# Patient Record
Sex: FEMALE | Race: ASIAN | NOT HISPANIC OR LATINO | Employment: PART TIME | URBAN - METROPOLITAN AREA
[De-identification: names, ages, dates, MRNs, and addresses within clinical notes are randomized per-mention and may not be internally consistent; named-entity substitution may affect disease eponyms.]

---

## 2017-01-12 DIAGNOSIS — M54.2 NECK PAIN: Primary | ICD-10-CM

## 2017-02-13 DIAGNOSIS — Z30.011 ENCOUNTER FOR INITIAL PRESCRIPTION OF CONTRACEPTIVE PILLS: ICD-10-CM

## 2017-02-13 RX ORDER — NORGESTIMATE AND ETHINYL ESTRADIOL 0.25-0.035
1 KIT ORAL DAILY
Qty: 84 TABLET | Refills: 3 | Status: SHIPPED
Start: 2017-02-13 | End: 2017-05-05

## 2017-02-17 ENCOUNTER — OFFICE VISIT (OUTPATIENT)
Dept: FAMILY MEDICINE | Facility: CLINIC | Age: 27
End: 2017-02-17

## 2017-02-17 ENCOUNTER — CARE COORDINATION (OUTPATIENT)
Dept: FAMILY MEDICINE | Facility: CLINIC | Age: 27
End: 2017-02-17

## 2017-02-17 ENCOUNTER — TELEPHONE (OUTPATIENT)
Dept: FAMILY MEDICINE | Facility: CLINIC | Age: 27
End: 2017-02-17

## 2017-02-17 VITALS
BODY MASS INDEX: 29.17 KG/M2 | WEIGHT: 135.2 LBS | SYSTOLIC BLOOD PRESSURE: 108 MMHG | HEIGHT: 57 IN | OXYGEN SATURATION: 98 % | DIASTOLIC BLOOD PRESSURE: 73 MMHG | HEART RATE: 112 BPM | TEMPERATURE: 98 F

## 2017-02-17 DIAGNOSIS — K29.50 OTHER CHRONIC GASTRITIS WITHOUT HEMORRHAGE: ICD-10-CM

## 2017-02-17 DIAGNOSIS — F33.1 MAJOR DEPRESSIVE DISORDER, RECURRENT EPISODE, MODERATE (H): Primary | ICD-10-CM

## 2017-02-17 DIAGNOSIS — F41.1 GENERALIZED ANXIETY DISORDER: ICD-10-CM

## 2017-02-17 DIAGNOSIS — J30.89 SEASONAL ALLERGIC RHINITIS DUE TO OTHER ALLERGIC TRIGGER: ICD-10-CM

## 2017-02-17 DIAGNOSIS — H81.10 BPPV (BENIGN PAROXYSMAL POSITIONAL VERTIGO), UNSPECIFIED LATERALITY: ICD-10-CM

## 2017-02-17 DIAGNOSIS — K59.00 CONSTIPATION, UNSPECIFIED CONSTIPATION TYPE: ICD-10-CM

## 2017-02-17 RX ORDER — CALCIUM CARBONATE 500 MG/1
1 TABLET, CHEWABLE ORAL 2 TIMES DAILY
Qty: 150 TABLET | Refills: 1 | Status: SHIPPED | OUTPATIENT
Start: 2017-02-17 | End: 2017-08-03

## 2017-02-17 RX ORDER — CETIRIZINE HYDROCHLORIDE 10 MG/1
10 TABLET ORAL EVERY EVENING
Qty: 90 TABLET | Refills: 1 | Status: SHIPPED | OUTPATIENT
Start: 2017-02-17 | End: 2017-08-03

## 2017-02-17 RX ORDER — ASPIRIN 81 MG
100 TABLET, DELAYED RELEASE (ENTERIC COATED) ORAL 2 TIMES DAILY
Qty: 60 TABLET | Refills: 1 | Status: SHIPPED | OUTPATIENT
Start: 2017-02-17 | End: 2017-08-03

## 2017-02-17 RX ORDER — FLUOXETINE 40 MG/1
40 CAPSULE ORAL DAILY
Qty: 60 CAPSULE | Refills: 1 | Status: SHIPPED | OUTPATIENT
Start: 2017-02-17 | End: 2017-04-03

## 2017-02-17 RX ORDER — MECLIZINE HYDROCHLORIDE 25 MG/1
25 TABLET ORAL EVERY 6 HOURS PRN
Qty: 30 TABLET | Refills: 1 | Status: SHIPPED | OUTPATIENT
Start: 2017-02-17 | End: 2017-08-03

## 2017-02-17 RX ORDER — DIPHENHYDRAMINE HCL 25 MG
25 TABLET ORAL EVERY 6 HOURS PRN
Qty: 60 TABLET | Refills: 1 | Status: SHIPPED | OUTPATIENT
Start: 2017-02-17 | End: 2017-08-03

## 2017-02-17 RX ORDER — HYDROXYZINE HYDROCHLORIDE 25 MG/1
25 TABLET, FILM COATED ORAL
Qty: 30 TABLET | Refills: 1 | Status: SHIPPED | OUTPATIENT
Start: 2017-02-17 | End: 2017-04-06

## 2017-02-17 ASSESSMENT — ANXIETY QUESTIONNAIRES
1. FEELING NERVOUS, ANXIOUS, OR ON EDGE: NEARLY EVERY DAY
3. WORRYING TOO MUCH ABOUT DIFFERENT THINGS: NEARLY EVERY DAY
2. NOT BEING ABLE TO STOP OR CONTROL WORRYING: NEARLY EVERY DAY
IF YOU CHECKED OFF ANY PROBLEMS ON THIS QUESTIONNAIRE, HOW DIFFICULT HAVE THESE PROBLEMS MADE IT FOR YOU TO DO YOUR WORK, TAKE CARE OF THINGS AT HOME, OR GET ALONG WITH OTHER PEOPLE: SOMEWHAT DIFFICULT
7. FEELING AFRAID AS IF SOMETHING AWFUL MIGHT HAPPEN: MORE THAN HALF THE DAYS
6. BECOMING EASILY ANNOYED OR IRRITABLE: MORE THAN HALF THE DAYS
5. BEING SO RESTLESS THAT IT IS HARD TO SIT STILL: SEVERAL DAYS
GAD7 TOTAL SCORE: 16

## 2017-02-17 ASSESSMENT — PATIENT HEALTH QUESTIONNAIRE - PHQ9: 5. POOR APPETITE OR OVEREATING: MORE THAN HALF THE DAYS

## 2017-02-17 NOTE — PROGRESS NOTES
HPI:       Hayde Macias is a 26 year old  female with a significant past medical history of anxiety and depression who presents for follow up of concern(s) listed below    1. Worsening depression  - Health insurance was interrupted, which was a known problem at her last visit  - Attempted to provide her with enough antidepressant medication to cover while insurance was instituted, but Hayde ran out of medications about 2-3 weeks ago  - Worsening symptoms include decreased sleep, decreased appetite, feelings of guilt and worthlessness, increased anxiety, increased feeling of impending doom  - Yet again brings in paperwork regarding immigration and citizenship status.  Unsure what this specific document is eluding to, but it reads as a possible decline of citizenship - Hayde became tearful at this possible notion.  Our  helped with this, printed off the documents the letter referred to  - Has not seen her therapist in about 2 months time either, notes the insurance problem and ride difficulty    2. Burning epigastric abdominal pain  - Worsening epigastric abdominal pain  - Described as a burning sensation that is worse with eating  - No vomiting         PMHX:     Patient Active Problem List   Diagnosis     Health Care Home     Generalized anxiety disorder     Major depressive disorder, recurrent episode, moderate (H)     Seasonal allergic rhinitis       Current Outpatient Prescriptions   Medication Sig Dispense Refill     norgestimate-ethinyl estradiol (ORTHO-CYCLEN, SPRINTEC) 0.25-35 MG-MCG per tablet Take 1 tablet by mouth daily 84 tablet 3     menthol, Topical Analgesic, 2.5% (BENGAY VANISHIN SCENT) 2.5 % GEL topical gel Apply topically every 6 hours as needed for moderate pain 1 Tube 1     acetaminophen (TYLENOL) 325 MG tablet Take 2 tablets (650 mg) by mouth every 4 hours as needed for mild pain 100 tablet 0     calcium carbonate (TUMS) 500 MG chewable tablet Take 1 tablet (500 mg) by mouth 2  "times daily 150 tablet 1     cetirizine (ZYRTEC) 10 MG tablet Take 1 tablet (10 mg) by mouth every evening 90 tablet 1     diphenhydrAMINE (BENADRYL) 25 MG tablet Take 1 tablet (25 mg) by mouth every 6 hours as needed for itching or allergies 60 tablet 1     docusate sodium (COLACE) 100 MG tablet Take 100 mg by mouth 2 times daily 60 tablet 1     FLUoxetine (PROZAC) 40 MG capsule Take 1 capsule (40 mg) by mouth daily 60 capsule 1     fluticasone (FLONASE) 50 MCG/ACT spray Spray 1-2 sprays into both nostrils daily 16 g 2     hydrOXYzine (ATARAX) 25 MG tablet Take 1 tablet (25 mg) by mouth nightly as needed for anxiety 30 tablet 1     meclizine (ANTIVERT) 25 MG tablet Take 1 tablet (25 mg) by mouth every 6 hours as needed for dizziness 30 tablet 1     ranitidine (ZANTAC) 150 MG tablet Take 1 tablet (150 mg) by mouth 2 times daily 60 tablet 1     polyvinyl alcohol (LIQUIFILM TEARS) 1.4 % ophthalmic solution Place 1 drop into both eyes as needed for dry eyes 15 mL 1          Allergies   Allergen Reactions     Nkda [No Known Drug Allergies]        No results found for this or any previous visit (from the past 24 hour(s)).         SHx:   No tobacco use         Review of Systems:   Constitutional, HEENT, cardiovascular, pulmonary, gi and gu systems are negative, except as otherwise noted.          Physical Exam:     Vitals:    02/17/17 1541   BP: 108/73   BP Location: Right arm   Patient Position: Chair   Cuff Size: Adult Regular   Pulse: 112   Temp: 98  F (36.7  C)   TempSrc: Oral   SpO2: 98%   Weight: 135 lb 3.2 oz (61.3 kg)   Height: 4' 9.25\" (145.4 cm)     Body mass index is 29 kg/(m^2).    GENERAL APPEARANCE: healthy, alert and no distress  EYES: Eyes grossly normal to inspection, conjunctivae and sclerae normal  HENT: ear canals and TM's normal, nose and mouth without ulcers or lesions, oropharynx clear and oral mucous membranes moist  NECK: no adenopathy, no asymmetry, masses, or scars and thyroid normal to " palpation  RESP: lungs clear to auscultation - no rales, rhonchi or wheezes  CV: regular rates and rhythm, normal S1 S2, no S3 or S4, no murmur, click or rub, no peripheral edema and peripheral pulses strong  ABDOMEN: soft, nontender, no hepatosplenomegaly, no masses and bowel sounds normal  MS: no musculoskeletal defects are noted and gait is age appropriate without ataxia  SKIN: no suspicious lesions or rashes  PSYCH: mentation appears normal, and affect depressed, congruent with mood    Office Visit on 12/09/2016   Component Date Value Ref Range Status     Rapid Strep A Screen 12/09/2016 POSITIVE  Negative Final       Assessment and Plan     (F33.1) Major depressive disorder, recurrent episode, moderate (H)  (primary encounter diagnosis), (F41.1) Generalized anxiety disorder  Comment:   Plan: FLUoxetine (PROZAC) 40 MG capsule, hydrOXYzine (ATARAX) 25 MG tablet, diphenhydrAMINE (BENADRYL) 25 MG tablet        Multifactorial causes of her worsening symptoms.  Ran out of prozac, restarting.  No need to titrate at this time.  Encouraged return to her therapist for increased visits.  Social stressors regarding immigration and citizenship status is a recurrent theme.  We have tried to help in multiple ways.  Continue to support.  Encouraged contact with [facility] for continued support through this, especially regarding interpreting letter's written in English that are sent to her    (K29.50) Other chronic gastritis without hemorrhage  Comment:   Plan: ranitidine (ZANTAC) 150 MG tablet, calcium         carbonate (TUMS) 500 MG chewable tablet        Known gastritis/GERD.  Without medications for 2-3 weeks, with symptoms resuming.  Not on NSAIDs anymore at this time.  Restart zantac and tums and monitor for relief of symptoms.    (H81.10) BPPV (benign paroxysmal positional vertigo), unspecified laterality  Comment:   Plan: meclizine (ANTIVERT) 25 MG tablet        Refilled medication    (K59.00) Constipation,  unspecified constipation type  Comment:   Plan: docusate sodium (COLACE) 100 MG tablet        Refilled medication    (J30.89) Seasonal allergic rhinitis due to other allergic trigger  Comment:   Plan: cetirizine (ZYRTEC) 10 MG tablet        Refilled medication      Options for treatment and follow-up care were reviewed with the patient and/or guardian. Hayde LUJAN Say and/or guardian engaged in the decision making process and verbalized understanding of the options discussed and agreed with the final plan.    Carlos Manuel Rivas MD      Precepted today with: Herb Spencer MD  Preceptor Attestation:  I saw and evaluated the patient.  I reviewed the resident physician's history, exam, and treatment plan; and I agree with the documentation by the resident physician.  Supervising Physician:  Herb Spencer MD

## 2017-02-17 NOTE — NURSING NOTE
PHQ9--given to  to help pt fill out for MD     name: William Lacy  Language: Maryann  Agency: [employer]  Phone number: [phone]

## 2017-02-17 NOTE — MR AVS SNAPSHOT
"              After Visit Summary   2/17/2017    Hayde LUJAN Say    MRN: [id number]           Patient Information     Date Of Birth          1990        Visit Information        Provider Department      2/17/2017 3:40 PM Carlos Manuel Rivas MD [facility]        Today's Diagnoses     Major depressive disorder, recurrent episode, moderate (H)    -  1    Generalized anxiety disorder        Other chronic gastritis without hemorrhage        BPPV (benign paroxysmal positional vertigo), unspecified laterality        Constipation, unspecified constipation type        Seasonal allergic rhinitis due to other allergic trigger           Follow-ups after your visit        Follow-up notes from your care team     Return in about 3 weeks (around 3/10/2017) for Recheck Condition Status.      Who to contact     Please call your clinic at [phone] to:    Ask questions about your health    Make or cancel appointments    Discuss your medicines    Learn about your test results    Speak to your doctor   If you have compliments or concerns about an experience at your clinic, or if you wish to file a complaint, please contact [facility] Patient Relations at [phone] or email us at [email or web address]         Additional Information About Your Visit        Care EveryWhere ID     This is your Care EveryWhere ID. This could be used by other organizations to access your [facility] medical records  [id number]        Your Vitals Were     Pulse Temperature Height Pulse Oximetry BMI (Body Mass Index)       112 98  F (36.7  C) (Oral) 4' 9.25\" (145.4 cm) 98% 29 kg/m2        Blood Pressure from Last 3 Encounters:   02/17/17 108/73   12/30/16 129/75   12/09/16 109/69    Weight from Last 3 Encounters:   02/17/17 135 lb 3.2 oz (61.3 kg)   12/30/16 133 lb 9.6 oz (60.6 kg)   12/09/16 135 lb (61.2 kg)              Today, you had the following     No orders found for display         Where to get " your medicines      These medications were sent to [facility][id number] - [place], [place] - [address]  [address] [address]     Phone:  [phone]     calcium carbonate 500 MG chewable tablet    cetirizine 10 MG tablet    diphenhydrAMINE 25 MG tablet    docusate sodium 100 MG tablet    FLUoxetine 40 MG capsule    hydrOXYzine 25 MG tablet    meclizine 25 MG tablet    ranitidine 150 MG tablet          Primary Care Provider Office Phone # Fax #    Carlos Manuel Rivas [phone]       [facility] [address] [address]        Thank you!     Thank you for choosing [facility]  for your care. Our goal is always to provide you with excellent care. Hearing back from our patients is one way we can continue to improve our services. Please take a few minutes to complete the written survey that you may receive in the mail after your visit with us. Thank you!             Your Updated Medication List - Protect others around you: Learn how to safely use, store and throw away your medicines at www.disposemymeds.org.          This list is accurate as of: 2/17/17 11:59 PM.  Always use your most recent med list.                   Brand Name Dispense Instructions for use    acetaminophen 325 MG tablet    TYLENOL    100 tablet    Take 2 tablets (650 mg) by mouth every 4 hours as needed for mild pain       calcium carbonate 500 MG chewable tablet    TUMS    150 tablet    Take 1 tablet (500 mg) by mouth 2 times daily       cetirizine 10 MG tablet    zyrTEC    90 tablet    Take 1 tablet (10 mg) by mouth every evening       diphenhydrAMINE 25 MG tablet    BENADRYL    60 tablet    Take 1 tablet (25 mg) by mouth every 6 hours as needed for itching or allergies       docusate sodium 100 MG tablet    COLACE    60 tablet    Take 100 mg by mouth 2 times daily       FLUoxetine 40 MG capsule    PROzac    60 capsule    Take 1 capsule (40 mg) by mouth daily       fluticasone  50 MCG/ACT spray    FLONASE    16 g    Spray 1-2 sprays into both nostrils daily       hydrOXYzine 25 MG tablet    ATARAX    30 tablet    Take 1 tablet (25 mg) by mouth nightly as needed for anxiety       meclizine 25 MG tablet    ANTIVERT    30 tablet    Take 1 tablet (25 mg) by mouth every 6 hours as needed for dizziness       menthol (Topical Analgesic) 2.5% 2.5 % Gel topical gel    BENGAY VANISHIN SCENT    1 Tube    Apply topically every 6 hours as needed for moderate pain       norgestimate-ethinyl estradiol 0.25-35 MG-MCG per tablet    ORTHO-CYCLEN, SPRINTEC    84 tablet    Take 1 tablet by mouth daily       polyvinyl alcohol 1.4 % ophthalmic solution    LIQUIFILM TEARS    15 mL    Place 1 drop into both eyes as needed for dry eyes       ranitidine 150 MG tablet    ZANTAC    60 tablet    Take 1 tablet (150 mg) by mouth 2 times daily

## 2017-02-17 NOTE — PROGRESS NOTES
Met with pt and   My perception of her was she appeared very angry  She said she wanted to go to [facility], but when she goes they do not have a karis  there and she can not communicate then, and it makes her angry    Told her I would see what I could do to set up an appt on a tues or wed at or around 9am with karis  per her request- however agency was closed    She will also need a ride set up - prefers her  to be karis as well if possible  lbetz

## 2017-02-18 ASSESSMENT — ANXIETY QUESTIONNAIRES: GAD7 TOTAL SCORE: 16

## 2017-02-18 ASSESSMENT — PATIENT HEALTH QUESTIONNAIRE - PHQ9: SUM OF ALL RESPONSES TO PHQ QUESTIONS 1-9: 13

## 2017-02-20 ENCOUNTER — TELEPHONE (OUTPATIENT)
Dept: FAMILY MEDICINE | Facility: CLINIC | Age: 27
End: 2017-02-20

## 2017-02-20 NOTE — TELEPHONE ENCOUNTER
I got a message to help the pt set up an appointment with her therapist at [facility].  They stated that they needed the pt to be seen either Tuesday or Wednesday at 9:00am.  I called [facility] to set up this appointment, they did not have any available for the times and days this week.  They did have it available for 9:00am next Wednesday, which I set the pt up for.      I called the pt and informed her of her appointment, and that I will set up a ride for the pt.      I called [facility] to set up the ride, but was not able to set up till the end of this month.  I will have to call them at the end of the month.

## 2017-03-14 ENCOUNTER — TELEPHONE (OUTPATIENT)
Dept: FAMILY MEDICINE | Facility: CLINIC | Age: 27
End: 2017-03-14

## 2017-03-14 NOTE — TELEPHONE ENCOUNTER
Call from Public Health Nurse Ashley [phone] wanting to go over patient's medications as she states that Hayde is taking all of her prn's at the same time. She questions if patient is on Dramamine and I inform her this is not on her list.

## 2017-03-24 ENCOUNTER — OFFICE VISIT (OUTPATIENT)
Dept: FAMILY MEDICINE | Facility: CLINIC | Age: 27
End: 2017-03-24

## 2017-03-24 VITALS
BODY MASS INDEX: 29.43 KG/M2 | WEIGHT: 136.4 LBS | SYSTOLIC BLOOD PRESSURE: 123 MMHG | TEMPERATURE: 100.9 F | HEART RATE: 140 BPM | HEIGHT: 57 IN | DIASTOLIC BLOOD PRESSURE: 74 MMHG

## 2017-03-24 DIAGNOSIS — J02.0 ACUTE STREPTOCOCCAL PHARYNGITIS: Primary | ICD-10-CM

## 2017-03-24 DIAGNOSIS — R07.0 THROAT PAIN: ICD-10-CM

## 2017-03-24 DIAGNOSIS — R10.9 FLANK PAIN: ICD-10-CM

## 2017-03-24 LAB
BACTERIA URINE: ABNORMAL HPF
BILIRUB UR QL STRIP: NEGATIVE
CLARITY, URINE: ABNORMAL
COLOR UR AUTO: YELLOW
GLUCOSE UR QL STRIP: NEGATIVE
HGB UR QL STRIP: NEGATIVE
KETONES UR QL STRIP: ABNORMAL
LEUKOCYTE ESTERASE UR QL STRIP: ABNORMAL
Lab: PRESENT
MUCOUS THREADS: ABNORMAL LPF
NITRITE UR QL STRIP: NEGATIVE
PH UR STRIP: 8 [PH] (ref 4.5–8)
PROT UR QL STRIP: NEGATIVE MG/DL
RBC, UR MICRO: ABNORMAL HPF
S PYO AG THROAT QL IA.RAPID: POSITIVE
SP GR UR STRIP: 1.01 (ref 1–1.03)
SQUAMOUS EPITHELIAL, UR.: >100 LPF
UROBILINOGEN UR QL STRIP: ABNORMAL
WBC, URINE MICROSCOPIC: ABNORMAL HPF

## 2017-03-24 RX ORDER — CEFTRIAXONE 1 G/1
1000 INJECTION, POWDER, FOR SOLUTION INTRAMUSCULAR; INTRAVENOUS ONCE
Qty: 1 EACH | Refills: 0 | OUTPATIENT
Start: 2017-03-24 | End: 2017-03-24

## 2017-03-24 NOTE — MR AVS SNAPSHOT
"              After Visit Summary   3/24/2017    Hayde LUJAN Say    MRN: [id number]           Patient Information     Date Of Birth          1990        Visit Information        Provider Department      3/24/2017 10:00 AM Suyapa Mittal MD [facility]        Today's Diagnoses     Throat pain    -  1    Flank pain        Acute streptococcal pharyngitis          Care Instructions    Take Augmentin 1 pill twice a day for 10 days  Go to the emergency department if your symptoms worsen or you have trouble breathing.  Return for follow-up visit on Monday        Follow-ups after your visit        Who to contact     Please call your clinic at [phone] to:    Ask questions about your health    Make or cancel appointments    Discuss your medicines    Learn about your test results    Speak to your doctor   If you have compliments or concerns about an experience at your clinic, or if you wish to file a complaint, please contact [facility] [place] [place] [facility] Patient Relations at [phone] or email us at [email or web address]         Additional Information About Your Visit        Care EveryWhere ID     This is your Care EveryWhere ID. This could be used by other organizations to access your [facility] medical records  [id number]        Your Vitals Were     Pulse Temperature Height BMI (Body Mass Index)          140 100.9  F (38.3  C) 4' 9\" (144.8 cm) 29.52 kg/m2         Blood Pressure from Last 3 Encounters:   03/24/17 123/74   02/17/17 108/73   12/30/16 129/75    Weight from Last 3 Encounters:   03/24/17 136 lb 6.4 oz (61.9 kg)   02/17/17 135 lb 3.2 oz (61.3 kg)   12/30/16 133 lb 9.6 oz (60.6 kg)              We Performed the Following     CBC with Plt ([facility])     cefTRIAXone (ROCEPHIN) 1 G IM ([facility])     INJECTION INTRAMUSCULAR OR SUB-Q     Rapid Strep Screen (Group) ([facility])     Urinalysis-UC If Indicate ([facility])          Today's Medication Changes          These changes are " accurate as of: 3/24/17 11:22 AM.  If you have any questions, ask your nurse or doctor.               Start taking these medicines.        Dose/Directions    amoxicillin-clavulanate 875-125 MG per tablet   Commonly known as:  AUGMENTIN   Used for:  Acute streptococcal pharyngitis   Started by:  Suyapa Mittal MD        Dose:  1 tablet   Take 1 tablet by mouth 2 times daily   Quantity:  20 tablet   Refills:  0       cefTRIAXone 1 GM vial   Commonly known as:  ROCEPHIN   Used for:  Throat pain   Started by:  Suyapa Mittal MD        Dose:  1000 mg   Inject 1 g (1,000 mg) into the vein once for 1 dose   Quantity:  1 each   Refills:  0       methylprednisoLONE acetate 40 MG/ML injection   Commonly known as:  DEPO-MEDROL   Used for:  Throat pain   Started by:  Suyapa Mittal MD        80 mg/ml 80 mg IM once   Quantity:  1 mL   Refills:  0            Where to get your medicines      These medications were sent to [id number] - [place], [place] - [address] [address] [address] [address]     Phone:  [phone]     amoxicillin-clavulanate 875-125 MG per tablet         Some of these will need a paper prescription and others can be bought over the counter.  Ask your nurse if you have questions.     You don't need a prescription for these medications     cefTRIAXone 1 GM vial    methylprednisoLONE acetate 40 MG/ML injection                Primary Care Provider Office Phone # Fax #    Carlos Manuel Rivas [phone]       [facility] [address] [address]        Thank you!     Thank you for choosing [facility]  for your care. Our goal is always to provide you with excellent care. Hearing back from our patients is one way we can continue to improve our services. Please take a few minutes to complete the written survey that you may receive in the mail after your visit with us. Thank you!             Your Updated Medication List - Protect others  around you: Learn how to safely use, store and throw away your medicines at www.disposemymeds.org.          This list is accurate as of: 3/24/17 11:22 AM.  Always use your most recent med list.                   Brand Name Dispense Instructions for use    acetaminophen 325 MG tablet    TYLENOL    100 tablet    Take 2 tablets (650 mg) by mouth every 4 hours as needed for mild pain       amoxicillin-clavulanate 875-125 MG per tablet    AUGMENTIN    20 tablet    Take 1 tablet by mouth 2 times daily       calcium carbonate 500 MG chewable tablet    TUMS    150 tablet    Take 1 tablet (500 mg) by mouth 2 times daily       cefTRIAXone 1 GM vial    ROCEPHIN    1 each    Inject 1 g (1,000 mg) into the vein once for 1 dose       cetirizine 10 MG tablet    zyrTEC    90 tablet    Take 1 tablet (10 mg) by mouth every evening       diphenhydrAMINE 25 MG tablet    BENADRYL    60 tablet    Take 1 tablet (25 mg) by mouth every 6 hours as needed for itching or allergies       docusate sodium 100 MG tablet    COLACE    60 tablet    Take 100 mg by mouth 2 times daily       FLUoxetine 40 MG capsule    PROzac    60 capsule    Take 1 capsule (40 mg) by mouth daily       fluticasone 50 MCG/ACT spray    FLONASE    16 g    Spray 1-2 sprays into both nostrils daily       hydrOXYzine 25 MG tablet    ATARAX    30 tablet    Take 1 tablet (25 mg) by mouth nightly as needed for anxiety       meclizine 25 MG tablet    ANTIVERT    30 tablet    Take 1 tablet (25 mg) by mouth every 6 hours as needed for dizziness       menthol (Topical Analgesic) 2.5% 2.5 % Gel topical gel    BENGAY VANISHIN SCENT    1 Tube    Apply topically every 6 hours as needed for moderate pain       methylprednisoLONE acetate 40 MG/ML injection    DEPO-MEDROL    1 mL    80 mg/ml 80 mg IM once       norgestimate-ethinyl estradiol 0.25-35 MG-MCG per tablet    ORTHO-CYCLEN, SPRINTEC    84 tablet    Take 1 tablet by mouth daily       polyvinyl alcohol 1.4 % ophthalmic solution     LIQUIFILM TEARS    15 mL    Place 1 drop into both eyes as needed for dry eyes       ranitidine 150 MG tablet    ZANTAC    60 tablet    Take 1 tablet (150 mg) by mouth 2 times daily

## 2017-03-24 NOTE — PROGRESS NOTES
HPI:       Hayde Macias is a 26 year old  female with a significant past medical history of anxiety, depression who presents for the new concern(s) of:    #Headache,throat , low back pain:  Started 2 days ago  -Having subjective fevers as well- has pain with swallowing, more severe on the left side.  No sick contacts.  No cough.  No dysuria or flank pain  -No trouble breathing, pain with swallowing but she is able to take sips  -Low back pain:  Present off and on for several months  No vision changes, no syncope  -Frontal headache, no neck pain or trouble with ROM, no vision changes    No vomiting or diarrhea.  No cough or SOB.  She has had this throat pain before, treated for strep on chart review    Maryann  used for entire visit              PMHX:     Patient Active Problem List   Diagnosis     Health Care Home     Generalized anxiety disorder     Major depressive disorder, recurrent episode, moderate (H)     Seasonal allergic rhinitis       Current Outpatient Prescriptions   Medication Sig Dispense Refill     ranitidine (ZANTAC) 150 MG tablet Take 1 tablet (150 mg) by mouth 2 times daily 60 tablet 1     meclizine (ANTIVERT) 25 MG tablet Take 1 tablet (25 mg) by mouth every 6 hours as needed for dizziness 30 tablet 1     hydrOXYzine (ATARAX) 25 MG tablet Take 1 tablet (25 mg) by mouth nightly as needed for anxiety 30 tablet 1     FLUoxetine (PROZAC) 40 MG capsule Take 1 capsule (40 mg) by mouth daily 60 capsule 1     docusate sodium (COLACE) 100 MG tablet Take 100 mg by mouth 2 times daily 60 tablet 1     diphenhydrAMINE (BENADRYL) 25 MG tablet Take 1 tablet (25 mg) by mouth every 6 hours as needed for itching or allergies 60 tablet 1     cetirizine (ZYRTEC) 10 MG tablet Take 1 tablet (10 mg) by mouth every evening 90 tablet 1     calcium carbonate (TUMS) 500 MG chewable tablet Take 1 tablet (500 mg) by mouth 2 times daily 150 tablet 1     norgestimate-ethinyl estradiol (ORTHO-CYCLEN, SPRINTEC)  "0.25-35 MG-MCG per tablet Take 1 tablet by mouth daily 84 tablet 3     menthol, Topical Analgesic, 2.5% (BENGAY VANISHIN SCENT) 2.5 % GEL topical gel Apply topically every 6 hours as needed for moderate pain 1 Tube 1     acetaminophen (TYLENOL) 325 MG tablet Take 2 tablets (650 mg) by mouth every 4 hours as needed for mild pain 100 tablet 0     fluticasone (FLONASE) 50 MCG/ACT spray Spray 1-2 sprays into both nostrils daily 16 g 2     polyvinyl alcohol (LIQUIFILM TEARS) 1.4 % ophthalmic solution Place 1 drop into both eyes as needed for dry eyes 15 mL 1          Allergies   Allergen Reactions     Nkda [No Known Drug Allergies]        No results found for this or any previous visit (from the past 24 hour(s)).         Review of Systems:   5-Point Review of Systems Negative -- Except as noted above.          Physical Exam:     Vitals:    03/24/17 1009   BP: 123/74   Pulse: 140   Temp: 100.9  F (38.3  C)   Weight: 136 lb 6.4 oz (61.9 kg)   Height: 4' 9\" (144.8 cm)     Body mass index is 29.52 kg/(m^2).    Gen patient laying on exam table in the fetal position when I arrive in the room.  She does not sit up or change positions while we talk.  She is tearful and crying in pain.  HEENT grossly purulent left tonsil, right tonsil edematous with white lesion.  Voice is clear, not muffled.  Uvula is midline.  No assymetry or neck fullness, pain with palpation of anterior cervical lymph nodes on the left, no crepitus. Pain with opening mouth.  No stridor.  Neck no nuchal rigidity, negative Kernig's  Heart tachycardic to 140s, regular no murmurs  Lungs clear no wheezing or crackles  Abd no pain rebound or guarding  Back no CVA tenderness, diffuse TTP along paraspinal muscles of lumbar spine  Neuro PERRL, CN grossly normal    Assessment and Plan     Severe strep positive pharyngitis, visible purulence on exam, cannot rule out tonsillar/peritonsillar abscess or deeper soft tissue infection.  Patient in obvious discomfort, tearful " and yelling out.  She is febrile and tachycardic, she is not hypotensive.  I did recommend that patient seek care at the emergency department but she refused, stating she is scared- she was not able to elaborate on why she was scared.  Reviewed risk of worsening infection, spreading to neck and rest of body, risk of sepsis if untreated.  Also recommended CT scan at [facility] which patient refused.  Communicated through karis .  Back pain seems chronic and unrelated to current illness.  No s/s meningitis.  -Patient was given 1g IM rocephin and 80 mg IM injection of methylprednisolone for anti-inflammatory benefit.  Rx sent for Augmentin BID x 10 days .   -Patient refused a blood draw, refused to leave a urine sample, refused imaging at [facility].  -I discussed with patient that she needs to seek care at the ED if pain/symptoms are not improving over the next few hours.  Patient to f/u here in clinic on Monday.    Options for treatment and follow-up care were reviewed with the patient and/or guardian. Hayde LUJAN Say and/or guardian engaged in the decision making process and verbalized understanding of the options discussed and agreed with the final plan.      Suyapa Mittal MD      Precepted today with: Jillian Degroot MD

## 2017-03-24 NOTE — PATIENT INSTRUCTIONS
Take Augmentin 1 pill twice a day for 10 days  Go to the emergency department if your symptoms worsen or you have trouble breathing.  Return for follow-up visit on Monday

## 2017-03-24 NOTE — NURSING NOTE
The following medication was given:     MEDICATION: Rocephin 1 gram and Lidocaine 2.1cc  ROUTE: IM  SITE: Vastus Lateralis - Right  DOSE: 1 gram  LOT #: [id number]  :  Hospira  EXPIRATION DATE:  07/01/2019  NDC: 8358-3536-18    The following medication was given:     MEDICATION: Depo Medrol 80mg  ROUTE: IM  SITE: Left upper glute  DOSE: 80 mg/ml  LOT #: [id number]   :  BzzAgent  EXPIRATION DATE:  09/2017  NDC: 3098-2182-88    Allergy: NKDA  Verified both medications with Suma TEJADA on 5Rs. Told pt to wait 20 minutes in clinic for allergy reaction.

## 2017-03-25 LAB — CULTURE: NORMAL

## 2017-03-28 NOTE — PROGRESS NOTES
Please call:    Jarvis Lock  Your urine test is normal, there is no infection.  How are you feeling?  Are you taking the augmentin pills?  Please return for a visit if you are still feeling ill.  Thank you  Dr Mittal

## 2017-03-31 DIAGNOSIS — F41.1 GENERALIZED ANXIETY DISORDER: ICD-10-CM

## 2017-03-31 DIAGNOSIS — F33.1 MAJOR DEPRESSIVE DISORDER, RECURRENT EPISODE, MODERATE (H): ICD-10-CM

## 2017-03-31 RX ORDER — FLUOXETINE 40 MG/1
40 CAPSULE ORAL DAILY
Qty: 60 CAPSULE | Refills: 1 | Status: CANCELLED | OUTPATIENT
Start: 2017-03-31

## 2017-03-31 RX ORDER — HYDROXYZINE HYDROCHLORIDE 25 MG/1
25 TABLET, FILM COATED ORAL
Qty: 30 TABLET | Refills: 1 | Status: CANCELLED | OUTPATIENT
Start: 2017-03-31

## 2017-03-31 NOTE — PROGRESS NOTES
Preceptor Attestation:  Patient's case reviewed and discussed with Suyapa Mittal MD Patient seen and discussed with the resident.. I agree with assessment and plan of care.  Supervising Physician:  Jillian Degroot MD  [facility]

## 2017-03-31 NOTE — TELEPHONE ENCOUNTER
The pt had called her , and the pt  had informed me about the pt needing medication refill.  The pt stated that she needs all her depression medication refilled.  She thought the doctor had refilled it, but when she went to the pharmacy, they did not have it.

## 2017-04-03 DIAGNOSIS — F33.1 MAJOR DEPRESSIVE DISORDER, RECURRENT EPISODE, MODERATE (H): ICD-10-CM

## 2017-04-03 RX ORDER — FLUOXETINE 40 MG/1
40 CAPSULE ORAL DAILY
Qty: 90 CAPSULE | Refills: 1 | Status: SHIPPED | OUTPATIENT
Start: 2017-04-03 | End: 2017-08-03

## 2017-04-06 ENCOUNTER — OFFICE VISIT (OUTPATIENT)
Dept: FAMILY MEDICINE | Facility: CLINIC | Age: 27
End: 2017-04-06

## 2017-04-06 VITALS
HEIGHT: 58 IN | HEART RATE: 72 BPM | TEMPERATURE: 97.8 F | SYSTOLIC BLOOD PRESSURE: 101 MMHG | RESPIRATION RATE: 20 BRPM | DIASTOLIC BLOOD PRESSURE: 66 MMHG | WEIGHT: 133 LBS | OXYGEN SATURATION: 99 % | BODY MASS INDEX: 27.92 KG/M2

## 2017-04-06 DIAGNOSIS — M54.2 NECK PAIN: ICD-10-CM

## 2017-04-06 DIAGNOSIS — H10.13 ALLERGIC CONJUNCTIVITIS OF BOTH EYES: ICD-10-CM

## 2017-04-06 DIAGNOSIS — G89.29 CHRONIC MIDLINE LOW BACK PAIN WITHOUT SCIATICA: ICD-10-CM

## 2017-04-06 DIAGNOSIS — G44.209 TENSION-TYPE HEADACHE, NOT INTRACTABLE, UNSPECIFIED CHRONICITY PATTERN: ICD-10-CM

## 2017-04-06 DIAGNOSIS — F41.1 GENERALIZED ANXIETY DISORDER: ICD-10-CM

## 2017-04-06 DIAGNOSIS — F33.1 MAJOR DEPRESSIVE DISORDER, RECURRENT EPISODE, MODERATE (H): Primary | ICD-10-CM

## 2017-04-06 DIAGNOSIS — M54.50 CHRONIC MIDLINE LOW BACK PAIN WITHOUT SCIATICA: ICD-10-CM

## 2017-04-06 RX ORDER — HYDROXYZINE HYDROCHLORIDE 25 MG/1
25 TABLET, FILM COATED ORAL
Qty: 30 TABLET | Refills: 1 | Status: SHIPPED | OUTPATIENT
Start: 2017-04-06 | End: 2017-08-03

## 2017-04-06 RX ORDER — POLYVINYL ALCOHOL 14 MG/ML
1 SOLUTION/ DROPS OPHTHALMIC PRN
Qty: 15 ML | Refills: 1 | Status: SHIPPED | OUTPATIENT
Start: 2017-04-06 | End: 2017-08-03

## 2017-04-06 ASSESSMENT — ANXIETY QUESTIONNAIRES
GAD7 TOTAL SCORE: 6
1. FEELING NERVOUS, ANXIOUS, OR ON EDGE: SEVERAL DAYS
2. NOT BEING ABLE TO STOP OR CONTROL WORRYING: SEVERAL DAYS
IF YOU CHECKED OFF ANY PROBLEMS ON THIS QUESTIONNAIRE, HOW DIFFICULT HAVE THESE PROBLEMS MADE IT FOR YOU TO DO YOUR WORK, TAKE CARE OF THINGS AT HOME, OR GET ALONG WITH OTHER PEOPLE: SOMEWHAT DIFFICULT
7. FEELING AFRAID AS IF SOMETHING AWFUL MIGHT HAPPEN: SEVERAL DAYS
6. BECOMING EASILY ANNOYED OR IRRITABLE: SEVERAL DAYS
3. WORRYING TOO MUCH ABOUT DIFFERENT THINGS: SEVERAL DAYS
5. BEING SO RESTLESS THAT IT IS HARD TO SIT STILL: NOT AT ALL

## 2017-04-06 ASSESSMENT — PATIENT HEALTH QUESTIONNAIRE - PHQ9: 5. POOR APPETITE OR OVEREATING: SEVERAL DAYS

## 2017-04-06 NOTE — PROGRESS NOTES
Preceptor Attestation:  Patient's case reviewed and discussed with Carlos Manuel Rivas MD Patient seen and discussed with the resident.. I agree with assessment and plan of care.  Supervising Physician:  Sariah Keller DO  [facility]

## 2017-04-06 NOTE — MR AVS SNAPSHOT
After Visit Summary   4/6/2017    Hayde LUJAN Say    MRN: [id number]           Patient Information     Date Of Birth          1990        Visit Information        Provider Department      4/6/2017 8:40 AM Carlos Manuel Rivas MD [facility]        Today's Diagnoses     Major depressive disorder, recurrent episode, moderate (H)    -  1    Neck pain        Tension-type headache, not intractable, unspecified chronicity pattern        Chronic midline low back pain without sciatica        Generalized anxiety disorder        Allergic conjunctivitis of both eyes          Care Instructions                    Copyright   [employer] 2011                    Follow-ups after your visit        Follow-up notes from your care team     Return in about 4 weeks (around 5/4/2017) for Recheck Condition Status.      Your next 10 appointments already scheduled     Apr 12, 2017  8:20 AM CDT   Return Visit with Elham Mcmahan LMFT [facility] ([facility])    [address] [place] [id number] [phone]            Apr 26, 2017  9:00 AM CDT   Return Visit with Carlos Manuel Rivas MD   [facility] ([facility])    [address] [place] [id number]   [phone]              Who to contact     Please call your clinic at [phone] to:    Ask questions about your health    Make or cancel appointments    Discuss your medicines    Learn about your test results    Speak to your doctor   If you have compliments or concerns about an experience at your clinic, or if you wish to file a complaint, please contact [facility] Patient Relations at [phone] or email us at [email or web address]         Additional Information About Your Visit        Care EveryWhere ID     This is your Care EveryWhere ID. This could be used by other organizations to access your [place] medical records  [id number]       "  Your Vitals Were     Pulse Temperature Respirations Height Pulse Oximetry BMI (Body Mass Index)    72 97.8  F (36.6  C) (Oral) 20 4' 10\" (147.3 cm) 99% 27.8 kg/m2       Blood Pressure from Last 3 Encounters:   04/06/17 101/66   03/24/17 123/74   02/17/17 108/73    Weight from Last 3 Encounters:   04/06/17 133 lb (60.3 kg)   03/24/17 136 lb 6.4 oz (61.9 kg)   02/17/17 135 lb 3.2 oz (61.3 kg)              Today, you had the following     No orders found for display         Where to get your medicines      These medications were sent to [id number] - [place], [place] - [address]  [address] [address]     Phone:  [phone]     hydrOXYzine 25 MG tablet    menthol (Topical Analgesic) 2.5% 2.5 % Gel topical gel    polyvinyl alcohol 1.4 % ophthalmic solution          Primary Care Provider Office Phone # Fax #    Carlos Manuel Rivas [phone]       [facility] [address]  [facility] [address]        Thank you!     Thank you for choosing [facility]  for your care. Our goal is always to provide you with excellent care. Hearing back from our patients is one way we can continue to improve our services. Please take a few minutes to complete the written survey that you may receive in the mail after your visit with us. Thank you!             Your Updated Medication List - Protect others around you: Learn how to safely use, store and throw away your medicines at www.disposemymeds.org.          This list is accurate as of: 4/6/17 11:59 PM.  Always use your most recent med list.                   Brand Name Dispense Instructions for use    acetaminophen 325 MG tablet    TYLENOL    100 tablet    Take 2 tablets (650 mg) by mouth every 4 hours as needed for mild pain       amoxicillin-clavulanate 875-125 MG per tablet    AUGMENTIN    20 tablet    Take 1 tablet by mouth 2 times daily       calcium carbonate 500 MG chewable tablet    TUMS    150 tablet    Take 1 " tablet (500 mg) by mouth 2 times daily       cetirizine 10 MG tablet    zyrTEC    90 tablet    Take 1 tablet (10 mg) by mouth every evening       diphenhydrAMINE 25 MG tablet    BENADRYL    60 tablet    Take 1 tablet (25 mg) by mouth every 6 hours as needed for itching or allergies       docusate sodium 100 MG tablet    COLACE    60 tablet    Take 100 mg by mouth 2 times daily       FLUoxetine 40 MG capsule    PROzac    90 capsule    Take 1 capsule (40 mg) by mouth daily       fluticasone 50 MCG/ACT spray    FLONASE    16 g    Spray 1-2 sprays into both nostrils daily       hydrOXYzine 25 MG tablet    ATARAX    30 tablet    Take 1 tablet (25 mg) by mouth nightly as needed for anxiety       meclizine 25 MG tablet    ANTIVERT    30 tablet    Take 1 tablet (25 mg) by mouth every 6 hours as needed for dizziness       menthol (Topical Analgesic) 2.5% 2.5 % Gel topical gel    BENGAY VANISHIN SCENT    1 Tube    Apply topically every 6 hours as needed for moderate pain       methylprednisoLONE acetate 40 MG/ML injection    DEPO-MEDROL    1 mL    80 mg/ml 80 mg IM once       norgestimate-ethinyl estradiol 0.25-35 MG-MCG per tablet    ORTHO-CYCLEN, SPRINTEC    84 tablet    Take 1 tablet by mouth daily       polyvinyl alcohol 1.4 % ophthalmic solution    LIQUIFILM TEARS    15 mL    Place 1 drop into both eyes as needed for dry eyes       ranitidine 150 MG tablet    ZANTAC    60 tablet    Take 1 tablet (150 mg) by mouth 2 times daily

## 2017-04-06 NOTE — PROGRESS NOTES
Primary Care Behavioral Health Consult Note    Meeting was: unscheduled  Others present: family medicine resident- PCP  Meeting lasted: 25 minutes    Identifying Information and Presenting Problem:    Dr. Rivas requested behavioral health consultation for this patient regarding depression/anxiety and changing psychotherapy care to our clinic.  The patient is a 26 year old Maryann female and agreed to be seen by behavioral health today.    Topics Discussed/Interventions Provided:       Current sxs: most bothersome include- anhedonia, crying, and racing thoughts before bed. These keep her up at night. Cannot identify triggers. Once anhedonia starts, patient reports it impacts her functioning and is bothersome. She 'can't do anything', it's coupled with low energy. Similar outcome when she starts crying. Says these happen 'sometimes', but are very bothersome when they occur and limit her functioning.     Reports depression dates back to pregnancy/post-partum of 3rd child (approx 3-4 yrs). Lives with 3 children, , and his aunt.    Did not like therapist she has been seeing. States he did not smile enough. To me, it sounds like they had poor rapport. We have agreed she will start seeing me here to address problems with sleep, energy, mood, and racing thoughts.       Assessment:     Mental Status: Hayde Macias appeared generally alert and oriented. Dress was casual and appropriate to the weather and occasion. Grooming and hygiene were good. Eye contact was appropriate. Speech was of normal volume and rate and was clear, coherent, and relevant. Mood was euthymic with congruent affect. Thought processes were relevant, logical and goal-directed. Thought content was WNL with no evidence of psychotic or paranoid features. No evidence of SI/HI or self-harm, intent, or plans. Memory appeared grossly intact. Insight and judgment appeared intact and patient exhibited good impulse control during the appointment.     Does the  patient appear to be at imminent risk of harm to self/others at this time? No      PHQ-9 SCORE 12/30/2016 2/17/2017 4/6/2017   Total Score 7 13 5       BRENDA-7 SCORE 2/17/2016 2/17/2017   Total Score 7 16       Other screeners: n/a     Diagnoses (PROVISIONAL):      Recurrent moderate major depression  Generalized anxiety disorder    Plan:      Return to see me to establish care as soon as possible.

## 2017-04-06 NOTE — PROGRESS NOTES
"       HPI:       Hayde Macias is a 26 year old  female with a significant past medical history of mental illness who presents for follow up of concern(s) listed below    1. Depression  - Has been taking her medications every day, does not bring in medications  - States she has been \"thinking a lot\", which prevents her from falling asleep  - Before bedtime, puts her kids to sleep, then tries to fall asleep right away after  - Sleep has been bad for the past couple days  - No thoughts of HI or SI    2. Headaches  - Has been taking tylenol, 2 tablets, once a day, up to 3 times a day  - Described as starting in bilateral temple area, then radiates backwards, bilaterally, hard to describe quality, but maybe stabbing  - Can be caused by oily foods, most frequently, but not always  - Is not sleeping well, could be contributing         PMHX:     Patient Active Problem List   Diagnosis     Health Care Home     Generalized anxiety disorder     Major depressive disorder, recurrent episode, moderate (H)     Seasonal allergic rhinitis       Current Outpatient Prescriptions   Medication Sig Dispense Refill     FLUoxetine (PROZAC) 40 MG capsule Take 1 capsule (40 mg) by mouth daily 90 capsule 1     methylprednisoLONE acetate (DEPO-MEDROL) 40 MG/ML injection 80 mg/ml  80 mg IM once 1 mL 0     amoxicillin-clavulanate (AUGMENTIN) 875-125 MG per tablet Take 1 tablet by mouth 2 times daily 20 tablet 0     ranitidine (ZANTAC) 150 MG tablet Take 1 tablet (150 mg) by mouth 2 times daily 60 tablet 1     meclizine (ANTIVERT) 25 MG tablet Take 1 tablet (25 mg) by mouth every 6 hours as needed for dizziness 30 tablet 1     hydrOXYzine (ATARAX) 25 MG tablet Take 1 tablet (25 mg) by mouth nightly as needed for anxiety 30 tablet 1     docusate sodium (COLACE) 100 MG tablet Take 100 mg by mouth 2 times daily 60 tablet 1     diphenhydrAMINE (BENADRYL) 25 MG tablet Take 1 tablet (25 mg) by mouth every 6 hours as needed for itching or allergies 60 " "tablet 1     cetirizine (ZYRTEC) 10 MG tablet Take 1 tablet (10 mg) by mouth every evening 90 tablet 1     calcium carbonate (TUMS) 500 MG chewable tablet Take 1 tablet (500 mg) by mouth 2 times daily 150 tablet 1     norgestimate-ethinyl estradiol (ORTHO-CYCLEN, SPRINTEC) 0.25-35 MG-MCG per tablet Take 1 tablet by mouth daily 84 tablet 3     menthol, Topical Analgesic, 2.5% (BENGAY VANISHIN SCENT) 2.5 % GEL topical gel Apply topically every 6 hours as needed for moderate pain 1 Tube 1     acetaminophen (TYLENOL) 325 MG tablet Take 2 tablets (650 mg) by mouth every 4 hours as needed for mild pain 100 tablet 0     fluticasone (FLONASE) 50 MCG/ACT spray Spray 1-2 sprays into both nostrils daily 16 g 2     polyvinyl alcohol (LIQUIFILM TEARS) 1.4 % ophthalmic solution Place 1 drop into both eyes as needed for dry eyes 15 mL 1          Allergies   Allergen Reactions     Nkda [No Known Drug Allergies]        No results found for this or any previous visit (from the past 24 hour(s)).         SHx:   No tobacco use         Review of Systems:   Constitutional, HEENT, cardiovascular, pulmonary, gi and gu systems are negative, except as otherwise noted.          Physical Exam:     Vitals:    04/06/17 0852   BP: 101/66   Pulse: 72   Resp: 20   Temp: 97.8  F (36.6  C)   TempSrc: Oral   SpO2: 99%   Weight: 133 lb (60.3 kg)   Height: 4' 10\" (147.3 cm)     Body mass index is 27.8 kg/(m^2).    GENERAL APPEARANCE: healthy, alert and no distress  EYES: Eyes grossly normal to inspection, sclerae normal  NECK: no asymmetry, masses, or scars, tenderness to palpation along paracervical muscles, especially at insertion into occiput  BACK: Tenderness along paraspinal muscles in lumbar region  MS: no musculoskeletal defects are noted and gait is age appropriate without ataxia  SKIN: no suspicious lesions or rashes  PSYCH: mentation appears normal and affect normal/bright    Office Visit on 03/24/2017   Component Date Value Ref Range Status "     Rapid Strep A Screen 03/24/2017 POSITIVE  Negative Final     Color Urine 03/24/2017 Yellow  Colorless, Yellow, Straw, Light Yellow Final     Clarity, urine 03/24/2017 Cloudy* Clear Final     Glucose Urine 03/24/2017 Negative  Negative Final     Bilirubin, Ur 03/24/2017 Negative  Negative Final     Ketones, Ur 03/24/2017 60 mg/dL* Negative Final     Specific Gravity 03/24/2017 1.014  1.001 - 1.030 Final     Hemoglobin, Ur. 03/24/2017 Negative  Negative Final     pH Urine 03/24/2017 8.0  4.5 - 8.0 Final     Protein, Ur. 03/24/2017 Negative  Negative mg/dL Final     Urobilinogen, Ur. 03/24/2017 <2.0 E.U./dL  <2.0 E.U./dL, 2.0 E.U./dL Final     Nitrite, Ur. 03/24/2017 Negative  Negative Final     Leukocyte Esterase, Ur. 03/24/2017 Small* Negative Final     BACTERIA URINE 03/24/2017 None Seen  None Seen hpf Final     RBC, Ur Micro 03/24/2017 0-2  None Seen, 0-2 hpf Final     WBC, Urine Microscopic 03/24/2017 5-10* None Seen, 0-5 hpf Final     Squamous Epithelial, Ur. 03/24/2017 >100* None Seen, 0-5 lpf Final     White Blood Cell Clump 03/24/2017 Present* None Seen Final     Mucous Threads 03/24/2017 Few* None Seen lpf Final     Culture 03/24/2017 SEE RESULTS BELOW   Final    Comment: CULTURE, URINE   SOURCE: Urine, Clean Catch   CULTURE RESULTS:    No Growth         Assessment and Plan     (F33.1) Major depressive disorder, recurrent episode, moderate (H)  (primary encounter diagnosis)  Comment:   Plan: Continue current regimen.  Hayde was seen in conjunction with Dr. Elham Mcmahan.  Has been taking prozac at night, which is an activating anti-depressant.  We therefore suggested her to take it in the morning instead.  RTC in 1-4 weeks to see how her sleep is doing.    (M54.2) Neck pain  Comment:   Plan: menthol, Topical Analgesic, 2.5% (BENGAY         VANISHIN SCENT) 2.5 % GEL topical gel        Gave home exercises for strengthening.  Musculoskeletal in origin.    (G44.209) Tension-type headache, not intractable,  unspecified chronicity pattern  Comment:   Plan: Chronic, likely combination of food triggers, lack of sleep, dehydration, and stress.  Continue depression treatment, work on sleep, avoid oily foods.    (M54.5,  G89.29) Chronic midline low back pain without sciatica  Comment:   Plan: Gave home back strengthening exercises    (H10.13) Allergic conjunctivitis of both eyes  Comment:   Plan: polyvinyl alcohol (LIQUIFILM TEARS) 1.4 %         ophthalmic solution        Refill      Options for treatment and follow-up care were reviewed with the patient and/or guardian. Hayde LUJAN Say and/or guardian engaged in the decision making process and verbalized understanding of the options discussed and agreed with the final plan.    Carlos Manuel Rivas MD      Precepted today with: Dr. Sariah Keller

## 2017-04-07 DIAGNOSIS — M54.2 NECK PAIN: ICD-10-CM

## 2017-04-07 ASSESSMENT — PATIENT HEALTH QUESTIONNAIRE - PHQ9: SUM OF ALL RESPONSES TO PHQ QUESTIONS 1-9: 5

## 2017-04-07 ASSESSMENT — ANXIETY QUESTIONNAIRES: GAD7 TOTAL SCORE: 6

## 2017-04-12 ENCOUNTER — OFFICE VISIT (OUTPATIENT)
Dept: PSYCHOLOGY | Facility: CLINIC | Age: 27
End: 2017-04-12

## 2017-04-12 DIAGNOSIS — F33.1 MAJOR DEPRESSIVE DISORDER, RECURRENT EPISODE, MODERATE (H): Primary | ICD-10-CM

## 2017-04-12 DIAGNOSIS — F41.1 GENERALIZED ANXIETY DISORDER: ICD-10-CM

## 2017-04-12 NOTE — MR AVS SNAPSHOT
After Visit Summary   4/12/2017    Hayde LUJAN Say    MRN: [id number]           Patient Information     Date Of Birth          1990        Visit Information        Provider Department      4/12/2017 8:20 AM Elham Mcmahan, LMFT [facility]        Today's Diagnoses     Major depressive disorder, recurrent episode, moderate (H)    -  1    Generalized anxiety disorder           Follow-ups after your visit        Your next 10 appointments already scheduled     Apr 26, 2017  9:00 AM CDT   Return Visit with Carlos Manuel Rivas MD   [facility] ([facility])    [address] [place] [id number]   [phone]            Apr 26, 2017 10:00 AM CDT   Return Visit with ANABEL Eckert   [facility] ([facility])    [address] [place] [id number]   [phone]              Who to contact     Please call your clinic at [phone] to:    Ask questions about your health    Make or cancel appointments    Discuss your medicines    Learn about your test results    Speak to your doctor   If you have compliments or concerns about an experience at your clinic, or if you wish to file a complaint, please contact [facility] Patient Relations at [phone] or email us at [email or web address]         Additional Information About Your Visit        Care EveryWhere ID     This is your Care EveryWhere ID. This could be used by other organizations to access your [facility] medical records  [id number]         Blood Pressure from Last 3 Encounters:   04/06/17 101/66   03/24/17 123/74   02/17/17 108/73    Weight from Last 3 Encounters:   04/06/17 133 lb (60.3 kg)   03/24/17 136 lb 6.4 oz (61.9 kg)   02/17/17 135 lb 3.2 oz (61.3 kg)              We Performed the Following     Interactive Complexity add-on (76338)     Psychiatric Diagnostic Eval (71938)        Primary Care Provider Office Phone # Fax #    Carlos Manuel  Andreas Rivas [phone]       [facility] [address] [place] [address]        Thank you!     Thank you for choosing [facility]  for your care. Our goal is always to provide you with excellent care. Hearing back from our patients is one way we can continue to improve our services. Please take a few minutes to complete the written survey that you may receive in the mail after your visit with us. Thank you!             Your Updated Medication List - Protect others around you: Learn how to safely use, store and throw away your medicines at www.disposemymeds.org.          This list is accurate as of: 4/12/17  2:45 PM.  Always use your most recent med list.                   Brand Name Dispense Instructions for use    acetaminophen 325 MG tablet    TYLENOL    100 tablet    Take 2 tablets (650 mg) by mouth every 4 hours as needed for mild pain       amoxicillin-clavulanate 875-125 MG per tablet    AUGMENTIN    20 tablet    Take 1 tablet by mouth 2 times daily       calcium carbonate 500 MG chewable tablet    TUMS    150 tablet    Take 1 tablet (500 mg) by mouth 2 times daily       Camphor-Menthol-Methyl Sal 3-5-15 % Crea     1 Tube    Externally apply topically every 6 hours as needed (for moderate pain)       cetirizine 10 MG tablet    zyrTEC    90 tablet    Take 1 tablet (10 mg) by mouth every evening       diphenhydrAMINE 25 MG tablet    BENADRYL    60 tablet    Take 1 tablet (25 mg) by mouth every 6 hours as needed for itching or allergies       docusate sodium 100 MG tablet    COLACE    60 tablet    Take 100 mg by mouth 2 times daily       FLUoxetine 40 MG capsule    PROzac    90 capsule    Take 1 capsule (40 mg) by mouth daily       fluticasone 50 MCG/ACT spray    FLONASE    16 g    Spray 1-2 sprays into both nostrils daily       hydrOXYzine 25 MG tablet    ATARAX    30 tablet    Take 1 tablet (25 mg) by mouth nightly as needed for anxiety       meclizine 25 MG  tablet    ANTIVERT    30 tablet    Take 1 tablet (25 mg) by mouth every 6 hours as needed for dizziness       menthol (Topical Analgesic) 2.5% 2.5 % Gel topical gel    BENGAY VANISHIN SCENT    1 Tube    Apply topically every 6 hours as needed for moderate pain       methylprednisoLONE acetate 40 MG/ML injection    DEPO-MEDROL    1 mL    80 mg/ml 80 mg IM once       norgestimate-ethinyl estradiol 0.25-35 MG-MCG per tablet    ORTHO-CYCLEN, SPRINTEC    84 tablet    Take 1 tablet by mouth daily       polyvinyl alcohol 1.4 % ophthalmic solution    LIQUIFILM TEARS    15 mL    Place 1 drop into both eyes as needed for dry eyes       ranitidine 150 MG tablet    ZANTAC    60 tablet    Take 1 tablet (150 mg) by mouth 2 times daily

## 2017-04-12 NOTE — PROGRESS NOTES
Behavioral Health Diagnostic Assessment:    Meeting was: scheduled  Others present:   Meeting lasted: 45 minutes  Client was: on time  Complexity: An  is used not only to interpret language, since the patient does not speak English, but also to help with the complexity of understandings across cultures, since the patient is not well integrated in the larger [place] culture.    Assessment Summary: Diagnostic completed today. The patient is a 26 year old Maryann female who was referred for mental health services for help with stress, feeling sad, and overwhelmed. Based on the patient's report of symptoms, she likely meets criteria for recurrent moderate major depressive disorder, and generalized anxiety disorder. Patient's affect was sometimes incongruent with statements she made (e.g., saying she felt sad and overwhelmed while smiling and laughing, joking around with me a little). Her behaviors were also a little inappropriate: often rubbed her face and ran hands through her hair while answering questions, sometimes interrupted me or the , and would move in a slightly exaggerated or sudden manner to lay down on the couch, sit up, or appear disengaged (less eye contact, physically turning her head away from me). These behaviors did not seem to be connected to any particular types of questions I asked. She saw Dr. Qureshi at this clinic for a few sessions in 2013. He noted similar behaviors and also questioned whether the pt had a personality d/o and PTSD, despite her inability to identify a traumatizing event. She did not endorse PTSD sxs with me today.  The patient's mental health concerns have been affecting her ability to function at home with her family/relationships and have been causing clinically significant distress. The patient also reports no drug/alcohol use amount frequency and duration. The patient is also struggling with acculturation concerns, not being able to speak  English, limited finances.She denies safety concerns.  Based on the patient's reported symptoms and impact on functioning, the plan for the patient is therapy every 2-3 weeks at this clinic with me.    DSM-V Diagnoses:  296.32 recurrent moderate major depression  300.02 Generalized anxiety d/o    R/O PTSD, personality d/o NOS per previous notes.    Orientation, Recommendations, & Plan:       Rights to and limits to confidentiality were discussed with patient.    Integrated care team and shared chart were discussed with patient and agreed upon.    Follow-up in 2-3 to establish regular psychotherapy to address sadness, stress management.    Goals for therapy = will be established in future appts    Collaboration with other professionals is not indicated at this time.    Referral to another professional/service is not indicated at this time..    A Release of Information is not needed at this time.    Mental Health Screening Questionnaires:    PHQ-9 SCORE 2/17/2017 4/6/2017 4/6/2017   Total Score 13 5 5     BRENDA-7 SCORE 2/17/2016 2/17/2017 4/6/2017   Total Score 7 16 6     PTSD-PC = 0/4  CAGE AID:  0/4   No flowsheet data found.  Complete responses are available for review in the flow-sheet.     Patient reported 'sometimes' to all these answers. Had difficulty putting a time frame on them.   How many days were these difficulties present? 2 days  How many days were you totally unable to carry out your usual activities or work because of any health condition? 2 days  How many days did you cut back and or reduce your usual activities or work because of any health condition? 3 days    Current Presenting Problems or Complaints (including patient perception of problem and external factors contributing to current dilemma): Overall, pt was poor historian today. I'm unable to tell if this is because she has challenges putting her thoughts/feelings into words or problems with the interpretation/translation process today.  Scanned  note from [facility] on 5/12/16 noted similar concerns. Patient states that she 'sometimes' has low energy, cries throughout the day, somewhat anhedonic (said she likes to get out and do things, but sometimes doesn't want to), and worries a lot. She says it is very stressful and frightening for her to live in the  because of language barriers, strange and frightening customs, and not understanding her mail/forms (hailey: food stamps, health insurance, citizenship forms). She says she has a worker at the 'agency' in [facility] that helps with her citizenship waiver. Pt had been seeing [facility]- unclear if this is who filled out her waiver or if this was another agency. Pt notes she becomes easily overwhelmed and frustrated when trying to access resources as she does not always understand what they are for or how to utilize them. She notes that she sometimes has 'episodes' where she becomes very overwhelmed, cries, heart races, short of breath, sensitive to noise, feels like something bad will happen. If her family does not quiet down and let her relax, she becomes more agitated and will start screaming and throwing things. Has not hurt anyone in the home.   Pt brought this up spontaneously as I was starting to ask another question.     Review of Symptoms:  Depression: Sleep Interest Energy Concentration Hopeless Helpless Ruminations Irritability  Gina:  No symptoms  Psychosis: No symptoms  Anxiety: Worries  Panic:  Palpitations Shortness of Breath Sense of Impending Doom- usually accompanied by crying, sometimes screaming, and throwing things.  Post Traumatic Stress Disorder: No symptoms (although somewhat endorsed sxs in 2013). PTSD screen at that time positive, but pt could not articulate a traumatizing event/s.  Obsessive Compulsive Disorder: No symptoms  Eating Disorder: No symptoms  Oppositional Defiant Disorder: Lose Temper  ADD / ADHD: No symptoms  Conduct Disorder: No symptoms    Patient Strengths and Resources:  "Patient is willing to attend appts at clinic and follow Beryl Recommendations. Tries hard to seek out help as needed.     Previous Mental Health Concerns/Treatment:  2-3 sessions at this clinic in 2013. At that time, endorsed more PTSD-like sxs (PTSD screen 4/4), as well as thoughts of self-injurious behaviors (e.g., wanting to poke herself with a knife), moderately high anxiety (BRENDA 7 of 17) and depression (PHQ9 of 20). Most recently, has been seen at [facility]. Feels this is too far away and wants to be seen here.    Outpatient: seen most recently at [facility] and at this clinic in 2013.    Inpatient: None    Chemical Health History:    Alcohol Use: 0  Drug Use: 0  Prescription Misuse: 0  Tobacco Use: 0    Have you ever thought about cutting down your drug/alcohol use?  No  Do you ever get annoyed when people ask you about your drug/alcohol use: No  Do you ever feel guilty about your drug/alcohol use: No  Do you ever drink alcohol or use drugs in the morning: No    Patient past attempts to control alcohol/drug use (including informal approaches or formal treatment): 0  Have there been any consequences related to clients drug use? no.    Mental Status Exam:    Appearance:  Distressed, Casually dressed, Adequately groomed and Appears stated age  Behavior/Relationship to Examiner/Demeanor:  Cooperative, Engaged, Pleasant, Reduced eye contact, Guarded and Interruptive  Build: stocky  Gait:  Normal  Psychomotor Activity: agitation  Speech rate:  Normal  Speech volume:  Normal  Speech coherence:  Normal- although sometimes did not exactly answer the question that was posed to her. Hard to tell if this was a result of miscommunication during translation.  Speech spontaneity:  Normal and sometimes increased latency of response.  Mood (subjective report):  \"okay\"  Affect (objective appearance):  Reactive/Full range  Eye Contact: adequate  Thought Process (Associations):  Logical, Linear and Goal directed (most of the time- otherwise " did not always answer question asked to her. Seemed to answer a different question).  Thought process (Rate):  Normal  Abnormal Perception:  None  Sensorium:  Alert, Oriented to person, Oriented to place and Oriented to situation  Attention/Concentration:  Normal  Insight:  Adequate  Judgment:  Fair    Suicide Assessment: Pt was poor historian. This was difficult to assess.     Recent suicidal thoughts: No  Past suicidal thoughts: No  Any attempts in the past: No  Any family/friends/loved ones commit suicide: No  Plan or considering various methods: No  Access to guns: No  Protective factors: Unclear  Verbal contract for safety: Yes    Non-Suicidal Self Injurious Behavior: unclear. Sounded more like 'no'    Violence/Homicide Risk Assessment:     Problems with anger management: possibly. Need to assess these 'episodes' further to understand pt outbursts.  History of violence: possible  History of significant damage to property: unk  Threat made to harm or kill someone: no  Verbal contract for safety: Yes      Social History and Associated Level of Functioning (See below): pt was very succinct in answering these questions. Despite some additional questioning, she did not have much else to offer.    Current Living Arrangements: Living in an apartment with her 3 children, , and 's aunt.     Family/Children: Pt has 3 children. The youngest is 3. She mentioned that one of them has a nurse that comes out. I could not ascertain what this was for or for which child. Her 's aunt is old and has a PCA that comes out to care for her. Stated that her relationship with her  was 'fine'. Has some family members (parents, siblings) nearby but they have their own concerns to deal with. Sounded like they mostly see each other for celebrations.     Intimate Relationship/Marriage: Pt is . Did not offer much more than to say that their relationship is 'fine'.    Social Connection: isolated.      Developmental History: Pt is Maryann and immigrated to the [place] within the past 10 years, possibly yes. It was hard to discern.     Abuse/Trauma: Did not endorse today, although has reported nightmares to her PCP and score positive on her PTSD screen in 2013 when meeting with this clinic's behaviorist.     Work: not working    Education: unk    Legal: working on establishing citizenship    Cultural/Belief System: pt very concerned about dying. Notes that [place] customs around burial are very frightening. She does not want her organs or blood removed nor does she want to be cremated. She had many questions for me about this- whether it was illegal to be buried without being embalmed, if she needed to have her organs removed etc. Reports these are part of the racing thoughts she has before bed; especially after seeing someone die. She reports this is the reason she does not like hospitals. She does not want to be near death as she is very worried about what will happen to her personally after death.     Personal Health:     Patient Active Problem List   Diagnosis     Health Care Home     Generalized anxiety disorder     Major depressive disorder, recurrent episode, moderate (H)     Seasonal allergic rhinitis     Current Outpatient Prescriptions   Medication     Camphor-Menthol-Methyl Sal 3-5-15 % CREA     hydrOXYzine (ATARAX) 25 MG tablet     menthol, Topical Analgesic, 2.5% (BENGAY VANISHIN SCENT) 2.5 % GEL topical gel     polyvinyl alcohol (LIQUIFILM TEARS) 1.4 % ophthalmic solution     FLUoxetine (PROZAC) 40 MG capsule     methylprednisoLONE acetate (DEPO-MEDROL) 40 MG/ML injection     amoxicillin-clavulanate (AUGMENTIN) 875-125 MG per tablet     ranitidine (ZANTAC) 150 MG tablet     meclizine (ANTIVERT) 25 MG tablet     docusate sodium (COLACE) 100 MG tablet     diphenhydrAMINE (BENADRYL) 25 MG tablet     cetirizine (ZYRTEC) 10 MG tablet     calcium carbonate (TUMS) 500 MG chewable tablet     norgestimate-ethinyl  "estradiol (ORTHO-CYCLEN, SPRINTEC) 0.25-35 MG-MCG per tablet     acetaminophen (TYLENOL) 325 MG tablet     fluticasone (FLONASE) 50 MCG/ACT spray     No current facility-administered medications for this visit.        Family Health History:     NOTE: Diagnostic complete       Primary Care PTSD Screen  In your life, have you ever had any experience that was so frightening, horrible or upsetting that, in the past month, you...    1. Have had nightmares about it or thought about it when you did not want to? No  2. Tried hard not to think about it or went out of your way to avoid situations that remind you of it? No  3. Were constantly on guard, watchful, or easily startled? No  4. Felt numb or detached from others, activities, or your surroundings? No    Current research suggests that the results of the PC-PTSD should be considered \"positive\" if a patient answers \"yes\" to any (3) items.    References    BRAD Braswell., NORA Angel., Kimerling, R., TRISH Muniz., KENDALL Boyd., RENÉ Gonzáles., NOMAN Luo, BIPIN Rivera., Saucedo, J.I. (2004). The primary care PTSD screen (PC-PTSD): development and operating characteristics. Primary Care Psychiatry, 9, 9-14.    "

## 2017-04-26 ENCOUNTER — OFFICE VISIT (OUTPATIENT)
Dept: PSYCHOLOGY | Facility: CLINIC | Age: 27
End: 2017-04-26

## 2017-04-26 ENCOUNTER — OFFICE VISIT (OUTPATIENT)
Dept: FAMILY MEDICINE | Facility: CLINIC | Age: 27
End: 2017-04-26

## 2017-04-26 VITALS
SYSTOLIC BLOOD PRESSURE: 111 MMHG | HEIGHT: 58 IN | DIASTOLIC BLOOD PRESSURE: 72 MMHG | WEIGHT: 134.4 LBS | HEART RATE: 85 BPM | OXYGEN SATURATION: 98 % | BODY MASS INDEX: 28.21 KG/M2 | TEMPERATURE: 98 F

## 2017-04-26 DIAGNOSIS — G89.29 CHRONIC RIGHT-SIDED LOW BACK PAIN WITHOUT SCIATICA: ICD-10-CM

## 2017-04-26 DIAGNOSIS — F33.1 MAJOR DEPRESSIVE DISORDER, RECURRENT EPISODE, MODERATE (H): Primary | ICD-10-CM

## 2017-04-26 DIAGNOSIS — J30.1 SEASONAL ALLERGIC RHINITIS DUE TO POLLEN: ICD-10-CM

## 2017-04-26 DIAGNOSIS — M54.50 CHRONIC RIGHT-SIDED LOW BACK PAIN WITHOUT SCIATICA: ICD-10-CM

## 2017-04-26 DIAGNOSIS — H10.13 ALLERGIC CONJUNCTIVITIS OF BOTH EYES AND RHINITIS: Primary | ICD-10-CM

## 2017-04-26 DIAGNOSIS — F41.1 GENERALIZED ANXIETY DISORDER: ICD-10-CM

## 2017-04-26 DIAGNOSIS — J30.9 ALLERGIC CONJUNCTIVITIS OF BOTH EYES AND RHINITIS: Primary | ICD-10-CM

## 2017-04-26 RX ORDER — OLOPATADINE HYDROCHLORIDE 1 MG/ML
1 SOLUTION/ DROPS OPHTHALMIC 2 TIMES DAILY
Qty: 1 BOTTLE | Refills: 1 | Status: SHIPPED | OUTPATIENT
Start: 2017-04-26 | End: 2017-08-03

## 2017-04-26 RX ORDER — FEXOFENADINE HCL 180 MG/1
180 TABLET ORAL DAILY
Qty: 30 TABLET | Refills: 1 | Status: SHIPPED | OUTPATIENT
Start: 2017-04-26 | End: 2017-08-03

## 2017-04-26 ASSESSMENT — ANXIETY QUESTIONNAIRES
6. BECOMING EASILY ANNOYED OR IRRITABLE: NOT AT ALL
2. NOT BEING ABLE TO STOP OR CONTROL WORRYING: MORE THAN HALF THE DAYS
3. WORRYING TOO MUCH ABOUT DIFFERENT THINGS: MORE THAN HALF THE DAYS
1. FEELING NERVOUS, ANXIOUS, OR ON EDGE: SEVERAL DAYS
IF YOU CHECKED OFF ANY PROBLEMS ON THIS QUESTIONNAIRE, HOW DIFFICULT HAVE THESE PROBLEMS MADE IT FOR YOU TO DO YOUR WORK, TAKE CARE OF THINGS AT HOME, OR GET ALONG WITH OTHER PEOPLE: SOMEWHAT DIFFICULT
5. BEING SO RESTLESS THAT IT IS HARD TO SIT STILL: NOT AT ALL
GAD7 TOTAL SCORE: 8
7. FEELING AFRAID AS IF SOMETHING AWFUL MIGHT HAPPEN: MORE THAN HALF THE DAYS

## 2017-04-26 ASSESSMENT — PATIENT HEALTH QUESTIONNAIRE - PHQ9: 5. POOR APPETITE OR OVEREATING: SEVERAL DAYS

## 2017-04-26 NOTE — PROGRESS NOTES
Behavioral Health Progress Note    Client Name: Hayde Macias    Service Type: Individual  Length of Visit: 45 minutes  Attendees:   Complexity: An  is used not only to interpret language, since the patient does not speak English, but also to help with the complexity of understandings across cultures, since the patient is not well integrated in the larger [place] culture.      Identifying Information and Presenting Problem:    The patient is a 26 year old Maryann female who is being seen for problematic symptoms of depression and anxiety .    Treatment Objective(s) Addressed in This Session:  Psychological distress due to acculturation difficulties    Progress on / Status of Treatment Objective(s) / Homework:  New Objective established this session       PHQ-9 SCORE 4/6/2017 4/6/2017 4/26/2017   Total Score 5 5 6       BRENDA-7 SCORE 2/17/2017 4/6/2017 4/26/2017   Total Score 16 6 8       Topics Discussed/Interventions Provided:  Today it was difficult to determine how to intervene with Hayde because she endorsed being 'ok' and only feeling stressed 2 days/week. I continued with assessment, reminding her of the concerns she brought last time. She endorsed she has had no anger episodes and that she often will throw clothes or dishes. Denies breaking or hurting anyone. Says it is mostly focused on her  when she is stressed over finances and she is uncertain where money has gone.     Citizenship: this was confusing today, so I brought in Angeli Guerrero, care coordinator, to help clarify. Her initial application was denied, she reapplied, and the newest letter was informational only- informing her they had received her application. She has fears and heard rumors that people in the [place] without citizenship will be deported. This is very stressful because her children are [place] citizens. Moving forward, she wants to work most on understanding [place] customs and help with citizenship.    Assessment: The patient  appeared to be active and engaged in today's session and was receptive to feedback. She struggles greatly to understand functioning and proper utilization of [place] government, resources, and forms. It's somewhat unclear still exactly how I can help her; she seems more interested at this point in getting her problems solved (e.g., obtain citizenship) than learning skills to cope with stress.    Mental Status: Hayde Macias appeared generally alert and oriented. Dress was casual and appropriate to the weather and occasion. Grooming and hygiene were good. Eye contact was adequate. Speech was of normal volume and rate and was clear, coherent, and relevant. Mood was euthymic with congruent affect. Thought processes were relevant, logical and goal-directed. Thought content was WNL with no evidence of psychotic or paranoid features. No evidence of SI/HI or self-harm, intent, or plans. Memory appeared grossly intact. Insight and judgment appeared fair and patient exhibited good impulse control during the appointment.     Does the patient appear to be at imminent risk of harm to self/others at this time? No    The session was necessary to address depressive and anxiety symptoms that have been interfering with patient's ability to function at home, personal life management.  Ongoing psychotherapy is necessary to improve functioning with daily activities, provide psychoeducation and provide support.    Diagnosis (DSM-5):    296.32 Major Depressive Disorder, Recurrent Episode, Moderate _  300.02 (F41.1) Generalized Anxiety Disorder    Plan:  1. Follow up in 2-3 weeks.  2. Work on goals as noted in patient instructions.  3. Utilize crisis resources as needed.      NOTE: Treatment plan update due in 2 sessions.  Diagnostic assessment update due 4/12/18.

## 2017-04-26 NOTE — NURSING NOTE
name: Evelio Sosa  Language: Maryann  Agency: [facility]  Phone number: [phone]    PHQ9 / GAD7--given to pt to have  help fill out for MD

## 2017-04-26 NOTE — PROGRESS NOTES
Preceptor Attestation:  Patient's case reviewed and discussed with  Patient seen and discussed with the resident..  I agree with written assessment and plan of care.  Supervising Physician:  Jenna Lozada MD  [facility]

## 2017-04-26 NOTE — PROGRESS NOTES
HPI:       Hayde Macias is a 26 year old  female with a significant past medical history of seasonal allergies and resistant depression who presents for    1. Eye problems  - Complains of worsening dry, itchy, burning eye discomfort  - This is a chronic problem secondary to allergies, which use to be controlled with zyrtec, lubricant eye drops, and benadryl as needed  - Currently symptoms are worsening, over the past 1-2 weeks  - Requesting new medications for this problem    2. Low back pain  - Continues to complain of low back pain, chronic problem  - Uses bengay cream with some help, requesting more definitive treatment  - No neurological symptoms         PMHX:     Patient Active Problem List   Diagnosis     Health Care Home     Generalized anxiety disorder     Major depressive disorder, recurrent episode, moderate (H)     Seasonal allergic rhinitis       Current Outpatient Prescriptions   Medication Sig Dispense Refill     hydrOXYzine (ATARAX) 25 MG tablet Take 1 tablet (25 mg) by mouth nightly as needed for anxiety 30 tablet 1     polyvinyl alcohol (LIQUIFILM TEARS) 1.4 % ophthalmic solution Place 1 drop into both eyes as needed for dry eyes 15 mL 1     FLUoxetine (PROZAC) 40 MG capsule Take 1 capsule (40 mg) by mouth daily 90 capsule 1     amoxicillin-clavulanate (AUGMENTIN) 875-125 MG per tablet Take 1 tablet by mouth 2 times daily 20 tablet 0     ranitidine (ZANTAC) 150 MG tablet Take 1 tablet (150 mg) by mouth 2 times daily 60 tablet 1     meclizine (ANTIVERT) 25 MG tablet Take 1 tablet (25 mg) by mouth every 6 hours as needed for dizziness 30 tablet 1     docusate sodium (COLACE) 100 MG tablet Take 100 mg by mouth 2 times daily 60 tablet 1     diphenhydrAMINE (BENADRYL) 25 MG tablet Take 1 tablet (25 mg) by mouth every 6 hours as needed for itching or allergies 60 tablet 1     cetirizine (ZYRTEC) 10 MG tablet Take 1 tablet (10 mg) by mouth every evening 90 tablet 1     calcium carbonate (TUMS) 500 MG  "chewable tablet Take 1 tablet (500 mg) by mouth 2 times daily 150 tablet 1     acetaminophen (TYLENOL) 325 MG tablet Take 2 tablets (650 mg) by mouth every 4 hours as needed for mild pain 100 tablet 0     Camphor-Menthol-Methyl Sal 3-5-15 % CREA Externally apply topically every 6 hours as needed (for moderate pain) 1 Tube 11     menthol, Topical Analgesic, 2.5% (BENGAY VANISHIN SCENT) 2.5 % GEL topical gel Apply topically every 6 hours as needed for moderate pain 1 Tube 1     methylprednisoLONE acetate (DEPO-MEDROL) 40 MG/ML injection 80 mg/ml  80 mg IM once 1 mL 0     norgestimate-ethinyl estradiol (ORTHO-CYCLEN, SPRINTEC) 0.25-35 MG-MCG per tablet Take 1 tablet by mouth daily 84 tablet 3     fluticasone (FLONASE) 50 MCG/ACT spray Spray 1-2 sprays into both nostrils daily 16 g 2          Allergies   Allergen Reactions     Nkda [No Known Drug Allergies]        No results found for this or any previous visit (from the past 24 hour(s)).         Review of Systems:   General:  NEGATIVE for fever, chills, change in weight  HEENT:  Positive for dry, burning, itchy eyes, negative for sore throat or nasal discharge  CV:  NEGATIVE for chest pain, palpitations or peripheral edema  Resp:  NEGATIVE for significant cough or SOB  Heme/immune/allergy: POSITIVE  for:, allergies, seasonal allergies  Skin: No rashes,worrisome lesions or skin problems          Physical Exam:     Vitals:    04/26/17 0902   BP: 111/72   BP Location: Right arm   Patient Position: Chair   Cuff Size: Adult Regular   Pulse: 85   Temp: 98  F (36.7  C)   TempSrc: Oral   SpO2: 98%   Weight: 134 lb 6.4 oz (61 kg)   Height: 4' 10\" (147.3 cm)     Body mass index is 28.09 kg/(m^2).    GENERAL APPEARANCE: healthy, alert and no distress  EYES: Eyes grossly normal to inspection, PERRLA, and conjunctivae and sclerae erythematous without drainage  HENT: ear canals and TM's normal, nose and mouth without ulcers or lesions, oropharynx clear and oral mucous membranes " moist  RESP: lungs clear to auscultation - no rales, rhonchi or wheezes  SKIN: no suspicious lesions or rashes    Office Visit on 03/24/2017   Component Date Value Ref Range Status     Rapid Strep A Screen 03/24/2017 POSITIVE  Negative Final     Color Urine 03/24/2017 Yellow  Colorless, Yellow, Straw, Light Yellow Final     Clarity, urine 03/24/2017 Cloudy* Clear Final     Glucose Urine 03/24/2017 Negative  Negative Final     Bilirubin, Ur 03/24/2017 Negative  Negative Final     Ketones, Ur 03/24/2017 60 mg/dL* Negative Final     Specific Gravity 03/24/2017 1.014  1.001 - 1.030 Final     Hemoglobin, Ur. 03/24/2017 Negative  Negative Final     pH Urine 03/24/2017 8.0  4.5 - 8.0 Final     Protein, Ur. 03/24/2017 Negative  Negative mg/dL Final     Urobilinogen, Ur. 03/24/2017 <2.0 E.U./dL  <2.0 E.U./dL, 2.0 E.U./dL Final     Nitrite, Ur. 03/24/2017 Negative  Negative Final     Leukocyte Esterase, Ur. 03/24/2017 Small* Negative Final     BACTERIA URINE 03/24/2017 None Seen  None Seen hpf Final     RBC, Ur Micro 03/24/2017 0-2  None Seen, 0-2 hpf Final     WBC, Urine Microscopic 03/24/2017 5-10* None Seen, 0-5 hpf Final     Squamous Epithelial, Ur. 03/24/2017 >100* None Seen, 0-5 lpf Final     White Blood Cell Clump 03/24/2017 Present* None Seen Final     Mucous Threads 03/24/2017 Few* None Seen lpf Final     Culture 03/24/2017 SEE RESULTS BELOW   Final    Comment: CULTURE, URINE   SOURCE: Urine, Clean Catch   CULTURE RESULTS:    No Growth         Assessment and Plan     (H10.13,  J30.9) Allergic conjunctivitis of both eyes and rhinitis  (primary encounter diagnosis)  Comment:   Plan: fexofenadine (ALLEGRA) 180 MG tablet,         olopatadine (PATANOL) 0.1 % ophthalmic         solution        Seasonal allergies worsening at this time, which is appropriate for the weather and pollen count this time of year.  Claritin seemingly not providing benefit at this time, will switch to allegra.  Eye symptoms are the primary  symptoms, so will prescribe patanol BID.  Information given to patient regarding this.  Continue lubricating eye drops.    (M54.5,  G89.29) Chronic right-sided low back pain without sciatica  Comment:   Plan: Discussed the need for back strengthening exercises.  Offered formal PT, which was declined.  Patient given home back exercises to do.  Discussed the importance of this.    Options for treatment and follow-up care were reviewed with the patient and/or guardian. Hayde TAI Say and/or guardian engaged in the decision making process and verbalized understanding of the options discussed and agreed with the final plan.    Carlos Manuel Rivas MD      Precepted today with: Dr. Jacque Lozada

## 2017-04-26 NOTE — MR AVS SNAPSHOT
"              After Visit Summary   4/26/2017    Hayde LUJAN Say    MRN: [id number]           Patient Information     Date Of Birth          1990        Visit Information        Provider Department      4/26/2017 9:00 AM Carlos Manuel Rivas MD [facility]        Today's Diagnoses     Allergic conjunctivitis of both eyes and rhinitis    -  1      Care Instructions                      Follow-ups after your visit        Your next 10 appointments already scheduled     Apr 26, 2017 10:00 AM CDT   Return Visit with ANABEL Eckert   [facility] ([facility])    [address] [place] [address]   [phone]              Who to contact     Please call your clinic at [phone] to:    Ask questions about your health    Make or cancel appointments    Discuss your medicines    Learn about your test results    Speak to your doctor   If you have compliments or concerns about an experience at your clinic, or if you wish to file a complaint, please contact [facility] Patient Relations at [phone] or email us at [email or web address]         Additional Information About Your Visit        Care EveryWhere ID     This is your Care EveryWhere ID. This could be used by other organizations to access your [place] medical records  [id number]        Your Vitals Were     Pulse Temperature Height Pulse Oximetry BMI (Body Mass Index)       85 98  F (36.7  C) (Oral) 4' 10\" (147.3 cm) 98% 28.09 kg/m2        Blood Pressure from Last 3 Encounters:   04/26/17 111/72   04/06/17 101/66   03/24/17 123/74    Weight from Last 3 Encounters:   04/26/17 134 lb 6.4 oz (61 kg)   04/06/17 133 lb (60.3 kg)   03/24/17 136 lb 6.4 oz (61.9 kg)              Today, you had the following     No orders found for display         Today's Medication Changes          These changes are accurate as of: 4/26/17  9:39 AM.  If you have any questions, ask your nurse or doctor. "               Start taking these medicines.        Dose/Directions    fexofenadine 180 MG tablet   Commonly known as:  ALLEGRA   Used for:  Allergic conjunctivitis of both eyes and rhinitis   Started by:  Carlos Manuel Rivas MD        Dose:  180 mg   Take 1 tablet (180 mg) by mouth daily   Quantity:  30 tablet   Refills:  1       olopatadine 0.1 % ophthalmic solution   Commonly known as:  PATANOL   Used for:  Allergic conjunctivitis of both eyes and rhinitis   Started by:  Carlos Manuel Rivas MD        Dose:  1 drop   Place 1 drop into both eyes 2 times daily   Quantity:  1 Bottle   Refills:  1            Where to get your medicines      These medications were sent to [id number] - [place], [place] - [address]  [address] [address]     Phone:  [phone]     fexofenadine 180 MG tablet    olopatadine 0.1 % ophthalmic solution                Primary Care Provider Office Phone # Fax #    Carlos Manuel Rivas [phone]       [facility] [address] [address]        Thank you!     Thank you for choosing [facility]  for your care. Our goal is always to provide you with excellent care. Hearing back from our patients is one way we can continue to improve our services. Please take a few minutes to complete the written survey that you may receive in the mail after your visit with us. Thank you!             Your Updated Medication List - Protect others around you: Learn how to safely use, store and throw away your medicines at www.disposemymeds.org.          This list is accurate as of: 4/26/17  9:39 AM.  Always use your most recent med list.                   Brand Name Dispense Instructions for use    acetaminophen 325 MG tablet    TYLENOL    100 tablet    Take 2 tablets (650 mg) by mouth every 4 hours as needed for mild pain       amoxicillin-clavulanate 875-125 MG per tablet    AUGMENTIN    20 tablet    Take 1 tablet by mouth  2 times daily       calcium carbonate 500 MG chewable tablet    TUMS    150 tablet    Take 1 tablet (500 mg) by mouth 2 times daily       Camphor-Menthol-Methyl Sal 3-5-15 % Crea     1 Tube    Externally apply topically every 6 hours as needed (for moderate pain)       cetirizine 10 MG tablet    zyrTEC    90 tablet    Take 1 tablet (10 mg) by mouth every evening       diphenhydrAMINE 25 MG tablet    BENADRYL    60 tablet    Take 1 tablet (25 mg) by mouth every 6 hours as needed for itching or allergies       docusate sodium 100 MG tablet    COLACE    60 tablet    Take 100 mg by mouth 2 times daily       fexofenadine 180 MG tablet    ALLEGRA    30 tablet    Take 1 tablet (180 mg) by mouth daily       FLUoxetine 40 MG capsule    PROzac    90 capsule    Take 1 capsule (40 mg) by mouth daily       fluticasone 50 MCG/ACT spray    FLONASE    16 g    Spray 1-2 sprays into both nostrils daily       hydrOXYzine 25 MG tablet    ATARAX    30 tablet    Take 1 tablet (25 mg) by mouth nightly as needed for anxiety       meclizine 25 MG tablet    ANTIVERT    30 tablet    Take 1 tablet (25 mg) by mouth every 6 hours as needed for dizziness       menthol (Topical Analgesic) 2.5% 2.5 % Gel topical gel    BENGAY VANISHIN SCENT    1 Tube    Apply topically every 6 hours as needed for moderate pain       methylprednisoLONE acetate 40 MG/ML injection    DEPO-MEDROL    1 mL    80 mg/ml 80 mg IM once       norgestimate-ethinyl estradiol 0.25-35 MG-MCG per tablet    ORTHO-CYCLEN, SPRINTEC    84 tablet    Take 1 tablet by mouth daily       olopatadine 0.1 % ophthalmic solution    PATANOL    1 Bottle    Place 1 drop into both eyes 2 times daily       polyvinyl alcohol 1.4 % ophthalmic solution    LIQUIFILM TEARS    15 mL    Place 1 drop into both eyes as needed for dry eyes       ranitidine 150 MG tablet    ZANTAC    60 tablet    Take 1 tablet (150 mg) by mouth 2 times daily

## 2017-04-26 NOTE — MR AVS SNAPSHOT
After Visit Summary   4/26/2017    Hayde LUJAN Say    MRN: [id number]           Patient Information     Date Of Birth          1990        Visit Information        Provider Department      4/26/2017 10:00 AM Elham Mcmahan, LMFT [facility]        Today's Diagnoses     Major depressive disorder, recurrent episode, moderate (H)    -  1    Generalized anxiety disorder           Follow-ups after your visit        Your next 10 appointments already scheduled     May 05, 2017  1:20 PM CDT   Return Visit with Carlos Manuel Rivas MD   [facility] ([facility])    [address] [place] [address]   [phone]            May 24, 2017  8:20 AM CDT   Return Visit with Elham Mcmahan LMFT [facility] ([facility]) [address] [place] [address]   [phone]              Who to contact     Please call your clinic at [phone] to:    Ask questions about your health    Make or cancel appointments    Discuss your medicines    Learn about your test results    Speak to your doctor   If you have compliments or concerns about an experience at your clinic, or if you wish to file a complaint, please contact [facility] Patient Relations at [phone] or email us at [email or web address]         Additional Information About Your Visit        Care EveryWhere ID     This is your Care EveryWhere ID. This could be used by other organizations to access your [place] medical records  [id number]         Blood Pressure from Last 3 Encounters:   04/26/17 111/72   04/06/17 101/66   03/24/17 123/74    Weight from Last 3 Encounters:   04/26/17 134 lb 6.4 oz (61 kg)   04/06/17 133 lb (60.3 kg)   03/24/17 136 lb 6.4 oz (61.9 kg)              We Performed the Following     Interactive Complexity add-on (38227)     Psychotherapy 45 min (75835)          Today's Medication Changes          These changes are accurate  as of: 4/26/17  4:44 PM.  If you have any questions, ask your nurse or doctor.               Start taking these medicines.        Dose/Directions    fexofenadine 180 MG tablet   Commonly known as:  ALLEGRA   Used for:  Allergic conjunctivitis of both eyes and rhinitis   Started by:  Carlos Manuel Rivas MD        Dose:  180 mg   Take 1 tablet (180 mg) by mouth daily   Quantity:  30 tablet   Refills:  1       olopatadine 0.1 % ophthalmic solution   Commonly known as:  PATANOL   Used for:  Allergic conjunctivitis of both eyes and rhinitis   Started by:  Carlos Manuel Rivas MD        Dose:  1 drop   Place 1 drop into both eyes 2 times daily   Quantity:  1 Bottle   Refills:  1            Where to get your medicines      These medications were sent to [id number] - [place], [place] - [address]  [address] [facility] [address]     Phone:  [phone]     fexofenadine 180 MG tablet    olopatadine 0.1 % ophthalmic solution                Primary Care Provider Office Phone # Fax #    Carlos Manuel Rivas [phone]       [facility] [address] [facility] [address]        Thank you!     Thank you for choosing [facility]  for your care. Our goal is always to provide you with excellent care. Hearing back from our patients is one way we can continue to improve our services. Please take a few minutes to complete the written survey that you may receive in the mail after your visit with us. Thank you!             Your Updated Medication List - Protect others around you: Learn how to safely use, store and throw away your medicines at www.disposemymeds.org.          This list is accurate as of: 4/26/17  4:44 PM.  Always use your most recent med list.                   Brand Name Dispense Instructions for use    acetaminophen 325 MG tablet    TYLENOL    100 tablet    Take 2 tablets (650 mg) by mouth every 4 hours as needed for mild pain        amoxicillin-clavulanate 875-125 MG per tablet    AUGMENTIN    20 tablet    Take 1 tablet by mouth 2 times daily       calcium carbonate 500 MG chewable tablet    TUMS    150 tablet    Take 1 tablet (500 mg) by mouth 2 times daily       Camphor-Menthol-Methyl Sal 3-5-15 % Crea     1 Tube    Externally apply topically every 6 hours as needed (for moderate pain)       cetirizine 10 MG tablet    zyrTEC    90 tablet    Take 1 tablet (10 mg) by mouth every evening       diphenhydrAMINE 25 MG tablet    BENADRYL    60 tablet    Take 1 tablet (25 mg) by mouth every 6 hours as needed for itching or allergies       docusate sodium 100 MG tablet    COLACE    60 tablet    Take 100 mg by mouth 2 times daily       fexofenadine 180 MG tablet    ALLEGRA    30 tablet    Take 1 tablet (180 mg) by mouth daily       FLUoxetine 40 MG capsule    PROzac    90 capsule    Take 1 capsule (40 mg) by mouth daily       fluticasone 50 MCG/ACT spray    FLONASE    16 g    Spray 1-2 sprays into both nostrils daily       hydrOXYzine 25 MG tablet    ATARAX    30 tablet    Take 1 tablet (25 mg) by mouth nightly as needed for anxiety       meclizine 25 MG tablet    ANTIVERT    30 tablet    Take 1 tablet (25 mg) by mouth every 6 hours as needed for dizziness       menthol (Topical Analgesic) 2.5% 2.5 % Gel topical gel    BENGAY VANISHIN SCENT    1 Tube    Apply topically every 6 hours as needed for moderate pain       methylprednisoLONE acetate 40 MG/ML injection    DEPO-MEDROL    1 mL    80 mg/ml 80 mg IM once       norgestimate-ethinyl estradiol 0.25-35 MG-MCG per tablet    ORTHO-CYCLEN, SPRINTEC    84 tablet    Take 1 tablet by mouth daily       olopatadine 0.1 % ophthalmic solution    PATANOL    1 Bottle    Place 1 drop into both eyes 2 times daily       polyvinyl alcohol 1.4 % ophthalmic solution    LIQUIFILM TEARS    15 mL    Place 1 drop into both eyes as needed for dry eyes       ranitidine 150 MG tablet    ZANTAC    60 tablet    Take 1 tablet  (150 mg) by mouth 2 times daily

## 2017-04-27 ASSESSMENT — PATIENT HEALTH QUESTIONNAIRE - PHQ9: SUM OF ALL RESPONSES TO PHQ QUESTIONS 1-9: 6

## 2017-04-27 ASSESSMENT — ANXIETY QUESTIONNAIRES: GAD7 TOTAL SCORE: 8

## 2017-05-05 ENCOUNTER — OFFICE VISIT (OUTPATIENT)
Dept: FAMILY MEDICINE | Facility: CLINIC | Age: 27
End: 2017-05-05

## 2017-05-05 VITALS
BODY MASS INDEX: 28.22 KG/M2 | WEIGHT: 135 LBS | HEART RATE: 96 BPM | SYSTOLIC BLOOD PRESSURE: 104 MMHG | TEMPERATURE: 97.8 F | DIASTOLIC BLOOD PRESSURE: 69 MMHG | OXYGEN SATURATION: 100 %

## 2017-05-05 DIAGNOSIS — K29.70 HELICOBACTER POSITIVE GASTRITIS: ICD-10-CM

## 2017-05-05 DIAGNOSIS — Z30.011 ENCOUNTER FOR INITIAL PRESCRIPTION OF CONTRACEPTIVE PILLS: ICD-10-CM

## 2017-05-05 DIAGNOSIS — K29.50 OTHER CHRONIC GASTRITIS WITHOUT HEMORRHAGE: ICD-10-CM

## 2017-05-05 DIAGNOSIS — R10.13 ABDOMINAL PAIN, EPIGASTRIC: Primary | ICD-10-CM

## 2017-05-05 DIAGNOSIS — B96.81 HELICOBACTER POSITIVE GASTRITIS: ICD-10-CM

## 2017-05-05 RX ORDER — NORGESTIMATE AND ETHINYL ESTRADIOL 0.25-0.035
1 KIT ORAL DAILY
Qty: 84 TABLET | Refills: 3 | Status: SHIPPED | OUTPATIENT
Start: 2017-05-05 | End: 2017-08-03

## 2017-05-05 RX ORDER — OMEPRAZOLE 40 MG/1
40 CAPSULE, DELAYED RELEASE ORAL DAILY
Qty: 30 CAPSULE | Refills: 0 | Status: SHIPPED | OUTPATIENT
Start: 2017-05-05 | End: 2017-05-11

## 2017-05-05 NOTE — NURSING NOTE
name: Contreras Hess  Language: Maryann  Agency: [facility]  Phone number: [phone]

## 2017-05-05 NOTE — PROGRESS NOTES
HPI:       Hayde Macias is a 26 year old  female with a significant past medical history of mental illness who presents for the new concern(s) of    1. Abdominal Pain  - having epigastric abdominal pain  - Comes and goes  - Described as burning pain  - Food makes it worse  - Has tried tums for the pain, no help, has also been on zantac for quite some time  - No nausea or vomiting, no fevers, no diarrhea, no melanotic stools  - Denies CP or SOB         PMHX:     Patient Active Problem List   Diagnosis     Health Care Home     Generalized anxiety disorder     Major depressive disorder, recurrent episode, moderate (H)     Seasonal allergic rhinitis       Current Outpatient Prescriptions   Medication Sig Dispense Refill     norgestimate-ethinyl estradiol (ORTHO-CYCLEN, SPRINTEC) 0.25-35 MG-MCG per tablet Take 1 tablet by mouth daily 84 tablet 3     fexofenadine (ALLEGRA) 180 MG tablet Take 1 tablet (180 mg) by mouth daily 30 tablet 1     olopatadine (PATANOL) 0.1 % ophthalmic solution Place 1 drop into both eyes 2 times daily 1 Bottle 1     Camphor-Menthol-Methyl Sal 3-5-15 % CREA Externally apply topically every 6 hours as needed (for moderate pain) 1 Tube 11     hydrOXYzine (ATARAX) 25 MG tablet Take 1 tablet (25 mg) by mouth nightly as needed for anxiety 30 tablet 1     polyvinyl alcohol (LIQUIFILM TEARS) 1.4 % ophthalmic solution Place 1 drop into both eyes as needed for dry eyes 15 mL 1     FLUoxetine (PROZAC) 40 MG capsule Take 1 capsule (40 mg) by mouth daily 90 capsule 1     ranitidine (ZANTAC) 150 MG tablet Take 1 tablet (150 mg) by mouth 2 times daily 60 tablet 1     meclizine (ANTIVERT) 25 MG tablet Take 1 tablet (25 mg) by mouth every 6 hours as needed for dizziness 30 tablet 1     docusate sodium (COLACE) 100 MG tablet Take 100 mg by mouth 2 times daily 60 tablet 1     diphenhydrAMINE (BENADRYL) 25 MG tablet Take 1 tablet (25 mg) by mouth every 6 hours as needed for itching or allergies 60 tablet 1      cetirizine (ZYRTEC) 10 MG tablet Take 1 tablet (10 mg) by mouth every evening 90 tablet 1     calcium carbonate (TUMS) 500 MG chewable tablet Take 1 tablet (500 mg) by mouth 2 times daily 150 tablet 1     [DISCONTINUED] norgestimate-ethinyl estradiol (ORTHO-CYCLEN, SPRINTEC) 0.25-35 MG-MCG per tablet Take 1 tablet by mouth daily 84 tablet 3     acetaminophen (TYLENOL) 325 MG tablet Take 2 tablets (650 mg) by mouth every 4 hours as needed for mild pain 100 tablet 0     fluticasone (FLONASE) 50 MCG/ACT spray Spray 1-2 sprays into both nostrils daily 16 g 2          Allergies   Allergen Reactions     Nkda [No Known Drug Allergies]        No results found for this or any previous visit (from the past 24 hour(s)).         Review of Systems:   Constitutional, HEENT, cardiovascular, pulmonary, gi and gu systems are negative, except as otherwise noted.          Physical Exam:     Vitals:    05/05/17 1320   BP: 104/69   Pulse: 96   Temp: 97.8  F (36.6  C)   TempSrc: Oral   SpO2: 100%   Weight: 135 lb (61.2 kg)     Body mass index is 28.22 kg/(m^2).    GENERAL APPEARANCE: healthy, alert and no distress  RESP: lungs clear to auscultation - no rales, rhonchi or wheezes  CV: regular rate and rhythm, normal S1 S2, no S3 or S4, no murmur, click or rub, no peripheral edema and peripheral pulses strong  ABDOMEN: soft, no guarding, tenderness to light palpation in epigastrium, negative juarez's sign, negative bounce test, no hepatosplenomegaly, no masses and bowel sounds normal  PSYCH: mentation appears normal    Office Visit on 03/24/2017   Component Date Value Ref Range Status     Rapid Strep A Screen 03/24/2017 POSITIVE  Negative Final     Color Urine 03/24/2017 Yellow  Colorless, Yellow, Straw, Light Yellow Final     Clarity, urine 03/24/2017 Cloudy* Clear Final     Glucose Urine 03/24/2017 Negative  Negative Final     Bilirubin, Ur 03/24/2017 Negative  Negative Final     Ketones, Ur 03/24/2017 60 mg/dL* Negative Final      Specific Gravity 03/24/2017 1.014  1.001 - 1.030 Final     Hemoglobin, Ur. 03/24/2017 Negative  Negative Final     pH Urine 03/24/2017 8.0  4.5 - 8.0 Final     Protein, Ur. 03/24/2017 Negative  Negative mg/dL Final     Urobilinogen, Ur. 03/24/2017 <2.0 E.U./dL  <2.0 E.U./dL, 2.0 E.U./dL Final     Nitrite, Ur. 03/24/2017 Negative  Negative Final     Leukocyte Esterase, Ur. 03/24/2017 Small* Negative Final     BACTERIA URINE 03/24/2017 None Seen  None Seen hpf Final     RBC, Ur Micro 03/24/2017 0-2  None Seen, 0-2 hpf Final     WBC, Urine Microscopic 03/24/2017 5-10* None Seen, 0-5 hpf Final     Squamous Epithelial, Ur. 03/24/2017 >100* None Seen, 0-5 lpf Final     White Blood Cell Clump 03/24/2017 Present* None Seen Final     Mucous Threads 03/24/2017 Few* None Seen lpf Final     Culture 03/24/2017 SEE RESULTS BELOW   Final    Comment: CULTURE, URINE   SOURCE: Urine, Clean Catch   CULTURE RESULTS:    No Growth         Assessment and Plan     (R10.13) Abdominal pain, epigastric  (primary encounter diagnosis)     (K29.50) Other chronic gastritis without hemorrhage  Comment:   Plan: H. Pylori Agn Fecal ([facility]), omeprazole         (PRILOSEC) 40 MG capsule        Hayde describes continued epigastric pain that is worsening at this time over her chronic pain.  Historically zantac was effective, but currently is not.  I am concerned for an H pylori infection at this time.  She has never been tested for this, and has never undergone an EGD.  She has no signs or symptoms of a bleeding peptic ulcer.  Plan at this time will be to obtain stool antigen for H pylori, and then (after stool collection) start omeprazole daily.  If positive, start triple therapy.  If negative, complete 1 month of PPI, then transition back to just H2 blocker with calcium carbonate (consider adding bismuth sulfate if needed).    Options for treatment and follow-up care were reviewed with the patient and/or guardian. Hayde LUJAN Say and/or guardian engaged  in the decision making process and verbalized understanding of the options discussed and agreed with the final plan.    Carlos Manuel Rivas MD      Precepted today with: Kenya Bentley MD

## 2017-05-05 NOTE — MR AVS SNAPSHOT
After Visit Summary   5/5/2017    Hayde LUJAN Say    MRN: [id number]           Patient Information     Date Of Birth          1990        Visit Information        Provider Department      5/5/2017 1:20 PM Carlos Manuel Rivas MD [facility]        Today's Diagnoses     Abdominal pain, epigastric    -  1    Encounter for initial prescription of contraceptive pills        Other chronic gastritis without hemorrhage           Follow-ups after your visit        Follow-up notes from your care team     Return in about 4 weeks (around 6/2/2017) for Recheck Condition Status.      Your next 10 appointments already scheduled     May 24, 2017  8:20 AM CDT   Return Visit with ANABEL Eckert   [facility] ([facility])    [address] [place] [address]   [phone]              Who to contact     Please call your clinic at [phone] to:    Ask questions about your health    Make or cancel appointments    Discuss your medicines    Learn about your test results    Speak to your doctor   If you have compliments or concerns about an experience at your clinic, or if you wish to file a complaint, please contact [facility] Patient Relations at [phone] or email us at [email or web address]         Additional Information About Your Visit        Care EveryWhere ID     This is your Care EveryWhere ID. This could be used by other organizations to access your [place] medical records  [id number]        Your Vitals Were     Pulse Temperature Last Period Pulse Oximetry BMI (Body Mass Index)       96 97.8  F (36.6  C) (Oral) 04/22/2017 (Approximate) 100% 28.22 kg/m2        Blood Pressure from Last 3 Encounters:   05/05/17 104/69   04/26/17 111/72   04/06/17 101/66    Weight from Last 3 Encounters:   05/05/17 135 lb (61.2 kg)   04/26/17 134 lb 6.4 oz (61 kg)   04/06/17 133 lb (60.3 kg)              We Performed the Following     H.  Pylori Agn Fecal ([facility])          Today's Medication Changes          These changes are accurate as of: 5/5/17 11:59 PM.  If you have any questions, ask your nurse or doctor.               Start taking these medicines.        Dose/Directions    omeprazole 40 MG capsule   Commonly known as:  priLOSEC   Used for:  Abdominal pain, epigastric, Other chronic gastritis without hemorrhage   Started by:  Carlos Manuel Rivas MD        Dose:  40 mg   Take 1 capsule (40 mg) by mouth daily Take 30-60 minutes before a meal.   Quantity:  30 capsule   Refills:  0         Stop taking these medicines if you haven't already. Please contact your care team if you have questions.     amoxicillin-clavulanate 875-125 MG per tablet   Commonly known as:  AUGMENTIN   Stopped by:  Carlos Manuel Rivas MD           menthol (Topical Analgesic) 2.5% 2.5 % Gel topical gel   Commonly known as:  BENGAY VANISHIN SCENT   Stopped by:  Carlos Manuel Rivas MD           methylprednisoLONE acetate 40 MG/ML injection   Commonly known as:  DEPO-MEDROL   Stopped by:  Carlos Manuel Rivas MD                Where to get your medicines      These medications were sent to [facility] #[id number] - [place] [address] [address] [facility]  [address] [facility] [address]     Phone:  [phone]     norgestimate-ethinyl estradiol 0.25-35 MG-MCG per tablet    omeprazole 40 MG capsule                Primary Care Provider Office Phone # Fax #    Carlos Manuel Rivas [phone]       [facility] [address] [facility] [address]        Thank you!     Thank you for choosing [facility]  for your care. Our goal is always to provide you with excellent care. Hearing back from our patients is one way we can continue to improve our services. Please take a few minutes to complete the written survey that you may receive in the mail after your visit with us. Thank you!             Your Updated Medication List -  Protect others around you: Learn how to safely use, store and throw away your medicines at www.disposemymeds.org.          This list is accurate as of: 5/5/17 11:59 PM.  Always use your most recent med list.                   Brand Name Dispense Instructions for use    acetaminophen 325 MG tablet    TYLENOL    100 tablet    Take 2 tablets (650 mg) by mouth every 4 hours as needed for mild pain       calcium carbonate 500 MG chewable tablet    TUMS    150 tablet    Take 1 tablet (500 mg) by mouth 2 times daily       Camphor-Menthol-Methyl Sal 3-5-15 % Crea     1 Tube    Externally apply topically every 6 hours as needed (for moderate pain)       cetirizine 10 MG tablet    zyrTEC    90 tablet    Take 1 tablet (10 mg) by mouth every evening       diphenhydrAMINE 25 MG tablet    BENADRYL    60 tablet    Take 1 tablet (25 mg) by mouth every 6 hours as needed for itching or allergies       docusate sodium 100 MG tablet    COLACE    60 tablet    Take 100 mg by mouth 2 times daily       fexofenadine 180 MG tablet    ALLEGRA    30 tablet    Take 1 tablet (180 mg) by mouth daily       FLUoxetine 40 MG capsule    PROzac    90 capsule    Take 1 capsule (40 mg) by mouth daily       fluticasone 50 MCG/ACT spray    FLONASE    16 g    Spray 1-2 sprays into both nostrils daily       hydrOXYzine 25 MG tablet    ATARAX    30 tablet    Take 1 tablet (25 mg) by mouth nightly as needed for anxiety       meclizine 25 MG tablet    ANTIVERT    30 tablet    Take 1 tablet (25 mg) by mouth every 6 hours as needed for dizziness       norgestimate-ethinyl estradiol 0.25-35 MG-MCG per tablet    ORTHO-CYCLEN, SPRINTEC    84 tablet    Take 1 tablet by mouth daily       olopatadine 0.1 % ophthalmic solution    PATANOL    1 Bottle    Place 1 drop into both eyes 2 times daily       omeprazole 40 MG capsule    priLOSEC    30 capsule    Take 1 capsule (40 mg) by mouth daily Take 30-60 minutes before a meal.       polyvinyl alcohol 1.4 % ophthalmic  solution    LIQUIFILM TEARS    15 mL    Place 1 drop into both eyes as needed for dry eyes       ranitidine 150 MG tablet    ZANTAC    60 tablet    Take 1 tablet (150 mg) by mouth 2 times daily

## 2017-05-08 LAB — H PYLORI AG STL QL IA: POSITIVE

## 2017-05-08 NOTE — PROGRESS NOTES
Preceptor Attestation:  Patient's case reviewed and discussed with Carlos Manuel Rivas MD resident and I evaluated the patient. I agree with written assessment and plan of care.  Supervising Physician:  CLAUDIA LONGORIA MD  [facility]

## 2017-05-11 RX ORDER — OMEPRAZOLE 40 MG/1
40 CAPSULE, DELAYED RELEASE ORAL 2 TIMES DAILY
Qty: 60 CAPSULE | Refills: 0 | Status: SHIPPED | OUTPATIENT
Start: 2017-05-11 | End: 2017-06-10

## 2017-05-11 RX ORDER — CLARITHROMYCIN 500 MG
500 TABLET ORAL 2 TIMES DAILY
Qty: 28 TABLET | Refills: 0 | Status: SHIPPED | OUTPATIENT
Start: 2017-05-11 | End: 2017-05-25

## 2017-05-11 RX ORDER — AMOXICILLIN 500 MG/1
1000 CAPSULE ORAL 2 TIMES DAILY
Qty: 56 CAPSULE | Refills: 0 | Status: SHIPPED | OUTPATIENT
Start: 2017-05-11 | End: 2017-05-25

## 2017-05-11 NOTE — PROGRESS NOTES
H pylori stool antigen is positive.  Will send in prescription for antibiotics to complete a triple therapy regimen with Clarithromycin 500mg BID for 14 days, Amoxicillin 1g BID for 14 days, and omeprazole 40mg BID for 1 month.  RTC in 1 month to follow up symptoms.    Carlos Manuel Rivas MD

## 2017-05-18 ENCOUNTER — TELEPHONE (OUTPATIENT)
Dept: FAMILY MEDICINE | Facility: CLINIC | Age: 27
End: 2017-05-18

## 2017-05-18 NOTE — TELEPHONE ENCOUNTER
Prior Authorization needed on:  17    Medication:  OMEPRAZOLE    Dose:  40 MG CAPSULE    SI CAPSULE TWICE DAILY    Pharmacy confirmed as   [facility] #[id number] - [place] [address] [place] [address] [address] [facility] [address]  Phone: [phone] Fax: [phone]  : Yes    Insurance Name:  BL[employer]/ [employer]  Insurance Phone: [phone]  Insurance Patient ID: [id number]    Alternatives Suggested:  NONE PROVIDED.    QUANTITY LIMIT FORM FILLED AND FAXED TO [employer] [employer].    Demetria Felix May 18, 2017 at 11:24 AM

## 2017-05-24 ENCOUNTER — OFFICE VISIT (OUTPATIENT)
Dept: PSYCHOLOGY | Facility: CLINIC | Age: 27
End: 2017-05-24

## 2017-05-24 ENCOUNTER — DOCUMENTATION ONLY (OUTPATIENT)
Dept: FAMILY MEDICINE | Facility: CLINIC | Age: 27
End: 2017-05-24

## 2017-05-24 DIAGNOSIS — F41.1 GENERALIZED ANXIETY DISORDER: ICD-10-CM

## 2017-05-24 DIAGNOSIS — F33.1 MAJOR DEPRESSIVE DISORDER, RECURRENT EPISODE, MODERATE (H): Primary | ICD-10-CM

## 2017-05-24 NOTE — MR AVS SNAPSHOT
After Visit Summary   5/24/2017    Hayde LUJAN Say    MRN: [id number]           Patient Information     Date Of Birth          1990        Visit Information        Provider Department      5/24/2017 8:20 AM Elham Mcmahan LMFT [facility]        Today's Diagnoses     Major depressive disorder, recurrent episode, moderate (H)    -  1    Generalized anxiety disorder           Follow-ups after your visit        Your next 10 appointments already scheduled     Jun 07, 2017 10:00 AM CDT   Return Visit with Elham Mcmahan LMFT [facility] ([facility])    [address] [place] [address]   [phone]            Jun 19, 2017  1:40 PM CDT   Return Visit with Carlos Manuel Rivas MD   [facility] ([facility])    [address] [place] [address]   [phone]            Jun 19, 2017  3:00 PM CDT   Return Visit with Elham Mcmahan LMFT [facility] ([facility])    [address] [place] [address]   [phone]              Who to contact     Please call your clinic at [phone] to:    Ask questions about your health    Make or cancel appointments    Discuss your medicines    Learn about your test results    Speak to your doctor   If you have compliments or concerns about an experience at your clinic, or if you wish to file a complaint, please contact [facility] Patient Relations at [phone] or email us at [email or web address]         Additional Information About Your Visit        Care EveryWhere ID     This is your Care EveryWhere ID. This could be used by other organizations to access your [place] medical records  [id number]        Your Vitals Were     Last Period                   04/22/2017 (Approximate)            Blood Pressure from Last 3 Encounters:   05/05/17 104/69   04/26/17 111/72   04/06/17 101/66    Weight from Last 3 Encounters:   05/05/17  135 lb (61.2 kg)   04/26/17 134 lb 6.4 oz (61 kg)   04/06/17 133 lb (60.3 kg)              We Performed the Following     Interactive Complexity add-on ([address])     Psychotherapy 45 min ([address])        Primary Care Provider Office Phone # Fax #    Carlos Manuel Anderas Rivas [phone]       [facility] [address] [place] [address]        Thank you!     Thank you for choosing [facility]  for your care. Our goal is always to provide you with excellent care. Hearing back from our patients is one way we can continue to improve our services. Please take a few minutes to complete the written survey that you may receive in the mail after your visit with us. Thank you!             Your Updated Medication List - Protect others around you: Learn how to safely use, store and throw away your medicines at www.disposemymeds.org.          This list is accurate as of: 5/24/17 10:10 AM.  Always use your most recent med list.                   Brand Name Dispense Instructions for use    acetaminophen 325 MG tablet    TYLENOL    100 tablet    Take 2 tablets (650 mg) by mouth every 4 hours as needed for mild pain       amoxicillin 500 MG capsule    AMOXIL    56 capsule    Take 2 capsules (1,000 mg) by mouth 2 times daily for 14 days       calcium carbonate 500 MG chewable tablet    TUMS    150 tablet    Take 1 tablet (500 mg) by mouth 2 times daily       Camphor-Menthol-Methyl Sal 3-5-15 % Crea     1 Tube    Externally apply topically every 6 hours as needed (for moderate pain)       cetirizine 10 MG tablet    zyrTEC    90 tablet    Take 1 tablet (10 mg) by mouth every evening       clarithromycin 500 MG tablet    BIAXIN    28 tablet    Take 1 tablet (500 mg) by mouth 2 times daily for 14 days       diphenhydrAMINE 25 MG tablet    BENADRYL    60 tablet    Take 1 tablet (25 mg) by mouth every 6 hours as needed for itching or allergies       docusate sodium 100 MG tablet    COLACE    60  tablet    Take 100 mg by mouth 2 times daily       fexofenadine 180 MG tablet    ALLEGRA    30 tablet    Take 1 tablet (180 mg) by mouth daily       FLUoxetine 40 MG capsule    PROzac    90 capsule    Take 1 capsule (40 mg) by mouth daily       fluticasone 50 MCG/ACT spray    FLONASE    16 g    Spray 1-2 sprays into both nostrils daily       hydrOXYzine 25 MG tablet    ATARAX    30 tablet    Take 1 tablet (25 mg) by mouth nightly as needed for anxiety       meclizine 25 MG tablet    ANTIVERT    30 tablet    Take 1 tablet (25 mg) by mouth every 6 hours as needed for dizziness       norgestimate-ethinyl estradiol 0.25-35 MG-MCG per tablet    ORTHO-CYCLEN, SPRINTEC    84 tablet    Take 1 tablet by mouth daily       olopatadine 0.1 % ophthalmic solution    PATANOL    1 Bottle    Place 1 drop into both eyes 2 times daily       omeprazole 40 MG capsule    priLOSEC    60 capsule    Take 1 capsule (40 mg) by mouth 2 times daily Take 30-60 minutes before a meal.       polyvinyl alcohol 1.4 % ophthalmic solution    LIQUIFILM TEARS    15 mL    Place 1 drop into both eyes as needed for dry eyes       ranitidine 150 MG tablet    ZANTAC    60 tablet    Take 1 tablet (150 mg) by mouth 2 times daily

## 2017-05-24 NOTE — PROGRESS NOTES
Behavioral Health Progress Note     Client Name:            Hayde Macias                                        Service Type:           Individual  Length of Visit: 50 minutes  Attendees:   Complexity: An  is used not only to interpret language, since the patient does not speak English, but also to help with the complexity of understandings across cultures, since the patient is not well integrated in the larger [place] culture.        Identifying Information and Presenting Problem:     The patient is a 26 year old [place] female who is being seen for problematic symptoms of depression and anxiety .     Treatment Objective(s) Addressed in This Session:  Psychological distress due to acculturation difficulties     Progress on / Status of Treatment Objective(s) / Homework:  New Objective established this session         PHQ-9 SCORE 4/6/2017 4/6/2017 4/26/2017   Total Score 5 5 6         BRENDA-7 SCORE 2/17/2017 4/6/2017 4/26/2017   Total Score 16 6 8         Topics Discussed/Interventions Provided:  Addressed psychological and acculturation stressors and wrote treatment plan.    Acculturation/psychological stressors:  - Pt had 3 major stressors: person knocking on their windows at 2-3am (Pt lives in basement apartment and was scared), had heard story on the news/rumor within her community that people will be required to get barcode tattoos in order to complete daily tasks (grocery shopping, going to the bank, be seen at the clinic etc); citizenship concerns. Processed all of these to better understand patient's fears and experience in [place]. Empathized with patient's terror at being deported back to [facility] where she lived in a refugee [place], uncertainty of living in [place], and feeling unsafe. Tried to help patient brainstorm ways to address safe housing concerns (e.g., call police, talk with landlord about locks) and calm fears about barcode. Ultimately, patient wanted most to just have a place to express  her fears today.     Pt goals focus on better addressing feelings of fear/anxiety/worry; acculturation stress; anger management.     Assessment: The patient appeared to be active and engaged in today's session and was receptive to feedback. She struggles greatly to understand functioning and proper utilization of [place] government, resources, and forms. Is terrified of being deported (despite having proper documentation), and consequences of having to leave her children here and return to life in a refugee camp.      Mental Status: Hayde Macias appeared generally alert and oriented. Dress was casual and appropriate to the weather and occasion. Grooming and hygiene were good. Eye contact was adequate. Speech was of normal volume and rate and was clear, coherent, and relevant. Mood was euthymic with congruent affect. Thought processes were relevant, logical and goal-directed. Thought content was WNL with no evidence of psychotic or paranoid features. No evidence of SI/HI or self-harm, intent, or plans. Memory appeared grossly intact. Insight and judgment appeared fair and patient exhibited good impulse control during the appointment.      Does the patient appear to be at imminent risk of harm to self/others at this time? No     The session was necessary to address depressive and anxiety symptoms that have been interfering with patient's ability to function at home, personal life management.  Ongoing psychotherapy is necessary to improve functioning with daily activities, provide psychoeducation and provide support.     Diagnosis (DSM-5):     296.32 Major Depressive Disorder, Recurrent Episode, Moderate _  300.02 (F41.1) Generalized Anxiety Disorder     Plan:  1. Follow up in 2 weeks and 4 weeks.  2. Further explore possible sxs of PTSD and life in refugee camp.          NOTE: Treatment plan update due 8/24/17.  Diagnostic assessment update due 4/12/18.

## 2017-05-24 NOTE — PROGRESS NOTES
Treatment Plan    Client's Name: Hayde Macias  YOB: 1990    Today's Date: 5/24/17    Date Diagnostic Update Due: 4/12/18    DSM-V Diagnoses:  296.32 Major Depressive Disorder, Recurrent Episode, Moderate _  300.02 (F41.1) Generalized Anxiety Disorder    Psychosocial / Contextual Factors: Hayde is a 26 year old [place] refugee who is well-known to our clinic and her PCP, Dr. Rivas. Hayde has a history of depression and anxiety. She has mostly voiced concerns with me about anger management, stress over finances and not understanding [place] culture/customs. She has young children and is . Her  works. She has some family here, that she sometimes socializes with.  She has tried multiple times to get citizenship, but has had problems with her application and processes in the past. Her application is currently in process again.     No flowsheet data found.  PC-PTSD: 0 /4- although pt has some possible sxs of PTSD that need further exploration.   CAGE AID: 0 /4  PHQ-9 SCORE 4/6/2017 4/6/2017 4/26/2017   Total Score 5 5 6     BRENDA-7 SCORE 2/17/2017 4/6/2017 4/26/2017   Total Score 16 6 8       Collaboration: Carlos Manuel Rivas  n/a    Referral:  n/a    Anticipated treatment duration: Unknown  Agreed upon meeting frequency: 2-3 weeks    Long Term Treatment Goal(s) related to diagnosis / functional impairment(s)  Goal 1: Hayde will become less fearful of [place] customs/news reports that she does not understand.    Steps we will take to achieve your goal:    Hayde will express her fears, ask questions related to them in psychotherapy or with other clinic staff and providers as indicated.    Intervention(s)  Therapist will provide support, psychoeducation and homework assignments as needed.    Goal 2: Hayde will learn more adaptive ways to manage her worries.     Steps we will take to achieve your goal:    Hayde will engage in a combination of relaxation techniques, mindfulness exercises, and  distraction/thought blocking to reduce worry..    Intervention(s)  Therapist will provide support, psychoeducation and homework assignments as needed.    Goal 3: Hayde will learn more appropriate skills to manage anger.    Steps we will take to achieve your goal:    Hayde will identify triggers that cause anger, and engage in a combination of relaxation techniques, mindfulness exercises, and distraction/thought blocking to reduce anger outbursts.    Intervention(s)  Therapist will provide support, psychoeducation and homework assignments as needed.    If you need additional support and care during times that your therapist or PCP are not available, here are some additional resources for you:    Help in a Crisis:    COPE ([facility] Mobile Response Team)  [phone]   Crisis Connection:  [phone]  [facility] Multilingual Crisis Line:  [phone]    [facility]   [address] [place] [address]   [phone] (for 24 hour crisis consultation)     Monday - Friday 8:00am - 7:00pm  Saturday: 11:00am - 3:00pm  Sunday and Holidays Closed    If you feel at risk of immediate harm, go directly to the Emergency Department.    Client has reviewed and agreed to the above plan.    Elham Mcmahan, LMFT  May 24, 2017      ______________________________    ________  Patient Signature       Date    ______________________________    ________  Provider Signature       Date

## 2017-05-26 NOTE — TELEPHONE ENCOUNTER
Prior Authorization: Approved    Approved as of: 5/11/17 THRU 6/11/17    Called pharmacy. Left message on pharmacy VM of approval information and dates.    Demetria Felix May 26, 2017 at 9:45 AM

## 2017-06-07 ENCOUNTER — OFFICE VISIT (OUTPATIENT)
Dept: PSYCHOLOGY | Facility: CLINIC | Age: 27
End: 2017-06-07

## 2017-06-07 DIAGNOSIS — F33.1 MAJOR DEPRESSIVE DISORDER, RECURRENT EPISODE, MODERATE (H): ICD-10-CM

## 2017-06-07 DIAGNOSIS — F43.10 PTSD (POST-TRAUMATIC STRESS DISORDER): Primary | ICD-10-CM

## 2017-06-07 NOTE — MR AVS SNAPSHOT
After Visit Summary   6/7/2017    Hayde LUJAN Say    MRN: [id number]           Patient Information     Date Of Birth          1990        Visit Information        Provider Department      6/7/2017 10:00 AM Elham Mcmahan, LMFT [facility]        Today's Diagnoses     PTSD (post-traumatic stress disorder)    -  1    Major depressive disorder, recurrent episode, moderate (H)           Follow-ups after your visit        Your next 10 appointments already scheduled     Jun 19, 2017  1:40 PM CDT   Return Visit with Carlos Manuel Rivas MD   [facility] ([facility])    [address] [place] [id number]   [phone]            Jun 19, 2017  3:00 PM CDT   Return Visit with Elham Mcmahan LMFT [facility] ([facility])    [address] [place] [address]   [phone]              Who to contact     Please call your clinic at [phone] to:    Ask questions about your health    Make or cancel appointments    Discuss your medicines    Learn about your test results    Speak to your doctor   If you have compliments or concerns about an experience at your clinic, or if you wish to file a complaint, please contact [facility] Patient Relations at [phone] or email us at [email or web address]         Additional Information About Your Visit        Care EveryWhere ID     This is your Care EveryWhere ID. This could be used by other organizations to access your [place] medical records  [id number]         Blood Pressure from Last 3 Encounters:   05/05/17 104/69   04/26/17 111/72   04/06/17 101/66    Weight from Last 3 Encounters:   05/05/17 135 lb (61.2 kg)   04/26/17 134 lb 6.4 oz (61 kg)   04/06/17 133 lb (60.3 kg)              We Performed the Following     Interactive Complexity add-on (99977)     Psychotherapy 45 min (42875)        Primary Care Provider Office Phone # Fax #    Carlos Mnauel Johns  MD Rob [phone]       [facility] [address] [place] [address]        Thank you!     Thank you for choosing [facility]  for your care. Our goal is always to provide you with excellent care. Hearing back from our patients is one way we can continue to improve our services. Please take a few minutes to complete the written survey that you may receive in the mail after your visit with us. Thank you!             Your Updated Medication List - Protect others around you: Learn how to safely use, store and throw away your medicines at www.disposemymeds.org.          This list is accurate as of: 6/7/17 11:59 PM.  Always use your most recent med list.                   Brand Name Dispense Instructions for use    acetaminophen 325 MG tablet    TYLENOL    100 tablet    Take 2 tablets (650 mg) by mouth every 4 hours as needed for mild pain       calcium carbonate 500 MG chewable tablet    TUMS    150 tablet    Take 1 tablet (500 mg) by mouth 2 times daily       Camphor-Menthol-Methyl Sal 3-5-15 % Crea     1 Tube    Externally apply topically every 6 hours as needed (for moderate pain)       cetirizine 10 MG tablet    zyrTEC    90 tablet    Take 1 tablet (10 mg) by mouth every evening       diphenhydrAMINE 25 MG tablet    BENADRYL    60 tablet    Take 1 tablet (25 mg) by mouth every 6 hours as needed for itching or allergies       docusate sodium 100 MG tablet    COLACE    60 tablet    Take 100 mg by mouth 2 times daily       fexofenadine 180 MG tablet    ALLEGRA    30 tablet    Take 1 tablet (180 mg) by mouth daily       FLUoxetine 40 MG capsule    PROzac    90 capsule    Take 1 capsule (40 mg) by mouth daily       fluticasone 50 MCG/ACT spray    FLONASE    16 g    Spray 1-2 sprays into both nostrils daily       hydrOXYzine 25 MG tablet    ATARAX    30 tablet    Take 1 tablet (25 mg) by mouth nightly as needed for anxiety       meclizine 25 MG tablet    ANTIVERT    30  tablet    Take 1 tablet (25 mg) by mouth every 6 hours as needed for dizziness       norgestimate-ethinyl estradiol 0.25-35 MG-MCG per tablet    ORTHO-CYCLEN, SPRINTEC    84 tablet    Take 1 tablet by mouth daily       olopatadine 0.1 % ophthalmic solution    PATANOL    1 Bottle    Place 1 drop into both eyes 2 times daily       omeprazole 40 MG capsule    priLOSEC    60 capsule    Take 1 capsule (40 mg) by mouth 2 times daily Take 30-60 minutes before a meal.       polyvinyl alcohol 1.4 % ophthalmic solution    LIQUIFILM TEARS    15 mL    Place 1 drop into both eyes as needed for dry eyes       ranitidine 150 MG tablet    ZANTAC    60 tablet    Take 1 tablet (150 mg) by mouth 2 times daily

## 2017-06-09 PROBLEM — F43.10 PTSD (POST-TRAUMATIC STRESS DISORDER): Status: ACTIVE | Noted: 2017-06-09

## 2017-06-09 NOTE — PROGRESS NOTES
Behavioral Health Progress Note      Client Name:            Hayde Macias                                        Service Type:           Individual  Length of Visit: 50 minutes  Attendees:   Complexity: An  is used not only to interpret language, since the patient does not speak English, but also to help with the complexity of understandings across cultures, since the patient is not well integrated in the larger [place] culture.          Identifying Information and Presenting Problem:      The patient is a 26 year old [place] female who is being seen for problematic symptoms of depression and anxiety .      Treatment Objective(s) Addressed in This Session:  Psychological distress due to acculturation difficulties      Progress on / Status of Treatment Objective(s) / Homework:  Minimal progress          PHQ-9 SCORE 4/6/2017 4/6/2017 4/26/2017   Total Score 5 5 6           BRENDA-7 SCORE 2/17/2017 4/6/2017 4/26/2017   Total Score 16 6 8           Topics Discussed/Interventions Provided:  Addressed psychological and acculturation stressors today by exploring patient's past experiences in the refugee [place]. Pt recounted tales of people being killed or maimed for leaving the camp, spending her days cleaning, cooking, and getting water. They came to the [place] because her father put in a petition. She reports that many control mechanisms were used by camp officials to keep everyone in line and there was a constant, low grade fear and uncertainty. They trusted few people. Based on assessment, patient more appropriately meets criteria for PTSD. I will remove her anxiety diagnosis, as her symptoms are more appropriately captured by hypervigilance. Her symptoms include: intrusion (distressing memories and nightmares), flashbacks, negative mood, avoidance, arousal (predominantly sleep disturbance, hypervigilance, problems with concentration, irritable behavior and angry outbursts).      Assessment: The patient  appeared to be active and engaged in today's session and was receptive to feedback. She struggles greatly to understand functioning and proper utilization of [place] government, resources, and forms. Is terrified of being deported (despite having proper documentation), and consequences of having to leave her children here and return to life in a refugee camp. As noted, PTSD is likely a more appropriate diagnosis for Hayde.       Mental Status: Hayde Macias appeared generally alert and oriented. Dress was casual and appropriate to the weather and occasion. Grooming and hygiene were good. Eye contact was adequate. Speech was of normal volume and rate and was clear, coherent, and relevant. Mood was euthymic with congruent affect. Thought processes were relevant, logical and goal-directed. Thought content was WNL with no evidence of psychotic or paranoid features. No evidence of SI/HI or self-harm, intent, or plans. Memory appeared grossly intact. Insight and judgment appeared fair and patient exhibited good impulse control during the appointment.       Does the patient appear to be at imminent risk of harm to self/others at this time? No      The session was necessary to address depressive and anxiety symptoms that have been interfering with patient's ability to function at home, personal life management.  Ongoing psychotherapy is necessary to improve functioning with daily activities, provide psychoeducation and provide support.      Diagnosis (DSM-5):      296.32 Major Depressive Disorder, Recurrent Episode, Moderate _  308.3 post traumatic stress disorder       Plan:  1. Follow up in 2 weeks and 4 weeks.  2. Further explore possible sxs of PTSD and life in refugee camp.             NOTE: Treatment plan update due 8/24/17.  Diagnostic assessment update due 4/12/18.

## 2017-08-03 ENCOUNTER — OFFICE VISIT (OUTPATIENT)
Dept: FAMILY MEDICINE | Facility: CLINIC | Age: 27
End: 2017-08-03

## 2017-08-03 VITALS
TEMPERATURE: 98 F | HEIGHT: 58 IN | WEIGHT: 132.8 LBS | OXYGEN SATURATION: 97 % | RESPIRATION RATE: 20 BRPM | DIASTOLIC BLOOD PRESSURE: 70 MMHG | SYSTOLIC BLOOD PRESSURE: 105 MMHG | BODY MASS INDEX: 27.88 KG/M2 | HEART RATE: 93 BPM

## 2017-08-03 DIAGNOSIS — Z86.19 HISTORY OF HELICOBACTER PYLORI INFECTION: ICD-10-CM

## 2017-08-03 DIAGNOSIS — K29.50 OTHER CHRONIC GASTRITIS WITHOUT HEMORRHAGE: ICD-10-CM

## 2017-08-03 DIAGNOSIS — F33.1 MAJOR DEPRESSIVE DISORDER, RECURRENT EPISODE, MODERATE (H): ICD-10-CM

## 2017-08-03 DIAGNOSIS — R10.13 ABDOMINAL PAIN, EPIGASTRIC: Primary | ICD-10-CM

## 2017-08-03 DIAGNOSIS — G44.209 TENSION-TYPE HEADACHE, NOT INTRACTABLE, UNSPECIFIED CHRONICITY PATTERN: ICD-10-CM

## 2017-08-03 DIAGNOSIS — J30.89 SEASONAL ALLERGIC RHINITIS DUE TO OTHER ALLERGIC TRIGGER: ICD-10-CM

## 2017-08-03 DIAGNOSIS — Z30.011 ENCOUNTER FOR INITIAL PRESCRIPTION OF CONTRACEPTIVE PILLS: ICD-10-CM

## 2017-08-03 DIAGNOSIS — K59.00 CONSTIPATION, UNSPECIFIED CONSTIPATION TYPE: ICD-10-CM

## 2017-08-03 DIAGNOSIS — H10.13 ALLERGIC CONJUNCTIVITIS OF BOTH EYES: ICD-10-CM

## 2017-08-03 DIAGNOSIS — H81.10 BPPV (BENIGN PAROXYSMAL POSITIONAL VERTIGO), UNSPECIFIED LATERALITY: ICD-10-CM

## 2017-08-03 DIAGNOSIS — A04.8 H. PYLORI INFECTION: Primary | ICD-10-CM

## 2017-08-03 RX ORDER — NORGESTIMATE AND ETHINYL ESTRADIOL 0.25-0.035
1 KIT ORAL DAILY
Qty: 84 TABLET | Refills: 3 | Status: SHIPPED | OUTPATIENT
Start: 2017-08-03 | End: 2018-07-03

## 2017-08-03 RX ORDER — CETIRIZINE HYDROCHLORIDE 10 MG/1
10 TABLET ORAL EVERY EVENING
Qty: 90 TABLET | Refills: 1 | Status: SHIPPED | OUTPATIENT
Start: 2017-08-03 | End: 2017-09-13

## 2017-08-03 RX ORDER — FLUTICASONE PROPIONATE 50 MCG
1-2 SPRAY, SUSPENSION (ML) NASAL DAILY
Qty: 16 G | Refills: 2 | Status: SHIPPED | OUTPATIENT
Start: 2017-08-03 | End: 2017-09-13

## 2017-08-03 RX ORDER — ACETAMINOPHEN 325 MG/1
650 TABLET ORAL EVERY 4 HOURS PRN
Qty: 100 TABLET | Refills: 0 | Status: SHIPPED | OUTPATIENT
Start: 2017-08-03 | End: 2017-11-01

## 2017-08-03 RX ORDER — POLYVINYL ALCOHOL 14 MG/ML
1 SOLUTION/ DROPS OPHTHALMIC PRN
Qty: 15 ML | Refills: 1 | Status: SHIPPED | OUTPATIENT
Start: 2017-08-03 | End: 2018-03-16

## 2017-08-03 RX ORDER — ASPIRIN 81 MG
100 TABLET, DELAYED RELEASE (ENTERIC COATED) ORAL 2 TIMES DAILY
Qty: 60 TABLET | Refills: 1 | Status: SHIPPED | OUTPATIENT
Start: 2017-08-03 | End: 2017-09-13

## 2017-08-03 RX ORDER — CALCIUM CARBONATE 500 MG/1
1 TABLET, CHEWABLE ORAL 2 TIMES DAILY
Qty: 150 TABLET | Refills: 1 | Status: SHIPPED | OUTPATIENT
Start: 2017-08-03 | End: 2017-09-13

## 2017-08-03 RX ORDER — FLUOXETINE 40 MG/1
40 CAPSULE ORAL DAILY
Qty: 90 CAPSULE | Refills: 1 | Status: SHIPPED | OUTPATIENT
Start: 2017-08-03 | End: 2017-12-15

## 2017-08-03 RX ORDER — MECLIZINE HYDROCHLORIDE 25 MG/1
25 TABLET ORAL EVERY 6 HOURS PRN
Qty: 30 TABLET | Refills: 1 | Status: SHIPPED | OUTPATIENT
Start: 2017-08-03 | End: 2018-03-16

## 2017-08-03 NOTE — PATIENT INSTRUCTIONS
- I refilled all of your medications. They will be at your pharmacy.   - Start taking omeprazole 2 times per day every day for the next month. If you need to break the capsule apart, mix it with juice or applesauce to make sure it gets to your stomach.   - Come back in about 3 weeks to follow up on your abdominal pain. You are also due for a pap test.  - Bring the stool test back to clinic tomorrow, before you start the omeprazole.    Starting a walking program  - Start with a 5 minute walk and add a minute every 1-2 days until you reach 30-60 minutes per day.   - If you get too sore from your walking, skip a day and subtract a minute. When you are no longer sore, you can begin adding minutes again.  - Plan to walk every day so that you get at least 5-6 days per week.   - This will give you great health benefits and 60 minutes a day along with sensible eating has been shown to help people lose weight.

## 2017-08-03 NOTE — PROGRESS NOTES
"   Subjective:   Hayde Macias is a 26 year old  female with mental health, abdominal pain who presents for:    Abdominal pain  - epigastric, burning  - all times of day, not affected by eating, not affected by position  - lasts for 3-4 hours at a time  - no blood in stool or black stools, no vomiting/nausea, no cramping, no fevers, no diarrhea or constipation, normal periods, no abnormal vaginal discharge, no chest pain, cough or SOB.  - thinks her mother has similar symptoms  - took triple therapy 3 months ago for h pylori. Could not swallow capsules, so broke them apart and swallowed the contents. Thinks she took all meds bid but not certain (\"whatever the instructions were on the bottle\").   - currently not taking PPI or zantac    Mood  - doing okay; not great. No thoughts of self harm.   - ran out of fluoxetine last month    Other  - thought she was going to be seeing her regular doctor today; wondering why he is not here  - needs refills on all her meds; had run out of insurance    Social: ran out of insurance coverage a few months ago, but recently has it again  A Riiid  was used for this visit.  ROS negative other than mentioned in HPI   History and medications listed below  Objective:   /70 (BP Location: Left arm, Patient Position: Chair, Cuff Size: Adult Regular)  Pulse 93  Temp 98  F (36.7  C) (Oral)  Resp 20  Ht 4' 9.5\" (146.1 cm)  Wt 132 lb 12.8 oz (60.2 kg)  LMP 08/03/2017  SpO2 97%  BMI 28.24 kg/m2  Body mass index is 28.24 kg/(m^2).  Gen: alert, NAD  HENT: oral mucosa moist  Card: RRR, no murmurs  Resp: CTAB, no wheezing or crackles  Abd: soft, moderate epigastric tenderness, mild diffuse abdominal tenderness without rebound or guarding  MS: extremities grossly normal  Ext: no LE edema  Skin: no rashes on exposed skin  Neuro: mentation intact, speech normal  Psych: mood dysthymic and irritable, affect normal, animated  Assessment and Plan     1. Abdominal pain, epigastric  Most " likely related to recurrent or inadequately treated h pylori infection with ulcers. Could also be gastritis. Less likely functional abdominal pain, though does have MH history related to refugee camp experiences.  Currently no other signs/symptoms to suggest alternative pathology. Will recheck h pylori as it has been about 2 months since completing treatment and she has been off PPI. If positive, would retreat with triple therapy or alternative regimen. Did discuss possibility of EGD in the future to assess for ulcers if ongoing symptoms; patient adamantly refuses this currently.   - omeprazole (PRILOSEC) 20 MG CR capsule; Take 1 capsule (20 mg) by mouth 2 times daily  Dispense: 60 capsule; Refill: 0  - H. Pylori Agn Fecal (RingMD); Future  - calcium carbonate (TUMS) 500 MG chewable tablet; Take 1 tablet (500 mg) by mouth 2 times daily  Dispense: 150 tablet; Refill: 1    2. Major depressive disorder, recurrent episode, moderate (H)  PHQ-9 score today is 5, with no no thoughts of self harm. Has been off meds and did not follow up as recommended with .   - FLUoxetine (PROZAC) 40 MG capsule; Take 1 capsule (40 mg) by mouth daily  Dispense: 90 capsule; Refill: 1    3. Other / med refills  Patient requested all meds refilled. Went through list together and reordered most meds.   - norgestimate-ethinyl estradiol (ORTHO-CYCLEN, SPRINTEC) 0.25-35 MG-MCG per tablet; Take 1 tablet by mouth daily  Dispense: 84 tablet; Refill: 3  - polyvinyl alcohol (LIQUIFILM TEARS) 1.4 % ophthalmic solution; Place 1 drop into both eyes as needed for dry eyes  Dispense: 15 mL; Refill: 1  - meclizine (ANTIVERT) 25 MG tablet; Take 1 tablet (25 mg) by mouth every 6 hours as needed for dizziness  Dispense: 30 tablet; Refill: 1  - docusate sodium (COLACE) 100 MG tablet; Take 100 mg by mouth 2 times daily  Dispense: 60 tablet; Refill: 1  - cetirizine (ZYRTEC) 10 MG tablet; Take 1 tablet (10 mg) by mouth every evening  Dispense: 90 tablet;  Refill: 1  - fluticasone (FLONASE) 50 MCG/ACT spray; Spray 1-2 sprays into both nostrils daily  Dispense: 16 g; Refill: 2  - acetaminophen (TYLENOL) 325 MG tablet; Take 2 tablets (650 mg) by mouth every 4 hours as needed for mild pain  Dispense: 100 tablet; Refill: 0    Follow up: about 3 weeks with Dr. Rivas    Options for treatment and follow-up care were reviewed with the patient and/or guardian. Hayde TAI Say and/or guardian engaged in the decision making process and verbalized understanding of the options discussed and agreed with the final plan.    Kandy Pagan MD, MPH  [facility] [place]    Precepted with: Sariah Keller DO    PMHX:     Patient Active Problem List   Diagnosis     Health Care Home     Major depressive disorder, recurrent episode, moderate (H)     Seasonal allergic rhinitis     PTSD (post-traumatic stress disorder)     Current Outpatient Prescriptions   Medication Sig Dispense Refill     norgestimate-ethinyl estradiol (ORTHO-CYCLEN, SPRINTEC) 0.25-35 MG-MCG per tablet Take 1 tablet by mouth daily 84 tablet 3     fexofenadine (ALLEGRA) 180 MG tablet Take 1 tablet (180 mg) by mouth daily 30 tablet 1     olopatadine (PATANOL) 0.1 % ophthalmic solution Place 1 drop into both eyes 2 times daily 1 Bottle 1     Camphor-Menthol-Methyl Sal 3-5-15 % CREA Externally apply topically every 6 hours as needed (for moderate pain) 1 Tube 11     hydrOXYzine (ATARAX) 25 MG tablet Take 1 tablet (25 mg) by mouth nightly as needed for anxiety 30 tablet 1     polyvinyl alcohol (LIQUIFILM TEARS) 1.4 % ophthalmic solution Place 1 drop into both eyes as needed for dry eyes 15 mL 1     FLUoxetine (PROZAC) 40 MG capsule Take 1 capsule (40 mg) by mouth daily 90 capsule 1     ranitidine (ZANTAC) 150 MG tablet Take 1 tablet (150 mg) by mouth 2 times daily 60 tablet 1     meclizine (ANTIVERT) 25 MG tablet Take 1 tablet (25 mg) by mouth every 6 hours as needed for dizziness 30 tablet 1     docusate sodium  (COLACE) 100 MG tablet Take 100 mg by mouth 2 times daily 60 tablet 1     diphenhydrAMINE (BENADRYL) 25 MG tablet Take 1 tablet (25 mg) by mouth every 6 hours as needed for itching or allergies 60 tablet 1     cetirizine (ZYRTEC) 10 MG tablet Take 1 tablet (10 mg) by mouth every evening 90 tablet 1     calcium carbonate (TUMS) 500 MG chewable tablet Take 1 tablet (500 mg) by mouth 2 times daily 150 tablet 1     acetaminophen (TYLENOL) 325 MG tablet Take 2 tablets (650 mg) by mouth every 4 hours as needed for mild pain 100 tablet 0     fluticasone (FLONASE) 50 MCG/ACT spray Spray 1-2 sprays into both nostrils daily 16 g 2     Family History   Problem Relation Age of Onset     Asthma Son      Asthma Son      Depression Mother      DIABETES No family hx of      Coronary Artery Disease No family hx of      Hypertension No family hx of      Hyperlipidemia No family hx of      CEREBROVASCULAR DISEASE No family hx of      Breast Cancer No family hx of      Colon Cancer No family hx of      Prostate Cancer No family hx of      Other Cancer No family hx of      Anxiety Disorder No family hx of      MENTAL ILLNESS No family hx of      Substance Abuse No family hx of      Anesthesia Reaction No family hx of      OSTEOPOROSIS No family hx of      Genetic Disorder No family hx of      Thyroid Disease No family hx of      Obesity No family hx of      Social History     Social History Narrative     Allergies   Allergen Reactions     Nkda [No Known Drug Allergies]      No results found for this or any previous visit (from the past 24 hour(s)).

## 2017-08-03 NOTE — PROGRESS NOTES
Preceptor Attestation:  Patient's case reviewed and discussed with Kandy Pagan MD Patient seen and discussed with the resident.. I agree with assessment and plan of care.  Supervising Physician:  Sariah Keller DO  [facility]

## 2017-08-03 NOTE — MR AVS SNAPSHOT
After Visit Summary   8/3/2017    Hayde LUJAN Say    MRN: [id number]           Patient Information     Date Of Birth          1990        Visit Information        Provider Department      8/3/2017 8:40 AM Kandy Pagan MD [facility]        Today's Diagnoses     Encounter for initial prescription of contraceptive pills        History of Helicobacter pylori infection        Major depressive disorder, recurrent episode, moderate (H)        Allergic conjunctivitis of both eyes        BPPV (benign paroxysmal positional vertigo), unspecified laterality        Constipation, unspecified constipation type        Seasonal allergic rhinitis due to other allergic trigger        Other chronic gastritis without hemorrhage        Tension-type headache, not intractable, unspecified chronicity pattern          Care Instructions    - I refilled all of your medications. They will be at your pharmacy.   - Start taking omeprazole 2 times per day every day for the next month. If you need to break the capsule apart, mix it with juice or applesauce to make sure it gets to your stomach.   - Come back in about 3 weeks to follow up on your abdominal pain. You are also due for a pap test.  - Bring the stool test back to clinic tomorrow, before you start the omeprazole.    Starting a walking program  - Start with a 5 minute walk and add a minute every 1-2 days until you reach 30-60 minutes per day.   - If you get too sore from your walking, skip a day and subtract a minute. When you are no longer sore, you can begin adding minutes again.  - Plan to walk every day so that you get at least 5-6 days per week.   - This will give you great health benefits and 60 minutes a day along with sensible eating has been shown to help people lose weight.                Follow-ups after your visit        Follow-up notes from your care team     Return in about 3 weeks (around 8/24/2017).      Future tests that were ordered for  "you today     Open Future Orders        Priority Expected Expires Ordered    H. Pylori Agn Fecal ([facility]) Routine  8/10/2017 8/3/2017            Who to contact     Please call your clinic at [phone] to:    Ask questions about your health    Make or cancel appointments    Discuss your medicines    Learn about your test results    Speak to your doctor   If you have compliments or concerns about an experience at your clinic, or if you wish to file a complaint, please contact [facility] Patient Relations at [phone] or email us at [email or web address]         Additional Information About Your Visit        Care EveryWhere ID     This is your Care EveryWhere ID. This could be used by other organizations to access your [facility] medical records  [id number]        Your Vitals Were     Pulse Temperature Respirations Height Last Period Pulse Oximetry    93 98  F (36.7  C) (Oral) 20 4' 9.5\" (146.1 cm) 08/03/2017 97%    BMI (Body Mass Index)                   28.24 kg/m2            Blood Pressure from Last 3 Encounters:   08/03/17 105/70   05/05/17 104/69   04/26/17 111/72    Weight from Last 3 Encounters:   08/03/17 132 lb 12.8 oz (60.2 kg)   05/05/17 135 lb (61.2 kg)   04/26/17 134 lb 6.4 oz (61 kg)                 Today's Medication Changes          These changes are accurate as of: 8/3/17  9:49 AM.  If you have any questions, ask your nurse or doctor.               Start taking these medicines.        Dose/Directions    omeprazole 20 MG CR capsule   Commonly known as:  priLOSEC   Used for:  History of Helicobacter pylori infection   Started by:  Kandy Pagan MD        Dose:  20 mg   Take 1 capsule (20 mg) by mouth 2 times daily   Quantity:  60 capsule   Refills:  0            Where to get your medicines      These medications were sent to [facility] - [place], [place] - [address] [place], [place] [address]  [address], [place] [address]     Phone:  " [phone]     acetaminophen 325 MG tablet    calcium carbonate 500 MG chewable tablet    cetirizine 10 MG tablet    docusate sodium 100 MG tablet    FLUoxetine 40 MG capsule    fluticasone 50 MCG/ACT spray    meclizine 25 MG tablet    norgestimate-ethinyl estradiol 0.25-35 MG-MCG per tablet    omeprazole 20 MG CR capsule    polyvinyl alcohol 1.4 % ophthalmic solution                Primary Care Provider Office Phone # Fax #    Carlos Manuel Andreas Rivas [phone]       [facility] [address] [place] [address]        Equal Access to Services     [facility]: Hadii raven ku hadasho Soomaali, waaxda luqadaha, qaybta kaalmada shoshana, nakul duarte . So [facility] [phone].    ATENCIÓN: Si habla español, tiene a keene disposición servicios gratuitos de asistencia lingüística. [facility] [phone].    We comply with applicable federal civil rights laws and [place] laws. We do not discriminate on the basis of race, color, national origin, age, disability sex, sexual orientation or gender identity.            Thank you!     Thank you for choosing [facility]  for your care. Our goal is always to provide you with excellent care. Hearing back from our patients is one way we can continue to improve our services. Please take a few minutes to complete the written survey that you may receive in the mail after your visit with us. Thank you!             Your Updated Medication List - Protect others around you: Learn how to safely use, store and throw away your medicines at www.disposemymeds.org.          This list is accurate as of: 8/3/17  9:49 AM.  Always use your most recent med list.                   Brand Name Dispense Instructions for use Diagnosis    acetaminophen 325 MG tablet    TYLENOL    100 tablet    Take 2 tablets (650 mg) by mouth every 4 hours as needed for mild pain    Tension-type headache, not intractable, unspecified chronicity  pattern       calcium carbonate 500 MG chewable tablet    TUMS    150 tablet    Take 1 tablet (500 mg) by mouth 2 times daily    Other chronic gastritis without hemorrhage       cetirizine 10 MG tablet    zyrTEC    90 tablet    Take 1 tablet (10 mg) by mouth every evening    Seasonal allergic rhinitis due to other allergic trigger       docusate sodium 100 MG tablet    COLACE    60 tablet    Take 100 mg by mouth 2 times daily    Constipation, unspecified constipation type       FLUoxetine 40 MG capsule    PROzac    90 capsule    Take 1 capsule (40 mg) by mouth daily    Major depressive disorder, recurrent episode, moderate (H)       fluticasone 50 MCG/ACT spray    FLONASE    16 g    Spray 1-2 sprays into both nostrils daily    Seasonal allergic rhinitis due to other allergic trigger       meclizine 25 MG tablet    ANTIVERT    30 tablet    Take 1 tablet (25 mg) by mouth every 6 hours as needed for dizziness    BPPV (benign paroxysmal positional vertigo), unspecified laterality       norgestimate-ethinyl estradiol 0.25-35 MG-MCG per tablet    ORTHO-CYCLEN, SPRINTEC    84 tablet    Take 1 tablet by mouth daily    Encounter for initial prescription of contraceptive pills       omeprazole 20 MG CR capsule    priLOSEC    60 capsule    Take 1 capsule (20 mg) by mouth 2 times daily    History of Helicobacter pylori infection       polyvinyl alcohol 1.4 % ophthalmic solution    LIQUIFILM TEARS    15 mL    Place 1 drop into both eyes as needed for dry eyes    Allergic conjunctivitis of both eyes

## 2017-08-03 NOTE — NURSING NOTE
name: William Lacy  Language: Maryann  Agency: [facility]  Phone number: [phone]

## 2017-08-05 LAB — H PYLORI AG STL QL IA: POSITIVE

## 2017-08-09 RX ORDER — METRONIDAZOLE 500 MG/1
500 TABLET ORAL 2 TIMES DAILY
Qty: 28 TABLET | Refills: 0 | Status: SHIPPED | OUTPATIENT
Start: 2017-08-09 | End: 2017-08-23

## 2017-08-09 RX ORDER — CLARITHROMYCIN 500 MG
500 TABLET ORAL 2 TIMES DAILY
Qty: 28 TABLET | Refills: 0 | Status: SHIPPED | OUTPATIENT
Start: 2017-08-09 | End: 2017-09-13

## 2017-08-09 RX ORDER — AMOXICILLIN 500 MG/1
1000 CAPSULE ORAL 2 TIMES DAILY
Qty: 56 CAPSULE | Refills: 0 | Status: SHIPPED | OUTPATIENT
Start: 2017-08-09 | End: 2017-09-13

## 2017-08-09 NOTE — PROGRESS NOTES
H pylori repeat testing was positive. Had been treated with triple therapy. Sounds like she was compliant, though may have had reduced efficacy with breaking capsules. Reviewed alternative regimens. Will do concomitant therapy option as this is bid dosing and adds metronidazole. If not successful, would need to discuss in person the alternative regimens, as there are more complex to take.   - PPI bid  - clarithromycin 500 mg bid  - amoxicillin 1 g bid  - metronidazole 500 mg bid  - should follow up in 2 weeks at completion of therapy; has not yet scheduled this appt

## 2017-08-10 NOTE — PROGRESS NOTES
Called with  and inform pt of results and recommendations. Pt states understanding and have no further questions for MD at this time.

## 2017-08-16 ASSESSMENT — PATIENT HEALTH QUESTIONNAIRE - PHQ9: SUM OF ALL RESPONSES TO PHQ QUESTIONS 1-9: 5

## 2017-08-25 ENCOUNTER — TELEPHONE (OUTPATIENT)
Dept: FAMILY MEDICINE | Facility: CLINIC | Age: 27
End: 2017-08-25

## 2017-08-25 NOTE — TELEPHONE ENCOUNTER
Call to patient to follow up from recent ED visit for dyspepsia, she is feeling better. She is informed of on call doctor. She states that she has had itchy watery eyes and runny nose x 2 weeks and would like to schedule an appointment, transferred with  to FD.

## 2017-08-29 ENCOUNTER — OFFICE VISIT (OUTPATIENT)
Dept: FAMILY MEDICINE | Facility: CLINIC | Age: 27
End: 2017-08-29

## 2017-08-29 VITALS
DIASTOLIC BLOOD PRESSURE: 67 MMHG | OXYGEN SATURATION: 98 % | HEIGHT: 57 IN | RESPIRATION RATE: 23 BRPM | TEMPERATURE: 97.7 F | SYSTOLIC BLOOD PRESSURE: 101 MMHG | HEART RATE: 80 BPM | WEIGHT: 133.5 LBS | BODY MASS INDEX: 28.8 KG/M2

## 2017-08-29 DIAGNOSIS — K21.9 ESOPHAGEAL REFLUX: ICD-10-CM

## 2017-08-29 DIAGNOSIS — H10.33 ACUTE CONJUNCTIVITIS OF BOTH EYES, UNSPECIFIED ACUTE CONJUNCTIVITIS TYPE: Primary | ICD-10-CM

## 2017-08-29 DIAGNOSIS — J30.2 ACUTE SEASONAL ALLERGIC RHINITIS, UNSPECIFIED TRIGGER: ICD-10-CM

## 2017-08-29 DIAGNOSIS — F33.1 MAJOR DEPRESSIVE DISORDER, RECURRENT EPISODE, MODERATE (H): ICD-10-CM

## 2017-08-29 RX ORDER — AZELASTINE HYDROCHLORIDE 0.5 MG/ML
1 SOLUTION/ DROPS OPHTHALMIC 2 TIMES DAILY
Qty: 1 BOTTLE | Refills: 1 | Status: SHIPPED | OUTPATIENT
Start: 2017-08-29 | End: 2017-09-06

## 2017-08-29 RX ORDER — MONTELUKAST SODIUM 10 MG/1
10 TABLET ORAL AT BEDTIME
Qty: 30 TABLET | Refills: 3 | Status: SHIPPED | OUTPATIENT
Start: 2017-08-29 | End: 2017-11-01

## 2017-08-29 ASSESSMENT — ANXIETY QUESTIONNAIRES
GAD7 TOTAL SCORE: 15
3. WORRYING TOO MUCH ABOUT DIFFERENT THINGS: NEARLY EVERY DAY
6. BECOMING EASILY ANNOYED OR IRRITABLE: SEVERAL DAYS
IF YOU CHECKED OFF ANY PROBLEMS ON THIS QUESTIONNAIRE, HOW DIFFICULT HAVE THESE PROBLEMS MADE IT FOR YOU TO DO YOUR WORK, TAKE CARE OF THINGS AT HOME, OR GET ALONG WITH OTHER PEOPLE: SOMEWHAT DIFFICULT
7. FEELING AFRAID AS IF SOMETHING AWFUL MIGHT HAPPEN: NEARLY EVERY DAY
5. BEING SO RESTLESS THAT IT IS HARD TO SIT STILL: SEVERAL DAYS
1. FEELING NERVOUS, ANXIOUS, OR ON EDGE: NEARLY EVERY DAY
2. NOT BEING ABLE TO STOP OR CONTROL WORRYING: NEARLY EVERY DAY

## 2017-08-29 ASSESSMENT — PATIENT HEALTH QUESTIONNAIRE - PHQ9
SUM OF ALL RESPONSES TO PHQ QUESTIONS 1-9: 13
5. POOR APPETITE OR OVEREATING: SEVERAL DAYS

## 2017-08-29 NOTE — NURSING NOTE
name: Sosa Pérez  Language: karis  Agency: [facility]  Phone number: [phone]

## 2017-08-29 NOTE — MR AVS SNAPSHOT
After Visit Summary   8/29/2017    Hayde LUJAN Say    MRN: [id number]           Patient Information     Date Of Birth          1990        Visit Information        Provider Department      8/29/2017 8:40 AM Elham Miller APRN CNP [facility]        Today's Diagnoses     Acute conjunctivitis of both eyes, unspecified acute conjunctivitis type    -  1    Acute seasonal allergic rhinitis, unspecified trigger        Esophageal reflux          Care Instructions      Take 1 omeprazole tablet in the morning and evening for 2 weeks. Then come back to see your doctor.    Please use the new allergy pill one tablet each day, and your new eye drops, 1 drop in each eye every morning and every evening.    Please bring all your medicines each time you come and visit.  Come back and see your primary doctor or me in 2 weeks.    If symptoms worsen suddenly, contact:    Mental Health Crisis number for adults   [facility] [phone]   [facility]   [address] [address]    Understanding Anxiety Disorders  Almost everyone gets nervous now and then. It s normal to have knots in your stomach before a test, or for your heart to race on a first date. But an anxiety disorder is much more than a case of nerves. In fact, its symptoms may be overwhelming. But treatment can relieve many of these symptoms. Talking to your healthcare provider is the first step.    What are anxiety disorders?  An anxiety disorder causes intense feelings of panic and fear. These feelings may arise for no apparent reason. And they tend to recur again and again. They may prevent you from coping with life and cause you great distress. As a result, you may avoid anything that triggers your fear. In extreme cases, you may never leave the house. Anxiety disorders may cause other symptoms, such as:    Obsessive thoughts you can t control    Constant nightmares or painful thoughts of the  past    Nausea, sweating, and muscle tension    Trouble sleeping or concentrating  What causes anxiety disorders?  Anxiety disorders tend to run in families. For some people, childhood abuse or neglect may play a role. For others, stressful life events or trauma may trigger anxiety disorders. Anxiety can trigger low self-esteem and poor coping skills.  Common anxiety disorders    Panic disorder. This causes an intense fear of being in danger.    Phobias. These are extreme fears of certain objects, places, or events.    Obsessive-compulsive disorder. This causes you to have unwanted thoughts and urges. You also may perform certain actions over and over.    Posttraumatic stress disorder. This occurs in people who have survived a terrible ordeal. It can cause nightmares and flashbacks about the event.    Generalized anxiety disorder. This causes constant worry that can greatly disrupt your life.   Getting better  You may believe that nothing can help you. Or, you might fear what others may think. But most anxiety symptoms can be eased. Having an anxiety disorder is nothing to be ashamed of. Most people do best with treatment that combines medicine and therapy. These aren t cures. But they can help you live a healthier life.  Date Last Reviewed: 2/1/2017 2000-2017 [employer]. [address] [place] [address]. All rights reserved. This information is not intended as a substitute for professional medical care. Always follow your healthcare professional's instructions.        Depression  Depression is one of the most common mental health problems today. It is not just a state of unhappiness or sadness. It is a true disease. The cause seems to be related to a decrease in chemicals that transmit signals in the brain. Having a family history of depression, alcoholism, or suicide increases the risk. Chronic illness, chronic pain, migraine headaches and high emotional stress also increase the  risk.  Depression is something we tend to recognize in others, but may have a hard time seeing in ourselves. It can show in many physical and emotional ways:    Loss of appetite    Over-eating    Not being able to sleep    Sleeping too much    Tiredness not related to physical exertion    Restlessness or irritability    Slowness of movement or speech    Feeling depressed or withdrawn    Loss of interest in things you once enjoyed    Trouble concentrating, poor memory, trouble making decisions    Thoughts of harming or killing oneself, or thoughts that life is not worth living    Low self-esteem  The treatment for depression may include both medicine and psychotherapy. Antidepressants can reduce suffering and can improve the ability to function during the depressed period. Therapy can offer emotional support and help you understand emotional factors that may be causing the depression.  Home care    On-going care and support helps people manage this disease.  Find a healthcare provider and therapist who meet your needs. Seek help when you feel like you may be getting ill.    Be kind to yourself. Make it a point to do things that you enjoy (gardening, walking in nature, going to a movie, etc.). Reward yourself for small successes.    Take care of your physical body. Eat a balanced diet (low in saturated fat and high in fruits and vegetables). Exercise at least 3 times a week for 30 minutes. Even mild-moderate exercise (like brisk walking) can make you feel better.    Avoid alcohol, which can make depression worse.    Take medicine as prescribed.    Tell each of your healthcare providers about all of the prescription drugs, over-the-counter medicines, vitamins, and supplements you take. Certain supplements interact with medicines and can result in dangerous side effects. Ask your pharmacist when you have questions about drug interactions.    Talk with your family and trusted friends about your feelings and thoughts. Ask  them to help you recognize behavior changes early so you can get help and, if needed, medicine can be adjusted.  Follow-up care  Follow up with your healthcare provider, or as advised.  Call 911  Call 911 if you:    Have suicidal thoughts, a suicide plan, and the means to carry out the plan    Have trouble breathing    Are very confused    Feel very drowsy or have trouble awakening    Faint or lose consciousness    Have new chest pain that becomes more severe, lasts longer, or spreads into your shoulder, arm, neck, jaw or back  When to seek medical advice  Call your healthcare provider right away if any of these occur:    Feeling extreme depression, fear, anxiety, or anger toward yourself or others    Feeling out of control    Feeling that you may try to harm yourself or another    Hearing voices that others do not hear    Seeing things that others do not see    Can t sleep or eat for 3 days in a row    Friends or family express concern over your behavior and ask you to seek help  Date Last Reviewed: 9/29/2015 2000-2017 [employer]. [address] [place] [address]. All rights reserved. This information is not intended as a substitute for professional medical care. Always follow your healthcare professional's instructions.        Seasonal Allergy  Seasonal allergy is also called hay fever. It may occur after a person is exposed to pollens released from grasses, weeds, trees and shrubs. This type of allergy occurs during the spring and summer when the pollen contacts the lining of the nose, eyes, eyelids, sinuses and throat. This causes histamine to be released from the tissues. Histamine causes itching and swelling. This may produce a watery discharge from the eyes or nose. Violent sneezing, nasal congestion, post-nasal drip, itching of the eyes, nose, throat and mouth, scratchy throat, and dry cough may also occur.  Home care  Seasonal allergy cannot be cured, but symptoms can be reduced  by these measures:    Avoid or reduce exposure to the allergen as much as you can:      Stay indoors on windy days of pollen season.     Keep windows and doors closed. Use air conditioning instead in your home and car. This filters the air.    Change air conditioner filters often.    Take a shower, wash your hair, and change clothes after being outdoors.    Put on a NIOSH-rated 95 filter mask when working outdoors. Before going outside, take your allergy medicine as advised by your healthcare provider.    Decongestant pills and sprays reduce tissue swelling and watery discharge. Overuse of nasal decongestant sprays may make symptoms worse. Do not use these more often than recommended. Sometimes you can experience a rebound effect (symptoms worsen), when stopping them. Talk to your healthcare provider or pharmacist about these medicines before taking them, especially if you have high blood pressure or heart problems.     Antihistamines block the release of histamine during the allergic response. They work better when taken before symptoms develop. Unless a prescription antihistamine was prescribed, you can take over-the-counter antihistamines that do not cause drowsiness.  Ask your pharmacist for suggestions.    Steroid nasal sprays or oral steroids may also be prescribed for more severe symptoms. These help to reduce the local inflammation that can add to the allergic response.    If you have asthma, pollen season may make your asthma symptoms worse. It is important that you use your asthma medicines as directed during this time to prevent or treat attacks. Some persons with asthma have asthma symptoms that get worse when they take antihistamines. This is due to the drying effect on the lungs. If you notice this, stop the antihistamines, drink extra fluids and notify your doctor.    If you have sinus congestion or drainage, a saline nasal rinse may give relief. A saline nasal rinse lessens the swelling and clears  excess mucus. This allows sinuses to drain. Prepackaged kits are sold at most drug stores. These contain pre-mixed salt packets and an irrigation device.  Follow-up care  Follow up with your healthcare provider or as directed. If you have been referred to a specialist, make an appointment promptly.  When to seek medical advice  Call your healthcare provider for any of the following:    Facial, ear or sinus pain; colored drainage from the nose    Headaches    You have asthma and your asthma symptoms do not respond to the usual doses of your medicine    Cough with colored sputum (mucus)    Fever of 100.4 F (38 C) or higher, or as directed by the healthcare provider  Call 911 if any of these occur:    Trouble breathing or swallowing, wheezing    Hoarse voice, trouble speaking, or drooling    Confusion    Very drowsy or trouble awakening    Fainting or loss of consciousness    Rapid heart rate, or weak pulse    Low blood pressure    Feeling of doom    Nausea, vomiting, abdominal pain, diarrhea    Vomiting blood, or large amounts of blood in stool    Seizure    Cold, moist, or pale (blue in color) skin  Date Last Reviewed: 5/1/2017 2000-2017 [employer]. [address] [place] [address]. All rights reserved. This information is not intended as a substitute for professional medical care. Always follow your healthcare professional's instructions.                Follow-ups after your visit        Follow-up notes from your care team     Return in about 2 weeks (around 9/12/2017), or if symptoms worsen or fail to improve, for Physical Exam, Routine Visit.      Your next 10 appointments already scheduled     Sep 14, 2017  9:00 AM CDT   Return Visit with ANABEL Eckert   [facility] ([facility])    [address] [place] [address]   [phone]              Who to contact     Please call your clinic at [phone] to:    Ask questions about your health    Make or  "cancel appointments    Discuss your medicines    Learn about your test results    Speak to your doctor   If you have compliments or concerns about an experience at your clinic, or if you wish to file a complaint, please contact [facility] Patient Relations at [phone] or email us at [email or web address]         Additional Information About Your Visit        Care EveryWhere ID     This is your Care EveryWhere ID. This could be used by other organizations to access your [facility] medical records  [id number]        Your Vitals Were     Pulse Temperature Respirations Height Last Period Pulse Oximetry    80 97.7  F (36.5  C) (Oral) 23 4' 9\" (144.8 cm) 08/03/2017 98%    BMI (Body Mass Index)                   28.89 kg/m2            Blood Pressure from Last 3 Encounters:   08/29/17 101/67   08/03/17 105/70   05/05/17 104/69    Weight from Last 3 Encounters:   08/29/17 133 lb 8 oz (60.6 kg)   08/03/17 132 lb 12.8 oz (60.2 kg)   05/05/17 135 lb (61.2 kg)              Today, you had the following     No orders found for display         Today's Medication Changes          These changes are accurate as of: 8/29/17  9:45 AM.  If you have any questions, ask your nurse or doctor.               Start taking these medicines.        Dose/Directions    azelastine 0.05 % Soln ophthalmic solution   Commonly known as:  OPTIVAR   Used for:  Acute conjunctivitis of both eyes, unspecified acute conjunctivitis type   Started by:  Elham Miller APRN CNP        Dose:  1 drop   Place 1 drop into both eyes 2 times daily   Quantity:  1 Bottle   Refills:  1       montelukast 10 MG tablet   Commonly known as:  SINGULAIR   Used for:  Acute seasonal allergic rhinitis, unspecified trigger   Started by:  Elham Miller APRN CNP        Dose:  10 mg   Take 1 tablet (10 mg) by mouth At Bedtime   Quantity:  30 tablet   Refills:  3         These medicines have changed or have updated prescriptions.  "       Dose/Directions    * omeprazole 20 MG CR capsule   Commonly known as:  priLOSEC   This may have changed:  Another medication with the same name was added. Make sure you understand how and when to take each.   Used for:  History of Helicobacter pylori infection   Changed by:  Kandy Pagan MD        Dose:  20 mg   Take 1 capsule (20 mg) by mouth 2 times daily   Quantity:  60 capsule   Refills:  0       * omeprazole 20 MG CR capsule   Commonly known as:  priLOSEC   This may have changed:  You were already taking a medication with the same name, and this prescription was added. Make sure you understand how and when to take each.   Used for:  Esophageal reflux   Changed by:  Elham Miller APRN CNP        Dose:  20 mg   Take 1 capsule (20 mg) by mouth 2 times daily   Quantity:  60 capsule   Refills:  0       * Notice:  This list has 2 medication(s) that are the same as other medications prescribed for you. Read the directions carefully, and ask your doctor or other care provider to review them with you.         Where to get your medicines      These medications were sent to [facility] - [place], [place] - [address] [place], [place] [address]  [address] [address] [place] [address]     Phone:  [phone]     azelastine 0.05 % Soln ophthalmic solution    montelukast 10 MG tablet    omeprazole 20 MG CR capsule                Primary Care Provider Office Phone # Fax #    Carlos Manuel Andreas Rivas [phone]       [facility] [address] [place] [address]        Equal Access to Services     DYLAN LEY AH: Hadii aad ku hadasho Soomaali, waaxda luqadaha, qaybta kaalmada adeegyada, nakul gil hayjuan garcía. So [facility] [phone].    ATENCIÓN: Si habla español, tiene a keene disposición servicios gratuitos de asistencia lingüística. Llame al [phone].    We comply with applicable federal civil rights laws and [place] laws. We do not discriminate  on the basis of race, color, national origin, age, disability sex, sexual orientation or gender identity.            Thank you!     Thank you for choosing [facility]  for your care. Our goal is always to provide you with excellent care. Hearing back from our patients is one way we can continue to improve our services. Please take a few minutes to complete the written survey that you may receive in the mail after your visit with us. Thank you!             Your Updated Medication List - Protect others around you: Learn how to safely use, store and throw away your medicines at www.disposemymeds.org.          This list is accurate as of: 8/29/17  9:45 AM.  Always use your most recent med list.                   Brand Name Dispense Instructions for use Diagnosis    acetaminophen 325 MG tablet    TYLENOL    100 tablet    Take 2 tablets (650 mg) by mouth every 4 hours as needed for mild pain    Tension-type headache, not intractable, unspecified chronicity pattern       azelastine 0.05 % Soln ophthalmic solution    OPTIVAR    1 Bottle    Place 1 drop into both eyes 2 times daily    Acute conjunctivitis of both eyes, unspecified acute conjunctivitis type       calcium carbonate 500 MG chewable tablet    TUMS    150 tablet    Take 1 tablet (500 mg) by mouth 2 times daily    Other chronic gastritis without hemorrhage       cetirizine 10 MG tablet    zyrTEC    90 tablet    Take 1 tablet (10 mg) by mouth every evening    Seasonal allergic rhinitis due to other allergic trigger       docusate sodium 100 MG tablet    COLACE    60 tablet    Take 100 mg by mouth 2 times daily    Constipation, unspecified constipation type       FLUoxetine 40 MG capsule    PROzac    90 capsule    Take 1 capsule (40 mg) by mouth daily    Major depressive disorder, recurrent episode, moderate (H)       fluticasone 50 MCG/ACT spray    FLONASE    16 g    Spray 1-2 sprays into both nostrils daily    Seasonal allergic rhinitis due to other  allergic trigger       meclizine 25 MG tablet    ANTIVERT    30 tablet    Take 1 tablet (25 mg) by mouth every 6 hours as needed for dizziness    BPPV (benign paroxysmal positional vertigo), unspecified laterality       montelukast 10 MG tablet    SINGULAIR    30 tablet    Take 1 tablet (10 mg) by mouth At Bedtime    Acute seasonal allergic rhinitis, unspecified trigger       norgestimate-ethinyl estradiol 0.25-35 MG-MCG per tablet    ORTHO-CYCLEN, SPRINTEC    84 tablet    Take 1 tablet by mouth daily    Encounter for initial prescription of contraceptive pills       * omeprazole 20 MG CR capsule    priLOSEC    60 capsule    Take 1 capsule (20 mg) by mouth 2 times daily    History of Helicobacter pylori infection       * omeprazole 20 MG CR capsule    priLOSEC    60 capsule    Take 1 capsule (20 mg) by mouth 2 times daily    Esophageal reflux       polyvinyl alcohol 1.4 % ophthalmic solution    LIQUIFILM TEARS    15 mL    Place 1 drop into both eyes as needed for dry eyes    Allergic conjunctivitis of both eyes       * Notice:  This list has 2 medication(s) that are the same as other medications prescribed for you. Read the directions carefully, and ask your doctor or other care provider to review them with you.

## 2017-08-29 NOTE — PROGRESS NOTES
HPI:       Hayde Macias is a 26 year old who presents for the following    A [employer]  was used for  this visit.      Main concerns-    Eye pain and itching-both eyes  No foreign body sensation  Unsure duration, intermittent, August Sept every year  Stuffy nose,Ear itching     Allergy pill and nasal spray and drops were ordered here  3 weeks ago,not helpful  Yearly occurrences  Nasal spray not working, eye drops not helping, eyes burn  Feeling poorly, wanted to come to clinic    Was begun on tx for h pylori 8/3, and her pain became so severe she went to the ED-burning and cramping abdominal pain    Finished her medicines as prescribed  Is out of the stomach pain medicines now  Uses twice a day    She is finished having her family, is  and has 4 children.Taking her birth control pill each day.Is safe in her home and her relationships.  Problem, Medication and Allergy Lists were   reviewed and are current.     Patient Active Problem List    Diagnosis Date Noted     History of Helicobacter pylori infection 08/03/2017     Priority: Medium     5/2017; treated       PTSD (post-traumatic stress disorder) 06/09/2017     Priority: Medium     Seasonal allergic rhinitis 09/06/2015     Priority: Medium     Major depressive disorder, recurrent episode, moderate (H) 11/29/2013     Priority: Medium     Health Care Home 02/15/2013     Priority: Medium     Tier Level: 1  DX V65.8 REPLACED WITH [facility] (04/08/2013)     .    Patient is an established patient of this clinic..         Review of Systems:   CONSTITUTIONAL: no fatigue, no unexpected change in weight  SKIN: no worrisome rashes, no worrisome moles, no worrisome lesions  EYES: Has itching and drainage from both eyes, again.  Has corrective lenses, unsure when she went to the eye doctor  For the prescription  ENT: ear- both are itching, no mouth problems,   Has some throat discomfort, sneezing  RESP: no significant cough, no shortness of  "breath  CV: no chest pain, no palpitations or edema  GI: has some nausea, intermittent,no vomiting,   no constipation, no diarrhea  Psych: Affect:  Very animated, verbal.Casually groomed.  PHQ-2 Score: 4  PHQ-9 SCORE 8/29/2017   Total Score -   Total Score 13     Endorses less interest, feeling down or depressed, Sleep and energy difficulty,  moving slowly Or restlessness as features. Does not endorse poor appetite/overeating, or feeling bad about herself.   Endorses occasional idea it might be better if her life were over.  Does not endorse suicidal intent, or plans.  Symptoms making getting through the day and getting along with others   Not at all difficult.             Physical Exam:     Vitals:    08/29/17 0836   BP: 101/67   Pulse: 80   Resp: 23   Temp: 97.7  F (36.5  C)   TempSrc: Oral   SpO2: 98%   Weight: 133 lb 8 oz (60.6 kg)   Height: 4' 9\" (144.8 cm)     Body mass index is 28.89 kg/(m^2).  Vitals were reviewed and were normal  GENERAL:: healthy, alert and in mild distress  EYES: Eyes grossly normal to inspection, extraocular movements - intact, and PERRL  HENT: ear canals- normal; TMs- normal; Nose- swollen, congested turbinates with clear mucous production; Mouth- no ulcers, no lesions  NECK: no tenderness, no adenopathy, no stiffness; thyroid- normal to palpation  RESP: lungs clear to auscultation - no rales, no rhonchi, no wheezes  CV: regular rates and rhythm, normal S1 S2, no murmur, no click or rub -  ABDOMEN: soft, epigastric tenderness, no masses, normal bowel sounds  MS: extremities- no deformities noted, no edema  SKIN: no suspicious lesions, no rashes  NEURO: strength and tone- normal, sensory exam- grossly normal, mentation- intact, reflexes- symmetric  PSYCH: Alert and oriented; speech- coherent , increased rate and volume; able to articulate logical thoughts,affect- animated, cooperative      Results:      None    Assessment and Plan     1. Acute conjunctivitis of both eyes, unspecified " "acute conjunctivitis type  Instructed in seasonal allergy basics including use of- azelastine (OPTIVAR) 0.05 % SOLN ophthalmic solution; Place 1 drop into both eyes 2 times daily  Dispense: 1 Bottle; Refill: 1    2. Acute seasonal allergic rhinitis, unspecified trigger  - montelukast (SINGULAIR) 10 MG tablet; Take 1 tablet (10 mg) by mouth At Bedtime  Dispense: 30 tablet; Refill: 3  Discussed purpose and appropriate administration.  3. Esophageal reflux  Epigastric pain and relationship to h pylori therapy  - omeprazole (PRILOSEC) 20 MG CR capsule; Take 1 capsule (20 mg) by mouth 2 times daily  Dispense: 60 capsule; Refill: 0  Discussed typical use of omeprazole is once daily 30 minutes prior to the main meal of the day. Discussed use of 20 mg BID during treatment for h pylori, or short term with increased symptoms. Concern for use of PPI long-term and especially BID this is not usually indicated.    Agreed w/patient to continue PPI BID for the next 2 weeks   Made an appt to see her PCP 9/11/17.   RTC for re-evaluation of symptoms and consideration of referral to GI should epigastric pain persist or if other GI s/s occur.  RTC sooner/promptly with increase in epigastric or abdominal pain, suggests she can be seen in clinic rather than ED unless symptoms are severe or accompanied by high fever, chills or other severe symptoms.      4. Major Depression, Moderate, 13 PHQ-9 score with ideas of suicide, \"sometimes\", no urge or plan.Puzzling choice that there is no impact on getting through her day.Wants to continue fluoxetine 40 mg po daily for the present time, reminded she can see Dr. Mcmahan again when she would like to-already has an appt scheduled for 9/14/17.    If symptoms worsen suddenly, contact:    Mental Health Crisis number for adults   [facility] [phone]   [facility]   [address]  [address]      Options for treatment and follow-up care were reviewed with the " patient. Hayde TAI Macias  engaged in the decision making process and verbalized understanding of the options discussed and agreed with the final plan.  30 minutes was spent on this visit, >50% counseling and coordination of care re:above concerns.  MARTHA Oshea CNP

## 2017-08-29 NOTE — PATIENT INSTRUCTIONS
Take 1 omeprazole tablet in the morning and evening for 2 weeks. Then come back to see your doctor.    Please use the new allergy pill one tablet each day, and your new eye drops, 1 drop in each eye every morning and every evening.    Please bring all your medicines each time you come and visit.  Come back and see your primary doctor or me in 2 weeks.    If symptoms worsen suddenly, contact:    Mental Health Crisis number for adults   [facility] [phone]   [facility]   [address] [address]    Understanding Anxiety Disorders  Almost everyone gets nervous now and then. It s normal to have knots in your stomach before a test, or for your heart to race on a first date. But an anxiety disorder is much more than a case of nerves. In fact, its symptoms may be overwhelming. But treatment can relieve many of these symptoms. Talking to your healthcare provider is the first step.    What are anxiety disorders?  An anxiety disorder causes intense feelings of panic and fear. These feelings may arise for no apparent reason. And they tend to recur again and again. They may prevent you from coping with life and cause you great distress. As a result, you may avoid anything that triggers your fear. In extreme cases, you may never leave the house. Anxiety disorders may cause other symptoms, such as:    Obsessive thoughts you can t control    Constant nightmares or painful thoughts of the past    Nausea, sweating, and muscle tension    Trouble sleeping or concentrating  What causes anxiety disorders?  Anxiety disorders tend to run in families. For some people, childhood abuse or neglect may play a role. For others, stressful life events or trauma may trigger anxiety disorders. Anxiety can trigger low self-esteem and poor coping skills.  Common anxiety disorders    Panic disorder. This causes an intense fear of being in danger.    Phobias. These are extreme fears of certain objects,  places, or events.    Obsessive-compulsive disorder. This causes you to have unwanted thoughts and urges. You also may perform certain actions over and over.    Posttraumatic stress disorder. This occurs in people who have survived a terrible ordeal. It can cause nightmares and flashbacks about the event.    Generalized anxiety disorder. This causes constant worry that can greatly disrupt your life.   Getting better  You may believe that nothing can help you. Or, you might fear what others may think. But most anxiety symptoms can be eased. Having an anxiety disorder is nothing to be ashamed of. Most people do best with treatment that combines medicine and therapy. These aren t cures. But they can help you live a healthier life.  Date Last Reviewed: 2/1/2017 2000-2017 [employer]. [address] [place] [address], [place] [address]. All rights reserved. This information is not intended as a substitute for professional medical care. Always follow your healthcare professional's instructions.        Depression  Depression is one of the most common mental health problems today. It is not just a state of unhappiness or sadness. It is a true disease. The cause seems to be related to a decrease in chemicals that transmit signals in the brain. Having a family history of depression, alcoholism, or suicide increases the risk. Chronic illness, chronic pain, migraine headaches and high emotional stress also increase the risk.  Depression is something we tend to recognize in others, but may have a hard time seeing in ourselves. It can show in many physical and emotional ways:    Loss of appetite    Over-eating    Not being able to sleep    Sleeping too much    Tiredness not related to physical exertion    Restlessness or irritability    Slowness of movement or speech    Feeling depressed or withdrawn    Loss of interest in things you once enjoyed    Trouble concentrating, poor memory, trouble making decisions    Thoughts  of harming or killing oneself, or thoughts that life is not worth living    Low self-esteem  The treatment for depression may include both medicine and psychotherapy. Antidepressants can reduce suffering and can improve the ability to function during the depressed period. Therapy can offer emotional support and help you understand emotional factors that may be causing the depression.  Home care    On-going care and support helps people manage this disease.  Find a healthcare provider and therapist who meet your needs. Seek help when you feel like you may be getting ill.    Be kind to yourself. Make it a point to do things that you enjoy (gardening, walking in nature, going to a movie, etc.). Reward yourself for small successes.    Take care of your physical body. Eat a balanced diet (low in saturated fat and high in fruits and vegetables). Exercise at least 3 times a week for 30 minutes. Even mild-moderate exercise (like brisk walking) can make you feel better.    Avoid alcohol, which can make depression worse.    Take medicine as prescribed.    Tell each of your healthcare providers about all of the prescription drugs, over-the-counter medicines, vitamins, and supplements you take. Certain supplements interact with medicines and can result in dangerous side effects. Ask your pharmacist when you have questions about drug interactions.    Talk with your family and trusted friends about your feelings and thoughts. Ask them to help you recognize behavior changes early so you can get help and, if needed, medicine can be adjusted.  Follow-up care  Follow up with your healthcare provider, or as advised.  Call 911  Call 911 if you:    Have suicidal thoughts, a suicide plan, and the means to carry out the plan    Have trouble breathing    Are very confused    Feel very drowsy or have trouble awakening    Faint or lose consciousness    Have new chest pain that becomes more severe, lasts longer, or spreads into your shoulder,  arm, neck, jaw or back  When to seek medical advice  Call your healthcare provider right away if any of these occur:    Feeling extreme depression, fear, anxiety, or anger toward yourself or others    Feeling out of control    Feeling that you may try to harm yourself or another    Hearing voices that others do not hear    Seeing things that others do not see    Can t sleep or eat for 3 days in a row    Friends or family express concern over your behavior and ask you to seek help  Date Last Reviewed: 9/29/2015 2000-2017 [employer]. [address] [place] [address]. All rights reserved. This information is not intended as a substitute for professional medical care. Always follow your healthcare professional's instructions.        Seasonal Allergy  Seasonal allergy is also called hay fever. It may occur after a person is exposed to pollens released from grasses, weeds, trees and shrubs. This type of allergy occurs during the spring and summer when the pollen contacts the lining of the nose, eyes, eyelids, sinuses and throat. This causes histamine to be released from the tissues. Histamine causes itching and swelling. This may produce a watery discharge from the eyes or nose. Violent sneezing, nasal congestion, post-nasal drip, itching of the eyes, nose, throat and mouth, scratchy throat, and dry cough may also occur.  Home care  Seasonal allergy cannot be cured, but symptoms can be reduced by these measures:    Avoid or reduce exposure to the allergen as much as you can:      Stay indoors on windy days of pollen season.     Keep windows and doors closed. Use air conditioning instead in your home and car. This filters the air.    Change air conditioner filters often.    Take a shower, wash your hair, and change clothes after being outdoors.    Put on a NIOSH-rated 95 filter mask when working outdoors. Before going outside, take your allergy medicine as advised by your healthcare provider.     Decongestant pills and sprays reduce tissue swelling and watery discharge. Overuse of nasal decongestant sprays may make symptoms worse. Do not use these more often than recommended. Sometimes you can experience a rebound effect (symptoms worsen), when stopping them. Talk to your healthcare provider or pharmacist about these medicines before taking them, especially if you have high blood pressure or heart problems.     Antihistamines block the release of histamine during the allergic response. They work better when taken before symptoms develop. Unless a prescription antihistamine was prescribed, you can take over-the-counter antihistamines that do not cause drowsiness.  Ask your pharmacist for suggestions.    Steroid nasal sprays or oral steroids may also be prescribed for more severe symptoms. These help to reduce the local inflammation that can add to the allergic response.    If you have asthma, pollen season may make your asthma symptoms worse. It is important that you use your asthma medicines as directed during this time to prevent or treat attacks. Some persons with asthma have asthma symptoms that get worse when they take antihistamines. This is due to the drying effect on the lungs. If you notice this, stop the antihistamines, drink extra fluids and notify your doctor.    If you have sinus congestion or drainage, a saline nasal rinse may give relief. A saline nasal rinse lessens the swelling and clears excess mucus. This allows sinuses to drain. Prepackaged kits are sold at most drug stores. These contain pre-mixed salt packets and an irrigation device.  Follow-up care  Follow up with your healthcare provider or as directed. If you have been referred to a specialist, make an appointment promptly.  When to seek medical advice  Call your healthcare provider for any of the following:    Facial, ear or sinus pain; colored drainage from the nose    Headaches    You have asthma and your asthma symptoms do not  respond to the usual doses of your medicine    Cough with colored sputum (mucus)    Fever of 100.4 F (38 C) or higher, or as directed by the healthcare provider  Call 911 if any of these occur:    Trouble breathing or swallowing, wheezing    Hoarse voice, trouble speaking, or drooling    Confusion    Very drowsy or trouble awakening    Fainting or loss of consciousness    Rapid heart rate, or weak pulse    Low blood pressure    Feeling of doom    Nausea, vomiting, abdominal pain, diarrhea    Vomiting blood, or large amounts of blood in stool    Seizure    Cold, moist, or pale (blue in color) skin  Date Last Reviewed: 5/1/2017 2000-2017 [employer]. [address] [place] [address]. All rights reserved. This information is not intended as a substitute for professional medical care. Always follow your healthcare professional's instructions.

## 2017-08-30 ASSESSMENT — ANXIETY QUESTIONNAIRES: GAD7 TOTAL SCORE: 15

## 2017-09-06 ENCOUNTER — TELEPHONE (OUTPATIENT)
Dept: FAMILY MEDICINE | Facility: CLINIC | Age: 27
End: 2017-09-06

## 2017-09-06 DIAGNOSIS — H10.33 ACUTE CONJUNCTIVITIS OF BOTH EYES, UNSPECIFIED ACUTE CONJUNCTIVITIS TYPE: Primary | ICD-10-CM

## 2017-09-06 NOTE — TELEPHONE ENCOUNTER
Azelastine 0.05% eye drops is non-formulary. Per online [employer] formulary cover alternatives are: Claritin 0.25%, ketotifen 0.025% and alaway 0.025% eye drops.    Message forward to provider to review and send alternatives. Patient will have to purchase Azelastine 0.05% if prefers this generic.

## 2017-09-06 NOTE — TELEPHONE ENCOUNTER
Will DC azelastine order-entered erx order for ketotifen 0.025% 1 drop OU q 12 hours.  Elham Miller,JASONP

## 2017-09-13 ENCOUNTER — OFFICE VISIT (OUTPATIENT)
Dept: FAMILY MEDICINE | Facility: CLINIC | Age: 27
End: 2017-09-13

## 2017-09-13 VITALS
TEMPERATURE: 98.1 F | HEIGHT: 57 IN | BODY MASS INDEX: 28.22 KG/M2 | OXYGEN SATURATION: 98 % | DIASTOLIC BLOOD PRESSURE: 72 MMHG | HEART RATE: 76 BPM | RESPIRATION RATE: 20 BRPM | SYSTOLIC BLOOD PRESSURE: 105 MMHG | WEIGHT: 130.8 LBS

## 2017-09-13 DIAGNOSIS — J30.1 CHRONIC SEASONAL ALLERGIC RHINITIS DUE TO POLLEN: Primary | ICD-10-CM

## 2017-09-13 DIAGNOSIS — A04.8 H. PYLORI INFECTION: ICD-10-CM

## 2017-09-13 DIAGNOSIS — K29.70 HELICOBACTER PYLORI GASTRITIS: ICD-10-CM

## 2017-09-13 DIAGNOSIS — K59.00 CONSTIPATION, UNSPECIFIED CONSTIPATION TYPE: ICD-10-CM

## 2017-09-13 DIAGNOSIS — K29.50 OTHER CHRONIC GASTRITIS WITHOUT HEMORRHAGE: ICD-10-CM

## 2017-09-13 DIAGNOSIS — B96.81 HELICOBACTER PYLORI GASTRITIS: ICD-10-CM

## 2017-09-13 DIAGNOSIS — J30.89 SEASONAL ALLERGIC RHINITIS DUE TO OTHER ALLERGIC TRIGGER: ICD-10-CM

## 2017-09-13 RX ORDER — CLARITHROMYCIN 500 MG
500 TABLET ORAL 2 TIMES DAILY
COMMUNITY
Start: 2017-09-13 | End: 2017-09-27

## 2017-09-13 RX ORDER — CALCIUM CARBONATE 500 MG/1
1 TABLET, CHEWABLE ORAL 2 TIMES DAILY PRN
COMMUNITY
Start: 2017-09-13 | End: 2017-12-15

## 2017-09-13 RX ORDER — AMOXICILLIN 500 MG/1
1000 CAPSULE ORAL 2 TIMES DAILY
COMMUNITY
Start: 2017-09-13 | End: 2017-09-27

## 2017-09-13 RX ORDER — LORATADINE 10 MG/1
10 TABLET ORAL DAILY
Qty: 30 TABLET | Refills: 1 | Status: SHIPPED | OUTPATIENT
Start: 2017-09-13 | End: 2017-09-13

## 2017-09-13 RX ORDER — ASPIRIN 81 MG
100 TABLET, DELAYED RELEASE (ENTERIC COATED) ORAL DAILY PRN
COMMUNITY
Start: 2017-09-13 | End: 2018-03-30

## 2017-09-13 RX ORDER — FLUTICASONE PROPIONATE 50 MCG
1-2 SPRAY, SUSPENSION (ML) NASAL DAILY PRN
COMMUNITY
Start: 2017-09-13 | End: 2018-08-22

## 2017-09-13 RX ORDER — IBUPROFEN 600 MG/1
600 TABLET, FILM COATED ORAL DAILY PRN
COMMUNITY
Start: 2017-09-13 | End: 2018-02-07

## 2017-09-13 RX ORDER — CETIRIZINE HYDROCHLORIDE 10 MG/1
10 TABLET ORAL EVERY EVENING
Qty: 90 TABLET | Refills: 1 | COMMUNITY
Start: 2017-09-13 | End: 2018-02-21

## 2017-09-13 NOTE — MR AVS SNAPSHOT
After Visit Summary   9/13/2017    Hayde LUJAN Say    MRN: [id number]           Patient Information     Date Of Birth          1990        Visit Information        Provider Department      9/13/2017 2:00 PM Carlos Manuel Rivas MD [facility]        Today's Diagnoses     Chronic seasonal allergic rhinitis due to pollen    -  1    Helicobacter pylori gastritis        Seasonal allergic rhinitis due to other allergic trigger        Other chronic gastritis without hemorrhage        H. pylori infection        Constipation, unspecified constipation type           Follow-ups after your visit        Follow-up notes from your care team     Return in about 2 weeks (around 9/27/2017).      Your next 10 appointments already scheduled     Sep 28, 2017  9:00 AM CDT   Return Visit with Elham Mcmahan, LMFT [facility] ([facility])    [address] [place] [address]   [phone]            Oct 02, 2017  8:20 AM CDT   Return Visit with Sierra Scott, RPH [facility] ([facility])    [address] [place] [address]   [phone]            Oct 02, 2017  8:40 AM CDT   Return Visit with Carlos Manuel Rivas MD   [facility] ([facility])    [address] [place] [address]   [phone]              Who to contact     Please call your clinic at [phone] to:    Ask questions about your health    Make or cancel appointments    Discuss your medicines    Learn about your test results    Speak to your doctor   If you have compliments or concerns about an experience at your clinic, or if you wish to file a complaint, please contact [facility] Patient Relations at [phone] or email us at [email or web address]         Additional Information About Your Visit        Care EveryWhere ID     This is your Care EveryWhere ID. This could be used by other organizations  "to access your [place] medical records  [id number]        Your Vitals Were     Pulse Temperature Respirations Height Last Period Pulse Oximetry    76 98.1  F (36.7  C) (Oral) 20 4' 8.75\" (144.1 cm) 09/01/2017 98%    BMI (Body Mass Index)                   28.55 kg/m2            Blood Pressure from Last 3 Encounters:   09/13/17 105/72   08/29/17 101/67   08/03/17 105/70    Weight from Last 3 Encounters:   09/13/17 130 lb 12.8 oz (59.3 kg)   08/29/17 133 lb 8 oz (60.6 kg)   08/03/17 132 lb 12.8 oz (60.2 kg)              Today, you had the following     No orders found for display         Today's Medication Changes          These changes are accurate as of: 9/13/17 11:59 PM.  If you have any questions, ask your nurse or doctor.               These medicines have changed or have updated prescriptions.        Dose/Directions    calcium carbonate 500 MG chewable tablet   Commonly known as:  TUMS   This may have changed:    - when to take this  - reasons to take this   Used for:  Other chronic gastritis without hemorrhage   Changed by:  Carlos Manuel Rivas MD        Dose:  1 chew tab   Take 1 tablet (500 mg) by mouth 2 times daily as needed for heartburn   Refills:  0       docusate sodium 100 MG tablet   Commonly known as:  COLACE   This may have changed:    - when to take this  - reasons to take this   Used for:  Constipation, unspecified constipation type   Changed by:  Carlos Manuel Rivas MD        Dose:  100 mg   Take 100 mg by mouth daily as needed for constipation   Refills:  0       FLONASE 50 MCG/ACT spray   This may have changed:    - when to take this  - reasons to take this   Used for:  Seasonal allergic rhinitis due to other allergic trigger   Generic drug:  fluticasone   Changed by:  Carlos Manuel Rivas MD        Dose:  1-2 spray   Spray 1-2 sprays into both nostrils daily as needed for allergies   Refills:  0                Primary Care Provider Office Phone # Fax #    Carlos Manuel Johns " MD Rob [phone]       [facility] [address] [address]        Equal Access to Services     DYLAN LEY : Hadii aad ku hadlainashelia Jones, wacarinda ambar, qamarshata kaalmada shoshana, nakul acin hayaajulián weemsritchie ibarra felicia garcía. So [facility] [phone].    ATENCIÓN: Si habla español, tiene a keene disposición servicios gratuitos de asistencia lingüística. Llame al [phone].    We comply with applicable federal civil rights laws and [place] laws. We do not discriminate on the basis of race, color, national origin, age, disability sex, sexual orientation or gender identity.            Thank you!     Thank you for choosing [facility]  for your care. Our goal is always to provide you with excellent care. Hearing back from our patients is one way we can continue to improve our services. Please take a few minutes to complete the written survey that you may receive in the mail after your visit with us. Thank you!             Your Updated Medication List - Protect others around you: Learn how to safely use, store and throw away your medicines at www.disposemymeds.org.          This list is accurate as of: 9/13/17 11:59 PM.  Always use your most recent med list.                   Brand Name Dispense Instructions for use Diagnosis    acetaminophen 325 MG tablet    TYLENOL    100 tablet    Take 2 tablets (650 mg) by mouth every 4 hours as needed for mild pain    Tension-type headache, not intractable, unspecified chronicity pattern       amoxicillin 500 MG capsule    AMOXIL     Take 2 capsules (1,000 mg) by mouth 2 times daily    H. pylori infection       calcium carbonate 500 MG chewable tablet    TUMS     Take 1 tablet (500 mg) by mouth 2 times daily as needed for heartburn    Other chronic gastritis without hemorrhage       cetirizine 10 MG tablet    zyrTEC    90 tablet    Take 1 tablet (10 mg) by mouth every evening    Seasonal allergic rhinitis due to other  allergic trigger       clarithromycin 500 MG tablet    BIAXIN     Take 1 tablet (500 mg) by mouth 2 times daily    H. pylori infection       docusate sodium 100 MG tablet    COLACE     Take 100 mg by mouth daily as needed for constipation    Constipation, unspecified constipation type       FLONASE 50 MCG/ACT spray   Generic drug:  fluticasone      Spray 1-2 sprays into both nostrils daily as needed for allergies    Seasonal allergic rhinitis due to other allergic trigger       FLUoxetine 40 MG capsule    PROzac    90 capsule    Take 1 capsule (40 mg) by mouth daily    Major depressive disorder, recurrent episode, moderate (H)       ibuprofen 600 MG tablet    ADVIL/MOTRIN     Take 1 tablet (600 mg) by mouth daily as needed        ketotifen 0.025 % Soln ophthalmic solution    ZADITOR    1 Bottle    Place 1 drop into both eyes every 12 hours    Acute conjunctivitis of both eyes, unspecified acute conjunctivitis type       meclizine 25 MG tablet    ANTIVERT    30 tablet    Take 1 tablet (25 mg) by mouth every 6 hours as needed for dizziness    BPPV (benign paroxysmal positional vertigo), unspecified laterality       montelukast 10 MG tablet    SINGULAIR    30 tablet    Take 1 tablet (10 mg) by mouth At Bedtime    Acute seasonal allergic rhinitis, unspecified trigger       norgestimate-ethinyl estradiol 0.25-35 MG-MCG per tablet    ORTHO-CYCLEN, SPRINTEC    84 tablet    Take 1 tablet by mouth daily    Encounter for initial prescription of contraceptive pills       omeprazole 20 MG CR capsule    priLOSEC     Take 1 capsule (20 mg) by mouth 2 times daily        polyvinyl alcohol 1.4 % ophthalmic solution    LIQUIFILM TEARS    15 mL    Place 1 drop into both eyes as needed for dry eyes    Allergic conjunctivitis of both eyes

## 2017-09-13 NOTE — NURSING NOTE
name: Shine Archuleta  Language: Maryann  Agency: [facility]  Phone number: [phone]

## 2017-09-13 NOTE — PROGRESS NOTES
S: Maryann  used for visit. Requested by Dr. Rivas to review medicines and implement pillbox use. Medicines brought to clinic today: Amoxicillin 500 mg, Clarithromycin 500 mg, Omeprazole 20 mg, Fluoxetine 40 mg, Ceterizine 10 mg, Diphenhydramine 25 mg, Augmentin 875 mg, Omeprazole 40 mg, Flonase, Ibuprofen 600 mg, Acetaminophen 325 mg, Liquifilm artificial tears, Calcium carbonate, Docusate 100 mg, Montelukast 10 mg.    Reports confusion about medicines. So many!    Regarding H pylori regimen, reports some difficulty swallowing capsules. Reports opens and sprinkles in mouth.     Reports sleepiness with Diphenhydramine. Using for itching / allergies. Not as consistently using Cetirizine. As reports Flonase / eye drops helpful also for allergies / itching. Does not use every day.     Patient unsure why prescribed Montelukast. Not taking.     Reports benefit with Fluoxetine for depression.     Reports Acetaminophen for headaches.     Calcium carbonate helpful for gas pains. Again uses PRN.    Uses Docusate only PRN for constipation. States sometimes will go week without BM. Relieved by Docusate    When inquire about getting more medicines from pharmacy, patient reports medicines are automatically sent to her house.     O:     Patient Active Problem List   Diagnosis     Health Care Home     Major depressive disorder, recurrent episode, moderate (H)     Seasonal allergic rhinitis     PTSD (post-traumatic stress disorder)     Helicobacter pylori gastritis         A/P:     Medication list updated.     BID pillbox x2 provided (=14 day course). Filled with patient as outlined below.    Discontinue Diphenhydramine due to side effect. Continue Ceterizine 10 mg QHS.     Initiate H pylori treatment - Amoxicillin 1000 mg BID, Clarithromycin 500 mg BID, Omeprazole BID x 2 weeks. Advised against opening capsules and to swallow whole if possible. Patient agreed.     Continue Fluoxetine 40 mg QHS.     Continue  Montelukast 10 mg QHS.     Affirmed that use of PRNs (as briefly reviewed today) is appropriate.     Call to [facility] - enrolled patient in automatic refills (pharmacy will call patient when maintenance medicines are due = Fluoxetine 40 mg, Montelukast 10 mg, Cetirizine 10 mg, OCP). Will discuss with her at follow up.     For future visit need to discuss medicines not present at today's visit = Ortho-cyclen, Meclizine, Ketotifen opth,     Options for treatment and/or follow-up care were reviewed with the patient. Hayde was engaged and actively involved in the decision making process. She verbalized understanding of the options discussed and was satisfied with the final plan. Patient was provided with written instructions/medication list via AVS.    Dr. Rivas was provided our recommendations in clinic today and Dr. Lin was available for supervision during this visit and is the authorizing prescriber for this visit through the pharmacist collaborative practice agreement.    Thank you for the opportunity to participate in the care of this patient.  Sierra Scott, Pharm.D.

## 2017-09-13 NOTE — PROGRESS NOTES
Preceptor Attestation:  Patient's case reviewed and discussed with Carlos Manuel Rivas MD Patient seen and discussed with the resident.. I agree with assessment and plan of care.  Supervising Physician:  Bulmaro Lin MD  [facility]

## 2017-09-14 ENCOUNTER — OFFICE VISIT (OUTPATIENT)
Dept: PSYCHOLOGY | Facility: CLINIC | Age: 27
End: 2017-09-14

## 2017-09-14 DIAGNOSIS — F43.10 PTSD (POST-TRAUMATIC STRESS DISORDER): ICD-10-CM

## 2017-09-14 DIAGNOSIS — F33.1 MAJOR DEPRESSIVE DISORDER, RECURRENT EPISODE, MODERATE (H): Primary | ICD-10-CM

## 2017-09-14 NOTE — PROGRESS NOTES
Behavioral Health Progress Note    Client Name: Hayde Macias    Service Type: Individual  Length of Visit: 45 minutes  Attendees:  and children  Complexity: An  is used not only to interpret language, since the patient does not speak English, but also to help with the complexity of understandings across cultures, since the patient is not well integrated in the larger [place] culture.      Identifying Information and Presenting Problem:    The patient is a 26 year old Maryann female who is being seen for problematic symptoms of depression and PTSD.    Treatment Objective(s) Addressed in This Session:  Psychological distress related to acculturation and immigration issues      Progress on / Status of Treatment Objective(s) / Homework:  Minimal progress       PHQ-9 SCORE 4/26/2017 8/3/2017 8/29/2017   Total Score - - -   Total Score 6 5 13       BRENDA-7 SCORE 4/6/2017 4/26/2017 8/29/2017   Total Score 6 8 15       Topics Discussed/Interventions Provided:  Legal/immigration: Patient went again to have an interview for her citizenship exam. Again they did not provide her an . Her child's teacher came with. They again denied her. She greatly fears that since she brought the teacher (White, English speaking) they believe she can speak English and is lying to them. She cried while discussing this and appeared completely dejected. Is uncertain whether/how to continue with citizenship. Says the forms that our clinic helped her fill out did not have enough information, according to the immigration personnel. She denied help from [employer] to understand what is going on. She also brought a CD from [employer]. She believes it has the 100 questions she needs to memorize to pass the test, but she does not have a CD player. I do not have one, but there should be in the clinic. She allowed me to keep the CD to determine what is on it. I will return at her next appt.     Parenting  stress: Her two young children came today and were very active and energetic (jumping and running around). She redirected, ignored, and sometimes moved them to another part of the room. They mostly did not follow direction. They immediately stopped and tended to her when she started crying. It is hard to understand, but it sounds like the youngest has a teacher that comes. Hayde says the teacher plays games with them. I offered much empathy for how challenging it could be for her to parent children with so much energy. Then, I tried to see if there were other areas where we could intervene to help with parenting. Pt became quiet, cried, and did not answer.    Assessment: The patient appeared to be active and engaged in today's session and was receptive to feedback. However, there were periods of long silence after I would ask how exactly we can help her today or offer suggestions for help. She appeared completely overwhelmed and despondent. Her children are very energetic and needed to be redirected many times. I anticipate it is overwhelming to parent them and in combination with her worry and depression, could cause her to shut down as witnessed today.     Mental Status: Hayde Macias appeared generally alert and oriented. Dress was casual and appropriate to the weather and occasion. Grooming and hygiene were clean. Eye contact was avoidant. Speech was of normal volume and rate and was clear, coherent, and relevant. Mood was labile with congruent affect. Thought processes were relevant, logical and goal-directed. Thought content was WNL with no evidence of psychotic or paranoid features. No evidence of SI/HI or self-harm, intent, or plans. Memory appeared grossly intact. Insight and judgment appeared fair and patient exhibited fair impulse control during the appointment.     Does the patient appear to be at imminent risk of harm to self/others at this time? No    The session was necessary to address PTSD, worry,  depressive symptoms that have been interfering with patient's ability to function at home, personal life management.  Ongoing psychotherapy is necessary to improve functioning with daily activities, provide psychoeducation and provide support.    Diagnosis (DSM-5):  296.32 recurrent moderate major depression  308.3 PTSD    Plan:  1. Follow up in 2 weeks.  2.I will listen to CD and determine it's content. Keep locked in locked file  locked building.       NOTE: Treatment plan update due 12/14/17.  Diagnostic assessment update due 4/12/18.  The Treatment Plan dated 5/24/17 was reviewed with the patient at today s visit.  The Treatment Plan remains current based on the patient s status and progress to date.

## 2017-09-14 NOTE — MR AVS SNAPSHOT
After Visit Summary   9/14/2017    Hayde LUJAN Say    MRN: [id number]           Patient Information     Date Of Birth          1990        Visit Information        Provider Department      9/14/2017 9:00 AM Elham Mcmahan, LMFT [facility]        Today's Diagnoses     Major depressive disorder, recurrent episode, moderate (H)    -  1    PTSD (post-traumatic stress disorder)           Follow-ups after your visit        Your next 10 appointments already scheduled     Sep 28, 2017  9:00 AM CDT   Return Visit with Elham Mcmahan, LMFT [facility] ([facility])    [address] [place] [address]   [phone]            Oct 02, 2017  8:20 AM CDT   Return Visit with Sierra Scott RPH [facility] ([facility])    [address] [place] [address]   [phone]            Oct 02, 2017  8:40 AM CDT   Return Visit with Carlos Manuel Rivas MD   [facility] ([facility])    [address] [place] [address]   [phone]              Who to contact     Please call your clinic at [phone] to:    Ask questions about your health    Make or cancel appointments    Discuss your medicines    Learn about your test results    Speak to your doctor   If you have compliments or concerns about an experience at your clinic, or if you wish to file a complaint, please contact [facility] Patient Relations at [phone] or email us at [email or web address]         Additional Information About Your Visit        Care EveryWhere ID     This is your Care EveryWhere ID. This could be used by other organizations to access your [place] medical records  [id number]        Your Vitals Were     Last Period                   09/01/2017            Blood Pressure from Last 3 Encounters:   09/13/17 105/72   08/29/17 101/67   08/03/17 105/70    Weight from Last 3 Encounters:    09/13/17 130 lb 12.8 oz (59.3 kg)   08/29/17 133 lb 8 oz (60.6 kg)   08/03/17 132 lb 12.8 oz (60.2 kg)              We Performed the Following     Interactive Complexity add-on (03817)     Psychotherapy 45 min (74222)        Primary Care Provider Office Phone # Fax #    Carlos Manuel Andreas Rivas [phone]       [facility] [address] [place] [address]        Equal Access to Services     CHHAYA LEY : Hadii aad ku hadasho Soomaali, waaxda luqadaha, qaybta kaalmada adeegyada, waxay idiin hayaan justin duarte . So [facility] [phone].    ATENCIÓN: Si habla español, tiene a keene disposición servicios gratuitos de asistencia lingüística. Llame al [phone].    We comply with applicable federal civil rights laws and [place] laws. We do not discriminate on the basis of race, color, national origin, age, disability sex, sexual orientation or gender identity.            Thank you!     Thank you for choosing [facility]  for your care. Our goal is always to provide you with excellent care. Hearing back from our patients is one way we can continue to improve our services. Please take a few minutes to complete the written survey that you may receive in the mail after your visit with us. Thank you!             Your Updated Medication List - Protect others around you: Learn how to safely use, store and throw away your medicines at www.disposemymeds.org.          This list is accurate as of: 9/14/17 11:32 AM.  Always use your most recent med list.                   Brand Name Dispense Instructions for use Diagnosis    acetaminophen 325 MG tablet    TYLENOL    100 tablet    Take 2 tablets (650 mg) by mouth every 4 hours as needed for mild pain    Tension-type headache, not intractable, unspecified chronicity pattern       amoxicillin 500 MG capsule    AMOXIL     Take 2 capsules (1,000 mg) by mouth 2 times daily    H. pylori infection       calcium carbonate 500 MG  chewable tablet    TUMS     Take 1 tablet (500 mg) by mouth 2 times daily as needed for heartburn    Other chronic gastritis without hemorrhage       cetirizine 10 MG tablet    zyrTEC    90 tablet    Take 1 tablet (10 mg) by mouth every evening    Seasonal allergic rhinitis due to other allergic trigger       clarithromycin 500 MG tablet    BIAXIN     Take 1 tablet (500 mg) by mouth 2 times daily    H. pylori infection       docusate sodium 100 MG tablet    COLACE     Take 100 mg by mouth daily as needed for constipation    Constipation, unspecified constipation type       FLONASE 50 MCG/ACT spray   Generic drug:  fluticasone      Spray 1-2 sprays into both nostrils daily as needed for allergies    Seasonal allergic rhinitis due to other allergic trigger       FLUoxetine 40 MG capsule    PROzac    90 capsule    Take 1 capsule (40 mg) by mouth daily    Major depressive disorder, recurrent episode, moderate (H)       ibuprofen 600 MG tablet    ADVIL/MOTRIN     Take 1 tablet (600 mg) by mouth daily as needed        ketotifen 0.025 % Soln ophthalmic solution    ZADITOR    1 Bottle    Place 1 drop into both eyes every 12 hours    Acute conjunctivitis of both eyes, unspecified acute conjunctivitis type       meclizine 25 MG tablet    ANTIVERT    30 tablet    Take 1 tablet (25 mg) by mouth every 6 hours as needed for dizziness    BPPV (benign paroxysmal positional vertigo), unspecified laterality       montelukast 10 MG tablet    SINGULAIR    30 tablet    Take 1 tablet (10 mg) by mouth At Bedtime    Acute seasonal allergic rhinitis, unspecified trigger       norgestimate-ethinyl estradiol 0.25-35 MG-MCG per tablet    ORTHO-CYCLEN, SPRINTEC    84 tablet    Take 1 tablet by mouth daily    Encounter for initial prescription of contraceptive pills       omeprazole 20 MG CR capsule    priLOSEC     Take 1 capsule (20 mg) by mouth 2 times daily        polyvinyl alcohol 1.4 % ophthalmic solution    LIQUIFILM TEARS    15 mL     Place 1 drop into both eyes as needed for dry eyes    Allergic conjunctivitis of both eyes

## 2017-09-28 ENCOUNTER — OFFICE VISIT (OUTPATIENT)
Dept: PSYCHOLOGY | Facility: CLINIC | Age: 27
End: 2017-09-28

## 2017-09-28 DIAGNOSIS — F43.10 PTSD (POST-TRAUMATIC STRESS DISORDER): Primary | ICD-10-CM

## 2017-09-28 DIAGNOSIS — F33.1 MAJOR DEPRESSIVE DISORDER, RECURRENT EPISODE, MODERATE (H): ICD-10-CM

## 2017-09-28 NOTE — PROGRESS NOTES
Behavioral Health Progress Note     Client Name:            Hayde Macias                                        Service Type:           Individual  Length of Visit: 45 minutes  Attendees:   Complexity: An  is used not only to interpret language, since the patient does not speak English, but also to help with the complexity of understandings across cultures, since the patient is not well integrated in the larger [place] culture.        Identifying Information and Presenting Problem:     The patient is a 26 year old Maryann female who is being seen for problematic symptoms of depression and PTSD.     Treatment Objective(s) Addressed in This Session:  Psychological distress related to acculturation and immigration issues        Progress on / Status of Treatment Objective(s) / Homework:  Minimal progress         PHQ-9 SCORE 4/26/2017 8/3/2017 8/29/2017   Total Score - - -   Total Score 6 5 13         BRENDA-7 SCORE 4/6/2017 4/26/2017 8/29/2017   Total Score 6 8 15         Topics Discussed/Interventions Provided:  Legal/immigration: I reviewed pt's CD from immigration services and it appears to be her entire file starting with her original accepted petition for refugee status in the [place]. I printed the document for her and spend most of session trying to explain it to her and address her fears. She quickly became overwhelmed and began crying.     Notably, at last visit, pt stated she brought the children's 'teacher' with her and had a recent interview. Per the report, her last attempt at an interview was in last year and the person with her was a . The form claims she did not have the correct forms and they felt she could speak sufficient English because she followed orders without needing gestures and the  indicated she communicated with Hayde in English. At this point, I am uncertain about next steps/guidance for Hayde and will call for a care coordination meeting among Dr. Angeli  Rob, and myself.       Housing: pt was recently told too many people are living in her apartment, but she does not believe they can afford an apartment with more bedrooms. She is scared about having to move and upset that living in this country is so hard for her. I provided empathy and supportive counseling.        Assessment: The patient appeared to be active and engaged in today's session and was receptive to feedback. I have some concerns about Hayde's memory or processing ability based on the fact that we have had numerous conversations about citizenship, and I often struggle to understand what has happened or determine how well Hayde comprehends this process. Additionally, her report is somewhat contrary to what is written in her immigration file. Again, she seems to get overwhelmed quickly when discussing this topic. This leads me to question how well she understands this process and how to participate it in.       Mental Status: Hayde Macias appeared generally alert and oriented. Dress was casual and appropriate to the weather and occasion. Grooming and hygiene were clean. Eye contact was avoidant. Speech was of normal volume and rate and was clear, coherent, and relevant. Mood was labile with congruent affect. Thought processes were relevant, logical and goal-directed. Thought content was WNL with no evidence of psychotic or paranoid features. No evidence of SI/HI or self-harm, intent, or plans. Memory appeared grossly intact. Insight and judgment appeared fair and patient exhibited fair impulse control during the appointment.      Does the patient appear to be at imminent risk of harm to self/others at this time? No     The session was necessary to address PTSD, worry, depressive symptoms that have been interfering with patient's ability to function at home, parenting, personal life management.  Ongoing psychotherapy is necessary to improve functioning with daily activities, provide psychoeducation and  provide support.     Diagnosis (DSM-5):  296.32 recurrent moderate major depression  308.3 PTSD     Plan:  1. Follow up in 2 weeks.  2.Per Hayde's request, I will keep her immigration file locked in locked file  my office.         NOTE: Treatment plan update due 12/14/17.  Diagnostic assessment update due 4/12/18.  The Treatment Plan dated 5/24/17 was reviewed with the patient at today s visit.  The Treatment Plan remains current based on the patient s status and progress to date.

## 2017-09-28 NOTE — MR AVS SNAPSHOT
After Visit Summary   9/28/2017    Hayde LUJAN Say    MRN: [id number]           Patient Information     Date Of Birth          1990        Visit Information        Provider Department      9/28/2017 9:00 AM Elham Mcmahan, LMFT [facility]        Today's Diagnoses     PTSD (post-traumatic stress disorder)    -  1    Major depressive disorder, recurrent episode, moderate (H)           Follow-ups after your visit        Your next 10 appointments already scheduled     Oct 02, 2017  8:20 AM CDT   Return Visit with Sierra Scott RPH [facility] ([facility])    [address] [place] [id number]   [phone]            Oct 02, 2017  8:40 AM CDT   Return Visit with Carlos Manuel Rivas MD   [facility] ([facility])    [address] [place] [address]   [phone]            Oct 25, 2017  9:00 AM CDT   Return Visit with Elham Mcmahan LMFT [facility] ([facility])    [address] [place] [address]   [phone]              Who to contact     Please call your clinic at [phone] to:    Ask questions about your health    Make or cancel appointments    Discuss your medicines    Learn about your test results    Speak to your doctor   If you have compliments or concerns about an experience at your clinic, or if you wish to file a complaint, please contact [facility] Patient Relations at [phone] or email us at [email or web address]         Additional Information About Your Visit        Care EveryWhere ID     This is your Care EveryWhere ID. This could be used by other organizations to access your [facility] medical records  [id number]        Your Vitals Were     Last Period                   09/01/2017            Blood Pressure from Last 3 Encounters:   09/13/17 105/72   08/29/17 101/67   08/03/17 105/70    Weight from Last 3 Encounters:    09/13/17 130 lb 12.8 oz (59.3 kg)   08/29/17 133 lb 8 oz (60.6 kg)   08/03/17 132 lb 12.8 oz (60.2 kg)              We Performed the Following     Interactive Complexity add-on (29255)     Psychotherapy 45 min (46556)        Primary Care Provider Office Phone # Fax #    Carlos Manuel Andreas Rivas [phone]       [facility] [address] [place] [address]        Equal Access to Services     CHHAYA LEY : Hadii aad ku hadasho Soomaali, waaxda luqadaha, qaybta kaalmada adeegyada, waxay idiin hayaan justin duarte . So [facility] [phone].    ATENCIÓN: Si habla español, tiene a keene disposición servicios gratuitos de asistencia lingüística. Llame al [phone].    We comply with applicable federal civil rights laws and [place] laws. We do not discriminate on the basis of race, color, national origin, age, disability sex, sexual orientation or gender identity.            Thank you!     Thank you for choosing [facility]  for your care. Our goal is always to provide you with excellent care. Hearing back from our patients is one way we can continue to improve our services. Please take a few minutes to complete the written survey that you may receive in the mail after your visit with us. Thank you!             Your Updated Medication List - Protect others around you: Learn how to safely use, store and throw away your medicines at www.disposemymeds.org.          This list is accurate as of: 9/28/17 12:40 PM.  Always use your most recent med list.                   Brand Name Dispense Instructions for use Diagnosis    acetaminophen 325 MG tablet    TYLENOL    100 tablet    Take 2 tablets (650 mg) by mouth every 4 hours as needed for mild pain    Tension-type headache, not intractable, unspecified chronicity pattern       calcium carbonate 500 MG chewable tablet    TUMS     Take 1 tablet (500 mg) by mouth 2 times daily as needed for heartburn    Other chronic gastritis  without hemorrhage       cetirizine 10 MG tablet    zyrTEC    90 tablet    Take 1 tablet (10 mg) by mouth every evening    Seasonal allergic rhinitis due to other allergic trigger       docusate sodium 100 MG tablet    COLACE     Take 100 mg by mouth daily as needed for constipation    Constipation, unspecified constipation type       FLONASE 50 MCG/ACT spray   Generic drug:  fluticasone      Spray 1-2 sprays into both nostrils daily as needed for allergies    Seasonal allergic rhinitis due to other allergic trigger       FLUoxetine 40 MG capsule    PROzac    90 capsule    Take 1 capsule (40 mg) by mouth daily    Major depressive disorder, recurrent episode, moderate (H)       ibuprofen 600 MG tablet    ADVIL/MOTRIN     Take 1 tablet (600 mg) by mouth daily as needed        ketotifen 0.025 % Soln ophthalmic solution    ZADITOR    1 Bottle    Place 1 drop into both eyes every 12 hours    Acute conjunctivitis of both eyes, unspecified acute conjunctivitis type       meclizine 25 MG tablet    ANTIVERT    30 tablet    Take 1 tablet (25 mg) by mouth every 6 hours as needed for dizziness    BPPV (benign paroxysmal positional vertigo), unspecified laterality       montelukast 10 MG tablet    SINGULAIR    30 tablet    Take 1 tablet (10 mg) by mouth At Bedtime    Acute seasonal allergic rhinitis, unspecified trigger       norgestimate-ethinyl estradiol 0.25-35 MG-MCG per tablet    ORTHO-CYCLEN, SPRINTEC    84 tablet    Take 1 tablet by mouth daily    Encounter for initial prescription of contraceptive pills       polyvinyl alcohol 1.4 % ophthalmic solution    LIQUIFILM TEARS    15 mL    Place 1 drop into both eyes as needed for dry eyes    Allergic conjunctivitis of both eyes

## 2017-10-02 ENCOUNTER — OFFICE VISIT (OUTPATIENT)
Dept: PHARMACY | Facility: CLINIC | Age: 27
End: 2017-10-02

## 2017-10-02 ENCOUNTER — OFFICE VISIT (OUTPATIENT)
Dept: FAMILY MEDICINE | Facility: CLINIC | Age: 27
End: 2017-10-02

## 2017-10-02 VITALS
BODY MASS INDEX: 27.92 KG/M2 | SYSTOLIC BLOOD PRESSURE: 108 MMHG | HEIGHT: 58 IN | DIASTOLIC BLOOD PRESSURE: 72 MMHG | RESPIRATION RATE: 18 BRPM | OXYGEN SATURATION: 97 % | TEMPERATURE: 98.4 F | WEIGHT: 133 LBS | HEART RATE: 99 BPM

## 2017-10-02 DIAGNOSIS — M54.2 NECK PAIN: ICD-10-CM

## 2017-10-02 DIAGNOSIS — F43.10 PTSD (POST-TRAUMATIC STRESS DISORDER): ICD-10-CM

## 2017-10-02 DIAGNOSIS — F33.1 MAJOR DEPRESSIVE DISORDER, RECURRENT EPISODE, MODERATE (H): Primary | ICD-10-CM

## 2017-10-02 DIAGNOSIS — B96.81 HELICOBACTER PYLORI GASTRITIS: ICD-10-CM

## 2017-10-02 DIAGNOSIS — B96.81 HELICOBACTER PYLORI GASTRITIS: Primary | ICD-10-CM

## 2017-10-02 DIAGNOSIS — K29.70 HELICOBACTER PYLORI GASTRITIS: Primary | ICD-10-CM

## 2017-10-02 DIAGNOSIS — K29.70 HELICOBACTER PYLORI GASTRITIS: ICD-10-CM

## 2017-10-02 DIAGNOSIS — J30.1 CHRONIC SEASONAL ALLERGIC RHINITIS DUE TO POLLEN: ICD-10-CM

## 2017-10-02 NOTE — MR AVS SNAPSHOT
After Visit Summary   10/2/2017    Hayde LUJAN Say    MRN: [id number]           Patient Information     Date Of Birth          1990        Visit Information        Provider Department      10/2/2017 8:20 AM Sierra Scott, RPH [facility]        Today's Diagnoses     Major depressive disorder, recurrent episode, moderate (H)    -  1    Chronic seasonal allergic rhinitis due to pollen        Helicobacter pylori gastritis           Follow-ups after your visit        Your next 10 appointments already scheduled     Oct 25, 2017  9:00 AM CDT   Return Visit with Elham Mcmahan LMFT [facility] ([facility])    [address] [place] [address]   [phone]              Who to contact     Please call your clinic at [phone] to:    Ask questions about your health    Make or cancel appointments    Discuss your medicines    Learn about your test results    Speak to your doctor   If you have compliments or concerns about an experience at your clinic, or if you wish to file a complaint, please contact [facility] Patient Relations at [phone] or email us at [email or web address]         Additional Information About Your Visit        Care EveryWhere ID     This is your Care EveryWhere ID. This could be used by other organizations to access your [place] medical records  [id number]        Your Vitals Were     Last Period                   09/01/2017            Blood Pressure from Last 3 Encounters:   10/02/17 108/72   09/13/17 105/72   08/29/17 101/67    Weight from Last 3 Encounters:   10/02/17 133 lb (60.3 kg)   09/13/17 130 lb 12.8 oz (59.3 kg)   08/29/17 133 lb 8 oz (60.6 kg)              We Performed the Following     MTM, EA ADDITIONAL 15 MIN (87539) x 1 (= 15 min.)          Today's Medication Changes          These changes are accurate as of: 10/2/17  9:29 AM.  If you have any questions, ask your nurse or  doctor.               Start taking these medicines.        Dose/Directions    Camphor-Menthol-Methyl Sal 3-5-15 % Crea   Used for:  Neck pain   Started by:  Carlos Manuel Rivas MD        Externally apply topically every 6 hours as needed (for moderate pain)   Quantity:  1 Tube   Refills:  3            Where to get your medicines      These medications were sent to [facility] - [place], [place] - [address]  [address], [place] [address]     Phone:  [phone]     Camphor-Menthol-Methyl Sal 3-5-15 % Crea                Primary Care Provider Office Phone # Fax #    Carlos Manuel Rivas [phone]       [facility] [address] [place] [address]        Equal Access to Services     [facility] HospitalCONST[facility]INO : Hadii aad ku hadasho Soomaali, waaxda luqadaha, qaybta kaalmada adeegyada, waxay idiin hayaan justin duarte . So [facility] [phone].    ATENCIÓN: Si habla español, tiene a keene disposición servicios gratuitos de asistencia lingüística. [facility] [phone].    We comply with applicable federal civil rights laws and [place] laws. We do not discriminate on the basis of race, color, national origin, age, disability, sex, sexual orientation, or gender identity.            Thank you!     Thank you for choosing [facility]  for your care. Our goal is always to provide you with excellent care. Hearing back from our patients is one way we can continue to improve our services. Please take a few minutes to complete the written survey that you may receive in the mail after your visit with us. Thank you!             Your Updated Medication List - Protect others around you: Learn how to safely use, store and throw away your medicines at www.disposemymeds.org.          This list is accurate as of: 10/2/17  9:29 AM.  Always use your most recent med list.                   Brand Name Dispense Instructions for use Diagnosis     acetaminophen 325 MG tablet    TYLENOL    100 tablet    Take 2 tablets (650 mg) by mouth every 4 hours as needed for mild pain    Tension-type headache, not intractable, unspecified chronicity pattern       calcium carbonate 500 MG chewable tablet    TUMS     Take 1 tablet (500 mg) by mouth 2 times daily as needed for heartburn    Other chronic gastritis without hemorrhage       Camphor-Menthol-Methyl Sal 3-5-15 % Crea     1 Tube    Externally apply topically every 6 hours as needed (for moderate pain)    Neck pain       cetirizine 10 MG tablet    zyrTEC    90 tablet    Take 1 tablet (10 mg) by mouth every evening    Seasonal allergic rhinitis due to other allergic trigger       docusate sodium 100 MG tablet    COLACE     Take 100 mg by mouth daily as needed for constipation    Constipation, unspecified constipation type       FLONASE 50 MCG/ACT spray   Generic drug:  fluticasone      Spray 1-2 sprays into both nostrils daily as needed for allergies    Seasonal allergic rhinitis due to other allergic trigger       FLUoxetine 40 MG capsule    PROzac    90 capsule    Take 1 capsule (40 mg) by mouth daily    Major depressive disorder, recurrent episode, moderate (H)       ibuprofen 600 MG tablet    ADVIL/MOTRIN     Take 1 tablet (600 mg) by mouth daily as needed        ketotifen 0.025 % Soln ophthalmic solution    ZADITOR    1 Bottle    Place 1 drop into both eyes every 12 hours    Acute conjunctivitis of both eyes, unspecified acute conjunctivitis type       meclizine 25 MG tablet    ANTIVERT    30 tablet    Take 1 tablet (25 mg) by mouth every 6 hours as needed for dizziness    BPPV (benign paroxysmal positional vertigo), unspecified laterality       montelukast 10 MG tablet    SINGULAIR    30 tablet    Take 1 tablet (10 mg) by mouth At Bedtime    Acute seasonal allergic rhinitis, unspecified trigger       norgestimate-ethinyl estradiol 0.25-35 MG-MCG per tablet    ORTHO-CYCLEN, SPRINTEC    84 tablet    Take 1 tablet  by mouth daily    Encounter for initial prescription of contraceptive pills       polyvinyl alcohol 1.4 % ophthalmic solution    LIQUIFILM TEARS    15 mL    Place 1 drop into both eyes as needed for dry eyes    Allergic conjunctivitis of both eyes

## 2017-10-02 NOTE — NURSING NOTE
name: Sebastián Murillo  Language: Maryann  Agency: [facility]  Phone number: [phone]

## 2017-10-02 NOTE — PROGRESS NOTES
"S: Maryann  used for duration of visit.     Pillbox not brought to visit today nor medications. Report is so busy with children, doesn't have time to sleep let alone make another appointment. Reports son has appointment here at [facility] this Wednesday.     Concern for effects of medicines - upset stomach, nausea, tingling feet / fingers. Persisting today. Wondering if medicines too strong? Only taking medicines out of box, not out of bottles. Is out of medicines, is not sure when the last dose was. Concern for number of medicines taken per day.    Cream was helpful for pain. Need more of this. Is unsure how to get more.     O:     Patient Active Problem List   Diagnosis     Health Care Home     Major depressive disorder, recurrent episode, moderate (H)     Seasonal allergic rhinitis     PTSD (post-traumatic stress disorder)     Helicobacter pylori gastritis         A/P: Concern for reported effects from being off of medicines. Hopeful that completed H pylori treatment, however unable to confirm today.     Had hoped today to review and provide education on pillbox fill so patient can hopefully transition to doing this independently. Unable to complete today.     When talk about previously reported benefit, patient agreeable to continuing the following scheduled medicines for allergies and \"mood\": Fluoxetine, Montelukast, Cetirizine. PRNs not discussed today. Per Dr. Rivas - no need for continued PPI at this time. Patient agreeable to bring pillbox and ALL medicines on Wednesday. Patient requested appointment reminder call. Sent message to .     Briefly discussed process of how to obtain more medicine once supply is deplete. This will require follow up. Refills requested by writer today from [facility] for delivery to patient's home.     Additional items for follow up:  - Unclear about use of birth control       Options for treatment and/or follow-up care were reviewed with the patient. Hayde was " engaged and actively involved in the decision making process. She verbalized understanding of the options discussed and was satisfied with the final plan. Patient was provided with written instructions/medication list via AVS.    Dr. Rivas was provided our recommendations in clinic today and Dr. Snyder was available for supervision during this visit and is the authorizing prescriber for this visit through the pharmacist collaborative practice agreement.    Thank you for the opportunity to participate in the care of this patient.  Sierra Scott, Pharm.D.

## 2017-10-02 NOTE — MR AVS SNAPSHOT
"              After Visit Summary   10/2/2017    Hayde LUJAN Say    MRN: [id number]           Patient Information     Date Of Birth          1990        Visit Information        Provider Department      10/2/2017 8:40 AM Carlos Manuel Rivas MD [facility]        Today's Diagnoses     Helicobacter pylori gastritis    -  1    PTSD (post-traumatic stress disorder)        Neck pain           Follow-ups after your visit        Follow-up notes from your care team     Return in about 4 weeks (around 10/30/2017).      Your next 10 appointments already scheduled     Oct 25, 2017  9:00 AM CDT   Return Visit with Elham Mcmahan LMFT [facility] ([facility])    [address] [place] [address]   [phone]            Nov 01, 2017  8:20 AM CDT   Return Visit with Carlos Manuel Rivas MD   [facility] ([facility])    [address] [place] [address]   [phone]              Who to contact     Please call your clinic at [phone] to:    Ask questions about your health    Make or cancel appointments    Discuss your medicines    Learn about your test results    Speak to your doctor   If you have compliments or concerns about an experience at your clinic, or if you wish to file a complaint, please contact [facility] Patient Relations at [phone] or email us at [email or web address]         Additional Information About Your Visit        Care EveryWhere ID     This is your Care EveryWhere ID. This could be used by other organizations to access your [place] medical records  [id number]        Your Vitals Were     Pulse Temperature Respirations Height Last Period Pulse Oximetry    99 98.4  F (36.9  C) (Oral) 18 4' 10\" (147.3 cm) 09/01/2017 97%    BMI (Body Mass Index)                   27.8 kg/m2            Blood Pressure from Last 3 Encounters:   10/02/17 108/72   09/13/17 105/72   08/29/17 101/67    Weight " from Last 3 Encounters:   10/02/17 133 lb (60.3 kg)   09/13/17 130 lb 12.8 oz (59.3 kg)   08/29/17 133 lb 8 oz (60.6 kg)              Today, you had the following     No orders found for display         Today's Medication Changes          These changes are accurate as of: 10/2/17 11:59 PM.  If you have any questions, ask your nurse or doctor.               Start taking these medicines.        Dose/Directions    Camphor-Menthol-Methyl Sal 3-5-15 % Crea   Used for:  Neck pain   Started by:  Carlos Manuel Rivas MD        Externally apply topically every 6 hours as needed (for moderate pain)   Quantity:  1 Tube   Refills:  3            Where to get your medicines      These medications were sent to [facility] - [place], [place] - [address] [place] [address] [address]  [address] [facility] [facility] [address], [place] [address]     Phone:  [phone]     Camphor-Menthol-Methyl Sal 3-5-15 % Crea                Primary Care Provider Office Phone # Fax #    Carlos Manuel Rivas [phone]       [facility] [address] [place] [address]        Equal Access to Services     DYLAN LEY AH: Hadii raven ku hadasho Soomaali, waaxda luqadaha, qaybta kaalmada adeegyada, nakul daon justin garcía. So [facility] [phone].    ATENCIÓN: Si habla español, tiene a keene disposición servicios gratuitos de asistencia lingüística. Llame al [phone].    We comply with applicable federal civil rights laws and [place] laws. We do not discriminate on the basis of race, color, national origin, age, disability, sex, sexual orientation, or gender identity.            Thank you!     Thank you for choosing [facility]  for your care. Our goal is always to provide you with excellent care. Hearing back from our patients is one way we can continue to improve our services. Please take a few minutes to complete the written survey that you may receive in the mail after your visit with us. Thank  you!             Your Updated Medication List - Protect others around you: Learn how to safely use, store and throw away your medicines at www.disposemymeds.org.          This list is accurate as of: 10/2/17 11:59 PM.  Always use your most recent med list.                   Brand Name Dispense Instructions for use Diagnosis    acetaminophen 325 MG tablet    TYLENOL    100 tablet    Take 2 tablets (650 mg) by mouth every 4 hours as needed for mild pain    Tension-type headache, not intractable, unspecified chronicity pattern       calcium carbonate 500 MG chewable tablet    TUMS     Take 1 tablet (500 mg) by mouth 2 times daily as needed for heartburn    Other chronic gastritis without hemorrhage       Camphor-Menthol-Methyl Sal 3-5-15 % Crea     1 Tube    Externally apply topically every 6 hours as needed (for moderate pain)    Neck pain       cetirizine 10 MG tablet    zyrTEC    90 tablet    Take 1 tablet (10 mg) by mouth every evening    Seasonal allergic rhinitis due to other allergic trigger       docusate sodium 100 MG tablet    COLACE     Take 100 mg by mouth daily as needed for constipation    Constipation, unspecified constipation type       FLONASE 50 MCG/ACT spray   Generic drug:  fluticasone      Spray 1-2 sprays into both nostrils daily as needed for allergies    Seasonal allergic rhinitis due to other allergic trigger       FLUoxetine 40 MG capsule    PROzac    90 capsule    Take 1 capsule (40 mg) by mouth daily    Major depressive disorder, recurrent episode, moderate (H)       ibuprofen 600 MG tablet    ADVIL/MOTRIN     Take 1 tablet (600 mg) by mouth daily as needed        ketotifen 0.025 % Soln ophthalmic solution    ZADITOR    1 Bottle    Place 1 drop into both eyes every 12 hours    Acute conjunctivitis of both eyes, unspecified acute conjunctivitis type       meclizine 25 MG tablet    ANTIVERT    30 tablet    Take 1 tablet (25 mg) by mouth every 6 hours as needed for dizziness    BPPV (benign  paroxysmal positional vertigo), unspecified laterality       montelukast 10 MG tablet    SINGULAIR    30 tablet    Take 1 tablet (10 mg) by mouth At Bedtime    Acute seasonal allergic rhinitis, unspecified trigger       norgestimate-ethinyl estradiol 0.25-35 MG-MCG per tablet    ORTHO-CYCLEN, SPRINTEC    84 tablet    Take 1 tablet by mouth daily    Encounter for initial prescription of contraceptive pills       polyvinyl alcohol 1.4 % ophthalmic solution    LIQUIFILM TEARS    15 mL    Place 1 drop into both eyes as needed for dry eyes    Allergic conjunctivitis of both eyes

## 2017-10-02 NOTE — PROGRESS NOTES
"       Subjective       Hayde B Say is a 26 year old  female who presents for follow up of concern(s) listed below    #Abdominal Pain  - Still comes with spicy food  - epigastric  - takes tums - alleviates it  - No nausea or vomiting, no hematemesis  - No diarrhea    #Neck Pain  - Located on anterior left neck  - Aching pain, worse with movement  - No sore throat  - No fever    Pertinent ROS: Constitutional, HEENT, cardiovascular, pulmonary, gi and gu systems are negative, except as otherwise noted.           Objective   Vitals:    10/02/17 0845   BP: 108/72   Pulse: 99   Resp: 18   Temp: 98.4  F (36.9  C)   TempSrc: Oral   SpO2: 97%   Weight: 133 lb (60.3 kg)   Height: 4' 10\" (147.3 cm)     Body mass index is 27.8 kg/(m^2).    GENERAL APPEARANCE: healthy, alert and no distress  EYES: Eyes grossly normal to inspection, mild scleral injection  HENT: ear canals and TM's normal, nose and mouth without ulcers or lesions, oropharynx clear and oral mucous membranes moist  NECK: no adenopathy, no asymmetry, masses, or scars and thyroid normal to palpation, Left SCM tender to palpation  RESP: lungs clear to auscultation - no rales, rhonchi or wheezes  CV: regular rates and rhythm, normal S1 S2, no S3 or S4, no murmur, click or rub, no peripheral edema and peripheral pulses strong  ABDOMEN: soft, nontender, no hepatosplenomegaly, no masses and bowel sounds normal  MS: no musculoskeletal defects are noted and gait is age appropriate without ataxia  SKIN: no suspicious lesions or rashes  NEURO: Normal strength and tone, sensory exam grossly normal, mentation intact and speech normal  PSYCH: mentation appears normal and affect normal/bright         Assessment/Plan       (K29.70,  B96.81) Helicobacter pylori gastritis  (primary encounter diagnosis)  Comment:   Plan: Improving abdominal pain, still has triggers. Discussed avoiding these triggers.    (F43.10) PTSD (post-traumatic stress disorder)  Comment:   Plan: Encouraged " continued meetings with [facility]. Given the complete situation, we discussed the potential benefits of continued mental health care at the [facility]. Patient is agreeable to this, though is concerned about rides. Will place referral.    (M54.2) Neck pain  Comment:   Plan: Camphor-Menthol-Methyl Sal 3-5-15 % CREA        Neck pain located to SCM muscle. Stretches taught by myself to help stretch and strengthen SCM.        Options for treatment and follow-up care were reviewed with the patient and/or guardian. Hayde TAI Say and/or guardian engaged in the decision making process and verbalized understanding of the options discussed and agreed with the final plan.    Carlos Manuel Rivas MD      Precepted today with: Teagan Snyder MD

## 2017-10-04 ENCOUNTER — OFFICE VISIT (OUTPATIENT)
Dept: PHARMACY | Facility: CLINIC | Age: 27
End: 2017-10-04

## 2017-10-04 DIAGNOSIS — F33.1 MAJOR DEPRESSIVE DISORDER, RECURRENT EPISODE, MODERATE (H): Primary | ICD-10-CM

## 2017-10-04 DIAGNOSIS — J30.1 CHRONIC SEASONAL ALLERGIC RHINITIS DUE TO POLLEN: ICD-10-CM

## 2017-10-04 NOTE — Clinical Note
Just an FYI / update - We went through how to fill her pillbox together. Afterwards she stated she was comfortable doing independently. Both this and her ability to obtain refills needs to be followed up on to make sure our conversation stuck :) Seeing you 11/1. I sent a message to the  to call to remind her to bring in her meds / pillboxes to this visit. I'll be in that AM also (closer to 845 as have a AM meeting) so hoping to pop in and check how it's going!

## 2017-10-04 NOTE — PROGRESS NOTES
S: Seen today in clinic with assistance of Maryann . Children were present. They were very interested in medicines / pillbox. Patient reports keeps medicine out of reach of kiddos.      Reports is in hurry today, needs to leave clinic very soon. No concerns with medicines but states has run out and has noticed a difference being off. Would like to continue pillboxes as filled previously.     Pillbox brought to clinic. Empty as expected. Patient did not feel comfortable doing herself. Medicine bottles (refills) also brought to clinic.     O:     Patient Active Problem List   Diagnosis     Health Care Home     Major depressive disorder, recurrent episode, moderate (H)     Seasonal allergic rhinitis     PTSD (post-traumatic stress disorder)     Helicobacter pylori gastritis         A/P:     Together with patient filled pillboxes x 2 weeks: Cetirizine, Singulair, Fluoxetine - each daily. Encouraged patient to take these medicines at the same time as her birth control pill. Patient agreeable. Patient states feels comfortable refilling pillbox independently going forward. Does not feel needs to do this together again. Will check in on patient in one month during follow up w/ PCP. Sent future message to  staff to remind patient prior to next appointment to bring medicines / pillboxes.    Talked through strategy of getting refills once medicines run out. Requires follow up to ensure understanding.         Options for treatment and/or follow-up care were reviewed with the patient. Hayde was engaged and actively involved in the decision making process. She verbalized understanding of the options discussed and was satisfied with the final plan.     Dr. Rivas was provided our recommendations via routed note and Dr. Nguyễn was available for supervision during this visit and is the authorizing prescriber for this visit through the pharmacist collaborative practice agreement.    Thank you for the opportunity  to participate in the care of this patient.  Tao OlsenD.    Current Outpatient Prescriptions   Medication Sig Dispense Refill     cetirizine (ZYRTEC) 10 MG tablet Take 1 tablet (10 mg) by mouth every evening 90 tablet 1     calcium carbonate (TUMS) 500 MG chewable tablet Take 1 tablet (500 mg) by mouth 2 times daily as needed for heartburn       fluticasone (FLONASE) 50 MCG/ACT spray Spray 1-2 sprays into both nostrils daily as needed for allergies       montelukast (SINGULAIR) 10 MG tablet Take 1 tablet (10 mg) by mouth At Bedtime 30 tablet 3     norgestimate-ethinyl estradiol (ORTHO-CYCLEN, SPRINTEC) 0.25-35 MG-MCG per tablet Take 1 tablet by mouth daily 84 tablet 3     FLUoxetine (PROZAC) 40 MG capsule Take 1 capsule (40 mg) by mouth daily 90 capsule 1     Camphor-Menthol-Methyl Sal 3-5-15 % CREA Externally apply topically every 6 hours as needed (for moderate pain) 1 Tube 3     docusate sodium (COLACE) 100 MG tablet Take 100 mg by mouth daily as needed for constipation       ibuprofen (ADVIL/MOTRIN) 600 MG tablet Take 1 tablet (600 mg) by mouth daily as needed       ketotifen (ZADITOR) 0.025 % SOLN ophthalmic solution Place 1 drop into both eyes every 12 hours 1 Bottle 1     polyvinyl alcohol (LIQUIFILM TEARS) 1.4 % ophthalmic solution Place 1 drop into both eyes as needed for dry eyes 15 mL 1     meclizine (ANTIVERT) 25 MG tablet Take 1 tablet (25 mg) by mouth every 6 hours as needed for dizziness 30 tablet 1     acetaminophen (TYLENOL) 325 MG tablet Take 2 tablets (650 mg) by mouth every 4 hours as needed for mild pain 100 tablet 0        Drug therapy problems identified:  Medical Condition 1: Depression, Goals of therapy: Not at goal, Drug Class: Antidepressant, Convenience: Patient misunderstanding, Intervention: Add device or process to assist use, Verification: Patient Agreed - Compliance/Education    Relevant medical devices: n/a    # of medical conditions addressed: 2  # of  medications addressed: 4  # of DTP identified: 1  Time spent: 20 minutes  Level of service per [place] DHS guidelines: 2

## 2017-10-04 NOTE — MR AVS SNAPSHOT
After Visit Summary   10/4/2017    Hayde LUJAN Say    MRN: [id number]           Patient Information     Date Of Birth          1990        Visit Information        Provider Department      10/4/2017 8:40 AM Sierra Scott, RPH [facility]        Today's Diagnoses     Major depressive disorder, recurrent episode, moderate (H)    -  1    Chronic seasonal allergic rhinitis due to pollen           Follow-ups after your visit        Your next 10 appointments already scheduled     Oct 25, 2017  9:00 AM CDT   Return Visit with ANABEL Eckert   [facility] ([facility])    [address] [place] [address]   [phone]            Nov 01, 2017  8:20 AM CDT   Return Visit with Carlos Manuel Rivas MD   [facility] ([facility])    [address] [place] [address]   [phone]              Who to contact     Please call your clinic at [phone] to:    Ask questions about your health    Make or cancel appointments    Discuss your medicines    Learn about your test results    Speak to your doctor   If you have compliments or concerns about an experience at your clinic, or if you wish to file a complaint, please contact [facility] Patient Relations at [phone] or email us at [email or web address]         Additional Information About Your Visit        Care EveryWhere ID     This is your Care EveryWhere ID. This could be used by other organizations to access your [place] medical records  [id number]        Your Vitals Were     Last Period                   09/01/2017            Blood Pressure from Last 3 Encounters:   10/02/17 108/72   09/13/17 105/72   08/29/17 101/67    Weight from Last 3 Encounters:   10/02/17 133 lb (60.3 kg)   09/13/17 130 lb 12.8 oz (59.3 kg)   08/29/17 133 lb 8 oz (60.6 kg)              We Performed the Following     MTM, EA ADDITIONAL 15 MIN (46695) x 1 (= 15  min.)        Primary Care Provider Office Phone # Fax #    Carlos Manuel Andreas Rivas [phone]       [facility] [address] [place] [address]        Equal Access to Services     DYLAN LEY : Hadii raven ku hadlainao Soomaali, waaxda luqadaha, qaybta kaalmada adeegyada, nakul daon justin peacejulián garcía. So [facility] [phone].    ATENCIÓN: Si habla español, tiene a keene disposición servicios gratuitos de asistencia lingüística. Llame al [phone].    We comply with applicable federal civil rights laws and [place] laws. We do not discriminate on the basis of race, color, national origin, age, disability, sex, sexual orientation, or gender identity.            Thank you!     Thank you for choosing [facility]  for your care. Our goal is always to provide you with excellent care. Hearing back from our patients is one way we can continue to improve our services. Please take a few minutes to complete the written survey that you may receive in the mail after your visit with us. Thank you!             Your Updated Medication List - Protect others around you: Learn how to safely use, store and throw away your medicines at www.disposemymeds.org.          This list is accurate as of: 10/4/17 11:59 PM.  Always use your most recent med list.                   Brand Name Dispense Instructions for use Diagnosis    acetaminophen 325 MG tablet    TYLENOL    100 tablet    Take 2 tablets (650 mg) by mouth every 4 hours as needed for mild pain    Tension-type headache, not intractable, unspecified chronicity pattern       calcium carbonate 500 MG chewable tablet    TUMS     Take 1 tablet (500 mg) by mouth 2 times daily as needed for heartburn    Other chronic gastritis without hemorrhage       Camphor-Menthol-Methyl Sal 3-5-15 % Crea     1 Tube    Externally apply topically every 6 hours as needed (for moderate pain)    Neck pain       cetirizine 10 MG tablet    zyrTEC    90  tablet    Take 1 tablet (10 mg) by mouth every evening    Seasonal allergic rhinitis due to other allergic trigger       docusate sodium 100 MG tablet    COLACE     Take 100 mg by mouth daily as needed for constipation    Constipation, unspecified constipation type       FLONASE 50 MCG/ACT spray   Generic drug:  fluticasone      Spray 1-2 sprays into both nostrils daily as needed for allergies    Seasonal allergic rhinitis due to other allergic trigger       FLUoxetine 40 MG capsule    PROzac    90 capsule    Take 1 capsule (40 mg) by mouth daily    Major depressive disorder, recurrent episode, moderate (H)       ibuprofen 600 MG tablet    ADVIL/MOTRIN     Take 1 tablet (600 mg) by mouth daily as needed        ketotifen 0.025 % Soln ophthalmic solution    ZADITOR    1 Bottle    Place 1 drop into both eyes every 12 hours    Acute conjunctivitis of both eyes, unspecified acute conjunctivitis type       meclizine 25 MG tablet    ANTIVERT    30 tablet    Take 1 tablet (25 mg) by mouth every 6 hours as needed for dizziness    BPPV (benign paroxysmal positional vertigo), unspecified laterality       montelukast 10 MG tablet    SINGULAIR    30 tablet    Take 1 tablet (10 mg) by mouth At Bedtime    Acute seasonal allergic rhinitis, unspecified trigger       norgestimate-ethinyl estradiol 0.25-35 MG-MCG per tablet    ORTHO-CYCLEN, SPRINTEC    84 tablet    Take 1 tablet by mouth daily    Encounter for initial prescription of contraceptive pills       polyvinyl alcohol 1.4 % ophthalmic solution    LIQUIFILM TEARS    15 mL    Place 1 drop into both eyes as needed for dry eyes    Allergic conjunctivitis of both eyes

## 2017-10-05 NOTE — PROGRESS NOTES
Preceptor Attestation:  Patient's case reviewed and discussed with Carlos Manuel Rivas MD.  Patient seen and discussed with the resident.  I agree with assessment and plan of care.  Supervising Physician:  Teagan Snyder MD  [facility]

## 2017-10-25 ENCOUNTER — OFFICE VISIT (OUTPATIENT)
Dept: PSYCHOLOGY | Facility: CLINIC | Age: 27
End: 2017-10-25

## 2017-10-25 DIAGNOSIS — F33.1 MAJOR DEPRESSIVE DISORDER, RECURRENT EPISODE, MODERATE (H): ICD-10-CM

## 2017-10-25 DIAGNOSIS — F43.10 PTSD (POST-TRAUMATIC STRESS DISORDER): Primary | ICD-10-CM

## 2017-10-25 NOTE — MR AVS SNAPSHOT
After Visit Summary   10/25/2017    Hayde LUJAN Say    MRN: [id number]           Patient Information     Date Of Birth          1990        Visit Information        Provider Department      10/25/2017 9:00 AM Elham Mcmahan, LMFT [facility]        Today's Diagnoses     PTSD (post-traumatic stress disorder)    -  1    Major depressive disorder, recurrent episode, moderate (H)           Follow-ups after your visit        Your next 10 appointments already scheduled     Nov 01, 2017  8:20 AM CDT   Return Visit with Carlos Manuel Rivas MD   [facility] ([facility])    [address] [place] [address]   [phone]            Nov 15, 2017  9:00 AM CST   Return Visit with ANABEL Eckert   [facility] ([facility])    [address] [place] [address]   [phone]              Who to contact     Please call your clinic at [phone] to:    Ask questions about your health    Make or cancel appointments    Discuss your medicines    Learn about your test results    Speak to your doctor   If you have compliments or concerns about an experience at your clinic, or if you wish to file a complaint, please contact [facility] Patient Relations at [phone] or email us at [email or web address]         Additional Information About Your Visit        Care EveryWhere ID     This is your Care EveryWhere ID. This could be used by other organizations to access your [place] medical records  [id number]         Blood Pressure from Last 3 Encounters:   10/02/17 108/72   09/13/17 105/72   08/29/17 101/67    Weight from Last 3 Encounters:   10/02/17 133 lb (60.3 kg)   09/13/17 130 lb 12.8 oz (59.3 kg)   08/29/17 133 lb 8 oz (60.6 kg)              We Performed the Following     Interactive Complexity add-on (20543)     Psychotherapy 45 min (04763)        Primary Care Provider Office Phone # Fax #    Carlos Manuel  Andreas Rivas [phone]       [facility] [address], [place] [address]        Equal Access to Services     DYLAN LEY : Hadii aad ku hadlainashelia Celenafransisco, wacarinda luqadaha, qaybta kaalmada shoshana, nakul acin hayaajulián weemsritchie ibarra laJeannettejuan garcía. So [facility] [phone].    ATENCIÓN: Si habla español, tiene a keene disposición servicios gratuitos de asistencia lingüística. Llame al [phone].    We comply with applicable federal civil rights laws and [place] laws. We do not discriminate on the basis of race, color, national origin, age, disability, sex, sexual orientation, or gender identity.            Thank you!     Thank you for choosing [facility]  for your care. Our goal is always to provide you with excellent care. Hearing back from our patients is one way we can continue to improve our services. Please take a few minutes to complete the written survey that you may receive in the mail after your visit with us. Thank you!             Your Updated Medication List - Protect others around you: Learn how to safely use, store and throw away your medicines at www.disposemymeds.org.          This list is accurate as of: 10/25/17 11:59 PM.  Always use your most recent med list.                   Brand Name Dispense Instructions for use Diagnosis    acetaminophen 325 MG tablet    TYLENOL    100 tablet    Take 2 tablets (650 mg) by mouth every 4 hours as needed for mild pain    Tension-type headache, not intractable, unspecified chronicity pattern       calcium carbonate 500 MG chewable tablet    TUMS     Take 1 tablet (500 mg) by mouth 2 times daily as needed for heartburn    Other chronic gastritis without hemorrhage       Camphor-Menthol-Methyl Sal 3-5-15 % Crea     1 Tube    Externally apply topically every 6 hours as needed (for moderate pain)    Neck pain       cetirizine 10 MG tablet    zyrTEC    90 tablet    Take 1 tablet (10 mg) by mouth every evening    Seasonal  allergic rhinitis due to other allergic trigger       docusate sodium 100 MG tablet    COLACE     Take 100 mg by mouth daily as needed for constipation    Constipation, unspecified constipation type       FLONASE 50 MCG/ACT spray   Generic drug:  fluticasone      Spray 1-2 sprays into both nostrils daily as needed for allergies    Seasonal allergic rhinitis due to other allergic trigger       FLUoxetine 40 MG capsule    PROzac    90 capsule    Take 1 capsule (40 mg) by mouth daily    Major depressive disorder, recurrent episode, moderate (H)       ibuprofen 600 MG tablet    ADVIL/MOTRIN     Take 1 tablet (600 mg) by mouth daily as needed        ketotifen 0.025 % Soln ophthalmic solution    ZADITOR    1 Bottle    Place 1 drop into both eyes every 12 hours    Acute conjunctivitis of both eyes, unspecified acute conjunctivitis type       meclizine 25 MG tablet    ANTIVERT    30 tablet    Take 1 tablet (25 mg) by mouth every 6 hours as needed for dizziness    BPPV (benign paroxysmal positional vertigo), unspecified laterality       montelukast 10 MG tablet    SINGULAIR    30 tablet    Take 1 tablet (10 mg) by mouth At Bedtime    Acute seasonal allergic rhinitis, unspecified trigger       norgestimate-ethinyl estradiol 0.25-35 MG-MCG per tablet    ORTHO-CYCLEN, SPRINTEC    84 tablet    Take 1 tablet by mouth daily    Encounter for initial prescription of contraceptive pills       polyvinyl alcohol 1.4 % ophthalmic solution    LIQUIFILM TEARS    15 mL    Place 1 drop into both eyes as needed for dry eyes    Allergic conjunctivitis of both eyes

## 2017-10-26 ENCOUNTER — TELEPHONE (OUTPATIENT)
Dept: FAMILY MEDICINE | Facility: CLINIC | Age: 27
End: 2017-10-26

## 2017-10-26 NOTE — TELEPHONE ENCOUNTER
I got a call from the pt, I called the pt back with an .  The pt wanted me to help her with applying for Public Housing and Section 8.  I told her that she can come in today at 2:00pm or wait till she is seen next week.  The pt has an appointment to be seen on the 1st of October.  The pt stated that she wanted to wait till next week to have me help her apply.

## 2017-10-26 NOTE — PROGRESS NOTES
Behavioral Health Progress Note    Client Legal Name: Hayde LUJAN Say   Client Preferred Name: Hayde   Service Type: Individual  Length of Visit: 40 minutes  Attendees:   Complexity: An  is used not only to interpret language, since the patient does not speak English, but also to help with the complexity of understandings across cultures, since the patient is not well integrated in the larger [place] culture.      Identifying Information and Presenting Problem:    The patient is a 26 year old [place] female who is being seen for problematic symptoms of depression and PTSD.    Treatment Objective(s) Addressed in This Session:  Fear related to [place] customs and systems.    Progress on / Status of Treatment Objective(s) / Homework:  Minimal progress   Hayde is interested in public housing today    PHQ-9 SCORE 4/26/2017 8/3/2017 8/29/2017   Total Score - - -   Total Score 6 5 13       BRENDA-7 SCORE 4/6/2017 4/26/2017 8/29/2017   Total Score 6 8 15       Topics Discussed/Interventions Provided:  Hayde was most interested in discussing how to apply for public housing because the landlord has told her there are too many people living her in [facility]. We discussed her fears around this, pulled up the [place] [employer] website and reviewed what she would need to apply. She then became hesitant because she was uncertain she would be able to get all the necessary information. It became confusing because it appeared patient thought it would be too difficult and overwhelming, but asked at the end of session if we were going to do her application. I had Angeli Guerrero, care coordinator, come and speak with patient about how fill the application and reach him for assistance. She also had concerns about forms sent home from her sons's school. Although they were written in [place], she and no one she knows can read [place]. Therefore, she was delinquent on the forms.     Assessment: The patient appeared to be active and engaged in  today's session and was receptive to feedback. Patient's behaviors were a little odd today. A few times she dramatically laid down on my couch when she felt overwhelmed and stayed laying down for the remainder of session. I think Healthcare [facility] would be a good choice for Hayde. Often she receives forms in the mail she cannot read and has trouble understanding and navigating correct systems to get the help she needs.      Mental Status: Hayde Macias appeared generally alert and oriented. Dress was casual and appropriate to the weather and occasion. Grooming and hygiene were clean. Eye contact was avoidant. Speech was of normal volume and rate and was clear, coherent, and relevant. Mood was stgressed with incongruent affect. Thought processes were relevant, logical and goal-directed. Thought content was WNL with no evidence of psychotic or paranoid features. No evidence of SI/HI or self-harm, intent, or plans. Memory appeared grossly intact. Insight and judgment appeared fair and patient exhibited fair impulse control during the appointment.      Does the patient appear to be at imminent risk of harm to self/others at this time? No     The session was necessary to address PTSD, worry, depressive symptoms that have been interfering with patient's ability to function at home, personal life management.  Ongoing psychotherapy is necessary to improve functioning with daily activities, provide psychoeducation and provide support.     Diagnosis (DSM-5):  296.32 recurrent moderate major depression  308.3 PTSD     Plan:  1. Follow up in 2 weeks.  2.Connect with [facility]     NOTE: Treatment plan update due 12/14/17.  Diagnostic assessment update due 4/12/18.

## 2017-11-01 ENCOUNTER — OFFICE VISIT (OUTPATIENT)
Dept: FAMILY MEDICINE | Facility: CLINIC | Age: 27
End: 2017-11-01

## 2017-11-01 VITALS
HEART RATE: 84 BPM | RESPIRATION RATE: 22 BRPM | TEMPERATURE: 97.7 F | SYSTOLIC BLOOD PRESSURE: 134 MMHG | OXYGEN SATURATION: 98 % | DIASTOLIC BLOOD PRESSURE: 76 MMHG | WEIGHT: 132.4 LBS | BODY MASS INDEX: 27.79 KG/M2 | HEIGHT: 58 IN

## 2017-11-01 DIAGNOSIS — B96.81 HELICOBACTER PYLORI GASTRITIS: Primary | ICD-10-CM

## 2017-11-01 DIAGNOSIS — M54.2 NECK PAIN: ICD-10-CM

## 2017-11-01 DIAGNOSIS — G44.209 TENSION-TYPE HEADACHE, NOT INTRACTABLE, UNSPECIFIED CHRONICITY PATTERN: ICD-10-CM

## 2017-11-01 DIAGNOSIS — K29.70 HELICOBACTER PYLORI GASTRITIS: Primary | ICD-10-CM

## 2017-11-01 DIAGNOSIS — G47.9 SLEEP DISTURBANCE: ICD-10-CM

## 2017-11-01 DIAGNOSIS — J30.2 ACUTE SEASONAL ALLERGIC RHINITIS, UNSPECIFIED TRIGGER: ICD-10-CM

## 2017-11-01 DIAGNOSIS — Z23 IMMUNIZATION DUE: ICD-10-CM

## 2017-11-01 RX ORDER — LANOLIN ALCOHOL/MO/W.PET/CERES
3 CREAM (GRAM) TOPICAL
Qty: 30 TABLET | Refills: 3 | Status: SHIPPED | OUTPATIENT
Start: 2017-11-01 | End: 2018-03-16

## 2017-11-01 RX ORDER — ACETAMINOPHEN 325 MG/1
650 TABLET ORAL EVERY 4 HOURS PRN
Qty: 100 TABLET | Refills: 0 | Status: SHIPPED | OUTPATIENT
Start: 2017-11-01 | End: 2018-03-16 | Stop reason: ALTCHOICE

## 2017-11-01 RX ORDER — MONTELUKAST SODIUM 10 MG/1
10 TABLET ORAL DAILY
Qty: 30 TABLET | Refills: 3 | Status: SHIPPED | OUTPATIENT
Start: 2017-11-01 | End: 2018-03-16

## 2017-11-01 NOTE — MR AVS SNAPSHOT
"              After Visit Summary   11/1/2017    Hayde LUJAN Say    MRN: [id number]           Patient Information     Date Of Birth          1990        Visit Information        Provider Department      11/1/2017 8:20 AM Carlos Manuel Rivas MD [facility]        Today's Diagnoses     Helicobacter pylori gastritis    -  1    Neck pain        Acute seasonal allergic rhinitis, unspecified trigger        Tension-type headache, not intractable, unspecified chronicity pattern        Sleep disturbance        Immunization due           Follow-ups after your visit        Follow-up notes from your care team     Return in about 6 weeks (around 12/13/2017).      Your next 10 appointments already scheduled     Nov 15, 2017  9:00 AM CST   Return Visit with Elham Mcmahan LMFT [facility] ([facility])    [address] [place] [address]   [phone]              Who to contact     Please call your clinic at [phone] to:    Ask questions about your health    Make or cancel appointments    Discuss your medicines    Learn about your test results    Speak to your doctor   If you have compliments or concerns about an experience at your clinic, or if you wish to file a complaint, please contact [facility] Patient Relations at [phone] or email us at [email or web address]         Additional Information About Your Visit        Care EveryWhere ID     This is your Care EveryWhere ID. This could be used by other organizations to access your [place] medical records  [id number]        Your Vitals Were     Pulse Temperature Respirations Height Pulse Oximetry BMI (Body Mass Index)    84 97.7  F (36.5  C) 22 4' 10\" (147.3 cm) 98% 27.67 kg/m2       Blood Pressure from Last 3 Encounters:   11/01/17 134/76   10/02/17 108/72   09/13/17 105/72    Weight from Last 3 Encounters:   11/01/17 132 lb 6.4 oz (60.1 kg)   10/02/17 133 lb (60.3 kg)   09/13/17 130 " lb 12.8 oz (59.3 kg)              We Performed the Following     ADMIN VACCINE, INITIAL     FLU VAC PRESRV FREE QUAD SPLIT VIR IM, 0.5 mL dosage          Today's Medication Changes          These changes are accurate as of: 11/1/17  9:04 AM.  If you have any questions, ask your nurse or doctor.               Start taking these medicines.        Dose/Directions    melatonin 3 MG tablet   Used for:  Sleep disturbance   Started by:  Carlos Manuel Rivas MD        Dose:  3 mg   Take 1 tablet (3 mg) by mouth nightly as needed for sleep   Quantity:  30 tablet   Refills:  3       ranitidine 150 MG tablet   Commonly known as:  ZANTAC   Used for:  Helicobacter pylori gastritis   Started by:  Carlos Manuel Rivas MD        Dose:  150 mg   Take 1 tablet (150 mg) by mouth 2 times daily   Quantity:  60 tablet   Refills:  3         These medicines have changed or have updated prescriptions.        Dose/Directions    montelukast 10 MG tablet   Commonly known as:  SINGULAIR   This may have changed:  when to take this   Used for:  Acute seasonal allergic rhinitis, unspecified trigger   Changed by:  Carlos Manuel Rivas MD        Dose:  10 mg   Take 1 tablet (10 mg) by mouth daily   Quantity:  30 tablet   Refills:  3            Where to get your medicines      These medications were sent to [facility] - [place], [place] - [address] [place] [address]  [address], [place] [address]     Phone:  [phone]     acetaminophen 325 MG tablet    Camphor-Menthol-Methyl Sal 3-5-15 % Crea    melatonin 3 MG tablet    montelukast 10 MG tablet    ranitidine 150 MG tablet                Primary Care Provider Office Phone # Fax #    Carlos Manuel Rivas [phone]       [facility] [address] [place] [address]        Equal Access to Services     DYLAN LEY AH: Mason Jones, [place] villalta, nakul young  lashayne garcía. So [facility] [phone].    ATENCIÓN: Si barbarala jolanta, tiene a keene disposición servicios gratuitos de asistencia lingüística. Cristy medina [phone].    We comply with applicable federal civil rights laws and [place] laws. We do not discriminate on the basis of race, color, national origin, age, disability, sex, sexual orientation, or gender identity.            Thank you!     Thank you for choosing [facility]  for your care. Our goal is always to provide you with excellent care. Hearing back from our patients is one way we can continue to improve our services. Please take a few minutes to complete the written survey that you may receive in the mail after your visit with us. Thank you!             Your Updated Medication List - Protect others around you: Learn how to safely use, store and throw away your medicines at www.disposemymeds.org.          This list is accurate as of: 11/1/17  9:04 AM.  Always use your most recent med list.                   Brand Name Dispense Instructions for use Diagnosis    acetaminophen 325 MG tablet    TYLENOL    100 tablet    Take 2 tablets (650 mg) by mouth every 4 hours as needed for mild pain    Tension-type headache, not intractable, unspecified chronicity pattern       calcium carbonate 500 MG chewable tablet    TUMS     Take 1 tablet (500 mg) by mouth 2 times daily as needed for heartburn    Other chronic gastritis without hemorrhage       Camphor-Menthol-Methyl Sal 3-5-15 % Crea     1 Tube    Externally apply topically every 6 hours as needed (for moderate pain)    Neck pain       cetirizine 10 MG tablet    zyrTEC    90 tablet    Take 1 tablet (10 mg) by mouth every evening    Seasonal allergic rhinitis due to other allergic trigger       docusate sodium 100 MG tablet    COLACE     Take 100 mg by mouth daily as needed for constipation    Constipation, unspecified constipation type       FLONASE 50 MCG/ACT spray   Generic drug:  fluticasone      Spray 1-2  sprays into both nostrils daily as needed for allergies    Seasonal allergic rhinitis due to other allergic trigger       FLUoxetine 40 MG capsule    PROzac    90 capsule    Take 1 capsule (40 mg) by mouth daily    Major depressive disorder, recurrent episode, moderate (H)       ibuprofen 600 MG tablet    ADVIL/MOTRIN     Take 1 tablet (600 mg) by mouth daily as needed        ketotifen 0.025 % Soln ophthalmic solution    ZADITOR    1 Bottle    Place 1 drop into both eyes every 12 hours    Acute conjunctivitis of both eyes, unspecified acute conjunctivitis type       meclizine 25 MG tablet    ANTIVERT    30 tablet    Take 1 tablet (25 mg) by mouth every 6 hours as needed for dizziness    BPPV (benign paroxysmal positional vertigo), unspecified laterality       melatonin 3 MG tablet     30 tablet    Take 1 tablet (3 mg) by mouth nightly as needed for sleep    Sleep disturbance       montelukast 10 MG tablet    SINGULAIR    30 tablet    Take 1 tablet (10 mg) by mouth daily    Acute seasonal allergic rhinitis, unspecified trigger       norgestimate-ethinyl estradiol 0.25-35 MG-MCG per tablet    ORTHO-CYCLEN, SPRINTEC    84 tablet    Take 1 tablet by mouth daily    Encounter for initial prescription of contraceptive pills       polyvinyl alcohol 1.4 % ophthalmic solution    LIQUIFILM TEARS    15 mL    Place 1 drop into both eyes as needed for dry eyes    Allergic conjunctivitis of both eyes       ranitidine 150 MG tablet    ZANTAC    60 tablet    Take 1 tablet (150 mg) by mouth 2 times daily    Helicobacter pylori gastritis

## 2017-11-01 NOTE — PROGRESS NOTES
"       Subjective       Hayde B Say is a 26 year old  female with a significant past medical history of depression and h pylori gastritis who presents for follow up of concern(s) listed below    1. H pylori associated abdominal pain  - Did have good relief from pain after finishing antibiotic course  - Now having burning epigastric abdominal pain again  - Worse with spicy foods  - No vomiting, weight loss, or melanotic stool  - Not taking any medication for abdominal pain at this time    2. Pill Boxes  - brings in empty pill boxes, states she doesn't remember how to fill them  - Brings in 3 pill bottles    Pertinent ROS: 7-Point Review of Systems Negative -- Except as noted above.         Objective     Vitals:    11/01/17 0833   BP: 134/76   Pulse: 84   Resp: 22   Temp: 97.7  F (36.5  C)   SpO2: 98%   Weight: 132 lb 6.4 oz (60.1 kg)   Height: 4' 10\" (147.3 cm)     Body mass index is 27.67 kg/(m^2).    GENERAL APPEARANCE: healthy, alert and no distress  EYES: Eyes grossly normal to inspection  RESP: lungs clear to auscultation - no rales, rhonchi or wheezes  CV: regular rates and rhythm, normal S1 S2, no S3 or S4, no murmur, click or rub, no peripheral edema and peripheral pulses strong  ABDOMEN: soft, mild tenderness in epigastrim, no hepatosplenomegaly, no masses and bowel sounds normal  MS: no musculoskeletal defects are noted and gait is age appropriate without ataxia  SKIN: no suspicious lesions or rashes  NEURO: Normal strength and tone, sensory exam grossly normal, mentation intact and speech normal  PSYCH: mentation appears normal and affect normal/bright         Assessment/Plan       (K29.70,  B96.81) Helicobacter pylori gastritis  (primary encounter diagnosis)  Comment:   Plan: ranitidine (ZANTAC) 150 MG tablet        Temporary relief from h pylori treatment. Will start antacid. If no improvement, consider other diagnostic studies such as upper GI study versus EGD. May not be just gastritis.    (M54.2) Neck " pain  Comment:   Plan: Camphor-Menthol-Methyl Sal 3-5-15 % CREA        Refill    (J30.2) Acute seasonal allergic rhinitis, unspecified trigger  Comment:   Plan: montelukast (SINGULAIR) 10 MG tablet        Refill    (G44.209) Tension-type headache, not intractable, unspecified chronicity pattern  Comment:   Plan: acetaminophen (TYLENOL) 325 MG tablet        Refill    (G47.9) Sleep disturbance  Comment:   Plan: melatonin 3 MG tablet        Sleep aid. Discussed sleep hygiene, which may be difficult for this patient    (Z23) Immunization due  Comment:   Plan: ADMIN VACCINE, INITIAL, FLU VAC PRESRV FREE         QUAD SPLIT VIR IM, 0.5 mL dosage            Options for treatment and follow-up care were reviewed with the patient and/or guardian. Hayde LUJAN Say and/or guardian engaged in the decision making process and verbalized understanding of the options discussed and agreed with the final plan.    Carlos Manuel Rivas MD      Precepted today with: Dr. Motta

## 2017-11-01 NOTE — PROGRESS NOTES
Preceptor Attestation:  Patient's case reviewed and discussed with Carlos Manuel Rivas MD resident and I evaluated the patient. I agree with written assessment and plan of care.  Supervising Physician:Brant Motta MD    [facility]

## 2017-11-01 NOTE — NURSING NOTE
" name: Mirna Holder  Language: Maryann  Agency: ?  Phone number: ?  Injectable Influenza Immunization Documentation    1.  Has the patient received the information for the injectable influenza vaccine? YES     2. Is the patient 6 months of age or older? YES     3. Does the patient have any of the following contraindications?         Severe allergy to eggs? No     Severe allergic reaction to previous influenza vaccines? No   Severe allergy to latex? No       History of Guillain-Rochester syndrome? No     Currently have a temperature greater than 100.4F? No        4.  Severely egg allergic patients should have flu vaccine eligibility assessed by an MD, RN, or pharmacist, and those who received flu vaccine should be observed for 15 min by an MD, RN, Pharmacist, Medical Technician, or member of clinic staff.\": YES    5. Latex-allergic patients should be given latex-free influenza vaccine Yes. Please reference the Vaccine latex table to determine if your clinic s product is latex-containing.       Vaccination given by SHILO Aguilera        "

## 2017-11-13 ENCOUNTER — OFFICE VISIT (OUTPATIENT)
Dept: FAMILY MEDICINE | Facility: CLINIC | Age: 27
End: 2017-11-13

## 2017-11-13 VITALS
HEART RATE: 78 BPM | TEMPERATURE: 98 F | BODY MASS INDEX: 28.34 KG/M2 | SYSTOLIC BLOOD PRESSURE: 104 MMHG | HEIGHT: 58 IN | DIASTOLIC BLOOD PRESSURE: 67 MMHG | OXYGEN SATURATION: 100 % | RESPIRATION RATE: 16 BRPM | WEIGHT: 135 LBS

## 2017-11-13 DIAGNOSIS — F43.10 PTSD (POST-TRAUMATIC STRESS DISORDER): Primary | ICD-10-CM

## 2017-11-13 RX ORDER — PRAZOSIN HYDROCHLORIDE 1 MG/1
1 CAPSULE ORAL AT BEDTIME
Qty: 30 CAPSULE | Refills: 1 | Status: SHIPPED | OUTPATIENT
Start: 2017-11-13 | End: 2017-11-29

## 2017-11-13 NOTE — PROGRESS NOTES
Preceptor Attestation:  Patient's case reviewed and discussed with Carlos Manuel Rivas MD Patient seen and discussed with the resident.. I agree with assessment and plan of care.  Supervising Physician:  Parth Jessica MD  [facility]

## 2017-11-13 NOTE — MR AVS SNAPSHOT
"              After Visit Summary   11/13/2017    Hayde LUJAN Say    MRN: [id number]           Patient Information     Date Of Birth          1990        Visit Information        Provider Department      11/13/2017 8:20 AM Carlos Manuel Rivas MD [facility]        Today's Diagnoses     PTSD (post-traumatic stress disorder)    -  1       Follow-ups after your visit        Follow-up notes from your care team     Return in about 2 weeks (around 11/27/2017).      Your next 10 appointments already scheduled     Nov 29, 2017  9:00 AM CST   Return Visit with Carlos Manuel Rivas MD   [facility] ([facility])    [address] [place] [address]   [phone]            Dec 07, 2017  9:00 AM CST   Return Visit with Elham Mcmahan LMFT [facility] ([facility])    [address] [place] [address]   [phone]              Who to contact     Please call your clinic at [phone] to:    Ask questions about your health    Make or cancel appointments    Discuss your medicines    Learn about your test results    Speak to your doctor   If you have compliments or concerns about an experience at your clinic, or if you wish to file a complaint, please contact [facility] Patient Relations at [phone] or email us at [email or web address]         Additional Information About Your Visit        Care EveryWhere ID     This is your Care EveryWhere ID. This could be used by other organizations to access your [facility] medical records  [id number]        Your Vitals Were     Pulse Temperature Respirations Height Last Period Pulse Oximetry    78 98  F (36.7  C) (Oral) 16 4' 9.87\" (147 cm) (LMP Unknown) 100%    BMI (Body Mass Index)                   28.34 kg/m2            Blood Pressure from Last 3 Encounters:   11/13/17 104/67   11/01/17 134/76   10/02/17 108/72    Weight from Last 3 Encounters:   11/13/17 135 lb (61.2 " kg)   11/01/17 132 lb 6.4 oz (60.1 kg)   10/02/17 133 lb (60.3 kg)              Today, you had the following     No orders found for display         Today's Medication Changes          These changes are accurate as of: 11/13/17 11:59 PM.  If you have any questions, ask your nurse or doctor.               Start taking these medicines.        Dose/Directions    prazosin 1 MG capsule   Commonly known as:  MINIPRESS   Used for:  PTSD (post-traumatic stress disorder)   Started by:  Carlos Manuel Rivas MD        Dose:  1 mg   Take 1 capsule (1 mg) by mouth At Bedtime   Quantity:  30 capsule   Refills:  1            Where to get your medicines      These medications were sent to [facility] - [place], [place] - [address] [place] [address] [address] [address] [place] [address]     Phone:  [phone]     prazosin 1 MG capsule                Primary Care Provider Office Phone # Fax #    Carlos Manuel Rivas [phone]       [facility] [address] [place] [address]        Equal Access to Services     [facility] AH: Hadii aad ku hadasho Soomaali, waaxda luqadaha, qaybta kaalmada adeegyada, waxay jeffery duarte . So [facility] [phone].    ATENCIÓN: Si habla español, tiene a keene disposición servicios gratuitos de asistencia lingüística. Cristy al [phone].    We comply with applicable federal civil rights laws and [place] laws. We do not discriminate on the basis of race, color, national origin, age, disability, sex, sexual orientation, or gender identity.            Thank you!     Thank you for choosing [facility]  for your care. Our goal is always to provide you with excellent care. Hearing back from our patients is one way we can continue to improve our services. Please take a few minutes to complete the written survey that you may receive in the mail after your visit with us. Thank you!             Your Updated Medication List  - Protect others around you: Learn how to safely use, store and throw away your medicines at www.disposemymeds.org.          This list is accurate as of: 11/13/17 11:59 PM.  Always use your most recent med list.                   Brand Name Dispense Instructions for use Diagnosis    acetaminophen 325 MG tablet    TYLENOL    100 tablet    Take 2 tablets (650 mg) by mouth every 4 hours as needed for mild pain    Tension-type headache, not intractable, unspecified chronicity pattern       calcium carbonate 500 MG chewable tablet    TUMS     Take 1 tablet (500 mg) by mouth 2 times daily as needed for heartburn    Other chronic gastritis without hemorrhage       Camphor-Menthol-Methyl Sal 3-5-15 % Crea     1 Tube    Externally apply topically every 6 hours as needed (for moderate pain)    Neck pain       cetirizine 10 MG tablet    zyrTEC    90 tablet    Take 1 tablet (10 mg) by mouth every evening    Seasonal allergic rhinitis due to other allergic trigger       docusate sodium 100 MG tablet    COLACE     Take 100 mg by mouth daily as needed for constipation    Constipation, unspecified constipation type       FLONASE 50 MCG/ACT spray   Generic drug:  fluticasone      Spray 1-2 sprays into both nostrils daily as needed for allergies    Seasonal allergic rhinitis due to other allergic trigger       FLUoxetine 40 MG capsule    PROzac    90 capsule    Take 1 capsule (40 mg) by mouth daily    Major depressive disorder, recurrent episode, moderate (H)       ibuprofen 600 MG tablet    ADVIL/MOTRIN     Take 1 tablet (600 mg) by mouth daily as needed        ketotifen 0.025 % Soln ophthalmic solution    ZADITOR    1 Bottle    Place 1 drop into both eyes every 12 hours    Acute conjunctivitis of both eyes, unspecified acute conjunctivitis type       meclizine 25 MG tablet    ANTIVERT    30 tablet    Take 1 tablet (25 mg) by mouth every 6 hours as needed for dizziness    BPPV (benign paroxysmal positional vertigo), unspecified  laterality       melatonin 3 MG tablet     30 tablet    Take 1 tablet (3 mg) by mouth nightly as needed for sleep    Sleep disturbance       montelukast 10 MG tablet    SINGULAIR    30 tablet    Take 1 tablet (10 mg) by mouth daily    Acute seasonal allergic rhinitis, unspecified trigger       norgestimate-ethinyl estradiol 0.25-35 MG-MCG per tablet    ORTHO-CYCLEN, SPRINTEC    84 tablet    Take 1 tablet by mouth daily    Encounter for initial prescription of contraceptive pills       polyvinyl alcohol 1.4 % ophthalmic solution    LIQUIFILM TEARS    15 mL    Place 1 drop into both eyes as needed for dry eyes    Allergic conjunctivitis of both eyes       prazosin 1 MG capsule    MINIPRESS    30 capsule    Take 1 capsule (1 mg) by mouth At Bedtime    PTSD (post-traumatic stress disorder)       ranitidine 150 MG tablet    ZANTAC    60 tablet    Take 1 tablet (150 mg) by mouth 2 times daily    Helicobacter pylori gastritis
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
## 2017-11-15 ENCOUNTER — OFFICE VISIT (OUTPATIENT)
Dept: PSYCHOLOGY | Facility: CLINIC | Age: 27
End: 2017-11-15

## 2017-11-15 DIAGNOSIS — F33.1 MAJOR DEPRESSIVE DISORDER, RECURRENT EPISODE, MODERATE (H): ICD-10-CM

## 2017-11-15 DIAGNOSIS — F43.10 PTSD (POST-TRAUMATIC STRESS DISORDER): Primary | ICD-10-CM

## 2017-11-15 NOTE — PROGRESS NOTES
"Behavioral Health Progress Note    Client Legal Name: Hayde LUJAN Say   Client Preferred Name: Hayde   Service Type: Individual  Length of Visit: 45 minutes  Attendees:   Complexity: An  is used not only to interpret language, since the patient does not speak English, but also to help with the complexity of understandings across cultures, since the patient is not well integrated in the larger [place] culture.      Identifying Information and Presenting Problem:    The patient is a 27 year old Maryann female who is being seen for problematic symptoms of depression and PTSD.    Treatment Objective(s) Addressed in This Session:  Learn more adaptive ways to manage worries      Progress on / Status of Treatment Objective(s) / Homework:  Minimal progress       PHQ-9 SCORE 4/26/2017 8/3/2017 8/29/2017   Total Score - - -   Total Score 6 5 13       BRENDA-7 SCORE 4/6/2017 4/26/2017 8/29/2017   Total Score 6 8 15       Topics Discussed/Interventions Provided:  Follow-up on friend's death: this was a confusing conversation. Hayde appeared to not know what I was talking about. She replied to my attempts to show empathy about the loss of her friend with statements such as \"I have a friend\", \"oh\", \"no\". She was smiling, sometimes giggling when I discussed this. I repeated that she told me and Dr. Rivas her friend was murdered and she said 'yes'.     Concerns with her son: She reports her oldest son is seeing Dr. Fernandez at our clinic and he thought Hayde needed a personal psychotherapist. She is uncertain why he said this since she is seeing me. She feels her son does not listen and she gets calls from the school about him misbehaving.     Anxiety sxs: pt has been experiencing palpitations directly related to anxiety and feeling scared since friend's death. Also hearing people eating, in the shower, doors closing. She lives in an apartment but denies this could be her neighbors. Others in the household do not " hear them. She denies she has experienced anything like this before (hearing things others cannot hear). Since her insight and cognition appear low, I felt breathing exercises might be the best tool today: talked her though deep breathing. She thought this was very funny and started laughing. I encouraged her to do this to help with her heart pounding and when she interacts with her son.     At the end of the appt she abruptly mentioned that she heard from someone that her green card is expiring and she needs help renewing it. She became very adamant that she needed help with this now. I told her that we need to see her green card to see when it is due, bring it to clinic, and a care coordinator could help fill this out.     Assessment: The patient appeared to be active and engaged in today's session and was receptive to feedback. However, she seemed to have great difficulty tracking. This commonly happens with Hayde and it is unclear to me whether this is because of issues with the  or issues with Hayde's intellectual ability. She answered many of my questions with very short answers or slightly inappropriate answers, which seemed inappropriate based on the complexity of what I was asking. I think it most appropriate for her to have a psych evaluation at this point. With information from the eval, we can determine how to get her an [facility] worker. I think she needs more intense care than psychotherapy alone.     Mental Status: Hayde appeared generally alert and oriented. Dress was casual and appropriate to the weather and occasion. Grooming and hygiene were clean. Eye contact was erratic. Speech was of normal volume and rate and was clear, coherent, and somewhat relevant. Mood was labile with congruent affect. Thought processes were not always relevant, logical nor goal-directed. Pt endorsed hearing noises at home that others do not hear.. No evidence of SI/HI or self-harm, intent, or plans. Memory appeared  grossly intact. Insight and judgment appeared poor and patient exhibited poor impulse control during the appointment.     Does the patient appear to be at imminent risk of harm to self/others at this time? No    The session was necessary to address PTSD, worry, depressive symptoms that have been interfering with patient's ability to function at home, personal life management, parenting.  Ongoing psychotherapy is necessary to improve functioning with daily activities, provide psychoeducation and provide support.    Diagnosis (DSM-5):  296.32 recurrent moderate major depression  308.3 PTSD    Plan:  1. Follow up in 1-2 weeks.  2. Schedule for appt with Dr. So for psych consult.     NOTE: Treatment plan update due 12/14/17.  Diagnostic assessment update due 4/12/18.

## 2017-11-15 NOTE — MR AVS SNAPSHOT
After Visit Summary   11/15/2017    Hayde LUJAN Say    MRN: [id number]           Patient Information     Date Of Birth          1990        Visit Information        Provider Department      11/15/2017 9:00 AM Elham Mcmahan, LMFT [facility]        Today's Diagnoses     PTSD (post-traumatic stress disorder)    -  1    Major depressive disorder, recurrent episode, moderate (H)           Follow-ups after your visit        Additional Services     MENTAL HEALTH REFERRAL  - Adult; Psychiatry and Medication Management       Use this form for behavioral health consults and assessments. The referral coordinator will help to determine whether patients are best served by clinic behavioral health staff or by community providers.    Type of referral(s) requested (indicate all that apply):  Adult Psychiatry--for diagnosis and medication management    Reason for referral: emotional lability and inappropriate and abrupt changes in affect during appts, poor response/unclear response to SSRIs (celexa and prozac), potential combination of low IQ, suspect personality disorder. Difficult interacting with patient- tracking conversation, pt cannot/does not participate in problem solving ideas, does not answer questions directly/stay on track.  Pt has diagnoses of major depressive disorder and PTSD. Need suggestions for medication management, thoughts on potential personality d/o, and potential for increased psychiatric care.    Currently receiving mental health services (if 'Yes', what services and why today's referral?): Yes: sees Dr. Mcmahan at [facility].  Currently having suicidal thoughts: No  Previous psych hospitalization: No    Please provide data for below screening tools if available.   PHQ-9 Score: 13 (8/29/17)  GAD7 Score: 15 (8/29/17)  PC-PTSD Score:  Bipolar Screen:  Elsie (ADHD):   MCHAT (Autism Screen):   Pediatric Symptom Checklist (PSC):      needed: Yes  Language: Maryann                   Your next 10 appointments already scheduled     Nov 29, 2017  9:00 AM CST   Return Visit with Carlos Manuel Rivas MD   [facility] ([facility])    [address] [place] [address]   [phone]            Dec 07, 2017  9:00 AM CST   Return Visit with Elham Mcmahan LMFT [facility] ([facility])    [address] [place] [address]   [phone]              Who to contact     Please call your clinic at [phone] to:    Ask questions about your health    Make or cancel appointments    Discuss your medicines    Learn about your test results    Speak to your doctor   If you have compliments or concerns about an experience at your clinic, or if you wish to file a complaint, please contact [facility] Patient Relations at [phone] or email us at [email or web address]         Additional Information About Your Visit        Care EveryWhere ID     This is your Care EveryWhere ID. This could be used by other organizations to access your [place] medical records  [id number]        Your Vitals Were     Last Period                   (LMP Unknown)            Blood Pressure from Last 3 Encounters:   11/13/17 104/67   11/01/17 134/76   10/02/17 108/72    Weight from Last 3 Encounters:   11/13/17 135 lb (61.2 kg)   11/01/17 132 lb 6.4 oz (60.1 kg)   10/02/17 133 lb (60.3 kg)              We Performed the Following     MENTAL HEALTH REFERRAL  - Adult; Psychiatry and Medication Management     Psychotherapy 45 min ([address])        Primary Care Provider Office Phone # Fax #    Carlos Manuel Rivas [phone]       [facility] [address] [place] [address]        Equal Access to Services     DYLAN LEY : Hadii raven Jones, wacarinda ambar, qaybta kaalmada shoshana, nakul garcía. [facility] [phone].    ATENCIÓN: Si barbarala espdenise,  tiene a keene disposición servicios gratuitos de asistencia lingüística. Cristy medina [phone].    We comply with applicable federal civil rights laws and [place] laws. We do not discriminate on the basis of race, color, national origin, age, disability, sex, sexual orientation, or gender identity.            Thank you!     Thank you for choosing [facility]  for your care. Our goal is always to provide you with excellent care. Hearing back from our patients is one way we can continue to improve our services. Please take a few minutes to complete the written survey that you may receive in the mail after your visit with us. Thank you!             Your Updated Medication List - Protect others around you: Learn how to safely use, store and throw away your medicines at www.disposemymeds.org.          This list is accurate as of: 11/15/17  3:41 PM.  Always use your most recent med list.                   Brand Name Dispense Instructions for use Diagnosis    acetaminophen 325 MG tablet    TYLENOL    100 tablet    Take 2 tablets (650 mg) by mouth every 4 hours as needed for mild pain    Tension-type headache, not intractable, unspecified chronicity pattern       calcium carbonate 500 MG chewable tablet    TUMS     Take 1 tablet (500 mg) by mouth 2 times daily as needed for heartburn    Other chronic gastritis without hemorrhage       Camphor-Menthol-Methyl Sal 3-5-15 % Crea     1 Tube    Externally apply topically every 6 hours as needed (for moderate pain)    Neck pain       cetirizine 10 MG tablet    zyrTEC    90 tablet    Take 1 tablet (10 mg) by mouth every evening    Seasonal allergic rhinitis due to other allergic trigger       docusate sodium 100 MG tablet    COLACE     Take 100 mg by mouth daily as needed for constipation    Constipation, unspecified constipation type       FLONASE 50 MCG/ACT spray   Generic drug:  fluticasone      Spray 1-2 sprays into both nostrils daily as needed for allergies     Seasonal allergic rhinitis due to other allergic trigger       FLUoxetine 40 MG capsule    PROzac    90 capsule    Take 1 capsule (40 mg) by mouth daily    Major depressive disorder, recurrent episode, moderate (H)       ibuprofen 600 MG tablet    ADVIL/MOTRIN     Take 1 tablet (600 mg) by mouth daily as needed        ketotifen 0.025 % Soln ophthalmic solution    ZADITOR    1 Bottle    Place 1 drop into both eyes every 12 hours    Acute conjunctivitis of both eyes, unspecified acute conjunctivitis type       meclizine 25 MG tablet    ANTIVERT    30 tablet    Take 1 tablet (25 mg) by mouth every 6 hours as needed for dizziness    BPPV (benign paroxysmal positional vertigo), unspecified laterality       melatonin 3 MG tablet     30 tablet    Take 1 tablet (3 mg) by mouth nightly as needed for sleep    Sleep disturbance       montelukast 10 MG tablet    SINGULAIR    30 tablet    Take 1 tablet (10 mg) by mouth daily    Acute seasonal allergic rhinitis, unspecified trigger       norgestimate-ethinyl estradiol 0.25-35 MG-MCG per tablet    ORTHO-CYCLEN, SPRINTEC    84 tablet    Take 1 tablet by mouth daily    Encounter for initial prescription of contraceptive pills       polyvinyl alcohol 1.4 % ophthalmic solution    LIQUIFILM TEARS    15 mL    Place 1 drop into both eyes as needed for dry eyes    Allergic conjunctivitis of both eyes       prazosin 1 MG capsule    MINIPRESS    30 capsule    Take 1 capsule (1 mg) by mouth At Bedtime    PTSD (post-traumatic stress disorder)       ranitidine 150 MG tablet    ZANTAC    60 tablet    Take 1 tablet (150 mg) by mouth 2 times daily    Helicobacter pylori gastritis

## 2017-11-29 ENCOUNTER — OFFICE VISIT (OUTPATIENT)
Dept: FAMILY MEDICINE | Facility: CLINIC | Age: 27
End: 2017-11-29

## 2017-11-29 VITALS
HEIGHT: 58 IN | TEMPERATURE: 97.8 F | BODY MASS INDEX: 28.13 KG/M2 | DIASTOLIC BLOOD PRESSURE: 69 MMHG | SYSTOLIC BLOOD PRESSURE: 107 MMHG | OXYGEN SATURATION: 99 % | WEIGHT: 134 LBS | HEART RATE: 81 BPM

## 2017-11-29 DIAGNOSIS — F41.1 GENERALIZED ANXIETY DISORDER: ICD-10-CM

## 2017-11-29 DIAGNOSIS — F43.10 PTSD (POST-TRAUMATIC STRESS DISORDER): Primary | ICD-10-CM

## 2017-11-29 RX ORDER — PRAZOSIN HYDROCHLORIDE 1 MG/1
2 CAPSULE ORAL AT BEDTIME
Qty: 60 CAPSULE | Refills: 1 | Status: SHIPPED | OUTPATIENT
Start: 2017-11-29 | End: 2017-12-15

## 2017-11-29 NOTE — MR AVS SNAPSHOT
"              After Visit Summary   11/29/2017    Hayde LUJAN Say    MRN: [id number]           Patient Information     Date Of Birth          1990        Visit Information        Provider Department      11/29/2017 9:00 AM Carlos Manuel Rivas MD [facility]        Today's Diagnoses     PTSD (post-traumatic stress disorder)    -  1    Generalized anxiety disorder           Follow-ups after your visit        Follow-up notes from your care team     Return in about 2 weeks (around 12/13/2017).      Your next 10 appointments already scheduled     Dec 15, 2017  8:20 AM CST   Return Visit with Carlos Manuel Rivas MD   [facility] ([facility])    [address] [place] [address]   [phone]            Dec 28, 2017 10:00 AM CST   Return Visit with Elham Mcmahan LMFT [facility] ([facility])    [address] [place] [address]   [phone]              Who to contact     Please call your clinic at [phone] to:    Ask questions about your health    Make or cancel appointments    Discuss your medicines    Learn about your test results    Speak to your doctor   If you have compliments or concerns about an experience at your clinic, or if you wish to file a complaint, please contact [facility] Patient Relations at [phone] or email us at [email or web address]         Additional Information About Your Visit        Care EveryWhere ID     This is your Care EveryWhere ID. This could be used by other organizations to access your [place] medical records  [id number]        Your Vitals Were     Pulse Temperature Height Last Period Pulse Oximetry BMI (Body Mass Index)    81 97.8  F (36.6  C) (Oral) 4' 9.5\" (146.1 cm) (LMP Unknown) 99% 28.5 kg/m2       Blood Pressure from Last 3 Encounters:   11/29/17 107/69   11/13/17 104/67   11/01/17 134/76    Weight from Last 3 Encounters:   11/29/17 134 lb (60.8 kg) "   11/13/17 135 lb (61.2 kg)   11/01/17 132 lb 6.4 oz (60.1 kg)              Today, you had the following     No orders found for display         Today's Medication Changes          These changes are accurate as of: 11/29/17 11:59 PM.  If you have any questions, ask your nurse or doctor.               These medicines have changed or have updated prescriptions.        Dose/Directions    prazosin 1 MG capsule   Commonly known as:  MINIPRESS   This may have changed:  how much to take   Used for:  PTSD (post-traumatic stress disorder)   Changed by:  Carlos Manuel Rivas MD        Dose:  2 mg   Take 2 capsules (2 mg) by mouth At Bedtime   Quantity:  60 capsule   Refills:  1            Where to get your medicines      These medications were sent to [facility] - [place], [place] - [address] [place] [address] [address]kw[address]  [address], [place] [address]     Phone:  [phone]     prazosin 1 MG capsule                Primary Care Provider Office Phone # Fax #    Carlos Manuel Rivas [phone]       [facility] [address] [place] [address]        Equal Access to Services     [facility] Center[facility]ANTINO AH: Hadii aad ku hadasho Soomaali, waaxda luqadaha, qaybta kaalmada adeegyada, nakul shenin hayninfan justin duarte . So [facility] [phone].    ATENCIÓN: Si habla español, tiene a keene disposición servicios gratuitos de asistencia lingüística. Ll[facility] [phone].    We comply with applicable federal civil rights laws and [place] laws. We do not discriminate on the basis of race, color, national origin, age, disability, sex, sexual orientation, or gender identity.            Thank you!     Thank you for choosing [facility]  for your care. Our goal is always to provide you with excellent care. Hearing back from our patients is one way we can continue to improve our services. Please take a few minutes to complete the written survey that you may receive in the mail after  your visit with us. Thank you!             Your Updated Medication List - Protect others around you: Learn how to safely use, store and throw away your medicines at www.disposemymeds.org.          This list is accurate as of: 11/29/17 11:59 PM.  Always use your most recent med list.                   Brand Name Dispense Instructions for use Diagnosis    acetaminophen 325 MG tablet    TYLENOL    100 tablet    Take 2 tablets (650 mg) by mouth every 4 hours as needed for mild pain    Tension-type headache, not intractable, unspecified chronicity pattern       calcium carbonate 500 MG chewable tablet    TUMS     Take 1 tablet (500 mg) by mouth 2 times daily as needed for heartburn    Other chronic gastritis without hemorrhage       Camphor-Menthol-Methyl Sal 3-5-15 % Crea     1 Tube    Externally apply topically every 6 hours as needed (for moderate pain)    Neck pain       cetirizine 10 MG tablet    zyrTEC    90 tablet    Take 1 tablet (10 mg) by mouth every evening    Seasonal allergic rhinitis due to other allergic trigger       docusate sodium 100 MG tablet    COLACE     Take 100 mg by mouth daily as needed for constipation    Constipation, unspecified constipation type       FLONASE 50 MCG/ACT spray   Generic drug:  fluticasone      Spray 1-2 sprays into both nostrils daily as needed for allergies    Seasonal allergic rhinitis due to other allergic trigger       FLUoxetine 40 MG capsule    PROzac    90 capsule    Take 1 capsule (40 mg) by mouth daily    Major depressive disorder, recurrent episode, moderate (H)       ibuprofen 600 MG tablet    ADVIL/MOTRIN     Take 1 tablet (600 mg) by mouth daily as needed        ketotifen 0.025 % Soln ophthalmic solution    ZADITOR    1 Bottle    Place 1 drop into both eyes every 12 hours    Acute conjunctivitis of both eyes, unspecified acute conjunctivitis type       meclizine 25 MG tablet    ANTIVERT    30 tablet    Take 1 tablet (25 mg) by mouth every 6 hours as needed for  dizziness    BPPV (benign paroxysmal positional vertigo), unspecified laterality       melatonin 3 MG tablet     30 tablet    Take 1 tablet (3 mg) by mouth nightly as needed for sleep    Sleep disturbance       montelukast 10 MG tablet    SINGULAIR    30 tablet    Take 1 tablet (10 mg) by mouth daily    Acute seasonal allergic rhinitis, unspecified trigger       norgestimate-ethinyl estradiol 0.25-35 MG-MCG per tablet    ORTHO-CYCLEN, SPRINTEC    84 tablet    Take 1 tablet by mouth daily    Encounter for initial prescription of contraceptive pills       polyvinyl alcohol 1.4 % ophthalmic solution    LIQUIFILM TEARS    15 mL    Place 1 drop into both eyes as needed for dry eyes    Allergic conjunctivitis of both eyes       prazosin 1 MG capsule    MINIPRESS    60 capsule    Take 2 capsules (2 mg) by mouth At Bedtime    PTSD (post-traumatic stress disorder)       ranitidine 150 MG tablet    ZANTAC    60 tablet    Take 1 tablet (150 mg) by mouth 2 times daily    Helicobacter pylori gastritis

## 2017-11-29 NOTE — PROGRESS NOTES
"       [place]       Hayde TAI Say is a 27 year old  female with a significant past medical history of PTSD, anxiety who presents for follow up of concern(s) listed below    1. Mood is not good  - PHQ-9 completed, similar - still question validity of PHQ-9 in [place] immigrant population   - More irritated, towards family  - More frustrated, towards family  - Children and  - she is swearing more at them  - Having daily nightmares still, people trying to kill her in her dreams.  When she wakes up, she has palpitations, hears voices, and feels very afraid.  - Other than that, denying hearing things that are not there anymore  - Had a recent call from her mother regarding her uncle attempting to kill himself    Pertinent ROS: Constitutional, HEENT, cardiovascular, pulmonary, gi and gu systems are negative, except as otherwise noted.           Objective   Vitals:    11/29/17 0911   BP: 107/69   Pulse: 81   Temp: 97.8  F (36.6  C)   TempSrc: Oral   SpO2: 99%   Weight: 134 lb (60.8 kg)   Height: 4' 9.5\" (146.1 cm)     Body mass index is 28.5 kg/(m^2).    ENERAL APPEARANCE: healthy, alert and no distress  EYES: Eyes grossly normal to inspection  RESP: lungs clear to auscultation - no rales, rhonchi or wheezes  CV: regular rates and rhythm, normal S1 S2, no S3 or S4, no murmur, click or rub, no peripheral edema and peripheral pulses strong  ABDOMEN: soft, mild tenderness in epigastrim, no hepatosplenomegaly, no masses and bowel sounds normal  MS: no musculoskeletal defects are noted and gait is age appropriate without ataxia  SKIN: no suspicious lesions or rashes  NEURO: Normal strength and tone, sensory exam grossly normal, mentation intact and speech normal  PSYCH: mentation appears normal and affect normal/bright, no SI or HI, no current hallucinations         Assessment/Plan       (F43.10) PTSD (post-traumatic stress disorder)  Comment:   Plan: prazosin (MINIPRESS) 1 MG capsule        Continues to have nightmares " daily. No evidence of orthostasis or morning drowsiness. Will increase prazosin to 2mg at night. RTC in 2 weeks    (F41.1) Generalized anxiety disorder  Comment:   Plan: No change in management at this time. Continue with Dr. Mcmahan, if able, re-establish with psychotherapist versus psychiatrist, but transportation and follow up has been difficult. Possible neuropsych testing. At next visit, consider switching prozac to a SNRI.    Options for treatment and follow-up care were reviewed with the patient and/or guardian. Hayde TAI Say and/or guardian engaged in the decision making process and verbalized understanding of the options discussed and agreed with the final plan.    Carlos Manuel Rivas MD      Precepted today with: Teagan Snyder MD

## 2017-11-29 NOTE — NURSING NOTE
name: Va Gifford  Language: Mayrann  Agency: [facility]  Phone number: [phone]

## 2017-11-30 ASSESSMENT — PATIENT HEALTH QUESTIONNAIRE - PHQ9: SUM OF ALL RESPONSES TO PHQ QUESTIONS 1-9: 8

## 2017-12-07 ENCOUNTER — OFFICE VISIT (OUTPATIENT)
Dept: PSYCHOLOGY | Facility: CLINIC | Age: 27
End: 2017-12-07

## 2017-12-07 DIAGNOSIS — F33.1 MAJOR DEPRESSIVE DISORDER, RECURRENT EPISODE, MODERATE (H): ICD-10-CM

## 2017-12-07 DIAGNOSIS — F43.10 PTSD (POST-TRAUMATIC STRESS DISORDER): Primary | ICD-10-CM

## 2017-12-07 NOTE — PROGRESS NOTES
Behavioral Health Progress Note     Client Legal Name:             Hayde LUJAN Say                  Client Preferred Name: Hayde             Service Type:           Individual  Length of Visit: 45 minutes  Attendees:   Complexity: An  is used not only to interpret language, since the patient does not speak English, but also to help with the complexity of understandings across cultures, since the patient is not well integrated in the larger [place] culture.        Identifying Information and Presenting Problem:     The patient is a 27 year old [place] female who is being seen for problematic symptoms of depression and PTSD.    Treatment Objective(s) Addressed in This Session:  psychological distress related to acculturation      Progress on / Status of Treatment Objective(s) / Homework:  Minimal progress       PHQ-9 SCORE 8/3/2017 8/29/2017 11/29/2017   Total Score - - -   Total Score 5 13 8       BRENDA-7 SCORE 4/6/2017 4/26/2017 8/29/2017   Total Score 6 8 15       Topics Discussed/Interventions Provided:  Anxiety: Hayde reports feeling more scared. States she is scared every day. Fearful of many things in [place]. Most recently, her son was in the hospital. She described being very fearful of doctors, not knowing if they would hurt her.  Examined times she is not scared- when her siblings are over. Otherwise, her  works at night and she is very scared and does not sleep well when he is gone. Still hearing people in the shower, but mostly when she is sleeping or coming out of sleep- yet maintains she does not sleep well.     Frustration: related to anxiety. Pt admits she has a hard time controlling her temper and is easily frustrated. Too many stressors, fears, and lack of understanding about [place] culture and customs compound on a regular basis and she has little patience-especially with her children. Deep breathing from last time- she forgot about it.     Adjustment to [place] culture: patient was very  focused today on concerns about what will happen to her body when she dies. This is a recurring theme. She is very fearful that her organs will be removed. She seems to be thinking about it more now since her friend was murdered. Her green card does not  until .    Also, I wondered if some of the concerns we have with Hayde not seeming to understand questions is because she speaks a different dialect than the interpreters. She does not. However, she does endorse she has a hard time understanding people when they speak to her in [place]. Does not always understand what they mean and needs them to explain a few times.     Medicines: pt does not always remember to take her medicines. Thinks she takes something for depression, nightmares, sleep, her heart, and constipation. Also drinks a chinese diet tea or pills (unclear)- it's the only thing she says that makes her have a bowel movement.     Most interventions today included solution focused therapy questions, processing and exploration of [place] culture.       Assessment: The patient appeared to be active and engaged in today's session and was receptive to feedback. We continue to see little progress in Hayde because she cannot remember what to work on at home. Similarly, she does not always remember to take her medicines. I don't believe neuropsych testing would be valid in her given her inability to speak English and cultural differences. It is somewhat indicative that she may have comprehension problems because she does endorse not understanding what is said in her native language (plus she does not read or write in [place] or English).     Mental Status: Hayde appeared generally alert and oriented. Dress was casual and appropriate to the weather and occasion. Grooming and hygiene were clean. Eye contact was poor. Speech was of normal volume and rate and was clear, coherent, and relevant. Mood was nervous with incongruent affect (laughed throughout session).  Thought processes were relevant, logical and goal-directed. Thought content was WNL with no evidence of psychotic or paranoid features. No evidence of SI/HI or self-harm, intent, or plans. Memory appeared grossly intact. Insight and judgment appeared fair/poor and patient exhibited fair impulse control during the appointment.     Does the patient appear to be at imminent risk of harm to self/others at this time? No    The session was necessary to address PTSD, worry, depressive symptoms that have been interfering with patient's ability to function at home, personal life management, parenting.  Ongoing psychotherapy is necessary to improve functioning with daily activities, provide psychoeducation and provide support.     Diagnosis (DSM-5):  296.32 recurrent moderate major depression  308.3 PTSD     Plan:  1. Follow up in 1-2 weeks.  2. Schedule for appt with Dr. So for psych consult.      NOTE: Treatment plan update due 12/14/17.  Diagnostic assessment update due 4/12/18.

## 2017-12-07 NOTE — MR AVS SNAPSHOT
After Visit Summary   12/7/2017    Hayde LUJAN Say    MRN: [id number]           Patient Information     Date Of Birth          1990        Visit Information        Provider Department      12/7/2017 9:00 AM Elham Mcmahan, LMFT [facility]        Today's Diagnoses     PTSD (post-traumatic stress disorder)    -  1    Major depressive disorder, recurrent episode, moderate (H)           Follow-ups after your visit        Your next 10 appointments already scheduled     Dec 15, 2017  8:20 AM CST   Return Visit with Carlos Manuel Rivas MD   [facility] ([facility])    [address] [place] [address]   [phone]            Dec 28, 2017 10:00 AM CST   Return Visit with Elham Mcmahan LMFT [facility] ([facility])    [address] [place] [address]   [phone]              Who to contact     Please call your clinic at [phone] to:    Ask questions about your health    Make or cancel appointments    Discuss your medicines    Learn about your test results    Speak to your doctor   If you have compliments or concerns about an experience at your clinic, or if you wish to file a complaint, please contact [facility] Patient Relations at [phone] or email us at [email or web address]         Additional Information About Your Visit        Care EveryWhere ID     This is your Care EveryWhere ID. This could be used by other organizations to access your [place] medical records  [id number]        Your Vitals Were     Last Period                   (LMP Unknown)            Blood Pressure from Last 3 Encounters:   11/29/17 107/69   11/13/17 104/67   11/01/17 134/76    Weight from Last 3 Encounters:   11/29/17 134 lb (60.8 kg)   11/13/17 135 lb (61.2 kg)   11/01/17 132 lb 6.4 oz (60.1 kg)              We Performed the Following     Interactive Complexity add-on (08148)     Psychotherapy 45 min  (07444)        Primary Care Provider Office Phone # Fax #    Carlos Manuel Andreas Rivas [phone]       [facility] [address] [facility] [address]        Equal Access to Services     DYLAN LEY : Hadii raven ku hadlainao Soomaali, waaxda luqadaha, qaybta kaalmada adeegyada, nakul daon justin ibarra laJeannettejuan garcía. So [facility] [phone].    ATENCIÓN: Si habla español, tiene a keene disposición servicios gratuitos de asistencia lingüística. Llame al [phone].    We comply with applicable federal civil rights laws and [place] laws. We do not discriminate on the basis of race, color, national origin, age, disability, sex, sexual orientation, or gender identity.            Thank you!     Thank you for choosing [facility]  for your care. Our goal is always to provide you with excellent care. Hearing back from our patients is one way we can continue to improve our services. Please take a few minutes to complete the written survey that you may receive in the mail after your visit with us. Thank you!             Your Updated Medication List - Protect others around you: Learn how to safely use, store and throw away your medicines at www.disposemymeds.org.          This list is accurate as of: 12/7/17 11:59 PM.  Always use your most recent med list.                   Brand Name Dispense Instructions for use Diagnosis    acetaminophen 325 MG tablet    TYLENOL    100 tablet    Take 2 tablets (650 mg) by mouth every 4 hours as needed for mild pain    Tension-type headache, not intractable, unspecified chronicity pattern       calcium carbonate 500 MG chewable tablet    TUMS     Take 1 tablet (500 mg) by mouth 2 times daily as needed for heartburn    Other chronic gastritis without hemorrhage       Camphor-Menthol-Methyl Sal 3-5-15 % Crea     1 Tube    Externally apply topically every 6 hours as needed (for moderate pain)    Neck pain       cetirizine 10 MG tablet    zyrTEC    90  tablet    Take 1 tablet (10 mg) by mouth every evening    Seasonal allergic rhinitis due to other allergic trigger       docusate sodium 100 MG tablet    COLACE     Take 100 mg by mouth daily as needed for constipation    Constipation, unspecified constipation type       FLONASE 50 MCG/ACT spray   Generic drug:  fluticasone      Spray 1-2 sprays into both nostrils daily as needed for allergies    Seasonal allergic rhinitis due to other allergic trigger       FLUoxetine 40 MG capsule    PROzac    90 capsule    Take 1 capsule (40 mg) by mouth daily    Major depressive disorder, recurrent episode, moderate (H)       ibuprofen 600 MG tablet    ADVIL/MOTRIN     Take 1 tablet (600 mg) by mouth daily as needed        ketotifen 0.025 % Soln ophthalmic solution    ZADITOR    1 Bottle    Place 1 drop into both eyes every 12 hours    Acute conjunctivitis of both eyes, unspecified acute conjunctivitis type       meclizine 25 MG tablet    ANTIVERT    30 tablet    Take 1 tablet (25 mg) by mouth every 6 hours as needed for dizziness    BPPV (benign paroxysmal positional vertigo), unspecified laterality       melatonin 3 MG tablet     30 tablet    Take 1 tablet (3 mg) by mouth nightly as needed for sleep    Sleep disturbance       montelukast 10 MG tablet    SINGULAIR    30 tablet    Take 1 tablet (10 mg) by mouth daily    Acute seasonal allergic rhinitis, unspecified trigger       norgestimate-ethinyl estradiol 0.25-35 MG-MCG per tablet    ORTHO-CYCLEN, SPRINTEC    84 tablet    Take 1 tablet by mouth daily    Encounter for initial prescription of contraceptive pills       polyvinyl alcohol 1.4 % ophthalmic solution    LIQUIFILM TEARS    15 mL    Place 1 drop into both eyes as needed for dry eyes    Allergic conjunctivitis of both eyes       prazosin 1 MG capsule    MINIPRESS    60 capsule    Take 2 capsules (2 mg) by mouth At Bedtime    PTSD (post-traumatic stress disorder)       ranitidine 150 MG tablet    ZANTAC    60 tablet     Take 1 tablet (150 mg) by mouth 2 times daily    Helicobacter pylori gastritis

## 2017-12-12 ENCOUNTER — DOCUMENTATION ONLY (OUTPATIENT)
Dept: FAMILY MEDICINE | Facility: CLINIC | Age: 27
End: 2017-12-12

## 2017-12-12 NOTE — PROGRESS NOTES
Procedure Requested        9035     MENTAL HEALTH REFERRAL  - Adult; Psy* [#[id number]]         Priority: Routine  Class: External referral         Comment:Use this form for behavioral health consults and assessments. The                  referral coordinator will help to determine whether patients are                  best served by clinic behavioral health staff or by community                  providers.                                    Type of referral(s) requested (indicate all that apply):                  Adult Psychiatry--for diagnosis and medication management                                    Reason for referral: emotional lability and inappropriate and                   abrupt changes in affect during appts, poor response/unclear                   response to SSRIs (celexa and prozac), potential combination of                   low IQ, suspect personality disorder. Difficult interacting with                   patient- tracking conversation, pt cannot/does not participate in                   problem solving ideas, does not answer questions directly/stay on                   track.  Pt has diagnoses of major depressive disorder and PTSD.                   Need suggestions for medication management, thoughts on potential                   personality d/o, and potential for increased psychiatric care.                                    Currently receiving mental health services (if 'Yes', what                   services and why today's referral?): Yes: sees Dr. Mcmahan at                   [facility].                  Currently having suicidal thoughts: No                  Previous psych hospitalization: No                                    Please provide data for below screening tools if available.                   PHQ-9 Score: 13 (8/29/17)                  GAD7 Score: 15 (8/29/17)                  PC-PTSD Score:                  Bipolar Screen:                  Elsie (ADHD):                   JENNIFER  (Autism Screen):                   Pediatric Symptom Checklist (PSC):                                      needed: Yes                  Language: Maryann       Associated Diagnoses         F43.10 PTSD (post-traumatic stress disorder)         F33.1 Major depressive disorder, recurrent episode, moderate (H)         Child/Adolescent or Adult  Adult               Which Service?  Psychiatry and Medication Management                     APOLINARDORENE TAI                [phone]               : 1990  F      [address] [facility] *                          PCP: MIL RICK*     [place] [address]                                CTR: [facility]

## 2017-12-12 NOTE — PROGRESS NOTES
Referral for (Test): Psychiatry   Location/Place/Provider: [facility]    Date/Time: 01/22/18 at 8:30am ()    Phone: [phone]  Fax: [phone]  Additional information/prep.:   Scheduled by: GEETA Chung

## 2017-12-15 ENCOUNTER — OFFICE VISIT (OUTPATIENT)
Dept: FAMILY MEDICINE | Facility: CLINIC | Age: 27
End: 2017-12-15
Payer: COMMERCIAL

## 2017-12-15 VITALS
BODY MASS INDEX: 27.71 KG/M2 | TEMPERATURE: 98.1 F | WEIGHT: 132 LBS | HEIGHT: 58 IN | HEART RATE: 87 BPM | DIASTOLIC BLOOD PRESSURE: 64 MMHG | OXYGEN SATURATION: 96 % | SYSTOLIC BLOOD PRESSURE: 98 MMHG

## 2017-12-15 DIAGNOSIS — K29.50 OTHER CHRONIC GASTRITIS WITHOUT HEMORRHAGE: ICD-10-CM

## 2017-12-15 DIAGNOSIS — F43.10 PTSD (POST-TRAUMATIC STRESS DISORDER): Primary | ICD-10-CM

## 2017-12-15 DIAGNOSIS — M54.2 NECK PAIN: ICD-10-CM

## 2017-12-15 DIAGNOSIS — M54.50 CHRONIC RIGHT-SIDED LOW BACK PAIN WITHOUT SCIATICA: ICD-10-CM

## 2017-12-15 DIAGNOSIS — G89.29 CHRONIC RIGHT-SIDED LOW BACK PAIN WITHOUT SCIATICA: ICD-10-CM

## 2017-12-15 RX ORDER — CALCIUM CARBONATE 500 MG/1
1 TABLET, CHEWABLE ORAL 2 TIMES DAILY PRN
Qty: 150 TABLET | Refills: 3 | Status: SHIPPED | OUTPATIENT
Start: 2017-12-15 | End: 2022-01-25

## 2017-12-15 RX ORDER — PRAZOSIN HYDROCHLORIDE 1 MG/1
2 CAPSULE ORAL AT BEDTIME
Qty: 60 CAPSULE | Refills: 1 | Status: SHIPPED | OUTPATIENT
Start: 2017-12-15 | End: 2018-03-16

## 2017-12-15 RX ORDER — VENLAFAXINE HYDROCHLORIDE 37.5 MG/1
CAPSULE, EXTENDED RELEASE ORAL
Qty: 46 CAPSULE | Refills: 0 | Status: SHIPPED | OUTPATIENT
Start: 2017-12-15 | End: 2018-01-09

## 2017-12-15 NOTE — PROGRESS NOTES
"       Subjective       Hayde LUJAN Say is a 27 year old  female with a significant past medical history of PTSD and depression who presents for follow up of concern(s) listed below     Hayde is taking 2 mg prazosin at night as prescribed. She now has some nights where she doesn't have nightmares. However, she still reports her nightmares as frequent and troubling. She has been using diapers for the last week because she feels too scared at night to leave her bed to use the bathroom. She reports clear disruption of her life.    Saw Dr. Mcmahan on 12/7. Didn't think it was very helpful.    Denies suicidal ideation or wanting to hurt others.    Pertinent ROS: 5-Point Review of Systems Negative -- Except as noted above.         Objective     Vitals:    12/15/17 0824   BP: 98/64   Pulse: 87   Temp: 98.1  F (36.7  C)   TempSrc: Oral   SpO2: 96%   Weight: 132 lb (59.9 kg)   Height: 4' 9.68\" (146.5 cm)     Body mass index is 27.9 kg/(m^2).    GENERAL APPEARANCE: alert, distressed, anxious  EYES: Eyes grossly normal to inspection  MS: no musculoskeletal defects are noted and gait is age appropriate without ataxia  SKIN: no suspicious lesions or rashes  NEURO: Normal strength and tone, sensory exam grossly normal, mentation intact and speech normal  PSYCH: judgement and insight intact, anxious and worried         Assessment/Plan       #PTSD, Depression  Patient is showing little improvement in her symptoms so far. Has been on fluoxetine for over a year but is still troubled by her depressive symptoms, and now with worsening symptoms and greater impairment of her life. Cannot increase prazosin yet, as she is sleepy in the morning on the 2mg.    - continue Prazosin 2 mg at night  - d/c fluoxetine, start Effexor 37.5mg daily for 2 weeks, then increase to 75mg daily. F/u in 1 week to assess for adverse effects  - continue seeing Dr. Mcmahan      Options for treatment and follow-up care were reviewed with the patient and/or " guardian. Hayde B Say and/or guardian engaged in the decision making process and verbalized understanding of the options discussed and agreed with the final plan.    This note is scribed by Duong Neely, medical student on behalf of CARLOS MANUEL RIVAS.    In supervising the medical student, I saw and evaluated the patient with the student and personally performed all aspects of the history and physical.  I have reviewed and verified that the documentation is correct and complete.    Carlos Manuel Rivas MD      Precepted today with: Jillian Degroot MD

## 2017-12-15 NOTE — MR AVS SNAPSHOT
"              After Visit Summary   12/15/2017    Hayde LUJAN Say    MRN: [id number]           Patient Information     Date Of Birth          1990        Visit Information        Provider Department      12/15/2017 8:20 AM Carlos Manuel Rivas MD [facility]        Today's Diagnoses     PTSD (post-traumatic stress disorder)        Other chronic gastritis without hemorrhage           Follow-ups after your visit        Your next 10 appointments already scheduled     Dec 28, 2017 10:00 AM CST   Return Visit with Elham Mcmahan LMFT [facility] ([facility])    [address] [place] [address]   [phone]              Who to contact     Please call your clinic at [phone] to:    Ask questions about your health    Make or cancel appointments    Discuss your medicines    Learn about your test results    Speak to your doctor   If you have compliments or concerns about an experience at your clinic, or if you wish to file a complaint, please contact [facility] Patient Relations at [phone] or email us at [email or web address]         Additional Information About Your Visit        Care EveryWhere ID     This is your Care EveryWhere ID. This could be used by other organizations to access your [place] medical records  [id number]        Your Vitals Were     Pulse Temperature Height Pulse Oximetry BMI (Body Mass Index)       87 98.1  F (36.7  C) (Oral) 4' 9.68\" (146.5 cm) 96% 27.9 kg/m2        Blood Pressure from Last 3 Encounters:   12/15/17 98/64   11/29/17 107/69   11/13/17 104/67    Weight from Last 3 Encounters:   12/15/17 132 lb (59.9 kg)   11/29/17 134 lb (60.8 kg)   11/13/17 135 lb (61.2 kg)              Today, you had the following     No orders found for display         Today's Medication Changes          These changes are accurate as of: 12/15/17  9:18 AM.  If you have any questions, ask your nurse or doctor.             "   Start taking these medicines.        Dose/Directions    venlafaxine 37.5 MG 24 hr capsule   Commonly known as:  EFFEXOR-XR   Used for:  PTSD (post-traumatic stress disorder)   Started by:  Carlos Manuel Rivas MD        Take 1 capsule daily for 14 days, then take 2 capsules daily.   Quantity:  46 capsule   Refills:  0         Stop taking these medicines if you haven't already. Please contact your care team if you have questions.     FLUoxetine 40 MG capsule   Commonly known as:  PROzac   Stopped by:  Carlos Manuel Rivas MD                Where to get your medicines      These medications were sent to [facility] - [place], [place] - [address]  [address], [place] [address]     Phone:  [phone]     calcium carbonate 500 MG chewable tablet    prazosin 1 MG capsule    venlafaxine 37.5 MG 24 hr capsule                Primary Care Provider Office Phone # Fax #    Carlos Manuel Rivas [phone]       [facility] [address] [place] [address]        Equal Access to Services     [facility] Hospital[facility]ANT[facility] AH: Hadii aad ku hadasho Soomaali, waaxda luqadaha, qaybta kaalmada adeegyada, waxay idiin hayaan justin duatre . So [facility] [phone].    ATENCIÓN: Si habla español, tiene a keene disposición servicios gratuitos de asistencia lingüística. Llame al [phone].    We comply with applicable federal civil rights laws and [place] laws. We do not discriminate on the basis of race, color, national origin, age, disability, sex, sexual orientation, or gender identity.            Thank you!     Thank you for choosing [facility]  for your care. Our goal is always to provide you with excellent care. Hearing back from our patients is one way we can continue to improve our services. Please take a few minutes to complete the written survey that you may receive in the mail after your visit with us. Thank you!             Your Updated  Medication List - Protect others around you: Learn how to safely use, store and throw away your medicines at www.disposemymeds.org.          This list is accurate as of: 12/15/17  9:18 AM.  Always use your most recent med list.                   Brand Name Dispense Instructions for use Diagnosis    acetaminophen 325 MG tablet    TYLENOL    100 tablet    Take 2 tablets (650 mg) by mouth every 4 hours as needed for mild pain    Tension-type headache, not intractable, unspecified chronicity pattern       calcium carbonate 500 MG chewable tablet    TUMS    150 tablet    Take 1 tablet (500 mg) by mouth 2 times daily as needed for heartburn    Other chronic gastritis without hemorrhage       Camphor-Menthol-Methyl Sal 3-5-15 % Crea     1 Tube    Externally apply topically every 6 hours as needed (for moderate pain)    Neck pain       cetirizine 10 MG tablet    zyrTEC    90 tablet    Take 1 tablet (10 mg) by mouth every evening    Seasonal allergic rhinitis due to other allergic trigger       docusate sodium 100 MG tablet    COLACE     Take 100 mg by mouth daily as needed for constipation    Constipation, unspecified constipation type       FLONASE 50 MCG/ACT spray   Generic drug:  fluticasone      Spray 1-2 sprays into both nostrils daily as needed for allergies    Seasonal allergic rhinitis due to other allergic trigger       ibuprofen 600 MG tablet    ADVIL/MOTRIN     Take 1 tablet (600 mg) by mouth daily as needed        ketotifen 0.025 % Soln ophthalmic solution    ZADITOR    1 Bottle    Place 1 drop into both eyes every 12 hours    Acute conjunctivitis of both eyes, unspecified acute conjunctivitis type       meclizine 25 MG tablet    ANTIVERT    30 tablet    Take 1 tablet (25 mg) by mouth every 6 hours as needed for dizziness    BPPV (benign paroxysmal positional vertigo), unspecified laterality       melatonin 3 MG tablet     30 tablet    Take 1 tablet (3 mg) by mouth nightly as needed for sleep    Sleep  disturbance       montelukast 10 MG tablet    SINGULAIR    30 tablet    Take 1 tablet (10 mg) by mouth daily    Acute seasonal allergic rhinitis, unspecified trigger       norgestimate-ethinyl estradiol 0.25-35 MG-MCG per tablet    ORTHO-CYCLEN, SPRINTEC    84 tablet    Take 1 tablet by mouth daily    Encounter for initial prescription of contraceptive pills       polyvinyl alcohol 1.4 % ophthalmic solution    LIQUIFILM TEARS    15 mL    Place 1 drop into both eyes as needed for dry eyes    Allergic conjunctivitis of both eyes       prazosin 1 MG capsule    MINIPRESS    60 capsule    Take 2 capsules (2 mg) by mouth At Bedtime    PTSD (post-traumatic stress disorder)       ranitidine 150 MG tablet    ZANTAC    60 tablet    Take 1 tablet (150 mg) by mouth 2 times daily    Helicobacter pylori gastritis       venlafaxine 37.5 MG 24 hr capsule    EFFEXOR-XR    46 capsule    Take 1 capsule daily for 14 days, then take 2 capsules daily.    PTSD (post-traumatic stress disorder)

## 2017-12-15 NOTE — NURSING NOTE
name: Sebastián Murillo  Language: Maryann   Agency: [employer]  Phone number: [phone]

## 2017-12-21 RX ORDER — ASPIRIN 81 MG
TABLET,CHEWABLE ORAL 2 TIMES DAILY PRN
Qty: 56.6 G | Refills: 3 | Status: SHIPPED | OUTPATIENT
Start: 2017-12-21 | End: 2018-03-16

## 2017-12-22 ENCOUNTER — OFFICE VISIT (OUTPATIENT)
Dept: FAMILY MEDICINE | Facility: CLINIC | Age: 27
End: 2017-12-22
Payer: COMMERCIAL

## 2017-12-22 VITALS
TEMPERATURE: 98.5 F | HEART RATE: 72 BPM | HEIGHT: 58 IN | OXYGEN SATURATION: 99 % | BODY MASS INDEX: 27.75 KG/M2 | RESPIRATION RATE: 20 BRPM | DIASTOLIC BLOOD PRESSURE: 71 MMHG | WEIGHT: 132.2 LBS | SYSTOLIC BLOOD PRESSURE: 105 MMHG

## 2017-12-22 DIAGNOSIS — F33.1 MAJOR DEPRESSIVE DISORDER, RECURRENT EPISODE, MODERATE (H): ICD-10-CM

## 2017-12-22 DIAGNOSIS — B96.81 HELICOBACTER PYLORI GASTRITIS: ICD-10-CM

## 2017-12-22 DIAGNOSIS — F43.10 PTSD (POST-TRAUMATIC STRESS DISORDER): Primary | ICD-10-CM

## 2017-12-22 DIAGNOSIS — K29.70 HELICOBACTER PYLORI GASTRITIS: ICD-10-CM

## 2017-12-22 LAB
BUN SERPL-MCNC: 8 MG/DL (ref 5–24)
CALCIUM SERPL-MCNC: 9.3 MG/DL (ref 8.5–10.4)
CHLORIDE SERPLBLD-SCNC: 100 MMOL/L (ref 94–109)
CO2 SERPL-SCNC: 26 MMOL/L (ref 20–32)
CREAT SERPL-MCNC: 0.7 MG/DL (ref 0.6–1.3)
EGFR CALCULATED (BLACK REFERENCE): >90 ML/MIN
EGFR CALCULATED (NON BLACK REFERENCE): >90 ML/MIN
GLUCOSE SERPL-MCNC: 79 MG/DL (ref 60–109)
POTASSIUM SERPL-SCNC: 3.8 MMOL/L (ref 3.4–5.3)
SODIUM SERPL-SCNC: 140 MMOL/L (ref 133–144)

## 2017-12-22 NOTE — NURSING NOTE
name: Clark Wilkinson  Language: Paige  Agency: [facility]  Phone number: [phone]

## 2017-12-22 NOTE — MR AVS SNAPSHOT
"              After Visit Summary   12/22/2017    Hayde LUJAN Say    MRN: [id number]           Patient Information     Date Of Birth          1990        Visit Information        Provider Department      12/22/2017 9:20 AM Carlos Manuel Rivas MD [facility]        Today's Diagnoses     PTSD (post-traumatic stress disorder)    -  1    Major depressive disorder, recurrent episode, moderate (H)        Helicobacter pylori gastritis          Care Instructions                Follow-ups after your visit        Your next 10 appointments already scheduled     Dec 28, 2017 10:00 AM CST   Return Visit with Elham Mcmahan LMFT [facility] ([facility])    [address] [place] [address]   [phone]              Who to contact     Please call your clinic at [phone] to:    Ask questions about your health    Make or cancel appointments    Discuss your medicines    Learn about your test results    Speak to your doctor   If you have compliments or concerns about an experience at your clinic, or if you wish to file a complaint, please contact [facility] Patient Relations at [phone] or email us at [email or web address]         Additional Information About Your Visit        Care EveryWhere ID     This is your Care EveryWhere ID. This could be used by other organizations to access your [place] medical records  [id number]        Your Vitals Were     Pulse Temperature Respirations Height Last Period Pulse Oximetry    72 98.5  F (36.9  C) (Oral) 20 4' 9.5\" (146.1 cm) 12/22/2017 99%    BMI (Body Mass Index)                   28.11 kg/m2            Blood Pressure from Last 3 Encounters:   12/22/17 105/71   12/15/17 98/64   11/29/17 107/69    Weight from Last 3 Encounters:   12/22/17 132 lb 3.2 oz (60 kg)   12/15/17 132 lb (59.9 kg)   11/29/17 134 lb (60.8 kg)              We Performed the Following     Basic Metabolic Panel ([facility]) - " Results < 1 hr        Primary Care Provider Office Phone # Fax #    Carlos Manuel Andreas Rivas [phone]       [facility] [address] [place] [address]        Equal Access to Services     DYLAN LEY : Hadii raven ku hadlainao Soomaali, waaxda luqadaha, qaybta kaalmada adeegyada, waxkala daon justin ibarra laJeannettejuan garcía. So [facility] [phone].    ATENCIÓN: Si habla español, tiene a keene disposición servicios gratuitos de asistencia lingüística. Llame al [phone].    We comply with applicable federal civil rights laws and [place] laws. We do not discriminate on the basis of race, color, national origin, age, disability, sex, sexual orientation, or gender identity.            Thank you!     Thank you for choosing [facility]  for your care. Our goal is always to provide you with excellent care. Hearing back from our patients is one way we can continue to improve our services. Please take a few minutes to complete the written survey that you may receive in the mail after your visit with us. Thank you!             Your Updated Medication List - Protect others around you: Learn how to safely use, store and throw away your medicines at www.disposemymeds.org.          This list is accurate as of: 12/22/17  9:49 AM.  Always use your most recent med list.                   Brand Name Dispense Instructions for use Diagnosis    acetaminophen 325 MG tablet    TYLENOL    100 tablet    Take 2 tablets (650 mg) by mouth every 4 hours as needed for mild pain    Tension-type headache, not intractable, unspecified chronicity pattern       calcium carbonate 500 MG chewable tablet    TUMS    150 tablet    Take 1 tablet (500 mg) by mouth 2 times daily as needed for heartburn    Other chronic gastritis without hemorrhage       Capsaicin 0.1 % cream     56.6 g    Apply topically 2 times daily as needed    Chronic right-sided low back pain without sciatica, Neck pain       cetirizine 10 MG  tablet    zyrTEC    90 tablet    Take 1 tablet (10 mg) by mouth every evening    Seasonal allergic rhinitis due to other allergic trigger       docusate sodium 100 MG tablet    COLACE     Take 100 mg by mouth daily as needed for constipation    Constipation, unspecified constipation type       FLONASE 50 MCG/ACT spray   Generic drug:  fluticasone      Spray 1-2 sprays into both nostrils daily as needed for allergies    Seasonal allergic rhinitis due to other allergic trigger       ibuprofen 600 MG tablet    ADVIL/MOTRIN     Take 1 tablet (600 mg) by mouth daily as needed        ketotifen 0.025 % Soln ophthalmic solution    ZADITOR    1 Bottle    Place 1 drop into both eyes every 12 hours    Acute conjunctivitis of both eyes, unspecified acute conjunctivitis type       meclizine 25 MG tablet    ANTIVERT    30 tablet    Take 1 tablet (25 mg) by mouth every 6 hours as needed for dizziness    BPPV (benign paroxysmal positional vertigo), unspecified laterality       melatonin 3 MG tablet     30 tablet    Take 1 tablet (3 mg) by mouth nightly as needed for sleep    Sleep disturbance       montelukast 10 MG tablet    SINGULAIR    30 tablet    Take 1 tablet (10 mg) by mouth daily    Acute seasonal allergic rhinitis, unspecified trigger       norgestimate-ethinyl estradiol 0.25-35 MG-MCG per tablet    ORTHO-CYCLEN, SPRINTEC    84 tablet    Take 1 tablet by mouth daily    Encounter for initial prescription of contraceptive pills       polyvinyl alcohol 1.4 % ophthalmic solution    LIQUIFILM TEARS    15 mL    Place 1 drop into both eyes as needed for dry eyes    Allergic conjunctivitis of both eyes       prazosin 1 MG capsule    MINIPRESS    60 capsule    Take 2 capsules (2 mg) by mouth At Bedtime    PTSD (post-traumatic stress disorder)       ranitidine 150 MG tablet    ZANTAC    60 tablet    Take 1 tablet (150 mg) by mouth 2 times daily    Helicobacter pylori gastritis       venlafaxine 37.5 MG 24 hr capsule    EFFEXOR-XR     46 capsule    Take 1 capsule daily for 14 days, then take 2 capsules daily.    PTSD (post-traumatic stress disorder)

## 2017-12-22 NOTE — LETTER
December 26, 2017      Hayde LUJAN Say  [address]  [place] [address]        Dear Hayde,    Your kidney test results are normal. I will see you in 2 weeks.    Please see below for your test results.    Resulted Orders   Basic Metabolic Panel ([facility]) - Results < 1 hr   Result Value Ref Range    Glucose 79.0 60.0 - 109.0 mg/dL    Urea Nitrogen 8.0 5.0 - 24.0 mg/dL    Creatinine 0.7 0.6 - 1.3 mg/dL    Sodium 140.0 133.0 - 144.0 mmol/L    Potassium 3.8 3.4 - 5.3 mmol/L    Chloride 100.0 94.0 - 109.0 mmol/L    Carbon Dioxide 26.0 20.0 - 32.0 mmol/L    Calcium 9.3 8.5 - 10.4 mg/dL    eGFR Calculated (Non Black Reference) >90 >60.0 mL/min    eGFR Calculated (Black Reference) >90 >60.0 mL/min       If you have any questions, please call the clinic to make an appointment.    Sincerely,    Carlos Manuel Rivas MD

## 2017-12-22 NOTE — PROGRESS NOTES
"       Chester LUJAN Say is a 27 year old  female with a significant past medical history of PTSD, anxiety who presents for follow up of concern(s) listed below    1. Mood is not good  - PHQ-9 completed, score of 4  -She just picked up venlafaxine yesterday, so has only been taking one days worth of this  -Has not stopped fluoxetine yet  -Her nightmares are improving, but she is still slightly tired in the morning after taking prazosin    2. Epigastric Abdominal Pain  -Started reexperiencing epigastric burning abdominal pain about 1 week ago  -Per patient report, she has only been taking ranitidine once a day, as well as taking Tums once a day in the morning  -She denies any hematemesis or melanotic stools  -Denies fevers    Pertinent ROS: Constitutional, HEENT, cardiovascular, pulmonary, gi and gu systems are negative, except as otherwise noted.           Objective   Vitals:    12/22/17 0917   BP: 105/71   Pulse: 72   Resp: 20   Temp: 98.5  F (36.9  C)   TempSrc: Oral   SpO2: 99%   Weight: 132 lb 3.2 oz (60 kg)   Height: 4' 9.5\" (146.1 cm)     Body mass index is 28.11 kg/(m^2).    ENERAL APPEARANCE: healthy, alert and no distress  EYES: Eyes grossly normal to inspection  RESP: lungs clear to auscultation - no rales, rhonchi or wheezes  CV: regular rates and rhythm, normal S1 S2, no S3 or S4, no murmur, click or rub, no peripheral edema and peripheral pulses strong  ABDOMEN: soft, mild tenderness in epigastrim, no hepatosplenomegaly, no masses and bowel sounds normal  MS: no musculoskeletal defects are noted and gait is age appropriate without ataxia  SKIN: no suspicious lesions or rashes  NEURO: Normal strength and tone, sensory exam grossly normal, mentation intact and speech normal  PSYCH: mentation appears normal and affect normal/bright, no SI or HI, no current hallucinations         Assessment/Plan     (F43.10) PTSD (post-traumatic stress disorder)  (primary encounter diagnosis)  Comment:   Plan: " Basic Metabolic Panel ([facility]) - Results < 1 hr        Continue venlafaxine at 37.5 mg daily in the morning, will see back in 2 weeks, at which time increase to 75 mg daily.  Treat with that for 4-6 more weeks.  Consider titrating more to desired effect, versus starting Abilify to augment.  Venlafaxine will be her third antidepressant single agent therapy.  Continue seen Dr. Elham Villa.  Continue prazosin 2 mg at bedtime.    (K29.70,  B96.81) Helicobacter pylori gastritis  Comment:   Plan: Discussed taking ranitidine twice daily, and taking Tums as needed with meals.  If these measures do not work over the next 2 weeks, will restart omeprazole instead.  If that fails, next step would be GI referral given her significant history.    Options for treatment and follow-up care were reviewed with the patient and/or guardian. Hayde LUJAN Say and/or guardian engaged in the decision making process and verbalized understanding of the options discussed and agreed with the final plan.    Carlos Manuel Rivas MD      Precepted today with: Jillian Degroot MD

## 2017-12-22 NOTE — PROGRESS NOTES
Preceptor Attestation:  Patient's case reviewed and discussed with Carlos aMnuel Rivas MD Patient seen and discussed with the resident.. I agree with assessment and plan of care.  Supervising Physician:  Jillian Degroot MD  [facility]

## 2017-12-22 NOTE — PROGRESS NOTES
Preceptor Attestation:  Patient's case reviewed and discussed with Carlos Manuel Rivas MD Patient seen and discussed with the resident.. I agree with assessment and plan of care.  Supervising Physician:  Jillian Degroot MD  [facility]

## 2017-12-28 ENCOUNTER — TELEPHONE (OUTPATIENT)
Dept: FAMILY MEDICINE | Facility: CLINIC | Age: 27
End: 2017-12-28

## 2017-12-28 ENCOUNTER — OFFICE VISIT (OUTPATIENT)
Dept: PSYCHOLOGY | Facility: CLINIC | Age: 27
End: 2017-12-28
Payer: COMMERCIAL

## 2017-12-28 DIAGNOSIS — F33.1 MAJOR DEPRESSIVE DISORDER, RECURRENT EPISODE, MODERATE (H): ICD-10-CM

## 2017-12-28 DIAGNOSIS — F43.10 PTSD (POST-TRAUMATIC STRESS DISORDER): Primary | ICD-10-CM

## 2017-12-28 NOTE — PROGRESS NOTES
Behavioral Health Progress Note    Client Legal Name: Hayde LUJAN Say   Client Preferred Name: Hayde   Service Type: Individual  Length of Visit: 40 minutes  Attendees: children,   Complexity: An  is used not only to interpret language, since the patient does not speak English, but also to help with the complexity of understandings across cultures, since the patient is not well integrated in the larger [place] culture.      Identifying Information and Presenting Problem:    The patient is a 27 year old [place] female who is being seen for problematic symptoms of depression and PTSD.    Treatment Objective(s) Addressed in This Session:  psychological distress due to acculturation      Progress on / Status of Treatment Objective(s) / Homework:  Minimal progress       PHQ-9 SCORE 8/3/2017 8/29/2017 11/29/2017   Total Score - - -   Total Score 5 13 8       BRENDA-7 SCORE 4/6/2017 4/26/2017 8/29/2017   Total Score 6 8 15       Topics Discussed/Interventions Provided:  Fear: pt still experiencing fear on a regular basis; is concerned again about technological advances and thinking she will need to get a chip inserted in her wrist in order to perform functions such as purchases, work, open doors, it will hold personal information etc. She has heard this is coming and expected that all [place] citizens will need one. We processed her general fears around understanding new technology and this particular concern. Patient does find relief in better understanding of these concepts.    Temper: worsening since her sleep has been bad. Most conflict is with . Reports they argue about everything in the morning after he returns from work. We decided in future appointments to discuss this more in depth find healthier ways to argue and manage her temper. Reports the boys are doing better right now, but she cannot exactly identify why that is. Perhaps because she is more focused on the conflict with her , she is  less focused on conflict with the children.     Bed bugs: pt mentioned she has had bed bugs in her home for a while and her 3 sons (present today) are getting bit regularly. They have appts following mine. We discussed various ways to manage bed bugs. I learned pt has no beds and pts' landlord is already aware and has treated the home. She has been sleeping worse because of the     Assessment: The patient appeared to be active and engaged in today's session and was receptive to feedback. Was more engaged and focused today.     Mental Status: Hayde appeared generally alert and oriented. Dress was casual and appropriate to the weather and occasion. Grooming and hygiene were clean. Eye contact was good. Speech was of normal volume and rate and was clear, coherent, and relevant. Mood was neutral with congruent affect. Thought processes were relevant, logical and goal-directed. Thought content was WNL with no evidence of psychotic or paranoid features. No evidence of SI/HI or self-harm, intent, or plans. Memory appeared grossly intact. Insight and judgment appeared fair/poor and patient exhibited good impulse control during the appointment.     Does the patient appear to be at imminent risk of harm to self/others at this time? No    The session was necessary to address PTSD, worry, depressive symptoms that have been interfering with patient's ability to function at home, personal life management, parenting.  Ongoing psychotherapy is necessary to improve functioning with daily activities, provide psychoeducation and provide support.    Diagnosis (DSM-5):  296.32 recurrent moderate major depression  308.3 PTSD    Plan:  1. Follow up in 2 weeks.  2. Next appt address anger management related to couple conflict.      NOTE: Treatment plan update due 3/28/18.  Diagnostic assessment update due 4/12/18.    The Treatment Plan dated 5/24/17 was reviewed with the patient at today s visit.  The Treatment Plan remains current based on  the patient s status and progress to date.

## 2017-12-28 NOTE — MR AVS SNAPSHOT
After Visit Summary   12/28/2017    Hayde LUJAN Say    MRN: [id number]           Patient Information     Date Of Birth          1990        Visit Information        Provider Department      12/28/2017 10:00 AM Elham Mcmahan, LMFT [facility]        Today's Diagnoses     PTSD (post-traumatic stress disorder)    -  1    Major depressive disorder, recurrent episode, moderate (H)           Follow-ups after your visit        Your next 10 appointments already scheduled     Jan 09, 2018  8:20 AM CST   Return Visit with Carlos Manuel Rivas MD   [facility] ([facility])    [address] [place] [address]   [phone]            Jan 11, 2018  9:00 AM CST   Return Visit with Elham Mcmahan LMFT [facility] ([facility])    [address] [place] [address]   [phone]              Who to contact     Please call your clinic at [phone] to:    Ask questions about your health    Make or cancel appointments    Discuss your medicines    Learn about your test results    Speak to your doctor   If you have compliments or concerns about an experience at your clinic, or if you wish to file a complaint, please contact [facility] Patient Relations at [phone] or email us at [email or web address]         Additional Information About Your Visit        Care EveryWhere ID     This is your Care EveryWhere ID. This could be used by other organizations to access your [place] medical records  [id number]        Your Vitals Were     Last Period                   12/22/2017            Blood Pressure from Last 3 Encounters:   12/22/17 105/71   12/15/17 98/64   11/29/17 107/69    Weight from Last 3 Encounters:   12/22/17 132 lb 3.2 oz (60 kg)   12/15/17 132 lb (59.9 kg)   11/29/17 134 lb (60.8 kg)              We Performed the Following     Interactive Complexity add-on (66349)     Psychotherapy 45 min  (32731)        Primary Care Provider Office Phone # Fax #    Carlos Manuel Andreas Rivas [phone]       [facility] [address] [address]        Equal Access to Services     DYLAN LEY : Hadii raven roberts michelleo Soomaali, waaxda luqadaha, qaybta kaalmada adeegyada, nakul acin hayaan faustinaritchie ibarra felicia garcía. So [facility] [phone].    ATENCIÓN: Si habla español, tiene a keene disposición servicios gratuitos de asistencia lingüística. Llame al [phone].    We comply with applicable federal civil rights laws and [place] laws. We do not discriminate on the basis of race, color, national origin, age, disability, sex, sexual orientation, or gender identity.            Thank you!     Thank you for choosing [facility]  for your care. Our goal is always to provide you with excellent care. Hearing back from our patients is one way we can continue to improve our services. Please take a few minutes to complete the written survey that you may receive in the mail after your visit with us. Thank you!             Your Updated Medication List - Protect others around you: Learn how to safely use, store and throw away your medicines at www.disposemymeds.org.          This list is accurate as of: 12/28/17  4:56 PM.  Always use your most recent med list.                   Brand Name Dispense Instructions for use Diagnosis    acetaminophen 325 MG tablet    TYLENOL    100 tablet    Take 2 tablets (650 mg) by mouth every 4 hours as needed for mild pain    Tension-type headache, not intractable, unspecified chronicity pattern       calcium carbonate 500 MG chewable tablet    TUMS    150 tablet    Take 1 tablet (500 mg) by mouth 2 times daily as needed for heartburn    Other chronic gastritis without hemorrhage       Capsaicin 0.1 % cream     56.6 g    Apply topically 2 times daily as needed    Chronic right-sided low back pain without sciatica, Neck pain       cetirizine 10 MG tablet     zyrTEC    90 tablet    Take 1 tablet (10 mg) by mouth every evening    Seasonal allergic rhinitis due to other allergic trigger       docusate sodium 100 MG tablet    COLACE     Take 100 mg by mouth daily as needed for constipation    Constipation, unspecified constipation type       FLONASE 50 MCG/ACT spray   Generic drug:  fluticasone      Spray 1-2 sprays into both nostrils daily as needed for allergies    Seasonal allergic rhinitis due to other allergic trigger       ibuprofen 600 MG tablet    ADVIL/MOTRIN     Take 1 tablet (600 mg) by mouth daily as needed        ketotifen 0.025 % Soln ophthalmic solution    ZADITOR    1 Bottle    Place 1 drop into both eyes every 12 hours    Acute conjunctivitis of both eyes, unspecified acute conjunctivitis type       meclizine 25 MG tablet    ANTIVERT    30 tablet    Take 1 tablet (25 mg) by mouth every 6 hours as needed for dizziness    BPPV (benign paroxysmal positional vertigo), unspecified laterality       melatonin 3 MG tablet     30 tablet    Take 1 tablet (3 mg) by mouth nightly as needed for sleep    Sleep disturbance       montelukast 10 MG tablet    SINGULAIR    30 tablet    Take 1 tablet (10 mg) by mouth daily    Acute seasonal allergic rhinitis, unspecified trigger       norgestimate-ethinyl estradiol 0.25-35 MG-MCG per tablet    ORTHO-CYCLEN, SPRINTEC    84 tablet    Take 1 tablet by mouth daily    Encounter for initial prescription of contraceptive pills       polyvinyl alcohol 1.4 % ophthalmic solution    LIQUIFILM TEARS    15 mL    Place 1 drop into both eyes as needed for dry eyes    Allergic conjunctivitis of both eyes       prazosin 1 MG capsule    MINIPRESS    60 capsule    Take 2 capsules (2 mg) by mouth At Bedtime    PTSD (post-traumatic stress disorder)       ranitidine 150 MG tablet    ZANTAC    60 tablet    Take 1 tablet (150 mg) by mouth 2 times daily    Helicobacter pylori gastritis       venlafaxine 37.5 MG 24 hr capsule    EFFEXOR-XR    46  capsule    Take 1 capsule daily for 14 days, then take 2 capsules daily.    PTSD (post-traumatic stress disorder)

## 2017-12-28 NOTE — TELEPHONE ENCOUNTER
wanted me to stop by and talk to the pt, the pt had brought in her kids in for a check up with bed bugs.  The pt stated that they have reported this issue to her manager,but nothing has been done.  Pt stated that when she talks to the manager about this issue, she is yelled at.  I talked to the pt and inform her that I will report this, and find out what I need to get someone out to her place.  I told the pt hat I will call her on what I am able to find out.      I called the [facility] complaints department to report this bed bug issue.  They inform me that they will get the  that is assigned to the pt address area, and have him go out to the property and check it out.  She stated that it may take 24 to 48 hours before they can get anyone out there since the news years is next week.  They took my information just incase the  has any questions.

## 2018-01-09 ENCOUNTER — OFFICE VISIT (OUTPATIENT)
Dept: FAMILY MEDICINE | Facility: CLINIC | Age: 28
End: 2018-01-09
Payer: COMMERCIAL

## 2018-01-09 VITALS
WEIGHT: 132.5 LBS | HEIGHT: 58 IN | BODY MASS INDEX: 27.81 KG/M2 | SYSTOLIC BLOOD PRESSURE: 103 MMHG | RESPIRATION RATE: 20 BRPM | HEART RATE: 81 BPM | DIASTOLIC BLOOD PRESSURE: 69 MMHG | OXYGEN SATURATION: 100 % | TEMPERATURE: 98.3 F

## 2018-01-09 DIAGNOSIS — F43.10 PTSD (POST-TRAUMATIC STRESS DISORDER): ICD-10-CM

## 2018-01-09 DIAGNOSIS — G44.219 EPISODIC TENSION-TYPE HEADACHE, NOT INTRACTABLE: Primary | ICD-10-CM

## 2018-01-09 DIAGNOSIS — B88.8 INFESTATION BY BED BUG: ICD-10-CM

## 2018-01-09 RX ORDER — TRIAMCINOLONE ACETONIDE 1 MG/G
OINTMENT TOPICAL
Qty: 30 G | Refills: 0 | Status: SHIPPED | OUTPATIENT
Start: 2018-01-09 | End: 2018-01-18

## 2018-01-09 RX ORDER — VENLAFAXINE HYDROCHLORIDE 75 MG/1
CAPSULE, EXTENDED RELEASE ORAL
Qty: 30 CAPSULE | Refills: 1 | Status: SHIPPED | OUTPATIENT
Start: 2018-01-09 | End: 2018-01-22

## 2018-01-09 NOTE — Clinical Note
Jarvis Gordonk,  Hayde has beg bugs, and I believe this has been attempted to deal with in the past (maybe even recent past). She states her apartment has been sprayed once, but there are still bed bugs. She also stated that a couple people came by her apartment asking about bed bugs, but she turned them away (stated she was scared).   Can we call this in and attempt another extermination of bed bugs in her apartment? I discussed this with Hayde, and she states she won't turn people away again.  - Jaden

## 2018-01-09 NOTE — PROGRESS NOTES
"       [place]       Hayde B Say is a 27 year old  female with a significant past medical history of PTSD, anxiety who presents for follow up of concern(s) listed below    1. Head Pain  - Stiff neck, weak hand and leg, and pounding headache  - Saturday is onset, stable course, no worsening symptoms  - Comes and goes, gets better and gets worse  - Right sided headache, pounding in sensation  - Lime juice helps her headache  - Both arm and leg weakness - starts to say she is more \"tired\"    2. Bed bugs  - States she is being bit by them, but no bug bites  - Son has been started on treated  - Brings in bed bugs in today  - Discussion with behavioral S indicates that this problem has been ongoing for several years, but is just worse as of now    Pertinent ROS: Constitutional, HEENT, cardiovascular, pulmonary, gi and gu systems are negative, except as otherwise noted.           Objective   Vitals:    01/09/18 0833   BP: 103/69   Pulse: 81   Resp: 20   Temp: 98.3  F (36.8  C)   TempSrc: Oral   SpO2: 100%   Weight: 132 lb 8 oz (60.1 kg)   Height: 4' 9.5\" (146.1 cm)     Body mass index is 28.18 kg/(m^2).    ENERAL APPEARANCE: healthy, alert and no distress  EYES: Eyes grossly normal to inspection  RESP: lungs clear to auscultation - no rales, rhonchi or wheezes  CV: regular rates and rhythm, normal S1 S2, no S3 or S4, no murmur, click or rub, no peripheral edema and peripheral pulses strong  MS: no musculoskeletal defects are noted and gait is age appropriate without ataxia  SKIN: no suspicious lesions or rashes, no bed bug bite marks  NEURO:  Upper extremity strength 5/5, lower extremity strength 5/5, sensory exam grossly normal, mentation intact and speech normal  PSYCH: mentation appears normal, mood is tearful and depressed, affect congruent, no SI or HI, no current hallucinations         Assessment/Plan     (G44.219) Episodic tension-type headache, not intractable  (primary encounter diagnosis)  Comment:    Plan: " Neuro exam is normal without any evidence of numbness, tingling, or weakness.  Headache described consistent with a tension headache.  Discussed good oral hydration, continue to focus on sleep hygiene, and Tylenol as needed.    (B88.8) Infestation by bed bug  Comment:   Plan: triamcinolone (KENALOG) 0.1 % ointment        Long-standing issue, but apparently worse at this time.  I had a long discussion with Hayde  Regarding allowing people to come in and exterminate the bedbugs.  Acknowledged her fear of new people, but stated that they are there to help her not removed from her apartment.  Routed a note to kathy, who aided in setting up bedbug extermination in the past.  Prescription for Kenalog ointment provided to treat any symptomatic bedbug bites that she may receive.    (F43.10) PTSD (post-traumatic stress disorder)  Comment:   Plan: venlafaxine (EFFEXOR-XR) 75 MG 24 hr capsule        Patient currently taking 75 mg of Effexor, refill given.  Still denying auditory or visual hallucinations.  If cannot control symptoms with current regimen, we will consider adding Abilify to her regimen.  We have an appointment with Dr. So on the 22nd of this month.      Options for treatment and follow-up care were reviewed with the patient and/or guardian. Hayde LUJAN Say and/or guardian engaged in the decision making process and verbalized understanding of the options discussed and agreed with the final plan.    Carlos Manuel Rivas MD      Precepted today with: Lynne Levine MD    This note was created with dragon dictation, and unintentional word substitutions and grammatical errors may happen, despite proofreading.

## 2018-01-09 NOTE — MR AVS SNAPSHOT
After Visit Summary   1/9/2018    Hayde LUJAN Say    MRN: [id number]           Patient Information     Date Of Birth          1990        Visit Information        Provider Department      1/9/2018 8:20 AM Carlos Manuel Rivas MD [facility]        Today's Diagnoses     Episodic tension-type headache, not intractable    -  1    Infestation by bed bug        PTSD (post-traumatic stress disorder)           Follow-ups after your visit        Follow-up notes from your care team     Return in about 4 weeks (around 2/6/2018).      Your next 10 appointments already scheduled     Jan 22, 2018  8:30 AM CST   CONSULT with Derek So MD   [facility] ([facility])    [address] [place] [address]   [phone]            Jan 25, 2018  8:20 AM CST   Return Visit with Elham Mcmahan LMFT [facility] ([facility])    [address] [place] [address]   [phone]            Feb 07, 2018  2:20 PM CST   Return Visit with Carlos Manuel Rivas MD   [facility] ([facility])    [address] [place] [address]   [phone]              Who to contact     Please call your clinic at [phone] to:    Ask questions about your health    Make or cancel appointments    Discuss your medicines    Learn about your test results    Speak to your doctor   If you have compliments or concerns about an experience at your clinic, or if you wish to file a complaint, please contact [facility] Patient Relations at [phone] or email us at [email or web address]         Additional Information About Your Visit        Care EveryWhere ID     This is your Care EveryWhere ID. This could be used by other organizations to access your [place] medical records  [id number]        Your Vitals Were     Pulse Temperature Respirations Height Last Period Pulse Oximetry    81 98.3  F (36.8  C)  "(Oral) 20 4' 9.5\" (146.1 cm) 12/22/2017 100%    BMI (Body Mass Index)                   28.18 kg/m2            Blood Pressure from Last 3 Encounters:   01/09/18 103/69   12/22/17 105/71   12/15/17 98/64    Weight from Last 3 Encounters:   01/09/18 132 lb 8 oz (60.1 kg)   12/22/17 132 lb 3.2 oz (60 kg)   12/15/17 132 lb (59.9 kg)              Today, you had the following     No orders found for display         Today's Medication Changes          These changes are accurate as of: 1/9/18 11:59 PM.  If you have any questions, ask your nurse or doctor.               Start taking these medicines.        Dose/Directions    triamcinolone 0.1 % ointment   Commonly known as:  KENALOG   Used for:  Infestation by bed bug   Started by:  Carlos Manuel Rivas MD        Apply sparingly to affected area three times daily for 14 days.   Quantity:  30 g   Refills:  0         These medicines have changed or have updated prescriptions.        Dose/Directions    venlafaxine 75 MG 24 hr capsule   Commonly known as:  EFFEXOR-XR   This may have changed:    - medication strength  - additional instructions   Used for:  PTSD (post-traumatic stress disorder)   Changed by:  Carlos Manuel Rivas MD        1 capsule daily   Quantity:  30 capsule   Refills:  1            Where to get your medicines      These medications were sent to [facility] - [place], [place] - [address]  [address], [place] [address]     Phone:  [phone]     triamcinolone 0.1 % ointment    venlafaxine 75 MG 24 hr capsule                Primary Care Provider Office Phone # Fax #    Carlos Manuel Rivas [phone]       [facility] [address] [place] [address]        Equal Access to Services     DYLAN LEY AH: Msaon martinezo Sofransisco, waaxda luqadaha, qaybta kaalmada adeegyada, nakul garcía. So [facility] [phone].    ATENCIÓN: Si ema stover, " tiene a keene disposición servicios gratuitos de asistencia lingüística. Cristy medina [phone].    We comply with applicable federal civil rights laws and [place] laws. We do not discriminate on the basis of race, color, national origin, age, disability, sex, sexual orientation, or gender identity.            Thank you!     Thank you for choosing [facility]  for your care. Our goal is always to provide you with excellent care. Hearing back from our patients is one way we can continue to improve our services. Please take a few minutes to complete the written survey that you may receive in the mail after your visit with us. Thank you!             Your Updated Medication List - Protect others around you: Learn how to safely use, store and throw away your medicines at www.disposemymeds.org.          This list is accurate as of: 1/9/18 11:59 PM.  Always use your most recent med list.                   Brand Name Dispense Instructions for use Diagnosis    acetaminophen 325 MG tablet    TYLENOL    100 tablet    Take 2 tablets (650 mg) by mouth every 4 hours as needed for mild pain    Tension-type headache, not intractable, unspecified chronicity pattern       calcium carbonate 500 MG chewable tablet    TUMS    150 tablet    Take 1 tablet (500 mg) by mouth 2 times daily as needed for heartburn    Other chronic gastritis without hemorrhage       Capsaicin 0.1 % cream     56.6 g    Apply topically 2 times daily as needed    Chronic right-sided low back pain without sciatica, Neck pain       cetirizine 10 MG tablet    zyrTEC    90 tablet    Take 1 tablet (10 mg) by mouth every evening    Seasonal allergic rhinitis due to other allergic trigger       docusate sodium 100 MG tablet    COLACE     Take 100 mg by mouth daily as needed for constipation    Constipation, unspecified constipation type       FLONASE 50 MCG/ACT spray   Generic drug:  fluticasone      Spray 1-2 sprays into both nostrils daily as needed for  allergies    Seasonal allergic rhinitis due to other allergic trigger       ibuprofen 600 MG tablet    ADVIL/MOTRIN     Take 1 tablet (600 mg) by mouth daily as needed        ketotifen 0.025 % Soln ophthalmic solution    ZADITOR    1 Bottle    Place 1 drop into both eyes every 12 hours    Acute conjunctivitis of both eyes, unspecified acute conjunctivitis type       meclizine 25 MG tablet    ANTIVERT    30 tablet    Take 1 tablet (25 mg) by mouth every 6 hours as needed for dizziness    BPPV (benign paroxysmal positional vertigo), unspecified laterality       melatonin 3 MG tablet     30 tablet    Take 1 tablet (3 mg) by mouth nightly as needed for sleep    Sleep disturbance       montelukast 10 MG tablet    SINGULAIR    30 tablet    Take 1 tablet (10 mg) by mouth daily    Acute seasonal allergic rhinitis, unspecified trigger       norgestimate-ethinyl estradiol 0.25-35 MG-MCG per tablet    ORTHO-CYCLEN, SPRINTEC    84 tablet    Take 1 tablet by mouth daily    Encounter for initial prescription of contraceptive pills       polyvinyl alcohol 1.4 % ophthalmic solution    LIQUIFILM TEARS    15 mL    Place 1 drop into both eyes as needed for dry eyes    Allergic conjunctivitis of both eyes       prazosin 1 MG capsule    MINIPRESS    60 capsule    Take 2 capsules (2 mg) by mouth At Bedtime    PTSD (post-traumatic stress disorder)       ranitidine 150 MG tablet    ZANTAC    60 tablet    Take 1 tablet (150 mg) by mouth 2 times daily    Helicobacter pylori gastritis       triamcinolone 0.1 % ointment    KENALOG    30 g    Apply sparingly to affected area three times daily for 14 days.    Infestation by bed bug       venlafaxine 75 MG 24 hr capsule    EFFEXOR-XR    30 capsule    1 capsule daily    PTSD (post-traumatic stress disorder)

## 2018-01-09 NOTE — NURSING NOTE
name: Kathe Riojas  Language: Maryann  Agency: [facility]  Phone number: [phone]

## 2018-01-10 ASSESSMENT — PATIENT HEALTH QUESTIONNAIRE - PHQ9: SUM OF ALL RESPONSES TO PHQ QUESTIONS 1-9: 9

## 2018-01-11 ENCOUNTER — OFFICE VISIT (OUTPATIENT)
Dept: PSYCHOLOGY | Facility: CLINIC | Age: 28
End: 2018-01-11
Payer: COMMERCIAL

## 2018-01-11 ENCOUNTER — TELEPHONE (OUTPATIENT)
Dept: FAMILY MEDICINE | Facility: CLINIC | Age: 28
End: 2018-01-11

## 2018-01-11 DIAGNOSIS — F33.1 MAJOR DEPRESSIVE DISORDER, RECURRENT EPISODE, MODERATE (H): ICD-10-CM

## 2018-01-11 DIAGNOSIS — F43.10 PTSD (POST-TRAUMATIC STRESS DISORDER): Primary | ICD-10-CM

## 2018-01-11 NOTE — MR AVS SNAPSHOT
After Visit Summary   1/11/2018    Hayde LUJAN Say    MRN: [id number]           Patient Information     Date Of Birth          1990        Visit Information        Provider Department      1/11/2018 9:00 AM Elham Mcmahan, LMFT [facility]        Today's Diagnoses     PTSD (post-traumatic stress disorder)    -  1    Major depressive disorder, recurrent episode, moderate (H)           Follow-ups after your visit        Your next 10 appointments already scheduled     Jan 22, 2018  8:30 AM CST   CONSULT with Derek So MD   [facility] ([facility])    [address] [place] [address]   [phone]            Jan 25, 2018  8:20 AM CST   Return Visit with Elham Mcmahan LMFT [facility] ([facility])    [address] [place] [address]   [phone]            Feb 07, 2018  2:20 PM CST   Return Visit with Carlos Manuel Rivas MD   [facility] ([facility])    [address] [place] [address]   [phone]              Who to contact     Please call your clinic at [phone] to:    Ask questions about your health    Make or cancel appointments    Discuss your medicines    Learn about your test results    Speak to your doctor   If you have compliments or concerns about an experience at your clinic, or if you wish to file a complaint, please contact [facility] Patient Relations at [phone] or email us at [email or web address]         Additional Information About Your Visit        Care EveryWhere ID     This is your Care EveryWhere ID. This could be used by other organizations to access your [place] medical records  [id number]        Your Vitals Were     Last Period                   12/22/2017            Blood Pressure from Last 3 Encounters:   01/09/18 103/69   12/22/17 105/71   12/15/17 98/64    Weight from Last 3 Encounters:   01/09/18 132 lb 8 oz  (60.1 kg)   12/22/17 132 lb 3.2 oz (60 kg)   12/15/17 132 lb (59.9 kg)              We Performed the Following     Interactive Complexity add-on (68321)     Psychotherapy 45 min (120[address])        Primary Care Provider Office Phone # Fax #    Carlos Manuel Andreas Rivas [phone]       [facility] [address] [place] [address]        Equal Access to Services     DYLAN LEY : Hadii aad ku hadasho Soomaali, waaxda luqadaha, qaybta kaalmada adeegyada, waxay idiin hayaan adeeg kharash la'aan ah. So [facility] [phone].    ATENCIÓN: Si habla español, tiene a keene disposición servicios gratuitos de asistencia lingüística. [facility] [phone].    We comply with applicable federal civil rights laws and [place] laws. We do not discriminate on the basis of race, color, national origin, age, disability, sex, sexual orientation, or gender identity.            Thank you!     Thank you for choosing [facility]  for your care. Our goal is always to provide you with excellent care. Hearing back from our patients is one way we can continue to improve our services. Please take a few minutes to complete the written survey that you may receive in the mail after your visit with us. Thank you!             Your Updated Medication List - Protect others around you: Learn how to safely use, store and throw away your medicines at www.disposemymeds.org.          This list is accurate as of: 1/11/18  3:50 PM.  Always use your most recent med list.                   Brand Name Dispense Instructions for use Diagnosis    acetaminophen 325 MG tablet    TYLENOL    100 tablet    Take 2 tablets (650 mg) by mouth every 4 hours as needed for mild pain    Tension-type headache, not intractable, unspecified chronicity pattern       calcium carbonate 500 MG chewable tablet    TUMS    150 tablet    Take 1 tablet (500 mg) by mouth 2 times daily as needed for heartburn    Other chronic gastritis without hemorrhage        Capsaicin 0.1 % cream     56.6 g    Apply topically 2 times daily as needed    Chronic right-sided low back pain without sciatica, Neck pain       cetirizine 10 MG tablet    zyrTEC    90 tablet    Take 1 tablet (10 mg) by mouth every evening    Seasonal allergic rhinitis due to other allergic trigger       docusate sodium 100 MG tablet    COLACE     Take 100 mg by mouth daily as needed for constipation    Constipation, unspecified constipation type       FLONASE 50 MCG/ACT spray   Generic drug:  fluticasone      Spray 1-2 sprays into both nostrils daily as needed for allergies    Seasonal allergic rhinitis due to other allergic trigger       ibuprofen 600 MG tablet    ADVIL/MOTRIN     Take 1 tablet (600 mg) by mouth daily as needed        ketotifen 0.025 % Soln ophthalmic solution    ZADITOR    1 Bottle    Place 1 drop into both eyes every 12 hours    Acute conjunctivitis of both eyes, unspecified acute conjunctivitis type       meclizine 25 MG tablet    ANTIVERT    30 tablet    Take 1 tablet (25 mg) by mouth every 6 hours as needed for dizziness    BPPV (benign paroxysmal positional vertigo), unspecified laterality       melatonin 3 MG tablet     30 tablet    Take 1 tablet (3 mg) by mouth nightly as needed for sleep    Sleep disturbance       montelukast 10 MG tablet    SINGULAIR    30 tablet    Take 1 tablet (10 mg) by mouth daily    Acute seasonal allergic rhinitis, unspecified trigger       norgestimate-ethinyl estradiol 0.25-35 MG-MCG per tablet    ORTHO-CYCLEN, SPRINTEC    84 tablet    Take 1 tablet by mouth daily    Encounter for initial prescription of contraceptive pills       polyvinyl alcohol 1.4 % ophthalmic solution    LIQUIFILM TEARS    15 mL    Place 1 drop into both eyes as needed for dry eyes    Allergic conjunctivitis of both eyes       prazosin 1 MG capsule    MINIPRESS    60 capsule    Take 2 capsules (2 mg) by mouth At Bedtime    PTSD (post-traumatic stress disorder)       ranitidine 150  MG tablet    ZANTAC    60 tablet    Take 1 tablet (150 mg) by mouth 2 times daily    Helicobacter pylori gastritis       triamcinolone 0.1 % ointment    KENALOG    30 g    Apply sparingly to affected area three times daily for 14 days.    Infestation by bed bug       venlafaxine 75 MG 24 hr capsule    EFFEXOR-XR    30 capsule    1 capsule daily    PTSD (post-traumatic stress disorder)

## 2018-01-11 NOTE — PROGRESS NOTES
Behavioral Health Progress Note    Client Legal Name: Hayde LUJAN Say   Client Preferred Name: Hayde   Service Type: Individual  Length of Visit: 40  minutes  Attendees:  and Dr. Echols towards end of appt.  Complexity: An  is used not only to interpret language, since the patient does not speak English, but also to help with the complexity of understandings across cultures, since the patient is not well integrated in the larger [place] culture.      Identifying Information and Presenting Problem:    The patient is a 27 year old Maryann female who is being seen for problematic symptoms of depression, PTSD.    Treatment Objective(s) Addressed in This Session:  psychological distress due to acculturation difficulties      Progress on / Status of Treatment Objective(s) / Homework:  Minimal progress       PHQ-9 SCORE 8/29/2017 11/29/2017 1/9/2018   Total Score - - -   Total Score 13 8 9       BRENDA-7 SCORE 4/6/2017 4/26/2017 8/29/2017   Total Score 6 8 15       Topics Discussed/Interventions Provided:  Bed bugs: Hayde was very focused on the bed bug infestation. I tried following up on conflict with her  per our last appt, but she did not want to discuss today. Bed bugs are keeping her awake at night. She showed me videos of bed bugs in her apartment. Apparently they have had bugs in that apt since moving to [place] 7-8 yrs ago. She is speaking out about the bed bugs now because they are worse. She had many questions for me about whether she would get in trouble or kicked out of her apt if she kept mentioning the problem to her landlord. They do not have the resources to move or rent a larger apt.    Cream for the children is not working (still itchy) and they are almost out of it. She finds her own cream helpful. Her boys are not sleeping well and she wanted something new for them. Dr. Echols was not seeing patients and serving as MUTD, so I asked her to speak with Hayde. Dr. Echols made recommendations and  sent a rx for benadryl for the boys.     Assessment: The patient appeared to be active and engaged in today's session and was receptive to feedback. She was able to focus directly on the bed bugs today and answered most questions appropriately. However, did have difficulty understanding what I was asking and needed clarification on a few questions.     Mental Status: Hayde appeared generally alert and oriented. Dress was casual and appropriate to the weather and occasion. Grooming and hygiene were clean. Eye contact was good. Speech was of normal volume and rate and was clear, coherent, and relevant. Mood was neutral with congruent affect. Thought processes were relevant, logical and goal-directed. Thought content was WNL with no evidence of psychotic or paranoid features. No evidence of SI/HI or self-harm, intent, or plans. Memory appeared grossly intact. Insight and judgment appeared fair/poor and patient exhibited good impulse control during the appointment.      Does the patient appear to be at imminent risk of harm to self/others at this time? No     The session was necessary to address PTSD, worry, depressive symptoms that have been interfering with patient's ability to function at home, personal life management, parenting.  Ongoing psychotherapy is necessary to improve functioning with daily activities, provide psychoeducation and provide support.     Diagnosis (DSM-5):  296.32 recurrent moderate major depression  308.3 PTSD     Plan:  1. Follow up in 2 weeks.  2. Next appt address anger management related to couple conflict and follow up on bed bugs.         NOTE: Treatment plan update due 3/28/18.  Diagnostic assessment update due 4/12/18.

## 2018-01-11 NOTE — TELEPHONE ENCOUNTER
I am calling patient to inform that Dr. Echols sent an RX for Benadryl for bed bug infestation.  Line used. Pt has no other questions at this time.

## 2018-01-11 NOTE — Clinical Note
Hi Jaden,  See my latest note from Hayde. Sounds like the bed bugs have been an issue since she moved in 7-8 yrs ago- just worse now. SIGRIDI.

## 2018-01-15 NOTE — PROGRESS NOTES
Preceptor Attestation:  Patient's case reviewed and discussed with  Patient seen and discussed with the resident.  I agree with written assessment and plan of care.  Supervising Physician:  Lynne Levine MD  [facility]

## 2018-01-16 ENCOUNTER — CARE COORDINATION (OUTPATIENT)
Dept: FAMILY MEDICINE | Facility: CLINIC | Age: 28
End: 2018-01-16

## 2018-01-16 NOTE — PROGRESS NOTES
Was requested to call the [facility] to report ongoing bed bug infestation. Advised that patient has not allowed inspectors in the home in the past but is now willing as infestation has worsened. Was informed that there was an inspection done on 12/29 for which the landlord needed to complete several issues including bed bugs (this patient's unit number was inspected)  and that follow up inspection is to take place on 1/29/18 at 230. Today's complaint will be sent through so that the city aware that the tenant feels that there has been no improvement. LM for patient through Language Line with this information.

## 2018-01-18 DIAGNOSIS — B88.8 INFESTATION BY BED BUG: ICD-10-CM

## 2018-01-19 RX ORDER — TRIAMCINOLONE ACETONIDE 1 MG/G
OINTMENT TOPICAL
Qty: 30 G | Refills: 1 | Status: SHIPPED | OUTPATIENT
Start: 2018-01-19 | End: 2018-08-22 | Stop reason: ALTCHOICE

## 2018-01-22 ENCOUNTER — OFFICE VISIT (OUTPATIENT)
Dept: PSYCHOLOGY | Facility: CLINIC | Age: 28
End: 2018-01-22
Payer: COMMERCIAL

## 2018-01-22 VITALS
RESPIRATION RATE: 22 BRPM | TEMPERATURE: 97.6 F | SYSTOLIC BLOOD PRESSURE: 105 MMHG | HEIGHT: 58 IN | HEART RATE: 93 BPM | OXYGEN SATURATION: 99 % | DIASTOLIC BLOOD PRESSURE: 74 MMHG | WEIGHT: 133.8 LBS | BODY MASS INDEX: 28.09 KG/M2

## 2018-01-22 DIAGNOSIS — F43.10 PTSD (POST-TRAUMATIC STRESS DISORDER): Primary | ICD-10-CM

## 2018-01-22 RX ORDER — VENLAFAXINE HYDROCHLORIDE 150 MG/1
CAPSULE, EXTENDED RELEASE ORAL
Qty: 30 CAPSULE | Refills: 1 | Status: SHIPPED | OUTPATIENT
Start: 2018-01-22 | End: 2018-03-08

## 2018-01-22 RX ORDER — QUETIAPINE FUMARATE 25 MG/1
25 TABLET, FILM COATED ORAL AT BEDTIME
Qty: 30 TABLET | Refills: 1 | Status: SHIPPED | OUTPATIENT
Start: 2018-01-22 | End: 2018-03-30

## 2018-01-22 ASSESSMENT — ANXIETY QUESTIONNAIRES
IF YOU CHECKED OFF ANY PROBLEMS ON THIS QUESTIONNAIRE, HOW DIFFICULT HAVE THESE PROBLEMS MADE IT FOR YOU TO DO YOUR WORK, TAKE CARE OF THINGS AT HOME, OR GET ALONG WITH OTHER PEOPLE: VERY DIFFICULT
2. NOT BEING ABLE TO STOP OR CONTROL WORRYING: SEVERAL DAYS
6. BECOMING EASILY ANNOYED OR IRRITABLE: NEARLY EVERY DAY
5. BEING SO RESTLESS THAT IT IS HARD TO SIT STILL: NOT AT ALL
7. FEELING AFRAID AS IF SOMETHING AWFUL MIGHT HAPPEN: NEARLY EVERY DAY
3. WORRYING TOO MUCH ABOUT DIFFERENT THINGS: NEARLY EVERY DAY
GAD7 TOTAL SCORE: 14
1. FEELING NERVOUS, ANXIOUS, OR ON EDGE: NEARLY EVERY DAY

## 2018-01-22 ASSESSMENT — PATIENT HEALTH QUESTIONNAIRE - PHQ9: 5. POOR APPETITE OR OVEREATING: SEVERAL DAYS

## 2018-01-22 NOTE — PATIENT INSTRUCTIONS
Increase Effexor to 150mg for the next 2 weeks. If tolerating with no improvement, will increase to 225mg for 4-8 weeks.    Start taking Effexor in the morning instead of at night.    Start Seroquel 25mg at nighttime. If tolerating with no improvement, will add 12.5mg in the morning.
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
## 2018-01-22 NOTE — MR AVS SNAPSHOT
"              After Visit Summary   1/22/2018    Hayde LUJAN Say    MRN: [id number]           Patient Information     Date Of Birth          1990        Visit Information        Provider Department      1/22/2018 8:30 AM Derek So MD [facility]        Today's Diagnoses     PTSD (post-traumatic stress disorder)    -  1      Care Instructions    Increase Effexor to 150mg for the next 2 weeks. If tolerating with no improvement, will increase to 225mg for 4-8 weeks.    Start taking Effexor in the morning instead of at night.    Start Seroquel 25mg at nighttime. If tolerating with no improvement, will add 12.5mg in the morning.          Follow-ups after your visit        Your next 10 appointments already scheduled     Feb 07, 2018  2:20 PM CST   Return Visit with Carlos Manuel Rivas MD   [facility] ([facility])    [address] [place] [address] [id number] [phone]            Feb 08, 2018  8:20 AM CST   Return Visit with Elham Mcmahan LMFT [facility] ([facility])    [address] [place] [id number]   [phone]              Who to contact     Please call your clinic at [phone] to:    Ask questions about your health    Make or cancel appointments    Discuss your medicines    Learn about your test results    Speak to your doctor   If you have compliments or concerns about an experience at your clinic, or if you wish to file a complaint, please contact [facility] Patient Relations at [phone] or email us at [email or web address]         Additional Information About Your Visit        Care EveryWhere ID     This is your Care EveryWhere ID. This could be used by other organizations to access your [place] medical records  [id number]        Your Vitals Were     Pulse Temperature Respirations Height Pulse Oximetry BMI (Body Mass Index)    93 97.6  F (36.4  C) 22 4' 9.5\" (146.1 cm) 99% 28.45 " kg/m2       Blood Pressure from Last 3 Encounters:   01/22/18 105/74   01/09/18 103/69   12/22/17 105/71    Weight from Last 3 Encounters:   01/22/18 133 lb 12.8 oz (60.7 kg)   01/09/18 132 lb 8 oz (60.1 kg)   12/22/17 132 lb 3.2 oz (60 kg)              Today, you had the following     No orders found for display         Today's Medication Changes          These changes are accurate as of 1/22/18 11:59 PM.  If you have any questions, ask your nurse or doctor.               Start taking these medicines.        Dose/Directions    QUEtiapine 25 MG tablet   Commonly known as:  SEROQUEL   Used for:  PTSD (post-traumatic stress disorder)   Started by:  Derek So MD        Dose:  25 mg   Take 1 tablet (25 mg) by mouth At Bedtime   Quantity:  30 tablet   Refills:  1         These medicines have changed or have updated prescriptions.        Dose/Directions    venlafaxine 150 MG 24 hr capsule   Commonly known as:  EFFEXOR-XR   This may have changed:  medication strength   Used for:  PTSD (post-traumatic stress disorder)   Changed by:  Derek So MD        1 capsule daily   Quantity:  30 capsule   Refills:  1            Where to get your medicines      These medications were sent to [facility] - [place], [place] - [address] [place] [address] [address]kw[address]  [address], [place] [address]     Phone:  [phone]     QUEtiapine 25 MG tablet    venlafaxine 150 MG 24 hr capsule                Primary Care Provider Office Phone # Fax #    Carlos Manuel Rivas [phone]       [facility] [address] [place] [address]        Equal Access to Services     CHHAYA LEY AH: Hadii raven ku hadasho Soomaali, waaxda luqadaha, qaybta kaalmada adeegyasarah, nakul garcía. So [facility] [phone].    ATENCIÓN: Si habla español, tiene a keene disposición servicios gratuitos de asistencia lingüística. Llame al [phone].    We comply with applicable  federal civil rights laws and [place] laws. We do not discriminate on the basis of race, color, national origin, age, disability, sex, sexual orientation, or gender identity.            Thank you!     Thank you for choosing [facility]  for your care. Our goal is always to provide you with excellent care. Hearing back from our patients is one way we can continue to improve our services. Please take a few minutes to complete the written survey that you may receive in the mail after your visit with us. Thank you!             Your Updated Medication List - Protect others around you: Learn how to safely use, store and throw away your medicines at www.disposemymeds.org.          This list is accurate as of 1/22/18 11:59 PM.  Always use your most recent med list.                   Brand Name Dispense Instructions for use Diagnosis    acetaminophen 325 MG tablet    TYLENOL    100 tablet    Take 2 tablets (650 mg) by mouth every 4 hours as needed for mild pain    Tension-type headache, not intractable, unspecified chronicity pattern       calcium carbonate 500 MG chewable tablet    TUMS    150 tablet    Take 1 tablet (500 mg) by mouth 2 times daily as needed for heartburn    Other chronic gastritis without hemorrhage       Capsaicin 0.1 % cream     56.6 g    Apply topically 2 times daily as needed    Chronic right-sided low back pain without sciatica, Neck pain       cetirizine 10 MG tablet    zyrTEC    90 tablet    Take 1 tablet (10 mg) by mouth every evening    Seasonal allergic rhinitis due to other allergic trigger       docusate sodium 100 MG tablet    COLACE     Take 100 mg by mouth daily as needed for constipation    Constipation, unspecified constipation type       FLONASE 50 MCG/ACT spray   Generic drug:  fluticasone      Spray 1-2 sprays into both nostrils daily as needed for allergies    Seasonal allergic rhinitis due to other allergic trigger       ibuprofen 600 MG tablet    ADVIL/MOTRIN     Take 1  tablet (600 mg) by mouth daily as needed        ketotifen 0.025 % Soln ophthalmic solution    ZADITOR    1 Bottle    Place 1 drop into both eyes every 12 hours    Acute conjunctivitis of both eyes, unspecified acute conjunctivitis type       meclizine 25 MG tablet    ANTIVERT    30 tablet    Take 1 tablet (25 mg) by mouth every 6 hours as needed for dizziness    BPPV (benign paroxysmal positional vertigo), unspecified laterality       melatonin 3 MG tablet     30 tablet    Take 1 tablet (3 mg) by mouth nightly as needed for sleep    Sleep disturbance       montelukast 10 MG tablet    SINGULAIR    30 tablet    Take 1 tablet (10 mg) by mouth daily    Acute seasonal allergic rhinitis, unspecified trigger       norgestimate-ethinyl estradiol 0.25-35 MG-MCG per tablet    ORTHO-CYCLEN, SPRINTEC    84 tablet    Take 1 tablet by mouth daily    Encounter for initial prescription of contraceptive pills       polyvinyl alcohol 1.4 % ophthalmic solution    LIQUIFILM TEARS    15 mL    Place 1 drop into both eyes as needed for dry eyes    Allergic conjunctivitis of both eyes       prazosin 1 MG capsule    MINIPRESS    60 capsule    Take 2 capsules (2 mg) by mouth At Bedtime    PTSD (post-traumatic stress disorder)       QUEtiapine 25 MG tablet    SEROQUEL    30 tablet    Take 1 tablet (25 mg) by mouth At Bedtime    PTSD (post-traumatic stress disorder)       ranitidine 150 MG tablet    ZANTAC    60 tablet    Take 1 tablet (150 mg) by mouth 2 times daily    Helicobacter pylori gastritis       triamcinolone 0.1 % ointment    KENALOG    30 g    Apply sparingly to affected area three times daily for 14 days.    Infestation by bed bug       venlafaxine 150 MG 24 hr capsule    EFFEXOR-XR    30 capsule    1 capsule daily    PTSD (post-traumatic stress disorder)

## 2018-01-29 ENCOUNTER — TELEPHONE (OUTPATIENT)
Dept: FAMILY MEDICINE | Facility: CLINIC | Age: 28
End: 2018-01-29

## 2018-01-29 NOTE — TELEPHONE ENCOUNTER
Received a call from Suma at [facility]   And she said that Hayde qualifies for services through [facility], and is being referred to the Behavioral health Team and they will reach out and try to contact her this week.  [facility] team can be reached at [phone]

## 2018-01-29 NOTE — PROGRESS NOTES
Preceptor Attestation:  Patient's case reviewed and discussed with Derek So MD Patient seen and discussed with the resident.. I agree with assessment and plan of care.  Supervising Physician:  Parth Jessica MD  [facility]

## 2018-02-02 ENCOUNTER — TELEPHONE (OUTPATIENT)
Dept: FAMILY MEDICINE | Facility: CLINIC | Age: 28
End: 2018-02-02

## 2018-02-02 NOTE — TELEPHONE ENCOUNTER
Attempted to call fernando today and she did not answer when I called through language line, I then tried calling direct and she picked up, so it may be that she recognized the [facility] number and was ok to answer that number.  I confirmed with her that she got my last message about her sons upcoming appointments- she confirmed but asked if I could put it in writing and send to her as she forgets things easily.    I also took the time to talk to her about guild services and let her know that ARIS was attempting to reach her and it is ok to talk with ARIS or( Richie) that she wants to help with some of the problems patient is having so it is ok to talk to her or meet with her that she is very nice.    Agustina

## 2018-02-02 NOTE — LETTER
February 2, 2018       TO: Hayde LUJAN Say  [address]   [place] [address]         Dear Brant Lock's appointment with Cardiology( Heart doctor) is on Feb 14th at 12:30 so your ride will pick you both up at 11:45am   Good ana will provide ride and have a Maryann     His Nephrology appt is on Feb 22nd at 11:20 so your ride will pick you up at 10:40      Both appointments are in [place].        Sincerely,      Agustina Gramajo CC, LSW  [facility]

## 2018-02-06 ENCOUNTER — TELEPHONE (OUTPATIENT)
Dept: FAMILY MEDICINE | Facility: CLINIC | Age: 28
End: 2018-02-06

## 2018-02-06 NOTE — TELEPHONE ENCOUNTER
Received call from ARIS at [facility] and she let me know they ran into a roadblock in getting engaged with pt.    She has [employer] insurance and they do not pay for  services in the community so they are unable to pay an  to work with this patient.     She will check with supervisor to see if we can at least get wilver in the door 1x to assess needs, maybe use family for communication and pt speaks some english- she is going to check with supervisor      Recommended using [facility] as they may have people on staff who speak the language.  Will look at options and discuss with care team    Ramila

## 2018-02-07 ENCOUNTER — OFFICE VISIT (OUTPATIENT)
Dept: FAMILY MEDICINE | Facility: CLINIC | Age: 28
End: 2018-02-07
Payer: COMMERCIAL

## 2018-02-07 VITALS
OXYGEN SATURATION: 100 % | WEIGHT: 136 LBS | SYSTOLIC BLOOD PRESSURE: 110 MMHG | HEIGHT: 58 IN | HEART RATE: 89 BPM | TEMPERATURE: 97.4 F | DIASTOLIC BLOOD PRESSURE: 71 MMHG | BODY MASS INDEX: 28.55 KG/M2

## 2018-02-07 DIAGNOSIS — F43.10 PTSD (POST-TRAUMATIC STRESS DISORDER): Primary | ICD-10-CM

## 2018-02-07 DIAGNOSIS — B88.8 INFESTATION BY BED BUG: ICD-10-CM

## 2018-02-07 DIAGNOSIS — F33.1 MAJOR DEPRESSIVE DISORDER, RECURRENT EPISODE, MODERATE (H): ICD-10-CM

## 2018-02-07 DIAGNOSIS — G44.219 EPISODIC TENSION-TYPE HEADACHE, NOT INTRACTABLE: ICD-10-CM

## 2018-02-07 RX ORDER — VENLAFAXINE HYDROCHLORIDE 75 MG/1
75 CAPSULE, EXTENDED RELEASE ORAL DAILY
Qty: 30 CAPSULE | Refills: 1 | Status: SHIPPED | OUTPATIENT
Start: 2018-02-07 | End: 2018-03-30 | Stop reason: DRUGHIGH

## 2018-02-07 RX ORDER — IBUPROFEN 600 MG/1
600 TABLET, FILM COATED ORAL DAILY PRN
Qty: 120 TABLET | Refills: 1 | Status: SHIPPED | OUTPATIENT
Start: 2018-02-07 | End: 2018-07-03

## 2018-02-07 ASSESSMENT — PATIENT HEALTH QUESTIONNAIRE - PHQ9: SUM OF ALL RESPONSES TO PHQ QUESTIONS 1-9: 6

## 2018-02-07 ASSESSMENT — ANXIETY QUESTIONNAIRES: GAD7 TOTAL SCORE: 14

## 2018-02-07 NOTE — MR AVS SNAPSHOT
After Visit Summary   2/7/2018    Hayde LUJAN Say    MRN: [id number]           Patient Information     Date Of Birth          1990        Visit Information        Provider Department      2/7/2018 2:20 PM Carlos Manuel Rivas MD [facility]        Today's Diagnoses     PTSD (post-traumatic stress disorder)    -  1    Major depressive disorder, recurrent episode, moderate (H)        Infestation by bed bug        Episodic tension-type headache, not intractable          Care Instructions    Please call Richie villalobos Harmans at  to arrange help          Follow-ups after your visit        Your next 10 appointments already scheduled     Feb 08, 2018  8:20 AM CST   Return Visit with Elham Mcmahan LMFT [facility] ([facility])    [address] [place] [address]   [phone]              Who to contact     Please call your clinic at [phone] to:    Ask questions about your health    Make or cancel appointments    Discuss your medicines    Learn about your test results    Speak to your doctor   If you have compliments or concerns about an experience at your clinic, or if you wish to file a complaint, please contact [facility] Relations at [phone] or email us at [email or web address]         Additional Information About Your Visit        MyChart Information     Rain is an electronic gateway that provides easy, online access to your medical records. With Rain, you can request a clinic appointment, read your test results, renew a prescription or communicate with your care team.     To sign up for [employer] visit the website at [email or web address]"t   You will be asked to enter the access code listed below, as well as some personal information. Please follow the directions to create your username and password.     Your access code is: [email or web address]  Expires: 5/8/2018  2:53 PM     Your access  "code will  in 90 days. If you need help or a new code, please contact your [facility] or call [phone] for assistance.        Care EveryWhere ID     This is your Care EveryWhere ID. This could be used by other organizations to access your [facility] medical records  [id number]        Your Vitals Were     Pulse Temperature Height Last Period Pulse Oximetry BMI (Body Mass Index)    89 97.4  F (36.3  C) (Oral) 4' 9.5\" (146.1 cm) (LMP Unknown) 100% 28.92 kg/m2       Blood Pressure from Last 3 Encounters:   18 110/71   18 105/74   18 103/69    Weight from Last 3 Encounters:   18 136 lb (61.7 kg)   18 133 lb 12.8 oz (60.7 kg)   18 132 lb 8 oz (60.1 kg)              Today, you had the following     No orders found for display         Today's Medication Changes          These changes are accurate as of 18  2:53 PM.  If you have any questions, ask your nurse or doctor.               These medicines have changed or have updated prescriptions.        Dose/Directions    * venlafaxine 150 MG 24 hr capsule   Commonly known as:  EFFEXOR-XR   This may have changed:  Another medication with the same name was added. Make sure you understand how and when to take each.   Used for:  PTSD (post-traumatic stress disorder)   Changed by:  Carlos Manuel Rivas MD        1 capsule daily   Quantity:  30 capsule   Refills:  1       * venlafaxine 75 MG 24 hr capsule   Commonly known as:  EFFEXOR-XR   This may have changed:  You were already taking a medication with the same name, and this prescription was added. Make sure you understand how and when to take each.   Used for:  PTSD (post-traumatic stress disorder), Major depressive disorder, recurrent episode, moderate (H)   Changed by:  Carlos Manuel Rivas MD        Dose:  75 mg   Take 1 capsule (75 mg) by mouth daily   Quantity:  30 capsule   Refills:  1       * Notice:  This list has 2 medication(s) " that are the same as other medications prescribed for you. Read the directions carefully, and ask your doctor or other care provider to review them with you.         Where to get your medicines      These medications were sent to [facility] - [place], [place] - [address]  [address], [place] [address]     Phone:  [phone]     ibuprofen 600 MG tablet    venlafaxine 75 MG 24 hr capsule                Primary Care Provider Office Phone # Fax #    Carlos Manuel Rivas [phone]       [facility] [address] [facility] [address]        Equal Access to Services     [facility] CenterCONSTANTINO : Hadii aad ku hadasho Soomaali, waaxda luqadaha, qaybta kaalmada adeegyada, waxkala shenin hayjuan duarte . So [facility] [phone].    ATENCIÓN: Si habla español, tiene a keene disposición servicios gratuitos de asistencia lingüística. [facility] [phone].    We comply with applicable federal civil rights laws and [place] laws. We do not discriminate on the basis of race, color, national origin, age, disability, sex, sexual orientation, or gender identity.            Thank you!     Thank you for choosing [facility]  for your care. Our goal is always to provide you with excellent care. Hearing back from our patients is one way we can continue to improve our services. Please take a few minutes to complete the written survey that you may receive in the mail after your visit with us. Thank you!             Your Updated Medication List - Protect others around you: Learn how to safely use, store and throw away your medicines at www.disposemymeds.org.          This list is accurate as of 2/7/18  2:53 PM.  Always use your most recent med list.                   Brand Name Dispense Instructions for use Diagnosis    acetaminophen 325 MG tablet    TYLENOL    100 tablet    Take 2 tablets (650 mg) by mouth every 4 hours as needed for mild pain     Tension-type headache, not intractable, unspecified chronicity pattern       calcium carbonate 500 MG chewable tablet    TUMS    150 tablet    Take 1 tablet (500 mg) by mouth 2 times daily as needed for heartburn    Other chronic gastritis without hemorrhage       Capsaicin 0.1 % cream     56.6 g    Apply topically 2 times daily as needed    Chronic right-sided low back pain without sciatica, Neck pain       cetirizine 10 MG tablet    zyrTEC    90 tablet    Take 1 tablet (10 mg) by mouth every evening    Seasonal allergic rhinitis due to other allergic trigger       docusate sodium 100 MG tablet    COLACE     Take 100 mg by mouth daily as needed for constipation    Constipation, unspecified constipation type       FLONASE 50 MCG/ACT spray   Generic drug:  fluticasone      Spray 1-2 sprays into both nostrils daily as needed for allergies    Seasonal allergic rhinitis due to other allergic trigger       ibuprofen 600 MG tablet    ADVIL/MOTRIN    120 tablet    Take 1 tablet (600 mg) by mouth daily as needed    Episodic tension-type headache, not intractable       ketotifen 0.025 % Soln ophthalmic solution    ZADITOR    1 Bottle    Place 1 drop into both eyes every 12 hours    Acute conjunctivitis of both eyes, unspecified acute conjunctivitis type       meclizine 25 MG tablet    ANTIVERT    30 tablet    Take 1 tablet (25 mg) by mouth every 6 hours as needed for dizziness    BPPV (benign paroxysmal positional vertigo), unspecified laterality       melatonin 3 MG tablet     30 tablet    Take 1 tablet (3 mg) by mouth nightly as needed for sleep    Sleep disturbance       montelukast 10 MG tablet    SINGULAIR    30 tablet    Take 1 tablet (10 mg) by mouth daily    Acute seasonal allergic rhinitis, unspecified trigger       norgestimate-ethinyl estradiol 0.25-35 MG-MCG per tablet    ORTHO-CYCLEN, SPRINTEC    84 tablet    Take 1 tablet by mouth daily    Encounter for initial prescription of contraceptive pills        polyvinyl alcohol 1.4 % ophthalmic solution    LIQUIFILM TEARS    15 mL    Place 1 drop into both eyes as needed for dry eyes    Allergic conjunctivitis of both eyes       prazosin 1 MG capsule    MINIPRESS    60 capsule    Take 2 capsules (2 mg) by mouth At Bedtime    PTSD (post-traumatic stress disorder)       QUEtiapine 25 MG tablet    SEROQUEL    30 tablet    Take 1 tablet (25 mg) by mouth At Bedtime    PTSD (post-traumatic stress disorder)       ranitidine 150 MG tablet    ZANTAC    60 tablet    Take 1 tablet (150 mg) by mouth 2 times daily    Helicobacter pylori gastritis       triamcinolone 0.1 % ointment    KENALOG    30 g    Apply sparingly to affected area three times daily for 14 days.    Infestation by bed bug       * venlafaxine 150 MG 24 hr capsule    EFFEXOR-XR    30 capsule    1 capsule daily    PTSD (post-traumatic stress disorder)       * venlafaxine 75 MG 24 hr capsule    EFFEXOR-XR    30 capsule    Take 1 capsule (75 mg) by mouth daily    PTSD (post-traumatic stress disorder), Major depressive disorder, recurrent episode, moderate (H)       * Notice:  This list has 2 medication(s) that are the same as other medications prescribed for you. Read the directions carefully, and ask your doctor or other care provider to review them with you.

## 2018-02-07 NOTE — PROGRESS NOTES
"       Chester LUJAN Say is a 27 year old  female with a significant past medical history of PTSD, anxiety who presents for follow up of concern(s) listed below    1. Depression, PTSD,   - Doesn't want to fill out PHQ-9 today  - Very tired, didn't sleep well last night  - Brings in medications, has fluoxetine as well today, unsure where it came from  - Denies hearing voices today    2. Bed bugs  - States she hasn't received a call from Richie Alva  - When asked if anyone else has come to her apartment to help, she states no    Pertinent ROS: Constitutional, HEENT, cardiovascular, pulmonary, gi and gu systems are negative, except as otherwise noted.           Objective   Vitals:    02/07/18 1414   BP: 110/71   Pulse: 89   Temp: 97.4  F (36.3  C)   TempSrc: Oral   SpO2: 100%   Weight: 136 lb (61.7 kg)   Height: 4' 9.5\" (146.1 cm)     Body mass index is 28.92 kg/(m^2).    ENERAL APPEARANCE: healthy, alert and no distress  EYES: Eyes grossly normal to inspection  RESP: lungs clear to auscultation - no rales, rhonchi or wheezes  CV: regular rates and rhythm, normal S1 S2, no S3 or S4, no murmur, click or rub, no peripheral edema and peripheral pulses strong  MS: no musculoskeletal defects are noted and gait is age appropriate without ataxia  SKIN: no suspicious lesions or rashes, no bed bug bite marks  NEURO:  Upper extremity strength 5/5, lower extremity strength 5/5, sensory exam grossly normal, mentation intact and speech normal  PSYCH: mentation appears normal, mood is tearful and depressed, affect congruent, no SI or HI, no current hallucinations         Assessment/Plan   (F43.10) PTSD (post-traumatic stress disorder)  (primary encounter diagnosis)  Comment:   Plan: venlafaxine (EFFEXOR-XR) 75 MG 24 hr capsule        Increase effexor to 225mg daily.     (F33.1) Major depressive disorder, recurrent episode, moderate (H)  Comment:   Plan: venlafaxine (EFFEXOR-XR) 75 MG 24 hr capsule          (B88.8) " "Infestation by bed bug  Comment:   Plan: IT comes to light that patient does not feel confident in her ability to make outgoing calls. Doesn't know how to dial a phone number, and we worked on that today. Also then states she cannot \"talk\" over the phone, but cannot express why. Based on this information, it may be best to try to call [facility] for the patient during her next visit.    (G44.219) Episodic tension-type headache, not intractable  Comment:   Plan: ibuprofen (ADVIL/MOTRIN) 600 MG tablet          Options for treatment and follow-up care were reviewed with the patient and/or guardian. Hayde LUJAN Say and/or guardian engaged in the decision making process and verbalized understanding of the options discussed and agreed with the final plan.    Carlos Manuel Rivas MD      Precepted today with: Lynne Levine MD    This note was created with dragon dictation, and unintentional word substitutions and grammatical errors may happen, despite proofreading.  "

## 2018-02-08 ENCOUNTER — OFFICE VISIT (OUTPATIENT)
Dept: PSYCHOLOGY | Facility: CLINIC | Age: 28
End: 2018-02-08
Payer: COMMERCIAL

## 2018-02-08 ENCOUNTER — TELEPHONE (OUTPATIENT)
Dept: FAMILY MEDICINE | Facility: CLINIC | Age: 28
End: 2018-02-08

## 2018-02-08 DIAGNOSIS — F43.10 PTSD (POST-TRAUMATIC STRESS DISORDER): Primary | ICD-10-CM

## 2018-02-08 DIAGNOSIS — F33.1 MAJOR DEPRESSIVE DISORDER, RECURRENT EPISODE, MODERATE (H): ICD-10-CM

## 2018-02-08 NOTE — TELEPHONE ENCOUNTER
Pt was called to confirm time of appt  We were saying 12:30and letter said 1pm because they want to do an echo first  Clarified all appt times with parent using language line today

## 2018-02-08 NOTE — PROGRESS NOTES
Behavioral Health Progress Note     Client Legal Name:             Hayde LUJAN Say                  Client Preferred Name: Hayde             Service Type:           Individual  Length of Visit: 40  minutes  Attendees:   Complexity: An  is used not only to interpret language, since the patient does not speak English, but also to help with the complexity of understandings across cultures, since the patient is not well integrated in the larger [place] culture.      Identifying Information and Presenting Problem:    The patient is a 27 year old [place] female who is being seen for problematic symptoms of depression, PTSD.    Treatment Objective(s) Addressed in This Session:  psychological distress due to acculturation difficulties        Progress on / Status of Treatment Objective(s) / Homework:  Minimal progress     PHQ-9 SCORE 11/29/2017 1/9/2018 1/22/2018   Total Score - - -   Total Score 8 9 6       BRENDA-7 SCORE 4/26/2017 8/29/2017 1/22/2018   Total Score 8 15 14       Topics Discussed/Interventions Provided:  Fears, anger: Feels it is worsening. A few days ago, she and her  heard someone outside their door they thought was trying to break in. Since then, she has been more angry, difficulties with temper. Explored relationship with - says they argue often. This is not much of a change; they have argued since they met. However, it does not seem they are necessarily in love anymore. She has no plans to divorce, but would be 'ok' if they did; stated she can take care of herself.     Social support: Pt reports she has a mom, brother, and sister living here in . Wants to get citizenship so she can go visit her grandmother in [facility]. She does not get along with her brother and does not talk with him, but does get along well with her mom.     Bed bugs: Still present. Seems they may have sprayed again. Pt very worried that if she reports it too much that landlord will get angry and kick her  out. Says they are all still very itchy.    Lastly, Hayde mentioned she had an issue with male interpreters telling her she was 'crazy'. There is a saying in [place] that refers to someone as 'not being right in the brain' (per ). I asked many questions to see if that occurred at this clinic, which , when, etc. Hayde talked more about this occurring at a clinic she went to before this one, she would recognize the  but could not remember his name.     Assessment: The patient appeared to be active and engaged in today's session and was receptive to feedback.     Mental Status: Hayde appeared generally alert and oriented. Dress was casual and appropriate to the weather and occasion. Grooming and hygiene were clean. Eye contact was good. Speech was of normal volume and rate and was clear, coherent, and relevant. Mood was angry with congruent affect. Thought processes were relevant, logical and goal-directed. Thought content was WNL with no evidence of psychotic or paranoid features. No evidence of SI/HI or self-harm, intent, or plans. Memory appeared grossly intact. Insight and judgment appeared poor and patient exhibited fair impulse control during the appointment.     Does the patient appear to be at imminent risk of harm to self/others at this time? No    The session was necessary to address PTSD, worry, depressive symptoms that have been interfering with patient's ability to function at home, personal life management, parenting.  Ongoing psychotherapy is necessary to improve functioning with daily activities, provide psychoeducation and provide support.      Diagnosis (DSM-5):  296.32 recurrent moderate major depression  308.3 PTSD      Plan:  1. Follow up in 2 weeks.  2. Next appt continue to address anger management, couple conflict, and follow up on bed bugs.           NOTE: Treatment plan update due 3/28/18.  Diagnostic assessment update due 4/12/18.

## 2018-02-08 NOTE — MR AVS SNAPSHOT
After Visit Summary   2018    Hayde LUJAN Say    MRN: [id number]           Patient Information     Date Of Birth          1990        Visit Information        Provider Department      2018 8:20 AM Elham Mcmahan LMFT [facility]        Today's Diagnoses     PTSD (post-traumatic stress disorder)    -  1    Major depressive disorder, recurrent episode, moderate (H)           Follow-ups after your visit        Your next 10 appointments already scheduled     Mar 02, 2018 10:40 AM CST   RETURN EXTENDED with Carlos Manuel Rivas MD   [facility] ([facility])    [address] [place] [address]   [phone]              Who to contact     Please call your clinic at [phone] to:    Ask questions about your health    Make or cancel appointments    Discuss your medicines    Learn about your test results    Speak to your doctor            Additional Information About Your Visit        MyChart Information     [employer] is an electronic gateway that provides easy, online access to your medical records. With dELiAs, you can request a clinic appointment, read your test results, renew a prescription or communicate with your care team.     To sign up for [employer] visit the website at [email or web address]   You will be asked to enter the access code listed below, as well as some personal information. Please follow the directions to create your username and password.     Your access code is: [email or web address]  Expires: 2018  2:53 PM     Your access code will  in 90 days. If you need help or a new code, please contact your [facility] or call [phone] for assistance.        Care EveryWhere ID     This is your Care EveryWhere ID. This could be used by other organizations to access your [facility] medical records  [id number]        Your Vitals Were     Last Period                   (LMP Unknown)            Blood Pressure  from Last 3 Encounters:   02/07/18 110/71   01/22/18 105/74   01/09/18 103/69    Weight from Last 3 Encounters:   02/07/18 136 lb (61.7 kg)   01/22/18 133 lb 12.8 oz (60.7 kg)   01/09/18 132 lb 8 oz (60.1 kg)              We Performed the Following     Interactive Complexity add-on (07310)     Psychotherapy 45 min (67359)        Primary Care Provider Office Phone # Fax #    Carlos Manuel Andreas Rivas [phone]       [facility] [address] [place] [address]        Equal Access to Services     [facility] JIL : Hadii raven roberts hadasho Sofransisco, waaxda luqadaha, qaybta kaalmada adeegyada, nakul duarte . So [facility] [phone].    ATENCIÓN: Si habla español, tiene a keene disposición servicios gratuitos de asistencia lingüística. Llame al [phone].    We comply with applicable federal civil rights laws and [place] laws. We do not discriminate on the basis of race, color, national origin, age, disability, sex, sexual orientation, or gender identity.            Thank you!     Thank you for choosing [facility]  for your care. Our goal is always to provide you with excellent care. Hearing back from our patients is one way we can continue to improve our services. Please take a few minutes to complete the written survey that you may receive in the mail after your visit with us. Thank you!             Your Updated Medication List - Protect others around you: Learn how to safely use, store and throw away your medicines at www.disposemymeds.org.          This list is accurate as of 2/8/18  3:02 PM.  Always use your most recent med list.                   Brand Name Dispense Instructions for use Diagnosis    acetaminophen 325 MG tablet    TYLENOL    100 tablet    Take 2 tablets (650 mg) by mouth every 4 hours as needed for mild pain    Tension-type headache, not intractable, unspecified chronicity pattern       calcium carbonate 500 MG chewable tablet     TUMS    150 tablet    Take 1 tablet (500 mg) by mouth 2 times daily as needed for heartburn    Other chronic gastritis without hemorrhage       Capsaicin 0.1 % cream     56.6 g    Apply topically 2 times daily as needed    Chronic right-sided low back pain without sciatica, Neck pain       cetirizine 10 MG tablet    zyrTEC    90 tablet    Take 1 tablet (10 mg) by mouth every evening    Seasonal allergic rhinitis due to other allergic trigger       docusate sodium 100 MG tablet    COLACE     Take 100 mg by mouth daily as needed for constipation    Constipation, unspecified constipation type       FLONASE 50 MCG/ACT spray   Generic drug:  fluticasone      Spray 1-2 sprays into both nostrils daily as needed for allergies    Seasonal allergic rhinitis due to other allergic trigger       ibuprofen 600 MG tablet    ADVIL/MOTRIN    120 tablet    Take 1 tablet (600 mg) by mouth daily as needed    Episodic tension-type headache, not intractable       ketotifen 0.025 % Soln ophthalmic solution    ZADITOR    1 Bottle    Place 1 drop into both eyes every 12 hours    Acute conjunctivitis of both eyes, unspecified acute conjunctivitis type       meclizine 25 MG tablet    ANTIVERT    30 tablet    Take 1 tablet (25 mg) by mouth every 6 hours as needed for dizziness    BPPV (benign paroxysmal positional vertigo), unspecified laterality       melatonin 3 MG tablet     30 tablet    Take 1 tablet (3 mg) by mouth nightly as needed for sleep    Sleep disturbance       montelukast 10 MG tablet    SINGULAIR    30 tablet    Take 1 tablet (10 mg) by mouth daily    Acute seasonal allergic rhinitis, unspecified trigger       norgestimate-ethinyl estradiol 0.25-35 MG-MCG per tablet    ORTHO-CYCLEN, SPRINTEC    84 tablet    Take 1 tablet by mouth daily    Encounter for initial prescription of contraceptive pills       polyvinyl alcohol 1.4 % ophthalmic solution    LIQUIFILM TEARS    15 mL    Place 1 drop into both eyes as needed for dry eyes     Allergic conjunctivitis of both eyes       prazosin 1 MG capsule    MINIPRESS    60 capsule    Take 2 capsules (2 mg) by mouth At Bedtime    PTSD (post-traumatic stress disorder)       QUEtiapine 25 MG tablet    SEROQUEL    30 tablet    Take 1 tablet (25 mg) by mouth At Bedtime    PTSD (post-traumatic stress disorder)       ranitidine 150 MG tablet    ZANTAC    60 tablet    Take 1 tablet (150 mg) by mouth 2 times daily    Helicobacter pylori gastritis       triamcinolone 0.1 % ointment    KENALOG    30 g    Apply sparingly to affected area three times daily for 14 days.    Infestation by bed bug       * venlafaxine 150 MG 24 hr capsule    EFFEXOR-XR    30 capsule    1 capsule daily    PTSD (post-traumatic stress disorder)       * venlafaxine 75 MG 24 hr capsule    EFFEXOR-XR    30 capsule    Take 1 capsule (75 mg) by mouth daily    PTSD (post-traumatic stress disorder), Major depressive disorder, recurrent episode, moderate (H)       * Notice:  This list has 2 medication(s) that are the same as other medications prescribed for you. Read the directions carefully, and ask your doctor or other care provider to review them with you.

## 2018-02-14 ENCOUNTER — TELEPHONE (OUTPATIENT)
Dept: FAMILY MEDICINE | Facility: CLINIC | Age: 28
End: 2018-02-14

## 2018-02-14 NOTE — TELEPHONE ENCOUNTER
Nida was going to meet with Hayde today at her home but caught at the last moment that they have bedbugs and can not send staff into a home that is not at least treated    Visit cancelled they are working to reset that visit, we did talk about them coming her at time of her next visit, which I encouraged but ARIS from [facility] is hoping to see her before march.      Lbetz

## 2018-02-21 DIAGNOSIS — F33.1 MAJOR DEPRESSIVE DISORDER, RECURRENT EPISODE, MODERATE (H): ICD-10-CM

## 2018-02-21 DIAGNOSIS — J30.2 CHRONIC SEASONAL ALLERGIC RHINITIS DUE TO FUNGAL SPORES: Primary | ICD-10-CM

## 2018-02-21 RX ORDER — FLUOXETINE 40 MG/1
40 CAPSULE ORAL DAILY
Qty: 90 CAPSULE | OUTPATIENT
Start: 2018-02-21

## 2018-02-21 RX ORDER — CETIRIZINE HYDROCHLORIDE 10 MG/1
10 TABLET ORAL EVERY EVENING
Qty: 90 TABLET | Refills: 3 | Status: SHIPPED | OUTPATIENT
Start: 2018-02-21 | End: 2018-08-02

## 2018-03-01 ENCOUNTER — TELEPHONE (OUTPATIENT)
Dept: FAMILY MEDICINE | Facility: CLINIC | Age: 28
End: 2018-03-01

## 2018-03-07 ENCOUNTER — TELEPHONE (OUTPATIENT)
Dept: FAMILY MEDICINE | Facility: CLINIC | Age: 28
End: 2018-03-07

## 2018-03-07 NOTE — TELEPHONE ENCOUNTER
I was checking Agustina shell, and Amilcar Saldivar from [facility] called about the pt.  Amilcar got a message forwarded to her from [facility].  It seem that Agustina had called [facility] to see if they got a [facility] referral for the pt.  Amilcar stated that they do not have the pt on file in their system.  She would like for Agustina to call her back to see what they can do to get the pt into the system.  She can be reach at [phone]    Richie from [facility] called to give Agustina a  at [facility], which is Amilcar Luu.

## 2018-03-08 ENCOUNTER — OFFICE VISIT (OUTPATIENT)
Dept: FAMILY MEDICINE | Facility: CLINIC | Age: 28
End: 2018-03-08
Payer: COMMERCIAL

## 2018-03-08 VITALS
SYSTOLIC BLOOD PRESSURE: 104 MMHG | RESPIRATION RATE: 20 BRPM | HEART RATE: 89 BPM | OXYGEN SATURATION: 97 % | DIASTOLIC BLOOD PRESSURE: 68 MMHG | WEIGHT: 136.8 LBS | BODY MASS INDEX: 29.51 KG/M2 | TEMPERATURE: 97.9 F | HEIGHT: 57 IN

## 2018-03-08 DIAGNOSIS — F43.10 PTSD (POST-TRAUMATIC STRESS DISORDER): ICD-10-CM

## 2018-03-08 NOTE — PROGRESS NOTES
"       [place]       Hayde TAI Say is a 27 year old  female with a significant past medical history of PTSD, anxiety who presents for follow up of concern(s) listed below    1. Depression, PTSD,   - Scared about a spirit appearing in front of her at night, but has not seen the spirit  - Does hear noises at night, they are not necessarily a spirit. Often come from outside  - Gets scared by these noises as well  - Continues to state she doesn't know how to use phone to make outgoing calls  - Denies anyone in family that can come to visits to help her  - Wants to see Elham hays (behavioralist) for an appointment  - When asked about going to [facility], patient states she cannot go   - She has low confidence in her ability to make it there due to rides   - She has low interest in seeking out new people to help her   - Wants to continue to come to this clinic for help    2. Medication  - Has multiple bottles of 75mg venlafaxine that are full  - 150mg venlafaxine is near empty as expected  - Prazosin is full  - Seroquel is near empty as expected  - Patient continues to not bring in all medications, and states she doesn't know what these medications are for    Pertinent ROS: Constitutional, HEENT, cardiovascular, pulmonary, gi and gu systems are negative, except as otherwise noted.           Objective   Vitals:    03/08/18 1029   BP: 104/68   Pulse: 89   Resp: 20   Temp: 97.9  F (36.6  C)   TempSrc: Oral   SpO2: 97%   Weight: 136 lb 12.8 oz (62.1 kg)   Height: 4' 9\" (144.8 cm)     Body mass index is 29.6 kg/(m^2).    ENERAL APPEARANCE: healthy, alert and no distress  EYES: Eyes grossly normal to inspection  RESP: lungs clear to auscultation - no rales, rhonchi or wheezes  CV: regular rates and rhythm, normal S1 S2, no S3 or S4, no murmur, click or rub, no peripheral edema and peripheral pulses strong  MS: no musculoskeletal defects are noted and gait is age appropriate without ataxia  SKIN: no suspicious lesions or " rashes, no bed bug bite marks  NEURO:  Sensory exam grossly normal, mentation intact and speech normal  PSYCH: mentation appears normal, mood is tearful and depressed, affect congruent, no SI or HI, no current hallucinations         Assessment/Plan   (F43.10) PTSD (post-traumatic stress disorder)  Comment:   Plan: venlafaxine (EFFEXOR-XR) 150 MG 24 hr capsule        No explicit hallucinations. Hayde is not taking the prescribed amount of effexor. I spent a fair amount of time stating she should be taking 225mg of effexor, which is 2 different pills (the 150mg capsule and the 75mg capsule). I showed which capsules they were. Also, I re-explained what the prazosin is for (nightmares). She is still taking seroquel, which is good.   I requested she return to clinic to meet with our pharmacist, and try to learn how to set up a pill box and go over her medications (encouraged her to bring all meds in).    Once she is restarted on all her medications as prescribed for her PTSD, we will re-evaluate her symptoms and complaints. Her next steps, nonetheless, will be to have her more connected with community services. The next best step will be [facility], and this should continue to be encouraged by anyone who sees Hayde in our clinic.              Options for treatment and follow-up care were reviewed with the patient and/or guardian. Hayde LUJAN Say and/or guardian engaged in the decision making process and verbalized understanding of the options discussed and agreed with the final plan.    Carlos Manuel Rivas MD      Precepted today with: Dr. Sariah Keller    This note was created with dragon dictation, and unintentional word substitutions and grammatical errors may happen, despite proofreading.

## 2018-03-08 NOTE — MR AVS SNAPSHOT
After Visit Summary   3/8/2018    Hayde LUJAN Say    MRN: [id number]           Patient Information     Date Of Birth          1990        Visit Information        Provider Department      3/8/2018 10:20 AM Carlos Manuel Rivas MD [facility]        Today's Diagnoses     PTSD (post-traumatic stress disorder)           Follow-ups after your visit        Follow-up notes from your care team     Return in about 4 weeks (around 4/5/2018) for Recheck Condition Status.      Your next 10 appointments already scheduled     Mar 16, 2018  8:40 AM CDT   Return Visit with Sierra Scott, RPH [facility] ([facility])    [address] [place] [id number]   [phone]            Mar 22, 2018  9:00 AM CDT   Return Visit with Elham Mcmahan, LMFT [facility] ([facility])    [address] [place] [address]   [phone]            Apr 13, 2018  8:40 AM CDT   Return Visit with Carlos Manuel Rivas MD   [facility] ([facility])    [address] [place] [address]   [phone]              Who to contact     Please call your clinic at [phone] to:    Ask questions about your health    Make or cancel appointments    Discuss your medicines    Learn about your test results    Speak to your doctor            Additional Information About Your Visit        MyChart Information     [employer] is an electronic gateway that provides easy, online access to your medical records. With official.fm, you can request a clinic appointment, read your test results, renew a prescription or communicate with your care team.     To sign up for [employer] visit the website at [email or web address]   You will be asked to enter the access code listed below, as well as some personal information. Please follow the directions to create your username and password.     Your access code is: [email or web address]  Expires: 5/8/2018  3:53 PM    "  Your access code will  in 90 days. If you need help or a new code, please contact your [facility] or call [phone] for assistance.        Care EveryWhere ID     This is your Care EveryWhere ID. This could be used by other organizations to access your [place] medical records  [id number]        Your Vitals Were     Pulse Temperature Respirations Height Last Period Pulse Oximetry    89 97.9  F (36.6  C) (Oral) 20 4' 9\" (144.8 cm) (LMP Unknown) 97%    BMI (Body Mass Index)                   29.6 kg/m2            Blood Pressure from Last 3 Encounters:   18 104/68   18 110/71   18 105/74    Weight from Last 3 Encounters:   18 136 lb 12.8 oz (62.1 kg)   18 136 lb (61.7 kg)   18 133 lb 12.8 oz (60.7 kg)              Today, you had the following     No orders found for display         Where to get your medicines      These medications were sent to [facility] - [place], [place] - [address] [place] [address] Pkw[address]  [address], [place] [address]     Phone:  [phone]     venlafaxine 150 MG 24 hr capsule          Primary Care Provider Office Phone # Fax #    Carlos Manuel Rivas [phone]       [facility] [address] [place] [address]        Equal Access to Services     DYLAN LEY AH: Hadii raven ku hadasho Soomaali, waaxda luqadaha, qaybta kaalmada adeegyada, nakul garcía. So [facility] [phone].    ATENCIÓN: Si habla español, tiene a keene disposición servicios gratuitos de asistencia lingüística. Cristy medina [phone].    We comply with applicable federal civil rights laws and [place] laws. We do not discriminate on the basis of race, color, national origin, age, disability, sex, sexual orientation, or gender identity.            Thank you!     Thank you for choosing [facility]  for your care. Our goal is always to provide you with excellent care. " Hearing back from our patients is one way we can continue to improve our services. Please take a few minutes to complete the written survey that you may receive in the mail after your visit with us. Thank you!             Your Updated Medication List - Protect others around you: Learn how to safely use, store and throw away your medicines at www.disposemymeds.org.          This list is accurate as of 3/8/18 11:59 PM.  Always use your most recent med list.                   Brand Name Dispense Instructions for use Diagnosis    acetaminophen 325 MG tablet    TYLENOL    100 tablet    Take 2 tablets (650 mg) by mouth every 4 hours as needed for mild pain    Tension-type headache, not intractable, unspecified chronicity pattern       calcium carbonate 500 MG chewable tablet    TUMS    150 tablet    Take 1 tablet (500 mg) by mouth 2 times daily as needed for heartburn    Other chronic gastritis without hemorrhage       Capsaicin 0.1 % cream     56.6 g    Apply topically 2 times daily as needed    Chronic right-sided low back pain without sciatica, Neck pain       cetirizine 10 MG tablet    zyrTEC    90 tablet    Take 1 tablet (10 mg) by mouth every evening    Chronic seasonal allergic rhinitis due to fungal spores       docusate sodium 100 MG tablet    COLACE     Take 100 mg by mouth daily as needed for constipation    Constipation, unspecified constipation type       FLONASE 50 MCG/ACT spray   Generic drug:  fluticasone      Spray 1-2 sprays into both nostrils daily as needed for allergies    Seasonal allergic rhinitis due to other allergic trigger       ibuprofen 600 MG tablet    ADVIL/MOTRIN    120 tablet    Take 1 tablet (600 mg) by mouth daily as needed    Episodic tension-type headache, not intractable       ketotifen 0.025 % Soln ophthalmic solution    ZADITOR    1 Bottle    Place 1 drop into both eyes every 12 hours    Acute conjunctivitis of both eyes, unspecified acute conjunctivitis type       meclizine 25 MG  tablet    ANTIVERT    30 tablet    Take 1 tablet (25 mg) by mouth every 6 hours as needed for dizziness    BPPV (benign paroxysmal positional vertigo), unspecified laterality       melatonin 3 MG tablet     30 tablet    Take 1 tablet (3 mg) by mouth nightly as needed for sleep    Sleep disturbance       montelukast 10 MG tablet    SINGULAIR    30 tablet    Take 1 tablet (10 mg) by mouth daily    Acute seasonal allergic rhinitis, unspecified trigger       norgestimate-ethinyl estradiol 0.25-35 MG-MCG per tablet    ORTHO-CYCLEN, SPRINTEC    84 tablet    Take 1 tablet by mouth daily    Encounter for initial prescription of contraceptive pills       polyvinyl alcohol 1.4 % ophthalmic solution    LIQUIFILM TEARS    15 mL    Place 1 drop into both eyes as needed for dry eyes    Allergic conjunctivitis of both eyes       prazosin 1 MG capsule    MINIPRESS    60 capsule    Take 2 capsules (2 mg) by mouth At Bedtime    PTSD (post-traumatic stress disorder)       QUEtiapine 25 MG tablet    SEROQUEL    30 tablet    Take 1 tablet (25 mg) by mouth At Bedtime    PTSD (post-traumatic stress disorder)       ranitidine 150 MG tablet    ZANTAC    60 tablet    Take 1 tablet (150 mg) by mouth 2 times daily    Helicobacter pylori gastritis       triamcinolone 0.1 % ointment    KENALOG    30 g    Apply sparingly to affected area three times daily for 14 days.    Infestation by bed bug       * venlafaxine 75 MG 24 hr capsule    EFFEXOR-XR    30 capsule    Take 1 capsule (75 mg) by mouth daily    PTSD (post-traumatic stress disorder), Major depressive disorder, recurrent episode, moderate (H)       * venlafaxine 150 MG 24 hr capsule    EFFEXOR-XR    30 capsule    1 capsule daily    PTSD (post-traumatic stress disorder)       * Notice:  This list has 2 medication(s) that are the same as other medications prescribed for you. Read the directions carefully, and ask your doctor or other care provider to review them with you.

## 2018-03-11 RX ORDER — VENLAFAXINE HYDROCHLORIDE 150 MG/1
CAPSULE, EXTENDED RELEASE ORAL
Qty: 30 CAPSULE | Refills: 1 | Status: SHIPPED | OUTPATIENT
Start: 2018-03-11 | End: 2018-03-30 | Stop reason: DRUGHIGH

## 2018-03-13 ENCOUNTER — TELEPHONE (OUTPATIENT)
Dept: FAMILY MEDICINE | Facility: CLINIC | Age: 28
End: 2018-03-13

## 2018-03-13 NOTE — TELEPHONE ENCOUNTER
Refaxed to [facility] the request for  [facility] services attention to Amilcar Luu    This was previously faxed and when I had spoke with Shawn they sounded like it was possible to help her and they let me know they have [facility] staff, now they are saying they did not get a referral so I have resent and will make a call to staff at [facility] as well    lbetz

## 2018-03-14 NOTE — PROGRESS NOTES
Preceptor Attestation:  Patient's case reviewed and discussed with Carlos Manuel Rivas MD. Patient seen and discussed with the resident. I agree with assessment and plan of care.  Supervising Physician:  Sariah Keller DO  [facility]

## 2018-03-15 ENCOUNTER — TELEPHONE (OUTPATIENT)
Dept: FAMILY MEDICINE | Facility: CLINIC | Age: 28
End: 2018-03-15

## 2018-03-15 NOTE — TELEPHONE ENCOUNTER
Contacted Cindy via voice mail at [facility],  [facility], to see if she had gotten all needed information that I faxed on pt to get services started for pt    Had to leave a message, also let her know if needed pt is supposed to be in tomorrow and possibly could call her together if they wish to make contact that way.    juan david

## 2018-03-15 NOTE — TELEPHONE ENCOUNTER
Called with language line to remind pt of appt tomorrow at clinic and to bring all medications    Pharm D doing a med training/ set with pt    Ubaldoetz

## 2018-03-16 ENCOUNTER — CARE COORDINATION (OUTPATIENT)
Dept: FAMILY MEDICINE | Facility: CLINIC | Age: 28
End: 2018-03-16

## 2018-03-16 ENCOUNTER — OFFICE VISIT (OUTPATIENT)
Dept: PHARMACY | Facility: CLINIC | Age: 28
End: 2018-03-16
Payer: COMMERCIAL

## 2018-03-16 ENCOUNTER — TELEPHONE (OUTPATIENT)
Dept: FAMILY MEDICINE | Facility: CLINIC | Age: 28
End: 2018-03-16

## 2018-03-16 DIAGNOSIS — F43.10 PTSD (POST-TRAUMATIC STRESS DISORDER): ICD-10-CM

## 2018-03-16 DIAGNOSIS — J30.1 CHRONIC SEASONAL ALLERGIC RHINITIS DUE TO POLLEN: ICD-10-CM

## 2018-03-16 DIAGNOSIS — H10.33 ACUTE CONJUNCTIVITIS OF BOTH EYES, UNSPECIFIED ACUTE CONJUNCTIVITIS TYPE: ICD-10-CM

## 2018-03-16 DIAGNOSIS — Z76.89 HEALTH CARE HOME: ICD-10-CM

## 2018-03-16 DIAGNOSIS — J30.2 ACUTE SEASONAL ALLERGIC RHINITIS, UNSPECIFIED TRIGGER: ICD-10-CM

## 2018-03-16 DIAGNOSIS — F33.1 MAJOR DEPRESSIVE DISORDER, RECURRENT EPISODE, MODERATE (H): Primary | ICD-10-CM

## 2018-03-16 DIAGNOSIS — K59.00 CONSTIPATION, UNSPECIFIED CONSTIPATION TYPE: ICD-10-CM

## 2018-03-16 RX ORDER — MONTELUKAST SODIUM 10 MG/1
10 TABLET ORAL DAILY
Qty: 30 TABLET | Refills: 11 | Status: SHIPPED | OUTPATIENT
Start: 2018-03-16 | End: 2019-02-19

## 2018-03-16 RX ORDER — PRAZOSIN HYDROCHLORIDE 1 MG/1
2 CAPSULE ORAL AT BEDTIME
Qty: 60 CAPSULE | Refills: 3 | Status: SHIPPED | OUTPATIENT
Start: 2018-03-16 | End: 2018-07-03

## 2018-03-16 NOTE — PROGRESS NOTES
"[facility] - Pharmacist Note  Patient: Hayde LUJAN Say, : 1990, Sex: female, Encounter Date: 3/16/2018  Primary Physician  Carlos Manuel Rivas    History of Present Illness  Hayde LUJAN Say is a 27 year old female referred by Dr. Rivas for medication review and counseling. Patient seen today with the assistance of Maryann .     Subjective  # Medication reconciliation/adherence:   - Medicines brought to clinic today as well as empty pillbox  - Reports needs help knowing how to put medicines into pillbox. Would prefer to have someone do this for her. Willing to come to clinic every 2 weeks.   - Medicines delivered from pharmacy. States this is confusing  - Has a car and drives but limits to when she wants to and/or to areas that are close to her home. Did not drive to appointment today  - Sates that doesn't wish to go to pharmacy to  medicine  - Unsure what all medicines are for  - States not all of her medicines need to go in pillbox, take some from pill bottles --> Calcium carbonate & ibuprofen  - Takes all medicines around 7 pm --> goes to bed around 10 mg  --- if late in taking doses, takes late - doesn't skip doses  - not using any topical medicines as not covered by insurance    # PTSD:   - Taking \"1 from each\" - represents one tablet/capsule from two different Venlafaxine 75 mg bottles, one Venlafaxine 150 mg bottle, Prazosin 1 mg bottle, Quetiapine 25 mg bottle (and Montelukast 10 mg).   - Reports due to not wanting to have to leave bedroom, wears a pull up at night  - Medicines are helpful to go to sleep, sleeps well  - Comments later in visit that still having dreams of people being killed.   - No concern for adverse effects - dizziness / lightheadedness (screening for orthostasis).     # Allergies:  - Eye drop helps relieve itchiness, dryness and blurry vision. Uses BID  - Nasal spray at night before sleep. One spray in each nostril    # PRN medicines:  - Headache - Takes " Ibuprofen. Takes once daily. Never more than once.   - Stomach upset - uses Calcium carbonate about once per day as well.     # Constipation: Uses stool softener at 10 am - after eating every day. States is helpful to allow for BM. Doesn't wish to have this medicine in pillbox. A part of routine to take every morning.     # Birth control pill - At night time.     Requests refill:  Nasal spray  Eye drop  BC    Refill dates on bottles:  Venlafaxine 75 mg 2/8/18  Venlafaxine 150 mg 3/12/18  Montelukast 10 mg2/21/18  ceterizine 10 mg 2/21/18  Quetiapine 25 mg 2/21/18    Objective    Patient Active Problem List   Diagnosis     Major depressive disorder, recurrent episode, moderate (H)     Seasonal allergic rhinitis     PTSD (post-traumatic stress disorder)     Helicobacter pylori gastritis       Assessment/Plan  All medications were reviewed and found to be indicated, effective, safe and convenient/affordable unless drug therapy problem(s) identified, as noted below. Allergies and social history were reviewed today and updated in the EMR.    # Medication reconciliation/adherence: Low functioning individual per personal interactions and those of others in clinic. Pillbox fills seem totally appropriate and ideal for patient. She seems to have insight into this. Concern for adherence given refill dates on most of her medicines and the amount that remain. Will need ongoing assistance in ordering PRN medicine refills as likely not sent on regular basis from [facility]. Unable to address some of medicines on list as not brought to clinic.     Last fills of:  Melatonin 11/1/17 - D/C'd from list  Meclizine 8/3/17 - D/C'd from list  Liquid tears - D/C'd from list  Ranitidine 11/1/17 - D/C'd from list    # PTSD: Likely incorrectly taking Venlafaxine and Prazosin. Given continued nightmares and lack of adverse effects, would benefit from increasing to prescribed dose of Prazosin.     # Allergies: Using appropriately.  Unclear however which eye drop.     # PRN medicines:  - Headache - No concerns.   - Stomach upset - again using appropriately. Relieving sx.     # Constipation: No change needed.     # Birth control pill - No change needed currently. Need to assess adherence in future.     Requests refill:  Nasal spray  Eye drop  BC    Plan:     Medication list was updated in the EMR to reflect current therapies (deleted medications patient no longer taking, added medications patient reported taking and changed orders if a discrepancy existed).     Requested patient to bring all medicines to next visit    Refills for: Nasal spray, eye drop (ketotifen) and BC called into [facility]    Updated autofill medicines from [facility]: Venlafaxine (both strengths), Cetirizine, Quetiapine, Prazosin, Montelukast, oral contraceptive    Pillbox filled as follows x 2 weeks:    Daily medicines   Venlafaxine 75 mg capsule x1  Venlafaxine 150 mg capsule x1  Quetiapine 25 mg tablet x1  Prazosin 1 mg capsule x2  Cetirizine 10 mg tablet x1  Montelukast 10 mg tablet x1       Follow up: To return to clinic in 2 weeks.      Options for treatment and/or follow-up care were reviewed with the patient. Hayde was engaged and actively involved in the decision making process. She verbalized understanding of the options discussed and was satisfied with the final plan. Patient was provided with written instructions/medication list via AVS.    Dr. Rivas was provided our recommendations via routed note and Dr. Degroot was available for supervision during this visit and is the authorizing prescriber for this visit through the pharmacist collaborative practice agreement.    Thank you for the opportunity to participate in the care of this patient.  Sierra Scott, Pharm.D.  [facility]: [phone]    Current Outpatient Prescriptions   Medication Sig Dispense Refill     prazosin (MINIPRESS) 1 MG capsule Take 2 capsules (2 mg) by mouth At Bedtime 60 capsule  3     montelukast (SINGULAIR) 10 MG tablet Take 1 tablet (10 mg) by mouth daily 30 tablet 11     ketotifen (ZADITOR) 0.025 % SOLN ophthalmic solution Place 1 drop into both eyes every 12 hours 1 Bottle 3     venlafaxine (EFFEXOR-XR) 150 MG 24 hr capsule 1 capsule daily 30 capsule 1     cetirizine (ZYRTEC) 10 MG tablet Take 1 tablet (10 mg) by mouth every evening 90 tablet 3     ibuprofen (ADVIL/MOTRIN) 600 MG tablet Take 1 tablet (600 mg) by mouth daily as needed 120 tablet 1     venlafaxine (EFFEXOR-XR) 75 MG 24 hr capsule Take 1 capsule (75 mg) by mouth daily 30 capsule 1     QUEtiapine (SEROQUEL) 25 MG tablet Take 1 tablet (25 mg) by mouth At Bedtime 30 tablet 1     calcium carbonate (TUMS) 500 MG chewable tablet Take 1 tablet (500 mg) by mouth 2 times daily as needed for heartburn 150 tablet 3     docusate sodium (COLACE) 100 MG tablet Take 100 mg by mouth daily as needed for constipation       fluticasone (FLONASE) 50 MCG/ACT spray Spray 1-2 sprays into both nostrils daily as needed for allergies       norgestimate-ethinyl estradiol (ORTHO-CYCLEN, SPRINTEC) 0.25-35 MG-MCG per tablet Take 1 tablet by mouth daily 84 tablet 3     triamcinolone (KENALOG) 0.1 % ointment Apply sparingly to affected area three times daily for 14 days. 30 g 1     [DISCONTINUED] prazosin (MINIPRESS) 1 MG capsule Take 2 capsules (2 mg) by mouth At Bedtime 60 capsule 1     [DISCONTINUED] montelukast (SINGULAIR) 10 MG tablet Take 1 tablet (10 mg) by mouth daily 30 tablet 3     [DISCONTINUED] ketotifen (ZADITOR) 0.025 % SOLN ophthalmic solution Place 1 drop into both eyes every 12 hours 1 Bottle 1        Drug therapy problems identified:  Medical Condition 1: Other psychiatric (i.e. bipolar, schizophrenia), Goals of therapy: Not at goal, Drug Class: Antidepressant,  Convenience: Patient misunderstanding, Intervention: Change dose, Add device or process to assist use, Verification: Patient Agreed - CPA  Medical Condition 2: Other psychiatric  (i.e. bipolar, schizophrenia), Goals of therapy 2: Not at goal, Drug Class 2: Other (Prazosin),  Convenience 2: Patient misunderstanding, Intervention 2: Change dose, Add device or process to assist use, Verification 2: Patient Agreed - CPA      Relevant medical devices: N/A    # of medical conditions addressed: 6  # of medications addressed: 11  # of DTP identified: 2  Time spent: 30 minutes  Level of service per [place] DHS guidelines: 3

## 2018-03-16 NOTE — PROGRESS NOTES
"[facility] Initial    Health care home care coordination discussed with patient.  Patient agrees to participate in care coordination.  Date of enrollment 3.016.18  Patient has been informed of his/her care coordinator. Yes  Patient was informed of 24/7 access to the clinic by calling the main clinic number.  Patient understands the answering service may answer and a provider will call patient back. Yes, but unlikely to use due to challenges with numbers, english, and communication  Patient was given the \"Welcome to your Health Care Home\" sheet? Yes  Patient's preferred communication: Home number on file [phone] (home)  Patient's race/ethnicity:   Patient's primary language: Maryann     needed? Yes  Name of : Micky Blake present for this visit  Telephone number for : NA    In the past year, how many times have you been to the Emergency Room? 2  Once for sore throat and headache, and once for abdominal pain    In the past year, how many times have you been hospitalized? 0    Major diagnoses and/or issues requiring coordination services  Helicobacter pylori gastritis   PTSD (post-traumatic stress disorder)   Seasonal allergic rhinitis   Major depressive disorder, recurrent episode, moderate (H)         Summary notes: Pt wants help with ride and appt scheduling, help with [facility]( enrolled as well) and is unhappy at home to do bed bug infestion, could use more in home resources and help with resources, contacts, how to navigate systems and language barriers    Counseling and coordination activities with: patient/family, PCP and specialist:     Follow-up date: Monthly and PRN  "

## 2018-03-16 NOTE — MR AVS SNAPSHOT
After Visit Summary   3/16/2018    Hayde LUJAN Say    MRN: [id number]           Patient Information     Date Of Birth          1990        Visit Information        Provider Department      3/16/2018 8:40 AM Sierra Scott, RPH [facility]        Today's Diagnoses     Major depressive disorder, recurrent episode, moderate (H)    -  1    PTSD (post-traumatic stress disorder)        Chronic seasonal allergic rhinitis due to pollen        Constipation, unspecified constipation type        Acute seasonal allergic rhinitis, unspecified trigger        Acute conjunctivitis of both eyes, unspecified acute conjunctivitis type           Follow-ups after your visit        Your next 10 appointments already scheduled     Mar 22, 2018  9:00 AM CDT   Return Visit with Elham Mcmahan LMFT [facility] ([facility])    [address] [place] [id number]   [phone]            Mar 30, 2018  8:20 AM CDT   Return Visit with Sierra Scott, RPH [facility] ([facility])    [address] [place] [id number]   [phone]            Apr 13, 2018  8:40 AM CDT   Return Visit with Carlos Manuel Rivas MD   [facility] ([facility])    [address] [place] [address]   [phone]              Who to contact     Please call your clinic at [phone] to:    Ask questions about your health    Make or cancel appointments    Discuss your medicines    Learn about your test results    Speak to your doctor            Additional Information About Your Visit        MyChart Information     [employer] is an electronic gateway that provides easy, online access to your medical records. With [employer], you can request a clinic appointment, read your test results, renew a prescription or communicate with your care team.     To sign up for [employer] visit the website at [email or web address]   You will be asked to  enter the access code listed below, as well as some personal information. Please follow the directions to create your username and password.     Your access code is: [email or web address]  Expires: 2018  3:53 PM     Your access code will  in 90 days. If you need help or a new code, please contact your [facility] or call [phone] for assistance.        Care EveryWhere ID     This is your Care EveryWhere ID. This could be used by other organizations to access your [place] medical records  [id number]         Blood Pressure from Last 3 Encounters:   18 104/68   18 110/71   18 105/74    Weight from Last 3 Encounters:   18 136 lb 12.8 oz (62.1 kg)   18 136 lb (61.7 kg)   18 133 lb 12.8 oz (60.7 kg)              We Performed the Following     MTM, EA ADDITIONAL 15 MIN (46913) x 2 (= 30 min.)          Today's Medication Changes          These changes are accurate as of 3/16/18  4:54 PM.  If you have any questions, ask your nurse or doctor.               Stop taking these medicines if you haven't already. Please contact your care team if you have questions.     acetaminophen 325 MG tablet   Commonly known as:  TYLENOL   Stopped by:  Sierra Scott [facility]                Where to get your medicines      These medications were sent to [facility] - [place], [place] - [address]  [address], [place] [address]     Phone:  [phone]     ketotifen 0.025 % Soln ophthalmic solution    montelukast 10 MG tablet    prazosin 1 MG capsule                Primary Care Provider Office Phone # Fax #    Carlos Manuel Rivas [phone]       [facility] [address] [place] [address]        Equal Access to Services     DYLAN LEY AH: Mason martinezo Sofransisco, waaxda luqadaha, qaybta kaalmada shoshana, nakul duarte . So [facility]  [phone].    ATENCIÓN: Si ema stover, tiene a keene disposición servicios gratuitos de asistencia lingüística. Cristy medina [phone].    We comply with applicable federal civil rights laws and [place] laws. We do not discriminate on the basis of race, color, national origin, age, disability, sex, sexual orientation, or gender identity.            Thank you!     Thank you for choosing [facility]  for your care. Our goal is always to provide you with excellent care. Hearing back from our patients is one way we can continue to improve our services. Please take a few minutes to complete the written survey that you may receive in the mail after your visit with us. Thank you!             Your Updated Medication List - Protect others around you: Learn how to safely use, store and throw away your medicines at www.disposemymeds.org.          This list is accurate as of 3/16/18  4:54 PM.  Always use your most recent med list.                   Brand Name Dispense Instructions for use Diagnosis    calcium carbonate 500 MG chewable tablet    TUMS    150 tablet    Take 1 tablet (500 mg) by mouth 2 times daily as needed for heartburn    Other chronic gastritis without hemorrhage       cetirizine 10 MG tablet    zyrTEC    90 tablet    Take 1 tablet (10 mg) by mouth every evening    Chronic seasonal allergic rhinitis due to fungal spores       docusate sodium 100 MG tablet    COLACE     Take 100 mg by mouth daily as needed for constipation    Constipation, unspecified constipation type       FLONASE 50 MCG/ACT spray   Generic drug:  fluticasone      Spray 1-2 sprays into both nostrils daily as needed for allergies    Seasonal allergic rhinitis due to other allergic trigger       ibuprofen 600 MG tablet    ADVIL/MOTRIN    120 tablet    Take 1 tablet (600 mg) by mouth daily as needed    Episodic tension-type headache, not intractable       ketotifen 0.025 % Soln ophthalmic solution    ZADITOR    1 Bottle    Place 1  drop into both eyes every 12 hours    Acute seasonal allergic rhinitis, unspecified trigger       montelukast 10 MG tablet    SINGULAIR    30 tablet    Take 1 tablet (10 mg) by mouth daily    Acute seasonal allergic rhinitis, unspecified trigger       norgestimate-ethinyl estradiol 0.25-35 MG-MCG per tablet    ORTHO-CYCLEN, SPRINTEC    84 tablet    Take 1 tablet by mouth daily    Encounter for initial prescription of contraceptive pills       prazosin 1 MG capsule    MINIPRESS    60 capsule    Take 2 capsules (2 mg) by mouth At Bedtime    PTSD (post-traumatic stress disorder)       QUEtiapine 25 MG tablet    SEROQUEL    30 tablet    Take 1 tablet (25 mg) by mouth At Bedtime    PTSD (post-traumatic stress disorder)       triamcinolone 0.1 % ointment    KENALOG    30 g    Apply sparingly to affected area three times daily for 14 days.    Infestation by bed bug       * venlafaxine 75 MG 24 hr capsule    EFFEXOR-XR    30 capsule    Take 1 capsule (75 mg) by mouth daily    PTSD (post-traumatic stress disorder), Major depressive disorder, recurrent episode, moderate (H)       * venlafaxine 150 MG 24 hr capsule    EFFEXOR-XR    30 capsule    1 capsule daily    PTSD (post-traumatic stress disorder)       * Notice:  This list has 2 medication(s) that are the same as other medications prescribed for you. Read the directions carefully, and ask your doctor or other care provider to review them with you.

## 2018-03-16 NOTE — TELEPHONE ENCOUNTER
"Called pt to confirm her ride to clinic on March 30th with Good Son - they will  between 7:20 and 7:30am.  She will meet with Sierra for med set then meet with Dr Palla for her Son Mayank Novak, who continues to demonstrate that he has headaches at the back of his head.  This very much scares pt and she said\" I don't want him to die, and people bleed back there into head and they die\"  She has seen a lot of tragedy in her life and is easily frightened, and worries a lot.  I suggested possibly getting him back to specialist as they were looking at his blood pressure - will see what I can track down for notes on testing.      Also informed pt of a call to city  about bedbugs again and they will be in contact in 72 hours minus the weekend so they thought next week - communicated to pt that  would come out- left message on machine and encouraged her to listen again to message in her language to understand it is ok to allow him in.    "

## 2018-03-19 ENCOUNTER — TELEPHONE (OUTPATIENT)
Dept: FAMILY MEDICINE | Facility: CLINIC | Age: 28
End: 2018-03-19

## 2018-03-19 NOTE — TELEPHONE ENCOUNTER
"Called pt through language line  Assured her it was ok to talk to \"Sriram\" the  when he calls and to set up a time for him to come into the home, and that it is ok to let him come in, he is a safe person, and he wants to help with the bedbug problem.  She is in agreement with the plan and communicated back to me that Sriram will call through language line, that the landlod wont know and that it is ok to let sriram in when they set up a time    Lbetz  "

## 2018-03-20 ENCOUNTER — TELEPHONE (OUTPATIENT)
Dept: FAMILY MEDICINE | Facility: CLINIC | Age: 28
End: 2018-03-20

## 2018-03-20 NOTE — TELEPHONE ENCOUNTER
Called Amilcar returning her call from Monday    She did receive the DA I sent over on Pt and she is pleased to say that she is enrolling pt with [facility] and assigning See Bernadine as the  to reach out to pt      I did call back this am and left message that pt can be forgetful and easily confused, so even though she is in agreement at times that she needs help, she can change her mind or get nervous and say no, so if they can really try hard to help her understand what they can do for her and how they can help as well as to try multiple times if needed so she understands that would help.  Also let her know that pt would benefit from  case management, navigating life, help at home as a parent ect.    Ramila

## 2018-03-22 ENCOUNTER — OFFICE VISIT (OUTPATIENT)
Dept: PSYCHOLOGY | Facility: CLINIC | Age: 28
End: 2018-03-22
Payer: COMMERCIAL

## 2018-03-22 DIAGNOSIS — F33.1 MAJOR DEPRESSIVE DISORDER, RECURRENT EPISODE, MODERATE (H): ICD-10-CM

## 2018-03-22 DIAGNOSIS — F43.10 PTSD (POST-TRAUMATIC STRESS DISORDER): Primary | ICD-10-CM

## 2018-03-22 NOTE — MR AVS SNAPSHOT
After Visit Summary   3/22/2018    Hayde LUJAN Say    MRN: [id number]           Patient Information     Date Of Birth          1990        Visit Information        Provider Department      3/22/2018 9:00 AM Elham Mcmahan, LMFT [facility]        Today's Diagnoses     PTSD (post-traumatic stress disorder)    -  1    Major depressive disorder, recurrent episode, moderate (H)           Follow-ups after your visit        Your next 10 appointments already scheduled     Mar 30, 2018  8:20 AM CDT   Return Visit with Sierra Scott, RPH [facility] ([facility])    [address] [place] [id number]   [phone]            2018 10:40 AM CDT   Return Visit with Elham Mcmahan LMFT [facility] ([facility])    [address] [place] [address]   [phone]            2018  8:40 AM CDT   Return Visit with Carlos Manuel Rivas MD   [facility] ([facility])    [address] [place] [address]   [phone]              Who to contact     Please call your clinic at [phone] to:    Ask questions about your health    Make or cancel appointments    Discuss your medicines    Learn about your test results    Speak to your doctor            Additional Information About Your Visit        MyChart Information     [employer] is an electronic gateway that provides easy, online access to your medical records. With [employer], you can request a clinic appointment, read your test results, renew a prescription or communicate with your care team.     To sign up for [employer] visit the website at [email or web address]   You will be asked to enter the access code listed below, as well as some personal information. Please follow the directions to create your username and password.     Your access code is: [email or web address]  Expires: 2018  3:53 PM     Your access code will  in 90 days. If you  need help or a new code, please contact your [facility] or call [phone] for assistance.        Care EveryWhere ID     This is your Care EveryWhere ID. This could be used by other organizations to access your [facility] medical records  [id number]         Blood Pressure from Last 3 Encounters:   03/08/18 104/68   02/07/18 110/71   01/22/18 105/74    Weight from Last 3 Encounters:   03/08/18 136 lb 12.8 oz (62.1 kg)   02/07/18 136 lb (61.7 kg)   01/22/18 133 lb 12.8 oz (60.7 kg)              We Performed the Following     Interactive Complexity add-on (30172)     Psychotherapy 45 min (04671)        Primary Care Provider Office Phone # Fax #    Carlos Manuel Andreas Rivas [phone]       [facility] [address] [place] [address]        Equal Access to Services     DYLAN LEY AH: Hadii aad ku hadasho Soomaali, waaxda luqadaha, qaybta kaalmada adeegyada, waxay idiin hayaan faustinaeg kharash felicia . So [facility] [phone].    ATENCIÓN: Si habla español, tiene a keene disposición servicios gratuitos de asistencia lingüística. Llame al [phone].    We comply with applicable federal civil rights laws and [place] laws. We do not discriminate on the basis of race, color, national origin, age, disability, sex, sexual orientation, or gender identity.            Thank you!     Thank you for choosing [facility]  for your care. Our goal is always to provide you with excellent care. Hearing back from our patients is one way we can continue to improve our services. Please take a few minutes to complete the written survey that you may receive in the mail after your visit with us. Thank you!             Your Updated Medication List - Protect others around you: Learn how to safely use, store and throw away your medicines at www.disposemymeds.org.          This list is accurate as of 3/22/18 11:59 PM.  Always use your most recent med list.                    Brand Name Dispense Instructions for use Diagnosis    calcium carbonate 500 MG chewable tablet    TUMS    150 tablet    Take 1 tablet (500 mg) by mouth 2 times daily as needed for heartburn    Other chronic gastritis without hemorrhage       cetirizine 10 MG tablet    zyrTEC    90 tablet    Take 1 tablet (10 mg) by mouth every evening    Chronic seasonal allergic rhinitis due to fungal spores       docusate sodium 100 MG tablet    COLACE     Take 100 mg by mouth daily as needed for constipation    Constipation, unspecified constipation type       FLONASE 50 MCG/ACT spray   Generic drug:  fluticasone      Spray 1-2 sprays into both nostrils daily as needed for allergies    Seasonal allergic rhinitis due to other allergic trigger       ibuprofen 600 MG tablet    ADVIL/MOTRIN    120 tablet    Take 1 tablet (600 mg) by mouth daily as needed    Episodic tension-type headache, not intractable       ketotifen 0.025 % Soln ophthalmic solution    ZADITOR    1 Bottle    Place 1 drop into both eyes every 12 hours    Acute seasonal allergic rhinitis, unspecified trigger       montelukast 10 MG tablet    SINGULAIR    30 tablet    Take 1 tablet (10 mg) by mouth daily    Acute seasonal allergic rhinitis, unspecified trigger       norgestimate-ethinyl estradiol 0.25-35 MG-MCG per tablet    ORTHO-CYCLEN, SPRINTEC    84 tablet    Take 1 tablet by mouth daily    Encounter for initial prescription of contraceptive pills       prazosin 1 MG capsule    MINIPRESS    60 capsule    Take 2 capsules (2 mg) by mouth At Bedtime    PTSD (post-traumatic stress disorder)       QUEtiapine 25 MG tablet    SEROQUEL    30 tablet    Take 1 tablet (25 mg) by mouth At Bedtime    PTSD (post-traumatic stress disorder)       triamcinolone 0.1 % ointment    KENALOG    30 g    Apply sparingly to affected area three times daily for 14 days.    Infestation by bed bug       * venlafaxine 75 MG 24 hr capsule    EFFEXOR-XR    30 capsule    Take 1 capsule (75  mg) by mouth daily    PTSD (post-traumatic stress disorder), Major depressive disorder, recurrent episode, moderate (H)       * venlafaxine 150 MG 24 hr capsule    EFFEXOR-XR    30 capsule    1 capsule daily    PTSD (post-traumatic stress disorder)       * Notice:  This list has 2 medication(s) that are the same as other medications prescribed for you. Read the directions carefully, and ask your doctor or other care provider to review them with you.

## 2018-03-26 NOTE — PROGRESS NOTES
Behavioral Health Progress Note    Client Legal Name: Hayde LUJAN Say   Client Preferred Name: Hayde   Service Type: Individual  Length of Visit: 40 minutes  Attendees:  and Agustina Gramajo (for last 10-15 min)  Complexity: An  is used not only to interpret language, since the patient does not speak English, but also to help with the complexity of understandings across cultures, since the patient is not well integrated in the larger [place] culture.      Identifying Information and Presenting Problem:    The patient is a 27 year old Maryann female who is being seen for problematic symptoms of depression, PTSD.    Treatment Objective(s) Addressed in This Session:  psychological distress due to acculturation difficulties      Progress on / Status of Treatment Objective(s) / Homework:  Minimal progress       PHQ-9 SCORE 11/29/2017 1/9/2018 1/22/2018   Total Score - - -   Total Score 8 9 6       BRENDA-7 SCORE 4/26/2017 8/29/2017 1/22/2018   Total Score 8 15 14       Topics Discussed/Interventions Provided:  Reviewed with patient that Agustina Gramajo, care coordinator, wanted to meet with her during our appointment to reiterate some items from a recent phone call. Patient laughed nervously and stated she did not know who I was talking about. When I probed further about recent phone calls Agustina had made to her, patient still did not seem to know who I was talking about.     Conflict: still concerns with behaviors from her children and now her  is not speaking to her. She described this as increasing her anger and that she is yelling more at home. We reviewed previous techniques-deep breathing, counting to 3 before acting. Patient laughed nervously, smiled a little inappropriately and stated she was uncertain what I was referring to and that they likely would not work for her. I tried to help her identify times she was less angry or less likely to react with yelling, patient could not identify any differences in her  behaviors-despite being given concrete examples.     Trust: Explored who Hayde trusts. She could think of no one. Discussed whether there were any differences [place]. Patient said although they are different circumstances, she does not see one as any better than the other. Upon direct questioning (e.g., do you trust___(name of person), she stated that she trusts her mother.    Care coordination: Agustina came in to reiterate to patient what she had called her about; someone will be calling from [facility] about case management, and someone else coming over to treat the bed bugs. Had some discussion about son Brant's appointments and her fears about his headaches. Patient began crying and did not respond.         Assessment: The patient appeared to be active and engaged in today's session and was receptive to feedback. Progress continues to be slow with Hayde. I believe she needs more services than psychotherapy. Intensive mental health care coordination would be most helpful. She is very fearful, nervous, and distrusting of others. This is compounded by the fact that she has a hard time understanding the [place] 'system' (ex., medical system, knowing who/when to reach out for to help, functions different agencies have) and is fearful of it. She appears nearly paralyzed by these fears. I also suspect she has lower cognitive functioning based on not always understanding what is being said to her in her native language.     Mental Status: Hayde appeared generally alert and oriented. Dress was casual and appropriate to the weather and occasion. Grooming and hygiene were clean. Eye contact was good. Speech was of normal volume and rate and was clear, coherent, and relevant. Mood was fearful with congruent affect, sometimes with smiling/nervous laughter. Thought processes were relevant, logical and goal-directed. Thought content was WNL with no evidence of psychotic or paranoid features.  However, patient was talking on the way into the exam room and I assumed she was speaking to the . The  denied this and when I commented on it, patient did not directly respond. No evidence of SI/HI or self-harm, intent, or plans. Memory appeared grossly intact. Insight and judgment appeared poor/fair and patient exhibited good impulse control during the appointment.     Does the patient appear to be at imminent risk of harm to self/others at this time? No    The session was necessary to address PTSD, worry, depressive symptoms that have been interfering with patient's ability to function at home, personal life management, parenting.  Ongoing psychotherapy is necessary to improve functioning with daily activities, provide psychoeducation and provide support.    Diagnosis (DSM-5):  296.32 recurrent moderate major depression  308.3 PTSD    Plan:  1. Follow up in 2 weeks to discuss how she connected with [facility], conflict management, anger management        NOTE: Treatment plan update due next appointment.  Diagnostic assessment update due 4/12/18.

## 2018-03-30 ENCOUNTER — TELEPHONE (OUTPATIENT)
Dept: FAMILY MEDICINE | Facility: CLINIC | Age: 28
End: 2018-03-30

## 2018-03-30 ENCOUNTER — CARE COORDINATION (OUTPATIENT)
Dept: FAMILY MEDICINE | Facility: CLINIC | Age: 28
End: 2018-03-30

## 2018-03-30 ENCOUNTER — OFFICE VISIT (OUTPATIENT)
Dept: PHARMACY | Facility: CLINIC | Age: 28
End: 2018-03-30
Payer: COMMERCIAL

## 2018-03-30 VITALS — HEART RATE: 95 BPM | SYSTOLIC BLOOD PRESSURE: 107 MMHG | DIASTOLIC BLOOD PRESSURE: 72 MMHG

## 2018-03-30 DIAGNOSIS — G89.29 CHRONIC RIGHT-SIDED LOW BACK PAIN WITHOUT SCIATICA: ICD-10-CM

## 2018-03-30 DIAGNOSIS — F33.2 SEVERE EPISODE OF RECURRENT MAJOR DEPRESSIVE DISORDER, WITHOUT PSYCHOTIC FEATURES (H): ICD-10-CM

## 2018-03-30 DIAGNOSIS — M54.50 CHRONIC RIGHT-SIDED LOW BACK PAIN WITHOUT SCIATICA: ICD-10-CM

## 2018-03-30 DIAGNOSIS — K59.00 CONSTIPATION, UNSPECIFIED CONSTIPATION TYPE: ICD-10-CM

## 2018-03-30 DIAGNOSIS — F41.1 GAD (GENERALIZED ANXIETY DISORDER): ICD-10-CM

## 2018-03-30 DIAGNOSIS — M54.2 NECK PAIN: ICD-10-CM

## 2018-03-30 DIAGNOSIS — F43.10 PTSD (POST-TRAUMATIC STRESS DISORDER): ICD-10-CM

## 2018-03-30 DIAGNOSIS — K21.9 GASTROESOPHAGEAL REFLUX DISEASE, ESOPHAGITIS PRESENCE NOT SPECIFIED: Primary | ICD-10-CM

## 2018-03-30 RX ORDER — VENLAFAXINE HYDROCHLORIDE 225 MG/1
225 TABLET, EXTENDED RELEASE ORAL DAILY
Qty: 30 TABLET | Refills: 11 | Status: SHIPPED | OUTPATIENT
Start: 2018-03-30 | End: 2018-04-13 | Stop reason: ALTCHOICE

## 2018-03-30 RX ORDER — ASPIRIN 81 MG
100 TABLET, DELAYED RELEASE (ENTERIC COATED) ORAL DAILY
Qty: 30 TABLET | Refills: 11 | Status: SHIPPED | OUTPATIENT
Start: 2018-03-30 | End: 2018-04-13

## 2018-03-30 RX ORDER — ASPIRIN 81 MG
TABLET,CHEWABLE ORAL 2 TIMES DAILY PRN
Qty: 56.6 G | Refills: 3 | Status: SHIPPED | OUTPATIENT
Start: 2018-03-30 | End: 2018-08-22

## 2018-03-30 RX ORDER — QUETIAPINE FUMARATE 25 MG/1
25 TABLET, FILM COATED ORAL AT BEDTIME
Qty: 30 TABLET | Refills: 11 | Status: SHIPPED | OUTPATIENT
Start: 2018-03-30 | End: 2018-04-13

## 2018-03-30 NOTE — PROGRESS NOTES
Pt in clinic today for med set with pharm D and appt for her son    Was able to check in with her - she said Carmen has called her but she did not know how to call back- I was able to connect with Carmen and they are stratagizing on how to connect best with her- spoke with Coni today and she used to work in [facility] and she will connect with rao Luu and coordinate it    Hayde was very excited to have some help on board and wants to talk with them, so that was a very positive sign and we are working on building trust and a relationship.    I helped get her next appts scheduled for her and son and all rides are set up and let her know carmen will be calling    Asked if Daphney or someone had come to the house about the bugs, she said yes, but could not tell me if this was recent or sometime in the past as they had come out a long time ago and treated once.  She is nervous about the treatment for bed bugs and someone told her the chemical used is not healthy for them.   Will try and reach out to daphney again    lbetz

## 2018-03-30 NOTE — PROGRESS NOTES
S: Seen today with Maryann interpreter and her son who also has an appointment today.     Presents with pillbox and medication bottles. No concerns with current medicines.     Reports in the past had medicine to help with bloating, stomach upset. No longer has this medicine but would like to restart. Also would like pain cream. On prompting - provided warm / tingly sensation. Relieved pain. Occasionally for headaches will use 2 tablets of Ibuprofen.     Feels that in the last few days, sleep has improved. Unable to identify reason for change. However also sleeping during day. Takes naps. Does not seem too bothered by this. Concerned today at how early cab picked her up for appointment. Reports occasionally dizzy/lightheaded, but unable to identify trends in these symptoms.     Other than stomach medicine, doesn't wish to make any change with her medicines.     Ran out of stool softener. Would like this again. Takes daily. Controls constipation.     O:     Patient Active Problem List   Diagnosis     Health Care Home     Major depressive disorder, recurrent episode, moderate (H)     Seasonal allergic rhinitis     PTSD (post-traumatic stress disorder)     Helicobacter pylori gastritis       BP Readings from Last 3 Encounters:   03/30/18 107/72   03/08/18 104/68   02/07/18 110/71       A/P: Pillbox empty as expected. Reported use appropriate.     # Constipation: Regimen reviewed again today, providing benefit when has medicine. Nonadherence as unable to independently request refill. To prevent gaps in use, reorder today with scheduled dosing and request to enroll in [facility] refill reminder program.     # GERD: Presume that patient is talking about previous medicine for GERD, Ranitidine. For ease of administration, will opt to resume with once daily dosing at bedtime, can increase if additional symptom control needed. Educated patient that until can make this change in pillbox, can increase use of Calcium  carbonate, however do not feel patient understood this recommendation. Continually asked for Ranitidine.     # PTSD/Anxiety/MDD: Unsure if questions asked to assess control of symptoms were understood by patient or interpreted as intended. Dr oS note from 1/22/18 indicate that Quetiapine could be titrated if needed to further control insomnia, anxiety. No change seemingly needed at this time, but will continue to monitor. BP today stable despite increase in Prazosin dose. Symptoms reported, however given lack of concerns regarding change in position, less concerned that related to medicine. To lessen number of pills needed daily, transition to Venlafaxine 225 mg ER tablet. No concern for change in efficacy, however messaged Dr So for confirmation. Refills of regimen placed per CPA, to follow up w/ PCP in 2 weeks. Recommend further assessment of efficacy of regimen and need for adjustment.     # Pain: Historically used Capsaicin per records. Previously D/C'd as was not using. Reordered today reflecting plan previously put in place by Dr. Rivas. Advised use of single tablet of Ibuprofen as higher doses may worsen stomach upset. Patient agreeable.     Refill for next time:  - Calcium carbonate      Daily medicines   Venlafaxine 75 mg capsule x1 (3/16 NR)  Venlafaxine 150 mg capsule x1 (3/12)  Quetiapine 25 mg tablet x1 (2/21 NR)  Prazosin 1 mg capsule x2 (3/19)  Cetirizine 10 mg tablet x1 (3/22)  Montelukast 10 mg tablet x1 (3/19)          Options for treatment and/or follow-up care were reviewed with the patient. Hayde was engaged and actively involved in the decision making process. She verbalized understanding of the options discussed and was satisfied with the final plan. Patient was provided with written instructions/medication list via AVS.    Dr. Rivas was provided our recommendations in clinic today and Dr. Snyder was available for supervision during this visit and is the authorizing prescriber for  this visit through the pharmacist collaborative practice agreement.    Thank you for the opportunity to participate in the care of this patient.  Sierra Scott, Pharm.D.  [facility]: [phone]    Current Outpatient Prescriptions   Medication Sig Dispense Refill     docusate sodium (COLACE) 100 MG tablet Take 100 mg by mouth daily 30 tablet 11     ranitidine (ZANTAC) 150 MG tablet Take 1 tablet (150 mg) by mouth At Bedtime 30 tablet 3     Capsaicin 0.1 % cream Apply topically 2 times daily as needed 56.6 g 3     venlafaxine (EFFEXOR-ER) 225 MG TB24 24 hr tablet Take 1 tablet (225 mg) by mouth daily 30 tablet 11     QUEtiapine (SEROQUEL) 25 MG tablet Take 1 tablet (25 mg) by mouth At Bedtime 30 tablet 11     prazosin (MINIPRESS) 1 MG capsule Take 2 capsules (2 mg) by mouth At Bedtime 60 capsule 3     montelukast (SINGULAIR) 10 MG tablet Take 1 tablet (10 mg) by mouth daily 30 tablet 11     ketotifen (ZADITOR) 0.025 % SOLN ophthalmic solution Place 1 drop into both eyes every 12 hours 1 Bottle 3     cetirizine (ZYRTEC) 10 MG tablet Take 1 tablet (10 mg) by mouth every evening 90 tablet 3     ibuprofen (ADVIL/MOTRIN) 600 MG tablet Take 1 tablet (600 mg) by mouth daily as needed 120 tablet 1     calcium carbonate (TUMS) 500 MG chewable tablet Take 1 tablet (500 mg) by mouth 2 times daily as needed for heartburn 150 tablet 3     fluticasone (FLONASE) 50 MCG/ACT spray Spray 1-2 sprays into both nostrils daily as needed for allergies       [DISCONTINUED] venlafaxine (EFFEXOR-XR) 150 MG 24 hr capsule 1 capsule daily 30 capsule 1     [DISCONTINUED] venlafaxine (EFFEXOR-XR) 75 MG 24 hr capsule Take 1 capsule (75 mg) by mouth daily 30 capsule 1     [DISCONTINUED] QUEtiapine (SEROQUEL) 25 MG tablet Take 1 tablet (25 mg) by mouth At Bedtime 30 tablet 1     triamcinolone (KENALOG) 0.1 % ointment Apply sparingly to affected area three times daily for 14 days. 30 g 1     norgestimate-ethinyl estradiol  (ORTHO-CYCLEN, SPRINTEC) 0.25-35 MG-MCG per tablet Take 1 tablet by mouth daily 84 tablet 3        Drug therapy problems identified:  Medical Condition 1: Constipation, Goals of therapy: Not at goal, Drug Class: Other (Stool softener), Convenience: Patient forgets to take, Intervention: Refill, Verification: Patient Agreed - OTC  Medical Condition 2: GERD, Goals of therapy 2: Not at goal, Drug Class 2: H2- blocker, Indication 2: Needs additional drug therapy,  Intervention 2: Refill, Verification 2: Patient Agreed - OTC  Medical Condition 3: Pain (i.e. somatic, visceral, neuropathic), Goals of therapy 3: Not at goal, Drug Class 3: Analgesic, OTC, Indication 3: Needs additional drug therapy,  Intervention 3: Refill, Verification 3: Patient Agreed - OTC  Medical Condition 4: Pain (i.e. somatic, visceral, neuropathic), Goals of therapy 4: Not at goal, Drug Class 4: Analgesic, Non-opioid, Safety 4: Dose too high,  Intervention 4: Change dose, Educate patient, Verification 4: Patient Agreed - CPA    Relevant medical devices: n/a    # of medical conditions addressed: 4  # of medications addressed: 13  # of DTP identified: 4  Time spent: 30 minutes  Level of service per [place] DHS guidelines: 5

## 2018-03-30 NOTE — TELEPHONE ENCOUNTER
Juan Carlos Schroeder was going to try to reach her about building issues  Called to see if he has had any luck reaching her.    lbetz

## 2018-03-30 NOTE — MR AVS SNAPSHOT
After Visit Summary   3/30/2018    Hayde LUJAN Say    MRN: [id number]           Patient Information     Date Of Birth          1990        Visit Information        Provider Department      3/30/2018 8:20 AM Sierra Scott, RPH [facility]        Today's Diagnoses     Gastroesophageal reflux disease, esophagitis presence not specified    -  1    Constipation, unspecified constipation type        Chronic right-sided low back pain without sciatica        Neck pain        BRENDA (generalized anxiety disorder)        Severe episode of recurrent major depressive disorder, without psychotic features (H)        PTSD (post-traumatic stress disorder)           Follow-ups after your visit        Your next 10 appointments already scheduled     Apr 05, 2018 10:40 AM CDT   Return Visit with ANABEL Eckert   [facility] ([facility])    [address] [place] [address]   [phone]            Apr 13, 2018  8:40 AM CDT   Return Visit with Carlos Manuel Rivas MD   [facility] ([facility])    [address] [place] [address]   [phone]            Apr 13, 2018  8:40 AM CDT   Return Visit with Sierra Scott RPH [facility] ([facility])    [address] [place] [id number] [phone]              Who to contact     Please call your clinic at [phone] to:    Ask questions about your health    Make or cancel appointments    Discuss your medicines    Learn about your test results    Speak to your doctor            Additional Information About Your Visit        MyChart Information     ECO-SAFE is an electronic gateway that provides easy, online access to your medical records. With ECO-SAFE, you can request a clinic appointment, read your test results, renew a prescription or communicate with your care team.     To sign up for [employer] visit the website at [email or web address]    You will be asked to enter the access code listed below, as well as some personal information. Please follow the directions to create your username and password.     Your access code is: [email or web address]  Expires: 2018  3:53 PM     Your access code will  in 90 days. If you need help or a new code, please contact your [facility] or call [phone] for assistance.        Care EveryWhere ID     This is your Care EveryWhere ID. This could be used by other organizations to access your [facility] medical records  [id number]        Your Vitals Were     Pulse                   95            Blood Pressure from Last 3 Encounters:   18 107/72   18 104/68   18 110/71    Weight from Last 3 Encounters:   18 136 lb 12.8 oz (62.1 kg)   18 136 lb (61.7 kg)   18 133 lb 12.8 oz (60.7 kg)              We Performed the Following     MTM, EA ADDITIONAL 15 MIN (67830) x 4 (= 60 min.)          Today's Medication Changes          These changes are accurate as of 3/30/18  1:06 PM.  If you have any questions, ask your nurse or doctor.               Start taking these medicines.        Dose/Directions    Capsaicin 0.1 % cream   Used for:  Chronic right-sided low back pain without sciatica, Neck pain   Started by:  Sierra Scott RPH        Apply topically 2 times daily as needed   Quantity:  56.6 g   Refills:  3       ranitidine 150 MG tablet   Commonly known as:  ZANTAC   Used for:  Gastroesophageal reflux disease, esophagitis presence not specified   Started by:  Sierra Scott RPH        Dose:  150 mg   Take 1 tablet (150 mg) by mouth At Bedtime   Quantity:  30 tablet   Refills:  3       venlafaxine 225 MG Tb24 24 hr tablet   Commonly known as:  EFFEXOR-ER   Used for:  BRENDA (generalized anxiety disorder), Severe episode of recurrent major depressive disorder, without psychotic features (H)   Replaces:  venlafaxine 75 MG 24 hr capsule    Started by:  Sierra Scott RPH        Dose:  225 mg   Take 1 tablet (225 mg) by mouth daily   Quantity:  30 tablet   Refills:  11         These medicines have changed or have updated prescriptions.        Dose/Directions    docusate sodium 100 MG tablet   Commonly known as:  COLACE   This may have changed:    - when to take this  - reasons to take this   Used for:  Constipation, unspecified constipation type   Changed by:  Sierra Scott RPH        Dose:  100 mg   Take 100 mg by mouth daily   Quantity:  30 tablet   Refills:  11         Stop taking these medicines if you haven't already. Please contact your care team if you have questions.     venlafaxine 150 MG 24 hr capsule   Commonly known as:  EFFEXOR-XR   Stopped by:  Sierra Scott RPH           venlafaxine 75 MG 24 hr capsule   Commonly known as:  EFFEXOR-XR   Replaced by:  venlafaxine 225 MG Tb24 24 hr tablet   Stopped by:  Sierra Scott RPH                Where to get your medicines      These medications were sent to [facility] - [place], [place] - [address] [place] [address] Pkwy  [address] [facility] [address], [place] [address]     Phone:  [phone]     Capsaicin 0.1 % cream    docusate sodium 100 MG tablet    QUEtiapine 25 MG tablet    ranitidine 150 MG tablet    venlafaxine 225 MG Tb24 24 hr tablet                Primary Care Provider Office Phone # Fax #    Carlos Manuel Andreas Rivas [phone]       [facility] [address] [place] [address]        Equal Access to Services     [facility] AH: Hadii aad ku hadasho Soomaali, waaxda luqadaha, qaybta kaalmada adeegyada, waxay acin hayjuan garcía. So [facility] [phone].    ATENCIÓN: Si habla español, tiene a keene disposición servicios gratuitos de asistencia lingüística. Llame al [phone].    We comply with applicable federal civil rights laws and [place] laws. We do not discriminate on the basis of  race, color, national origin, age, disability, sex, sexual orientation, or gender identity.            Thank you!     Thank you for choosing [facility]  for your care. Our goal is always to provide you with excellent care. Hearing back from our patients is one way we can continue to improve our services. Please take a few minutes to complete the written survey that you may receive in the mail after your visit with us. Thank you!             Your Updated Medication List - Protect others around you: Learn how to safely use, store and throw away your medicines at www.disposemymeds.org.          This list is accurate as of 3/30/18  1:06 PM.  Always use your most recent med list.                   Brand Name Dispense Instructions for use Diagnosis    calcium carbonate 500 MG chewable tablet    TUMS    150 tablet    Take 1 tablet (500 mg) by mouth 2 times daily as needed for heartburn    Other chronic gastritis without hemorrhage       Capsaicin 0.1 % cream     56.6 g    Apply topically 2 times daily as needed    Chronic right-sided low back pain without sciatica, Neck pain       cetirizine 10 MG tablet    zyrTEC    90 tablet    Take 1 tablet (10 mg) by mouth every evening    Chronic seasonal allergic rhinitis due to fungal spores       docusate sodium 100 MG tablet    COLACE    30 tablet    Take 100 mg by mouth daily    Constipation, unspecified constipation type       FLONASE 50 MCG/ACT spray   Generic drug:  fluticasone      Spray 1-2 sprays into both nostrils daily as needed for allergies    Seasonal allergic rhinitis due to other allergic trigger       ibuprofen 600 MG tablet    ADVIL/MOTRIN    120 tablet    Take 1 tablet (600 mg) by mouth daily as needed    Episodic tension-type headache, not intractable       ketotifen 0.025 % Soln ophthalmic solution    ZADITOR    1 Bottle    Place 1 drop into both eyes every 12 hours    Acute seasonal allergic rhinitis, unspecified trigger       montelukast 10 MG  tablet    SINGULAIR    30 tablet    Take 1 tablet (10 mg) by mouth daily    Acute seasonal allergic rhinitis, unspecified trigger       norgestimate-ethinyl estradiol 0.25-35 MG-MCG per tablet    ORTHO-CYCLEN, SPRINTEC    84 tablet    Take 1 tablet by mouth daily    Encounter for initial prescription of contraceptive pills       prazosin 1 MG capsule    MINIPRESS    60 capsule    Take 2 capsules (2 mg) by mouth At Bedtime    PTSD (post-traumatic stress disorder)       QUEtiapine 25 MG tablet    SEROQUEL    30 tablet    Take 1 tablet (25 mg) by mouth At Bedtime    PTSD (post-traumatic stress disorder)       ranitidine 150 MG tablet    ZANTAC    30 tablet    Take 1 tablet (150 mg) by mouth At Bedtime    Gastroesophageal reflux disease, esophagitis presence not specified       triamcinolone 0.1 % ointment    KENALOG    30 g    Apply sparingly to affected area three times daily for 14 days.    Infestation by bed bug       venlafaxine 225 MG Tb24 24 hr tablet    EFFEXOR-ER    30 tablet    Take 1 tablet (225 mg) by mouth daily    BRENDA (generalized anxiety disorder), Severe episode of recurrent major depressive disorder, without psychotic features (H)

## 2018-04-02 ENCOUNTER — TELEPHONE (OUTPATIENT)
Dept: FAMILY MEDICINE | Facility: CLINIC | Age: 28
End: 2018-04-02

## 2018-04-02 NOTE — TELEPHONE ENCOUNTER
Sriram called today about patients apartment    He was able to get out and see her apartment and found no activity, no droppings, and no evidence of infestation.      He said they also timed it to do the building inspection around the same time so he was able to see the apartment twice and again found nothing.    He did say to continue to report if they see anything and he will continue to go out there each report     Said the managers have been open to having him come out and are willing to address any problems he finds    lbetz  Copy to Abby cuellar

## 2018-04-02 NOTE — TELEPHONE ENCOUNTER
Received a call from Shawn  Sesar Colindres, who is having a hard time reaching her to set up a time to meet.      She needs to get out and assess needs and do paperwork.  I returned call and got message machine and let her know of pt fears at times and now always answering and told her she could let me know when she wants to visit and I would be happy to be a go between and get it set up    Message left.    Ubaldoetz

## 2018-04-03 ENCOUNTER — TELEPHONE (OUTPATIENT)
Dept: FAMILY MEDICINE | Facility: CLINIC | Age: 28
End: 2018-04-03

## 2018-04-03 NOTE — TELEPHONE ENCOUNTER
[facility] Follow-up    Call initiated by clinic.  Message recorded by Agustina Gramajo  Calling for: Monthly check in and services coordinated  Patient: Hayde LUJAN Say  Phone conversation with: Patient  Patient's Phone number/s: Home number on file [phone] (home)  Available today in the AM on their Home number.  OK to leave message on voice mail? Yes      Major diagnoses and/or issues requiring coordination services  Mental health    In the past month, how many times have you been to the Emergency Room? 0  In the past month, how many times have you been hospitalized? 0    Summary notes: Working on getting her set up with [facility] program through Shawn in the hopes that they can help get more services in place.  Pt would benefit from psych, maybe arrmhs, or ILS to help navigate life.  Shawn had made a few attempts and asked if I could help get initial set up, and Pt did answer when I called and she is anxious to have help, got it set up for Monday 9th at 11am at her home.   is Sesar Colindres    Self-management goals:  Managing caring for 4 children  Attend her appts and her sukumar specialty appts  Trust her care team here and build relationships  Readiness to change: Somewhat ready  Really wants help but gets anxious about meeting new people or having to get to appts or services      Counseling and coordination activities with: patient/family, PCP and specialist:     Follow-up date: Monthly and prn

## 2018-04-05 ENCOUNTER — OFFICE VISIT (OUTPATIENT)
Dept: PSYCHOLOGY | Facility: CLINIC | Age: 28
End: 2018-04-05
Payer: COMMERCIAL

## 2018-04-05 DIAGNOSIS — F43.10 PTSD (POST-TRAUMATIC STRESS DISORDER): Primary | ICD-10-CM

## 2018-04-05 DIAGNOSIS — F33.2 SEVERE EPISODE OF RECURRENT MAJOR DEPRESSIVE DISORDER, WITHOUT PSYCHOTIC FEATURES (H): ICD-10-CM

## 2018-04-05 NOTE — MR AVS SNAPSHOT
After Visit Summary   2018    Hayde LUJAN Say    MRN: [id number]           Patient Information     Date Of Birth          1990        Visit Information        Provider Department      2018 10:40 AM Elham Mcmahan LMFT [facility]        Today's Diagnoses     PTSD (post-traumatic stress disorder)    -  1    Moderate episode of recurrent major depressive disorder           Follow-ups after your visit        Your next 10 appointments already scheduled     2018  8:40 AM CDT   Return Visit with Carlos Manuel Rivas MD   [facility] ([facility])    [address] [place] [address]   [phone]            2018  8:40 AM CDT   Return Visit with Sierra Scott RPH [facility] ([facility])    [address] [place] [address]   [phone]            2018  9:00 AM CDT   Return Visit with Elham Mcmahan LMFT [facility] ([facility])    [address] [place] [address]   [phone]              Who to contact     Please call your clinic at [phone] to:    Ask questions about your health    Make or cancel appointments    Discuss your medicines    Learn about your test results    Speak to your doctor            Additional Information About Your Visit        MyChart Information     123ContactFormt is an electronic gateway that provides easy, online access to your medical records. With [employer], you can request a clinic appointment, read your test results, renew a prescription or communicate with your care team.     To sign up for [email or web address] visit the website at [email or web address]   You will be asked to enter the access code listed below, as well as some personal information. Please follow the directions to create your username and password.     Your access code is: [email or web address]  Expires: 2018  3:53 PM     Your access code will  in 90 days. If you  need help or a new code, please contact your [facility] or call [phone] for assistance.        Care EveryWhere ID     This is your Care EveryWhere ID. This could be used by other organizations to access your [facility] medical records  [id number]         Blood Pressure from Last 3 Encounters:   03/30/18 107/72   03/08/18 104/68   02/07/18 110/71    Weight from Last 3 Encounters:   03/08/18 136 lb 12.8 oz (62.1 kg)   02/07/18 136 lb (61.7 kg)   01/22/18 133 lb 12.8 oz (60.7 kg)              We Performed the Following     Interactive Complexity add-on (05489)     Psychotherapy 30 min (14263)        Primary Care Provider Office Phone # Fax #    Carlos Manuel Andreas Rivas [phone]       [facility] [address] [place] [address]        Equal Access to Services     DYLAN LEY AH: Hadii aad ku hadasho Soomaali, waaxda luqadaha, qaybta kaalmada adeegyada, waxay idiin hayaan justin kharash felicia . So [facility] [phone].    ATENCIÓN: Si habla español, tiene a keene disposición servicios gratuitos de asistencia lingüística. Llame al [phone].    We comply with applicable federal civil rights laws and [place] laws. We do not discriminate on the basis of race, color, national origin, age, disability, sex, sexual orientation, or gender identity.            Thank you!     Thank you for choosing [facility]  for your care. Our goal is always to provide you with excellent care. Hearing back from our patients is one way we can continue to improve our services. Please take a few minutes to complete the written survey that you may receive in the mail after your visit with us. Thank you!             Your Updated Medication List - Protect others around you: Learn how to safely use, store and throw away your medicines at www.disposemymeds.org.          This list is accurate as of 4/5/18 12:42 PM.  Always use your most recent med list.                    Brand Name Dispense Instructions for use Diagnosis    calcium carbonate 500 MG chewable tablet    TUMS    150 tablet    Take 1 tablet (500 mg) by mouth 2 times daily as needed for heartburn    Other chronic gastritis without hemorrhage       Capsaicin 0.1 % cream     56.6 g    Apply topically 2 times daily as needed    Chronic right-sided low back pain without sciatica, Neck pain       cetirizine 10 MG tablet    zyrTEC    90 tablet    Take 1 tablet (10 mg) by mouth every evening    Chronic seasonal allergic rhinitis due to fungal spores       docusate sodium 100 MG tablet    COLACE    30 tablet    Take 100 mg by mouth daily    Constipation, unspecified constipation type       FLONASE 50 MCG/ACT spray   Generic drug:  fluticasone      Spray 1-2 sprays into both nostrils daily as needed for allergies    Seasonal allergic rhinitis due to other allergic trigger       ibuprofen 600 MG tablet    ADVIL/MOTRIN    120 tablet    Take 1 tablet (600 mg) by mouth daily as needed    Episodic tension-type headache, not intractable       ketotifen 0.025 % Soln ophthalmic solution    ZADITOR    1 Bottle    Place 1 drop into both eyes every 12 hours    Acute seasonal allergic rhinitis, unspecified trigger       montelukast 10 MG tablet    SINGULAIR    30 tablet    Take 1 tablet (10 mg) by mouth daily    Acute seasonal allergic rhinitis, unspecified trigger       norgestimate-ethinyl estradiol 0.25-35 MG-MCG per tablet    ORTHO-CYCLEN, SPRINTEC    84 tablet    Take 1 tablet by mouth daily    Encounter for initial prescription of contraceptive pills       prazosin 1 MG capsule    MINIPRESS    60 capsule    Take 2 capsules (2 mg) by mouth At Bedtime    PTSD (post-traumatic stress disorder)       QUEtiapine 25 MG tablet    SEROQUEL    30 tablet    Take 1 tablet (25 mg) by mouth At Bedtime    PTSD (post-traumatic stress disorder)       ranitidine 150 MG tablet    ZANTAC    30 tablet    Take 1 tablet (150 mg) by mouth At Bedtime     Gastroesophageal reflux disease, esophagitis presence not specified       triamcinolone 0.1 % ointment    KENALOG    30 g    Apply sparingly to affected area three times daily for 14 days.    Infestation by bed bug       venlafaxine 225 MG Tb24 24 hr tablet    EFFEXOR-ER    30 tablet    Take 1 tablet (225 mg) by mouth daily    BRENDA (generalized anxiety disorder), Severe episode of recurrent major depressive disorder, without psychotic features (H)

## 2018-04-05 NOTE — PROGRESS NOTES
Behavioral Health Progress Note    Client Legal Name: Hayde LUJAN Say   Client Preferred Name: Hayde   Service Type: Individual  Length of Visit: 35 minutes  Attendees:   Complexity: An  is used not only to interpret language, since the patient does not speak English, but also to help with the complexity of understandings across cultures, since the patient is not well integrated in the larger [place] culture.      Identifying Information and Presenting Problem:    The patient is a 27 year old [place] female who is being seen for problematic symptoms of PTSD, worry, depression.    Treatment Objective(s) Addressed in This Session:  PTSD Symptom Management      Progress on / Status of Treatment Objective(s) / Homework:  Minimal progress       PHQ-9 SCORE 2017   Total Score - - -   Total Score 8 9 6       BRENDA-7 SCORE 2017   Total Score 8 15 14       Topics Discussed/Interventions Provided:  Worry: patient has 3 main worries today. 1) her son had some teeth extracted when he was a toddler and they haven't grown in yet (he is 5).  She has a niece who is 16 that went through something similar and still does not have her adult teeth. I encouraged her to talk with their dentist, but also that many children don't have permanent teeth coming in at age 5. 2) son Brant was seen recently and they don't have rxs for his 'breathing medicines and BP medicine'. I will discuss with Agustina and Dr. Palla. 3) Still worried someone will take her organs out when she dies. Wants to fill out an ACD- will do that at our next appointment.     Possible hallucinations?: Patient reports being very fearful and anxious because she continues to see her  friend's spirit. Sometimes this happens in her mind when she is praying, othertimes, happens in her field of vision. Wants a medicine to make this stop. I explored a bit of her [employer]ar[employer] and cultural beliefs around death,  spirits, considering the spirit a friend/source of comfort vs one of fear. Patient had a hard time engaging in this conversation. Kept repeating she wanted the vision to stop.     Escobar: I reiterated that  from [facility] is coming to her home on Monday. She was confused, asking me what Shawn was. I explained these were the people who would be able to offer her more help that Agustina has been working to coordinate. She still seemed uncertain what I was talking about, but agreed to let them in.     HAFA: patient is eligible for program at our clinic starting at end of May wherein she can receive a basket of free fruits/vegetables weekly. Discussed interest with patient; she was very interested and would like to be registered.     Assessment: The patient appeared to be active and engaged in today's session and was receptive to feedback. Interestingly, patient seemed more focused today and clear about my role vs. Agustina's vs. Dr. Rivas's. She came in with a clear agenda that she wanted to talk to me about her worries and recalled things she had brought up with me in previous sessions.  May be candidate for antipsychotic to help with visions/possible hallucinations. Both her mother and brother have diagnoses of psychotic disorders, so there may be some relationship. Will discuss with Dr. Rivas.     Mental Status: Hayde appeared generally alert and oriented. Dress was casual and appropriate to the weather and occasion. Grooming and hygiene were clean. Eye contact was fair. Speech was of normal volume and rate and was clear, coherent, and relevant. Mood was nervous with congruent affect. Thought processes were relevant, logical and goal-directed. Thought content was WNL with no evidence of psychotic or paranoid features. No evidence of SI/HI or self-harm, intent, or plans. Memory appeared grossly intact. Insight and judgment appeared fair and patient exhibited good impulse control during the appointment.      Does the patient appear to be at imminent risk of harm to self/others at this time? No    The session was necessary to address PTSD, worry, depressive symptoms that have been interfering with patient's ability to function at home, personal life management, parenting.  Ongoing psychotherapy is necessary to improve functioning with daily activities, provide psychoeducation and provide support.    Diagnosis (DSM-5):  296.32 recurrent moderate major depression  308.3 PTSD    Plan:  1. Follow up in 2 weeks.  2. I will connect with Dr. Rivas re: medication for her visions  3. I will talk with Agustina about concerns Hayde was uncertain who/what Escobar was despite a number of conversations between her and Agustina about this.      NOTE: Treatment plan update due 7/5/18.  Diagnostic assessment update due next appt.

## 2018-04-09 ENCOUNTER — TELEPHONE (OUTPATIENT)
Dept: FAMILY MEDICINE | Facility: CLINIC | Age: 28
End: 2018-04-09

## 2018-04-09 NOTE — TELEPHONE ENCOUNTER
----- Message from Agustina Gramajo sent at 4/5/2018  3:08 PM CDT -----  Call about Escobar coming out

## 2018-04-09 NOTE — TELEPHONE ENCOUNTER
Called pt again with reminder today about Shawn coming to her home     See Bernadine to visit at 11- reminded her of this and she seemed confused again.      Re stated in multiple ways encouraging her to lock in this conversation as she seems to discard reminders out of mind.  Not sure if they cause distress or cognitively confuse her but was confused about it again today after several reminders about this appt and her desire to have extra supports.    Will follow up with her and with shawn post assessment.    Ramila

## 2018-04-11 ENCOUNTER — TELEPHONE (OUTPATIENT)
Dept: FAMILY MEDICINE | Facility: CLINIC | Age: 28
End: 2018-04-11

## 2018-04-12 ENCOUNTER — TELEPHONE (OUTPATIENT)
Dept: FAMILY MEDICINE | Facility: CLINIC | Age: 28
End: 2018-04-12

## 2018-04-13 ENCOUNTER — CARE COORDINATION (OUTPATIENT)
Dept: FAMILY MEDICINE | Facility: CLINIC | Age: 28
End: 2018-04-13

## 2018-04-13 ENCOUNTER — OFFICE VISIT (OUTPATIENT)
Dept: FAMILY MEDICINE | Facility: CLINIC | Age: 28
End: 2018-04-13
Payer: COMMERCIAL

## 2018-04-13 ENCOUNTER — OFFICE VISIT (OUTPATIENT)
Dept: PHARMACY | Facility: CLINIC | Age: 28
End: 2018-04-13
Payer: COMMERCIAL

## 2018-04-13 VITALS
SYSTOLIC BLOOD PRESSURE: 110 MMHG | TEMPERATURE: 98 F | OXYGEN SATURATION: 99 % | HEART RATE: 89 BPM | DIASTOLIC BLOOD PRESSURE: 76 MMHG

## 2018-04-13 DIAGNOSIS — F33.1 MAJOR DEPRESSIVE DISORDER, RECURRENT EPISODE, MODERATE (H): ICD-10-CM

## 2018-04-13 DIAGNOSIS — G44.219 EPISODIC TENSION-TYPE HEADACHE, NOT INTRACTABLE: ICD-10-CM

## 2018-04-13 DIAGNOSIS — F43.10 PTSD (POST-TRAUMATIC STRESS DISORDER): Primary | ICD-10-CM

## 2018-04-13 DIAGNOSIS — Z71.89 ENCOUNTER FOR MEDICATION REVIEW AND COUNSELING: Primary | ICD-10-CM

## 2018-04-13 DIAGNOSIS — K59.00 CONSTIPATION, UNSPECIFIED CONSTIPATION TYPE: ICD-10-CM

## 2018-04-13 DIAGNOSIS — F43.10 PTSD (POST-TRAUMATIC STRESS DISORDER): ICD-10-CM

## 2018-04-13 PROBLEM — B96.81 HELICOBACTER PYLORI GASTRITIS: Status: RESOLVED | Noted: 2017-08-03 | Resolved: 2018-04-13

## 2018-04-13 PROBLEM — K29.70 HELICOBACTER PYLORI GASTRITIS: Status: RESOLVED | Noted: 2017-08-03 | Resolved: 2018-04-13

## 2018-04-13 RX ORDER — VENLAFAXINE HYDROCHLORIDE 75 MG/1
CAPSULE, EXTENDED RELEASE ORAL
Qty: 30 CAPSULE | Refills: 11 | Status: SHIPPED | OUTPATIENT
Start: 2018-04-13 | End: 2019-04-17

## 2018-04-13 RX ORDER — ASPIRIN 81 MG
200 TABLET, DELAYED RELEASE (ENTERIC COATED) ORAL DAILY
Qty: 60 TABLET | Refills: 3 | Status: SHIPPED | OUTPATIENT
Start: 2018-04-13 | End: 2018-10-10

## 2018-04-13 RX ORDER — POLYETHYLENE GLYCOL 3350 17 G/17G
1 POWDER, FOR SOLUTION ORAL DAILY
Qty: 510 G | Refills: 1 | Status: SHIPPED | OUTPATIENT
Start: 2018-04-13 | End: 2018-10-10

## 2018-04-13 RX ORDER — VENLAFAXINE HYDROCHLORIDE 150 MG/1
CAPSULE, EXTENDED RELEASE ORAL
Qty: 30 CAPSULE | Refills: 11 | Status: SHIPPED | OUTPATIENT
Start: 2018-04-13 | End: 2019-04-17

## 2018-04-13 RX ORDER — QUETIAPINE FUMARATE 50 MG/1
50 TABLET, FILM COATED ORAL AT BEDTIME
Qty: 30 TABLET | Refills: 1 | Status: SHIPPED | OUTPATIENT
Start: 2018-04-13 | End: 2018-06-06

## 2018-04-13 RX ORDER — VENLAFAXINE HYDROCHLORIDE 150 MG/1
CAPSULE, EXTENDED RELEASE ORAL
Qty: 30 CAPSULE | Refills: 0 | Status: SHIPPED | OUTPATIENT
Start: 2018-04-13 | End: 2018-04-13

## 2018-04-13 RX ORDER — VENLAFAXINE HYDROCHLORIDE 75 MG/1
CAPSULE, EXTENDED RELEASE ORAL
Qty: 30 CAPSULE | Refills: 0 | Status: SHIPPED | OUTPATIENT
Start: 2018-04-13 | End: 2018-04-13

## 2018-04-13 NOTE — PROGRESS NOTES
S: Seen today per Dr. Rivas for pillbox fill. Presents with pillbox and pillbottles.     Concerns today for constipation and for stomach pains. Prefers to have bowel movement every day, as was not experiencing with use of usual Docusate 100 mg/day, increased x1 day and took 2 capsules with no relief. Does not wish to restart Miralax.     Because of stomach pains, was using Ranitidine out of bottle as not in pillbox. Also notes using Calcium carbonate PRN. Notes that spicy food worsens pains but if doesn't eat spicy foods feels weak.     For headache, taking 2 tablets of Ibuprofen at a time. Severe headache.     Requests refill of birth control today.     O:     Patient Active Problem List   Diagnosis     Health Care Home     Recurrent major depressive disorder (H)     Seasonal allergic rhinitis     PTSD (post-traumatic stress disorder)     Helicobacter pylori gastritis     BRENDA (generalized anxiety disorder)         A/P:     # Medication reconciliation: Refills needed as outlined above (BC, calcium carbonate). Called in today to [facility]. double check that monthly autorefills appropriate. Pillbox empty as expected.  Pillbox filled as outlined below:    Daily medicines   Venlafaxine 75 mg capsule x1   Venlafaxine 150 mg capsule x1  Quetiapine 25 mg tablet x2  Prazosin 1 mg capsule x2   Cetirizine 10 mg tablet x1   Montelukast 10 mg tablet x1   Ranitidine 150 mg tablet x1       # Headache: Concern that high dose could be contributing to worsening stomach upset. Dosing higher than recommended. Educated patient of this. Instructed to only take 1 tablet when needed. Agreeable.     # Stomach upset: Possible that scheduled Ranitidine will be more beneficial than PRN. Need refill of Calcium carbonate. Requested after last visit. Unclear why not sent. Refilled today.    # Constipation: Unclear if truly constipated or just not having daily bowel movement as desired. Difficulty in gathering history. Passed along history provided  by patient to Dr. Rivas, ideally would not add additional agent to make easier for patient. Recommend increase Docusate to 200 mg /day.     # PTSD/MDD: Per Dr. Rivas, change Seroquel to 50 mg QHS. Concern today from his review for increasing AH/VH and thoughts of harming family. Educated patient of this change who is agreeable. Instructed to help w/ sleep and VH. Unfortunately does not have Effexor today call to [facility] indicates was not covered by insurance. Change back to 150 mg and 75 mg capsules. Fill at [facility] today, sent refill for future refills back to [facility].         Options for treatment and/or follow-up care were reviewed with the patient. Hayde was engaged and actively involved in the decision making process. She verbalized understanding of the options discussed and was satisfied with the final plan. Patient was provided with written instructions/medication list via AVS.    Dr. Rivas was provided our recommendations in clinic today and Dr. Degroot was available for supervision during this visit and is the authorizing prescriber for this visit through the pharmacist collaborative practice agreement.    Thank you for the opportunity to participate in the care of this patient.  Sierra Scott, Pharm.D.  [facility]: [phone]    Current Outpatient Prescriptions   Medication Sig Dispense Refill     venlafaxine (EFFEXOR-XR) 75 MG 24 hr capsule Take 1 capsule by mouth daily (together with 150 mg capsule for total of 225 mg/day) 30 capsule 0     venlafaxine (EFFEXOR-XR) 150 MG 24 hr capsule Take 1 capsule by mouth daily (together with 75 mg capsule for total of 225 mg/day) 30 capsule 0     ranitidine (ZANTAC) 150 MG tablet Take 1 tablet (150 mg) by mouth At Bedtime 30 tablet 3     prazosin (MINIPRESS) 1 MG capsule Take 2 capsules (2 mg) by mouth At Bedtime 60 capsule 3     montelukast (SINGULAIR) 10 MG tablet Take 1 tablet (10 mg) by mouth daily 30 tablet 11      cetirizine (ZYRTEC) 10 MG tablet Take 1 tablet (10 mg) by mouth every evening 90 tablet 3     ibuprofen (ADVIL/MOTRIN) 600 MG tablet Take 1 tablet (600 mg) by mouth daily as needed 120 tablet 1     calcium carbonate (TUMS) 500 MG chewable tablet Take 1 tablet (500 mg) by mouth 2 times daily as needed for heartburn 150 tablet 3     norgestimate-ethinyl estradiol (ORTHO-CYCLEN, SPRINTEC) 0.25-35 MG-MCG per tablet Take 1 tablet by mouth daily 84 tablet 3     QUEtiapine (SEROQUEL) 50 MG tablet Take 1 tablet (50 mg) by mouth At Bedtime 30 tablet 1     docusate sodium (COLACE) 100 MG tablet Take 200 mg by mouth daily 60 tablet 3     polyethylene glycol (MIRALAX) powder Take 17 g (1 capful) by mouth daily 510 g 1     Capsaicin 0.1 % cream Apply topically 2 times daily as needed 56.6 g 3     [DISCONTINUED] venlafaxine (EFFEXOR-ER) 225 MG TB24 24 hr tablet Take 1 tablet (225 mg) by mouth daily 30 tablet 11     [DISCONTINUED] QUEtiapine (SEROQUEL) 25 MG tablet Take 1 tablet (25 mg) by mouth At Bedtime 30 tablet 11     ketotifen (ZADITOR) 0.025 % SOLN ophthalmic solution Place 1 drop into both eyes every 12 hours 1 Bottle 3     triamcinolone (KENALOG) 0.1 % ointment Apply sparingly to affected area three times daily for 14 days. 30 g 1     fluticasone (FLONASE) 50 MCG/ACT spray Spray 1-2 sprays into both nostrils daily as needed for allergies          Drug therapy problems identified:  Medical Condition 1: Constipation, Goals of therapy: Not at goal, Drug Class: Other, Efficacy: Partially effective, Intervention: Change dose, Verification: Provider Agreed  Medical Condition 2: GERD, Goals of therapy 2: Not at goal, Drug Class 2: Analgesic, Non-opioid, Safety 2: Drug - disease interaction,  , Intervention 2: Change dose, Verification 2: Patient Agreed - Compliance/Education  Medical Condition 3: Depression, Goals of therapy 3: Not at goal, Drug Class 3: Antidepressant, Convenience 3: Patient forgets to take, Intervention 3:  Add device or process to assist use, Verification 3: Patient Agreed - Compliance/Education    Relevant medical devices: n/a    # of medical conditions addressed: 4  # of medications addressed: 9  # of DTP identified: 0  Time spent: 30 minutes  Level of service per [place] DHS guidelines: 4

## 2018-04-13 NOTE — MR AVS SNAPSHOT
After Visit Summary   2018    Hayde LUJAN Say    MRN: [id number]           Patient Information     Date Of Birth          1990        Visit Information        Provider Department      2018 8:40 AM Carlos Manuel Rivas MD [facility]        Today's Diagnoses     PTSD (post-traumatic stress disorder)    -  1    Constipation, unspecified constipation type           Follow-ups after your visit        Follow-up notes from your care team     Return in about 1 week (around 2018) for Recheck Condition Status.      Your next 10 appointments already scheduled     2018  9:00 AM CDT   Return Visit with ANABEL Eckert   [facility] ([facility])    [address] [place] [id number]   [phone]              Who to contact     Please call your clinic at [phone] to:    Ask questions about your health    Make or cancel appointments    Discuss your medicines    Learn about your test results    Speak to your doctor            Additional Information About Your Visit        MyChart Information     [employer] is an electronic gateway that provides easy, online access to your medical records. With [employer], you can request a clinic appointment, read your test results, renew a prescription or communicate with your care team.     To sign up for [employer] visit the website at [email or web address]   You will be asked to enter the access code listed below, as well as some personal information. Please follow the directions to create your username and password.     Your access code is: [email or web address]  Expires: 2018  3:53 PM     Your access code will  in 90 days. If you need help or a new code, please contact your [facility] or call [phone] for assistance.        Care EveryWhere ID     This is your Care EveryWhere ID. This could be used by other organizations to access your [facility]  records  [id number]        Your Vitals Were     Pulse Temperature Pulse Oximetry             89 98  F (36.7  C) (Oral) 99%          Blood Pressure from Last 3 Encounters:   04/18/18 113/76   04/13/18 110/76   03/30/18 107/72    Weight from Last 3 Encounters:   04/18/18 140 lb 3.2 oz (63.6 kg)   03/08/18 136 lb 12.8 oz (62.1 kg)   02/07/18 136 lb (61.7 kg)              Today, you had the following     No orders found for display         Today's Medication Changes          These changes are accurate as of 4/13/18 11:59 PM.  If you have any questions, ask your nurse or doctor.               Start taking these medicines.        Dose/Directions    polyethylene glycol powder   Commonly known as:  MIRALAX   Used for:  Constipation, unspecified constipation type   Started by:  Carlos Manuel Rivas MD        Dose:  1 capful   Take 17 g (1 capful) by mouth daily   Quantity:  510 g   Refills:  1       * venlafaxine 75 MG 24 hr capsule   Commonly known as:  EFFEXOR-XR   Used for:  PTSD (post-traumatic stress disorder), Major depressive disorder, recurrent episode, moderate (H)   Replaces:  venlafaxine 225 MG Tb24 24 hr tablet   Started by:  Sierra Scott RPH        Take 1 capsule by mouth daily (together with 150 mg capsule for total of 225 mg/day)   Quantity:  30 capsule   Refills:  11       * venlafaxine 150 MG 24 hr capsule   Commonly known as:  EFFEXOR-XR   Used for:  PTSD (post-traumatic stress disorder), Major depressive disorder, recurrent episode, moderate (H)   Started by:  Sierra Scott RPH        Take 1 capsule by mouth daily (together with 75 mg capsule for total of 225 mg/day)   Quantity:  30 capsule   Refills:  11       * Notice:  This list has 2 medication(s) that are the same as other medications prescribed for you. Read the directions carefully, and ask your doctor or other care provider to review them with you.      These medicines have changed or have updated prescriptions.         Dose/Directions    docusate sodium 100 MG tablet   Commonly known as:  COLACE   This may have changed:  how much to take   Used for:  Constipation, unspecified constipation type   Changed by:  Carlos Manuel Rivas MD        Dose:  200 mg   Take 200 mg by mouth daily   Quantity:  60 tablet   Refills:  3       QUEtiapine 50 MG tablet   Commonly known as:  SEROQUEL   This may have changed:    - medication strength  - how much to take   Used for:  PTSD (post-traumatic stress disorder)   Changed by:  Carlos Manuel Rivas MD        Dose:  50 mg   Take 1 tablet (50 mg) by mouth At Bedtime   Quantity:  30 tablet   Refills:  1         Stop taking these medicines if you haven't already. Please contact your care team if you have questions.     venlafaxine 225 MG Tb24 24 hr tablet   Commonly known as:  EFFEXOR-ER   Replaced by:  venlafaxine 75 MG 24 hr capsule   Stopped by:  Sierra Scott, [facility]                Where to get your medicines      These medications were sent to [facility] - [place], [place] - [address] [place] [address] Pkw[address]  [address], [place] [address]     Phone:  [phone]     docusate sodium 100 MG tablet    polyethylene glycol powder    QUEtiapine 50 MG tablet    venlafaxine 150 MG 24 hr capsule    venlafaxine 75 MG 24 hr capsule                Primary Care Provider Office Phone # Fax #    Carlos Manuel Rivas [phone]       [facility] [address] [place] [address]        Equal Access to Services     DYLAN LEY AH: Hadii aad ku hadasho Soomaali, waaxda luqadaha, qaybta kaalmada adeegyada, nakul duarte . So [facility] [phone].    ATENCIÓN: Si habla español, tiene a keene disposición servicios gratuitos de asistencia lingüística. Cristy al [phone].    We comply with applicable federal civil rights laws and [place] laws. We do not discriminate on the basis of race, color, national origin,  age, disability, sex, sexual orientation, or gender identity.            Thank you!     Thank you for choosing [facility]  for your care. Our goal is always to provide you with excellent care. Hearing back from our patients is one way we can continue to improve our services. Please take a few minutes to complete the written survey that you may receive in the mail after your visit with us. Thank you!             Your Updated Medication List - Protect others around you: Learn how to safely use, store and throw away your medicines at www.disposemymeds.org.          This list is accurate as of 4/13/18 11:59 PM.  Always use your most recent med list.                   Brand Name Dispense Instructions for use Diagnosis    calcium carbonate 500 MG chewable tablet    TUMS    150 tablet    Take 1 tablet (500 mg) by mouth 2 times daily as needed for heartburn    Other chronic gastritis without hemorrhage       Capsaicin 0.1 % cream     56.6 g    Apply topically 2 times daily as needed    Chronic right-sided low back pain without sciatica, Neck pain       cetirizine 10 MG tablet    zyrTEC    90 tablet    Take 1 tablet (10 mg) by mouth every evening    Chronic seasonal allergic rhinitis due to fungal spores       docusate sodium 100 MG tablet    COLACE    60 tablet    Take 200 mg by mouth daily    Constipation, unspecified constipation type       FLONASE 50 MCG/ACT spray   Generic drug:  fluticasone      Spray 1-2 sprays into both nostrils daily as needed for allergies    Seasonal allergic rhinitis due to other allergic trigger       ibuprofen 600 MG tablet    ADVIL/MOTRIN    120 tablet    Take 1 tablet (600 mg) by mouth daily as needed    Episodic tension-type headache, not intractable       ketotifen 0.025 % Soln ophthalmic solution    ZADITOR    1 Bottle    Place 1 drop into both eyes every 12 hours    Acute seasonal allergic rhinitis, unspecified trigger       montelukast 10 MG tablet    SINGULAIR    30 tablet     Take 1 tablet (10 mg) by mouth daily    Acute seasonal allergic rhinitis, unspecified trigger       norgestimate-ethinyl estradiol 0.25-35 MG-MCG per tablet    ORTHO-CYCLEN, SPRINTEC    84 tablet    Take 1 tablet by mouth daily    Encounter for initial prescription of contraceptive pills       polyethylene glycol powder    MIRALAX    510 g    Take 17 g (1 capful) by mouth daily    Constipation, unspecified constipation type       prazosin 1 MG capsule    MINIPRESS    60 capsule    Take 2 capsules (2 mg) by mouth At Bedtime    PTSD (post-traumatic stress disorder)       QUEtiapine 50 MG tablet    SEROQUEL    30 tablet    Take 1 tablet (50 mg) by mouth At Bedtime    PTSD (post-traumatic stress disorder)       ranitidine 150 MG tablet    ZANTAC    30 tablet    Take 1 tablet (150 mg) by mouth At Bedtime    Gastroesophageal reflux disease, esophagitis presence not specified       triamcinolone 0.1 % ointment    KENALOG    30 g    Apply sparingly to affected area three times daily for 14 days.    Infestation by bed bug       * venlafaxine 75 MG 24 hr capsule    EFFEXOR-XR    30 capsule    Take 1 capsule by mouth daily (together with 150 mg capsule for total of 225 mg/day)    PTSD (post-traumatic stress disorder), Major depressive disorder, recurrent episode, moderate (H)       * venlafaxine 150 MG 24 hr capsule    EFFEXOR-XR    30 capsule    Take 1 capsule by mouth daily (together with 75 mg capsule for total of 225 mg/day)    PTSD (post-traumatic stress disorder), Major depressive disorder, recurrent episode, moderate (H)       * Notice:  This list has 2 medication(s) that are the same as other medications prescribed for you. Read the directions carefully, and ask your doctor or other care provider to review them with you.

## 2018-04-13 NOTE — PROGRESS NOTES
Preceptor Attestation:   Patient seen, evaluated and discussed with the resident. I have verified the content of the note, which accurately reflects my assessment of the patient and the plan of care.  Supervising Physician:Jillian Degroot MD  [facility]

## 2018-04-13 NOTE — MR AVS SNAPSHOT
After Visit Summary   2018    Hayde LUJAN Say    MRN: [id number]           Patient Information     Date Of Birth          1990        Visit Information        Provider Department      2018 8:40 AM Sierra Scott, RPH [facility]        Today's Diagnoses     Encounter for medication review and counseling    -  1    PTSD (post-traumatic stress disorder)        Major depressive disorder, recurrent episode, moderate (H)        Episodic tension-type headache, not intractable        Constipation, unspecified constipation type           Follow-ups after your visit        Your next 10 appointments already scheduled     2018  8:20 AM CDT   Return Visit with Carlos Manuel Rivas MD   [facility] ([facility])    [address] [place] [id number] [phone]            2018  9:00 AM CDT   Return Visit with Elham Mcmahan LMFT [facility] ([facility])    [address] [place] [address]   [phone]              Who to contact     Please call your clinic at [phone] to:    Ask questions about your health    Make or cancel appointments    Discuss your medicines    Learn about your test results    Speak to your doctor            Additional Information About Your Visit        MyChart Information     "[employer]"hart is an electronic gateway that provides easy, online access to your medical records. With [employer], you can request a clinic appointment, read your test results, renew a prescription or communicate with your care team.     To sign up for [employer] visit the website at [email or web address]   You will be asked to enter the access code listed below, as well as some personal information. Please follow the directions to create your username and password.     Your access code is: [email or web address]  Expires: 2018  3:53 PM     Your access code will  in 90 days. If you need help or a new code,  please contact your [facility] or call [phone] for assistance.        Care EveryWhere ID     This is your Care EveryWhere ID. This could be used by other organizations to access your [place] medical records  [id number]         Blood Pressure from Last 3 Encounters:   04/13/18 110/76   03/30/18 107/72   03/08/18 104/68    Weight from Last 3 Encounters:   03/08/18 136 lb 12.8 oz (62.1 kg)   02/07/18 136 lb (61.7 kg)   01/22/18 133 lb 12.8 oz (60.7 kg)              We Performed the Following     MTM, EA ADDITIONAL 15 MIN (20366) x 3 (= 45 min.)          Today's Medication Changes          These changes are accurate as of 4/13/18  2:05 PM.  If you have any questions, ask your nurse or doctor.               Start taking these medicines.        Dose/Directions    polyethylene glycol powder   Commonly known as:  MIRALAX   Used for:  Constipation, unspecified constipation type   Started by:  Carlos Manuel Rivas MD        Dose:  1 capful   Take 17 g (1 capful) by mouth daily   Quantity:  510 g   Refills:  1       * venlafaxine 75 MG 24 hr capsule   Commonly known as:  EFFEXOR-XR   Used for:  PTSD (post-traumatic stress disorder), Major depressive disorder, recurrent episode, moderate (H)   Replaces:  venlafaxine 225 MG Tb24 24 hr tablet   Started by:  Sierra Scott RPH        Take 1 capsule by mouth daily (together with 150 mg capsule for total of 225 mg/day)   Quantity:  30 capsule   Refills:  11       * venlafaxine 150 MG 24 hr capsule   Commonly known as:  EFFEXOR-XR   Used for:  PTSD (post-traumatic stress disorder), Major depressive disorder, recurrent episode, moderate (H)   Started by:  Sierra Scott RPH        Take 1 capsule by mouth daily (together with 75 mg capsule for total of 225 mg/day)   Quantity:  30 capsule   Refills:  11       * Notice:  This list has 2 medication(s) that are the same as other medications prescribed for you. Read the  directions carefully, and ask your doctor or other care provider to review them with you.      These medicines have changed or have updated prescriptions.        Dose/Directions    docusate sodium 100 MG tablet   Commonly known as:  COLACE   This may have changed:  how much to take   Used for:  Constipation, unspecified constipation type   Changed by:  Carlos Manuel Rivas MD        Dose:  200 mg   Take 200 mg by mouth daily   Quantity:  60 tablet   Refills:  3       QUEtiapine 50 MG tablet   Commonly known as:  SEROQUEL   This may have changed:    - medication strength  - how much to take   Used for:  PTSD (post-traumatic stress disorder)   Changed by:  Carlos Manuel Rivas MD        Dose:  50 mg   Take 1 tablet (50 mg) by mouth At Bedtime   Quantity:  30 tablet   Refills:  1         Stop taking these medicines if you haven't already. Please contact your care team if you have questions.     venlafaxine 225 MG Tb24 24 hr tablet   Commonly known as:  EFFEXOR-ER   Replaced by:  venlafaxine 75 MG 24 hr capsule   Stopped by:  Sierra Scott [facility]                Where to get your medicines      These medications were sent to [facility] - [place], [place] - [address]  [address], [place] [address]     Phone:  [phone]     docusate sodium 100 MG tablet    polyethylene glycol powder    QUEtiapine 50 MG tablet    venlafaxine 150 MG 24 hr capsule    venlafaxine 75 MG 24 hr capsule                Primary Care Provider Office Phone # Fax #    Carlos Manuel Rivas [phone]       [facility] [address] [place] [address]        Equal Access to Services     [facility] AH: Hadii aad ku hadasho Soomaali, waaxda luqadaha, qaybta kaalmada adeegyasarah, nakul duarte . So [facility] [phone].    ATENCIÓN: Si habla español, tiene a keene disposición servicios gratuitos de asistencia lingüística. Llame al  [phone].    We comply with applicable federal civil rights laws and [place] laws. We do not discriminate on the basis of race, color, national origin, age, disability, sex, sexual orientation, or gender identity.            Thank you!     Thank you for choosing [facility]  for your care. Our goal is always to provide you with excellent care. Hearing back from our patients is one way we can continue to improve our services. Please take a few minutes to complete the written survey that you may receive in the mail after your visit with us. Thank you!             Your Updated Medication List - Protect others around you: Learn how to safely use, store and throw away your medicines at www.disposemymeds.org.          This list is accurate as of 4/13/18  2:05 PM.  Always use your most recent med list.                   Brand Name Dispense Instructions for use Diagnosis    calcium carbonate 500 MG chewable tablet    TUMS    150 tablet    Take 1 tablet (500 mg) by mouth 2 times daily as needed for heartburn    Other chronic gastritis without hemorrhage       Capsaicin 0.1 % cream     56.6 g    Apply topically 2 times daily as needed    Chronic right-sided low back pain without sciatica, Neck pain       cetirizine 10 MG tablet    zyrTEC    90 tablet    Take 1 tablet (10 mg) by mouth every evening    Chronic seasonal allergic rhinitis due to fungal spores       docusate sodium 100 MG tablet    COLACE    60 tablet    Take 200 mg by mouth daily    Constipation, unspecified constipation type       FLONASE 50 MCG/ACT spray   Generic drug:  fluticasone      Spray 1-2 sprays into both nostrils daily as needed for allergies    Seasonal allergic rhinitis due to other allergic trigger       ibuprofen 600 MG tablet    ADVIL/MOTRIN    120 tablet    Take 1 tablet (600 mg) by mouth daily as needed    Episodic tension-type headache, not intractable       ketotifen 0.025 % Soln ophthalmic solution    ZADITOR    1 Bottle     Place 1 drop into both eyes every 12 hours    Acute seasonal allergic rhinitis, unspecified trigger       montelukast 10 MG tablet    SINGULAIR    30 tablet    Take 1 tablet (10 mg) by mouth daily    Acute seasonal allergic rhinitis, unspecified trigger       norgestimate-ethinyl estradiol 0.25-35 MG-MCG per tablet    ORTHO-CYCLEN, SPRINTEC    84 tablet    Take 1 tablet by mouth daily    Encounter for initial prescription of contraceptive pills       polyethylene glycol powder    MIRALAX    510 g    Take 17 g (1 capful) by mouth daily    Constipation, unspecified constipation type       prazosin 1 MG capsule    MINIPRESS    60 capsule    Take 2 capsules (2 mg) by mouth At Bedtime    PTSD (post-traumatic stress disorder)       QUEtiapine 50 MG tablet    SEROQUEL    30 tablet    Take 1 tablet (50 mg) by mouth At Bedtime    PTSD (post-traumatic stress disorder)       ranitidine 150 MG tablet    ZANTAC    30 tablet    Take 1 tablet (150 mg) by mouth At Bedtime    Gastroesophageal reflux disease, esophagitis presence not specified       triamcinolone 0.1 % ointment    KENALOG    30 g    Apply sparingly to affected area three times daily for 14 days.    Infestation by bed bug       * venlafaxine 75 MG 24 hr capsule    EFFEXOR-XR    30 capsule    Take 1 capsule by mouth daily (together with 150 mg capsule for total of 225 mg/day)    PTSD (post-traumatic stress disorder), Major depressive disorder, recurrent episode, moderate (H)       * venlafaxine 150 MG 24 hr capsule    EFFEXOR-XR    30 capsule    Take 1 capsule by mouth daily (together with 75 mg capsule for total of 225 mg/day)    PTSD (post-traumatic stress disorder), Major depressive disorder, recurrent episode, moderate (H)       * Notice:  This list has 2 medication(s) that are the same as other medications prescribed for you. Read the directions carefully, and ask your doctor or other care provider to review them with you.

## 2018-04-13 NOTE — PROGRESS NOTES
Met with pt today in clinic - she brought in paperwork from [facility] for renewal- it was automatically renewed as of June 2018 for her and the entire family    Sent the orignal back in envelope supplied and made copy for us and for pt    She also brought in paperwork for schools - for her oldest son they are doing the annual school trip to [facility]- reviewed paperwork, completed forms with her to allow, and gave medical information, along with explaining costs and what child needs to bring.    For younger son completed summer school program information so that is ready to go back to his teacher for school over summer a stem program.      Reviewed all notes and papers sent home by school, she does want me to reach out to older sons teacher as she is unsure if he is doing his schoolwork and says child lies about homework.,      Will attempt to help with school communication as well.  Ramila

## 2018-04-13 NOTE — PROGRESS NOTES
Chester Macias is a 27 year old  female with a significant past medical history of PTSD who presents for    1. Constipation  - States no BM in 2-3 weeks at first, but later in conversation states she had one this morning  - Feels bloated, but no pain  - Drinks Strawberry Juice, still no BM  - States she is taking 2 docusates at bedtime  - States she is taking a Tea that helps with BMs, purchased from [email or web address]    2. PTSD  - Increased voices and visions, still near bedtime  - Does have thoughts of hurting her family, but doesn't want to act on them  - No command hallucinations  - Has been allowing more aide into her home to help with bed bug problem, as well as further therapy connection  - Taking medications, seeing pharmacist for this  - Is well established with VLAD Berrios here - seems to be a therapeutic relationship    Pertinent ROS: Constitutional, HEENT, cardiovascular, pulmonary, gi and gu systems are negative, except as otherwise noted.         Objective     Vitals:    04/13/18 0854   BP: 110/76   Pulse: 89   Temp: 98  F (36.7  C)   TempSrc: Oral   SpO2: 99%     There is no height or weight on file to calculate BMI.    GENERAL APPEARANCE: healthy, alert and no distress  EYES: Eyes grossly normal to inspection  RESP: lungs clear to auscultation - no rales, rhonchi or wheezes  CV: regular rates and rhythm, normal S1 S2, no S3 or S4, no murmur, click or rub, no peripheral edema and peripheral pulses strong  ABD: Soft, no masses palpated, normoactive BS  RECTAL: Declined by patient  MS: no musculoskeletal defects are noted and gait is age appropriate without ataxia  SKIN: no suspicious lesions or rashes, no bed bug bite marks  NEURO:  Sensory exam grossly normal, mentation intact and speech normal  PSYCH: mentation appears normal, mood is tearful and depressed, affect congruent, no SI or HI, no current hallucinations         Assessment/Plan       (F43.10) PTSD (post-traumatic stress  "disorder)  (primary encounter diagnosis)  Comment:   Plan: QUEtiapine (SEROQUEL) 50 MG tablet        Increase seroquel. For ease of administration, will keep once a day. RTC in 1 week for recheck of her symptoms. Difficulty to say if her visions/voices are worsening or not. May need to attempt to get patient connected with psychiatrist again - she is starting to be more trusting of new people. If no morning lethargy and BP still normal, consider increasing prazosin.    (K59.00) Constipation, unspecified constipation type  Comment:   Plan: docusate sodium (COLACE) 100 MG tablet,         polyethylene glycol (MIRALAX) powder        No real concern, having nonbloody BMs. Concern there may be a psychogenic \"cause\" of distress regarding her BMs. It appears that she is in fact having them (states this morning). Docusate two capsules at bedtime, and miralax.    Options for treatment and follow-up care were reviewed with the patient and/or guardian. Hayde LUJAN Say and/or guardian engaged in the decision making process and verbalized understanding of the options discussed and agreed with the final plan.    Carlos Manuel Rivas MD      Precepted today with: Jillian Degroot MD    This note was created using Dragon Dictation software. Any grammatical errors or word substitutions are unintentional, despite proofreading.    "

## 2018-04-17 ENCOUNTER — TELEPHONE (OUTPATIENT)
Dept: FAMILY MEDICINE | Facility: CLINIC | Age: 28
End: 2018-04-17

## 2018-04-17 NOTE — TELEPHONE ENCOUNTER
"Called pt sons school per mothers request.  He will bring home what appears to be incomplete work or homework and tells her he does not have to do it.  She thinks he is not being truthful and that he is supposed to be doing it.    Spoke with his teacher kiley today at [employer], and teacher reports that yes moms instincts are correct and student should do his homework, but he has not pushed it a lot as he is doing so well on in classroom work and he is the most improved student award winner this year, and while he should do his math homework and read 20 min each night, he is \"crushing it\" at school.    Will communicate with mother- she was not at home when I called her father was there and said she was at the store.    Lbetz  "

## 2018-04-17 NOTE — TELEPHONE ENCOUNTER
Reminder call to pt about upcoming appt tomorrow and to bring meds  Used language line for karis language and called in english as well    juan david

## 2018-04-18 ENCOUNTER — OFFICE VISIT (OUTPATIENT)
Dept: FAMILY MEDICINE | Facility: CLINIC | Age: 28
End: 2018-04-18
Payer: COMMERCIAL

## 2018-04-18 VITALS
WEIGHT: 140.2 LBS | HEIGHT: 57 IN | OXYGEN SATURATION: 99 % | RESPIRATION RATE: 18 BRPM | BODY MASS INDEX: 30.24 KG/M2 | DIASTOLIC BLOOD PRESSURE: 76 MMHG | TEMPERATURE: 98.5 F | SYSTOLIC BLOOD PRESSURE: 113 MMHG | HEART RATE: 84 BPM

## 2018-04-18 DIAGNOSIS — F43.10 PTSD (POST-TRAUMATIC STRESS DISORDER): Primary | ICD-10-CM

## 2018-04-18 DIAGNOSIS — F33.3 SEVERE EPISODE OF RECURRENT MAJOR DEPRESSIVE DISORDER, WITH PSYCHOTIC FEATURES (H): ICD-10-CM

## 2018-04-18 NOTE — NURSING NOTE
name: Mirna Holder  Language: Maryann  Agency: [facility]  Phone number: [phone]

## 2018-04-18 NOTE — PROGRESS NOTES
"       Chester LUJAN Say is a 27 year old  female with a significant past medical history of PTSD who presents for    1. PTSD  - Difficult historian, story changes multiple times during conversation  - First says voices and visions are better, then later states they are worse  - States frustrations improved, then states they are worse  - Thoughts of hurting her family slightly decreased  - No command hallucinations  - Has been allowing more aide into her home to help with bed bug problem, as well as further therapy connection  - Taking increased dose of medications as prescribed  - Is well established with VLAD Berrios here - seems to be a therapeutic relationship    SH  - Not currently working    Pertinent ROS: Constitutional, HEENT, cardiovascular, pulmonary, gi and gu systems are negative, except as otherwise noted.         Objective     Vitals:    04/18/18 0809   BP: 113/76   Pulse: 84   Resp: 18   Temp: 98.5  F (36.9  C)   SpO2: 99%   Weight: 140 lb 3.2 oz (63.6 kg)   Height: 4' 9\" (144.8 cm)     Body mass index is 30.34 kg/(m^2).    GENERAL APPEARANCE: healthy, alert and no distress  EYES: Eyes grossly normal to inspection  RESP: lungs clear to auscultation - no rales, rhonchi or wheezes  CV: regular rates and rhythm, normal S1 S2, no S3 or S4, no murmur, click or rub, no peripheral edema and peripheral pulses strong  MS: no musculoskeletal defects are noted and gait is age appropriate without ataxia  SKIN: no suspicious lesions or rashes  PSYCH: mentation appears normal, mood is tearful and depressed, doesn't talk for extended periods of time, affect congruent, no current SI or HI, no active hallucinations         Assessment/Plan       (F43.10) PTSD (post-traumatic stress disorder)  (primary encounter diagnosis)  Comment:   Plan: QUEtiapine (SEROQUEL) 50 MG tablet        BP is normal today, but she wakes up very sleepy, despite saying increased sleep. Will hold off on increasing prazosin for this reason. " Continue 50mg seroquel. When discussing need for extra physician care (psychiatrist), patient shut down completely. Not ready for this. Still, I feel she may need to attempt to get patient connected with psychiatrist again - she is starting to be more trusting of new people.    Options for treatment and follow-up care were reviewed with the patient and/or guardian. Hayde LUJAN Say and/or guardian engaged in the decision making process and verbalized understanding of the options discussed and agreed with the final plan.    Carlos Manuel Rivas MD      Precepted today with: Derek Chávez MD    This note was created using Dragon Dictation software. Any grammatical errors or word substitutions are unintentional, despite proofreading.

## 2018-04-18 NOTE — MR AVS SNAPSHOT
After Visit Summary   2018    Hayde LUJAN Say    MRN: [id number]           Patient Information     Date Of Birth          1990        Visit Information        Provider Department      2018 8:20 AM Carlos Manuel Rivas MD [facility]        Today's Diagnoses     PTSD (post-traumatic stress disorder)    -  1    Severe episode of recurrent major depressive disorder, with psychotic features (H)           Follow-ups after your visit        Follow-up notes from your care team     Return in about 1 week (around 2018) for Recheck Condition Status.      Your next 10 appointments already scheduled     2018  9:00 AM CDT   Return Visit with Elham Mcmahan LMFT [facility] ([facility])    [address] [place] [address]   [phone]              Who to contact     Please call your clinic at [phone] to:    Ask questions about your health    Make or cancel appointments    Discuss your medicines    Learn about your test results    Speak to your doctor            Additional Information About Your Visit        MyChart Information     rSmart is an electronic gateway that provides easy, online access to your medical records. With rSmart, you can request a clinic appointment, read your test results, renew a prescription or communicate with your care team.     To sign up for Adomot visit the website at [email or web address]   You will be asked to enter the access code listed below, as well as some personal information. Please follow the directions to create your username and password.     Your access code is: [email or web address]  Expires: 2018  3:53 PM     Your access code will  in 90 days. If you need help or a new code, please contact your [facility] or call [phone] for assistance.        Care EveryWhere ID     This is your Care EveryWhere ID. This could be used by other organizations to access  "your [place] medical records  [id number]        Your Vitals Were     Pulse Temperature Respirations Height Pulse Oximetry BMI (Body Mass Index)    84 98.5  F (36.9  C) 18 4' 9\" (144.8 cm) 99% 30.34 kg/m2       Blood Pressure from Last 3 Encounters:   04/18/18 113/76   04/13/18 110/76   03/30/18 107/72    Weight from Last 3 Encounters:   04/18/18 140 lb 3.2 oz (63.6 kg)   03/08/18 136 lb 12.8 oz (62.1 kg)   02/07/18 136 lb (61.7 kg)              Today, you had the following     No orders found for display       Primary Care Provider Office Phone # Fax #    Carlos Manuel Rivas [phone]       [facility] [address] [place] [address]        Equal Access to Services     CHHAYA LEY : Hadii aad ku hadasho Soomaali, waaxda luqadaha, qaybta kaalmada adeegyada, waxay idiin hayaan justin kharash laguillermon . So [facility] [phone].    ATENCIÓN: Si habla español, tiene a keene disposición servicios gratuitos de asistencia lingüística. Cristy al [phone].    We comply with applicable federal civil rights laws and [place] laws. We do not discriminate on the basis of race, color, national origin, age, disability, sex, sexual orientation, or gender identity.            Thank you!     Thank you for choosing [facility]  for your care. Our goal is always to provide you with excellent care. Hearing back from our patients is one way we can continue to improve our services. Please take a few minutes to complete the written survey that you may receive in the mail after your visit with us. Thank you!             Your Updated Medication List - Protect others around you: Learn how to safely use, store and throw away your medicines at www.disposemymeds.org.          This list is accurate as of 4/18/18 11:36 AM.  Always use your most recent med list.                   Brand Name Dispense Instructions for use Diagnosis    calcium carbonate 500 MG chewable tablet    TUMS    150 tablet "    Take 1 tablet (500 mg) by mouth 2 times daily as needed for heartburn    Other chronic gastritis without hemorrhage       Capsaicin 0.1 % cream     56.6 g    Apply topically 2 times daily as needed    Chronic right-sided low back pain without sciatica, Neck pain       cetirizine 10 MG tablet    zyrTEC    90 tablet    Take 1 tablet (10 mg) by mouth every evening    Chronic seasonal allergic rhinitis due to fungal spores       docusate sodium 100 MG tablet    COLACE    60 tablet    Take 200 mg by mouth daily    Constipation, unspecified constipation type       FLONASE 50 MCG/ACT spray   Generic drug:  fluticasone      Spray 1-2 sprays into both nostrils daily as needed for allergies    Seasonal allergic rhinitis due to other allergic trigger       ibuprofen 600 MG tablet    ADVIL/MOTRIN    120 tablet    Take 1 tablet (600 mg) by mouth daily as needed    Episodic tension-type headache, not intractable       ketotifen 0.025 % Soln ophthalmic solution    ZADITOR    1 Bottle    Place 1 drop into both eyes every 12 hours    Acute seasonal allergic rhinitis, unspecified trigger       montelukast 10 MG tablet    SINGULAIR    30 tablet    Take 1 tablet (10 mg) by mouth daily    Acute seasonal allergic rhinitis, unspecified trigger       norgestimate-ethinyl estradiol 0.25-35 MG-MCG per tablet    ORTHO-CYCLEN, SPRINTEC    84 tablet    Take 1 tablet by mouth daily    Encounter for initial prescription of contraceptive pills       polyethylene glycol powder    MIRALAX    510 g    Take 17 g (1 capful) by mouth daily    Constipation, unspecified constipation type       prazosin 1 MG capsule    MINIPRESS    60 capsule    Take 2 capsules (2 mg) by mouth At Bedtime    PTSD (post-traumatic stress disorder)       QUEtiapine 50 MG tablet    SEROQUEL    30 tablet    Take 1 tablet (50 mg) by mouth At Bedtime    PTSD (post-traumatic stress disorder)       ranitidine 150 MG tablet    ZANTAC    30 tablet    Take 1 tablet (150 mg) by mouth  At Bedtime    Gastroesophageal reflux disease, esophagitis presence not specified       triamcinolone 0.1 % ointment    KENALOG    30 g    Apply sparingly to affected area three times daily for 14 days.    Infestation by bed bug       * venlafaxine 75 MG 24 hr capsule    EFFEXOR-XR    30 capsule    Take 1 capsule by mouth daily (together with 150 mg capsule for total of 225 mg/day)    PTSD (post-traumatic stress disorder), Major depressive disorder, recurrent episode, moderate (H)       * venlafaxine 150 MG 24 hr capsule    EFFEXOR-XR    30 capsule    Take 1 capsule by mouth daily (together with 75 mg capsule for total of 225 mg/day)    PTSD (post-traumatic stress disorder), Major depressive disorder, recurrent episode, moderate (H)       * Notice:  This list has 2 medication(s) that are the same as other medications prescribed for you. Read the directions carefully, and ask your doctor or other care provider to review them with you.

## 2018-04-18 NOTE — PROGRESS NOTES
Preceptor Attestation:  Patient's case reviewed and discussed with Carlos Manuel Rivas MD resident and I evaluated the patient. I agree with written assessment and plan of care.  Supervising Physician:  Derek Chávez MD MD  [facility]

## 2018-04-19 ENCOUNTER — TELEPHONE (OUTPATIENT)
Dept: FAMILY MEDICINE | Facility: CLINIC | Age: 28
End: 2018-04-19

## 2018-04-20 ENCOUNTER — CARE COORDINATION (OUTPATIENT)
Dept: FAMILY MEDICINE | Facility: CLINIC | Age: 28
End: 2018-04-20

## 2018-04-20 NOTE — PROGRESS NOTES
Met with pt today as she was in with her youngest son  Let her know I spoke to her older sons teacher and explained about him doing his homework and how he is doing in class, she wants him to do his homework - encouraged her to continue to have him complete homework.  She is worried about his credit.  Told him he is doing well and will be going to the next grade.  She wants a higher standard and I agreed.    Ramila

## 2018-04-26 ENCOUNTER — OFFICE VISIT (OUTPATIENT)
Dept: PSYCHOLOGY | Facility: CLINIC | Age: 28
End: 2018-04-26
Payer: COMMERCIAL

## 2018-04-26 ENCOUNTER — TELEPHONE (OUTPATIENT)
Dept: FAMILY MEDICINE | Facility: CLINIC | Age: 28
End: 2018-04-26

## 2018-04-26 DIAGNOSIS — F43.10 PTSD (POST-TRAUMATIC STRESS DISORDER): Primary | ICD-10-CM

## 2018-04-26 DIAGNOSIS — F41.1 GAD (GENERALIZED ANXIETY DISORDER): ICD-10-CM

## 2018-04-26 NOTE — PROGRESS NOTES
Behavioral Health Diagnostic Assessment Update:    Meeting was: scheduled  Others present:   Meeting lasted: 60 minutes  Client was: on time  Date of Initial Diagnostic Assessment: 4/12/17  Complexity: An  is used not only to interpret language, since the patient does not speak English, but also to help with the complexity of understandings across cultures, since the patient is not well integrated in the larger [place] culture.    Assessment Summary: Diagnostic assessment update completed today. The patient is a 27 year old [place] female who was referred for mental health services for help with fear, anxiety, PTSD, anger management. Based on the patient's report of symptoms, she continues to meet criteria for PTSD, recurrent major depression, generalized anxiety disorder  The patient's mental health concerns continue to affect her ability to function at parenting, work, organization, personal relationships, memory, concentration, understanding [place] customs and has been causing clinically significant distress. The patient also reports no drug/alcohol use. The patient is also struggling with being a [place] refugee, limited income, poor parenting skills, conflictual relationship with her , food insecurity. She denies safety concerns.  Based on the patient's reported symptoms and impact on functioning, the plan for the patient is biweekly (every other week psychotherapy) paired with every other week visits with her PCP, regular contact with [facility] coordinator, Agustina Gramajo, and is getting connected with case management services at [place].    Diagnosis (DSM-5):    296.32 (F33.1) Major Depressive Disorder, Recurrent Episode, Moderate _  300.02 (F41.1) Generalized Anxiety Disorder  309.81 (F43.10) Posttraumatic Stress Disorder (includes Posttraumatic Stress Disorder for Children 6 Years and Younger)  Without dissociative symptoms    Recommendations & Plan:       Treatment plan reviewed. Next treatment  plan update due on 7/5/18.    Goals for therapy = anger management, improve understanding and lessen fear of [place] culture, learn adaptive ways to manage worry.    Mental Health Screening Questionnaires:    PHQ-9 SCORE 11/29/2017 1/9/2018 1/22/2018   Total Score - - -   Total Score 8 9 6     BRENDA-7 SCORE 4/26/2017 8/29/2017 1/22/2018   Total Score 8 15 14     PTSD-PC = 4/4  CAGE AID:  0/4   No flowsheet data found.  Complete responses are available for review in the flow-sheet.     Current Presenting Problems or Complaints (including patient perception of problem and external factors contributing to current dilemma): [place] refugee who has been in the [place] approximately 8-9 years.  She has PTSD and depression related to her experiences growing up in a refugee [place].  She reports being very fearful and distrusting.  She has had significant challenges adjusting to life in the [place] and is often very fearful or anxious with [place] customs, culture, technological advances, etc. she recognizes she becomes very focused and sometimes perseverates on these worries. She also faces challenges with anger management and often has a hard time controlling her temper at home.  Will yell at her children or cry.  Her relationship with her  is strained.  She has 4 young children, one of whom has medical concerns that involved specialists.  Her  is the only one working and they have very limited income and are food insecure.      Review of Symptoms:  Depression: Sadness, irritability, hopelessness  Gina: None  Psychosis: At this time difficult to ascertain because patient is not sleeping well and some of the visions she has had since her friend was murdered are shared by those within her social [place].  Anxiety: Worries, feels being anxious and as though she cannot relax, perseverates on things she fears  Post Traumatic Stress Disorder: Traumatic event of growing up in a refugee camp, hypervigilant, very poor  sleep, low to no trust of others,    Updated Life History/Circumstances: As of about 3 months ago patient was enrolled in health care home at our clinic.  This has seemed to help, albeit very slowly, with improving patient's trust of others.  She has been able to make specialist appointments for her son on a regular basis and appears to have a more clear idea of who to ask to help with different services.  It appears, she has a basic understanding that I am her psychotherapist and Agustina is her care coordinator and Dr. Rivas manages her medical conditions and that we work together to help her.  It also appears as though there are no more bedbugs in her home.    Current Substance Use: None    Updated Health History/Family Health History: Nothing new    Patient Active Problem List   Diagnosis     Health Care Home     Recurrent major depressive disorder (H)     Seasonal allergic rhinitis     PTSD (post-traumatic stress disorder)     BRENDA (generalized anxiety disorder)     Current Outpatient Prescriptions   Medication     calcium carbonate (TUMS) 500 MG chewable tablet     Capsaicin 0.1 % cream     cetirizine (ZYRTEC) 10 MG tablet     docusate sodium (COLACE) 100 MG tablet     fluticasone (FLONASE) 50 MCG/ACT spray     ibuprofen (ADVIL/MOTRIN) 600 MG tablet     ketotifen (ZADITOR) 0.025 % SOLN ophthalmic solution     montelukast (SINGULAIR) 10 MG tablet     norgestimate-ethinyl estradiol (ORTHO-CYCLEN, SPRINTEC) 0.25-35 MG-MCG per tablet     polyethylene glycol (MIRALAX) powder     prazosin (MINIPRESS) 1 MG capsule     QUEtiapine (SEROQUEL) 50 MG tablet     ranitidine (ZANTAC) 150 MG tablet     triamcinolone (KENALOG) 0.1 % ointment     venlafaxine (EFFEXOR-XR) 150 MG 24 hr capsule     venlafaxine (EFFEXOR-XR) 75 MG 24 hr capsule     No current facility-administered medications for this visit.        Cultural Factors: As noted earlier patient has struggled greatly with adjustment to the [place] and is very  fearful and wary of new and change.  She reports that she is Mormon and that her lidia is important to her.    Mental Status Exam:    Appearance:  Adequately groomed and Appears stated age  Behavior/Relationship to Examiner/Demeanor:  Reduced eye contact and Guarded  Build: stocky  Gait:  Normal  Psychomotor Activity: agitation  Speech rate:  Normal and Rapid  Speech volume:  Normal and Loud  Speech coherence:  Normal and sometimes interpreters will say that they are having a hard time understanding what she is saying and that she has a hard time understanding them, despite speaking the same dialect.  Patient has told me before that she has a hard time sometimes understanding what people are telling to her even in her native language.  Speech spontaneity:  Normal  Mood (subjective report):  irritable and anxious  Affect (objective appearance):  Appropriate/mood-congruent  Eye Contact: adequate  Thought Process (Associations):  Logical and Linear  Thought process (Rate):  Normal  Abnormal Perception: Potential visual hallucinations.  As noted earlier this is difficult to ascertain based on limited sleep and cultural factors.  Of note, both patient's mother and brother have psychotic disorders.  Sensorium:  Alert, Oriented to place and Oriented to situation  Attention/Concentration:  Fair  Insight:  Fair  Judgment:  Fair    Suicide Assessment:    Recent suicidal thoughts: No  Past suicidal thoughts: No  Any attempts in the past: No  Any family/friends/loved ones commit suicide: No  Plan or considering various methods: No  Access to guns: No  Protective factors: unclear  Verbal contract for safety: Yes    Non-Suicidal Self Injurious Behavior: No    Violence/Homicide Risk Assessment:     Problems with anger management: Yes: Has problems with irritability.  Will yell at  and children.  History of violence: No  History of significant damage to property: No  Threat made to harm or kill someone: No  Verbal  "contract for safety: Yes    Safety Plan:   Hayde was provided with the phone number for emergency mental health services and was encouraged to call these local crisis numbers, 911, or visit a local emergency room if thoughts of suicide or homicide were to arise and/or if she were to be in acute distress. The patient agreed to utilize these services as indicated if the need were to arise. Hayde denied past or current suicidal or homicidal ideation, intent, or plan, denied a history of suicide attempts/self-harming behaviors, and identified not wanting to die as primary protective factor(s) to self-harm or harm to others. Based on these factors, Hayde is considered to be sustainable as an outpatient at this time.     NOTE: Diagnostic update complete.    Primary Care PTSD Screen  In your life, have you ever had any experience that was so frightening, horrible or upsetting that, in the past month, you...    1. Have had nightmares about it or thought about it when you did not want to? Yes  2. Tried hard not to think about it or went out of your way to avoid situations that remind you of it? Yes  3. Were constantly on guard, watchful, or easily startled? Yes  4. Felt numb or detached from others, activities, or your surroundings? Yes    Current research suggests that the results of the PC-PTSD should be considered \"positive\" if a patient answers \"yes\" to any (3) items.    References    NOMAN Braswell, DONOVAN Angel, Kimerling, R., TRISH Muniz., DIEGO BoydS., RENÉ Gonzáles., NOMAN Luo, BIPIN Rivera., Saucedo, J.I. (2004). The primary care PTSD screen (PC-PTSD): development and operating characteristics. Primary Care Psychiatry, 9, 9-14.    "

## 2018-04-26 NOTE — MR AVS SNAPSHOT
After Visit Summary   2018    Hayde LUJAN Say    MRN: [id number]           Patient Information     Date Of Birth          1990        Visit Information        Provider Department      2018 9:00 AM Elham Mcmahan LMFT [facility]        Today's Diagnoses     PTSD (post-traumatic stress disorder)    -  1    BRENDA (generalized anxiety disorder)           Follow-ups after your visit        Your next 10 appointments already scheduled     May 17, 2018  8:20 AM CDT   Return Visit with ANABEL Eckert   [facility] ([facility])    [address] [place] [address]   [phone]              Who to contact     Please call your clinic at [phone] to:    Ask questions about your health    Make or cancel appointments    Discuss your medicines    Learn about your test results    Speak to your doctor            Additional Information About Your Visit        MyChart Information     Litebit is an electronic gateway that provides easy, online access to your medical records. With bVisual, you can request a clinic appointment, read your test results, renew a prescription or communicate with your care team.     To sign up for Litebit visit the website at [email or web address]   You will be asked to enter the access code listed below, as well as some personal information. Please follow the directions to create your username and password.     Your access code is: [email or web address]  Expires: 2018  3:53 PM     Your access code will  in 90 days. If you need help or a new code, please contact your [facility] or call [phone] for assistance.        Care EveryWhere ID     This is your Care EveryWhere ID. This could be used by other organizations to access your [place] medical records  [id number]         Blood Pressure from Last 3 Encounters:   18 113/76   18 110/76   18 107/72    Weight from Last 3  Encounters:   04/18/18 140 lb 3.2 oz (63.6 kg)   03/08/18 136 lb 12.8 oz (62.1 kg)   02/07/18 136 lb (61.7 kg)              We Performed the Following     Interactive Complexity add-on (59385)     Psychiatric Diagnostic Eval (85147)        Primary Care Provider Office Phone # Fax #    Carlos Manuel Andreas Rivas [phone]       [facility] [address] [place] [address]        Equal Access to Services     CHHAYA LEY : Hadii aad ku hadasho Soomaali, waaxda luqadaha, qaybta kaalmada adeegyada, waxay idiin hayaan justin duarte . So [facility] [phone].    ATENCIÓN: Si habla español, tiene a keene disposición servicios gratuitos de asistencia lingüística. Llame al [phone].    We comply with applicable federal civil rights laws and [place] laws. We do not discriminate on the basis of race, color, national origin, age, disability, sex, sexual orientation, or gender identity.            Thank you!     Thank you for choosing [facility]  for your care. Our goal is always to provide you with excellent care. Hearing back from our patients is one way we can continue to improve our services. Please take a few minutes to complete the written survey that you may receive in the mail after your visit with us. Thank you!             Your Updated Medication List - Protect others around you: Learn how to safely use, store and throw away your medicines at www.disposemymeds.org.          This list is accurate as of 4/26/18 11:59 PM.  Always use your most recent med list.                   Brand Name Dispense Instructions for use Diagnosis    calcium carbonate 500 MG chewable tablet    TUMS    150 tablet    Take 1 tablet (500 mg) by mouth 2 times daily as needed for heartburn    Other chronic gastritis without hemorrhage       Capsaicin 0.1 % cream     56.6 g    Apply topically 2 times daily as needed    Chronic right-sided low back pain without sciatica, Neck pain        cetirizine 10 MG tablet    zyrTEC    90 tablet    Take 1 tablet (10 mg) by mouth every evening    Chronic seasonal allergic rhinitis due to fungal spores       docusate sodium 100 MG tablet    COLACE    60 tablet    Take 200 mg by mouth daily    Constipation, unspecified constipation type       FLONASE 50 MCG/ACT spray   Generic drug:  fluticasone      Spray 1-2 sprays into both nostrils daily as needed for allergies    Seasonal allergic rhinitis due to other allergic trigger       ibuprofen 600 MG tablet    ADVIL/MOTRIN    120 tablet    Take 1 tablet (600 mg) by mouth daily as needed    Episodic tension-type headache, not intractable       ketotifen 0.025 % Soln ophthalmic solution    ZADITOR    1 Bottle    Place 1 drop into both eyes every 12 hours    Acute seasonal allergic rhinitis, unspecified trigger       montelukast 10 MG tablet    SINGULAIR    30 tablet    Take 1 tablet (10 mg) by mouth daily    Acute seasonal allergic rhinitis, unspecified trigger       norgestimate-ethinyl estradiol 0.25-35 MG-MCG per tablet    ORTHO-CYCLEN, SPRINTEC    84 tablet    Take 1 tablet by mouth daily    Encounter for initial prescription of contraceptive pills       polyethylene glycol powder    MIRALAX    510 g    Take 17 g (1 capful) by mouth daily    Constipation, unspecified constipation type       prazosin 1 MG capsule    MINIPRESS    60 capsule    Take 2 capsules (2 mg) by mouth At Bedtime    PTSD (post-traumatic stress disorder)       QUEtiapine 50 MG tablet    SEROQUEL    30 tablet    Take 1 tablet (50 mg) by mouth At Bedtime    PTSD (post-traumatic stress disorder)       ranitidine 150 MG tablet    ZANTAC    30 tablet    Take 1 tablet (150 mg) by mouth At Bedtime    Gastroesophageal reflux disease, esophagitis presence not specified       triamcinolone 0.1 % ointment    KENALOG    30 g    Apply sparingly to affected area three times daily for 14 days.    Infestation by bed bug       * venlafaxine 75 MG 24 hr capsule     EFFEXOR-XR    30 capsule    Take 1 capsule by mouth daily (together with 150 mg capsule for total of 225 mg/day)    PTSD (post-traumatic stress disorder), Major depressive disorder, recurrent episode, moderate (H)       * venlafaxine 150 MG 24 hr capsule    EFFEXOR-XR    30 capsule    Take 1 capsule by mouth daily (together with 75 mg capsule for total of 225 mg/day)    PTSD (post-traumatic stress disorder), Major depressive disorder, recurrent episode, moderate (H)       * Notice:  This list has 2 medication(s) that are the same as other medications prescribed for you. Read the directions carefully, and ask your doctor or other care provider to review them with you.

## 2018-05-03 ENCOUNTER — TELEPHONE (OUTPATIENT)
Dept: FAMILY MEDICINE | Facility: CLINIC | Age: 28
End: 2018-05-03

## 2018-05-03 NOTE — TELEPHONE ENCOUNTER
Pt has [employer]  coming out to see her on Monday at 2pm, will call her with reminder    Tuesday she is coming to clinic for med set - Agustina to do this while Sierra is out    Will call Monday with reminder    Carlitos Guo has an appt on May 10th in [place] with nephrology will do reminder on Wednesday    Carlitos Newell has an appt she thinks in may soon for hearing and speech, but she does not know when, where, or who referred- trying to figure this one out so I can help her as she wants help setting up a ride.  She understands now to call me when she gets information on any appts    Pt also asked about fruit and veggie program, very excited about this and wants to know when will find out and let her know

## 2018-05-04 ENCOUNTER — TELEPHONE (OUTPATIENT)
Dept: FAMILY MEDICINE | Facility: CLINIC | Age: 28
End: 2018-05-04

## 2018-05-04 ENCOUNTER — TELEPHONE (OUTPATIENT)
Dept: LAB | Facility: CLINIC | Age: 28
End: 2018-05-04

## 2018-05-04 NOTE — TELEPHONE ENCOUNTER
Ana comes out weekly to help fernando and her younger children Osiris and Devin  From [facility] and bring  with.  Today following up on needed appts and communicating - fernando was able to tell them to call me for help.  Great step forward.      Ana comes weekly and Sayda comes monthly    Lbetz

## 2018-05-04 NOTE — TELEPHONE ENCOUNTER
Called with reminder about appt    She has [employer]ales coming to her home on Monday at 2pm    She will come here on Tuesday for a quick med set up with Agustina since Sierra is out for the week    Pt asking about fruit and veggie program and when that will start- will have to find out from Dr Devin Mcgrath

## 2018-05-08 ENCOUNTER — TELEPHONE (OUTPATIENT)
Dept: FAMILY MEDICINE | Facility: CLINIC | Age: 28
End: 2018-05-08

## 2018-05-08 NOTE — TELEPHONE ENCOUNTER
Called pharmacy on Ranitidine, per pharm note to enroll in reminder program, I spoke with pharmacy and they have it enrolled and it goes out tomorrow.  Ubaldoetz

## 2018-05-08 NOTE — TELEPHONE ENCOUNTER
Hayde- Meds refilled for two week period-  She is missing Ranitidine in mon and tues box yellow side- set up all of blue side and she wanted the mon tues that she has used refilled so we did those minus Ranitidine.  Need more ordered.  She sees Elham on 17th and told her to bring meds and we could complete more, also gave her another week box per her request but did not set up until we have more meds.    She complained today of always being tired, and angry, and said her stomach is hurting and burning a lot.    Asked if she was eating when taking all of her meds, she said yes.    She also informed me that she is seeing bed bugs again, I will call  again to get him out to follow up.  She said baby is itching from them.      Pt appeared tired, but more relaxed today, more calm in voice and hand gestures, no scratching her head, and she smiled and laughed a lot with me today.      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

## 2018-05-09 ENCOUNTER — TELEPHONE (OUTPATIENT)
Dept: FAMILY MEDICINE | Facility: CLINIC | Age: 28
End: 2018-05-09

## 2018-05-09 NOTE — TELEPHONE ENCOUNTER
Called pt with reminder about son Juan appt tomorrow with nephrology in [place]    Confirmed and she haider be riding with good ana fall

## 2018-05-11 ENCOUNTER — TELEPHONE (OUTPATIENT)
Dept: FAMILY MEDICINE | Facility: CLINIC | Age: 28
End: 2018-05-11

## 2018-05-11 NOTE — TELEPHONE ENCOUNTER
Pt has an appt with carlitos Newell May 15 [facility]hab, riding with Good ana 10:30am  Appt with Elham cuellar on May 17 at 8:20am Riding with [employer]- phone number is [phone]  Appt for carlitos Guo at 8:20 on May 21 at [facility] riding with good ana    Carlitos Newell is up to date with appts and with dental so he is ready for headstart as well  Has a June 6 appt with [facility] in [place]

## 2018-05-17 ENCOUNTER — OFFICE VISIT (OUTPATIENT)
Dept: PSYCHOLOGY | Facility: CLINIC | Age: 28
End: 2018-05-17
Payer: COMMERCIAL

## 2018-05-17 DIAGNOSIS — F43.10 PTSD (POST-TRAUMATIC STRESS DISORDER): Primary | ICD-10-CM

## 2018-05-17 NOTE — MR AVS SNAPSHOT
After Visit Summary   2018    Hayde LUJAN Say    MRN: [id number]           Patient Information     Date Of Birth          1990        Visit Information        Provider Department      2018 8:20 AM Elham Mcmahan LMFT [facility]        Today's Diagnoses     PTSD (post-traumatic stress disorder)    -  1       Follow-ups after your visit        Your next 10 appointments already scheduled     May 31, 2018  9:00 AM CDT   Return Visit with ANABEL Eckert   [facility] ([facility])    [address] [place] [id number]   [phone]              Who to contact     Please call your clinic at [phone] to:    Ask questions about your health    Make or cancel appointments    Discuss your medicines    Learn about your test results    Speak to your doctor            Additional Information About Your Visit        MyChart Information     StormPinst is an electronic gateway that provides easy, online access to your medical records. With The Flipping Pro's, you can request a clinic appointment, read your test results, renew a prescription or communicate with your care team.     To sign up for StormPinst visit the website at [email or web address]   You will be asked to enter the access code listed below, as well as some personal information. Please follow the directions to create your username and password.     Your access code is: [email or web address]  Expires: 8/15/2018 12:01 PM     Your access code will  in 90 days. If you need help or a new code, please contact your [facility] or call [phone] for assistance.        Care EveryWhere ID     This is your Care EveryWhere ID. This could be used by other organizations to access your [place] medical records  [id number]         Blood Pressure from Last 3 Encounters:   18 113/76   18 110/76   18 107/72    Weight from Last 3 Encounters:   18 140 lb 3.2 oz (63.6  kg)   03/08/18 136 lb 12.8 oz (62.1 kg)   02/07/18 136 lb (61.7 kg)              We Performed the Following     Interactive Complexity add-on (77280)     Psychotherapy 45 min (59108)        Primary Care Provider Office Phone # Fax #    Carlos Manuel Andreas Rivas [phone]       [facility] [address] [place] [address]        Equal Access to Services     DYLAN LEY : Hadii aad ku hadasho Soomaali, waaxda luqadaha, qaybta kaalmada adeegyada, waxay idiin hayaan adeeg kharash la'aan . So [facility] [phone].    ATENCIÓN: Si habla español, tiene a keene disposición servicios gratuitos de asistencia lingüística. [facility] [phone].    We comply with applicable federal civil rights laws and [place] laws. We do not discriminate on the basis of race, color, national origin, age, disability, sex, sexual orientation, or gender identity.            Thank you!     Thank you for choosing [facility]  for your care. Our goal is always to provide you with excellent care. Hearing back from our patients is one way we can continue to improve our services. Please take a few minutes to complete the written survey that you may receive in the mail after your visit with us. Thank you!             Your Updated Medication List - Protect others around you: Learn how to safely use, store and throw away your medicines at www.disposemymeds.org.          This list is accurate as of 5/17/18 12:01 PM.  Always use your most recent med list.                   Brand Name Dispense Instructions for use Diagnosis    calcium carbonate 500 MG chewable tablet    TUMS    150 tablet    Take 1 tablet (500 mg) by mouth 2 times daily as needed for heartburn    Other chronic gastritis without hemorrhage       Capsaicin 0.1 % cream     56.6 g    Apply topically 2 times daily as needed    Chronic right-sided low back pain without sciatica, Neck pain       cetirizine 10 MG tablet    zyrTEC    90 tablet    Take 1  tablet (10 mg) by mouth every evening    Chronic seasonal allergic rhinitis due to fungal spores       docusate sodium 100 MG tablet    COLACE    60 tablet    Take 200 mg by mouth daily    Constipation, unspecified constipation type       FLONASE 50 MCG/ACT spray   Generic drug:  fluticasone      Spray 1-2 sprays into both nostrils daily as needed for allergies    Seasonal allergic rhinitis due to other allergic trigger       ibuprofen 600 MG tablet    ADVIL/MOTRIN    120 tablet    Take 1 tablet (600 mg) by mouth daily as needed    Episodic tension-type headache, not intractable       ketotifen 0.025 % Soln ophthalmic solution    ZADITOR    1 Bottle    Place 1 drop into both eyes every 12 hours    Acute seasonal allergic rhinitis, unspecified trigger       montelukast 10 MG tablet    SINGULAIR    30 tablet    Take 1 tablet (10 mg) by mouth daily    Acute seasonal allergic rhinitis, unspecified trigger       norgestimate-ethinyl estradiol 0.25-35 MG-MCG per tablet    ORTHO-CYCLEN, SPRINTEC    84 tablet    Take 1 tablet by mouth daily    Encounter for initial prescription of contraceptive pills       polyethylene glycol powder    MIRALAX    510 g    Take 17 g (1 capful) by mouth daily    Constipation, unspecified constipation type       prazosin 1 MG capsule    MINIPRESS    60 capsule    Take 2 capsules (2 mg) by mouth At Bedtime    PTSD (post-traumatic stress disorder)       QUEtiapine 50 MG tablet    SEROQUEL    30 tablet    Take 1 tablet (50 mg) by mouth At Bedtime    PTSD (post-traumatic stress disorder)       ranitidine 150 MG tablet    ZANTAC    30 tablet    Take 1 tablet (150 mg) by mouth At Bedtime    Gastroesophageal reflux disease, esophagitis presence not specified       triamcinolone 0.1 % ointment    KENALOG    30 g    Apply sparingly to affected area three times daily for 14 days.    Infestation by bed bug       * venlafaxine 75 MG 24 hr capsule    EFFEXOR-XR    30 capsule    Take 1 capsule by mouth  daily (together with 150 mg capsule for total of 225 mg/day)    PTSD (post-traumatic stress disorder), Major depressive disorder, recurrent episode, moderate (H)       * venlafaxine 150 MG 24 hr capsule    EFFEXOR-XR    30 capsule    Take 1 capsule by mouth daily (together with 75 mg capsule for total of 225 mg/day)    PTSD (post-traumatic stress disorder), Major depressive disorder, recurrent episode, moderate (H)       * Notice:  This list has 2 medication(s) that are the same as other medications prescribed for you. Read the directions carefully, and ask your doctor or other care provider to review them with you.
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
## 2018-05-18 ENCOUNTER — TELEPHONE (OUTPATIENT)
Dept: FAMILY MEDICINE | Facility: CLINIC | Age: 28
End: 2018-05-18

## 2018-05-18 NOTE — TELEPHONE ENCOUNTER
Called pt with reminder for Mondays appt for son Brant    Encouraged her to bring her meds so we can get those reset    Pharm D to see her on Monday to set up meds    Inquired about a live in relative - age, needs, medical.  Aunt is over 80 and becoming more dependent on family which means on Hayde, and this is causing her some stress and anger.  Asked about what support she has to help aunt and there is a pca, but they may not be in enough to meet the aunts increasing needs.  Ana is there today along with an  too so they were helping navigate conversation as well on their end.  They will explore some more about aunts needs.  The aunt does not doctor with us, per hayde, I was hoping we might be able to assist some and be more aware of her needs but we do not have records.    Ramila

## 2018-05-21 ENCOUNTER — OFFICE VISIT (OUTPATIENT)
Dept: PHARMACY | Facility: CLINIC | Age: 28
End: 2018-05-21
Payer: COMMERCIAL

## 2018-05-21 ENCOUNTER — TELEPHONE (OUTPATIENT)
Dept: FAMILY MEDICINE | Facility: CLINIC | Age: 28
End: 2018-05-21

## 2018-05-21 DIAGNOSIS — Z71.89 ENCOUNTER FOR MEDICATION REVIEW AND COUNSELING: Primary | ICD-10-CM

## 2018-05-21 DIAGNOSIS — K59.00 CONSTIPATION, UNSPECIFIED CONSTIPATION TYPE: ICD-10-CM

## 2018-05-21 DIAGNOSIS — K29.70 HELICOBACTER PYLORI GASTRITIS: ICD-10-CM

## 2018-05-21 DIAGNOSIS — J30.1 CHRONIC SEASONAL ALLERGIC RHINITIS DUE TO POLLEN: ICD-10-CM

## 2018-05-21 DIAGNOSIS — B96.81 HELICOBACTER PYLORI GASTRITIS: ICD-10-CM

## 2018-05-21 NOTE — MR AVS SNAPSHOT
After Visit Summary   2018    Hayde LUJAN Say    MRN: [id number]           Patient Information     Date Of Birth          1990        Visit Information        Provider Department      2018 8:40 AM Sierra Scott RPH [facility]        Today's Diagnoses     Encounter for medication review and counseling    -  1    Chronic seasonal allergic rhinitis due to pollen        Constipation, unspecified constipation type        Helicobacter pylori gastritis           Follow-ups after your visit        Your next 10 appointments already scheduled     2018  2:00 PM CDT   Return Visit with Sierra Scott RPH [facility] ([facility])    [address] [place] [id number] [phone]              Who to contact     Please call your clinic at [phone] to:    Ask questions about your health    Make or cancel appointments    Discuss your medicines    Learn about your test results    Speak to your doctor            Additional Information About Your Visit        MyChart Information     Ounert is an electronic gateway that provides easy, online access to your medical records. With Smart Wire Grid, you can request a clinic appointment, read your test results, renew a prescription or communicate with your care team.     To sign up for Ounert visit the website at [email or web address]   You will be asked to enter the access code listed below, as well as some personal information. Please follow the directions to create your username and password.     Your access code is: [email or web address]  Expires: 8/15/2018 12:01 PM     Your access code will  in 90 days. If you need help or a new code, please contact your [facility] or call [phone] for assistance.        Care EveryWhere ID     This is your Care EveryWhere ID. This could be used by other organizations to access your [facility]  records  [id number]         Blood Pressure from Last 3 Encounters:   04/18/18 113/76   04/13/18 110/76   03/30/18 107/72    Weight from Last 3 Encounters:   04/18/18 140 lb 3.2 oz (63.6 kg)   03/08/18 136 lb 12.8 oz (62.1 kg)   02/07/18 136 lb (61.7 kg)              We Performed the Following     MTM, EA ADDITIONAL 15 MIN (78570) x 3 (= 45 min.)        Primary Care Provider Office Phone # Fax #    Carlos Manuel Andreas Rivas [phone]       [facility] [address] [place] [address]        Equal Access to Services     DYLAN LEY : Hadii raven roberts hadasho Soomaali, waaxda luqadaha, qaybta kaalmada adeegyada, nakul duarte . So [facility] [phone].    ATENCIÓN: Si habla español, tiene a keene disposición servicios gratuitos de asistencia lingüística. LlThe [facility] [phone].    We comply with applicable federal civil rights laws and [place] laws. We do not discriminate on the basis of race, color, national origin, age, disability, sex, sexual orientation, or gender identity.            Thank you!     Thank you for choosing [facility]  for your care. Our goal is always to provide you with excellent care. Hearing back from our patients is one way we can continue to improve our services. Please take a few minutes to complete the written survey that you may receive in the mail after your visit with us. Thank you!             Your Updated Medication List - Protect others around you: Learn how to safely use, store and throw away your medicines at www.disposemymeds.org.          This list is accurate as of 5/21/18 11:59 PM.  Always use your most recent med list.                   Brand Name Dispense Instructions for use Diagnosis    calcium carbonate 500 MG chewable tablet    TUMS    150 tablet    Take 1 tablet (500 mg) by mouth 2 times daily as needed for heartburn    Other chronic gastritis without hemorrhage       Capsaicin 0.1 % cream     56.6 g     Apply topically 2 times daily as needed    Chronic right-sided low back pain without sciatica, Neck pain       cetirizine 10 MG tablet    zyrTEC    90 tablet    Take 1 tablet (10 mg) by mouth every evening    Chronic seasonal allergic rhinitis due to fungal spores       docusate sodium 100 MG tablet    COLACE    60 tablet    Take 200 mg by mouth daily    Constipation, unspecified constipation type       FLONASE 50 MCG/ACT spray   Generic drug:  fluticasone      Spray 1-2 sprays into both nostrils daily as needed for allergies    Seasonal allergic rhinitis due to other allergic trigger       ibuprofen 600 MG tablet    ADVIL/MOTRIN    120 tablet    Take 1 tablet (600 mg) by mouth daily as needed    Episodic tension-type headache, not intractable       ketotifen 0.025 % Soln ophthalmic solution    ZADITOR    1 Bottle    Place 1 drop into both eyes every 12 hours    Acute seasonal allergic rhinitis, unspecified trigger       montelukast 10 MG tablet    SINGULAIR    30 tablet    Take 1 tablet (10 mg) by mouth daily    Acute seasonal allergic rhinitis, unspecified trigger       norgestimate-ethinyl estradiol 0.25-35 MG-MCG per tablet    ORTHO-CYCLEN, SPRINTEC    84 tablet    Take 1 tablet by mouth daily    Encounter for initial prescription of contraceptive pills       polyethylene glycol powder    MIRALAX    510 g    Take 17 g (1 capful) by mouth daily    Constipation, unspecified constipation type       prazosin 1 MG capsule    MINIPRESS    60 capsule    Take 2 capsules (2 mg) by mouth At Bedtime    PTSD (post-traumatic stress disorder)       QUEtiapine 50 MG tablet    SEROQUEL    30 tablet    Take 1 tablet (50 mg) by mouth At Bedtime    PTSD (post-traumatic stress disorder)       ranitidine 150 MG tablet    ZANTAC    30 tablet    Take 1 tablet (150 mg) by mouth At Bedtime    Gastroesophageal reflux disease, esophagitis presence not specified       triamcinolone 0.1 % ointment    KENALOG    30 g    Apply sparingly to  affected area three times daily for 14 days.    Infestation by bed bug       * venlafaxine 75 MG 24 hr capsule    EFFEXOR-XR    30 capsule    Take 1 capsule by mouth daily (together with 150 mg capsule for total of 225 mg/day)    PTSD (post-traumatic stress disorder), Major depressive disorder, recurrent episode, moderate (H)       * venlafaxine 150 MG 24 hr capsule    EFFEXOR-XR    30 capsule    Take 1 capsule by mouth daily (together with 75 mg capsule for total of 225 mg/day)    PTSD (post-traumatic stress disorder), Major depressive disorder, recurrent episode, moderate (H)       * Notice:  This list has 2 medication(s) that are the same as other medications prescribed for you. Read the directions carefully, and ask your doctor or other care provider to review them with you.

## 2018-05-21 NOTE — TELEPHONE ENCOUNTER
Called to see how last meeting with Hayde went and if they had worked on a care plan yet and what hayde is expressing as her needs and concerns.    Ramila

## 2018-05-21 NOTE — PROGRESS NOTES
S: Seen today for pillbox fill during her son's C. Seen with Maryann olea. Medications and pillboxes brought to clinic.     # Medication adherence: Concern for lack of follow up as previously agreed upon. Patient does not provide reason for this. Does acknowledge that she has had days recently where she went without medicines. When discuss strategies for pillbox fills, patient reports will return to clinic every 2 weeks currently for pillbox fill.     # GERD: Tums in pillbox. Patient taking 2 tablets daily. Not able to explain if takes because has reflux/symptoms or if takes because in box. Also has Ibuprofen 600 mg tablet in each box. Similar concern as above. Patient comments that sometimes takes 2 tablets.     # Constipation: Concern per patient. Has not been taking Docusate as had been previously in the morning (not in previous pillbox fills because she would take out of bottle on own).    # Allergies: Concern for lack of symptom control per patient. Two bottles of each nasal spray, eye drops.    O:     Patient Active Problem List   Diagnosis     Health Care Home     Recurrent major depressive disorder (H)     Seasonal allergic rhinitis     PTSD (post-traumatic stress disorder)     BRENDA (generalized anxiety disorder)         A/P:    # Medication adherence: No medication changes per last visit w/ Dr. Rivas. Pillbox filled x 2 weeks as follows. Follow up visit scheduled with [facility] prior to patient leaving. Need to ensure all of the following medicines are on auto refill from [facility]. Will work with Care Coordinator to ensure this.     Daily medicines   Venlafaxine 75 mg capsule x1   Venlafaxine 150 mg capsule x1  Quetiapine 25 mg tablet x2  Prazosin 1 mg capsule x2   Cetirizine 10 mg tablet x1   Montelukast 10 mg tablet x1   Ranitidine 150 mg tablet x1  Docusate 100 mg capsule x1     # GERD: Concern for Tums contributing to constipation. And likely not needed in scheduled fashion if can ensure adherence to  Ranitidine. Believe has congtrolled symptoms in past. Advised against scheduled use of Ibuprofen if not needed due to risk of exacerbating GERD, again as have discussed previously. Have repeatedly reviewed dosing of Ibuprofen. Not accepted by patient. Unclear, but could consider confirmation of H pylori eradication. Will work with PCP to determine if test appropriate.     # Constipation: To simplify drug regimen, Docusate added to pillbox as above. Reviewed use of Miralax if needed.     # Allergies: Nonadherence to agents likely leading to worsening symptoms. Reviewed use of eye drop and nasal spray.       Options for treatment and/or follow-up care were reviewed with the patient. Hayde was engaged and actively involved in the decision making process. She verbalized understanding of the options discussed and was satisfied with the final plan. Patient was provided with written instructions/medication list via AVS.    Dr. Rivas was provided our recommendations via routed note and Dr. Jessica was available for supervision during this visit and is the authorizing prescriber for this visit through the pharmacist collaborative practice agreement.    Thank you for the opportunity to participate in the care of this patient.  Sierra Scott, Pharm.D.  [facility]: [phone]    Current Outpatient Prescriptions   Medication Sig Dispense Refill     calcium carbonate (TUMS) 500 MG chewable tablet Take 1 tablet (500 mg) by mouth 2 times daily as needed for heartburn 150 tablet 3     Capsaicin 0.1 % cream Apply topically 2 times daily as needed 56.6 g 3     cetirizine (ZYRTEC) 10 MG tablet Take 1 tablet (10 mg) by mouth every evening 90 tablet 3     docusate sodium (COLACE) 100 MG tablet Take 200 mg by mouth daily 60 tablet 3     fluticasone (FLONASE) 50 MCG/ACT spray Spray 1-2 sprays into both nostrils daily as needed for allergies       ibuprofen (ADVIL/MOTRIN) 600 MG tablet Take 1 tablet (600 mg) by  mouth daily as needed 120 tablet 1     ketotifen (ZADITOR) 0.025 % SOLN ophthalmic solution Place 1 drop into both eyes every 12 hours 1 Bottle 3     montelukast (SINGULAIR) 10 MG tablet Take 1 tablet (10 mg) by mouth daily 30 tablet 11     norgestimate-ethinyl estradiol (ORTHO-CYCLEN, SPRINTEC) 0.25-35 MG-MCG per tablet Take 1 tablet by mouth daily 84 tablet 3     polyethylene glycol (MIRALAX) powder Take 17 g (1 capful) by mouth daily 510 g 1     prazosin (MINIPRESS) 1 MG capsule Take 2 capsules (2 mg) by mouth At Bedtime 60 capsule 3     QUEtiapine (SEROQUEL) 50 MG tablet Take 1 tablet (50 mg) by mouth At Bedtime 30 tablet 1     ranitidine (ZANTAC) 150 MG tablet Take 1 tablet (150 mg) by mouth At Bedtime 30 tablet 3     triamcinolone (KENALOG) 0.1 % ointment Apply sparingly to affected area three times daily for 14 days. 30 g 1     venlafaxine (EFFEXOR-XR) 150 MG 24 hr capsule Take 1 capsule by mouth daily (together with 75 mg capsule for total of 225 mg/day) 30 capsule 11     venlafaxine (EFFEXOR-XR) 75 MG 24 hr capsule Take 1 capsule by mouth daily (together with 150 mg capsule for total of 225 mg/day) 30 capsule 11        Drug therapy problems identified:  Medical Condition 1: GERD, Goals of therapy: Not at goal, Drug Class: Other (Tums), Safety: Drug - disease interaction,  , Intervention: Change dose, Educate patient, Verification: Patient Agreed - OTC  Medical Condition 2: GERD, Goals of therapy 2: Not at goal, Drug Class 2: Antibiotic,  , Efficacy 2: Lab test needed to determine efficacy, Intervention 2: Order lab, Verification 2: Recommedation to Provider  Medical Condition 3: Constipation, Goals of therapy 3: Not at goal, Drug Class 3: Other (Stool softener), Convenience 3: Pt unable to administer correctly, Intervention 3: Add device or process to assist use, Verification 3: Patient Agreed - OTC  Medical Condition 4: Pain (i.e. somatic, visceral, neuropathic), Goals of therapy 4: Not at goal, Drug Class  4: Analgesic, Non-opioid, Safety 4: Drug - disease interaction, Dose too high, Intervention 4: Educate patient, Verification 4: Patient Agreed - Compliance/Education    Relevant medical devices: n/a    # of medical conditions addressed: 3  # of medications addressed: 12  # of DTP identified: 4  Time spent: 30 minutes  Level of service per [place] DHS guidelines: 4        Due to patient being non-English speaking/uses sign language, an  was used for this visit. Only for face-to-face interpretation by an external agency, date and length of interpretation can be found on the scanned worksheet.     name: Hsa Bertram  Agency: [employer]  Language: Maryann   Telephone number: [phone]  Type of interpretation: Face-to-face, spoken

## 2018-05-21 NOTE — Clinical Note
Dr. Rivas - should we consider H pylori antigen test to confirm eradication?   Agustina - can you make sure that both strengths of her Effexor are enrolled in the auto refill program w/ [facility]. I worry I screwed that up when we had to fill that one time at [facility].

## 2018-05-23 ENCOUNTER — TELEPHONE (OUTPATIENT)
Dept: FAMILY MEDICINE | Facility: CLINIC | Age: 28
End: 2018-05-23

## 2018-05-23 NOTE — TELEPHONE ENCOUNTER
Spoke with [facility] worker See Bernadine today    She has completed her initial assessment with pt and found that patients main goal right now is immigration  She wants to be able to help her but sounds unclear as to how to go about it.  She will look into what steps need to be taken since she has previously applied and I let her know our  therapist is working on an exemption letter from testing.      Goal of the team is to reduce this stressor so patient can move forward into other areas of treatment plan and care planning for her needs    juan david

## 2018-05-31 ENCOUNTER — OFFICE VISIT (OUTPATIENT)
Dept: PSYCHOLOGY | Facility: CLINIC | Age: 28
End: 2018-05-31
Payer: COMMERCIAL

## 2018-05-31 ENCOUNTER — CARE COORDINATION (OUTPATIENT)
Dept: FAMILY MEDICINE | Facility: CLINIC | Age: 28
End: 2018-05-31

## 2018-05-31 DIAGNOSIS — F43.10 PTSD (POST-TRAUMATIC STRESS DISORDER): Primary | ICD-10-CM

## 2018-05-31 DIAGNOSIS — F33.3 SEVERE EPISODE OF RECURRENT MAJOR DEPRESSIVE DISORDER, WITH PSYCHOTIC FEATURES (H): ICD-10-CM

## 2018-05-31 NOTE — MR AVS SNAPSHOT
After Visit Summary   2018    Hayde LUJAN Say    MRN: [id number]           Patient Information     Date Of Birth          1990        Visit Information        Provider Department      2018 9:00 AM Elham Mcmahan, LMFT [facility]        Today's Diagnoses     PTSD (post-traumatic stress disorder)    -  1    Severe episode of recurrent major depressive disorder, with psychotic features (H)           Follow-ups after your visit        Your next 10 appointments already scheduled     2018  2:00 PM CDT   Return Visit with Sierra Scott, RPH [facility] ([facility])    [address] [place] [id number]   [phone]            2018  9:00 AM CDT   Return Visit with Elham Mcmahan LMFT [facility] ([facility])    [address] [place] [id number]   [phone]              Who to contact     Please call your clinic at [phone] to:    Ask questions about your health    Make or cancel appointments    Discuss your medicines    Learn about your test results    Speak to your doctor            Additional Information About Your Visit        MyChart Information     [employer] is an electronic gateway that provides easy, online access to your medical records. With [employer], you can request a clinic appointment, read your test results, renew a prescription or communicate with your care team.     To sign up for [employer] visit the website at [email or web address]   You will be asked to enter the access code listed below, as well as some personal information. Please follow the directions to create your username and password.     Your access code is: [email or web address]  Expires: 8/15/2018 12:01 PM     Your access code will  in 90 days. If you need help or a new code, please contact your [facility] or call [phone] for assistance.        Care EveryWhere ID     This is  your Care EveryWhere ID. This could be used by other organizations to access your [place] medical records  [id number]         Blood Pressure from Last 3 Encounters:   04/18/18 113/76   04/13/18 110/76   03/30/18 107/72    Weight from Last 3 Encounters:   04/18/18 140 lb 3.2 oz (63.6 kg)   03/08/18 136 lb 12.8 oz (62.1 kg)   02/07/18 136 lb (61.7 kg)              We Performed the Following     Interactive Complexity add-on (53343)     Psychotherapy 60 min (87083)        Primary Care Provider Office Phone # Fax #    Carlos Manuel Andreas Rivas [phone]       [facility] [address] [place] [address]        Equal Access to Services     DYLAN LEY : Hadii raven roberts hadasho Soomaali, waaxda luqadaha, qaybta kaalmada adeegyada, nakul gil hayjuan duarte . So [facility] [phone].    ATENCIÓN: Si habla español, tiene a keene disposición servicios gratuitos de asistencia lingüística. Llame al [phone].    We comply with applicable federal civil rights laws and [place] laws. We do not discriminate on the basis of race, color, national origin, age, disability, sex, sexual orientation, or gender identity.            Thank you!     Thank you for choosing [facility]  for your care. Our goal is always to provide you with excellent care. Hearing back from our patients is one way we can continue to improve our services. Please take a few minutes to complete the written survey that you may receive in the mail after your visit with us. Thank you!             Your Updated Medication List - Protect others around you: Learn how to safely use, store and throw away your medicines at www.disposemymeds.org.          This list is accurate as of 5/31/18 11:59 PM.  Always use your most recent med list.                   Brand Name Dispense Instructions for use Diagnosis    calcium carbonate 500 MG chewable tablet    TUMS    150 tablet    Take 1 tablet (500 mg) by mouth 2 times  daily as needed for heartburn    Other chronic gastritis without hemorrhage       Capsaicin 0.1 % cream     56.6 g    Apply topically 2 times daily as needed    Chronic right-sided low back pain without sciatica, Neck pain       cetirizine 10 MG tablet    zyrTEC    90 tablet    Take 1 tablet (10 mg) by mouth every evening    Chronic seasonal allergic rhinitis due to fungal spores       docusate sodium 100 MG tablet    COLACE    60 tablet    Take 200 mg by mouth daily    Constipation, unspecified constipation type       FLONASE 50 MCG/ACT spray   Generic drug:  fluticasone      Spray 1-2 sprays into both nostrils daily as needed for allergies    Seasonal allergic rhinitis due to other allergic trigger       ibuprofen 600 MG tablet    ADVIL/MOTRIN    120 tablet    Take 1 tablet (600 mg) by mouth daily as needed    Episodic tension-type headache, not intractable       ketotifen 0.025 % Soln ophthalmic solution    ZADITOR    1 Bottle    Place 1 drop into both eyes every 12 hours    Acute seasonal allergic rhinitis, unspecified trigger       montelukast 10 MG tablet    SINGULAIR    30 tablet    Take 1 tablet (10 mg) by mouth daily    Acute seasonal allergic rhinitis, unspecified trigger       norgestimate-ethinyl estradiol 0.25-35 MG-MCG per tablet    ORTHO-CYCLEN, SPRINTEC    84 tablet    Take 1 tablet by mouth daily    Encounter for initial prescription of contraceptive pills       polyethylene glycol powder    MIRALAX    510 g    Take 17 g (1 capful) by mouth daily    Constipation, unspecified constipation type       prazosin 1 MG capsule    MINIPRESS    60 capsule    Take 2 capsules (2 mg) by mouth At Bedtime    PTSD (post-traumatic stress disorder)       QUEtiapine 50 MG tablet    SEROQUEL    30 tablet    Take 1 tablet (50 mg) by mouth At Bedtime    PTSD (post-traumatic stress disorder)       ranitidine 150 MG tablet    ZANTAC    30 tablet    Take 1 tablet (150 mg) by mouth At Bedtime    Gastroesophageal reflux  disease, esophagitis presence not specified       triamcinolone 0.1 % ointment    KENALOG    30 g    Apply sparingly to affected area three times daily for 14 days.    Infestation by bed bug       * venlafaxine 75 MG 24 hr capsule    EFFEXOR-XR    30 capsule    Take 1 capsule by mouth daily (together with 150 mg capsule for total of 225 mg/day)    PTSD (post-traumatic stress disorder), Major depressive disorder, recurrent episode, moderate (H)       * venlafaxine 150 MG 24 hr capsule    EFFEXOR-XR    30 capsule    Take 1 capsule by mouth daily (together with 75 mg capsule for total of 225 mg/day)    PTSD (post-traumatic stress disorder), Major depressive disorder, recurrent episode, moderate (H)       * Notice:  This list has 2 medication(s) that are the same as other medications prescribed for you. Read the directions carefully, and ask your doctor or other care provider to review them with you.

## 2018-05-31 NOTE — PROGRESS NOTES
Behavioral Health Progress Note    Client Legal Name: Hayde B Say   Client Preferred Name: Hayde   Service Type: Individual  Length of Visit: 55 minutes  Attendees:     name: Josiane Douglas  Agency: [employer]  Language: Maryann   Telephone number:[phone]  Type of interpretation: Face-to-face, spoken    Complexity statement: Due to patient being non-English speaking, an  was used for this visit. An  is used not only to interpret language, since the patient does not speak English, but also to help with the complexity of understandings across cultures, since the patient is not well integrated in the larger [place] culture.      Identifying Information and Presenting Problem:    The patient is a 27 year old Maryann female who is being seen for problematic symptoms of PTSD, .worry, depression, anger/stress management.     Treatment Objective(s) Addressed in This Session:  learn more adaptive ways to mange worrries and anger management      Progress on / Status of Treatment Objective(s) / Homework:  Minimal progress       PHQ-9 SCORE 11/29/2017 1/9/2018 1/22/2018   Total Score - - -   Total Score 8 9 6       BRENDA-7 SCORE 4/26/2017 8/29/2017 1/22/2018   Total Score 8 15 14       Topics Discussed/Interventions Provided:  Patient did not speak for the first 10 min of the appointment. She appeared tired, stressed, upset, but did not share what was going on despite a combination of direct and gentle questioning, and allowing patient to have her space. Eventually, she asked to see her care coordinator Agustina because she had forms for her. I brought Agustina in and patient became a little more open. She shared that neighbors have been teasing her and calling her names when she goes outside to take a break. Agustina and I tried hard to help reinforce specific self care strategies and phrases she can remind herself that improve self-esteem (I am worthy, I am beautiful, I am strong etc).     Citizenship:  per patient's request at last appt, I investigated more about patient's abilities to function, understand English,etc for citizenship exam. She has a 3rd grade education, obtained in a refugee [place]. She was not taught to read and write. Stopped going to school to help in their home.  Per patient, she was required to do 3 months of English classes upon moving to [place]. She reports she had a very hard time learning, to the point where others made fun of her. She was encouraged not to return to classes because she was not learning. Most recently, she has been doing more 'English classes' that sounds like a combination of English learning and life skills (learning to use a phone, make appoinments). Life skills portion is taught by Maryann speaking teachers. She reports the most helpful parts were related to 'eating and drinking'. She has been attending on/off for a year. She is teased by classmates because when she tries to read in English, she is incorrect. She reports she cannot read at all in English and that it is 'very hard' to understand spoken English.     Home resource needs: patient reports they have very little furniture, no beds, all sleep on the floor. I will connect with care coordinator, Agustina, to discuss getting some mattresses from [employer].    Assessment: The patient appeared to be active and engaged in today's session and was receptive to feedback. In previous appointments, patient has stated she has a hard time understanding sometimes when others speak in her same language. I often witness her and the  speaking back and forth, and the  will say they are trying to understand what Hayde is saying because it is not always relevant/appropriate, and other times Hayde is not understanding them. Therefore, it is unsurprising that patient has struggled significantly to learn English and I do suspect Hayde has an underlying learning disability or low cognitive functioning that hinder her progress  in therapy and ability to understand/retain information, especially not in her native language.     Mental Status: Hayde appeared generally alert and oriented. Dress was casual and appropriate to the weather and occasion. Grooming and hygiene were clean. Eye contact was minimal. Speech was of normal volume and rate and was clear, coherent, and relevant. Mood was depressed with congruent affect. Thought processes were relevant, logical and goal-directed. Thought content was WNL with no evidence of psychotic or paranoid features. No evidence of SI/HI or self-harm, intent, or plans. Memory appeared grossly intact. Insight and judgment appeared fair/poor and patient exhibited good impulse control during the appointment.     Does the patient appear to be at imminent risk of harm to self/others at this time? No    The session was necessary to address worry, depression, PTSD that have been interfering with patient's ability to function at work, home, personal life management, parenting.  Ongoing psychotherapy is necessary to improve functioning with daily activities, provide psychoeducation and provide support.    Diagnosis (DSM-5):  296.32 recurrent moderate major depression  308.3 PTSD      Plan:  1. Follow up in 2-3 weeks. Discuss how we can help with citizenship, purpose of psychotherapy.          NOTE: Treatment plan update due 7/5/18.  Diagnostic assessment update due 4/26/19

## 2018-05-31 NOTE — PROGRESS NOTES
Pt was in to see Therapist today, she was tearful and reluctant to talk about why.  I was invited in to meet with pt and with us teaming pt was able to express sadness due to someone in her building saying mean things to her today.  We talked about all of her wonderful qualities and that when someone says something mean, it is not about her, that is about that person not being a nice person.  As we told her her positive qualities we were able to get a smile and laugh.  She also wanted this writer to come in as she had some questions regarding her [employer] school schedule.  I was able to go through school paperwork, and school calender and explain with the support of Maryann .    Concerns I support for pt include:   Filling out paperwork for schools for her children  Scheduling of appts for her and children and arrange insurance rides  Reminder calls- Pt struggles with a  Lack of short term memory even with next day appts  Pt overwhelmed with the care of four children and an aging aunt in the home  Pt does not read or write in English or in Maryann  Can become overwhelmed when questioning, need to ask one at a time and very slowly, reiterating often  Pt gets help with medication set up at our clinic, unable to read direction  Pt tries really hard to be a good person, help her family, be kind to others, follow the law, takes her childrens education very seriously as she has only a 3rd grade modified education.    Trust issues, and a lot of fear from traumatic history in refugee camp  Family living in a two bedroom with no beds and furniture      Called pt in afternoon to review family expenses and income, expenses exceed monthly income, looking for more supports to help family

## 2018-06-01 ENCOUNTER — TELEPHONE (OUTPATIENT)
Dept: FAMILY MEDICINE | Facility: CLINIC | Age: 28
End: 2018-06-01

## 2018-06-01 NOTE — TELEPHONE ENCOUNTER
Called to ensure both her 150 and 75 effexor are on the auto refill and send out program.    Both are enrolled and they are refilling the 150 today and sending out on second run today so she will have for mondays pillbox refill.    Ramila

## 2018-06-04 ENCOUNTER — CARE COORDINATION (OUTPATIENT)
Dept: FAMILY MEDICINE | Facility: CLINIC | Age: 28
End: 2018-06-04

## 2018-06-04 ENCOUNTER — TELEPHONE (OUTPATIENT)
Dept: FAMILY MEDICINE | Facility: CLINIC | Age: 28
End: 2018-06-04

## 2018-06-04 ENCOUNTER — OFFICE VISIT (OUTPATIENT)
Dept: PHARMACY | Facility: CLINIC | Age: 28
End: 2018-06-04
Payer: COMMERCIAL

## 2018-06-04 DIAGNOSIS — Z79.899 MEDICATION MANAGEMENT: Primary | ICD-10-CM

## 2018-06-04 DIAGNOSIS — F33.3 SEVERE EPISODE OF RECURRENT MAJOR DEPRESSIVE DISORDER, WITH PSYCHOTIC FEATURES (H): ICD-10-CM

## 2018-06-04 DIAGNOSIS — J30.1 CHRONIC SEASONAL ALLERGIC RHINITIS DUE TO POLLEN: ICD-10-CM

## 2018-06-04 DIAGNOSIS — K59.00 CONSTIPATION, UNSPECIFIED CONSTIPATION TYPE: ICD-10-CM

## 2018-06-04 NOTE — PROGRESS NOTES
"S: Seen today for medication review and counseling per Dr. Rivas.       No concerns with medicines. Taking as filled in pillbox.     Patient expresses concern for weight gain.     Additional concern for fatigue. Reports \"heart pounding\" mostly in evening. Food makes better. With these episodes also experiences dizziness / room spinning. Has been happening over the last month. Has to lay down to relieve. Move around make worse. States this occurs before taking her evening medicines. Does not think it is related to her medicines.    States weekly therapy is \"too much\"    No diarrhea concerns. No constipation concerns. If doesn't take Docusate, then constipation.     Ranitidine is helpful. However spicy foods still upset her stomach. Takes Tums PRN.       O:     Patient Active Problem List   Diagnosis     Health Care Home     Recurrent major depressive disorder (H)     Seasonal allergic rhinitis     PTSD (post-traumatic stress disorder)     BRENDA (generalized anxiety disorder)         A/P:     Constipation regimen and GERD regimen effective - no change.     Metabolic adverse effects known with second generation antipsychotics. Started on Quetiapine 1/2018. Weight around initiation 60 kg, today 63 kg. About 5% weight gain. Unfortunately expected and common with this agent. Requires ongoing monitoring.         Overdue for monitoring of metabolic related labs. Schedule outlined below in green. Resource borrowed from Dr. Vásquez.     ATYPICAL ANTIPSYCHOTIC  Lab Schedule         LABS   Baseline    @1 mo   @2 mos   @3 mos   q3 mos     @6 mos     q12 mos    other / prn   fasting  BMP  [gluc pre-diab 100-125]   order  fasting  CMP       fasting gluc          fasting BMP       A1c   A1c       A1c       A1c      fasting lipid  panel reflex to  direct LDL     for LDL value     for LDL value       for LDL value        CBC w/plts   CBC w/plts       CBC w/plts  if concerned         CBC w/plts      LFTs order   fasting CMP          If " concerned       TSH w/free T4 reflex      if concerned           if concerned   prolactin level if using risperidone or  paliperidone        if concerned  if concerned      vitals   vitals      vitals    vitals    vitals                  vitals      waste circumference if concerned or  desired              BMI  [overwt 25-29; obese >30]    BMI       BMI     BMI   BMI   BMI              EKG    if concerned         EKG if:  >2 psychotropics  OR   high dose     AIMS   (& see below)     AIMS          if concerned    AIMS if possible   AIMS      preg test  UDS   if concerned             if concerned     Family Hx:  DM, lipids, obestiy, HTN, QT, CAD   family history  medical              Pillbox empty as expected. Filled as below.    Scheduled for 2 week follow up with self and PCP to assess concerns for fatigue, etc. as outlined above. Unclear but question if related to orthostatic hypotension from Prazosin. Can consider orthostatic vital signs. Will work with PCP to determine if appropriate.       Daily medicines   Venlafaxine 75 mg capsule x1   Venlafaxine 150 mg capsule x1   Quetiapine 50 mg tablet x1   Prazosin 1 mg capsule x2  Cetirizine 10 mg tablet x1  Montelukast 10 mg tablet x1   Ranitidine 150 mg tablet x1   Docusate 100 mg capsule x2        Options for treatment and/or follow-up care were reviewed with the patient. Hayde was engaged and actively involved in the decision making process. She verbalized understanding of the options discussed and was satisfied with the final plan. Patient was provided with written instructions/medication list via AVS.    Dr. Rivas was provided our recommendations via routed note and Dr. Bentley was available for supervision during this visit and is the authorizing prescriber for this visit through the pharmacist collaborative practice agreement.    Thank you for the opportunity to participate in the care of this patient.  Sierra Scott, Pharm.D.  [facility]: [phone]      Due to patient being non-English speaking/uses sign language, an  was used for this visit. Only for face-to-face interpretation by an external agency, date and length of interpretation can be found on the scanned worksheet.     name: Ji  Agency: [employer]  Language: Maryann   Telephone number: [phone]  Type of interpretation: Face-to-face, spoken      TO DO:  -metabolic/antipsychotic labs  -orthostatic vital signs  -H pylori eradication testing      Current Outpatient Prescriptions   Medication Sig Dispense Refill     calcium carbonate (TUMS) 500 MG chewable tablet Take 1 tablet (500 mg) by mouth 2 times daily as needed for heartburn 150 tablet 3     Capsaicin 0.1 % cream Apply topically 2 times daily as needed 56.6 g 3     cetirizine (ZYRTEC) 10 MG tablet Take 1 tablet (10 mg) by mouth every evening 90 tablet 3     docusate sodium (COLACE) 100 MG tablet Take 200 mg by mouth daily 60 tablet 3     fluticasone (FLONASE) 50 MCG/ACT spray Spray 1-2 sprays into both nostrils daily as needed for allergies       ibuprofen (ADVIL/MOTRIN) 600 MG tablet Take 1 tablet (600 mg) by mouth daily as needed 120 tablet 1     ketotifen (ZADITOR) 0.025 % SOLN ophthalmic solution Place 1 drop into both eyes every 12 hours 1 Bottle 3     montelukast (SINGULAIR) 10 MG tablet Take 1 tablet (10 mg) by mouth daily 30 tablet 11     norgestimate-ethinyl estradiol (ORTHO-CYCLEN, SPRINTEC) 0.25-35 MG-MCG per tablet Take 1 tablet by mouth daily 84 tablet 3     polyethylene glycol (MIRALAX) powder Take 17 g (1 capful) by mouth daily 510 g 1     prazosin (MINIPRESS) 1 MG capsule Take 2 capsules (2 mg) by mouth At Bedtime 60 capsule 3     QUEtiapine (SEROQUEL) 50 MG tablet Take 1 tablet (50 mg) by mouth At Bedtime 30 tablet 3     ranitidine (ZANTAC) 150 MG tablet Take 1 tablet (150 mg) by mouth At Bedtime 30 tablet 3     triamcinolone (KENALOG) 0.1 % ointment Apply sparingly to affected area  three times daily for 14 days. 30 g 1     venlafaxine (EFFEXOR-XR) 150 MG 24 hr capsule Take 1 capsule by mouth daily (together with 75 mg capsule for total of 225 mg/day) 30 capsule 11     venlafaxine (EFFEXOR-XR) 75 MG 24 hr capsule Take 1 capsule by mouth daily (together with 150 mg capsule for total of 225 mg/day) 30 capsule 11        Drug therapy problems identified:  Medical Condition 1: Depression, Goals of therapy: Not at goal, Drug Class: Antipsychotic, Safety: Lab test needed, Intervention: Order lab, Verification: Recommendation to Provider  Medical Condition 2: Depression, Goals of therapy 2: Not at goal, Drug Class 2: Alpha-1 blocker, Safety 2: Undesirable effect, Intervention 2: Other (Orthostatic vital signs), Verification 2: Recommedation to Provider  Medical Condition 3: Depression, Goals of therapy 3: At Goal, Drug Class 3: Antipsychotic,  Convenience 3: Patient forgets to take, Intervention 3: Add device or process to assist use, Verification 3: Patient Agreed - Compliance/Education    Relevant medical devices: n/a    # of medical conditions addressed: 3  # of medications addressed: 10  # of DTP identified: 3  Time spent: 30 minutes  Level of service per [place] DHS guidelines: 4

## 2018-06-04 NOTE — TELEPHONE ENCOUNTER
Called pt to let her know I heard from Shawn-  They have a back pack program for kids and she, as a member, will receive back packs for the kids and they will be distributed by Sesar Colindres to her after August 18.    Ramila

## 2018-06-05 ENCOUNTER — TELEPHONE (OUTPATIENT)
Dept: FAMILY MEDICINE | Facility: CLINIC | Age: 28
End: 2018-06-05

## 2018-06-05 NOTE — TELEPHONE ENCOUNTER
Called to follow up with [facility]  See Bernadine today.    Asked how often she sees fernando, she said she calls her monthly to see if she needs anything, but if fernando is not verbalizing or is unable to verbalize her needs she may not be getting needs met    Being asked if you need anything is hard, if you don't know how you can be helped     See Bernadine has face to face to review care plan every 6 months    Said she does struggle with visits as she needs an     Asked if maybe it would be more helpful to put an [facility] worker or ILS in that could be there more often, she said she would talk with fernando about that too    Lastly put in for backpacks for the kids for school this year to receive free back packs for the children attending school    Ramila

## 2018-06-05 NOTE — TELEPHONE ENCOUNTER
Called Pt's teacher advocates from [facility].  Spoke with ana today.    Ana will have her last home visit with pt on Friday June 8, then they stop for the summer and will resume seeing child only as he will be in [facility] next sept 6th when they start.  This will give Ana more time to focus on child as much of her time is spent working with mom and not child.     Last visit, they worked on getting her on the public housing wait list - it opened for 2-3-4 bedrooms    They also completed her application for [employer] benefits    I asked about the home visits, and what the family has in the home and what they need.    She said they have a table and two chairs, lots of pillows and blankets that is it    Ana said the home environment is strange in that at times random people will come in and just eat their food and she is not sure who they are or how they fit in  Sometimes people will be there scaling fish  Stated that people come and go  Her  also does a [employer] study group once a week, which she hates! And wishes the people would not come there, and she is happy when Ana comes at [employer] study time, she likes her and hopes the others are uncomfortable      In terms of stressors and issues that pt continues to struggle with, that might be addressed in therapy are a hyper- focus on losing weight  She weighs in each time she is here, even if it is for one of the sukumar visits, she gets frustrated as she hovers around 140, was down a pound this week at 139, She asked about how to lose weight and amounts of rice were brought up.  Also from Ana on visits at the home, she stated pt is always drinking some sort of Tea or something that is suppose to help her lose weight.    Also, in terms of struggles of the patient, I was told the 3 yr old runs the household, and has delayed communication which does not seem to improve and the child does not seem to need to learn to talk, gets what he needs from everyone, also does  not listen, pt gets frustrated and spanks him, and could use some help with parenting of child or tips.  Poor coping skills as well as directing of child.    Reassurance needed often, pt also likes tangible task oriented solutions, wonder if giving her some exercises to do when frustrated, angry, sad and tying to each emotion, could help her have something to do when feeling that way, but also address weight loss in a healthier manner. If stressed walk, angry do push ups, happy do jumping jacks, ect.  Seems to relate to task better than ideas.  She also gets very overwhelmed by being asked a lot of questions.    Lbetz

## 2018-06-05 NOTE — PROGRESS NOTES
[facility] Follow-up    Call initiated by patient.  Message recorded by Agustina Gramajo  Calling for: Pt wanted to talk, request some help with questions  Patient: Hayde TAI Say  Phone conversation with: Patient  Patient's Phone number/s: Other phone number:  Pt in clinic did face to face  Available today in the PM on their Home number.  OK to leave message on voice mail? Yes      Major diagnoses and/or issues requiring coordination services  BRENDA (generalized anxiety disorder)   PTSD (post-traumatic stress disorder)   Seasonal allergic rhinitis   Recurrent major depressive disorder (H)         In the past month, how many times have you been to the Emergency Room? 0  In the past month, how many times have you been hospitalized? 0    Summary notes: Pt doing well today, in for med set up, but hyper focused on weight loss.  She was asking how to lose weight, she was down a lb this week from last weigh in, but wants to be at 100lbs.  Talked about how that should not be the goal, that is too much to lose, and talked about food choices, less rice, smaller portion, drinking water, and eating more fruits and veggies which she will be able to do when her box comes in.    Self-management goals:  Ask for needed help  Continue to attend appointments  Manage stressors of home, children, spouse, through self calming techniques, working with Elham on  Readiness to change: Somewhat ready  Needs very tangible things to do to fix it in her mind, she struggles with things like deep breathing, meditation, that slows her down which she can not always do with her child at home      Counseling and coordination activities with: patient/family, PCP and specialist:     Follow-up date: Monthly and prn

## 2018-06-06 DIAGNOSIS — F43.10 PTSD (POST-TRAUMATIC STRESS DISORDER): ICD-10-CM

## 2018-06-08 RX ORDER — QUETIAPINE FUMARATE 50 MG/1
50 TABLET, FILM COATED ORAL AT BEDTIME
Qty: 30 TABLET | Refills: 3 | Status: SHIPPED | OUTPATIENT
Start: 2018-06-08 | End: 2018-06-28

## 2018-06-19 ENCOUNTER — OFFICE VISIT (OUTPATIENT)
Dept: FAMILY MEDICINE | Facility: CLINIC | Age: 28
End: 2018-06-19
Payer: COMMERCIAL

## 2018-06-19 ENCOUNTER — CARE COORDINATION (OUTPATIENT)
Dept: FAMILY MEDICINE | Facility: CLINIC | Age: 28
End: 2018-06-19

## 2018-06-19 ENCOUNTER — OFFICE VISIT (OUTPATIENT)
Dept: PHARMACY | Facility: CLINIC | Age: 28
End: 2018-06-19
Payer: COMMERCIAL

## 2018-06-19 VITALS
OXYGEN SATURATION: 100 % | RESPIRATION RATE: 16 BRPM | WEIGHT: 140 LBS | HEART RATE: 73 BPM | DIASTOLIC BLOOD PRESSURE: 70 MMHG | TEMPERATURE: 98.1 F | BODY MASS INDEX: 30.2 KG/M2 | SYSTOLIC BLOOD PRESSURE: 104 MMHG | HEIGHT: 57 IN

## 2018-06-19 DIAGNOSIS — F43.10 PTSD (POST-TRAUMATIC STRESS DISORDER): ICD-10-CM

## 2018-06-19 DIAGNOSIS — F43.10 PTSD (POST-TRAUMATIC STRESS DISORDER): Primary | ICD-10-CM

## 2018-06-19 DIAGNOSIS — R53.83 FATIGUE, UNSPECIFIED TYPE: Primary | ICD-10-CM

## 2018-06-19 LAB
% GRANULOCYTES: 54.8 %G (ref 40–75)
GRANULOCYTES #: 3.6 K/UL (ref 1.6–8.3)
HCT VFR BLD AUTO: 40.7 % (ref 35–47)
HEMOGLOBIN: 12.8 G/DL (ref 11.7–15.7)
LYMPHOCYTES # BLD AUTO: 2.6 K/UL (ref 0.8–5.3)
LYMPHOCYTES NFR BLD AUTO: 39.3 %L (ref 20–48)
MCH RBC QN AUTO: 28.2 PG (ref 26.5–35)
MCHC RBC AUTO-ENTMCNC: 31.4 G/DL (ref 32–36)
MCV RBC AUTO: 89.7 FL (ref 78–100)
MID #: 0.4 K/UL (ref 0–2.2)
MID %: 5.9 %M (ref 0–20)
PLATELET # BLD AUTO: 254 K/UL (ref 150–450)
RBC # BLD AUTO: 4.54 M/UL (ref 3.8–5.2)
TSH SERPL DL<=0.05 MIU/L-ACNC: 4.46 UIU/ML (ref 0.3–5)
WBC # BLD AUTO: 6.6 K/UL (ref 4–11)

## 2018-06-19 NOTE — MR AVS SNAPSHOT
After Visit Summary   6/19/2018    Hayde LUJAN Say    MRN: [id number]           Patient Information     Date Of Birth          1990        Visit Information        Provider Department      6/19/2018 9:20 AM Sierra Scott, RPH [facility]        Today's Diagnoses     PTSD (post-traumatic stress disorder)    -  1       Follow-ups after your visit        Your next 10 appointments already scheduled     Jul 03, 2018  2:00 PM CDT   Return Visit with Sierra Scott, RPH [facility] ([facility])    [address] [place] [id number]   [phone]            Jul 05, 2018  1:20 PM CDT   Return Visit with Flavio Brothers MD   [facility] ([facility])    [address] [place] [id number]   [phone]            Jul 12, 2018  9:00 AM CDT   Return Visit with Elham Mcmahan LMFT [facility] ([facility])    [address] [place] [address]   [phone]              Who to contact     Please call your clinic at [phone] to:    Ask questions about your health    Make or cancel appointments    Discuss your medicines    Learn about your test results    Speak to your doctor            Additional Information About Your Visit        Care EveryWhere ID     This is your Care EveryWhere ID. This could be used by other organizations to access your [facility] medical records  [id number]         Blood Pressure from Last 3 Encounters:   06/28/18 97/64   06/19/18 104/70   04/18/18 113/76    Weight from Last 3 Encounters:   06/28/18 140 lb 6.4 oz (63.7 kg)   06/19/18 140 lb (63.5 kg)   06/04/18 139 lb 3.2 oz (63.1 kg)              We Performed the Following     MTM, EA ADDITIONAL 15 MIN (72455) x 1 (= 15 min.)        Primary Care Provider Office Phone # Fax #    Carlos Manuel Rivas [phone]       [facility] [address] [place] [address]        Equal Access  to Services     CHHAYA LEY : Mason Jones, waameya villalta, qamarshava kajbnakul elizabeth. So [facility] [phone].    ATENCIÓN: Si barbarala jolanta, tiene a keene disposición servicios gratuitos de asistencia lingüística. Llame al [phone].    We comply with applicable federal civil rights laws and [place] laws. We do not discriminate on the basis of race, color, national origin, age, disability, sex, sexual orientation, or gender identity.            Thank you!     Thank you for choosing [facility]  for your care. Our goal is always to provide you with excellent care. Hearing back from our patients is one way we can continue to improve our services. Please take a few minutes to complete the written survey that you may receive in the mail after your visit with us. Thank you!             Your Updated Medication List - Protect others around you: Learn how to safely use, store and throw away your medicines at www.disposemymeds.org.          This list is accurate as of 6/19/18 11:59 PM.  Always use your most recent med list.                   Brand Name Dispense Instructions for use Diagnosis    calcium carbonate 500 MG chewable tablet    TUMS    150 tablet    Take 1 tablet (500 mg) by mouth 2 times daily as needed for heartburn    Other chronic gastritis without hemorrhage       Capsaicin 0.1 % cream     56.6 g    Apply topically 2 times daily as needed    Chronic right-sided low back pain without sciatica, Neck pain       cetirizine 10 MG tablet    zyrTEC    90 tablet    Take 1 tablet (10 mg) by mouth every evening    Chronic seasonal allergic rhinitis due to fungal spores       docusate sodium 100 MG tablet    COLACE    60 tablet    Take 200 mg by mouth daily    Constipation, unspecified constipation type       FLONASE 50 MCG/ACT spray   Generic drug:  fluticasone      Spray 1-2 sprays into both nostrils daily as needed for allergies    Seasonal  allergic rhinitis due to other allergic trigger       ibuprofen 600 MG tablet    ADVIL/MOTRIN    120 tablet    Take 1 tablet (600 mg) by mouth daily as needed    Episodic tension-type headache, not intractable       ketotifen 0.025 % Soln ophthalmic solution    ZADITOR    1 Bottle    Place 1 drop into both eyes every 12 hours    Acute seasonal allergic rhinitis, unspecified trigger       montelukast 10 MG tablet    SINGULAIR    30 tablet    Take 1 tablet (10 mg) by mouth daily    Acute seasonal allergic rhinitis, unspecified trigger       norgestimate-ethinyl estradiol 0.25-35 MG-MCG per tablet    ORTHO-CYCLEN, SPRINTEC    84 tablet    Take 1 tablet by mouth daily    Encounter for initial prescription of contraceptive pills       polyethylene glycol powder    MIRALAX    510 g    Take 17 g (1 capful) by mouth daily    Constipation, unspecified constipation type       prazosin 1 MG capsule    MINIPRESS    60 capsule    Take 2 capsules (2 mg) by mouth At Bedtime    PTSD (post-traumatic stress disorder)       ranitidine 150 MG tablet    ZANTAC    30 tablet    Take 1 tablet (150 mg) by mouth At Bedtime    Gastroesophageal reflux disease, esophagitis presence not specified       triamcinolone 0.1 % ointment    KENALOG    30 g    Apply sparingly to affected area three times daily for 14 days.    Infestation by bed bug       * venlafaxine 75 MG 24 hr capsule    EFFEXOR-XR    30 capsule    Take 1 capsule by mouth daily (together with 150 mg capsule for total of 225 mg/day)    PTSD (post-traumatic stress disorder), Major depressive disorder, recurrent episode, moderate (H)       * venlafaxine 150 MG 24 hr capsule    EFFEXOR-XR    30 capsule    Take 1 capsule by mouth daily (together with 75 mg capsule for total of 225 mg/day)    PTSD (post-traumatic stress disorder), Major depressive disorder, recurrent episode, moderate (H)       * Notice:  This list has 2 medication(s) that are the same as other medications prescribed for  you. Read the directions carefully, and ask your doctor or other care provider to review them with you.

## 2018-06-19 NOTE — NURSING NOTE
Due to patient being non-English speaking/uses sign language, an  was used for this visit. Only for face-to-face interpretation by an external agency, date and length of interpretation can be found on the scanned worksheet.     name: Frances England  Agency: [employer]  Language: Maryann   Telephone number: [phone]  Type of interpretation: Face-to-face, spoken

## 2018-06-19 NOTE — PROGRESS NOTES
Pt in clinic today and was sad, overly tired, said it has been difficult to sleep.  She gets nervous, and taking care of the kids wearing out some.    Pt told dr she was tired because she was not eating in am, short on food.     Able to provide some food today from a donation made to clinic and some clothing to help patient and her children.    Able to get her scheduled back at a good time for family and she is scheduled for our CSA food share this month should be ready next week.    Ramila

## 2018-06-19 NOTE — PROGRESS NOTES
Preceptor Attestation:   Patient seen, evaluated and discussed with the resident, Dr. Jaden Rivas. I have verified the content of the note, which accurately reflects my assessment of the patient and the plan of care.  Supervising Physician:Teagan Snyder MD  [facility]

## 2018-06-19 NOTE — MR AVS SNAPSHOT
After Visit Summary   6/19/2018    Hayde LUJAN Say    MRN: [id number]           Patient Information     Date Of Birth          1990        Visit Information        Provider Department      6/19/2018 9:20 AM Carlos Manuel Rivas MD [facility]        Today's Diagnoses     Fatigue, unspecified type    -  1    PTSD (post-traumatic stress disorder)           Follow-ups after your visit        Follow-up notes from your care team     Return in about 2 weeks (around 7/3/2018).      Your next 10 appointments already scheduled     Jun 28, 2018  2:20 PM CDT   Return Visit with Sierra Scott, RPH [facility] ([facility])    [address] [place] [id number]   [phone]            Jun 28, 2018  2:40 PM CDT   Return Visit with Flavio Brothers MD   [facility] ([facility])    [address] [place] [address]   [phone]            Jul 12, 2018  9:00 AM CDT   Return Visit with Elham Mcmahan LMFT [facility] ([facility])    [address] [place] [address]   [phone]              Who to contact     Please call your clinic at [phone] to:    Ask questions about your health    Make or cancel appointments    Discuss your medicines    Learn about your test results    Speak to your doctor            Additional Information About Your Visit        MyChart Information     Privepasst is an electronic gateway that provides easy, online access to your medical records. With [email or web address], you can request a clinic appointment, read your test results, renew a prescription or communicate with your care team.     To sign up for Privepasst visit the website at [email or web address]   You will be asked to enter the access code listed below, as well as some personal information. Please follow the directions to create your username and password.     Your access code is: [email or web address]  Expires: 8/15/2018 12:01  "PM     Your access code will  in 90 days. If you need help or a new code, please contact your [facility] or call [phone] for assistance.        Care EveryWhere ID     This is your Care EveryWhere ID. This could be used by other organizations to access your [facility] medical records  [id number]        Your Vitals Were     Pulse Temperature Respirations Height Pulse Oximetry BMI (Body Mass Index)    73 98.1  F (36.7  C) (Oral) 16 4' 9\" (144.8 cm) 100% 30.3 kg/m2       Blood Pressure from Last 3 Encounters:   18 104/70   18 113/76   18 110/76    Weight from Last 3 Encounters:   18 140 lb (63.5 kg)   18 139 lb 3.2 oz (63.1 kg)   18 140 lb 3.2 oz (63.6 kg)              We Performed the Following     CBC with Diff Plt ([facility])      : Sign Language or Oral - 83-97 minutes     Thyroid [place] ([facility])        Primary Care Provider Office Phone # Fax #    Carlos Manuel Andreas Rivas [phone]       [facility] [address] [place] [address]        Equal Access to Services     DYLAN LEY AH: Hadii aad ku hadasho Soomaali, waaxda luqadaha, qaybta kaalmada adeegyada, waxay idiin hayninfan justin ibarra lashayne garcía. So [facility] [phone].    ATENCIÓN: Si habla español, tiene a keene disposición servicios gratuitos de asistencia lingüística. Llame al [phone].    We comply with applicable federal civil rights laws and [place] laws. We do not discriminate on the basis of race, color, national origin, age, disability, sex, sexual orientation, or gender identity.            Thank you!     Thank you for choosing [facility]  for your care. Our goal is always to provide you with excellent care. Hearing back from our patients is one way we can continue to improve our services. Please take a few minutes to complete the written survey that you may receive in the mail after your visit with us. Thank " you!             Your Updated Medication List - Protect others around you: Learn how to safely use, store and throw away your medicines at www.disposemymeds.org.          This list is accurate as of 6/19/18 11:59 PM.  Always use your most recent med list.                   Brand Name Dispense Instructions for use Diagnosis    calcium carbonate 500 MG chewable tablet    TUMS    150 tablet    Take 1 tablet (500 mg) by mouth 2 times daily as needed for heartburn    Other chronic gastritis without hemorrhage       Capsaicin 0.1 % cream     56.6 g    Apply topically 2 times daily as needed    Chronic right-sided low back pain without sciatica, Neck pain       cetirizine 10 MG tablet    zyrTEC    90 tablet    Take 1 tablet (10 mg) by mouth every evening    Chronic seasonal allergic rhinitis due to fungal spores       docusate sodium 100 MG tablet    COLACE    60 tablet    Take 200 mg by mouth daily    Constipation, unspecified constipation type       FLONASE 50 MCG/ACT spray   Generic drug:  fluticasone      Spray 1-2 sprays into both nostrils daily as needed for allergies    Seasonal allergic rhinitis due to other allergic trigger       ibuprofen 600 MG tablet    ADVIL/MOTRIN    120 tablet    Take 1 tablet (600 mg) by mouth daily as needed    Episodic tension-type headache, not intractable       ketotifen 0.025 % Soln ophthalmic solution    ZADITOR    1 Bottle    Place 1 drop into both eyes every 12 hours    Acute seasonal allergic rhinitis, unspecified trigger       montelukast 10 MG tablet    SINGULAIR    30 tablet    Take 1 tablet (10 mg) by mouth daily    Acute seasonal allergic rhinitis, unspecified trigger       norgestimate-ethinyl estradiol 0.25-35 MG-MCG per tablet    ORTHO-CYCLEN, SPRINTEC    84 tablet    Take 1 tablet by mouth daily    Encounter for initial prescription of contraceptive pills       polyethylene glycol powder    MIRALAX    510 g    Take 17 g (1 capful) by mouth daily    Constipation,  unspecified constipation type       prazosin 1 MG capsule    MINIPRESS    60 capsule    Take 2 capsules (2 mg) by mouth At Bedtime    PTSD (post-traumatic stress disorder)       QUEtiapine 50 MG tablet    SEROquel    30 tablet    Take 1 tablet (50 mg) by mouth At Bedtime    PTSD (post-traumatic stress disorder)       ranitidine 150 MG tablet    ZANTAC    30 tablet    Take 1 tablet (150 mg) by mouth At Bedtime    Gastroesophageal reflux disease, esophagitis presence not specified       triamcinolone 0.1 % ointment    KENALOG    30 g    Apply sparingly to affected area three times daily for 14 days.    Infestation by bed bug       * venlafaxine 75 MG 24 hr capsule    EFFEXOR-XR    30 capsule    Take 1 capsule by mouth daily (together with 150 mg capsule for total of 225 mg/day)    PTSD (post-traumatic stress disorder), Major depressive disorder, recurrent episode, moderate (H)       * venlafaxine 150 MG 24 hr capsule    EFFEXOR-XR    30 capsule    Take 1 capsule by mouth daily (together with 75 mg capsule for total of 225 mg/day)    PTSD (post-traumatic stress disorder), Major depressive disorder, recurrent episode, moderate (H)       * Notice:  This list has 2 medication(s) that are the same as other medications prescribed for you. Read the directions carefully, and ask your doctor or other care provider to review them with you.

## 2018-06-19 NOTE — PROGRESS NOTES
S: Seen today for medication reconciliation and review.     Medicines brought to clinic today and pillboxes.     Comments that doesn't want to get pregnant. Want something more premanent like tubal. No trouble remembering birth control pills. Has had implant in the past and she did not like this.     Concerns today for stomach pain. Using 2 Tums every day PRN.    No concerns for constipation.     Continues to have concerns for tiredness. Seeing PCP today to discuss this.       A Maryann  was used for this visit.     O:     Patient Active Problem List   Diagnosis     Health Care Home     Recurrent major depressive disorder (H)     Seasonal allergic rhinitis     PTSD (post-traumatic stress disorder)     BRENDA (generalized anxiety disorder)         A/P: Pillbox empty as expected. No medication changes today per Dr. Rivas. Using PRN medicines appropriately. Dr. Rivas ordered H pylroi confirmation test today. Patient not interested in pursuing at this time. Will attempt to address in future. Will pass on to PCP regarding concern for contraception. Pillbox filled x2 weeks. Last dose in pillbox 7/2/18, due for pillbox fill 7/3/18.     Daily medicines Last Fill Date Refill needed?   Venlafaxine 75 mg capsule x1  6/6    [id number] mg capsule x1 6/1    Quetiapine 50 mg tablet x1  6/8    Prazosin 1 mg capsule x2  6/6    Cetirizine 10 mg tablet x1  4/23 Y   Montelukast 10 mg tablet x1  6/6    Ranitidine 150 mg tablet x1  6/6    Docusate 100 mg capsule x2  4/13 Y        PRN medicines:     Zaditor eye drop - 2 bottles present 6/2 - 2 bottles present    Ibuprofen 600 mg 2/8 for #30 tablets         Options for treatment and/or follow-up care were reviewed with the patient. Hayde was engaged and actively involved in the decision making process. She verbalized understanding of the options discussed and was satisfied with the final plan. Patient was provided with written instructions/medication list via AVS.      Rob was provided our recommendations in clinic today and Dr. Snyder was available for supervision during this visit and is the authorizing prescriber for this visit through the pharmacist collaborative practice agreement.    Thank you for the opportunity to participate in the care of this patient.  Sierra Scott, Pharm.D.  [facility]: [phone]    Current Outpatient Prescriptions   Medication Sig Dispense Refill     calcium carbonate (TUMS) 500 MG chewable tablet Take 1 tablet (500 mg) by mouth 2 times daily as needed for heartburn 150 tablet 3     Capsaicin 0.1 % cream Apply topically 2 times daily as needed 56.6 g 3     cetirizine (ZYRTEC) 10 MG tablet Take 1 tablet (10 mg) by mouth every evening 90 tablet 3     docusate sodium (COLACE) 100 MG tablet Take 200 mg by mouth daily 60 tablet 3     fluticasone (FLONASE) 50 MCG/ACT spray Spray 1-2 sprays into both nostrils daily as needed for allergies       ibuprofen (ADVIL/MOTRIN) 600 MG tablet Take 1 tablet (600 mg) by mouth daily as needed 120 tablet 1     ketotifen (ZADITOR) 0.025 % SOLN ophthalmic solution Place 1 drop into both eyes every 12 hours 1 Bottle 3     montelukast (SINGULAIR) 10 MG tablet Take 1 tablet (10 mg) by mouth daily 30 tablet 11     norgestimate-ethinyl estradiol (ORTHO-CYCLEN, SPRINTEC) 0.25-35 MG-MCG per tablet Take 1 tablet by mouth daily 84 tablet 3     polyethylene glycol (MIRALAX) powder Take 17 g (1 capful) by mouth daily 510 g 1     prazosin (MINIPRESS) 1 MG capsule Take 2 capsules (2 mg) by mouth At Bedtime 60 capsule 3     ranitidine (ZANTAC) 150 MG tablet Take 1 tablet (150 mg) by mouth At Bedtime 30 tablet 3     triamcinolone (KENALOG) 0.1 % ointment Apply sparingly to affected area three times daily for 14 days. 30 g 1     venlafaxine (EFFEXOR-XR) 150 MG 24 hr capsule Take 1 capsule by mouth daily (together with 75 mg capsule for total of 225 mg/day) 30 capsule 11     venlafaxine (EFFEXOR-XR) 75 MG 24  hr capsule Take 1 capsule by mouth daily (together with 150 mg capsule for total of 225 mg/day) 30 capsule 11     QUEtiapine (SEROQUEL) 50 MG tablet Take 1 tablet (50 mg) by mouth At Bedtime 30 tablet 3        Drug therapy problems identified:  Medical Condition 1: Depression, Goals of therapy: Not at goal, Drug Class: Antipsychotic,  Convenience: Patient forgets to take, Intervention: Add device or process to assist use, Verification: Patient Agreed - Compliance/Education    Relevant medical devices: n/a    # of medical conditions addressed: 4  # of medications addressed: 10  # of DTP identified: 1  Time spent: 30 minutes  Level of service per [place] DHS guidelines: 2       name: Frances England  Agency: [facility]  Language: Maryann   Telephone number: [phone]  Type of interpretation: Face-to-face, spoken

## 2018-06-19 NOTE — PROGRESS NOTES
"       Chester LUJAN Say is a 27 year old  female with a significant past medical history of PTSD who presents for    Chief Complaint   Patient presents with     Fatigue     ongoing fatigue       At the start of our encounter, Hayde becomes quite tearful; crying, but not conversive for 5 minutes. She then states she doesn't feel good. Tired, heart racing. She states the her heart races randomly, as well as at night (she states the bedroom door is opening and shutting by itself - no one else is around when this happens per Hayde). This keeps her up at night, and so she is not sleeping a lot. Thinks this is why she is so tired. Also, notes a neighbor boy who is loud, keeping her up and \"scaring\" her due to his yelling and running around. Tired in the afternoon, thinks she needs to eat something. Her fatigue improves after eating. Doesn't eat breakfast, doesn't have enough food to do this.    Pertinent ROS: Constitutional, neuro, ENT, endocrine, pulmonary, cardiac, gastrointestinal, genitourinary, musculoskeletal, integument and psychiatric systems are negative, except as otherwise noted.    Social History:   History   Smoking Status     Never Smoker   Smokeless Tobacco     Never Used     Comment: no smoke exposure.       Current Medications:   Current Outpatient Prescriptions   Medication Sig Dispense Refill     calcium carbonate (TUMS) 500 MG chewable tablet Take 1 tablet (500 mg) by mouth 2 times daily as needed for heartburn 150 tablet 3     Capsaicin 0.1 % cream Apply topically 2 times daily as needed 56.6 g 3     cetirizine (ZYRTEC) 10 MG tablet Take 1 tablet (10 mg) by mouth every evening 90 tablet 3     docusate sodium (COLACE) 100 MG tablet Take 200 mg by mouth daily 60 tablet 3     fluticasone (FLONASE) 50 MCG/ACT spray Spray 1-2 sprays into both nostrils daily as needed for allergies       ibuprofen (ADVIL/MOTRIN) 600 MG tablet Take 1 tablet (600 mg) by mouth daily as needed 120 tablet 1     " "ketotifen (ZADITOR) 0.025 % SOLN ophthalmic solution Place 1 drop into both eyes every 12 hours 1 Bottle 3     montelukast (SINGULAIR) 10 MG tablet Take 1 tablet (10 mg) by mouth daily 30 tablet 11     norgestimate-ethinyl estradiol (ORTHO-CYCLEN, SPRINTEC) 0.25-35 MG-MCG per tablet Take 1 tablet by mouth daily 84 tablet 3     polyethylene glycol (MIRALAX) powder Take 17 g (1 capful) by mouth daily 510 g 1     prazosin (MINIPRESS) 1 MG capsule Take 2 capsules (2 mg) by mouth At Bedtime 60 capsule 3     QUEtiapine (SEROQUEL) 50 MG tablet Take 1 tablet (50 mg) by mouth At Bedtime 30 tablet 3     ranitidine (ZANTAC) 150 MG tablet Take 1 tablet (150 mg) by mouth At Bedtime 30 tablet 3     triamcinolone (KENALOG) 0.1 % ointment Apply sparingly to affected area three times daily for 14 days. 30 g 1     venlafaxine (EFFEXOR-XR) 150 MG 24 hr capsule Take 1 capsule by mouth daily (together with 75 mg capsule for total of 225 mg/day) 30 capsule 11     venlafaxine (EFFEXOR-XR) 75 MG 24 hr capsule Take 1 capsule by mouth daily (together with 150 mg capsule for total of 225 mg/day) 30 capsule 11            Objective     Vitals:    06/19/18 0933   BP: 104/70   Pulse: 73   Resp: 16   Temp: 98.1  F (36.7  C)   TempSrc: Oral   SpO2: 100%   Weight: 140 lb (63.5 kg)   Height: 4' 9\" (144.8 cm)     Body mass index is 30.3 kg/(m^2).    GENERAL APPEARANCE: healthy, alert and no distress  EYES: Eyes grossly normal to inspection  NECK: thyroid soft, pliable, normal in size, no nodules  RESP: lungs clear to auscultation - no rales, rhonchi or wheezes  CV: regular rates and rhythm, normal S1 S2, no S3 or S4, no murmur, click or rub, no peripheral edema and peripheral pulses strong  MS: no musculoskeletal defects are noted and gait is age appropriate without ataxia  SKIN: no suspicious lesions or rashes  PSYCH: mentation appears normal, mood is tearful and depressed, doesn't talk for extended periods of time, affect congruent, no current SI or " "HI, no active hallucinations         Assessment/Plan       (R53.83) Fatigue, unspecified type  (primary encounter diagnosis)  Comment:   Plan: Thyroid [place] ([facility]), CBC with Diff Plt        ([facility]), H. Pylori Agn Fecal ([facility])        Likely secondary to decreased sleep and poor diet due to lack of monetary resources. We have Hayde connected with our program to provide fresh fruits and vegetables, will have Agustina ([facility]) continue to work with Hayde to provide all community resources we can. As for decreased sleep, likely secondary to mood and PTSD. Hayde does not want to start another medication at this time. See below for further discussion. In the mean time, will obtain some lab work to rule out common medical causes of fatigue.    (F43.10) PTSD (post-traumatic stress disorder)  Comment:   Plan: Working diagnosis. Hard to say that the \"door opening and closing\" is actually happening or a hallucination. Discussed starting another medication, Abilify, to augment her therapy. Will hold off on this now per patient's preference. No danger signs (no HI or SI). Consider re-referral to psych for confirmation of diagnosis.     Options for treatment and follow-up care were reviewed with the patient and/or guardian. Hayde LUJAN Say and/or guardian engaged in the decision making process and verbalized understanding of the options discussed and agreed with the final plan.    Carlos Manuel Rivas MD      Precepted today with: Teagan Snyder MD    This note was created using Dragon Dictation software. Any grammatical errors or word substitutions are unintentional, despite proofreading.  "

## 2018-06-19 NOTE — PROGRESS NOTES
Pt in clinic today  Met with her to address clothing and food needs and some resources    Scheduled next visits and rides for 28th    She missed a visit with carmen because she forgot they were coming out needs reminders, I will talk to carmen and ask them to call day before or let me know when appts are  lbetz

## 2018-06-20 VITALS — BODY MASS INDEX: 30.12 KG/M2 | WEIGHT: 139.2 LBS

## 2018-06-21 ENCOUNTER — OFFICE VISIT (OUTPATIENT)
Dept: PSYCHOLOGY | Facility: CLINIC | Age: 28
End: 2018-06-21
Payer: COMMERCIAL

## 2018-06-21 ENCOUNTER — CARE COORDINATION (OUTPATIENT)
Dept: FAMILY MEDICINE | Facility: CLINIC | Age: 28
End: 2018-06-21

## 2018-06-21 DIAGNOSIS — F43.10 PTSD (POST-TRAUMATIC STRESS DISORDER): Primary | ICD-10-CM

## 2018-06-21 DIAGNOSIS — F33.2 SEVERE EPISODE OF RECURRENT MAJOR DEPRESSIVE DISORDER, WITHOUT PSYCHOTIC FEATURES (H): ICD-10-CM

## 2018-06-21 DIAGNOSIS — F41.1 GAD (GENERALIZED ANXIETY DISORDER): ICD-10-CM

## 2018-06-21 NOTE — Clinical Note
Dr. Brothers, Just an FYI, this is one of your upcoming patients. I wanted to let you know what has been going on for the past year or so. Nothing you need to do at this time.

## 2018-06-21 NOTE — PROGRESS NOTES
Pt in clinic today with  therapist    Assisted pt with paperwork follow up      Need to reach out to [facility] and follow up with pt on snap as well as to inform her about free meals if interested and yakelin orellana

## 2018-06-21 NOTE — MR AVS SNAPSHOT
After Visit Summary   6/21/2018    Hayde LUJAN Say    MRN: [id number]           Patient Information     Date Of Birth          1990        Visit Information        Provider Department      6/21/2018 9:00 AM Elham Mcmahan, LMFT [facility]        Today's Diagnoses     PTSD (post-traumatic stress disorder)    -  1    BRENDA (generalized anxiety disorder)        Severe episode of recurrent major depressive disorder, without psychotic features (H)           Follow-ups after your visit        Your next 10 appointments already scheduled     Jun 28, 2018  2:20 PM CDT   Return Visit with Sierra Scott RPH [facility] ([facility])    [address] [place] [id number]   [phone]            Jun 28, 2018  2:40 PM CDT   Return Visit with Flavio Brothers MD   [facility] ([facility])    [address] [place] [address]   [phone]            Jul 12, 2018  9:00 AM CDT   Return Visit with Elham Mcmahan LMFT [facility] ([facility])    [address] [place] [id number]   [phone]              Who to contact     Please call your clinic at [phone] to:    Ask questions about your health    Make or cancel appointments    Discuss your medicines    Learn about your test results    Speak to your doctor            Additional Information About Your Visit        MyChart Information     [employer] is an electronic gateway that provides easy, online access to your medical records. With [employer], you can request a clinic appointment, read your test results, renew a prescription or communicate with your care team.     To sign up for [employer] visit the website at [email or web address]   You will be asked to enter the access code listed below, as well as some personal information. Please follow the directions to create your username and password.     Your access code is: [email or web address]  Expires: 8/15/2018  12:01 PM     Your access code will  in 90 days. If you need help or a new code, please contact your [facility] or call [phone] for assistance.        Care EveryWhere ID     This is your Care EveryWhere ID. This could be used by other organizations to access your [facility] medical records  [id number]         Blood Pressure from Last 3 Encounters:   18 104/70   18 113/76   18 110/76    Weight from Last 3 Encounters:   18 140 lb (63.5 kg)   18 139 lb 3.2 oz (63.1 kg)   18 140 lb 3.2 oz (63.6 kg)              We Performed the Following     Interactive Complexity add-on (74688)     Psychiatric Diagnostic Eval (02894)        Primary Care Provider Office Phone # Fax #    Carlos Manuel Andreas Rivas [phone]       [facility] [address] [place] [address]        Equal Access to Services     DYLAN LEY : Hadii aad ku hadasho Soomaali, waaxda luqadaha, qaybta kaalmada adeegyada, waxay idiin hayaan justin kharash felicia . So [facility] [phone].    ATENCIÓN: Si habla español, tiene a keene disposición servicios gratuitos de asistencia lingüística. Llame al [phone].    We comply with applicable federal civil rights laws and [place] laws. We do not discriminate on the basis of race, color, national origin, age, disability, sex, sexual orientation, or gender identity.            Thank you!     Thank you for choosing [facility]  for your care. Our goal is always to provide you with excellent care. Hearing back from our patients is one way we can continue to improve our services. Please take a few minutes to complete the written survey that you may receive in the mail after your visit with us. Thank you!             Your Updated Medication List - Protect others around you: Learn how to safely use, store and throw away your medicines at www.disposemymeds.org.          This list is accurate as of  6/21/18 11:59 PM.  Always use your most recent med list.                   Brand Name Dispense Instructions for use Diagnosis    calcium carbonate 500 MG chewable tablet    TUMS    150 tablet    Take 1 tablet (500 mg) by mouth 2 times daily as needed for heartburn    Other chronic gastritis without hemorrhage       Capsaicin 0.1 % cream     56.6 g    Apply topically 2 times daily as needed    Chronic right-sided low back pain without sciatica, Neck pain       cetirizine 10 MG tablet    zyrTEC    90 tablet    Take 1 tablet (10 mg) by mouth every evening    Chronic seasonal allergic rhinitis due to fungal spores       docusate sodium 100 MG tablet    COLACE    60 tablet    Take 200 mg by mouth daily    Constipation, unspecified constipation type       FLONASE 50 MCG/ACT spray   Generic drug:  fluticasone      Spray 1-2 sprays into both nostrils daily as needed for allergies    Seasonal allergic rhinitis due to other allergic trigger       ibuprofen 600 MG tablet    ADVIL/MOTRIN    120 tablet    Take 1 tablet (600 mg) by mouth daily as needed    Episodic tension-type headache, not intractable       ketotifen 0.025 % Soln ophthalmic solution    ZADITOR    1 Bottle    Place 1 drop into both eyes every 12 hours    Acute seasonal allergic rhinitis, unspecified trigger       montelukast 10 MG tablet    SINGULAIR    30 tablet    Take 1 tablet (10 mg) by mouth daily    Acute seasonal allergic rhinitis, unspecified trigger       norgestimate-ethinyl estradiol 0.25-35 MG-MCG per tablet    ORTHO-CYCLEN, SPRINTEC    84 tablet    Take 1 tablet by mouth daily    Encounter for initial prescription of contraceptive pills       polyethylene glycol powder    MIRALAX    510 g    Take 17 g (1 capful) by mouth daily    Constipation, unspecified constipation type       prazosin 1 MG capsule    MINIPRESS    60 capsule    Take 2 capsules (2 mg) by mouth At Bedtime    PTSD (post-traumatic stress disorder)       QUEtiapine 50 MG tablet     SEROquel    30 tablet    Take 1 tablet (50 mg) by mouth At Bedtime    PTSD (post-traumatic stress disorder)       ranitidine 150 MG tablet    ZANTAC    30 tablet    Take 1 tablet (150 mg) by mouth At Bedtime    Gastroesophageal reflux disease, esophagitis presence not specified       triamcinolone 0.1 % ointment    KENALOG    30 g    Apply sparingly to affected area three times daily for 14 days.    Infestation by bed bug       * venlafaxine 75 MG 24 hr capsule    EFFEXOR-XR    30 capsule    Take 1 capsule by mouth daily (together with 150 mg capsule for total of 225 mg/day)    PTSD (post-traumatic stress disorder), Major depressive disorder, recurrent episode, moderate (H)       * venlafaxine 150 MG 24 hr capsule    EFFEXOR-XR    30 capsule    Take 1 capsule by mouth daily (together with 75 mg capsule for total of 225 mg/day)    PTSD (post-traumatic stress disorder), Major depressive disorder, recurrent episode, moderate (H)       * Notice:  This list has 2 medication(s) that are the same as other medications prescribed for you. Read the directions carefully, and ask your doctor or other care provider to review them with you.

## 2018-06-25 NOTE — PROGRESS NOTES
Behavioral Health Diagnostic Assessment Update:    Meeting was: scheduled  Others present:   Meeting lasted: 45-50 minutes  Client was: on time  Date of Initial Diagnostic Assessment: 4/12/17     name: Merlyn Bee  Agency: [employer]  Language: [place]  Telephone number: [phone]    Type of interpretation: Face-to-face, spoken    Complexity statement: Due to patient being non-English speaking, an  was used for this visit. An  is used not only to interpret language, since the patient does not speak English, but also to help with the complexity of understandings across cultures, since the patient is not well integrated in the larger [place] culture.      Assessment Summary: Diagnostic assessment update completed today. The patient is a 27 year old [place] female who was referred for mental health services for help with anxiety, anger management, fear of [place] customs, distrust of others. Based on the patient's report of symptoms, she continues to meet criteria for PTSD, BRENDA, and severe recurrent major depression.  The patient's mental health concerns continue to affect her ability to function work, parenting, intimate relationship, feeling of safety and security, distress tolerance, retention of new information and has been causing clinically significant distress. The patient also reports no drug/alcohol use. 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  Based on the patient's reported symptoms and impact on functioning, the plan for the patient is continued healthcare home coordination, regular psychotherapy (2-3 weeks).    Diagnosis (DSM-5):    296.33 (F33.2) Major Depressive Disorder, Recurrent  Episode, Severe _  300.02 (F41.1) Generalized Anxiety Disorder  309.81 (F43.10) Posttraumatic Stress Disorder (includes Posttraumatic Stress Disorder for Children 6 Years and Younger)  Without dissociative symptoms    Recommendations & Plan:       Treatment plan updated today.  Next treatment plan update due on 9/21/18    Goals for therapy = become more trusting of others and [place] customs, news, learn more adaptive ways to manage worries and anger    Mental Health Screening Questionnaires:    PHQ-9 SCORE 11/29/2017 1/9/2018 1/22/2018   Total Score - - -   Total Score 8 9 6     BRENDA-7 SCORE 4/26/2017 8/29/2017 1/22/2018   Total Score 8 15 14     PTSD-PC = 4/4  CAGE AID:  0/4   No flowsheet data found.  Complete responses are available for review in the flow-sheet.     Current Presenting Problems or Complaints (including patient perception of problem and external factors contributing to current dilemma):  Patient reports struggles with trust, anger management, troubles with controlling her sons/parenting, racing thoughts, not sleeping well (sleeps on hard floor and has bed bugs), does not understand English, argues with her , crying spells, does not remember appointments or understand how different agencies and workers can help her. Her specific concerns often vary from worries about the bed bugs, arguments with her , sometimes seeing/hearing things others don't, children not following the rules, significant fears and perseveration on [place] cultural norms that scare her (e.g., organ donation and autopsies).     Review of Symptoms:  Depression: crying, sad, hopeless, not sleeping well, anhedonia  Gina: none  Psychosis: unclear. Sometimes sees/hears things others don't such as her friend who was murdered and noises at night. Because of communication barriers, it has been hard to decipher what is disturbing images in her mind's eye versus in her field of vision. Patient also very stressed most of the time and  has poor sleep. Could be related to these as well. To bear in mind, mother and brother both have psychotic disorders.    Anxiety: worries, angry, irritable, short temper, racing thoughts, head aches, stomach aches    Post Traumatic Stress Disorder: traumatic events as a refugee, extremely distrusting of others, extremely fearful/on guard, sometimes feeling detached and shut down.    Updated Life History/Circumstances: Patient has complex and sometimes inconsistent history. Her medical team believes this may be a result of difficulties with concentration and memory, uncertainty about who to trust with certain types of information, and Hayde may display challenges with using proper tenses. Today, for example, we were discussing her daily routine to better understand stressors. She mentioned she needs to go to the bathroom in the morning and cannot hold it. After some confusion with the , it became clear she needed to use the bathroom during the appointment. I spoke briefly with the , who indicated that during today's appointment, Hayde seemed to be running her thoughts together and the  could not tell whether she was speaking past, present, future, and exactly what Hayde was referencing. Her language was short and vague. She often needed to clarify what Hayde meant by what she said. This is a common occurrence in therapy. Often, she will answer questions inappropriately. I thought it may be challenges with interpretation, but it may be more related to her understanding (aHyde has said she cannot always understand what is being said in her native language) and her speech which may not be very specific and runs thoughts/time together. Other times, her story is inconsistent. Today I inquired about how much help she gets with her 's aunt (who Hayde's care coordinator believes gets some type of PCA services), and she reported none. I also followed up on concerns in her chart that people  are coming into her home that she does not always know, sometimes to scale fish. Today, she reported this does not happen.     She began healthcare home care coordination services at our clinic with Agustina Gramajo, which has helped her attend appointments on time, more regularly. She also comes in every 2 weeks to have her pill box filled by our pharmacist, which has helped improve her medication compliance. Prior to this, she was struggling to understand how/when to take her medicines.     Additionally, since our first DA, Hayde has noted concerns about bed bugs that are in her building. We have called the [facility] to spray, which helps, but the bugs come back. She has washed clothing in hot water, used mothballs,and has brought us pictures and dead bed bugs in little bags. We have learned Hayde's family has no beds and their only furniture is a table and chairs. Last year, a friend of hers was murdered and she has reported seeing this friend (sounds like perhaps both in her mind and in her field of vision), and others have reported seeing her also. Hayde has not mentioned this recently.  We have also learned at our clinic that Hayde's mother and brother also go to our clinic. Both have diagnosed psychotic disorders.     Current Substance Use: none    Updated Health History/Family Health History: Patient's mother and brother have psychotic disorders. Her mother in particular has concerns with short term and working memory.     Patient Active Problem List   Diagnosis     Health Care Home     Recurrent major depressive disorder (H)     Seasonal allergic rhinitis     PTSD (post-traumatic stress disorder)     BRENDA (generalized anxiety disorder)     Current Outpatient Prescriptions   Medication     calcium carbonate (TUMS) 500 MG chewable tablet     Capsaicin 0.1 % cream     cetirizine (ZYRTEC) 10 MG tablet     docusate sodium (COLACE) 100 MG tablet     fluticasone (FLONASE) 50 MCG/ACT spray     ibuprofen (ADVIL/MOTRIN) 600 MG  tablet     ketotifen (ZADITOR) 0.025 % SOLN ophthalmic solution     montelukast (SINGULAIR) 10 MG tablet     norgestimate-ethinyl estradiol (ORTHO-CYCLEN, SPRINTEC) 0.25-35 MG-MCG per tablet     polyethylene glycol (MIRALAX) powder     prazosin (MINIPRESS) 1 MG capsule     QUEtiapine (SEROQUEL) 50 MG tablet     ranitidine (ZANTAC) 150 MG tablet     triamcinolone (KENALOG) 0.1 % ointment     venlafaxine (EFFEXOR-XR) 150 MG 24 hr capsule     venlafaxine (EFFEXOR-XR) 75 MG 24 hr capsule     No current facility-administered medications for this visit.        Cultural Factors: Patient is a [place] refugee. Her fear and low understanding of [place] customs may be partially accounted for by discussions and understanding within the [place] community. During one appointment, patient voiced concerns about a news story she had heard about implanting chips into people's wrists as a way to pay bills, unlock doors, store personal information etc. She understood this was something all citizens might have to do. During the appointment, the  also asked me a few questions about this to clarify misunderstanding.  Patient describes herself as Church.     Mental Status Exam:    Appearance:  Casually dressed, Well groomed and Tired appearing  Behavior/Relationship to Examiner/Demeanor:  Cooperative and Reduced eye contact  Build: medium  Gait:  Normal  Psychomotor Activity: retardation  Speech rate:  Normal  Speech volume:  Normal and Loud  Speech coherence:  Normal and Abnormal syntax  Speech spontaneity:  Normal  Mood (subjective report):  overwhelmed  Affect (objective appearance):  Appropriate/mood-congruent  Eye Contact: adequate  Thought Process (Associations):  Goal directed  Thought process (Rate):  Normal  Abnormal Perception:  None  Sensorium:  Alert, Oriented to person and Oriented to place  Attention/Concentration:  Fair  Insight:  Fair/poor  Judgment:  Fair    Suicide Assessment:    Recent suicidal thoughts: No  Past  "suicidal thoughts: No  Any attempts in the past: No  Any family/friends/loved ones commit suicide: No  Plan or considering various methods: No  Access to guns: No  Protective factors: commitment to children  Verbal contract for safety: Yes    Non-Suicidal Self Injurious Behavior: No    Violence/Homicide Risk Assessment:     Problems with anger management: Yes: arguments with her  at home and sometimes she throws things near him. Reports she does this when children are not around.  History of violence: No  History of significant damage to property: No  Threat made to harm or kill someone: No  Verbal contract for safety: Yes    Safety Plan:   Hayde was provided with the phone number for emergency mental health services and was encouraged to call these local crisis numbers, 911, or visit a local emergency room if thoughts of suicide or homicide were to arise and/or if she were to be in acute distress. The patient agreed to utilize these services as indicated if the need were to arise. Hayde denied past or current suicidal or homicidal ideation, intent, or plan, denied a history of suicide attempts/self-harming behaviors, and identified her childre as primary protective factor(s) to self-harm or harm to others. Based on these factors, Hayde is considered to be sustainable as an outpatient at this time.     NOTE: Diagnostic update complete.    Primary Care PTSD Screen  In your life, have you ever had any experience that was so frightening, horrible or upsetting that, in the past month, you...    1. Have had nightmares about it or thought about it when you did not want to? Yes  2. Tried hard not to think about it or went out of your way to avoid situations that remind you of it? Yes  3. Were constantly on guard, watchful, or easily startled? Yes  4. Felt numb or detached from others, activities, or your surroundings? Yes    Current research suggests that the results of the PC-PTSD should be considered \"positive\" if a " "patient answers \"yes\" to any (3) items.    References    NOMAN Braswell, DONOVAN Angel, Kimerling, R., TRISH Muniz., KENDALL Boyd., ADRIAN Gonzáles, NOMAN Luo, BIPIN Rivera., Saucedo, J.I. (2004). The primary care PTSD screen (PC-PTSD): development and operating characteristics. Primary Care Psychiatry, 9, 9-14.    "

## 2018-06-26 ENCOUNTER — TELEPHONE (OUTPATIENT)
Dept: FAMILY MEDICINE | Facility: CLINIC | Age: 28
End: 2018-06-26

## 2018-06-28 ENCOUNTER — CARE COORDINATION (OUTPATIENT)
Dept: FAMILY MEDICINE | Facility: CLINIC | Age: 28
End: 2018-06-28

## 2018-06-28 ENCOUNTER — OFFICE VISIT (OUTPATIENT)
Dept: FAMILY MEDICINE | Facility: CLINIC | Age: 28
End: 2018-06-28
Payer: COMMERCIAL

## 2018-06-28 VITALS
DIASTOLIC BLOOD PRESSURE: 64 MMHG | HEIGHT: 57 IN | RESPIRATION RATE: 18 BRPM | WEIGHT: 140.4 LBS | BODY MASS INDEX: 30.29 KG/M2 | SYSTOLIC BLOOD PRESSURE: 97 MMHG | HEART RATE: 71 BPM | TEMPERATURE: 98 F | OXYGEN SATURATION: 100 %

## 2018-06-28 DIAGNOSIS — F41.1 GAD (GENERALIZED ANXIETY DISORDER): ICD-10-CM

## 2018-06-28 DIAGNOSIS — F43.10 PTSD (POST-TRAUMATIC STRESS DISORDER): ICD-10-CM

## 2018-06-28 DIAGNOSIS — F33.2 SEVERE EPISODE OF RECURRENT MAJOR DEPRESSIVE DISORDER, WITHOUT PSYCHOTIC FEATURES (H): ICD-10-CM

## 2018-06-28 DIAGNOSIS — F99 INSOMNIA DUE TO OTHER MENTAL DISORDER: ICD-10-CM

## 2018-06-28 DIAGNOSIS — R53.83 OTHER FATIGUE: Primary | ICD-10-CM

## 2018-06-28 DIAGNOSIS — F51.05 INSOMNIA DUE TO OTHER MENTAL DISORDER: ICD-10-CM

## 2018-06-28 RX ORDER — QUETIAPINE FUMARATE 50 MG/1
100 TABLET, FILM COATED ORAL AT BEDTIME
Qty: 30 TABLET | Refills: 3 | Status: SHIPPED | OUTPATIENT
Start: 2018-06-28 | End: 2018-07-03

## 2018-06-28 NOTE — PROGRESS NOTES
Preceptor Attestation:   Patient seen, evaluated and discussed with the resident. I have verified the content of the note, which accurately reflects my assessment of the patient and the plan of care.  Supervising Physician:Sariah Keller DO [facility]

## 2018-06-28 NOTE — NURSING NOTE
Due to patient being non-English speaking/uses sign language, an  was used for this visit. Only for face-to-face interpretation by an external agency, date and length of interpretation can be found on the scanned worksheet.     name: Sebastián Murillo  Agency: [employer]  Language: [place]   Telephone number: [phone]  Type of interpretation: Face-to-face, spoken
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
## 2018-06-29 ENCOUNTER — TELEPHONE (OUTPATIENT)
Dept: FAMILY MEDICINE | Facility: CLINIC | Age: 28
End: 2018-06-29

## 2018-06-29 NOTE — PROGRESS NOTES
Met with pt in clinic today she wanted support about a medical bill   Met with    And then had an appt with her youngest child  Picked up food share today as well    Ramila

## 2018-07-03 ENCOUNTER — OFFICE VISIT (OUTPATIENT)
Dept: FAMILY MEDICINE | Facility: CLINIC | Age: 28
End: 2018-07-03
Payer: COMMERCIAL

## 2018-07-03 ENCOUNTER — CARE COORDINATION (OUTPATIENT)
Dept: FAMILY MEDICINE | Facility: CLINIC | Age: 28
End: 2018-07-03

## 2018-07-03 ENCOUNTER — OFFICE VISIT (OUTPATIENT)
Dept: PHARMACY | Facility: CLINIC | Age: 28
End: 2018-07-03
Payer: COMMERCIAL

## 2018-07-03 DIAGNOSIS — Z34.81 ENCOUNTER FOR SUPERVISION OF OTHER NORMAL PREGNANCY IN FIRST TRIMESTER: ICD-10-CM

## 2018-07-03 DIAGNOSIS — Z71.89 ENCOUNTER FOR MEDICATION REVIEW AND COUNSELING: Primary | ICD-10-CM

## 2018-07-03 DIAGNOSIS — F33.2 SEVERE EPISODE OF RECURRENT MAJOR DEPRESSIVE DISORDER, WITHOUT PSYCHOTIC FEATURES (H): ICD-10-CM

## 2018-07-03 DIAGNOSIS — N91.2 ABSENCE OF MENSTRUATION: ICD-10-CM

## 2018-07-03 DIAGNOSIS — N91.2 AMENORRHEA: Primary | ICD-10-CM

## 2018-07-03 DIAGNOSIS — F43.10 PTSD (POST-TRAUMATIC STRESS DISORDER): ICD-10-CM

## 2018-07-03 DIAGNOSIS — K21.9 GASTROESOPHAGEAL REFLUX DISEASE, ESOPHAGITIS PRESENCE NOT SPECIFIED: ICD-10-CM

## 2018-07-03 LAB — HCG UR QL: POSITIVE

## 2018-07-03 RX ORDER — PRAZOSIN HYDROCHLORIDE 1 MG/1
2 CAPSULE ORAL AT BEDTIME
Qty: 60 CAPSULE | Refills: 3 | Status: SHIPPED | OUTPATIENT
Start: 2018-07-03 | End: 2018-10-10

## 2018-07-03 RX ORDER — PRENATAL VIT/IRON FUM/FOLIC AC 27MG-0.8MG
1 TABLET ORAL DAILY
Qty: 100 TABLET | Refills: 3 | Status: SHIPPED | OUTPATIENT
Start: 2018-07-03 | End: 2019-03-20

## 2018-07-03 RX ORDER — QUETIAPINE FUMARATE 50 MG/1
100 TABLET, FILM COATED ORAL AT BEDTIME
Qty: 60 TABLET | Refills: 3 | Status: SHIPPED | OUTPATIENT
Start: 2018-07-03 | End: 2018-08-02

## 2018-07-03 NOTE — PROGRESS NOTES
S: ***      A [employer]  was used for this visit.     O:     Patient Active Problem List   Diagnosis     Health Care Home     Recurrent major depressive disorder (H)     Seasonal allergic rhinitis     PTSD (post-traumatic stress disorder)     BRENDA (generalized anxiety disorder)     Other fatigue     Insomnia due to other mental disorder         A/P: ***    2 weeks of pillbox    Daily medicines Last Fill Date Refill needed?   Venlafaxine 75 mg capsule x1  6/6     Rsevvyglpdj483 mg capsule x1 6/1     Quetiapine 50 mg tablet x1  6/8     Prazosin 1 mg capsule x2  6/6     Cetirizine 10 mg tablet x1  4/23 Y   Montelukast 10 mg tablet x1  6/6     Ranitidine 150 mg tablet x1  6/6     Docusate 100 mg capsule x2  4/13 Y           PRN medicines:       Zaditor eye drop - 2 bottles present 6/2 - 2 bottles present     Ibuprofen 600 mg 2/8 for #30 tablets              Options for treatment and/or follow-up care were reviewed with the patient. Hayde was engaged and actively involved in the decision making process. She verbalized understanding of the options discussed and was satisfied with the final plan. Patient was provided with written instructions/medication list via AVS.    Dr. Brothers was provided our recommendations via routed note and Dr. Bentley was available for supervision during this visit and is the authorizing prescriber for this visit through the pharmacist collaborative practice agreement.    Thank you for the opportunity to participate in the care of this patient.  Sierra Scott, Pharm.D.  [facility]: [phone]    Due to patient being non-English speaking/uses sign language, an  was used for this visit. Only for face-to-face interpretation by an external agency, date and length of interpretation can be found on the scanned worksheet.     name: ***  Agency: {FMinterpagency:103691}  Language: Maryann   Telephone number:   **[phone]  ***[employer]  [phone]  ***[place] [phone]  ***Language [employer] [phone]  Type of interpretation: Face-to-face, spoken

## 2018-07-03 NOTE — PROGRESS NOTES
"[facility]        Subjective     CC:  Amenorrhea    HPI: Hayde Macias is a 27 year old female with history of mental illness who was at clinic for a pharmacy visit and pillbox fill when she reported that she had not been taking her birth control and has not had a period since May.    1) Amenorrhea: She is not sure when she forgot to take her birth control. She thinks she just forgot once but she is not sure.  She may have forgotten for several weeks.  She says she has just been very busy taking care of her kids and forgot to take her pills. Says life is \"so stressful\" taking care of her children. She never had a period in June. She thinks her last period was around May 6.      She is not sure when she last had intercourse. She only has intercourse about every two to three months.     She says she does not want to be pregnant, but stated that if the fetus already had organs, then she would not want to terminate.  She says if there is a heartbeat then she would not want to do anything.    She tried to take herbal medication a few weeks ago to cause a period.  She says it did not work.    ROS: constitutional, cardiovascular, respiratory, GI, , MSK systems reviewed and negative except as noted above.    Social: Patient lives at home with her 4 sons.  Her  works at night and she says he sleeps during the days generally.   she is not sure if he wants to remain in a relationship with him.  She is Muslim.          Objective   Gen: healthy, alert  Pulm: unlabored breathing  CV: acyanotic. No LE edema.   Abd: soft, nontender, unable to palpate uterus.  No rebound or guarding.  Gyn: Unable to hear fetal heart tones  Psych: Intermittently tearful.  Generally linear and logical thinking, but difficult to assess.  Generally responses to most questions are short and vague.     Results for orders placed or performed in visit on 07/03/18 (from the past 24 hour(s))   HCG Qualitative Urine (UPT)  ([facility]) "   Result Value Ref Range    HCG Qual Urine POSITIVE Negative     Bedside ultrasound: Abdominal images obtained personally and reviewed.  There is a gestational sac with a fetus present.  Transabdominal image quality of the fetus was somewhat limited, but can definitely see a heartbeat at about 130 bpm.  The gestational sac measured 2.44cm, yielding a gestational age of 7w1d.          Assessment & Plan     1. Amenorrhea  - HCG Qualitative Urine (UPT)  ([facility])  2. Encounter for supervision of other normal pregnancy in first trimester  - Prenatal Vit-Fe Fumarate-FA (PRENATAL MULTIVITAMIN PLUS IRON) 27-0.8 MG TABS per tablet; Take 1 tablet by mouth daily  Dispense: 100 tablet; Refill: 3  3. Severe episode of recurrent major depressive disorder, without psychotic features (H)    Patient clearly does not want to be pregnant, but has strong personal beliefs and does not want to terminate this pregnancy as the fetus already has started organogenesis and has a heartbeat.  I did review with her that there are medical and surgical options for  in this situation.  After our visit, both her care coordinator Agustina and her therapist Elham met with her and reviewed her options with her and that if she changes her mind regarding termination to let us know immediately.  I also reviewed with her the option of adoption.    CHARLI via LMP would be 2/10/19.  CHARLI via US today would be 19.    Given that there is more than a 7 day discrepancy (and with her uncertainty regarding the LMP), would use the ultrasound date.  The only limiting factor is that we just obtained transabdominal images today, which is certainly not as accurate at this point.  Could consider obtaining a repeat bedside ultrasound in 1-2 weeks and obtaining a crown-rump length which I could not do today due to the poor image quality OR could simply set her up for a formal ultrasound in the next 2-3 weeks.      I have sent in a prenatal vitamin that  the pharmacy  will deliver and she can bring to her next appointment  to include in the rest of her pillbox.  Her medications were reviewed.  Given her history of intermittent suicidal ideation, hallucinations and great stress at home, I do not think she should come off her quetiapine.  This is generally considered to be safe in pregnancy and has a relatively low transmission through the placenta, at least in comparison to the other antipsychotics.  In fact, last week the was made to increase her dose and I concur with that decision; this was implemented today by our pharmacist adjusting her pillbox.    Patient would like to have a tubal after this pregnancy.    Patient has a f/u visit with Dr Brothers scheduled for 7/5.  Will keep that appointment given her mood issues and to allow us to have an opportunity to check in with her.  Hopefully the nurses can do her pregnancy intake at that point.      A [employer]  was used for this visit.  Precepted today with: MD Raza Allan MD    -------  PMH:  Patient Active Problem List   Diagnosis     Health Care Home     Recurrent major depressive disorder (H)     Seasonal allergic rhinitis     PTSD (post-traumatic stress disorder)     BRENDA (generalized anxiety disorder)     Other fatigue     Insomnia due to other mental disorder      Current Outpatient Prescriptions   Medication     Prenatal Vit-Fe Fumarate-FA (PRENATAL MULTIVITAMIN PLUS IRON) 27-0.8 MG TABS per tablet     calcium carbonate (TUMS) 500 MG chewable tablet     Capsaicin 0.1 % cream     cetirizine (ZYRTEC) 10 MG tablet     docusate sodium (COLACE) 100 MG tablet     fluticasone (FLONASE) 50 MCG/ACT spray     ketotifen (ZADITOR) 0.025 % SOLN ophthalmic solution     montelukast (SINGULAIR) 10 MG tablet     polyethylene glycol (MIRALAX) powder     prazosin (MINIPRESS) 1 MG capsule     QUEtiapine (SEROQUEL) 50 MG tablet     ranitidine (ZANTAC) 150 MG tablet     triamcinolone (KENALOG) 0.1 % ointment      venlafaxine (EFFEXOR-XR) 150 MG 24 hr capsule     venlafaxine (EFFEXOR-XR) 75 MG 24 hr capsule     No current facility-administered medications for this visit.       ALLERGIES: Nkda [no known drug allergies]

## 2018-07-03 NOTE — PROGRESS NOTES
S: Main concern today is that she was expecting period beginning of June, hasn't gotten. Requesting pregnancy test. Reports she forgot to take BC pill. Unable to define when she last took a dose. Not sure if has BC at home. Last time had intercourse, also unsure.     Presents with pillbox and medicine bottles today.     A "[employer]  was used for this visit.     O:     Patient Active Problem List   Diagnosis     Health Care Home     Recurrent major depressive disorder (H)     Seasonal allergic rhinitis     PTSD (post-traumatic stress disorder)     BRENDA (generalized anxiety disorder)     Other fatigue     Insomnia due to other mental disorder         A/P:  - Pillbox empty as expected.   - Referred to Dr Kapoor today. Pregnancy test positive.   - Review of medicines in [employer] database. No concerns with medicines and increasing risk of congenital anomalies, except Ibuprofen. Benefit of continued treatment of PTSD/BRENDA likely outweighs any risk. Educated patient of this today. Patient agreeable today to D/C Ibuprofen, disposed of for patient safety. Also agreeable.   - Educated patient on stopping birth control pills. Agreeable.  - D/C'd Ibuprofen and OC Rx at pharmacy.   - Filled 4 weeks of pillbox. Last dose 7/30/18, due for pillbox fill 7/31/18. Note Cetirizine not present in medicine supply today. Not placed in pillbox. Will inquire with pharmacy prior to next visit.     Daily medicines Last Fill Date Refill needed?   Venlafaxine 75 mg capsule x1  7/2 #30    [id number] mg capsule x1 7/2 #30    Quetiapine 50 mg tablet x2 6/28 #60 Rx written for #30 - Need change   Prazosin 1 mg capsule x2  6/6 #60 Y   Montelukast 10 mg tablet x1  7/2 #30    Ranitidine 150 mg tablet x1  7/2 #30 Y   Docusate 100 mg capsule x2  7/2 #30 Change Rx to 2 capsules /day         PRN medicines:       Zaditor eye drop - 2 bottles present 6/2               Options for treatment and/or follow-up care were reviewed with the patient. Hayde  was engaged and actively involved in the decision making process. She verbalized understanding of the options discussed and was satisfied with the final plan. Patient was provided with written instructions/medication list via AVS.    Dr. Brothers was provided our recommendations via routed note and Dr. Bentley was available for supervision during this visit and is the authorizing prescriber for this visit through the pharmacist collaborative practice agreement.    Thank you for the opportunity to participate in the care of this patient.  Sierra Scott, Pharm.D.  [facility]: [phone]    Due to patient being non-English speaking/uses sign language, an  was used for this visit. Only for face-to-face interpretation by an external agency, date and length of interpretation can be found on the scanned worksheet.     name: Frances  Agency: [employer]  Language: Maryann   Telephone number: [phone]  Type of interpretation: Face-to-face, spoken     Current Outpatient Prescriptions   Medication Sig Dispense Refill     prazosin (MINIPRESS) 1 MG capsule Take 2 capsules (2 mg) by mouth At Bedtime 60 capsule 3     QUEtiapine (SEROQUEL) 50 MG tablet Take 2 tablets (100 mg) by mouth At Bedtime 60 tablet 3     ranitidine (ZANTAC) 150 MG tablet Take 1 tablet (150 mg) by mouth At Bedtime 30 tablet 3     calcium carbonate (TUMS) 500 MG chewable tablet Take 1 tablet (500 mg) by mouth 2 times daily as needed for heartburn 150 tablet 3     Capsaicin 0.1 % cream Apply topically 2 times daily as needed 56.6 g 3     cetirizine (ZYRTEC) 10 MG tablet Take 1 tablet (10 mg) by mouth every evening 90 tablet 3     docusate sodium (COLACE) 100 MG tablet Take 200 mg by mouth daily 60 tablet 3     fluticasone (FLONASE) 50 MCG/ACT spray Spray 1-2 sprays into both nostrils daily as needed for allergies       ketotifen (ZADITOR) 0.025 % SOLN ophthalmic solution Place 1 drop into both eyes every 12 hours 1  Bottle 3     montelukast (SINGULAIR) 10 MG tablet Take 1 tablet (10 mg) by mouth daily 30 tablet 11     polyethylene glycol (MIRALAX) powder Take 17 g (1 capful) by mouth daily 510 g 1     Prenatal Vit-Fe Fumarate-FA (PRENATAL MULTIVITAMIN PLUS IRON) 27-0.8 MG TABS per tablet Take 1 tablet by mouth daily 100 tablet 3     triamcinolone (KENALOG) 0.1 % ointment Apply sparingly to affected area three times daily for 14 days. 30 g 1     venlafaxine (EFFEXOR-XR) 150 MG 24 hr capsule Take 1 capsule by mouth daily (together with 75 mg capsule for total of 225 mg/day) 30 capsule 11     venlafaxine (EFFEXOR-XR) 75 MG 24 hr capsule Take 1 capsule by mouth daily (together with 150 mg capsule for total of 225 mg/day) 30 capsule 11     [DISCONTINUED] prazosin (MINIPRESS) 1 MG capsule Take 2 capsules (2 mg) by mouth At Bedtime 60 capsule 3     [DISCONTINUED] QUEtiapine (SEROQUEL) 50 MG tablet Take 2 tablets (100 mg) by mouth At Bedtime 30 tablet 3        Drug therapy problems identified:  Medical Condition 1: Pain (i.e. somatic, visceral, neuropathic), Goals of therapy: Not at goal, Drug Class: Analgesic, Non-opioid, Safety: Drug - disease interaction, Intervention: Discontinue drug, Verification: Patient Agreed - CPA  Medical Condition 2: Contraception/hormones, Goals of therapy 2: Not at goal, Drug Class 2: Contraceptive, Safety 2: Drug - disease interaction, Intervention 2: Discontinue drug, Verification 2: Patient Agreed - CPA      Relevant medical devices: n/a    # of medical conditions addressed: 3  # of medications addressed: 8  # of DTP identified: 2  Time spent: 15 minutes  Level of service per [place] DHS guidelines: 3

## 2018-07-03 NOTE — MR AVS SNAPSHOT
After Visit Summary   7/3/2018    Hayde LUJAN Say    MRN: [id number]           Patient Information     Date Of Birth          1990        Visit Information        Provider Department      7/3/2018 2:00 PM Sierra Scott, RPH [facility]        Today's Diagnoses     Encounter for medication review and counseling    -  1    Severe episode of recurrent major depressive disorder, without psychotic features (H)        PTSD (post-traumatic stress disorder)        Gastroesophageal reflux disease, esophagitis presence not specified        Absence of menstruation           Follow-ups after your visit        Your next 10 appointments already scheduled     Jul 05, 2018  1:20 PM CDT   Return Visit with Flavio Brothers MD   [facility] ([facility])    [address] [place] [address]   [phone]            Jul 12, 2018  9:00 AM CDT   Return Visit with Elham Mcmahan LMFT [facility] ([facility])    [address] [place] [address]   [phone]              Who to contact     Please call your clinic at [phone] to:    Ask questions about your health    Make or cancel appointments    Discuss your medicines    Learn about your test results    Speak to your doctor            Additional Information About Your Visit        Care EveryWhere ID     This is your Care EveryWhere ID. This could be used by other organizations to access your [place] medical records  [id number]         Blood Pressure from Last 3 Encounters:   06/28/18 97/64   06/19/18 104/70   04/18/18 113/76    Weight from Last 3 Encounters:   06/28/18 140 lb 6.4 oz (63.7 kg)   06/19/18 140 lb (63.5 kg)   06/04/18 139 lb 3.2 oz (63.1 kg)              We Performed the Following     MTM, EA ADDITIONAL 15 MIN (07177) x 2 (= 30 min.)          Today's Medication Changes          These changes are accurate as of 7/3/18  4:25 PM.  If you have any questions, ask your nurse or  doctor.               Start taking these medicines.        Dose/Directions    prenatal multivitamin plus iron 27-0.8 MG Tabs per tablet   Used for:  Encounter for supervision of other normal pregnancy in first trimester   Started by:  Raza Kapoor MD        Dose:  1 tablet   Take 1 tablet by mouth daily   Quantity:  100 tablet   Refills:  3            Where to get your medicines      These medications were sent to [facility] - [place], [place] - [address]  [address], [place] [address]     Phone:  [phone]     prazosin 1 MG capsule    prenatal multivitamin plus iron 27-0.8 MG Tabs per tablet    QUEtiapine 50 MG tablet    ranitidine 150 MG tablet                Primary Care Provider Office Phone # Fax #    Carlos Manuel Rivas [phone]       [facility] [address] [place] [address]        Equal Access to Services     [facility] AH: Hadii aad ku hadasho Soomaali, waaxda luqadaha, qaybta kaalmada adeegyada, waxay idiin hayaan aderitchie kharaelie duarte . So [facility] [phone].    ATENCIÓN: Si habla español, tiene a keene disposición servicios gratuitos de asistencia lingüística. Llame al [phone].    We comply with applicable federal civil rights laws and [place] laws. We do not discriminate on the basis of race, color, national origin, age, disability, sex, sexual orientation, or gender identity.            Thank you!     Thank you for choosing [facility]  for your care. Our goal is always to provide you with excellent care. Hearing back from our patients is one way we can continue to improve our services. Please take a few minutes to complete the written survey that you may receive in the mail after your visit with us. Thank you!             Your Updated Medication List - Protect others around you: Learn how to safely use, store and throw away your medicines at www.disposemymeds.org.          This list is  accurate as of 7/3/18  4:25 PM.  Always use your most recent med list.                   Brand Name Dispense Instructions for use Diagnosis    calcium carbonate 500 MG chewable tablet    TUMS    150 tablet    Take 1 tablet (500 mg) by mouth 2 times daily as needed for heartburn    Other chronic gastritis without hemorrhage       Capsaicin 0.1 % cream     56.6 g    Apply topically 2 times daily as needed    Chronic right-sided low back pain without sciatica, Neck pain       cetirizine 10 MG tablet    zyrTEC    90 tablet    Take 1 tablet (10 mg) by mouth every evening    Chronic seasonal allergic rhinitis due to fungal spores       docusate sodium 100 MG tablet    COLACE    60 tablet    Take 200 mg by mouth daily    Constipation, unspecified constipation type       FLONASE 50 MCG/ACT spray   Generic drug:  fluticasone      Spray 1-2 sprays into both nostrils daily as needed for allergies    Seasonal allergic rhinitis due to other allergic trigger       ketotifen 0.025 % Soln ophthalmic solution    ZADITOR    1 Bottle    Place 1 drop into both eyes every 12 hours    Acute seasonal allergic rhinitis, unspecified trigger       montelukast 10 MG tablet    SINGULAIR    30 tablet    Take 1 tablet (10 mg) by mouth daily    Acute seasonal allergic rhinitis, unspecified trigger       polyethylene glycol powder    MIRALAX    510 g    Take 17 g (1 capful) by mouth daily    Constipation, unspecified constipation type       prazosin 1 MG capsule    MINIPRESS    60 capsule    Take 2 capsules (2 mg) by mouth At Bedtime    PTSD (post-traumatic stress disorder)       prenatal multivitamin plus iron 27-0.8 MG Tabs per tablet     100 tablet    Take 1 tablet by mouth daily    Encounter for supervision of other normal pregnancy in first trimester       QUEtiapine 50 MG tablet    SEROquel    60 tablet    Take 2 tablets (100 mg) by mouth At Bedtime    PTSD (post-traumatic stress disorder)       ranitidine 150 MG tablet    ZANTAC    30  tablet    Take 1 tablet (150 mg) by mouth At Bedtime    Gastroesophageal reflux disease, esophagitis presence not specified       triamcinolone 0.1 % ointment    KENALOG    30 g    Apply sparingly to affected area three times daily for 14 days.    Infestation by bed bug       * venlafaxine 75 MG 24 hr capsule    EFFEXOR-XR    30 capsule    Take 1 capsule by mouth daily (together with 150 mg capsule for total of 225 mg/day)    PTSD (post-traumatic stress disorder), Major depressive disorder, recurrent episode, moderate (H)       * venlafaxine 150 MG 24 hr capsule    EFFEXOR-XR    30 capsule    Take 1 capsule by mouth daily (together with 75 mg capsule for total of 225 mg/day)    PTSD (post-traumatic stress disorder), Major depressive disorder, recurrent episode, moderate (H)       * Notice:  This list has 2 medication(s) that are the same as other medications prescribed for you. Read the directions carefully, and ask your doctor or other care provider to review them with you.

## 2018-07-03 NOTE — PROGRESS NOTES
Primary Care Behavioral Health Consult Note    Meeting lasted: 10 minutes  Others present:  and care coordinator, Agustina Gramajo    Identifying Information and Presenting Problem:    Dr. Kapoor requested behavioral health consultation for this patient regarding unwanted pregnancy.  The patient is a 27 year old Maryann individual that agreed to be seen by behavioral health today.    Topics Discussed/Interventions Provided:       Patient was crying and upset. She learned she was pregnant today, confirmed with UPT and ultrasound. Her care-coordinator, Agustina, and I offer support and reassurance that we would provide help and care regardless of her choice to keep the pregnancy. Patient said she would like to keep the pregnancy because they were able to confirm heartbeat on the ultrasound- which she requested. She is sad and unhappy about this because she is scared/overwhelmed with the idea of caring for another baby. She also requested to have the same  at each appointment, if possible. She would like to have the one she had today.       Assessment:     Mental Status: Hayde appeared generally alert and oriented. Dress was casual and appropriate to the weather and occasion. Grooming and hygiene were clean. Eye contact was poor. Speech was of normal volume and rate and was clear, coherent, and relevant. Mood was sad with congruent affect. Thought processes were relevant, logical and goal-directed. Thought content was WNL with no evidence of psychotic or paranoid features. No evidence of SI/HI or self-harm, intent, or plans. Memory appeared grossly intact. Insight and judgment appeared intact and patient exhibited good impulse control during the appointment.     PHQ-9 SCORE 11/29/2017 1/9/2018 1/22/2018   Total Score - - -   Total Score 8 9 6       BRENDA-7 SCORE 4/26/2017 8/29/2017 1/22/2018   Total Score 8 15 14       Diagnostic Considerations:      A complete diagnostic assessment was not performed at today's  visit.     Plan:      Will see me for our scheduled appt next Thurs. Can come in earlier if needed.    Can call our clinic to discuss pregnancy or concerns with any physician 24/7.     Agustina will call and check in on her regularly, PRN.

## 2018-07-03 NOTE — MR AVS SNAPSHOT
After Visit Summary   7/3/2018    Hayde LUJAN Say    MRN: [id number]           Patient Information     Date Of Birth          1990        Visit Information        Provider Department      7/3/2018 2:40 PM Raza Kapoor MD [facility]        Today's Diagnoses     Amenorrhea    -  1    Encounter for supervision of other normal pregnancy in first trimester        Severe episode of recurrent major depressive disorder, without psychotic features (H)           Follow-ups after your visit        Your next 10 appointments already scheduled     Jul 12, 2018  9:00 AM CDT   Return Visit with Elham Mcmahan LMFT [facility] ([facility])    [address] [place] [address]   [phone]            Jul 27, 2018  9:00 AM CDT   NEW OB with Rosana Rosales DO   [facility] ([facility])    [address] [place] [address]   [phone]            Jul 27, 2018  9:40 AM CDT   Return Visit with Sierra Scott RPH [facility] ([facility])    [address] [place] [address]   [phone]              Who to contact     Please call your clinic at [phone] to:    Ask questions about your health    Make or cancel appointments    Discuss your medicines    Learn about your test results    Speak to your doctor            Additional Information About Your Visit        Care EveryWhere ID     This is your Care EveryWhere ID. This could be used by other organizations to access your [place] medical records  [id number]         Blood Pressure from Last 3 Encounters:   07/05/18 99/66   06/28/18 97/64   06/19/18 104/70    Weight from Last 3 Encounters:   07/05/18 136 lb (61.7 kg)   06/28/18 140 lb 6.4 oz (63.7 kg)   06/19/18 140 lb (63.5 kg)              We Performed the Following     HCG Qualitative Urine (UPT)  ([facility])      : Sign Language or Oral -  minutes          Today's Medication  Changes          These changes are accurate as of 7/3/18 11:59 PM.  If you have any questions, ask your nurse or doctor.               Start taking these medicines.        Dose/Directions    prenatal multivitamin plus iron 27-0.8 MG Tabs per tablet   Used for:  Encounter for supervision of other normal pregnancy in first trimester, Amenorrhea, Severe episode of recurrent major depressive disorder, without psychotic features (H)   Started by:  Raza Kapoor MD        Dose:  1 tablet   Take 1 tablet by mouth daily   Quantity:  100 tablet   Refills:  3            Where to get your medicines      These medications were sent to [facility] - [place], [place] - [address]  [address], [place] [address]     Phone:  [phone]     prazosin 1 MG capsule    prenatal multivitamin plus iron 27-0.8 MG Tabs per tablet    QUEtiapine 50 MG tablet    ranitidine 150 MG tablet                Primary Care Provider Office Phone # Fax #    Carlos Manuel Andreas Rivas [phone]       [facility] [address] [place] [address]        Equal Access to Services     [facility]: Hadii aad ku hadasho Soomaali, waaxda luqadaha, qaybta kaalmada adeegyada, waxay jeffery duarte . So [facility] [phone].    ATENCIÓN: Si habla español, tiene a keene disposición servicios gratuitos de asistencia lingüística. Llame al [phone].    We comply with applicable federal civil rights laws and [place] laws. We do not discriminate on the basis of race, color, national origin, age, disability, sex, sexual orientation, or gender identity.            Thank you!     Thank you for choosing [facility]  for your care. Our goal is always to provide you with excellent care. Hearing back from our patients is one way we can continue to improve our services. Please take a few minutes to complete the written survey that you may receive in the mail after  your visit with us. Thank you!             Your Updated Medication List - Protect others around you: Learn how to safely use, store and throw away your medicines at www.disposemymeds.org.          This list is accurate as of 7/3/18 11:59 PM.  Always use your most recent med list.                   Brand Name Dispense Instructions for use Diagnosis    calcium carbonate 500 MG chewable tablet    TUMS    150 tablet    Take 1 tablet (500 mg) by mouth 2 times daily as needed for heartburn    Other chronic gastritis without hemorrhage       Capsaicin 0.1 % cream     56.6 g    Apply topically 2 times daily as needed    Chronic right-sided low back pain without sciatica, Neck pain       cetirizine 10 MG tablet    zyrTEC    90 tablet    Take 1 tablet (10 mg) by mouth every evening    Chronic seasonal allergic rhinitis due to fungal spores       docusate sodium 100 MG tablet    COLACE    60 tablet    Take 200 mg by mouth daily    Constipation, unspecified constipation type       FLONASE 50 MCG/ACT spray   Generic drug:  fluticasone      Spray 1-2 sprays into both nostrils daily as needed for allergies    Seasonal allergic rhinitis due to other allergic trigger       ketotifen 0.025 % Soln ophthalmic solution    ZADITOR    1 Bottle    Place 1 drop into both eyes every 12 hours    Acute seasonal allergic rhinitis, unspecified trigger       montelukast 10 MG tablet    SINGULAIR    30 tablet    Take 1 tablet (10 mg) by mouth daily    Acute seasonal allergic rhinitis, unspecified trigger       polyethylene glycol powder    MIRALAX    510 g    Take 17 g (1 capful) by mouth daily    Constipation, unspecified constipation type       prazosin 1 MG capsule    MINIPRESS    60 capsule    Take 2 capsules (2 mg) by mouth At Bedtime    PTSD (post-traumatic stress disorder)       prenatal multivitamin plus iron 27-0.8 MG Tabs per tablet     100 tablet    Take 1 tablet by mouth daily    Encounter for supervision of other normal pregnancy in  first trimester, Amenorrhea, Severe episode of recurrent major depressive disorder, without psychotic features (H)       QUEtiapine 50 MG tablet    SEROquel    60 tablet    Take 2 tablets (100 mg) by mouth At Bedtime    PTSD (post-traumatic stress disorder)       ranitidine 150 MG tablet    ZANTAC    30 tablet    Take 1 tablet (150 mg) by mouth At Bedtime    Gastroesophageal reflux disease, esophagitis presence not specified       triamcinolone 0.1 % ointment    KENALOG    30 g    Apply sparingly to affected area three times daily for 14 days.    Infestation by bed bug       * venlafaxine 75 MG 24 hr capsule    EFFEXOR-XR    30 capsule    Take 1 capsule by mouth daily (together with 150 mg capsule for total of 225 mg/day)    PTSD (post-traumatic stress disorder), Major depressive disorder, recurrent episode, moderate (H)       * venlafaxine 150 MG 24 hr capsule    EFFEXOR-XR    30 capsule    Take 1 capsule by mouth daily (together with 75 mg capsule for total of 225 mg/day)    PTSD (post-traumatic stress disorder), Major depressive disorder, recurrent episode, moderate (H)       * Notice:  This list has 2 medication(s) that are the same as other medications prescribed for you. Read the directions carefully, and ask your doctor or other care provider to review them with you.

## 2018-07-05 ENCOUNTER — TELEPHONE (OUTPATIENT)
Dept: FAMILY MEDICINE | Facility: CLINIC | Age: 28
End: 2018-07-05

## 2018-07-05 ENCOUNTER — TRANSFERRED RECORDS (OUTPATIENT)
Dept: HEALTH INFORMATION MANAGEMENT | Facility: CLINIC | Age: 28
End: 2018-07-05

## 2018-07-05 ENCOUNTER — DOCUMENTATION ONLY (OUTPATIENT)
Dept: FAMILY MEDICINE | Facility: CLINIC | Age: 28
End: 2018-07-05

## 2018-07-05 ENCOUNTER — OFFICE VISIT (OUTPATIENT)
Dept: FAMILY MEDICINE | Facility: CLINIC | Age: 28
End: 2018-07-05
Payer: COMMERCIAL

## 2018-07-05 VITALS
SYSTOLIC BLOOD PRESSURE: 99 MMHG | HEART RATE: 80 BPM | TEMPERATURE: 98.6 F | OXYGEN SATURATION: 100 % | DIASTOLIC BLOOD PRESSURE: 66 MMHG | HEIGHT: 58 IN | WEIGHT: 136 LBS | BODY MASS INDEX: 28.55 KG/M2

## 2018-07-05 DIAGNOSIS — Z34.81 ENCOUNTER FOR SUPERVISION OF OTHER NORMAL PREGNANCY IN FIRST TRIMESTER: ICD-10-CM

## 2018-07-05 DIAGNOSIS — F33.2 SEVERE EPISODE OF RECURRENT MAJOR DEPRESSIVE DISORDER, WITHOUT PSYCHOTIC FEATURES (H): Primary | ICD-10-CM

## 2018-07-05 DIAGNOSIS — F99 INSOMNIA DUE TO OTHER MENTAL DISORDER: ICD-10-CM

## 2018-07-05 DIAGNOSIS — F51.05 INSOMNIA DUE TO OTHER MENTAL DISORDER: ICD-10-CM

## 2018-07-05 DIAGNOSIS — F43.10 PTSD (POST-TRAUMATIC STRESS DISORDER): ICD-10-CM

## 2018-07-05 DIAGNOSIS — F41.1 GAD (GENERALIZED ANXIETY DISORDER): ICD-10-CM

## 2018-07-05 DIAGNOSIS — G44.219 EPISODIC TENSION-TYPE HEADACHE, NOT INTRACTABLE: ICD-10-CM

## 2018-07-05 RX ORDER — ACETAMINOPHEN 325 MG/1
650 TABLET ORAL EVERY 4 HOURS PRN
Qty: 36 TABLET | Refills: 0 | Status: SHIPPED | OUTPATIENT
Start: 2018-07-05 | End: 2018-08-02

## 2018-07-05 NOTE — PROGRESS NOTES
Primary Care Behavioral Health Consult Note    Requesting Provider: Dr. Brothers    Identifying Information and Presenting Problem:    Dr. Brothers requested behavioral health consultation for this patient regarding distress surrounding unexpected pregnancy.      Summary of review:   1. Patient is a Maryann, non-English speaking woman with 4 boys at home. She and her  have a strained, sometimes volatile relationship. Reports he works nights and sleeps during the day and 'all weekend'. She has little social support. Some of her children have had behavioral concerns. Money is very limited and patient recently had SNAP benefits discontinued, despite turning in paperwork. Lives in 2 bed apartment with 3 adults, 4 children. Only furniture is table and chairs.   2. Patient has PTSD, depression, and BRENDA. She is very fearful of others and extremely slow to trust. She has had concerns with anger management and has been taking walks to manage them. She has had some potential psychosis, this is hard to decipher.   3. Patient presented to clinic for medbox refill on 7/3 with our pharmacist, with concerns she might be pregnant. She had been wanting more permanent method for birth control. UPT was positive, bedside ultrasound showed a 7 wk fetus with cardiac activity. Patient was wanting an  if no organs had formed. She is going to keep the baby now that she has seen the heart beat.   4. Patient was very upset and irritable today, not answering questions during her OB intake with the RN, instead making exclamations about birthing practices in the [place] versus [place].     Assessment: Patient will need ongoing care and touch points from care coordination, , and possible more frequent OB visits to help monitor changes in mood, anger, anxiety, and this potential psychosis. At this point, possible concerns about patient bonding with baby, if pregnancy is not wanted. However, patient is likely still experiencing  myriad emotions since it has only been 2 days.     PHQ-9 SCORE 11/29/2017 1/9/2018 1/22/2018   Total Score - - -   Total Score 8 9 6       BRENDA-7 SCORE 4/26/2017 8/29/2017 1/22/2018   Total Score 8 15 14         Recommendations and Plan:      Consider female provider for OB, if possible. This may be a better fit for patient from a cultural perspective.    Increase follow-up to offer support, being careful to not overwhelm patient with too many appointments since her children also see specialists regularly.     Consider increasing psychotherapy appts to every 2 weeks, weekly calls with/from Agustina, and consider every 3 week appts for OB.    Patient will need resources for baby.    Be patient with her. Responds best to having concrete items/tasks accomplished.     Monitor closely for changes in mood, psychosis, etc both during and after pregnancy.     Disclaimer  The above treatment recommendations are based on consultation with the patient's primary care provider and a review of relevant information in EPIC.  I have not personally examined the patient.  All recommendations should be implemented with considerations of the patient's relevant prior history and current clinical status.  Please contact me with any questions about the care of this patient.

## 2018-07-05 NOTE — PROGRESS NOTES
[facility] Follow-up    Call initiated by patient.  Message recorded by Agustina Gramajo  Calling for: Pt in clinic for med set  Patient: Hayde LUJAN Say  Phone conversation with: Patient  Patient's Phone number/s: Home number on file [phone] (home)  Available today in the PM on their Home number.  OK to leave message on voice mail? Yes      Major diagnoses and/or issues requiring coordination services  Other fatigue   Insomnia due to other mental disorder   BRENDA (generalized anxiety disorder)   PTSD (post-traumatic stress disorder)   Seasonal allergic rhinitis   Recurrent major depressive disorder (H)         In the past month, how many times have you been to the Emergency Room? 0  In the past month, how many times have you been hospitalized? 0    Summary notes: Pt in clinic for med set with pharm D and mentioned late periods and concerned about possible pregnancy, tested and found to be pregnant and us done to determine rough date and pt wants to know if there is a heartbeat - confirmed and pt has then stated she wants to keep child.  Very tearful and fearful about abilities to care for a 5th child.  Will need a lot of supports physically and emotionally    Self-management goals:  Meet needs of infant and mother  Support family  Pt to continue to see dr for ob and therapist as well as pharm d for med set up And Care coordination  Readiness to change: Somewhat ready  Overwhelmed and frustrated      Counseling and coordination activities with: patient/family, PCP and specialist:     Follow-up date: Monthly and prn

## 2018-07-05 NOTE — PROGRESS NOTES
Chief Complaint   Patient presents with     Follow Up For     OB decided to keep baby     Assessment and Plan   1. Severe episode of recurrent major depressive disorder, without psychotic features (H)  Currently on Venlafaxine, we would like her to see psychiatry again for diagnosis clarification, but patient has declined. Her mood continues to be poor but am refraining from additional med changes at this time due to just increasing her Quetiapine 2 days ago.    2. Episodic tension-type headache, not intractable  Patient complaining of intermittent, mild headache.  - acetaminophen (TYLENOL) 325 MG tablet; Take 2 tablets (650 mg) by mouth every 4 hours as needed for mild pain  Dispense: 36 tablet; Refill: 0    3. PTSD (post-traumatic stress disorder)  Currently on Venlafaxine, we would like her to see psychiatry again for diagnosis clarification, but patient has declined. Her mood continues to be poor but am refraining from additional med changes at this time due to just increasing her Quetiapine 2 days ago.    4. BRENDA (generalized anxiety disorder)  See #1 and #3.    5. Insomnia due to other mental disorder  See #1 and #3.    6. Pregnant, first trimester  Unwanted pregnancy, but wants to keep child. Will do OB intake and US today in clinic.  -f/u with me for initial prenatal visit    Options for treatment and follow-up care were reviewed with the patient and/or guardian. Hayde Macias and/or guardian engaged in the decision making process and verbalized understanding of the options discussed and agreed with the final plan.    Flavio Brothers MD  [facility], PGY-2    Precepted with Precepted with: Sariah Keller DO.         HPI:   Hayde Macias is a 27 year old female who presents to clinic today for followup of + pregnancy test and mood.    Patient was seen in clinic 2 days ago and was found to have + UPT, this was an undesired pregnancy. She does  however wish to keep the baby at this point in time - does NOT want to discuss  options.    At our last visit we had also increased her Quetiapine as she was having poor mood and sleeping only 2-3 hours a night. She continues to have poor mood and sleep.    She also complains of intermittent headache. These are mild in nature and happen a few times a week.    She did not bring her pill boxes with her today to place the prenatal vitamins.    A Uni[employer]ity  was used for this visit.         Review of Systems:   A comprehensive 12 point review of systems was negative unless otherwise noted in the HPI.          PMHX:   Active Problems List  Patient Active Problem List   Diagnosis     Health Care Home     Recurrent major depressive disorder (H)     Seasonal allergic rhinitis     PTSD (post-traumatic stress disorder)     BRENDA (generalized anxiety disorder)     Other fatigue     Insomnia due to other mental disorder     Active problem list reviewed and updated.    Current Medications  Current Outpatient Prescriptions   Medication Sig Dispense Refill     acetaminophen (TYLENOL) 325 MG tablet Take 2 tablets (650 mg) by mouth every 4 hours as needed for mild pain 36 tablet 0     calcium carbonate (TUMS) 500 MG chewable tablet Take 1 tablet (500 mg) by mouth 2 times daily as needed for heartburn 150 tablet 3     Capsaicin 0.1 % cream Apply topically 2 times daily as needed 56.6 g 3     cetirizine (ZYRTEC) 10 MG tablet Take 1 tablet (10 mg) by mouth every evening 90 tablet 3     docusate sodium (COLACE) 100 MG tablet Take 200 mg by mouth daily 60 tablet 3     fluticasone (FLONASE) 50 MCG/ACT spray Spray 1-2 sprays into both nostrils daily as needed for allergies       ketotifen (ZADITOR) 0.025 % SOLN ophthalmic solution Place 1 drop into both eyes every 12 hours 1 Bottle 3     montelukast (SINGULAIR) 10 MG tablet Take 1 tablet (10 mg) by mouth daily 30 tablet 11     polyethylene glycol (MIRALAX) powder Take 17 g (1  "capful) by mouth daily 510 g 1     prazosin (MINIPRESS) 1 MG capsule Take 2 capsules (2 mg) by mouth At Bedtime 60 capsule 3     Prenatal Vit-Fe Fumarate-FA (PRENATAL MULTIVITAMIN PLUS IRON) 27-0.8 MG TABS per tablet Take 1 tablet by mouth daily 100 tablet 3     QUEtiapine (SEROQUEL) 50 MG tablet Take 2 tablets (100 mg) by mouth At Bedtime 60 tablet 3     ranitidine (ZANTAC) 150 MG tablet Take 1 tablet (150 mg) by mouth At Bedtime 30 tablet 3     triamcinolone (KENALOG) 0.1 % ointment Apply sparingly to affected area three times daily for 14 days. 30 g 1     venlafaxine (EFFEXOR-XR) 150 MG 24 hr capsule Take 1 capsule by mouth daily (together with 75 mg capsule for total of 225 mg/day) 30 capsule 11     venlafaxine (EFFEXOR-XR) 75 MG 24 hr capsule Take 1 capsule by mouth daily (together with 150 mg capsule for total of 225 mg/day) 30 capsule 11     Medication list unable to be updated due to patient not bringing her pill boxes to clinic today.    Social History  Social History   Substance Use Topics     Smoking status: Never Smoker     Smokeless tobacco: Never Used      Comment: no smoke exposure.     Alcohol use No     History   Drug Use No     Allergies  Allergies   Allergen Reactions     Nkda [No Known Drug Allergies]      Allergies and Medication Intolerances Updated         Physical Exam:     Vitals:    07/05/18 1331   BP: 99/66   Pulse: 80   Temp: 98.6  F (37  C)   TempSrc: Oral   SpO2: 100%   Weight: 136 lb (61.7 kg)   Height: 4' 9.68\" (146.5 cm)     Body mass index is 28.74 kg/(m^2).    GENERAL APPEARANCE: alert, no acute distress   NECK: no adenopathy, asymmetry, masses, or scars  RESP: lungs clear to auscultation - no rales, rhonchi, or wheezes   CV: regular rate and rhythm, no murmur, click, rub, or gallop   ABDOMEN: soft, nontender, no hepatosplenomegaly or masses   MSK: extremities normal - no gross deformities noted   SKIN: no suspicious lesions or rashes   NEURO: Normal strength and tone, sensory exam " grossly normal, mentation appears intact and speech normal   PSYCH: difficult to assess due to language barrier, patient became intermittently agitated however

## 2018-07-05 NOTE — TELEPHONE ENCOUNTER
Reminder call to pt about todays appt, she was reluctant since she saw dr this week, encouraged her though to come back today, regular follow up and to meet new dr and talk about anything concerned with and new results just found out she is pregnant.  jesusetz

## 2018-07-05 NOTE — MR AVS SNAPSHOT
"              After Visit Summary   7/5/2018    Hayde LUJAN Say    MRN: [id number]           Patient Information     Date Of Birth          1990        Visit Information        Provider Department      7/5/2018 1:20 PM Flavio Brothers MD [facility]        Today's Diagnoses     Severe episode of recurrent major depressive disorder, without psychotic features (H)    -  1    Episodic tension-type headache, not intractable        PTSD (post-traumatic stress disorder)        BRENDA (generalized anxiety disorder)        Insomnia due to other mental disorder           Follow-ups after your visit        Your next 10 appointments already scheduled     Jul 12, 2018  9:00 AM CDT   Return Visit with Elham Mcmahan LMFT [facility] ([facility])    [address] [place] [address]   [phone]            Jul 27, 2018  9:00 AM CDT   NEW OB with Rosana Rosales DO   [facility] ([facility])    [address] [place] [address]   [phone]            Jul 27, 2018  9:40 AM CDT   Return Visit with Sierra Scott RPH [facility] ([facility])    [address] [place] [id number]   [phone]              Who to contact     Please call your clinic at [phone] to:    Ask questions about your health    Make or cancel appointments    Discuss your medicines    Learn about your test results    Speak to your doctor            Additional Information About Your Visit        Care EveryWhere ID     This is your Care EveryWhere ID. This could be used by other organizations to access your [facility] medical records  [id number]        Your Vitals Were     Pulse Temperature Height Last Period Pulse Oximetry BMI (Body Mass Index)    80 98.6  F (37  C) (Oral) 4' 9.68\" (146.5 cm) (LMP Unknown) 100% 28.74 kg/m2       Blood Pressure from Last 3 Encounters:   07/05/18 99/66   06/28/18 97/64   06/19/18 104/70    Weight from Last 3 " Encounters:   07/05/18 136 lb (61.7 kg)   06/28/18 140 lb 6.4 oz (63.7 kg)   06/19/18 140 lb (63.5 kg)              We Performed the Following      : Sign Language or Oral - 53-67 minutes          Today's Medication Changes          These changes are accurate as of 7/5/18 11:59 PM.  If you have any questions, ask your nurse or doctor.               Start taking these medicines.        Dose/Directions    acetaminophen 325 MG tablet   Commonly known as:  TYLENOL   Used for:  Episodic tension-type headache, not intractable, Severe episode of recurrent major depressive disorder, without psychotic features (H), PTSD (post-traumatic stress disorder), BRENDA (generalized anxiety disorder), Insomnia due to other mental disorder   Started by:  Flavio Brothers MD        Dose:  650 mg   Take 2 tablets (650 mg) by mouth every 4 hours as needed for mild pain   Quantity:  36 tablet   Refills:  0            Where to get your medicines      These medications were sent to [facility] - [place], [place] - [address] [place] [address]  [address] [facility] [address], [place] [address]     Phone:  [phone]     acetaminophen 325 MG tablet                Primary Care Provider Office Phone # Fax #    Carlos Manuel Andreas Rivas [phone]       [facility] [address] [place] [address]        Equal Access to Services     DYLAN LEY AH: Hadii raven ku hadasho Soomaali, waaxda luqadaha, qaybta kaalmada adeegyada, nakul gil hayjuan duarte . So [facility] [phone].    ATENCIÓN: Si habla español, tiene a keene disposición servicios gratuitos de asistencia lingüística. Llame al [phone].    We comply with applicable federal civil rights laws and [place] laws. We do not discriminate on the basis of race, color, national origin, age, disability, sex, sexual orientation, or gender identity.            Thank you!     Thank you for choosing [facility]  for your care. Our  goal is always to provide you with excellent care. Hearing back from our patients is one way we can continue to improve our services. Please take a few minutes to complete the written survey that you may receive in the mail after your visit with us. Thank you!             Your Updated Medication List - Protect others around you: Learn how to safely use, store and throw away your medicines at www.disposemymeds.org.          This list is accurate as of 7/5/18 11:59 PM.  Always use your most recent med list.                   Brand Name Dispense Instructions for use Diagnosis    acetaminophen 325 MG tablet    TYLENOL    36 tablet    Take 2 tablets (650 mg) by mouth every 4 hours as needed for mild pain    Episodic tension-type headache, not intractable, Severe episode of recurrent major depressive disorder, without psychotic features (H), PTSD (post-traumatic stress disorder), BRENDA (generalized anxiety disorder), Insomnia due to other mental disorder       calcium carbonate 500 MG chewable tablet    TUMS    150 tablet    Take 1 tablet (500 mg) by mouth 2 times daily as needed for heartburn    Other chronic gastritis without hemorrhage       Capsaicin 0.1 % cream     56.6 g    Apply topically 2 times daily as needed    Chronic right-sided low back pain without sciatica, Neck pain       cetirizine 10 MG tablet    zyrTEC    90 tablet    Take 1 tablet (10 mg) by mouth every evening    Chronic seasonal allergic rhinitis due to fungal spores       docusate sodium 100 MG tablet    COLACE    60 tablet    Take 200 mg by mouth daily    Constipation, unspecified constipation type       FLONASE 50 MCG/ACT spray   Generic drug:  fluticasone      Spray 1-2 sprays into both nostrils daily as needed for allergies    Seasonal allergic rhinitis due to other allergic trigger       ketotifen 0.025 % Soln ophthalmic solution    ZADITOR    1 Bottle    Place 1 drop into both eyes every 12 hours    Acute seasonal allergic rhinitis, unspecified  trigger       montelukast 10 MG tablet    SINGULAIR    30 tablet    Take 1 tablet (10 mg) by mouth daily    Acute seasonal allergic rhinitis, unspecified trigger       polyethylene glycol powder    MIRALAX    510 g    Take 17 g (1 capful) by mouth daily    Constipation, unspecified constipation type       prazosin 1 MG capsule    MINIPRESS    60 capsule    Take 2 capsules (2 mg) by mouth At Bedtime    PTSD (post-traumatic stress disorder)       prenatal multivitamin plus iron 27-0.8 MG Tabs per tablet     100 tablet    Take 1 tablet by mouth daily    Encounter for supervision of other normal pregnancy in first trimester, Amenorrhea, Severe episode of recurrent major depressive disorder, without psychotic features (H)       QUEtiapine 50 MG tablet    SEROquel    60 tablet    Take 2 tablets (100 mg) by mouth At Bedtime    PTSD (post-traumatic stress disorder)       ranitidine 150 MG tablet    ZANTAC    30 tablet    Take 1 tablet (150 mg) by mouth At Bedtime    Gastroesophageal reflux disease, esophagitis presence not specified       triamcinolone 0.1 % ointment    KENALOG    30 g    Apply sparingly to affected area three times daily for 14 days.    Infestation by bed bug       * venlafaxine 75 MG 24 hr capsule    EFFEXOR-XR    30 capsule    Take 1 capsule by mouth daily (together with 150 mg capsule for total of 225 mg/day)    PTSD (post-traumatic stress disorder), Major depressive disorder, recurrent episode, moderate (H)       * venlafaxine 150 MG 24 hr capsule    EFFEXOR-XR    30 capsule    Take 1 capsule by mouth daily (together with 75 mg capsule for total of 225 mg/day)    PTSD (post-traumatic stress disorder), Major depressive disorder, recurrent episode, moderate (H)       * Notice:  This list has 2 medication(s) that are the same as other medications prescribed for you. Read the directions carefully, and ask your doctor or other care provider to review them with you.

## 2018-07-06 ENCOUNTER — TELEPHONE (OUTPATIENT)
Dept: FAMILY MEDICINE | Facility: CLINIC | Age: 28
End: 2018-07-06

## 2018-07-06 NOTE — TELEPHONE ENCOUNTER
I have left messages regarding snap for [facility] worker Sunil Horn (Shantel) on:  Sharee 19  Sharee 21  Sharee 26  Sharee 28  July 3  July 5   July 6    No response from worker yet  Faxed some updates to her along with a release on July 5, and asked on cover letter to have her or a supervisor call back

## 2018-07-06 NOTE — TELEPHONE ENCOUNTER
"Called city  [place] [employer]  Asked about bed bug problem, her building is providing documentation showing that they are trying to control problem with every 3 week pest control spraying the buildings and they rotate - unfortunately he said he can not due much more from an enforcement standpoint as they are doing what they are supposed to.  Continue to file formal complaints and they will go out again.    Asked if there are other options or in home things they can do to help this process internally - he was not aware of any \"home remedies\"    lbetz  "

## 2018-07-06 NOTE — TELEPHONE ENCOUNTER
Called patient after speaking to her county worker Sunil Funes    She was unable to read blays husbands shirlene larry and therefore paperwork has been suspended  Talked with pt and she will bring it next week so we can resend and then I need to notify worker it is in and we should be ok to get snap reinstated.    Lbetz

## 2018-07-06 NOTE — NURSING NOTE
Due to patient being non-English speaking/uses sign language, an  was used for this visit. Only for face-to-face interpretation by an external agency, date and length of interpretation can be found on the scanned worksheet.     name: Claudia Rey  Agency: [employer]  Language: Maryann   Telephone number: [phone]  Type of interpretation: Face-to-face, spoken

## 2018-07-06 NOTE — NURSING NOTE
Due to patient being non-English speaking/uses sign language, an  was used for this visit. Only for face-to-face interpretation by an external agency, date and length of interpretation can be found on the scanned worksheet.     name: Frances England  Agency: [employer]  Language: Maryann   Telephone number: [phone]  Type of interpretation: Face-to-face, spoken

## 2018-07-10 ENCOUNTER — CARE COORDINATION (OUTPATIENT)
Dept: FAMILY MEDICINE | Facility: CLINIC | Age: 28
End: 2018-07-10

## 2018-07-11 ENCOUNTER — TELEPHONE (OUTPATIENT)
Dept: FAMILY MEDICINE | Facility: CLINIC | Age: 28
End: 2018-07-11

## 2018-07-11 NOTE — TELEPHONE ENCOUNTER
Called pt and reminded her of appt tomorrow, to bring medications so we can add new one, and to bring check stub for husbands work so we can resend to [facility].  Ramila

## 2018-07-12 ENCOUNTER — OFFICE VISIT (OUTPATIENT)
Dept: PSYCHOLOGY | Facility: CLINIC | Age: 28
End: 2018-07-12
Payer: COMMERCIAL

## 2018-07-12 ENCOUNTER — CARE COORDINATION (OUTPATIENT)
Dept: FAMILY MEDICINE | Facility: CLINIC | Age: 28
End: 2018-07-12

## 2018-07-12 ENCOUNTER — TELEPHONE (OUTPATIENT)
Dept: FAMILY MEDICINE | Facility: CLINIC | Age: 28
End: 2018-07-12

## 2018-07-12 DIAGNOSIS — F43.10 PTSD (POST-TRAUMATIC STRESS DISORDER): Primary | ICD-10-CM

## 2018-07-12 DIAGNOSIS — F33.2 SEVERE EPISODE OF RECURRENT MAJOR DEPRESSIVE DISORDER, WITHOUT PSYCHOTIC FEATURES (H): ICD-10-CM

## 2018-07-12 NOTE — MR AVS SNAPSHOT
After Visit Summary   7/12/2018    Hayde LUJAN Say    MRN: [id number]           Patient Information     Date Of Birth          1990        Visit Information        Provider Department      7/12/2018 9:00 AM Elham Mcmahan LMFT [facility]        Today's Diagnoses     PTSD (post-traumatic stress disorder)    -  1    Severe episode of recurrent major depressive disorder, without psychotic features (H)           Follow-ups after your visit        Your next 10 appointments already scheduled     Jul 20, 2018  8:00 AM CDT   Return Visit with Sierra Scott RPH [facility] ([facility])    [address] [place] [id number]   [phone]            Jul 20, 2018  8:20 AM CDT   NEW OB with Rosana Rosales DO   [facility] ([facility])    [address] [place] [address]   [phone]            Aug 02, 2018  9:00 AM CDT   Return Visit with Elham Mcmahan FT   [facility] ([facility])    [address] [place] [address]   [phone]              Who to contact     Please call your clinic at [phone] to:    Ask questions about your health    Make or cancel appointments    Discuss your medicines    Learn about your test results    Speak to your doctor            Additional Information About Your Visit        Care EveryWhere ID     This is your Care EveryWhere ID. This could be used by other organizations to access your [facility] medical records  [id number]        Your Vitals Were     Last Period                   (LMP Unknown)            Blood Pressure from Last 3 Encounters:   07/05/18 99/66   06/28/18 97/64   06/19/18 104/70    Weight from Last 3 Encounters:   07/05/18 136 lb (61.7 kg)   06/28/18 140 lb 6.4 oz (63.7 kg)   06/19/18 140 lb (63.5 kg)              Today, you had the following     No orders found for display       Primary Care Provider Office Phone # Fax #    Flavio Brothers MD  [phone]       [facility] [address] [place] [address]        Equal Access to Services     DYLAN LEY : Hadii aad ku hadlainashelia Jones, dianada estefaniamoisesha, berthata kajbda shoshana, nakul pylejuan garcía. So [facility] [phone].    ATENCIÓN: Si habla español, tiene a keene disposición servicios gratuitos de asistencia lingüística. Llame al [phone].    We comply with applicable federal civil rights laws and [place] laws. We do not discriminate on the basis of race, color, national origin, age, disability, sex, sexual orientation, or gender identity.            Thank you!     Thank you for choosing [facility]  for your care. Our goal is always to provide you with excellent care. Hearing back from our patients is one way we can continue to improve our services. Please take a few minutes to complete the written survey that you may receive in the mail after your visit with us. Thank you!             Your Updated Medication List - Protect others around you: Learn how to safely use, store and throw away your medicines at www.disposemymeds.org.          This list is accurate as of 7/12/18  4:28 PM.  Always use your most recent med list.                   Brand Name Dispense Instructions for use Diagnosis    acetaminophen 325 MG tablet    TYLENOL    36 tablet    Take 2 tablets (650 mg) by mouth every 4 hours as needed for mild pain    Episodic tension-type headache, not intractable, Severe episode of recurrent major depressive disorder, without psychotic features (H), PTSD (post-traumatic stress disorder), BRENDA (generalized anxiety disorder), Insomnia due to other mental disorder       calcium carbonate 500 MG chewable tablet    TUMS    150 tablet    Take 1 tablet (500 mg) by mouth 2 times daily as needed for heartburn    Other chronic gastritis without hemorrhage       Capsaicin 0.1 % cream     56.6 g    Apply topically 2 times daily as needed     Chronic right-sided low back pain without sciatica, Neck pain       cetirizine 10 MG tablet    zyrTEC    90 tablet    Take 1 tablet (10 mg) by mouth every evening    Chronic seasonal allergic rhinitis due to fungal spores       docusate sodium 100 MG tablet    COLACE    60 tablet    Take 200 mg by mouth daily    Constipation, unspecified constipation type       FLONASE 50 MCG/ACT spray   Generic drug:  fluticasone      Spray 1-2 sprays into both nostrils daily as needed for allergies    Seasonal allergic rhinitis due to other allergic trigger       ketotifen 0.025 % Soln ophthalmic solution    ZADITOR    1 Bottle    Place 1 drop into both eyes every 12 hours    Acute seasonal allergic rhinitis, unspecified trigger       montelukast 10 MG tablet    SINGULAIR    30 tablet    Take 1 tablet (10 mg) by mouth daily    Acute seasonal allergic rhinitis, unspecified trigger       polyethylene glycol powder    MIRALAX    510 g    Take 17 g (1 capful) by mouth daily    Constipation, unspecified constipation type       prazosin 1 MG capsule    MINIPRESS    60 capsule    Take 2 capsules (2 mg) by mouth At Bedtime    PTSD (post-traumatic stress disorder)       prenatal multivitamin plus iron 27-0.8 MG Tabs per tablet     100 tablet    Take 1 tablet by mouth daily    Encounter for supervision of other normal pregnancy in first trimester, Amenorrhea, Severe episode of recurrent major depressive disorder, without psychotic features (H)       QUEtiapine 50 MG tablet    SEROquel    60 tablet    Take 2 tablets (100 mg) by mouth At Bedtime    PTSD (post-traumatic stress disorder)       ranitidine 150 MG tablet    ZANTAC    30 tablet    Take 1 tablet (150 mg) by mouth At Bedtime    Gastroesophageal reflux disease, esophagitis presence not specified       triamcinolone 0.1 % ointment    KENALOG    30 g    Apply sparingly to affected area three times daily for 14 days.    Infestation by bed bug       * venlafaxine 75 MG 24 hr capsule     EFFEXOR-XR    30 capsule    Take 1 capsule by mouth daily (together with 150 mg capsule for total of 225 mg/day)    PTSD (post-traumatic stress disorder), Major depressive disorder, recurrent episode, moderate (H)       * venlafaxine 150 MG 24 hr capsule    EFFEXOR-XR    30 capsule    Take 1 capsule by mouth daily (together with 75 mg capsule for total of 225 mg/day)    PTSD (post-traumatic stress disorder), Major depressive disorder, recurrent episode, moderate (H)       * Notice:  This list has 2 medication(s) that are the same as other medications prescribed for you. Read the directions carefully, and ask your doctor or other care provider to review them with you.

## 2018-07-12 NOTE — TELEPHONE ENCOUNTER
Received a call from Sesar Colindres that the  would transition from her to a person by the name of ny Perez.  I did leave a follow up message asking for mary phone number, and expressing some of the needs of the patients, and areas she struggles with.     Asked for return call    juan david

## 2018-07-12 NOTE — PROGRESS NOTES
Pt in clinic today to meet with Elham Villa  She brought in needed pay stubs for proofs of income for snap and I made a copy for me to follow and track, a copy for [facility], I mailed one and faxed one and am calling worker to let her know that it is there.  Pt looking a bit sad after her appt, sounds like it was a bit emotional and was able to get her smiling prior to leaving.  No other concerns addressed.    jesusetz

## 2018-07-12 NOTE — PROGRESS NOTES
Behavioral Health Progress Note    Client Legal Name: Hayde LUJAN Say   Client Preferred Name: Hayde   Service Type: Individual  Length of Visit: 54 minutes  Attendees:       name: Frances England  Agency: [facility]  Language: Maryann   Telephone number: [phone]  Type of interpretation: Face-to-face, spoken    Complexity statement: Due to patient being non-English speaking, an  was used for this visit. An  is used not only to interpret language, since the patient does not speak English, but also to help with the complexity of understandings across cultures, since the patient is not well integrated in the larger [place] culture.      Identifying Information and Presenting Problem:    The patient is a 27 year old Maryann female who is being seen for problematic symptoms of PTSD, worry, depression, anger/stress management. .    Treatment Objective(s) Addressed in This Session:  Anxiety: will develop more effective coping skills to manage anxiety symptoms      Progress on / Status of Treatment Objective(s) / Homework:  Worsening  Patient found out she is pregnant 9 days ago and has had significant worsening of worry and SI since.     PHQ-9 SCORE 11/29/2017 1/9/2018 1/22/2018   Total Score - - -   Total Score 8 9 6       BRENDA-7 SCORE 4/26/2017 8/29/2017 1/22/2018   Total Score 8 15 14       Topics Discussed/Interventions Provided:  Patient was tired and not feeling well today (morning sickness). Did not want to talk much today. Patient needed her prenatal vitamin added to her pill box. I spoke with Pharmacist, Sierra Dickson, prior to the appointment and she instructed me how to add it (put one in each box that already has medicines in it). So, I helped her with this. Upon discussion with Sierra, it appears Hayde may be taking too much of her medications, based on the number of pill boxes I filled today (approx 10-12, versus the expected approx 19).     Pregnancy: Patient is  now 9 days post finding out she is pregnant, and she still is unhappy about it. I explored her feelings and fears around the pregnancy as well as family support. She is sad, increased isolation, wanting to stay home and sleep and cry. Her children and  know. She is uncertain how they feel about it, except her  did mention that 'there isn't anything we can do about it'. Her mother wants her to have an . She had many questions about how gender was determined. Does not want to know baby's gender. Does not want another boy. Is very worried this baby will be a boy. She has heard in her culture that it is the mother who determine's baby's gender.     Depression and emotional reactivity: Gets angry that her friends come over and tell her she is crazy. This happens in the context of her either yelling at her children when her friends are present or when she has reached her limit of talking with her friends and yells at them to leave. Unclear what they are talking about or what leads her to start yelling. Now, she is not answering the door. Endorsing greater SI today- wishes she could die and have no one find her body for a few days so they cannot remove her organs. She maintained she would not do anything to harm herself because she needs to care for her children and fears her organs will be removed. Wants to isolate, sleep, and cry. This exacerbation is directly related (per patient) to the pregnancy. She is also fearful she will have worsening depression after baby is born because this happened with her other children.     Practical help: I explored with patient if there were any practical actions the clinic could take to help her- baby crib, beds, furniture, toiletries, etc. Patient said that her carpet needs cleaning. It has not been cleaned since they moved in and it stinks.     Assessment: The patient appeared to be active and engaged in today's session and was receptive to feedback. Patient  needs to be monitored closely for SI and worsening depressive symptoms.     Mental Status: Hayde appeared generally alert and oriented. Dress was casual and appropriate to the weather and occasion. Grooming and hygiene were clean. Eye contact was minimal. Speech was of normal volume and rate and was clear, coherent, and relevant. Mood was labile with congruent affect. Thought processes were relevant, logical and goal-directed. Thought content was WNL with no evidence of psychotic or paranoid features. Reports passive SI, no HI or self-harm, intent, or plans. Could not kill herself because she wants to care for her children. Memory appeared grossly intact. Insight and judgment appeared poor and patient exhibited fair impulse control during the appointment.     Does the patient appear to be at imminent risk of harm to self/others at this time? No    The session was necessary to address worry, depression, PTSD that have been interfering with patient's ability to function at work, home, personal life management, parenting.  Ongoing psychotherapy is necessary to improve functioning with daily activities, provide psychoeducation and provide support.     Diagnosis (DSM-5):  296.32 recurrent moderate major depression  308.3 PTSD      Plan:  1. Follow up in 2 weeks. Discuss pregnancy, monitor SI.          NOTE: Treatment plan update due 9/21/18.  Diagnostic assessment update due 4/26/19

## 2018-07-16 ENCOUNTER — TELEPHONE (OUTPATIENT)
Dept: FAMILY MEDICINE | Facility: CLINIC | Age: 28
End: 2018-07-16

## 2018-07-16 NOTE — TELEPHONE ENCOUNTER
Pt had family help call me to let me know her young child has speech therapy on Wed, 18th and we got a ride scheduled for it, it was conflicting with [employer] coming out to the home, asked her if I could call them and if she has a number so we can reschedule she said they speak Maryann so she will call cancel that appt and go to speech therapy.  Encouraged her to ask about pts anger as well and if the speech delay is part of the anger.      She was receptive to this.  She is finding creative ways to get to me for help as needed making good progress

## 2018-07-17 ENCOUNTER — TELEPHONE (OUTPATIENT)
Dept: FAMILY MEDICINE | Facility: CLINIC | Age: 28
End: 2018-07-17

## 2018-07-17 NOTE — TELEPHONE ENCOUNTER
Called pt worker and did not reach, so was redirected to another worker, she says she can read the proof of income but the new stuff would not have been scanned in yet they are scanning paperwork from 10th and I did it on 12th.      Will continue to follow up    Juan David    Left an urgent message for her worker too-juan david

## 2018-07-18 NOTE — PROGRESS NOTES
"2018  OB intake was completed with patient and  present in the room. Hayde is a 28 yo Maryann speaking female, , no hx miscarriage or . Uncomplicated, full term, normal vaginal deliveries with previous pregnancies. This was an undesired pregnancy but after initial quick look on ultrasound Hayde has decided to keep pregnancy. Hayde firmly has mentioned she does not want to know the gender of this baby until born. If found out prior to delivery baby was not a girl, she states she will become very sick,mentally.  LMP- unknown, dating ultrasound obtained on 2018 gave CHARLI of 2019 with gestational age 7 weeks 2 days.  During intake Hayde expressed in her tone of voice anger-raised voice,unhappiness, responses were short . Uncertain trigger of her heightened frustration, one concern voiced was \"why can't I see the doctor with the smith?\"  Hayde wanted to continue prenatal care with Dr Kapoor, who would no longer be practicing at [facility]. After further explanation and discussion, she requests to see a female provider if she was not able to see Dr Kapoor.  NOB is schedule and will be followed by Dr Rosales. Aniceto PHAM    Average Risk Category  No significant risk factors: No    At Risk Category (up to 3)  Teen pregnancy: No  Poor social situation: No  Domestic abuse: No  Financial difficulties: No  Smoker: No  H/O  deliver: No  H/O drug abuse: No  Non-English speaking: Yes  Advanced maternal age: No  GDM risks: No  Previous C/S: No  H/O PIH: No  H/O STIs: No  H/O mental health concerns: Yes  Onset care > 20 weeks: No  Other: NONE    High Risk Category (4 or more At Risk or)  Diabetes/GDM: No  Multiple gestation: No  Chronic hypertension: No  Significant hx of asthma: No  Fetal demise > 20 weeks: No  Positive tox screen: No  Current mental health treatment: YES  Other: No other factors     Risk: Average Risk   Date determined: 2018  Aniceto PHAM    "

## 2018-07-19 ENCOUNTER — TELEPHONE (OUTPATIENT)
Dept: FAMILY MEDICINE | Facility: CLINIC | Age: 28
End: 2018-07-19

## 2018-07-20 ENCOUNTER — OFFICE VISIT (OUTPATIENT)
Dept: FAMILY MEDICINE | Facility: CLINIC | Age: 28
End: 2018-07-20
Payer: COMMERCIAL

## 2018-07-20 ENCOUNTER — CARE COORDINATION (OUTPATIENT)
Dept: FAMILY MEDICINE | Facility: CLINIC | Age: 28
End: 2018-07-20

## 2018-07-20 ENCOUNTER — OFFICE VISIT (OUTPATIENT)
Dept: PHARMACY | Facility: CLINIC | Age: 28
End: 2018-07-20
Payer: COMMERCIAL

## 2018-07-20 VITALS
HEART RATE: 78 BPM | WEIGHT: 135 LBS | DIASTOLIC BLOOD PRESSURE: 61 MMHG | SYSTOLIC BLOOD PRESSURE: 93 MMHG | TEMPERATURE: 98 F | HEIGHT: 58 IN | OXYGEN SATURATION: 99 % | BODY MASS INDEX: 28.34 KG/M2

## 2018-07-20 DIAGNOSIS — Z34.81 ENCOUNTER FOR SUPERVISION OF OTHER NORMAL PREGNANCY IN FIRST TRIMESTER: Primary | ICD-10-CM

## 2018-07-20 DIAGNOSIS — F33.1 MAJOR DEPRESSIVE DISORDER, RECURRENT EPISODE, MODERATE (H): ICD-10-CM

## 2018-07-20 DIAGNOSIS — Z71.89 ENCOUNTER FOR MEDICATION REVIEW AND COUNSELING: Primary | ICD-10-CM

## 2018-07-20 DIAGNOSIS — B88.8 INFESTATION BY BED BUG: ICD-10-CM

## 2018-07-20 DIAGNOSIS — F43.10 PTSD (POST-TRAUMATIC STRESS DISORDER): ICD-10-CM

## 2018-07-20 LAB
BILIRUBIN UR: NEGATIVE
BLOOD UR: NEGATIVE
GLUCOSE URINE: NEGATIVE
HEMOGLOBIN: 12.4 G/DL (ref 11.7–15.7)
HIV 1+2 AB+HIV1 P24 AG SERPL QL IA: NEGATIVE
KETONES UR QL: NEGATIVE
LEUKOCYTE ESTERASE UR: ABNORMAL
NITRITE UR QL STRIP: NEGATIVE
PH UR STRIP: 8.5 [PH] (ref 5–7)
PROTEIN UR: ABNORMAL
SP GR UR STRIP: 1.01
UROBILINOGEN UR STRIP-ACNC: ABNORMAL

## 2018-07-20 NOTE — MR AVS SNAPSHOT
After Visit Summary   7/20/2018    Hayde LUJAN Say    MRN: [id number]           Patient Information     Date Of Birth          1990        Visit Information        Provider Department      7/20/2018 8:20 AM Rosana Rosales DO [facility]        Today's Diagnoses     Encounter for supervision of other normal pregnancy in first trimester    -  1      Care Instructions      Bedbugs  After years of being very rare in the , bedbugs are making a comeback. These bugs are small, about the size of an apple seed. They are reddish-brown, oval, and look slightly flattened. Bedbugs feed on human and animal blood, usually at night during sleep. Bedbugs are a nuisance. But they are not a major threat to your health.  Facts about bedbugs    Bedbugs are active mainly at night. During the day, they hide in dark places, often in and around where people or animals sleep. They are commonly found on mattresses and boxsprings and behind headboards. But they can hide anywhere.    Bedbugs are small and hard to see. They are often carried from place to place in items like luggage, furniture, and clothing. This is why they spread so easily.    Bedbugs are not attracted to dirt. Even the cleanest house or hotel can have bedbugs.    Unlike mosquitoes, bedbugs do not transmit disease. If you are bitten, you do not have to worry about catching a blood-borne illness.    Insect repellents have little effect on bedbugs.    Adult bedbugs can live for several months without a blood feeding.    Bedbugs are very hard to get rid of. If an infestation is suspected, it is recommended that a professional  be called.  Signs of bedbugs  Bites can be the first sign of a bedbug infestation. When inspecting for the bugs, look in crevices of mattresses and box springs, behind the headboard, and in and on objects near or under the bed. You may see the bugs themselves. Or, you may see tiny dark stains on fabric or carpets.  Smears of blood on sheets and nightclothes upon awakening are another sign. In some cases, the bugs are so well hidden they can t be found unless items are taken apart.  Bedbug bites  Bedbugs look for food at night. They bite while people or animals are sleeping. The bites are most often painless. Many people never know they ve been bitten. But some people develop an itchy red welt or swelling. And if a person has an allergic reaction, severe itching, blisters, or hives can develop. Bites are often on areas that are exposed, such as the head, neck, arms, and hands. Bedbug bites are not dangerous and don t spread illness. But if the bite is scratched and the skin is broken and irritated, there is a chance that a skin infection can develop.  Treating bites  Bite symptoms usually go away on their own within a week or two. During this time, over-the-counter (OTC) hydrocortisone ointment or cream can help relieve itching and swelling. If itching is bad, an OTC antihistamine that s taken by mouth (oral) can help. If an infection develops from scratching the bites, your healthcare provider can prescribe an antibiotic.  If you were bitten by bedbugs in your home, talk to a licensed pest-control professional or company. They can inspect your home and help you get rid of the bugs safely.  When to call your healthcare provider   If you have bites, call your healthcare provider if you develop any of the following:    A fever of 100.4 F (38 C) or higher, or as directed by your provider    Signs of infection of the bites, such as increased swelling and pain, warmth, or oozing    Signs of allergic reaction, such as hives, spreading rash, throat itching or swelling, or wheezing   Avoiding bedbugs    Avoid buying used beds. But if you do buy used bed frames, mattresses, box springs, or other furniture, check them carefully for bedbugs before bringing them into your home.    If bedbugs are found or suspected in the bed, use mattress  "and box spring encasement covers that can seal in bedbugs so they will eventually die there.    When traveling, remove linens from the top of the bed and check the mattress and headboard for signs of the bugs. Place luggage on a hard surface such as a table or on a luggage rack and not on the floor.    If you think you were exposed to bedbugs while traveling, wash all clothing in hot water as soon as you get home. Washing alone will not kill the bugs. Clothing must be put in a dryer at high temperatures, at least 113  F (45  C) for 1 hour.     Never  items discarded on the street for use in your home. These include bed frames, mattresses, box springs, or upholstered furniture. These items may carry bedbugs.     Date Last Reviewed: 3/1/2017    1093-3149 [employer]. [address] [place] [address]. All rights reserved. This information is not intended as a substitute for professional medical care. Always follow your healthcare professional's instructions.                Follow-ups after your visit        Your next 10 appointments already scheduled     Aug 02, 2018  9:00 AM CDT   Return Visit with ANABEL Eckert   [facility] ([facility])    [address] [place] [address]   [phone]              Who to contact     Please call your clinic at [phone] to:    Ask questions about your health    Make or cancel appointments    Discuss your medicines    Learn about your test results    Speak to your doctor            Additional Information About Your Visit        Care EveryWhere ID     This is your Care EveryWhere ID. This could be used by other organizations to access your [place] medical records  [id number]        Your Vitals Were     Pulse Temperature Height Last Period Pulse Oximetry BMI (Body Mass Index)    78 98  F (36.7  C) (Oral) 4' 9.87\" (147 cm) (LMP Unknown) 99% 28.34 kg/m2       Blood Pressure from Last 3 Encounters:   07/20/18 93/61 "   07/05/18 99/66   06/28/18 97/64    Weight from Last 3 Encounters:   07/20/18 135 lb (61.2 kg)   07/05/18 136 lb (61.7 kg)   06/28/18 140 lb 6.4 oz (63.7 kg)              We Performed the Following     ABO/Rh Type-HML ([facility])     Antibody Screen ([facility])     Chlamydia/Gono Amplified ([facility])     Culture Urine ([facility])     Hemoglobin (HGB) ([facility])     Hepatitis B Surface Ag ([facility])     HIV Ag/Ab Screen [place] ([facility])     Rubella  IgG ([facility])     Syphilis Screen [place] ([facility])     Urinalysis([facility])        Primary Care Provider Office Phone # Fax #    Flavio Brothers [phone]       [facility] [address] [place] [address]        Equal Access to Services     [facility] : Hadii aad ku hadasho Sofr[place], waaxda luqadaha, qaybta kaalmada aderitchieyada, nakul duarte . So [facility] [phone].    ATENCIÓN: Si habla español, tiene a keene disposición servicios gratuitos de asistencia lingüística. Llame al [phone].    We comply with applicable federal civil rights laws and [place] laws. We do not discriminate on the basis of race, color, national origin, age, disability, sex, sexual orientation, or gender identity.            Thank you!     Thank you for choosing [facility]  for your care. Our goal is always to provide you with excellent care. Hearing back from our patients is one way we can continue to improve our services. Please take a few minutes to complete the written survey that you may receive in the mail after your visit with us. Thank you!             Your Updated Medication List - Protect others around you: Learn how to safely use, store and throw away your medicines at www.disposemymeds.org.          This list is accurate as of 7/20/18  8:56 AM.  Always use your most recent med list.                   Brand Name Dispense Instructions for use Diagnosis    calcium carbonate 500 MG chewable  tablet    TUMS    150 tablet    Take 1 tablet (500 mg) by mouth 2 times daily as needed for heartburn    Other chronic gastritis without hemorrhage       Capsaicin 0.1 % cream     56.6 g    Apply topically 2 times daily as needed    Chronic right-sided low back pain without sciatica, Neck pain       cetirizine 10 MG tablet    zyrTEC    90 tablet    Take 1 tablet (10 mg) by mouth every evening    Chronic seasonal allergic rhinitis due to fungal spores       docusate sodium 100 MG tablet    COLACE    60 tablet    Take 200 mg by mouth daily    Constipation, unspecified constipation type       FLONASE 50 MCG/ACT spray   Generic drug:  fluticasone      Spray 1-2 sprays into both nostrils daily as needed for allergies    Seasonal allergic rhinitis due to other allergic trigger       ketotifen 0.025 % Soln ophthalmic solution    ZADITOR    1 Bottle    Place 1 drop into both eyes every 12 hours    Acute seasonal allergic rhinitis, unspecified trigger       montelukast 10 MG tablet    SINGULAIR    30 tablet    Take 1 tablet (10 mg) by mouth daily    Acute seasonal allergic rhinitis, unspecified trigger       polyethylene glycol powder    MIRALAX    510 g    Take 17 g (1 capful) by mouth daily    Constipation, unspecified constipation type       prazosin 1 MG capsule    MINIPRESS    60 capsule    Take 2 capsules (2 mg) by mouth At Bedtime    PTSD (post-traumatic stress disorder)       prenatal multivitamin plus iron 27-0.8 MG Tabs per tablet     100 tablet    Take 1 tablet by mouth daily    Encounter for supervision of other normal pregnancy in first trimester, Amenorrhea, Severe episode of recurrent major depressive disorder, without psychotic features (H)       QUEtiapine 50 MG tablet    SEROquel    60 tablet    Take 2 tablets (100 mg) by mouth At Bedtime    PTSD (post-traumatic stress disorder)       ranitidine 150 MG tablet    ZANTAC    30 tablet    Take 1 tablet (150 mg) by mouth At Bedtime    Gastroesophageal reflux  disease, esophagitis presence not specified       triamcinolone 0.1 % ointment    KENALOG    30 g    Apply sparingly to affected area three times daily for 14 days.    Infestation by bed bug       * venlafaxine 75 MG 24 hr capsule    EFFEXOR-XR    30 capsule    Take 1 capsule by mouth daily (together with 150 mg capsule for total of 225 mg/day)    PTSD (post-traumatic stress disorder), Major depressive disorder, recurrent episode, moderate (H)       * venlafaxine 150 MG 24 hr capsule    EFFEXOR-XR    30 capsule    Take 1 capsule by mouth daily (together with 75 mg capsule for total of 225 mg/day)    PTSD (post-traumatic stress disorder), Major depressive disorder, recurrent episode, moderate (H)       * Notice:  This list has 2 medication(s) that are the same as other medications prescribed for you. Read the directions carefully, and ask your doctor or other care provider to review them with you.

## 2018-07-20 NOTE — MR AVS SNAPSHOT
After Visit Summary   7/20/2018    Hayde LUJAN Say    MRN: [id number]           Patient Information     Date Of Birth          1990        Visit Information        Provider Department      7/20/2018 8:00 AM Sierra Scott RPH [facility]        Today's Diagnoses     Encounter for medication review and counseling    -  1       Follow-ups after your visit        Your next 10 appointments already scheduled     Aug 02, 2018  9:00 AM CDT   Return Visit with ANABEL Eckert   [facility] ([facility])    [address] [place] [id number]   [phone]            Aug 07, 2018  9:20 AM CDT   RETURN OB with Rosana Rosales DO   [facility] ([facility])    [address] [place] [address]   [phone]              Who to contact     Please call your clinic at [phone] to:    Ask questions about your health    Make or cancel appointments    Discuss your medicines    Learn about your test results    Speak to your doctor            Additional Information About Your Visit        Care EveryWhere ID     This is your Care EveryWhere ID. This could be used by other organizations to access your [facility] medical records  [id number]        Your Vitals Were     Last Period                   (LMP Unknown)            Blood Pressure from Last 3 Encounters:   07/20/18 93/61   07/05/18 99/66   06/28/18 97/64    Weight from Last 3 Encounters:   07/20/18 135 lb (61.2 kg)   07/05/18 136 lb (61.7 kg)   06/28/18 140 lb 6.4 oz (63.7 kg)              Today, you had the following     No orders found for display       Primary Care Provider Office Phone # Fax #    Flavio Brothers [phone]       [facility] [address] [place] [address]        Equal Access to Services     DYLAN LEY AH: Hadii aad ku hadasho Soomaali, waaxda luqadaha, qaybta kaalmada adeegyada, nakul garcía. So  [facility] [phone].    ATENCIÓN: Si ema stover, tiene a keene disposición servicios gratuitos de asistencia lingüística. Cristy medina [phone].    We comply with applicable federal civil rights laws and [place] laws. We do not discriminate on the basis of race, color, national origin, age, disability, sex, sexual orientation, or gender identity.            Thank you!     Thank you for choosing [facility]  for your care. Our goal is always to provide you with excellent care. Hearing back from our patients is one way we can continue to improve our services. Please take a few minutes to complete the written survey that you may receive in the mail after your visit with us. Thank you!             Your Updated Medication List - Protect others around you: Learn how to safely use, store and throw away your medicines at www.disposemymeds.org.          This list is accurate as of 7/20/18  2:07 PM.  Always use your most recent med list.                   Brand Name Dispense Instructions for use Diagnosis    calcium carbonate 500 MG chewable tablet    TUMS    150 tablet    Take 1 tablet (500 mg) by mouth 2 times daily as needed for heartburn    Other chronic gastritis without hemorrhage       Capsaicin 0.1 % cream     56.6 g    Apply topically 2 times daily as needed    Chronic right-sided low back pain without sciatica, Neck pain       cetirizine 10 MG tablet    zyrTEC    90 tablet    Take 1 tablet (10 mg) by mouth every evening    Chronic seasonal allergic rhinitis due to fungal spores       docusate sodium 100 MG tablet    COLACE    60 tablet    Take 200 mg by mouth daily    Constipation, unspecified constipation type       FLONASE 50 MCG/ACT spray   Generic drug:  fluticasone      Spray 1-2 sprays into both nostrils daily as needed for allergies    Seasonal allergic rhinitis due to other allergic trigger       ketotifen 0.025 % Soln ophthalmic solution    ZADITOR    1 Bottle    Place 1 drop into both eyes every  12 hours    Acute seasonal allergic rhinitis, unspecified trigger       montelukast 10 MG tablet    SINGULAIR    30 tablet    Take 1 tablet (10 mg) by mouth daily    Acute seasonal allergic rhinitis, unspecified trigger       polyethylene glycol powder    MIRALAX    510 g    Take 17 g (1 capful) by mouth daily    Constipation, unspecified constipation type       prazosin 1 MG capsule    MINIPRESS    60 capsule    Take 2 capsules (2 mg) by mouth At Bedtime    PTSD (post-traumatic stress disorder)       prenatal multivitamin plus iron 27-0.8 MG Tabs per tablet     100 tablet    Take 1 tablet by mouth daily    Encounter for supervision of other normal pregnancy in first trimester, Amenorrhea, Severe episode of recurrent major depressive disorder, without psychotic features (H)       QUEtiapine 50 MG tablet    SEROquel    60 tablet    Take 2 tablets (100 mg) by mouth At Bedtime    PTSD (post-traumatic stress disorder)       ranitidine 150 MG tablet    ZANTAC    30 tablet    Take 1 tablet (150 mg) by mouth At Bedtime    Gastroesophageal reflux disease, esophagitis presence not specified       triamcinolone 0.1 % ointment    KENALOG    30 g    Apply sparingly to affected area three times daily for 14 days.    Infestation by bed bug       * venlafaxine 75 MG 24 hr capsule    EFFEXOR-XR    30 capsule    Take 1 capsule by mouth daily (together with 150 mg capsule for total of 225 mg/day)    PTSD (post-traumatic stress disorder), Major depressive disorder, recurrent episode, moderate (H)       * venlafaxine 150 MG 24 hr capsule    EFFEXOR-XR    30 capsule    Take 1 capsule by mouth daily (together with 75 mg capsule for total of 225 mg/day)    PTSD (post-traumatic stress disorder), Major depressive disorder, recurrent episode, moderate (H)       * Notice:  This list has 2 medication(s) that are the same as other medications prescribed for you. Read the directions carefully, and ask your doctor or other care provider to review  them with you.

## 2018-07-20 NOTE — PROGRESS NOTES
Call to [facility] for  today. Patient brings in bed bug.  See note from Agustina on 7/6, advised patient again to continue to file complaints but that because the landlord is complying with spraying in a timely manner there is not much more they can do.
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
## 2018-07-20 NOTE — PROGRESS NOTES
S: Seen today for pillbox fill. Seen today jointly with Dr Rosales.     Pillboxes brought to clinic today. Concern as should have about 2 weeks of medicines remaining but only has 2 days remaining. Has been taking medicines AM and PM. Was confused.     No concerns swallowing medicines.     O:     Patient Active Problem List   Diagnosis     Health Care Home     Recurrent major depressive disorder (H)     Encounter for supervision of normal pregnancy     Seasonal allergic rhinitis     PTSD (post-traumatic stress disorder)     BRENDA (generalized anxiety disorder)     Other fatigue     Insomnia due to other mental disorder         A/P:   - Unclear how patient got confused about medicines given no change in pillbox or regimen. ?Social stressors  - With taking medicines BID, exceeded daily dose recommendations for Montelukast, Prenatal and Effexor. Had just recently increased Quetiapine to 100 mg/day. Did not have opportunity to assess efficacy. Could have obtained benefit from increased antipsychotic dose but opt at this time to revert to previous daily dosing after discussion w/ Dr. Bentley. Reassess at future visit need for dose change.   - Reinforced use of daily medicines.   - Relabeled pillbox.  - Provided 2 weeks of medicines as follows. Due for pillbox fill 8/2, last dose 8/1. Request refills of medicines 7/27/1  - As did not have enough Docusate to fill pillbox as filled previously, encouraged use of Miralax PRN.     Daily medicines Last Fill Date Refill needed?   Venlafaxine 75 mg capsule x1  7/2 #30 Need early refill - will likely need authorization   Venlafaxine 150 mg capsule x1 7/2 #30     Quetiapine 50 mg tablet x2 6/28 #60    Prazosin 1 mg capsule x2  6/6 #60    Montelukast 10 mg tablet x1  7/2 #30     Ranitidine 150 mg tablet x1  7/2 #30    Docusate 100 mg capsule x1 7/2 #30 Y   Prenatal tablet x1 7/3 #100            PRN medicines:         Zaditor eye drop - 2 bottles present 6/2       Medicines NOT  addressed today, not present in clinic.  Cetirizine - Will assess at f/u to see if needed. Possible to control allergy symptoms with non-systemic agent, I.e. Flonase.    Fluticasone  Triamcinolone  Capsaicin       Options for treatment and/or follow-up care were reviewed with the patient. Hayde was engaged and actively involved in the decision making process. She verbalized understanding of the options discussed and was satisfied with the final plan. Patient was provided with written instructions/medication list via AVS.    Dr. Rosales was provided our recommendations in clinic today and Dr. Degroot was available for supervision during this visit and is the authorizing prescriber for this visit through the pharmacist collaborative practice agreement.    Thank you for the opportunity to participate in the care of this patient.  Sierra Scott, Pharm.D.  [facility]: [phone]    Current Outpatient Prescriptions   Medication Sig Dispense Refill     calcium carbonate (TUMS) 500 MG chewable tablet Take 1 tablet (500 mg) by mouth 2 times daily as needed for heartburn 150 tablet 3     docusate sodium (COLACE) 100 MG tablet Take 200 mg by mouth daily 60 tablet 3     montelukast (SINGULAIR) 10 MG tablet Take 1 tablet (10 mg) by mouth daily 30 tablet 11     prazosin (MINIPRESS) 1 MG capsule Take 2 capsules (2 mg) by mouth At Bedtime 60 capsule 3     Prenatal Vit-Fe Fumarate-FA (PRENATAL MULTIVITAMIN PLUS IRON) 27-0.8 MG TABS per tablet Take 1 tablet by mouth daily 100 tablet 3     QUEtiapine (SEROQUEL) 50 MG tablet Take 2 tablets (100 mg) by mouth At Bedtime 60 tablet 3     ranitidine (ZANTAC) 150 MG tablet Take 1 tablet (150 mg) by mouth At Bedtime 30 tablet 3     venlafaxine (EFFEXOR-XR) 150 MG 24 hr capsule Take 1 capsule by mouth daily (together with 75 mg capsule for total of 225 mg/day) 30 capsule 11     venlafaxine (EFFEXOR-XR) 75 MG 24 hr capsule Take 1 capsule by mouth daily (together with 150  mg capsule for total of 225 mg/day) 30 capsule 11     Capsaicin 0.1 % cream Apply topically 2 times daily as needed 56.6 g 3     cetirizine (ZYRTEC) 10 MG tablet Take 1 tablet (10 mg) by mouth every evening 90 tablet 3     fluticasone (FLONASE) 50 MCG/ACT spray Spray 1-2 sprays into both nostrils daily as needed for allergies       ketotifen (ZADITOR) 0.025 % SOLN ophthalmic solution Place 1 drop into both eyes every 12 hours 1 Bottle 3     polyethylene glycol (MIRALAX) powder Take 17 g (1 capful) by mouth daily 510 g 1     triamcinolone (KENALOG) 0.1 % ointment Apply sparingly to affected area three times daily for 14 days. 30 g 1        Drug therapy problems identified:  Medical Condition 1: Depression, Goals of therapy: Not at goal, Drug Class: Antidepressant, Convenience: Patient misunderstanding, Intervention: Educate patient, Add device or process to assist use, Verification: Patient Agreed - Compliance/Education    Relevant medical devices: n/a    # of medical conditions addressed: 1  # of medications addressed: 8  # of DTP identified: 1  Time spent: 15 minutes  Level of service per [place] DHS guidelines: 2 nc

## 2018-07-20 NOTE — Clinical Note
I added it to my calendar too. Need refills 7/27. Need to ensure all of her meds are on auto refill too.

## 2018-07-20 NOTE — NURSING NOTE
Due to patient being non-English speaking/uses sign language, an  was used for this visit. Only for face-to-face interpretation by an external agency, date and length of interpretation can be found on the scanned worksheet.     name: Frances Swan  Agency: [employer]  Language: Maryann   Telephone number: [phone]  Type of interpretation: Face-to-face, spoken

## 2018-07-20 NOTE — PROGRESS NOTES
Preceptor Attestation:   Patient seen, evaluated and discussed with the resident. I have verified the content of the note, which accurately reflects my assessment of the patient and the plan of care.    Supervising Physician:Balta Degroot MD    [facility]

## 2018-07-20 NOTE — LETTER
July 25, 2018      Hayde LUJAN Say  [address]   [place] [address]        Dear Hayde,  Your prenatal labs are reassuring.     Your blood type is O+, and your hemoglobin is 12.4 which is normal and means you are not anemic.     Your infection screen was negative for HIV, syphilis, gonorrhea, chlamydia, and hepatitis B.     Your immunity screen shows you are immune to rubella.     Your urine tests showed trace protein. We will plan to retest the urine your urine at your next visit.     Please let me know if you have any questions or concerns, otherwise I will talk with you at your next follow up appointment.     Thank you,     Rosana Rosales, DO   Please see below for your test results.    Resulted Orders   ABO/Rh Type-HML ([employer])   Result Value Ref Range    ABO/Rh(D) O POS     Repeat ABO/Rh Typing (L) O POS     Narrative    Test performed by:   [facility]  [address], [place] [address]   [facility]ib[facility]   Result Value Ref Range    Antibody Screen Negative Negative    Narrative    Test performed by:   [facility]  [address], [place] [address]   Urinalysis([employer])   Result Value Ref Range    Specific Gravity Urine 1.015 1.005 - 1.030    pH Urine 8.5 4.5 - 8.0    Leukocyte Esterase UR 2+ (A) NEGATIVE    Nitrite Urine Negative NEGATIVE    Protein UR Trace (A) NEGATIVE    Glucose Urine Negative NEGATIVE    Ketones Urine Negative NEGATIVE    Urobilinogen mg/dL 0.2 E.U./dL 0.2 E.U./dL    Bilirubin UR Negative NEGATIVE    Blood UR Negative NEGATIVE   Hemoglobin (HGB) ([employer])   Result Value Ref Range    Hemoglobin 12.4 11.7 - 15.7 g/dL   Hepatitis B Surface Ag ([employer])   Result Value Ref Range    Hepatitis B Surface Antigen Negative Negative    Narrative    Test performed by:  [facility]  [address] [place] [address]   Syphilis Screen [facility] ([employer])   Result Value Ref Range    Treponema Antibody (Syphilis) Negative Negative    Narrative    Test  performed by:  [facility] [address] [place] [address]   HIV Ag/Ab Screen [place] ([facility])   Result Value Ref Range    HIV Antigen/Antibody Negative Negative    Narrative    Test performed by:  [facility] [address] [place] [address]  Method is Abbott HIV Ag/Ab for the detection of HIV p24 antigen, HIV-1   antibodies and HIV-2 antibodies.   Culture Urine ([facility])   Result Value Ref Range    Culture SEE RESULTS BELOW       Comment:      CULTURE, URINE   SOURCE: Urine, Clean Catch   CULTURE RESULTS:    No Growth      Narrative    Test performed by:  [facility] [address] [place] [address]   Rubella  IgG ([facility])   Result Value Ref Range    Rubella IgG Positive     Narrative    Test performed by:  [facility] [address] [place] [address]  Negative: Absence of detectable rubella virus IgG antibodies. A negative   result presumes that immunity has not been acquired.   Equivocal: Suggest recollection.  Positive: Considered positive for IgG antibodies to rubella virus.   Chlamydia/Gono Amplified ([facility])   Result Value Ref Range    Chlamydia trac,Amplified Prb Negative Negative    N gonorrhoeae,Amplified Prb Negative Negative    Narrative    Test performed by:  [facility] [address] [place] [address]       If you have any questions, please call the clinic to make an appointment.    Sincerely,    Rosana Rosales, DO

## 2018-07-21 LAB
BLD GP AB SCN SERPL QL: NEGATIVE
CULTURE: NORMAL
HBV SURFACE AG SERPL QL IA: NEGATIVE
TREPONEMA ANTIBODY (SYPHILIS): NEGATIVE

## 2018-07-23 ENCOUNTER — TELEPHONE (OUTPATIENT)
Dept: FAMILY MEDICINE | Facility: CLINIC | Age: 28
End: 2018-07-23

## 2018-07-23 LAB
ABO + RH BLD: NORMAL
C TRACH RRNA SPEC QL NAA+PROBE: NEGATIVE
N GONORRHOEA RRNA SPEC QL NAA+PROBE: NEGATIVE
REPEAT ABO/RH TYPING (HML): NORMAL
RUBV IGG SERPL QL IA: POSITIVE

## 2018-07-23 NOTE — TELEPHONE ENCOUNTER
Called [facility] again  Checked the status through info line and it stated [employer] closed on August 2018, confused by this and wondering if it was then now open, called workers  [facility] so I got another worker- he looked into records and reports they are waiting on proof of income, and I stated they have gotten it 2x but he said that was her husbands and they need hers, told him she does not work and has not as long as I have known her, he had a record of work in past and said they need a letter from that employer on when she stopped working    He then offered a transfer to [facility] supervisor, and his voice mail is full,     Sat on hold again for 20 minutes and got another worker who took a message to give to the lead who is filling in for [facility] supervisor.     I will call pt and ask when she last worked and if she can get a letter      I also wrote a letter of appeal and faxed it today, kept a hard copy  juan david

## 2018-07-23 NOTE — TELEPHONE ENCOUNTER
Called city to report bed bug issue again   Reported that she had brought in a live bed bug and that the kids have bites again.  Spoke with Sarah.    She entered complaint into the computer system and the  will contact toña Berrios

## 2018-07-25 ENCOUNTER — TELEPHONE (OUTPATIENT)
Dept: FAMILY MEDICINE | Facility: CLINIC | Age: 28
End: 2018-07-25

## 2018-07-25 NOTE — PROGRESS NOTES
Please send letter with the following information:    Your prenatal labs are reassuring.    Your blood type is O+, and your hemoglobin is 12.4 which is normal and means you are not anemic.     Your infection screen was negative for HIV, syphilis, gonorrhea, chlamydia, and hepatitis B.     Your immunity screen shows you are immune to rubella.     Your urine tests showed trace protein. We will plan to retest the urine your urine at your next visit.     Please let me know if you have any questions or concerns, otherwise I will talk with you at your next follow up appointment.    Thank you,     Rosana Rosales, DO

## 2018-07-25 NOTE — TELEPHONE ENCOUNTER
Called pt about appt times, bringing meds, and refills to be done by [facility] as well as to talk about other concerns.    Called [facility] about her bedbugs again and made a complaint, so let her know that they will likely come to her home again    Let her know [employer] status- they need her proof of income and I thought she did not work, but she does work 2pm-6pm at [employer] daily  So told her they need proof of income as I have only her husbands [employer].    She will either bring to me or submit herself.    Ramila

## 2018-07-27 ENCOUNTER — TELEPHONE (OUTPATIENT)
Dept: FAMILY MEDICINE | Facility: CLINIC | Age: 28
End: 2018-07-27

## 2018-07-27 NOTE — TELEPHONE ENCOUNTER
Set up rides for hosanna to [facility] eye 9am on Aug 1 and then [facility].  [facility] 2pm  St up all rides for those appts for August and want to wait on sept until I know what headstart schedule is.    Scheduled [facility] rides for appt here on August 2 with jason  And her return ob on 7th.  Reminder on picking up donell fall

## 2018-08-01 ENCOUNTER — TELEPHONE (OUTPATIENT)
Dept: FAMILY MEDICINE | Facility: CLINIC | Age: 28
End: 2018-08-01

## 2018-08-01 NOTE — TELEPHONE ENCOUNTER
[facility] Follow-up    Call initiated by patient.  Message recorded by Agustina Gramajo  Calling for: Check in and concerns  Patient: Hayde LUJAN Say  Phone conversation with: Patient  Patient's Phone number/s: Home number on file [phone] (home)  Available today in the AM on their Home number.  OK to leave message on voice mail? Yes      Major diagnoses and/or issues requiring coordination services  Mental health  New dx of pregnancy unplanned    In the past month, how many times have you been to the Emergency Room? 0  In the past month, how many times have you been hospitalized? 0    Summary notes: Pt unhappy about unplanned pregnancy, feeling pressured about baby wants a girl has 4 boys.  Pt is getting braver and reaching out to care coordinator for help.  Goals are to support family, get resources for pregnancy/ baby needs    Self-management goals:  Be ready for a baby  Get needed baby supplies  Mental health  Support family- food, clothing  Bed bugs  Readiness to change: Somewhat ready    Counseling and coordination activities with: patient/family, PCP and specialist: Pt reached out today to have me cancel her appt for sons speech today as she is not feeling well.  Called on her own and communicated her needs - difficult to get to the why, so called back with language line and found she is tired, feeling sick and sweaty    Follow-up date: Monthly and prn

## 2018-08-02 ENCOUNTER — OFFICE VISIT (OUTPATIENT)
Dept: PHARMACY | Facility: CLINIC | Age: 28
End: 2018-08-02
Payer: COMMERCIAL

## 2018-08-02 ENCOUNTER — TELEPHONE (OUTPATIENT)
Dept: FAMILY MEDICINE | Facility: CLINIC | Age: 28
End: 2018-08-02

## 2018-08-02 ENCOUNTER — CARE COORDINATION (OUTPATIENT)
Dept: FAMILY MEDICINE | Facility: CLINIC | Age: 28
End: 2018-08-02

## 2018-08-02 ENCOUNTER — OFFICE VISIT (OUTPATIENT)
Dept: PSYCHOLOGY | Facility: CLINIC | Age: 28
End: 2018-08-02
Payer: COMMERCIAL

## 2018-08-02 VITALS
BODY MASS INDEX: 28.84 KG/M2 | HEART RATE: 90 BPM | DIASTOLIC BLOOD PRESSURE: 72 MMHG | SYSTOLIC BLOOD PRESSURE: 110 MMHG | WEIGHT: 137.4 LBS | TEMPERATURE: 98.9 F

## 2018-08-02 DIAGNOSIS — F33.2 SEVERE EPISODE OF RECURRENT MAJOR DEPRESSIVE DISORDER, WITHOUT PSYCHOTIC FEATURES (H): ICD-10-CM

## 2018-08-02 DIAGNOSIS — F43.10 PTSD (POST-TRAUMATIC STRESS DISORDER): Primary | ICD-10-CM

## 2018-08-02 DIAGNOSIS — R30.0 DYSURIA: Primary | ICD-10-CM

## 2018-08-02 DIAGNOSIS — J30.2 CHRONIC SEASONAL ALLERGIC RHINITIS DUE TO FUNGAL SPORES: ICD-10-CM

## 2018-08-02 DIAGNOSIS — F43.10 PTSD (POST-TRAUMATIC STRESS DISORDER): ICD-10-CM

## 2018-08-02 DIAGNOSIS — F51.05 INSOMNIA DUE TO OTHER MENTAL DISORDER: ICD-10-CM

## 2018-08-02 DIAGNOSIS — G44.219 EPISODIC TENSION-TYPE HEADACHE, NOT INTRACTABLE: ICD-10-CM

## 2018-08-02 DIAGNOSIS — Z71.89 ENCOUNTER FOR MEDICATION REVIEW AND COUNSELING: Primary | ICD-10-CM

## 2018-08-02 DIAGNOSIS — F41.1 GAD (GENERALIZED ANXIETY DISORDER): ICD-10-CM

## 2018-08-02 DIAGNOSIS — F99 INSOMNIA DUE TO OTHER MENTAL DISORDER: ICD-10-CM

## 2018-08-02 DIAGNOSIS — N30.00 ACUTE CYSTITIS WITHOUT HEMATURIA: Primary | ICD-10-CM

## 2018-08-02 LAB
BILIRUBIN UR: NEGATIVE
BLOOD UR: NEGATIVE
GLUCOSE URINE: NEGATIVE
KETONES UR QL: NEGATIVE
LEUKOCYTE ESTERASE UR: ABNORMAL
NITRITE UR QL STRIP: NEGATIVE
PH UR STRIP: 6.5 [PH] (ref 5–7)
PROTEIN UR: NEGATIVE
SP GR UR STRIP: 1.01
UROBILINOGEN UR STRIP-ACNC: ABNORMAL

## 2018-08-02 RX ORDER — ACETAMINOPHEN 325 MG/1
650 TABLET ORAL EVERY 4 HOURS PRN
COMMUNITY
Start: 2018-08-02 | End: 2018-09-19

## 2018-08-02 RX ORDER — QUETIAPINE FUMARATE 100 MG/1
100 TABLET, FILM COATED ORAL AT BEDTIME
Qty: 30 TABLET | Refills: 3 | Status: SHIPPED | OUTPATIENT
Start: 2018-08-02 | End: 2018-11-21

## 2018-08-02 RX ORDER — CETIRIZINE HYDROCHLORIDE 10 MG/1
10 TABLET ORAL DAILY PRN
COMMUNITY
Start: 2018-08-02 | End: 2018-08-22

## 2018-08-02 NOTE — PROGRESS NOTES
Behavioral Health Progress Note    Client Legal Name: Hayde B Say   Client Preferred Name: Hayde   Service Type: Individual  Length of Visit: 25 minutes  Attendees:    Miriamter name: Frances England  Agency: [employer]  Language: Maryann   Telephone number: [phone]  Type of interpretation: Face-to-face, spoken    Complexity statement: Due to patient being non-English speaking, an  was used for this visit. An  is used not only to interpret language, since the patient does not speak English, but also to help with the complexity of understandings across cultures, since the patient is not well integrated in the larger [place] culture.      Identifying Information and Presenting Problem:    The patient is a 27 year old Maryann female who is being seen for problematic symptoms of PTSD, worry, depression, anger/stress management, new pregnancy.        Treatment Objective(s) Addressed in This Session:  Anxiety: will increase ability to function adaptively      Progress on / Status of Treatment Objective(s) / Homework:  Minimal progress       PHQ-9 SCORE 11/29/2017 1/9/2018 1/22/2018   Total Score - - -   Total Score 8 9 6       BRENDA-7 SCORE 4/26/2017 8/29/2017 1/22/2018   Total Score 8 15 14       Topics Discussed/Interventions Provided:  At 25 minutes today because patient arrived a little late for her meeting with our pharmacist to refill her pillbox and address any acute concerns.  Per our pharmacist patient was emotionally labile, alternating between presenting as feeling stable and crying.  Per the patient's care coordinator, patient works as a PCA 4 hours daily.  When I discussed this with patient she reports that she is a PCA for her mother who has memory concerns and a psychotic disorder.  She helps with cleaning, laundry, and food preparation.  Neighbors and others in their cultural community often make fun of them and referred to them as one crazy person helping another.    Patient's  "main concern today is that her  will not be helpful once the baby comes.  She reports that he \"does not listen to me\".  Try to explore a little bit about what that meant and it sounds as though they do not talk very much and he is disengaged with the other children.  I asked about whether we could bring him in with her for an appointment or him alone to cover these parenting concerns.  Patient adamantly denied he would be willing to do this.  Another worry today is what will happen to her children when she passes.  She is very fearful there is no one in the family who could take care of them.  She denied suicidal or homicidal ideation, just extensive worry about this topic.  At the end because we were running low on the amount of time the  had available, I asked Hayde which of the many worries she has was the most important for us to work on in therapy.  Her medical disability for citizenship exam waiver is most important.    Of note, patient arrives today with live bedbugs on her.  Per clinic protocol she will be exiting and arriving through the back door so as to minimize risk to other patients.    Assessment: The patient appeared to be active and engaged in today's session and was receptive to feedback.      Mental Status: Hayde appeared generally alert and oriented. Dress was casual and appropriate to the weather and occasion. Grooming and hygiene were clean except for the bedbugs. Eye contact was minimal. Speech was of normal volume and rate and was clear, coherent, and relevant. Mood was fearful and angry with congruent affect. Thought processes were relevant, logical and goal-directed. Thought content was WNL with no evidence of psychotic or paranoid features. No evidence of SI/HI or self-harm, intent, or plans. Memory appeared grossly intact. Insight and judgment appeared fair and patient exhibited fair impulse control during the appointment.     Does the patient appear to be at imminent risk " of harm to self/others at this time? No    The session was necessary to address worry, depression, PTSD that have been interfering with patient's ability to function at concentrating at work, being able to work with strangers, arguments with her , personal life management, parenting.  Ongoing psychotherapy is necessary to improve functioning with daily activities, provide psychoeducation and provide support.      Diagnosis (DSM-5):  296.32 recurrent moderate major depression  308.3 PTSD      Plan:  1. Follow up in 2 weeks. Start N648 form.           NOTE: Treatment plan update due 9/21/18.  Diagnostic assessment update due 4/26/19

## 2018-08-02 NOTE — MR AVS SNAPSHOT
After Visit Summary   8/2/2018    Hayde LUJAN Say    MRN: [id number]           Patient Information     Date Of Birth          1990        Visit Information        Provider Department      8/2/2018 8:40 AM Sierra Scott, RPH [facility]        Today's Diagnoses     Encounter for medication review and counseling    -  1    Episodic tension-type headache, not intractable        Severe episode of recurrent major depressive disorder, without psychotic features (H)        PTSD (post-traumatic stress disorder)        BRENDA (generalized anxiety disorder)        Insomnia due to other mental disorder        Chronic seasonal allergic rhinitis due to fungal spores           Follow-ups after your visit        Your next 10 appointments already scheduled     Aug 07, 2018  9:20 AM CDT   RETURN OB with Rosana Rosales DO   [facility] ([facility])    [address] [place] [id number] [phone]            Aug 16, 2018  9:00 AM CDT   Return Visit with Elham Mcmahan LMFT [facility] ([facility])    [address] [place] [address]   [phone]              Who to contact     Please call your clinic at [phone] to:    Ask questions about your health    Make or cancel appointments    Discuss your medicines    Learn about your test results    Speak to your doctor            Additional Information About Your Visit        Care EveryWhere ID     This is your Care EveryWhere ID. This could be used by other organizations to access your [facility] medical records  [id number]        Your Vitals Were     Pulse Temperature Last Period BMI (Body Mass Index)          90 98.9  F (37.2  C) (LMP Unknown) 28.84 kg/m2         Blood Pressure from Last 3 Encounters:   08/02/18 110/72   07/20/18 93/61   07/05/18 99/66    Weight from Last 3 Encounters:   08/02/18 137 lb 6.4 oz (62.3 kg)   07/20/18 135 lb (61.2 kg)   07/05/18 136 lb (61.7 kg)               Today, you had the following     No orders found for display         Today's Medication Changes          These changes are accurate as of 8/2/18  5:26 PM.  If you have any questions, ask your nurse or doctor.               These medicines have changed or have updated prescriptions.        Dose/Directions    cetirizine 10 MG tablet   Commonly known as:  zyrTEC   This may have changed:    - when to take this  - reasons to take this   Used for:  Chronic seasonal allergic rhinitis due to fungal spores   Changed by:  Sierra Scott RPH        Dose:  10 mg   Take 1 tablet (10 mg) by mouth daily as needed for allergies   Refills:  0       QUEtiapine 100 MG tablet   Commonly known as:  SEROquel   This may have changed:  medication strength   Used for:  PTSD (post-traumatic stress disorder)   Changed by:  Sierra Scott RPH        Dose:  100 mg   Take 1 tablet (100 mg) by mouth At Bedtime   Quantity:  30 tablet   Refills:  3            Where to get your medicines      These medications were sent to [facility] - [place], [place] - [address] [place] [address] Pkw[address]  [address], [place] [address]     Phone:  [phone]     QUEtiapine 100 MG tablet                Primary Care Provider Office Phone # Fax #    Flavio Brothers [phone]       [facility] [address] [place] [address]        Equal Access to Services     DYLAN LEY : Hadii raven ku hadasho Soomaali, waaxda luqadaha, qaybta kaalmada adeegyada, nakul garcía. So [facility] [phone].    ATENCIÓN: Si habla español, tiene a keene disposición servicios gratuitos de asistencia lingüística. Cristy al [phone].    We comply with applicable federal civil rights laws and [place] laws. We do not discriminate on the basis of race, color, national origin, age, disability, sex, sexual orientation, or gender identity.            Thank you!     Thank you for choosing  [facility]  for your care. Our goal is always to provide you with excellent care. Hearing back from our patients is one way we can continue to improve our services. Please take a few minutes to complete the written survey that you may receive in the mail after your visit with us. Thank you!             Your Updated Medication List - Protect others around you: Learn how to safely use, store and throw away your medicines at www.disposemymeds.org.          This list is accurate as of 8/2/18  5:26 PM.  Always use your most recent med list.                   Brand Name Dispense Instructions for use Diagnosis    acetaminophen 325 MG tablet    TYLENOL     Take 2 tablets (650 mg) by mouth every 4 hours as needed for headaches or pain    Episodic tension-type headache, not intractable, Severe episode of recurrent major depressive disorder, without psychotic features (H), PTSD (post-traumatic stress disorder), BRENDA (generalized anxiety disorder), Insomnia due to other mental disorder       calcium carbonate 500 MG chewable tablet    TUMS    150 tablet    Take 1 tablet (500 mg) by mouth 2 times daily as needed for heartburn    Other chronic gastritis without hemorrhage       Capsaicin 0.1 % cream     56.6 g    Apply topically 2 times daily as needed    Chronic right-sided low back pain without sciatica, Neck pain       cetirizine 10 MG tablet    zyrTEC     Take 1 tablet (10 mg) by mouth daily as needed for allergies    Chronic seasonal allergic rhinitis due to fungal spores       docusate sodium 100 MG tablet    COLACE    60 tablet    Take 200 mg by mouth daily    Constipation, unspecified constipation type       FLONASE 50 MCG/ACT spray   Generic drug:  fluticasone      Spray 1-2 sprays into both nostrils daily as needed for allergies    Seasonal allergic rhinitis due to other allergic trigger       ketotifen 0.025 % Soln ophthalmic solution    ZADITOR    1 Bottle    Place 1 drop into both eyes every 12 hours     Acute seasonal allergic rhinitis, unspecified trigger       montelukast 10 MG tablet    SINGULAIR    30 tablet    Take 1 tablet (10 mg) by mouth daily    Acute seasonal allergic rhinitis, unspecified trigger       polyethylene glycol powder    MIRALAX    510 g    Take 17 g (1 capful) by mouth daily    Constipation, unspecified constipation type       prazosin 1 MG capsule    MINIPRESS    60 capsule    Take 2 capsules (2 mg) by mouth At Bedtime    PTSD (post-traumatic stress disorder)       prenatal multivitamin plus iron 27-0.8 MG Tabs per tablet     100 tablet    Take 1 tablet by mouth daily    Encounter for supervision of other normal pregnancy in first trimester, Amenorrhea, Severe episode of recurrent major depressive disorder, without psychotic features (H)       QUEtiapine 100 MG tablet    SEROquel    30 tablet    Take 1 tablet (100 mg) by mouth At Bedtime    PTSD (post-traumatic stress disorder)       ranitidine 150 MG tablet    ZANTAC    30 tablet    Take 1 tablet (150 mg) by mouth At Bedtime    Gastroesophageal reflux disease, esophagitis presence not specified       triamcinolone 0.1 % ointment    KENALOG    30 g    Apply sparingly to affected area three times daily for 14 days.    Infestation by bed bug       * venlafaxine 75 MG 24 hr capsule    EFFEXOR-XR    30 capsule    Take 1 capsule by mouth daily (together with 150 mg capsule for total of 225 mg/day)    PTSD (post-traumatic stress disorder), Major depressive disorder, recurrent episode, moderate (H)       * venlafaxine 150 MG 24 hr capsule    EFFEXOR-XR    30 capsule    Take 1 capsule by mouth daily (together with 75 mg capsule for total of 225 mg/day)    PTSD (post-traumatic stress disorder), Major depressive disorder, recurrent episode, moderate (H)       * Notice:  This list has 2 medication(s) that are the same as other medications prescribed for you. Read the directions carefully, and ask your doctor or other care provider to review them with  you.

## 2018-08-02 NOTE — PROGRESS NOTES
S: Seen today with assistance of Maryann . Seen per request of Dr. Brothers and Bobby for medication review and counseling.       Concerned that she had HIV based on letter she received. Angry with her  as a result.     Pain and burning with urination. Ever since found out she was pregnant.     + recent nausea, vomiting. But infrequent. More concerned about headaches. Lower back pain. Chills.     No Tylenol. Would like to have available.     Taking medicines before bedtime 8pm. No other medicines during day.    Concerns for constipation. Would like to take 2 Docusate/day instead of 1. 2 per day gives her soft stools.    Tired during day. No time to sleep. Time to sleep at night but difficulty. Racing thoughts keep her awake as well as nightmares. No change in recent change in medication doses.     Would like to take Cetirizine PRN. Concern for sneezing.     O:     Patient Active Problem List   Diagnosis     Health Care Home     Recurrent major depressive disorder (H)     Encounter for supervision of normal pregnancy     Seasonal allergic rhinitis     PTSD (post-traumatic stress disorder)     BRENDA (generalized anxiety disorder)     Other fatigue     Insomnia due to other mental disorder     + bed bugs present on patient's belongings today    /72  Pulse 90  Temp 98.9  F (37.2  C)  Wt 137 lb 6.4 oz (62.3 kg)  LMP  (LMP Unknown)  BMI 28.84 kg/m2      A/P: Patient initially seemed to be in good spirits however later after meeting with VLAD patient was crying/sobbing.     - Reviewed results from last visit with Dr. Rosales. Reassured of results ie negative HIV and all other STIs tested.   - Afebrile. But subjectively reports chills. Vitals WNL today. Did not report this during last encounter 7/20. UA/UC ordered after discussion w/ Dr. Brown. Routed to PCP for review. Discussed via telephone.   - Tylenol available, educated patient on bottle, use. Marked with red sticker and Maryann word for  'headaches' with assistance of .   - Taking medicines as instructed. Improved from last visit. Will continue to confirm understanding of medicines to ensure does not unintentionally make errors.   - Increased Docusate to 2 capsules daily. Patient agreeable.   - Encouraged patient to discuss sleep concerns with ANABEL Fleming today. Will forward on to her care team to follow up on to see if changes in medicines or additional evaluation/education warranted.   - Patient has been without Cetirizine for at least last month. In mean time has been maintained on Montelukast daily. Only with prompting do allergy symptoms seem to be of concern. To lessen medication exposure/simplify regimen, agree with patient to take Cetirizine PRN. Same process as with Tylenol, marked with red sticker and Maryann word for 'itching' with assistance of .   - Pillbox filled x 2 weeks. Last dose 8/15. Due for fill 8/16. As noted below. Not enough Quetiapine to fill as prescribed despite recent refill. Must have been left at home. To make supply last and to have dose every day decreased to 50 mg/day. Will update box during visit w/ Dr. Rosales 8/7/18, already scheduled.    Daily medicines Last Fill Date Refill needed?   Venlafaxine 75 mg capsule x1  7/29 #30    Venlafaxine 150 mg capsule x1 7/29 #30      Quetiapine 50 mg tablet x1 6/28 #60  Y - Sent today. [facility] to refill reminder program at [facility]. Per pharmacy filled 7/24 for #15 day supply. Not available today.    Prazosin 1 mg capsule x2  7/29 #60     Montelukast 10 mg tablet x1  7/29 #30      Ranitidine 150 mg tablet x1  7/29 #30     Docusate 100 mg capsule x2 8/1 #60    Prenatal tablet x1 7/3 #100         Options for treatment and/or follow-up care were reviewed with the patient. Hayde was engaged and actively involved in the decision making process. She verbalized understanding of the options discussed and was satisfied with the final plan. Patient was provided with  written instructions/medication list via AVS.    Dr. Brothers/Bobby was provided our recommendations via routed note and Dr. Brown was available for supervision during this visit and is the authorizing prescriber for this visit through the pharmacist collaborative practice agreement.    Thank you for the opportunity to participate in the care of this patient.  Sierra Scott, Pharm.D.  [facility]: [phone]    Current Outpatient Prescriptions   Medication Sig Dispense Refill     acetaminophen (TYLENOL) 325 MG tablet Take 2 tablets (650 mg) by mouth every 4 hours as needed for headaches or pain       cetirizine (ZYRTEC) 10 MG tablet Take 1 tablet (10 mg) by mouth daily as needed for allergies       docusate sodium (COLACE) 100 MG tablet Take 200 mg by mouth daily 60 tablet 3     montelukast (SINGULAIR) 10 MG tablet Take 1 tablet (10 mg) by mouth daily 30 tablet 11     prazosin (MINIPRESS) 1 MG capsule Take 2 capsules (2 mg) by mouth At Bedtime 60 capsule 3     Prenatal Vit-Fe Fumarate-FA (PRENATAL MULTIVITAMIN PLUS IRON) 27-0.8 MG TABS per tablet Take 1 tablet by mouth daily 100 tablet 3     QUEtiapine (SEROQUEL) 100 MG tablet Take 1 tablet (100 mg) by mouth At Bedtime 30 tablet 3     ranitidine (ZANTAC) 150 MG tablet Take 1 tablet (150 mg) by mouth At Bedtime 30 tablet 3     venlafaxine (EFFEXOR-XR) 150 MG 24 hr capsule Take 1 capsule by mouth daily (together with 75 mg capsule for total of 225 mg/day) 30 capsule 11     venlafaxine (EFFEXOR-XR) 75 MG 24 hr capsule Take 1 capsule by mouth daily (together with 150 mg capsule for total of 225 mg/day) 30 capsule 11     calcium carbonate (TUMS) 500 MG chewable tablet Take 1 tablet (500 mg) by mouth 2 times daily as needed for heartburn 150 tablet 3     Capsaicin 0.1 % cream Apply topically 2 times daily as needed 56.6 g 3     fluticasone (FLONASE) 50 MCG/ACT spray Spray 1-2 sprays into both nostrils daily as needed for allergies       ketotifen  (ZADITOR) 0.025 % SOLN ophthalmic solution Place 1 drop into both eyes every 12 hours 1 Bottle 3     polyethylene glycol (MIRALAX) powder Take 17 g (1 capful) by mouth daily 510 g 1     triamcinolone (KENALOG) 0.1 % ointment Apply sparingly to affected area three times daily for 14 days. 30 g 1     [DISCONTINUED] QUEtiapine (SEROQUEL) 50 MG tablet Take 2 tablets (100 mg) by mouth At Bedtime 60 tablet 3        Drug therapy problems identified:  Medical Condition 1: Pain (i.e. somatic, visceral, neuropathic), Goals of therapy: Not at goal, Drug Class: Analgesic, OTC, Convenience: Patient misunderstanding, Intervention: Educate patient, Verification: Patient Agreed - Compliance/Education  Medical Condition 2: Constipation, Goals of therapy 2: Not at goal, Drug Class 2: Other (Docusate),  , Efficacy 2: Partially effective, Intervention 2: Change dose, Verification 2: Patient Agreed - OTC  Medical Condition 3: Depression, Goals of therapy 3: Not at goal, Drug Class 3: Antidepressant,  , Efficacy 3: Partially effective, Intervention 3: Referral, Verification 3: Deferred to future visit  Medical Condition 4: Allergies, Goals of therapy 4: At Goal, Drug Class 4: Antihistamine, Indication 4: Unnecessary drug therapy, Intervention 4: Change dose, Verification 4: Patient Agreed - OTC    Relevant medical devices: n/a    # of medical conditions addressed: 5  # of medications addressed: 10  # of DTP identified: 4  Time spent: 30 minutes  Level of service per [place] DHS guidelines: 5 nc     documentation:  Due to patient being non-English speaking/uses sign language, an  was used for this visit. Only for face-to-face interpretation by an external agency, date and length of interpretation can be found on the scanned worksheet.     name: Frances England  Agency: [employer]  Language: Maryann   Telephone number: [phone]  Type of interpretation: Face-to-face, spoken

## 2018-08-02 NOTE — PROGRESS NOTES
Patient with +leukocyte esterase on UA today with pain and burning with urination since start of pregnancy. Seen by pharmacy and mental health today, UA obtained with culture in process.    Ordered Augmentin and will have our staff call to inform.    Flavio Brothers MD  [facility], PGY-2

## 2018-08-02 NOTE — PROGRESS NOTES
"Met with pt and she has some paperwork for [employer] requiring dr malik for her youngest- will have abraham sign today    She has her pay stubs and I sent those to her financial worker at [facility] for [employer] benefit    Had a surgvey from [employer] so helped her complete this and mailing it in for her.      Pt began to cry and when asked why she said \"too many worries\" per   Asked what her biggest worry was today and she said her , when I asked if she wanted to tell me more, she said not right now, asked if she would talk with camille about it she nodded.      Pt was also seen today for med set up.  Lbetz  "

## 2018-08-02 NOTE — Clinical Note
See note for greater detail. Concern for continued racing thoughts/nightmares. Has not seemed notice change since increase in Quetiapine.

## 2018-08-02 NOTE — MR AVS SNAPSHOT
After Visit Summary   8/2/2018    Hayde LUJAN Say    MRN: [id number]           Patient Information     Date Of Birth          1990        Visit Information        Provider Department      8/2/2018 9:00 AM Elham Mcmahan, LMFT [facility]        Today's Diagnoses     PTSD (post-traumatic stress disorder)    -  1    Severe episode of recurrent major depressive disorder, without psychotic features (H)           Follow-ups after your visit        Your next 10 appointments already scheduled     Aug 07, 2018  9:20 AM CDT   RETURN OB with Rosana Rosales DO   [facility] ([facility])    [address] [place] [id number]   [phone]            Aug 16, 2018  9:00 AM CDT   Return Visit with Elham Mcmahan LMFT [facility] ([facility])    [address] [place] [address]   [phone]              Who to contact     Please call your clinic at [phone] to:    Ask questions about your health    Make or cancel appointments    Discuss your medicines    Learn about your test results    Speak to your doctor            Additional Information About Your Visit        Care EveryWhere ID     This is your Care EveryWhere ID. This could be used by other organizations to access your [place] medical records  [id number]        Your Vitals Were     Last Period                   (LMP Unknown)            Blood Pressure from Last 3 Encounters:   08/02/18 110/72   07/20/18 93/61   07/05/18 99/66    Weight from Last 3 Encounters:   08/02/18 137 lb 6.4 oz (62.3 kg)   07/20/18 135 lb (61.2 kg)   07/05/18 136 lb (61.7 kg)              We Performed the Following     Interactive Complexity add-on (68705)     Psychotherapy 30 min (57476)          Today's Medication Changes          These changes are accurate as of 8/2/18 12:01 PM.  If you have any questions, ask your nurse or doctor.               These medicines have changed or have updated prescriptions.         Dose/Directions    cetirizine 10 MG tablet   Commonly known as:  zyrTEC   This may have changed:    - when to take this  - reasons to take this   Used for:  Chronic seasonal allergic rhinitis due to fungal spores   Changed by:  Sierra Scott RPH        Dose:  10 mg   Take 1 tablet (10 mg) by mouth daily as needed for allergies   Refills:  0       QUEtiapine 100 MG tablet   Commonly known as:  SEROquel   This may have changed:  medication strength   Used for:  PTSD (post-traumatic stress disorder)   Changed by:  Sierra Scott RPH        Dose:  100 mg   Take 1 tablet (100 mg) by mouth At Bedtime   Quantity:  30 tablet   Refills:  3            Where to get your medicines      These medications were sent to [facility] - [place], [place] - [address] [place] [address] Pkw[address]  [address], [place] [address]     Phone:  [phone]     QUEtiapine 100 MG tablet                Primary Care Provider Office Phone # Fax #    Flavio Brothers [phone]       [facility] [address] [place] [address]        Equal Access to Services     [facility] CenterCONSTANTINO AH: Hadii raven ku hadasho Soomaali, waaxda luqadaha, qaybta kaalmada adeegyada, nakul duarte . So [facility] [phone].    ATENCIÓN: Si habla español, tiene a keene disposición servicios gratuitos de asistencia lingüística. [facility] [phone].    We comply with applicable federal civil rights laws and [place] laws. We do not discriminate on the basis of race, color, national origin, age, disability, sex, sexual orientation, or gender identity.            Thank you!     Thank you for choosing [facility]  for your care. Our goal is always to provide you with excellent care. Hearing back from our patients is one way we can continue to improve our services. Please take a few minutes to complete the written survey that you may receive in the mail after your visit with  us. Thank you!             Your Updated Medication List - Protect others around you: Learn how to safely use, store and throw away your medicines at www.disposemymeds.org.          This list is accurate as of 8/2/18 12:01 PM.  Always use your most recent med list.                   Brand Name Dispense Instructions for use Diagnosis    acetaminophen 325 MG tablet    TYLENOL     Take 2 tablets (650 mg) by mouth every 4 hours as needed for headaches or pain    Episodic tension-type headache, not intractable, Severe episode of recurrent major depressive disorder, without psychotic features (H), PTSD (post-traumatic stress disorder), BRENDA (generalized anxiety disorder), Insomnia due to other mental disorder       calcium carbonate 500 MG chewable tablet    TUMS    150 tablet    Take 1 tablet (500 mg) by mouth 2 times daily as needed for heartburn    Other chronic gastritis without hemorrhage       Capsaicin 0.1 % cream     56.6 g    Apply topically 2 times daily as needed    Chronic right-sided low back pain without sciatica, Neck pain       cetirizine 10 MG tablet    zyrTEC     Take 1 tablet (10 mg) by mouth daily as needed for allergies    Chronic seasonal allergic rhinitis due to fungal spores       docusate sodium 100 MG tablet    COLACE    60 tablet    Take 200 mg by mouth daily    Constipation, unspecified constipation type       FLONASE 50 MCG/ACT spray   Generic drug:  fluticasone      Spray 1-2 sprays into both nostrils daily as needed for allergies    Seasonal allergic rhinitis due to other allergic trigger       ketotifen 0.025 % Soln ophthalmic solution    ZADITOR    1 Bottle    Place 1 drop into both eyes every 12 hours    Acute seasonal allergic rhinitis, unspecified trigger       montelukast 10 MG tablet    SINGULAIR    30 tablet    Take 1 tablet (10 mg) by mouth daily    Acute seasonal allergic rhinitis, unspecified trigger       polyethylene glycol powder    MIRALAX    510 g    Take 17 g (1 capful) by  mouth daily    Constipation, unspecified constipation type       prazosin 1 MG capsule    MINIPRESS    60 capsule    Take 2 capsules (2 mg) by mouth At Bedtime    PTSD (post-traumatic stress disorder)       prenatal multivitamin plus iron 27-0.8 MG Tabs per tablet     100 tablet    Take 1 tablet by mouth daily    Encounter for supervision of other normal pregnancy in first trimester, Amenorrhea, Severe episode of recurrent major depressive disorder, without psychotic features (H)       QUEtiapine 100 MG tablet    SEROquel    30 tablet    Take 1 tablet (100 mg) by mouth At Bedtime    PTSD (post-traumatic stress disorder)       ranitidine 150 MG tablet    ZANTAC    30 tablet    Take 1 tablet (150 mg) by mouth At Bedtime    Gastroesophageal reflux disease, esophagitis presence not specified       triamcinolone 0.1 % ointment    KENALOG    30 g    Apply sparingly to affected area three times daily for 14 days.    Infestation by bed bug       * venlafaxine 75 MG 24 hr capsule    EFFEXOR-XR    30 capsule    Take 1 capsule by mouth daily (together with 150 mg capsule for total of 225 mg/day)    PTSD (post-traumatic stress disorder), Major depressive disorder, recurrent episode, moderate (H)       * venlafaxine 150 MG 24 hr capsule    EFFEXOR-XR    30 capsule    Take 1 capsule by mouth daily (together with 75 mg capsule for total of 225 mg/day)    PTSD (post-traumatic stress disorder), Major depressive disorder, recurrent episode, moderate (H)       * Notice:  This list has 2 medication(s) that are the same as other medications prescribed for you. Read the directions carefully, and ask your doctor or other care provider to review them with you.

## 2018-08-02 NOTE — TELEPHONE ENCOUNTER
Called  office again to make complaint of bed bugs after pt came in with active bed bugs on her.  Spoke to Sarah who took the report.    Ramila

## 2018-08-03 ENCOUNTER — TELEPHONE (OUTPATIENT)
Dept: FAMILY MEDICINE | Facility: CLINIC | Age: 28
End: 2018-08-03

## 2018-08-03 LAB — CULTURE: NORMAL

## 2018-08-03 NOTE — TELEPHONE ENCOUNTER
----- Message from Flavio Brothers MD sent at 8/2/2018  6:56 PM CDT -----  UA/culture obtained today after pharmacy and mental health visits with results forwarded to me as on call provider. I have prescribed Augmentin.    Please let patient know that I have prescribed an antibiotic for her UTI and that it is waiting for her at [facility]. If she has any questions or concerns about this, please schedule her for a visit so we can add this pill to her pill boxes which unfortunately we just filled today during her visits : /

## 2018-08-03 NOTE — TELEPHONE ENCOUNTER
Spoke with patient and informed her of UTI and Rx at pharmacy.  Let patient know that if she is still having symptoms she should be seen in clinic again.

## 2018-08-07 ENCOUNTER — CARE COORDINATION (OUTPATIENT)
Dept: FAMILY MEDICINE | Facility: CLINIC | Age: 28
End: 2018-08-07

## 2018-08-07 ENCOUNTER — OFFICE VISIT (OUTPATIENT)
Dept: FAMILY MEDICINE | Facility: CLINIC | Age: 28
End: 2018-08-07
Payer: COMMERCIAL

## 2018-08-07 VITALS
HEART RATE: 66 BPM | DIASTOLIC BLOOD PRESSURE: 60 MMHG | SYSTOLIC BLOOD PRESSURE: 95 MMHG | TEMPERATURE: 97.8 F | RESPIRATION RATE: 16 BRPM | OXYGEN SATURATION: 100 %

## 2018-08-07 DIAGNOSIS — B88.8 INFESTATION BY BED BUG: ICD-10-CM

## 2018-08-07 DIAGNOSIS — F33.1 MAJOR DEPRESSIVE DISORDER, RECURRENT EPISODE, MODERATE (H): Primary | ICD-10-CM

## 2018-08-07 NOTE — PROGRESS NOTES
Patient presents with some paperwork. She has an appeal telephone interview with [employer] on Thursday 8/16 at 1030. I called and spoke with them and informed them of her phone number (as they did not have this) and that she will need an . Informed patient that this will take place and that we will call and remind her of this appointment. She also brings in some refunds of copayments from here. Checked with Karina and her insurance is no longer needing a copayment. Patient is informed of this. Patient brings in paperwork stating that her apartment will be sprayed for bed bugs today and again on 8/20/18.  went over the list that the tenants are suppose to complete before spraying occurs. Ride is called. Patient will be escorted out when ride arrives. Patient quite upset about not being able to sit in lobby and need to use back door as does not understand despite Karina MARTINEZ attempting to explain to her.

## 2018-08-07 NOTE — MR AVS SNAPSHOT
After Visit Summary   8/7/2018    Hayde LUJAN Say    MRN: [id number]           Patient Information     Date Of Birth          1990        Visit Information        Provider Department      8/7/2018 9:20 AM Rosana Rosales DO [facility]        Today's Diagnoses     Encounter for supervision of other normal pregnancy in first trimester    -  1       Follow-ups after your visit        Your next 10 appointments already scheduled     Aug 16, 2018  9:00 AM CDT   Return Visit with ANABEL Eckert   [facility] ([facility])    [address] [place] [address]   [phone]              Who to contact     Please call your clinic at [phone] to:    Ask questions about your health    Make or cancel appointments    Discuss your medicines    Learn about your test results    Speak to your doctor            Additional Information About Your Visit        Care EveryWhere ID     This is your Care EveryWhere ID. This could be used by other organizations to access your [place] medical records  [id number]        Your Vitals Were     Pulse Temperature Respirations Last Period Pulse Oximetry       66 97.8  F (36.6  C) (Oral) 16 (LMP Unknown) 100%        Blood Pressure from Last 3 Encounters:   08/07/18 95/60   08/02/18 110/72   07/20/18 93/61    Weight from Last 3 Encounters:   08/02/18 137 lb 6.4 oz (62.3 kg)   07/20/18 135 lb (61.2 kg)   07/05/18 136 lb (61.7 kg)              Today, you had the following     No orders found for display       Primary Care Provider Office Phone # Fax #    Flavio Brothers [phone]       [facility] [address] [place] [address]        Equal Access to Services     [facility]: Hadii raven bingham Sofransisco, waaxda luqadaha, qaybta kaalmada nakul anna. So [facility] [phone].    ATENCIÓN: Si habla español, tiene a keene disposición servicios gratuitos de  asistencia lingüística. Cristy al [phone].    We comply with applicable federal civil rights laws and [place] laws. We do not discriminate on the basis of race, color, national origin, age, disability, sex, sexual orientation, or gender identity.            Thank you!     Thank you for choosing [facility]  for your care. Our goal is always to provide you with excellent care. Hearing back from our patients is one way we can continue to improve our services. Please take a few minutes to complete the written survey that you may receive in the mail after your visit with us. Thank you!             Your Updated Medication List - Protect others around you: Learn how to safely use, store and throw away your medicines at www.disposemymeds.org.          This list is accurate as of 8/7/18 10:13 AM.  Always use your most recent med list.                   Brand Name Dispense Instructions for use Diagnosis    acetaminophen 325 MG tablet    TYLENOL     Take 2 tablets (650 mg) by mouth every 4 hours as needed for headaches or pain    Episodic tension-type headache, not intractable, Severe episode of recurrent major depressive disorder, without psychotic features (H), PTSD (post-traumatic stress disorder), BRENDA (generalized anxiety disorder), Insomnia due to other mental disorder       amoxicillin-clavulanate 875-125 MG per tablet    AUGMENTIN    14 tablet    Take 1 tablet by mouth 2 times daily for 7 days    Acute cystitis without hematuria       calcium carbonate 500 MG chewable tablet    TUMS    150 tablet    Take 1 tablet (500 mg) by mouth 2 times daily as needed for heartburn    Other chronic gastritis without hemorrhage       Capsaicin 0.1 % cream     56.6 g    Apply topically 2 times daily as needed    Chronic right-sided low back pain without sciatica, Neck pain       cetirizine 10 MG tablet    zyrTEC     Take 1 tablet (10 mg) by mouth daily as needed for allergies    Chronic seasonal allergic rhinitis due  to fungal spores       docusate sodium 100 MG tablet    COLACE    60 tablet    Take 200 mg by mouth daily    Constipation, unspecified constipation type       FLONASE 50 MCG/ACT spray   Generic drug:  fluticasone      Spray 1-2 sprays into both nostrils daily as needed for allergies    Seasonal allergic rhinitis due to other allergic trigger       ketotifen 0.025 % Soln ophthalmic solution    ZADITOR    1 Bottle    Place 1 drop into both eyes every 12 hours    Acute seasonal allergic rhinitis, unspecified trigger       montelukast 10 MG tablet    SINGULAIR    30 tablet    Take 1 tablet (10 mg) by mouth daily    Acute seasonal allergic rhinitis, unspecified trigger       polyethylene glycol powder    MIRALAX    510 g    Take 17 g (1 capful) by mouth daily    Constipation, unspecified constipation type       prazosin 1 MG capsule    MINIPRESS    60 capsule    Take 2 capsules (2 mg) by mouth At Bedtime    PTSD (post-traumatic stress disorder)       prenatal multivitamin plus iron 27-0.8 MG Tabs per tablet     100 tablet    Take 1 tablet by mouth daily    Encounter for supervision of other normal pregnancy in first trimester, Amenorrhea, Severe episode of recurrent major depressive disorder, without psychotic features (H)       QUEtiapine 100 MG tablet    SEROquel    30 tablet    Take 1 tablet (100 mg) by mouth At Bedtime    PTSD (post-traumatic stress disorder)       ranitidine 150 MG tablet    ZANTAC    30 tablet    Take 1 tablet (150 mg) by mouth At Bedtime    Gastroesophageal reflux disease, esophagitis presence not specified       triamcinolone 0.1 % ointment    KENALOG    30 g    Apply sparingly to affected area three times daily for 14 days.    Infestation by bed bug       * venlafaxine 75 MG 24 hr capsule    EFFEXOR-XR    30 capsule    Take 1 capsule by mouth daily (together with 150 mg capsule for total of 225 mg/day)    PTSD (post-traumatic stress disorder), Major depressive disorder, recurrent episode,  moderate (H)       * venlafaxine 150 MG 24 hr capsule    EFFEXOR-XR    30 capsule    Take 1 capsule by mouth daily (together with 75 mg capsule for total of 225 mg/day)    PTSD (post-traumatic stress disorder), Major depressive disorder, recurrent episode, moderate (H)       * Notice:  This list has 2 medication(s) that are the same as other medications prescribed for you. Read the directions carefully, and ask your doctor or other care provider to review them with you.

## 2018-08-07 NOTE — NURSING NOTE
Medication-completed pill box set up for one week for all scheduled medications for Hayde. She was fine with taking Augmentin by herself and not putting it in med set up as it's taken twice a day. Aniceto PHAM

## 2018-08-07 NOTE — NURSING NOTE
Roomed without     Due to patient being non-English speaking/uses sign language, an  was used for this visit. Only for face-to-face interpretation by an external agency, date and length of interpretation can be found on the scanned worksheet.     name: Frances England  Agency: [employer]  Language: Maryann   Telephone number: [phone]  Type of interpretation: Face-to-face, spoken

## 2018-08-07 NOTE — PROGRESS NOTES
"       HPI:   Hayde Macias is a 27 year old  female with PMH of MDD, PTSD, currently 12 weeks pregnancy  who presents for:    Bed bugs: Wakes her up every night. Has active bites. Has no one she could stay with because of her large family. The apartment has been spraying insecticide every 3 months. She has been cleaning the clothes and apartment daily without success.     MDD: Patient states her mood is still down. She says it's about the same as it has been at prior visits. Her  does not help with the kids. She is tearful today. She says she is anxious coming to the clinic and feels \"like a terrorist\" that she has to come in the back clinic door due to bed bugs.     A [employer]  was used for this visit         PMHX:     Patient Active Problem List   Diagnosis     Health Care Home     Recurrent major depressive disorder (H)     Encounter for supervision of normal pregnancy     Seasonal allergic rhinitis     PTSD (post-traumatic stress disorder)     BRENDA (generalized anxiety disorder)     Other fatigue     Insomnia due to other mental disorder       Current Outpatient Prescriptions   Medication Sig Dispense Refill     acetaminophen (TYLENOL) 325 MG tablet Take 2 tablets (650 mg) by mouth every 4 hours as needed for headaches or pain       amoxicillin-clavulanate (AUGMENTIN) 875-125 MG per tablet Take 1 tablet by mouth 2 times daily for 7 days 14 tablet 0     calcium carbonate (TUMS) 500 MG chewable tablet Take 1 tablet (500 mg) by mouth 2 times daily as needed for heartburn 150 tablet 3     Capsaicin 0.1 % cream Apply topically 2 times daily as needed 56.6 g 3     cetirizine (ZYRTEC) 10 MG tablet Take 1 tablet (10 mg) by mouth daily as needed for allergies       docusate sodium (COLACE) 100 MG tablet Take 200 mg by mouth daily 60 tablet 3     fluticasone (FLONASE) 50 MCG/ACT spray Spray 1-2 sprays into both nostrils daily as needed for allergies       ketotifen (ZADITOR) 0.025 % SOLN ophthalmic solution " Place 1 drop into both eyes every 12 hours 1 Bottle 3     montelukast (SINGULAIR) 10 MG tablet Take 1 tablet (10 mg) by mouth daily 30 tablet 11     polyethylene glycol (MIRALAX) powder Take 17 g (1 capful) by mouth daily 510 g 1     prazosin (MINIPRESS) 1 MG capsule Take 2 capsules (2 mg) by mouth At Bedtime 60 capsule 3     Prenatal Vit-Fe Fumarate-FA (PRENATAL MULTIVITAMIN PLUS IRON) 27-0.8 MG TABS per tablet Take 1 tablet by mouth daily 100 tablet 3     QUEtiapine (SEROQUEL) 100 MG tablet Take 1 tablet (100 mg) by mouth At Bedtime 30 tablet 3     ranitidine (ZANTAC) 150 MG tablet Take 1 tablet (150 mg) by mouth At Bedtime 30 tablet 3     triamcinolone (KENALOG) 0.1 % ointment Apply sparingly to affected area three times daily for 14 days. 30 g 1     venlafaxine (EFFEXOR-XR) 150 MG 24 hr capsule Take 1 capsule by mouth daily (together with 75 mg capsule for total of 225 mg/day) 30 capsule 11     venlafaxine (EFFEXOR-XR) 75 MG 24 hr capsule Take 1 capsule by mouth daily (together with 150 mg capsule for total of 225 mg/day) 30 capsule 11     Social History   Substance Use Topics     Smoking status: Never Smoker     Smokeless tobacco: Never Used      Comment: no smoke exposure.     Alcohol use No     Allergies   Allergen Reactions     Nkda [No Known Drug Allergies]      No results found for this or any previous visit (from the past 24 hour(s)).         Review of Systems:   See HPI.          Physical Exam:     Vitals:    08/07/18 0915   BP: 95/60   Pulse: 66   Resp: 16   Temp: 97.8  F (36.6  C)   TempSrc: Oral   SpO2: 100%     There is no height or weight on file to calculate BMI.    PHQ-9 SCORE 11/29/2017 1/9/2018 1/22/2018   Total Score - - -   Total Score 8 9 6     General: Alert, well-appearing female  HEENT: PERRL, moist oral mucus membranes  Pulm: CTA BL, no tachypnea  CV: RRR, no murmur  Psych: tearful, poor eye contact, provides limited information to questioning    Assessment and Plan   1. Major depressive  disorder, recurrent episode, moderate (H): PHQ9 slightly improved from prior visits, however, patient is tearful today. She is very distressed by the bed bug situation. ANKIT Jackson helped patient fill her pill box. During her last visit the pharmacist, Dr. Scott was only able to provide seroquel 50 mg at bedtime since patient did bring in all of her meds. Pillbox now filled with accurate seroquel dose of 100 mg at bedtime.  Concerned about patient's mental state, especially during pregnancy. Will continue with frequent visits in attempt to aid with stressors.     2. Infestation by bed bug: No visible bugs seen today, but have been during prior visits. Patient was asked to come in through back clinic door to avoid spreading bugs to others. She became very upset by this request. Discussed that this a standarized precaution for patient safety. Continued to encourage using back door for clinic visits. Patient has maximum clinic resources, and apartment is abiding by code with routine spraying.     Follow up: 3 weeks  Options for treatment and follow-up care were reviewed with the patient and/or guardian. Hayde LUJAN Say and/or guardian engaged in the decision making process and verbalized understanding of the options discussed and agreed with the final plan.    Rosana Rosales DO  [facility] Family Medicine Resident    Precepted with: Jacque Lozada MD

## 2018-08-08 ASSESSMENT — PATIENT HEALTH QUESTIONNAIRE - PHQ9: SUM OF ALL RESPONSES TO PHQ QUESTIONS 1-9: 11

## 2018-08-09 ENCOUNTER — TELEPHONE (OUTPATIENT)
Dept: FAMILY MEDICINE | Facility: CLINIC | Age: 28
End: 2018-08-09

## 2018-08-09 NOTE — TELEPHONE ENCOUNTER
I was checking Agustina shell, and Valerie from [facility] had called.  She called to inform Agustina that she is working on the pt case.  She will be working on the case to get it opened so that the pt family will have benefits.  She stated that the pt case is the second case that she will be working on today, and by the end of the end she will call Agustina back to let her know the outcome.

## 2018-08-10 ENCOUNTER — TELEPHONE (OUTPATIENT)
Dept: FAMILY MEDICINE | Facility: CLINIC | Age: 28
End: 2018-08-10

## 2018-08-10 NOTE — TELEPHONE ENCOUNTER
I checked Valerie Bella [phone] had called and updated Agustina about the pt SNAP program.  She reviewed the pt case, and she had approved the pt SNAP amount starting August is $125.04.  The pt has a hearing about her SNAP program scheduled for next Thursday, and would like for Agustina to help the pt call and cancel the hearing, since it she resolved it.  If Agustina can help the pt call [phone], tell them her name, dock#[phone], and ask to cancel the hearing.  If there is any questions to give her a call back.      Nasreen Case#[id number]

## 2018-08-14 ENCOUNTER — TELEPHONE (OUTPATIENT)
Dept: FAMILY MEDICINE | Facility: CLINIC | Age: 28
End: 2018-08-14

## 2018-08-14 DIAGNOSIS — B88.8 INFESTATION BY BED BUG: Primary | ICD-10-CM

## 2018-08-14 NOTE — TELEPHONE ENCOUNTER
Spoke to pt today to remind about upcoming appt and to bring med box and all medications  She said she will do so    She also concerned with the following:  Headache, very tired, throwing up and new onset of itching for last 2-3 days driving her crazy  She also reports her son Remington is also itching and has swelling, itch, blistery and watery     Asked if it was just where the bites are or where she itches, she said face, neck, back, legs, thighs, everywhere and it is driving her crazy, she is miserable asked if there is something she can put on itch or take for it    Route to Dr Rosales who is seeing her for pregnancy  And to Pharm D    Is there a cream or medication she can take and remington her child can take for itching

## 2018-08-15 ENCOUNTER — TELEPHONE (OUTPATIENT)
Dept: FAMILY MEDICINE | Facility: CLINIC | Age: 28
End: 2018-08-15

## 2018-08-15 RX ORDER — DIPHENHYDRAMINE HCL 25 MG
25 TABLET ORAL EVERY 6 HOURS PRN
Qty: 60 TABLET | Refills: 1 | Status: SHIPPED | OUTPATIENT
Start: 2018-08-15 | End: 2018-08-22

## 2018-08-15 NOTE — TELEPHONE ENCOUNTER
Benadryl sent to pharmacy. Jenna will see patient tomorrow (8/16/18) to refill med box. Will ask if she could explain medication administration to patient at that time.     Rosana Rosales DO  [facility] Family Medicine Resident  Pager #[phone]

## 2018-08-15 NOTE — TELEPHONE ENCOUNTER
Called pt using language line, had to leave message    Message left for pt that a Rx has been sent to [facility] for her and her son for Benedryl for the itch.      Ramila

## 2018-08-16 ENCOUNTER — OFFICE VISIT (OUTPATIENT)
Dept: PHARMACY | Facility: CLINIC | Age: 28
End: 2018-08-16
Payer: COMMERCIAL

## 2018-08-16 ENCOUNTER — OFFICE VISIT (OUTPATIENT)
Dept: FAMILY MEDICINE | Facility: CLINIC | Age: 28
End: 2018-08-16
Payer: COMMERCIAL

## 2018-08-16 ENCOUNTER — TELEPHONE (OUTPATIENT)
Dept: FAMILY MEDICINE | Facility: CLINIC | Age: 28
End: 2018-08-16

## 2018-08-16 ENCOUNTER — OFFICE VISIT (OUTPATIENT)
Dept: PSYCHOLOGY | Facility: CLINIC | Age: 28
End: 2018-08-16
Payer: COMMERCIAL

## 2018-08-16 VITALS
DIASTOLIC BLOOD PRESSURE: 68 MMHG | HEART RATE: 84 BPM | HEIGHT: 58 IN | SYSTOLIC BLOOD PRESSURE: 102 MMHG | TEMPERATURE: 98.4 F | BODY MASS INDEX: 28.8 KG/M2 | WEIGHT: 137.2 LBS | RESPIRATION RATE: 20 BRPM | OXYGEN SATURATION: 100 %

## 2018-08-16 DIAGNOSIS — F43.10 PTSD (POST-TRAUMATIC STRESS DISORDER): Primary | ICD-10-CM

## 2018-08-16 DIAGNOSIS — R21 RASH: Primary | ICD-10-CM

## 2018-08-16 DIAGNOSIS — Z71.89 ENCOUNTER FOR MEDICATION REVIEW AND COUNSELING: Primary | ICD-10-CM

## 2018-08-16 DIAGNOSIS — R21 RASH: ICD-10-CM

## 2018-08-16 DIAGNOSIS — K59.00 CONSTIPATION, UNSPECIFIED CONSTIPATION TYPE: ICD-10-CM

## 2018-08-16 DIAGNOSIS — F33.2 SEVERE EPISODE OF RECURRENT MAJOR DEPRESSIVE DISORDER, WITHOUT PSYCHOTIC FEATURES (H): ICD-10-CM

## 2018-08-16 RX ORDER — DIPHENHYDRAMINE HCL 25 MG
25-50 TABLET ORAL EVERY 6 HOURS PRN
Qty: 60 TABLET | Refills: 1 | Status: SHIPPED | OUTPATIENT
Start: 2018-08-16 | End: 2018-12-19

## 2018-08-16 RX ORDER — TRIAMCINOLONE ACETONIDE 1 MG/G
CREAM TOPICAL
Qty: 30 G | Refills: 0 | Status: SHIPPED | OUTPATIENT
Start: 2018-08-16 | End: 2018-12-19

## 2018-08-16 RX ORDER — DIPHENHYDRAMINE HCL 25 MG
12.5 TABLET ORAL ONCE
Qty: 1 TABLET | Refills: 0
Start: 2018-08-16 | End: 2018-08-16

## 2018-08-16 NOTE — PATIENT INSTRUCTIONS
Important Takeaway Points From This Visit:    You can take benadryl 25-50 mg every 6 hours as needed for itching    You can also apply the kenalog cream twice daily as needed for itching    Please return if not helping within 1 week or if new symptoms occur such as shortness of breath, headache, chest pain      As always, please call with any questions or concerns. I look forward to seeing you again soon!    Take care,  Dr. Mistry    Your current medication list is printed. Please keep this with you - it is helpful to bring this current list to any other medical appointments. It can also be helpful if you ever go to the emergency room or hospital.    If you had lab testing today we will call you with the results. The phone number we will call with your results is # [phone] (home) . If this is not the best number please call our clinic and change the number.    If you need any refills, please call your pharmacy and they will contact us.    If you have any further concerns or wish to schedule another appointment, please call our office at [phone] during normal business hours (8-5, M-F).    If you have urgent medical questions that cannot wait, you may call [phone] at any time of day.    If you have a medical emergency, please call [phone].    Thank you for coming to [facility].

## 2018-08-16 NOTE — PROGRESS NOTES
"  SUBJECTIVE:                                                    Hayde Macias is a 27 year old year old female who presents to clinic today for the following health issues:    Rash  Onset: Sunday, 8/12/18    Description:   Location: \"Whole body\" including face, arms, back, chest  Character: round, blotchy,red, blanches to palpitation  Itching (Pruritis): YES, worse with hot showers    Progression of Symptoms:  worsening    Accompanying Signs & Symptoms:  Fever: no   Body aches or joint pain: no   Sore throat symptoms: no   Recent cold symptoms: no     History:   Previous similar rash: YES, but never this bad    Precipitating factors:   Exposure to similar rash: YES, son also has this at home  New exposures: New sunscreen. No new lotions, detergents, clothes, soaps, etc.  Recent travel: no  States she has seen bed bugs at home.     Associated symptoms:     Denies chest pain, shortness of breath, wheezing, nausea, vision changes, lightheadedness, headache, muscle/joint aches, etc.    Therapies Tried and outcome: Claritin and cold. Cold also made her symptoms worse.    The patient is also 13 weeks pregnant.    Patient is an established patient of this clinic.    A Pertino  was used for this visit.  ----------------------------------------------------------------------------------------------------------------------  Patient Active Problem List   Diagnosis     Health Care Home     Recurrent major depressive disorder (H)     Encounter for supervision of normal pregnancy     Seasonal allergic rhinitis     PTSD (post-traumatic stress disorder)     BRENDA (generalized anxiety disorder)     Other fatigue     Insomnia due to other mental disorder     Past Surgical History:   Procedure Laterality Date     NO HISTORY OF SURGERY         Social History   Substance Use Topics     Smoking status: Never Smoker     Smokeless tobacco: Never Used      Comment: no smoke exposure.     Alcohol use No     Family History   Problem " Relation Age of Onset     Asthma Son      Asthma Son      Depression Mother      Diabetes No family hx of      Coronary Artery Disease No family hx of      Hypertension No family hx of      Hyperlipidemia No family hx of      Cerebrovascular Disease No family hx of      Breast Cancer No family hx of      Colon Cancer No family hx of      Prostate Cancer No family hx of      Other Cancer No family hx of      Anxiety Disorder No family hx of      Mental Illness No family hx of      Substance Abuse No family hx of      Anesthesia Reaction No family hx of      Osteoperosis No family hx of      Genetic Disorder No family hx of      Thyroid Disease No family hx of      Obesity No family hx of          Problem list and past medical, surgical, social, and family histories reviewed & adjusted, as indicated.    Current Outpatient Prescriptions   Medication Sig Dispense Refill     diphenhydrAMINE HCl 12.5 MG/5ML SYRP Take 12.5 mg by mouth once for 1 dose 5 mL 0     acetaminophen (TYLENOL) 325 MG tablet Take 2 tablets (650 mg) by mouth every 4 hours as needed for headaches or pain       calcium carbonate (TUMS) 500 MG chewable tablet Take 1 tablet (500 mg) by mouth 2 times daily as needed for heartburn 150 tablet 3     Capsaicin 0.1 % cream Apply topically 2 times daily as needed 56.6 g 3     cetirizine (ZYRTEC) 10 MG tablet Take 1 tablet (10 mg) by mouth daily as needed for allergies       diphenhydrAMINE (BENADRYL) 25 MG tablet Take 1 tablet (25 mg) by mouth every 6 hours as needed for itching or allergies 60 tablet 1     docusate sodium (COLACE) 100 MG tablet Take 200 mg by mouth daily 60 tablet 3     fluticasone (FLONASE) 50 MCG/ACT spray Spray 1-2 sprays into both nostrils daily as needed for allergies       ketotifen (ZADITOR) 0.025 % SOLN ophthalmic solution Place 1 drop into both eyes every 12 hours 1 Bottle 3     montelukast (SINGULAIR) 10 MG tablet Take 1 tablet (10 mg) by mouth daily 30 tablet 11     polyethylene  "glycol (MIRALAX) powder Take 17 g (1 capful) by mouth daily 510 g 1     prazosin (MINIPRESS) 1 MG capsule Take 2 capsules (2 mg) by mouth At Bedtime 60 capsule 3     Prenatal Vit-Fe Fumarate-FA (PRENATAL MULTIVITAMIN PLUS IRON) 27-0.8 MG TABS per tablet Take 1 tablet by mouth daily 100 tablet 3     QUEtiapine (SEROQUEL) 100 MG tablet Take 1 tablet (100 mg) by mouth At Bedtime 30 tablet 3     ranitidine (ZANTAC) 150 MG tablet Take 1 tablet (150 mg) by mouth At Bedtime 30 tablet 3     triamcinolone (KENALOG) 0.1 % ointment Apply sparingly to affected area three times daily for 14 days. 30 g 1     venlafaxine (EFFEXOR-XR) 150 MG 24 hr capsule Take 1 capsule by mouth daily (together with 75 mg capsule for total of 225 mg/day) 30 capsule 11     venlafaxine (EFFEXOR-XR) 75 MG 24 hr capsule Take 1 capsule by mouth daily (together with 150 mg capsule for total of 225 mg/day) 30 capsule 11     Medication list reviewed and updated as indicated.    Allergies   Allergen Reactions     Nkda [No Known Drug Allergies]      Allergies reviewed and updated as indicated.  ----------------------------------------------------------------------------------------------------------------------  ROS:  Constitutional, HEENT, cardiovascular, pulmonary, GI, musculoskeletal, neuro, skin, and psych systems are negative, except as otherwise noted.    OBJECTIVE:     /68  Pulse 84  Temp 98.4  F (36.9  C) (Oral)  Resp 20  Ht 4' 9.5\" (146.1 cm)  Wt 137 lb 3.2 oz (62.2 kg)  LMP  (LMP Unknown)  SpO2 100%  BMI 29.18 kg/m2  Body mass index is 29.18 kg/(m^2).  General: Appears in mild emotional distress, constantly itching rash.   Cardiovascular: Regular rate and rhythm, normal S1 and S2 without murmur. No extra heartsounds or friction rub. Radial pulses present and equal bilaterally.  Respiratory: Lungs clear to auscultation bilaterally. No wheezing or crackles. No prolonged expiration. Symmetrical chest rise.  Musculoskeletal: No gross " extremity deformities. No peripheral edema. Normal muscle bulk.  Derm: Erythematous macular papular rash, blanching distributed over bilateral wrists, arms, face, back, and  Few spots on abdomen. No mites or lice seen.    Diagnostic Test Results:  none     ASSESSMENT/PLAN:     1. Rash: Likely viral urticaria given appearance and son also having similar symptoms. Unlikely bed bugs, scabies, etc given facial involvement and appearance. Treat symptomatically with benadryl and triamcinolone cream. Follow-up within 1 week. Avoid oral steroids in this pregnant patient unless concern of anaphylaxis which she does not currently have.  - diphenhydrAMINE (BENADRYL) 25 MG tablet; Take 1-2 tablets (25-50 mg) by mouth every 6 hours as needed for itching or allergies  Dispense: 60 tablet; Refill: 1  - triamcinolone (KENALOG) 0.1 % cream; Apply sparingly to affected area two times daily for 14 days or until rash resolves, whichever is sooner  Dispense: 30 g; Refill: 0    Schedule follow-up appointment in 1 week(s).      Medications Discontinued During This Encounter   Medication Reason     diphenhydrAMINE (BENADRYL) 25 MG tablet Reorder       Nitish Mistry MD  [facility] Resident  Pager# [phone]    Precepted with: Agustina Brown MD    Options for treatment and follow-up care were reviewed with the patient and/or guardian. Hayde LUJAN Say and/or guardian engaged in the decision making process and verbalized understanding of the options discussed and agreed with the final plan    This chart is completed utilizing dictation software; typos and/or incorrect word substitutions may unintentionally occur.     The Following is for Coding Purposes Only    Dx Type # This visit   Self-Limited                  (1 pt ea) 0   Established, Stable      (1 pt ea) 0   Established, Worse      (2 pt ea) 0   New, Simple                 (3 pt ea) 1   New, Further Work-Up (4 pt ea) 0       Total Points 3 - Moderate       Data  Reviewed    Decision to Obtain Records                 (1 pt) 0   Review/Summarize Old Records         (2 pt) 0   Order/Review Labs                              (1 pt) 0   Order/Review Radiology                      (1 pt) 0   Order/Review Medical Tests                (1 pt) 0   Independent Review of EKG/XRay (2 pt ea) 0       Total Points 0 - Straightforward       Risk    1       One Minor Problem No   Basic Labs / Imaging / EKG No   Supportive Cares Yes   2       2+ Minor / Stable Chronic / Simple Acute Problem   Yes   Unstressed Test (Biopsy, PFT's, etc) No   OTC Meds / PT/OT No   3       Complicated Acute / Worse Chronic / 2+ Stable / Undiagnosed  No   Stress Test and Endoscopies No   Prescription Drugs or Treatment of Closed Injury Yes   4       Life Threatening Condition No   Rx With Monitoring / De-Escalate Care For Poor Prognosis  No   Level 3

## 2018-08-16 NOTE — PROGRESS NOTES
Behavioral Health Progress Note    Client Legal Name: Hayde LUJAN Say   Client Preferred Name: Hayde   Service Type: Individual  Length of Visit: 20 minutes  Attendees:      IInterpreter name: Frances England  Agency: [employer]  Language: Maryann   Telephone number: [phone]  Type of interpretation: Face-to-face, spoken     Complexity statement: Due to patient being non-English speaking, an  was used for this visit. An  is used not only to interpret language, since the patient does not speak English, but also to help with the complexity of understandings across cultures, since the patient is not well integrated in the larger [place] culture.        Identifying Information and Presenting Problem:     The patient is a 27 year old [place] female who is being seen for problematic symptoms of PTSD, worry, depression, anger/stress management, new pregnancy.         Treatment Objective(s) Addressed in This Session:  Anxiety: will increase ability to function adaptively        Progress on / Status of Treatment Objective(s) / Homework:  Minimal progress        PHQ-9 SCORE 1/9/2018 1/22/2018 8/7/2018   Total Score - - -   Total Score 9 6 11       BRENDA-7 SCORE 4/26/2017 8/29/2017 1/22/2018   Total Score 8 15 14       Topics Discussed/Interventions Provided:  I met briefly with patient for psychotherapy today. She had seen our Pharmacist prior to her appt with me and presented with significant hives. She was very distressed and itchy. She was given an appointment with one of our physicians during my appointment time with her. I saw her between her appointment with the pharmacist and physician.     I checked on mood, safety, ongoing efforts to decipher Hayde's ability to learn, remember and understand. Per patient, mood is worse. Has episodes of throwing clothes, blankets, pillows when she gets very upset. Does not throw at anyone. She reports being concerned and wanting her medications changed to help her  feel less sad, worried, and angry. She does feel safe and that she will not hurt anyone. I will report to PCP.    Learning/functioning: Patient [place] 3rd [place] She was learning reading and writing. Reports she never passed any of the tests given to her, it did not make sense, and she never learned to read or write in [place]. She stopped school after this point to help her mom. Patient also reported she was having a hard time answering questions about the timeline of certain behaviors she has seen in her mother by her mom's nursing team. She does struggle with clearly answering questions related to time. Mom has been displaying more bizarre behaviors (taking her clothes off).    Assessment: The patient appeared to be active and engaged in today's session and was receptive to feedback. Based on all the information I have continued to gather about patient and her functioning demonstrate she struggles with understanding what is told to her, even in her own language, memory, cannot read or write and struggled to learn how when she was young, and interpreters have indicated it is hard to understand her because her language often uses general terms and the  does not understand the reference.     Mental Status: Hayde appeared generally alert and oriented. Dress was casual and appropriate to the weather and occasion. Grooming and hygiene were clean. Eye contact was poor. Speech was of normal volume and rate and was clear, coherent, and relevant. Mood was anxious/angry with congruent affect. Thought processes were relevant, logical and goal-directed. Thought content was WNL with no evidence of psychotic or paranoid features. No evidence of SI/HI or self-harm, intent, or plans. Memory appeared grossly intact. Insight and judgment appeared fair and patient exhibited poor impulse control during the appointment.     Does the patient appear to be at imminent risk of harm to  self/others at this time? No    The session was necessary to address worry, depression, PTSD that have been interfering with patient's ability to function at concentrating at work, being able to work with strangers, arguments with her , personal life management, parenting.  Ongoing psychotherapy is necessary to improve functioning with daily activities, provide psychoeducation and provide support.      Diagnosis (DSM-5):  296.32 recurrent moderate major depression  308.3 PTSD      Plan:  1. Follow up in 2 weeks. Start N648 form.           NOTE: Treatment plan update due 9/21/18.  Diagnostic assessment update due 4/26/19

## 2018-08-16 NOTE — TELEPHONE ENCOUNTER
Pt is scheduled next ob on wed 22nd and to meet with pharm D prior to appt  For med set- ride is scheduled.  Gave her a color coded schedule for appts for her and her son

## 2018-08-16 NOTE — PROGRESS NOTES
Assessment and Plan     (Z71.89) Encounter for medication review and counseling  (primary encounter diagnosis)  Comment: Unclear what lead to missed doses.   Plan: Reiterated range of time acceptable for medication administration.     (K59.00) Constipation, unspecified constipation type  Comment: Misunderstanding with Miralax. Per report of uncontrolled illness, requires change in therapy.  Plan: Instructed may mix with any liquid to improve taste. Agreeable. Continue 200 mg Docusate/day.    (R21) Rash  Comment: Concern for allergic reaction to Augmentin.   Plan: Referred to provider for evaluation today. Instructed to continue using Cetirizine daily, in addition to Diphenhydramine PRN. After discussion w/ Dr. Spencer, instructed to stop and to not take last tablet of Augmentin.       Daily medicines Last Fill Date Refill needed?   Venlafaxine 75 mg capsule x1  7/29 #30 N   Venlafaxine 150 mg capsule x1 7/29 #30 Y   Quetiapine 100 mg tablet x1 8/2 #60 Y   Prazosin 1 mg capsule x2  7/29 #60 N     Montelukast 10 mg tablet x1  7/29 #30 N    Ranitidine 150 mg tablet x1  7/29 #30 Y   Docusate 100 mg capsule x2 8/1 #60 Y   Prenatal tablet x1 7/3 #100 N          Follow up: 1 week covisit w/ PCP for pillbox fill. Will then hope to transition to every 2 week schedule.    Options for treatment and/or follow-up care were reviewed with the patient. Hayde was engaged and actively involved in the decision making process. She verbalized understanding of the options discussed and was satisfied with the final plan. Patient was provided with written instructions/medication list via AVS.    Dr. Rosales was provided our recommendations via routed note, Dr. Giovany Kim was provided recommendations in clinic and Dr. Spencer was available for supervision during this visit and is the authorizing prescriber for this visit through the pharmacist collaborative practice agreement.    Thank you for the opportunity to participate in the care of  this patient.  Sierra Scott, Pharm.D.  [facility]: [phone]    Current Outpatient Prescriptions   Medication Sig Dispense Refill     acetaminophen (TYLENOL) 325 MG tablet Take 2 tablets (650 mg) by mouth every 4 hours as needed for headaches or pain       calcium carbonate (TUMS) 500 MG chewable tablet Take 1 tablet (500 mg) by mouth 2 times daily as needed for heartburn 150 tablet 3     Capsaicin 0.1 % cream Apply topically 2 times daily as needed 56.6 g 3     cetirizine (ZYRTEC) 10 MG tablet Take 1 tablet (10 mg) by mouth daily as needed for allergies       diphenhydrAMINE (BENADRYL) 25 MG tablet Take 1-2 tablets (25-50 mg) by mouth every 6 hours as needed for itching or allergies 60 tablet 1     diphenhydrAMINE (BENADRYL) 25 MG tablet Take 1 tablet (25 mg) by mouth every 6 hours as needed for itching or allergies 60 tablet 1     diphenhydrAMINE HCl 12.5 MG/5ML SYRP Take 12.5 mg by mouth once for 1 dose 5 mL 0     docusate sodium (COLACE) 100 MG tablet Take 200 mg by mouth daily 60 tablet 3     fluticasone (FLONASE) 50 MCG/ACT spray Spray 1-2 sprays into both nostrils daily as needed for allergies       ketotifen (ZADITOR) 0.025 % SOLN ophthalmic solution Place 1 drop into both eyes every 12 hours 1 Bottle 3     montelukast (SINGULAIR) 10 MG tablet Take 1 tablet (10 mg) by mouth daily 30 tablet 11     polyethylene glycol (MIRALAX) powder Take 17 g (1 capful) by mouth daily 510 g 1     prazosin (MINIPRESS) 1 MG capsule Take 2 capsules (2 mg) by mouth At Bedtime 60 capsule 3     Prenatal Vit-Fe Fumarate-FA (PRENATAL MULTIVITAMIN PLUS IRON) 27-0.8 MG TABS per tablet Take 1 tablet by mouth daily 100 tablet 3     QUEtiapine (SEROQUEL) 100 MG tablet Take 1 tablet (100 mg) by mouth At Bedtime 30 tablet 3     ranitidine (ZANTAC) 150 MG tablet Take 1 tablet (150 mg) by mouth At Bedtime 30 tablet 3     triamcinolone (KENALOG) 0.1 % cream Apply sparingly to affected area two times  "daily for 14 days or until rash resolves, whichever is sooner 30 g 0     triamcinolone (KENALOG) 0.1 % ointment Apply sparingly to affected area three times daily for 14 days. 30 g 1     venlafaxine (EFFEXOR-XR) 150 MG 24 hr capsule Take 1 capsule by mouth daily (together with 75 mg capsule for total of 225 mg/day) 30 capsule 11     venlafaxine (EFFEXOR-XR) 75 MG 24 hr capsule Take 1 capsule by mouth daily (together with 150 mg capsule for total of 225 mg/day) 30 capsule 11            HPI:       Hayde Macias is a 27 year old female referred by Dr. Rosales for medication review and counseling. Hayde s main concern today is rash. Patient seen today with the assistance of Frances Fernandez.    # Rash: Started Sunday. Itchy. Can't sleep because of itchiness. Burning where it itchess. One more antibiotic pill left. Youngest child (Bauitsta) also has same symptoms, later clarifies and says that his rash is different. Nothing made better. Heat makes worse. No trouble breathing / SOB. Morning and evening Augmentin. Delivered and started the next day. Denies N/V.     # Additional concern with medicine today is constipation. Taking 2 Docusate daily. Doesn't like Miralax taste. Mixing with water.     # Medication adherence: Not able to identify what lead to missed doses. \"Busy, tired.\" Usually takes at 8pm. Goes to bed later.            PMHX:     Patient Active Problem List   Diagnosis     Health Care Home     Recurrent major depressive disorder (H)     Encounter for supervision of normal pregnancy     Seasonal allergic rhinitis     PTSD (post-traumatic stress disorder)     BRENDA (generalized anxiety disorder)     Other fatigue     Insomnia due to other mental disorder     Allergies   Allergen Reactions     Nkda [No Known Drug Allergies]            [facility] Pharmacist Documentation     Drug therapy problems identified:  Medical Condition 1: Constipation, Goals of therapy: Not at goal, Drug Class: Other, Convenience: Patient " misunderstanding, Intervention: Educate patient, Verification: Patient Agreed - Compliance/Education  Medical Condition 2: ID (i.e. CAP, UTI, URI, LTBI), Goals of therapy 2: Not at goal, Drug Class 2: Antibiotic,Safety 2: Undesirable effect, Intervention 2: Discontinue drug, Verification 2: Provider Agreed       Relevant medical devices: n/a    # of medical conditions addressed: 1  # of medications addressed: 7  # of DTP identified: 2  Time spent: 20 minutes  Level of service per [place] DHS guidelines: 3 nc       documentation:  Due to patient being non-English speaking/uses sign language, an  was used for this visit. Only for face-to-face interpretation by an external agency, date and length of interpretation can be found on the scanned worksheet.      name: Frances England  Agency: [employer]  Language: Maryann   Telephone number: [phone]  Type of interpretation: Face-to-face, spoken

## 2018-08-16 NOTE — NURSING NOTE
I administered the following to Hayde Macias.    MEDICATION: Benadryl 12.5/5ml mg  ROUTE: PO  SITE: mouth  DOSE: 5ml  (12.5mg)  LOT #: [id number]  :  [employer]  EXPIRATION DATE:  01/20  NDC#: 13342-804-48     Was entire vial of medication used? Yes    Did the patient bring this medication to the clinic to be injected? No    Name of provider who requested the Medication: Dr. Hunt  Name of provider on site (faculty or community preceptor) at the time of performing the injection: Dr. Spencer/ Dr. Gilbert DODSON CMA    Due to patient being non-English speaking/uses sign language, an  was used for this visit. Only for face-to-face interpretation by an external agency, date and length of interpretation can be found on the scanned worksheet.     name: Frances Rice  Agency: [employer]  Language: Maryann   Telephone number: [phone]  Type of interpretation: Face-to-face, spoken

## 2018-08-16 NOTE — MR AVS SNAPSHOT
After Visit Summary   8/16/2018    Hayde LUJAN Say    MRN: [id number]           Patient Information     Date Of Birth          1990        Visit Information        Provider Department      8/16/2018 9:00 AM Elham Mcmahan, LMFT [facility]        Today's Diagnoses     PTSD (post-traumatic stress disorder)    -  1    Severe episode of recurrent major depressive disorder, without psychotic features (H)           Follow-ups after your visit        Your next 10 appointments already scheduled     Aug 22, 2018  9:20 AM CDT   Return Visit with Sierra Scott RPH [facility] ([facility])    [address] [place] [id number]   [phone]            Aug 22, 2018  9:40 AM CDT   RETURN OB with Rosana Rosales DO   [facility] ([facility])    [address] [place] [address]   [phone]              Who to contact     Please call your clinic at [phone] to:    Ask questions about your health    Make or cancel appointments    Discuss your medicines    Learn about your test results    Speak to your doctor            Additional Information About Your Visit        Care EveryWhere ID     This is your Care EveryWhere ID. This could be used by other organizations to access your [facility] medical records  [id number]        Your Vitals Were     Last Period                   (LMP Unknown)            Blood Pressure from Last 3 Encounters:   08/16/18 102/68   08/07/18 95/60   08/02/18 110/72    Weight from Last 3 Encounters:   08/16/18 137 lb 3.2 oz (62.2 kg)   08/02/18 137 lb 6.4 oz (62.3 kg)   07/20/18 135 lb (61.2 kg)              We Performed the Following     Interactive Complexity add-on (65559)     Psychotherapy 30 min (54655)          Today's Medication Changes          These changes are accurate as of 8/16/18 11:59 PM.  If you have any questions, ask your nurse or doctor.               These medicines have changed or have  updated prescriptions.        Dose/Directions    * diphenhydrAMINE 25 MG tablet   Commonly known as:  BENADRYL   This may have changed:  Another medication with the same name was added. Make sure you understand how and when to take each.   Used for:  Infestation by bed bug   Changed by:  Nitish Mistry MD        Dose:  25 mg   Take 1 tablet (25 mg) by mouth every 6 hours as needed for itching or allergies   Quantity:  60 tablet   Refills:  1       * diphenhydrAMINE HCl 12.5 MG/5ML Syrp   This may have changed:  You were already taking a medication with the same name, and this prescription was added. Make sure you understand how and when to take each.   Changed by:  Nitish Mistry MD        Dose:  12.5 mg   Take 12.5 mg by mouth once for 1 dose   Quantity:  5 mL   Refills:  0       * diphenhydrAMINE 25 MG tablet   Commonly known as:  BENADRYL   This may have changed:  You were already taking a medication with the same name, and this prescription was added. Make sure you understand how and when to take each.   Used for:  Rash   Changed by:  Nitish Mistry MD        Dose:  25-50 mg   Take 1-2 tablets (25-50 mg) by mouth every 6 hours as needed for itching or allergies   Quantity:  60 tablet   Refills:  1       * triamcinolone 0.1 % ointment   Commonly known as:  KENALOG   This may have changed:  Another medication with the same name was added. Make sure you understand how and when to take each.   Used for:  Infestation by bed bug   Changed by:  Nitish Mistry MD        Apply sparingly to affected area three times daily for 14 days.   Quantity:  30 g   Refills:  1       * triamcinolone 0.1 % cream   Commonly known as:  KENALOG   This may have changed:  You were already taking a medication with the same name, and this prescription was added. Make sure you understand how and when to take each.   Used for:  Rash   Changed by:  Nitish Mistry MD        Apply sparingly to affected  area two times daily for 14 days or until rash resolves, whichever is sooner   Quantity:  30 g   Refills:  0       * Notice:  This list has 5 medication(s) that are the same as other medications prescribed for you. Read the directions carefully, and ask your doctor or other care provider to review them with you.         Where to get your medicines      These medications were sent to [facility] - [place], [place] - [address]  [address], [place] [address]     Phone:  [phone]     diphenhydrAMINE 25 MG tablet    triamcinolone 0.1 % cream         Some of these will need a paper prescription and others can be bought over the counter.  Ask your nurse if you have questions.     You don't need a prescription for these medications     diphenhydrAMINE HCl 12.5 MG/5ML Syrp                Primary Care Provider Office Phone #    Rosana Rosales [phone]       [address] [place] [address] E  [place] [address]        Equal Access to Services     DYLAN LYE AH: Hadii raven ku hadasho Soomaali, waaxda luqadaha, qaybta kaalmada adeegyada, waxay idiin hayninfan justin duarte . So [facility] [phone].    ATENCIÓN: Si habla español, tiene a keene disposición servicios gratuitos de asistencia lingüística. Cristy al [phone].    We comply with applicable federal civil rights laws and [place] laws. We do not discriminate on the basis of race, color, national origin, age, disability, sex, sexual orientation, or gender identity.            Thank you!     Thank you for choosing [facility]  for your care. Our goal is always to provide you with excellent care. Hearing back from our patients is one way we can continue to improve our services. Please take a few minutes to complete the written survey that you may receive in the mail after your visit with us. Thank you!             Your Updated Medication List - Protect others around you: Learn how to safely use, store and throw away your  medicines at www.disposemymeds.org.          This list is accurate as of 8/16/18 11:59 PM.  Always use your most recent med list.                   Brand Name Dispense Instructions for use Diagnosis    acetaminophen 325 MG tablet    TYLENOL     Take 2 tablets (650 mg) by mouth every 4 hours as needed for headaches or pain    Episodic tension-type headache, not intractable, Severe episode of recurrent major depressive disorder, without psychotic features (H), PTSD (post-traumatic stress disorder), BRENDA (generalized anxiety disorder), Insomnia due to other mental disorder       calcium carbonate 500 MG chewable tablet    TUMS    150 tablet    Take 1 tablet (500 mg) by mouth 2 times daily as needed for heartburn    Other chronic gastritis without hemorrhage       Capsaicin 0.1 % cream     56.6 g    Apply topically 2 times daily as needed    Chronic right-sided low back pain without sciatica, Neck pain       cetirizine 10 MG tablet    zyrTEC     Take 1 tablet (10 mg) by mouth daily as needed for allergies    Chronic seasonal allergic rhinitis due to fungal spores       * diphenhydrAMINE 25 MG tablet    BENADRYL    60 tablet    Take 1 tablet (25 mg) by mouth every 6 hours as needed for itching or allergies    Infestation by bed bug       * diphenhydrAMINE HCl 12.5 MG/5ML Syrp     5 mL    Take 12.5 mg by mouth once for 1 dose        * diphenhydrAMINE 25 MG tablet    BENADRYL    60 tablet    Take 1-2 tablets (25-50 mg) by mouth every 6 hours as needed for itching or allergies    Rash       docusate sodium 100 MG tablet    COLACE    60 tablet    Take 200 mg by mouth daily    Constipation, unspecified constipation type       FLONASE 50 MCG/ACT spray   Generic drug:  fluticasone      Spray 1-2 sprays into both nostrils daily as needed for allergies    Seasonal allergic rhinitis due to other allergic trigger       ketotifen 0.025 % Soln ophthalmic solution    ZADITOR    1 Bottle    Place 1 drop into both eyes every 12 hours     Acute seasonal allergic rhinitis, unspecified trigger       montelukast 10 MG tablet    SINGULAIR    30 tablet    Take 1 tablet (10 mg) by mouth daily    Acute seasonal allergic rhinitis, unspecified trigger       polyethylene glycol powder    MIRALAX    510 g    Take 17 g (1 capful) by mouth daily    Constipation, unspecified constipation type       prazosin 1 MG capsule    MINIPRESS    60 capsule    Take 2 capsules (2 mg) by mouth At Bedtime    PTSD (post-traumatic stress disorder)       prenatal multivitamin plus iron 27-0.8 MG Tabs per tablet     100 tablet    Take 1 tablet by mouth daily    Encounter for supervision of other normal pregnancy in first trimester, Amenorrhea, Severe episode of recurrent major depressive disorder, without psychotic features (H)       QUEtiapine 100 MG tablet    SEROquel    30 tablet    Take 1 tablet (100 mg) by mouth At Bedtime    PTSD (post-traumatic stress disorder)       ranitidine 150 MG tablet    ZANTAC    30 tablet    Take 1 tablet (150 mg) by mouth At Bedtime    Gastroesophageal reflux disease, esophagitis presence not specified       * triamcinolone 0.1 % ointment    KENALOG    30 g    Apply sparingly to affected area three times daily for 14 days.    Infestation by bed bug       * triamcinolone 0.1 % cream    KENALOG    30 g    Apply sparingly to affected area two times daily for 14 days or until rash resolves, whichever is sooner    Rash       * venlafaxine 75 MG 24 hr capsule    EFFEXOR-XR    30 capsule    Take 1 capsule by mouth daily (together with 150 mg capsule for total of 225 mg/day)    PTSD (post-traumatic stress disorder), Major depressive disorder, recurrent episode, moderate (H)       * venlafaxine 150 MG 24 hr capsule    EFFEXOR-XR    30 capsule    Take 1 capsule by mouth daily (together with 75 mg capsule for total of 225 mg/day)    PTSD (post-traumatic stress disorder), Major depressive disorder, recurrent episode, moderate (H)       * Notice:  This list has  7 medication(s) that are the same as other medications prescribed for you. Read the directions carefully, and ask your doctor or other care provider to review them with you.

## 2018-08-16 NOTE — TELEPHONE ENCOUNTER
Called Shawn to see who new worker is as I dont think it is going to be see saud any longer and I dont know if she has met a new one or if they have had contact.  fernando is concerned about getting back packs for the kids- sent a message to See saud today via her voice mail    Ramila    Left a message with her old worker See and with new worker Isidro Rice  Ph [phone]

## 2018-08-16 NOTE — PROGRESS NOTES
Preceptor Attestation:   Patient seen, evaluated and discussed with the resident. I have verified the content of the note, which accurately reflects my assessment of the patient and the plan of care.  Supervising Physician:Agustina Brown MD  [facility]

## 2018-08-16 NOTE — MR AVS SNAPSHOT
"              After Visit Summary   8/16/2018    Hayde LUJAN Say    MRN: [id number]           Patient Information     Date Of Birth          1990        Visit Information        Provider Department      8/16/2018 9:00 AM Nitish Mistry MD [facility]        Today's Diagnoses     Rash    -  1       Follow-ups after your visit        Your next 10 appointments already scheduled     Aug 22, 2018  9:40 AM CDT   RETURN OB with Rosana Rosales DO   [facility] ([facility])    [address] [place] [address]   [phone]              Who to contact     Please call your clinic at [phone] to:    Ask questions about your health    Make or cancel appointments    Discuss your medicines    Learn about your test results    Speak to your doctor            Additional Information About Your Visit        Care EveryWhere ID     This is your Care EveryWhere ID. This could be used by other organizations to access your [place] medical records  [id number]        Your Vitals Were     Pulse Temperature Respirations Height Last Period Pulse Oximetry    84 98.4  F (36.9  C) (Oral) 20 4' 9.5\" (146.1 cm) (LMP Unknown) 100%    BMI (Body Mass Index)                   29.18 kg/m2            Blood Pressure from Last 3 Encounters:   08/16/18 102/68   08/07/18 95/60   08/02/18 110/72    Weight from Last 3 Encounters:   08/16/18 137 lb 3.2 oz (62.2 kg)   08/02/18 137 lb 6.4 oz (62.3 kg)   07/20/18 135 lb (61.2 kg)              Today, you had the following     No orders found for display         Today's Medication Changes          These changes are accurate as of 8/16/18 10:01 AM.  If you have any questions, ask your nurse or doctor.               These medicines have changed or have updated prescriptions.        Dose/Directions    * diphenhydrAMINE 25 MG tablet   Commonly known as:  BENADRYL   This may have changed:  Another medication with the same name was added. Make sure you understand how " and when to take each.   Used for:  Infestation by bed bug   Changed by:  Nitish Mistry MD        Dose:  25 mg   Take 1 tablet (25 mg) by mouth every 6 hours as needed for itching or allergies   Quantity:  60 tablet   Refills:  1       * diphenhydrAMINE HCl 12.5 MG/5ML Syrp   This may have changed:  You were already taking a medication with the same name, and this prescription was added. Make sure you understand how and when to take each.   Changed by:  Nitish Mistry MD        Dose:  12.5 mg   Take 12.5 mg by mouth once for 1 dose   Quantity:  5 mL   Refills:  0       * diphenhydrAMINE 25 MG tablet   Commonly known as:  BENADRYL   This may have changed:  You were already taking a medication with the same name, and this prescription was added. Make sure you understand how and when to take each.   Used for:  Rash   Changed by:  Nitish Mistry MD        Dose:  25-50 mg   Take 1-2 tablets (25-50 mg) by mouth every 6 hours as needed for itching or allergies   Quantity:  60 tablet   Refills:  1       * triamcinolone 0.1 % ointment   Commonly known as:  KENALOG   This may have changed:  Another medication with the same name was added. Make sure you understand how and when to take each.   Used for:  Infestation by bed bug   Changed by:  Nitish Mistry MD        Apply sparingly to affected area three times daily for 14 days.   Quantity:  30 g   Refills:  1       * triamcinolone 0.1 % cream   Commonly known as:  KENALOG   This may have changed:  You were already taking a medication with the same name, and this prescription was added. Make sure you understand how and when to take each.   Used for:  Rash   Changed by:  Nitish Mistry MD        Apply sparingly to affected area two times daily for 14 days or until rash resolves, whichever is sooner   Quantity:  30 g   Refills:  0       * Notice:  This list has 5 medication(s) that are the same as other medications prescribed for you.  Read the directions carefully, and ask your doctor or other care provider to review them with you.         Where to get your medicines      These medications were sent to [facility] - [place], [place] - [address]  [address], [place] [address]     Phone:  [phone]     diphenhydrAMINE 25 MG tablet    triamcinolone 0.1 % cream         Some of these will need a paper prescription and others can be bought over the counter.  Ask your nurse if you have questions.     You don't need a prescription for these medications     diphenhydrAMINE HCl 12.5 MG/5ML Syrp                Primary Care Provider Office Phone #    Rosana Rosales -[phone]       [facility] [address]  [place] [address]        Equal Access to Services     DYLAN LEY : Hadii aad ku hadasho Soyobanyali, waaxda luqadaha, qaybta kaalmada adeegyada, waxay acin ashlee garcía. So [facility] [phone].    ATENCIÓN: Si habla español, tiene a keene disposición servicios gratuitos de asistencia lingüística. [facility] [phone].    We comply with applicable federal civil rights laws and [place] laws. We do not discriminate on the basis of race, color, national origin, age, disability, sex, sexual orientation, or gender identity.            Thank you!     Thank you for choosing [facility]  for your care. Our goal is always to provide you with excellent care. Hearing back from our patients is one way we can continue to improve our services. Please take a few minutes to complete the written survey that you may receive in the mail after your visit with us. Thank you!             Your Updated Medication List - Protect others around you: Learn how to safely use, store and throw away your medicines at www.disposemymeds.org.          This list is accurate as of 8/16/18 10:01 AM.  Always use your most recent med list.                   Brand Name Dispense Instructions for use Diagnosis    acetaminophen  325 MG tablet    TYLENOL     Take 2 tablets (650 mg) by mouth every 4 hours as needed for headaches or pain    Episodic tension-type headache, not intractable, Severe episode of recurrent major depressive disorder, without psychotic features (H), PTSD (post-traumatic stress disorder), BRENDA (generalized anxiety disorder), Insomnia due to other mental disorder       calcium carbonate 500 MG chewable tablet    TUMS    150 tablet    Take 1 tablet (500 mg) by mouth 2 times daily as needed for heartburn    Other chronic gastritis without hemorrhage       Capsaicin 0.1 % cream     56.6 g    Apply topically 2 times daily as needed    Chronic right-sided low back pain without sciatica, Neck pain       cetirizine 10 MG tablet    zyrTEC     Take 1 tablet (10 mg) by mouth daily as needed for allergies    Chronic seasonal allergic rhinitis due to fungal spores       * diphenhydrAMINE 25 MG tablet    BENADRYL    60 tablet    Take 1 tablet (25 mg) by mouth every 6 hours as needed for itching or allergies    Infestation by bed bug       * diphenhydrAMINE HCl 12.5 MG/5ML Syrp     5 mL    Take 12.5 mg by mouth once for 1 dose        * diphenhydrAMINE 25 MG tablet    BENADRYL    60 tablet    Take 1-2 tablets (25-50 mg) by mouth every 6 hours as needed for itching or allergies    Rash       docusate sodium 100 MG tablet    COLACE    60 tablet    Take 200 mg by mouth daily    Constipation, unspecified constipation type       FLONASE 50 MCG/ACT spray   Generic drug:  fluticasone      Spray 1-2 sprays into both nostrils daily as needed for allergies    Seasonal allergic rhinitis due to other allergic trigger       ketotifen 0.025 % Soln ophthalmic solution    ZADITOR    1 Bottle    Place 1 drop into both eyes every 12 hours    Acute seasonal allergic rhinitis, unspecified trigger       montelukast 10 MG tablet    SINGULAIR    30 tablet    Take 1 tablet (10 mg) by mouth daily    Acute seasonal allergic rhinitis, unspecified trigger        polyethylene glycol powder    MIRALAX    510 g    Take 17 g (1 capful) by mouth daily    Constipation, unspecified constipation type       prazosin 1 MG capsule    MINIPRESS    60 capsule    Take 2 capsules (2 mg) by mouth At Bedtime    PTSD (post-traumatic stress disorder)       prenatal multivitamin plus iron 27-0.8 MG Tabs per tablet     100 tablet    Take 1 tablet by mouth daily    Encounter for supervision of other normal pregnancy in first trimester, Amenorrhea, Severe episode of recurrent major depressive disorder, without psychotic features (H)       QUEtiapine 100 MG tablet    SEROquel    30 tablet    Take 1 tablet (100 mg) by mouth At Bedtime    PTSD (post-traumatic stress disorder)       ranitidine 150 MG tablet    ZANTAC    30 tablet    Take 1 tablet (150 mg) by mouth At Bedtime    Gastroesophageal reflux disease, esophagitis presence not specified       * triamcinolone 0.1 % ointment    KENALOG    30 g    Apply sparingly to affected area three times daily for 14 days.    Infestation by bed bug       * triamcinolone 0.1 % cream    KENALOG    30 g    Apply sparingly to affected area two times daily for 14 days or until rash resolves, whichever is sooner    Rash       * venlafaxine 75 MG 24 hr capsule    EFFEXOR-XR    30 capsule    Take 1 capsule by mouth daily (together with 150 mg capsule for total of 225 mg/day)    PTSD (post-traumatic stress disorder), Major depressive disorder, recurrent episode, moderate (H)       * venlafaxine 150 MG 24 hr capsule    EFFEXOR-XR    30 capsule    Take 1 capsule by mouth daily (together with 75 mg capsule for total of 225 mg/day)    PTSD (post-traumatic stress disorder), Major depressive disorder, recurrent episode, moderate (H)       * Notice:  This list has 7 medication(s) that are the same as other medications prescribed for you. Read the directions carefully, and ask your doctor or other care provider to review them with you.

## 2018-08-16 NOTE — MR AVS SNAPSHOT
After Visit Summary   8/16/2018    Hayde LUJAN Say    MRN: [id number]           Patient Information     Date Of Birth          1990        Visit Information        Provider Department      8/16/2018 8:40 AM Sierra Scott RPH [facility]        Today's Diagnoses     Encounter for medication review and counseling    -  1    Constipation, unspecified constipation type        Rash           Follow-ups after your visit        Your next 10 appointments already scheduled     Aug 22, 2018  9:20 AM CDT   Return Visit with Sierra Scott RPH   [facility] ([facility])    [address] [place] [id number]   [phone]            Aug 22, 2018  9:40 AM CDT   RETURN OB with Rosana Rosales DO   [facility] ([facility])    [address] [place] [id number]   [phone]              Who to contact     Please call your clinic at [phone] to:    Ask questions about your health    Make or cancel appointments    Discuss your medicines    Learn about your test results    Speak to your doctor            Additional Information About Your Visit        Care EveryWhere ID     This is your Care EveryWhere ID. This could be used by other organizations to access your [facility] medical records  [id number]        Your Vitals Were     Last Period                   (LMP Unknown)            Blood Pressure from Last 3 Encounters:   08/16/18 102/68   08/07/18 95/60   08/02/18 110/72    Weight from Last 3 Encounters:   08/16/18 137 lb 3.2 oz (62.2 kg)   08/02/18 137 lb 6.4 oz (62.3 kg)   07/20/18 135 lb (61.2 kg)              Today, you had the following     No orders found for display         Today's Medication Changes          These changes are accurate as of 8/16/18 11:59 PM.  If you have any questions, ask your nurse or doctor.               These medicines have changed or have updated prescriptions.        Dose/Directions    *  diphenhydrAMINE 25 MG tablet   Commonly known as:  BENADRYL   This may have changed:  Another medication with the same name was added. Make sure you understand how and when to take each.   Used for:  Infestation by bed bug   Changed by:  Nitish Mistry MD        Dose:  25 mg   Take 1 tablet (25 mg) by mouth every 6 hours as needed for itching or allergies   Quantity:  60 tablet   Refills:  1       * diphenhydrAMINE HCl 12.5 MG/5ML Syrp   This may have changed:  You were already taking a medication with the same name, and this prescription was added. Make sure you understand how and when to take each.   Changed by:  Nitish Mistry MD        Dose:  12.5 mg   Take 12.5 mg by mouth once for 1 dose   Quantity:  5 mL   Refills:  0       * diphenhydrAMINE 25 MG tablet   Commonly known as:  BENADRYL   This may have changed:  You were already taking a medication with the same name, and this prescription was added. Make sure you understand how and when to take each.   Used for:  Rash   Changed by:  Nitish Mistry MD        Dose:  25-50 mg   Take 1-2 tablets (25-50 mg) by mouth every 6 hours as needed for itching or allergies   Quantity:  60 tablet   Refills:  1       * triamcinolone 0.1 % ointment   Commonly known as:  KENALOG   This may have changed:  Another medication with the same name was added. Make sure you understand how and when to take each.   Used for:  Infestation by bed bug   Changed by:  Nitish Mistry MD        Apply sparingly to affected area three times daily for 14 days.   Quantity:  30 g   Refills:  1       * triamcinolone 0.1 % cream   Commonly known as:  KENALOG   This may have changed:  You were already taking a medication with the same name, and this prescription was added. Make sure you understand how and when to take each.   Used for:  Rash   Changed by:  Nitish Mistry MD        Apply sparingly to affected area two times daily for 14 days or until rash  resolves, whichever is sooner   Quantity:  30 g   Refills:  0       * Notice:  This list has 5 medication(s) that are the same as other medications prescribed for you. Read the directions carefully, and ask your doctor or other care provider to review them with you.         Where to get your medicines      These medications were sent to [facility] - [place], [place] - [address] [place] [address] [address]kw[address]  [address], [place] [address]     Phone:  [phone]     diphenhydrAMINE 25 MG tablet    triamcinolone 0.1 % cream         Some of these will need a paper prescription and others can be bought over the counter.  Ask your nurse if you have questions.     You don't need a prescription for these medications     diphenhydrAMINE HCl 12.5 MG/5ML Syrp                Primary Care Provider Office Phone #    Rosana Rosales -[phone]       [address] [place] [address] E  [place] [address]        Equal Access to Services     DYLAN LEY AH: Hadii raven roberts hadasho Soyobanyali, waaxda luqadaha, qaybta kaalmada adeegyada, waxay acin hayjuan duarte . So [facility] [phone].    ATENCIÓN: Si habla español, tiene a keene disposición servicios gratuitos de asistencia lingüística. Cristy al [phone].    We comply with applicable federal civil rights laws and [place] laws. We do not discriminate on the basis of race, color, national origin, age, disability, sex, sexual orientation, or gender identity.            Thank you!     Thank you for choosing [facility]  for your care. Our goal is always to provide you with excellent care. Hearing back from our patients is one way we can continue to improve our services. Please take a few minutes to complete the written survey that you may receive in the mail after your visit with us. Thank you!             Your Updated Medication List - Protect others around you: Learn how to safely use, store and throw away your medicines at www.disposemymeds.org.           This list is accurate as of 8/16/18 11:59 PM.  Always use your most recent med list.                   Brand Name Dispense Instructions for use Diagnosis    acetaminophen 325 MG tablet    TYLENOL     Take 2 tablets (650 mg) by mouth every 4 hours as needed for headaches or pain    Episodic tension-type headache, not intractable, Severe episode of recurrent major depressive disorder, without psychotic features (H), PTSD (post-traumatic stress disorder), BRENDA (generalized anxiety disorder), Insomnia due to other mental disorder       calcium carbonate 500 MG chewable tablet    TUMS    150 tablet    Take 1 tablet (500 mg) by mouth 2 times daily as needed for heartburn    Other chronic gastritis without hemorrhage       Capsaicin 0.1 % cream     56.6 g    Apply topically 2 times daily as needed    Chronic right-sided low back pain without sciatica, Neck pain       cetirizine 10 MG tablet    zyrTEC     Take 1 tablet (10 mg) by mouth daily as needed for allergies    Chronic seasonal allergic rhinitis due to fungal spores       * diphenhydrAMINE 25 MG tablet    BENADRYL    60 tablet    Take 1 tablet (25 mg) by mouth every 6 hours as needed for itching or allergies    Infestation by bed bug       * diphenhydrAMINE HCl 12.5 MG/5ML Syrp     5 mL    Take 12.5 mg by mouth once for 1 dose        * diphenhydrAMINE 25 MG tablet    BENADRYL    60 tablet    Take 1-2 tablets (25-50 mg) by mouth every 6 hours as needed for itching or allergies    Rash       docusate sodium 100 MG tablet    COLACE    60 tablet    Take 200 mg by mouth daily    Constipation, unspecified constipation type       FLONASE 50 MCG/ACT spray   Generic drug:  fluticasone      Spray 1-2 sprays into both nostrils daily as needed for allergies    Seasonal allergic rhinitis due to other allergic trigger       ketotifen 0.025 % Soln ophthalmic solution    ZADITOR    1 Bottle    Place 1 drop into both eyes every 12 hours    Acute seasonal allergic rhinitis,  unspecified trigger       montelukast 10 MG tablet    SINGULAIR    30 tablet    Take 1 tablet (10 mg) by mouth daily    Acute seasonal allergic rhinitis, unspecified trigger       polyethylene glycol powder    MIRALAX    510 g    Take 17 g (1 capful) by mouth daily    Constipation, unspecified constipation type       prazosin 1 MG capsule    MINIPRESS    60 capsule    Take 2 capsules (2 mg) by mouth At Bedtime    PTSD (post-traumatic stress disorder)       prenatal multivitamin plus iron 27-0.8 MG Tabs per tablet     100 tablet    Take 1 tablet by mouth daily    Encounter for supervision of other normal pregnancy in first trimester, Amenorrhea, Severe episode of recurrent major depressive disorder, without psychotic features (H)       QUEtiapine 100 MG tablet    SEROquel    30 tablet    Take 1 tablet (100 mg) by mouth At Bedtime    PTSD (post-traumatic stress disorder)       ranitidine 150 MG tablet    ZANTAC    30 tablet    Take 1 tablet (150 mg) by mouth At Bedtime    Gastroesophageal reflux disease, esophagitis presence not specified       * triamcinolone 0.1 % ointment    KENALOG    30 g    Apply sparingly to affected area three times daily for 14 days.    Infestation by bed bug       * triamcinolone 0.1 % cream    KENALOG    30 g    Apply sparingly to affected area two times daily for 14 days or until rash resolves, whichever is sooner    Rash       * venlafaxine 75 MG 24 hr capsule    EFFEXOR-XR    30 capsule    Take 1 capsule by mouth daily (together with 150 mg capsule for total of 225 mg/day)    PTSD (post-traumatic stress disorder), Major depressive disorder, recurrent episode, moderate (H)       * venlafaxine 150 MG 24 hr capsule    EFFEXOR-XR    30 capsule    Take 1 capsule by mouth daily (together with 75 mg capsule for total of 225 mg/day)    PTSD (post-traumatic stress disorder), Major depressive disorder, recurrent episode, moderate (H)       * Notice:  This list has 7 medication(s) that are the same  as other medications prescribed for you. Read the directions carefully, and ask your doctor or other care provider to review them with you.

## 2018-08-16 NOTE — PROGRESS NOTES
Assessment and Plan     {DM DIAG PICKLIST:130719}      Daily medicines Last Fill Date Refill needed?   Venlafaxine 75 mg capsule x1  7/29 #30 N   Venlafaxine 150 mg capsule x1 7/29 #30 Y   Quetiapine 100 mg tablet x1 8/2 #60 Y   Prazosin 1 mg capsule x2  7/29 #60 N     Montelukast 10 mg tablet x1  7/29 #30 N    Ranitidine 150 mg tablet x1  7/29 #30 Y   Docusate 100 mg capsule x2 8/1 #60 Y   Prenatal tablet x1 7/3 #100 N          Follow up: 1 week covisit w/ PCP for pillbox fill. Will then hope to transition to every 2 week schedule.    Options for treatment and/or follow-up care were reviewed with the patient. Hayde was engaged and actively involved in the decision making process. She verbalized understanding of the options discussed and was satisfied with the final plan. Patient was provided with written instructions/medication list via AVS.    Dr. Rosales was provided our recommendations via routed note, Dr. Giovany Kim was provided recommendations in clinic and Dr. Spencer was available for supervision during this visit and is the authorizing prescriber for this visit through the pharmacist collaborative practice agreement.    Thank you for the opportunity to participate in the care of this patient.  Sierra Scott, Pharm.D.  [facility]: [phone]    Current Outpatient Prescriptions   Medication Sig Dispense Refill     acetaminophen (TYLENOL) 325 MG tablet Take 2 tablets (650 mg) by mouth every 4 hours as needed for headaches or pain       calcium carbonate (TUMS) 500 MG chewable tablet Take 1 tablet (500 mg) by mouth 2 times daily as needed for heartburn 150 tablet 3     Capsaicin 0.1 % cream Apply topically 2 times daily as needed 56.6 g 3     cetirizine (ZYRTEC) 10 MG tablet Take 1 tablet (10 mg) by mouth daily as needed for allergies       diphenhydrAMINE (BENADRYL) 25 MG tablet Take 1-2 tablets (25-50 mg) by mouth every 6 hours as needed for itching or allergies 60  tablet 1     diphenhydrAMINE (BENADRYL) 25 MG tablet Take 1 tablet (25 mg) by mouth every 6 hours as needed for itching or allergies 60 tablet 1     diphenhydrAMINE HCl 12.5 MG/5ML SYRP Take 12.5 mg by mouth once for 1 dose 5 mL 0     docusate sodium (COLACE) 100 MG tablet Take 200 mg by mouth daily 60 tablet 3     fluticasone (FLONASE) 50 MCG/ACT spray Spray 1-2 sprays into both nostrils daily as needed for allergies       ketotifen (ZADITOR) 0.025 % SOLN ophthalmic solution Place 1 drop into both eyes every 12 hours 1 Bottle 3     montelukast (SINGULAIR) 10 MG tablet Take 1 tablet (10 mg) by mouth daily 30 tablet 11     polyethylene glycol (MIRALAX) powder Take 17 g (1 capful) by mouth daily 510 g 1     prazosin (MINIPRESS) 1 MG capsule Take 2 capsules (2 mg) by mouth At Bedtime 60 capsule 3     Prenatal Vit-Fe Fumarate-FA (PRENATAL MULTIVITAMIN PLUS IRON) 27-0.8 MG TABS per tablet Take 1 tablet by mouth daily 100 tablet 3     QUEtiapine (SEROQUEL) 100 MG tablet Take 1 tablet (100 mg) by mouth At Bedtime 30 tablet 3     ranitidine (ZANTAC) 150 MG tablet Take 1 tablet (150 mg) by mouth At Bedtime 30 tablet 3     triamcinolone (KENALOG) 0.1 % cream Apply sparingly to affected area two times daily for 14 days or until rash resolves, whichever is sooner 30 g 0     triamcinolone (KENALOG) 0.1 % ointment Apply sparingly to affected area three times daily for 14 days. 30 g 1     venlafaxine (EFFEXOR-XR) 150 MG 24 hr capsule Take 1 capsule by mouth daily (together with 75 mg capsule for total of 225 mg/day) 30 capsule 11     venlafaxine (EFFEXOR-XR) 75 MG 24 hr capsule Take 1 capsule by mouth daily (together with 150 mg capsule for total of 225 mg/day) 30 capsule 11            HPI:       Hayde Macias is a 27 year old female referred by Dr. Rosales for medication review and counseling. Hayde s main concern today is rash. Patient seen today with the assistance of rFances Fernandez.    # Rash: Started Sunday. Itchy. Can't  sleep because of itchiness. Burning where it itchess. One more antibiotic pill left. Youngest child (Bautista) also has same symptoms, later clarifies and says that his rash is different. Nothing made better. Heat makes worse. No trouble breathing / SOB. Morning and evening Augmentin. Delivered and started the next day.     Denies N/V    Main concern with medicine today = constipation.            PMHX:     Patient Active Problem List   Diagnosis     Health Care Home     Recurrent major depressive disorder (H)     Encounter for supervision of normal pregnancy     Seasonal allergic rhinitis     PTSD (post-traumatic stress disorder)     BRENDA (generalized anxiety disorder)     Other fatigue     Insomnia due to other mental disorder     Allergies   Allergen Reactions     Nkda [No Known Drug Allergies]             Objective     There were no vitals filed for this visit.  There is no height or weight on file to calculate BMI.  ***        [facility] Pharmacist Documentation     Drug therapy problems identified:      Relevant medical devices: ***    # of medical conditions addressed: ***  # of medications addressed: ***  # of DTP identified: {NUMBERS 0-5:580291}  Time spent: *** minutes  Level of service per [place] DHS guidelines: {NUMBERS 0-5:883421}       documentation:  Due to patient being non-English speaking/uses sign language, an  was used for this visit. Only for face-to-face interpretation by an external agency, date and length of interpretation can be found on the scanned worksheet.      name: Frances England  Agency: [employer]  Language: Maryann   Telephone number: [phone]  Type of interpretation: Face-to-face, spoken

## 2018-08-17 ENCOUNTER — TELEPHONE (OUTPATIENT)
Dept: FAMILY MEDICINE | Facility: CLINIC | Age: 28
End: 2018-08-17

## 2018-08-17 NOTE — TELEPHONE ENCOUNTER
Pt reached out via phone enough for me to figure out who called and I was able to return her call with language line.    Pt stated she took her benedryl 1 every 6 and was still itching and unable to sleep last night.    Had Pharmacy near by and she was able to give input- increase taking 2 benedryl every 6 per the written order of 1-2 as well the kenalog should be delivered today to help and pharm D wants her to make sure to take her zyrtec every day.      Pt will do so and will call if on going    lbetz

## 2018-08-22 ENCOUNTER — OFFICE VISIT (OUTPATIENT)
Dept: PHARMACY | Facility: CLINIC | Age: 28
End: 2018-08-22
Payer: COMMERCIAL

## 2018-08-22 ENCOUNTER — OFFICE VISIT (OUTPATIENT)
Dept: FAMILY MEDICINE | Facility: CLINIC | Age: 28
End: 2018-08-22
Payer: COMMERCIAL

## 2018-08-22 VITALS — TEMPERATURE: 98.2 F | HEART RATE: 92 BPM | DIASTOLIC BLOOD PRESSURE: 64 MMHG | SYSTOLIC BLOOD PRESSURE: 98 MMHG

## 2018-08-22 DIAGNOSIS — J30.2 CHRONIC SEASONAL ALLERGIC RHINITIS DUE TO FUNGAL SPORES: ICD-10-CM

## 2018-08-22 DIAGNOSIS — Z71.89 ENCOUNTER FOR MEDICATION REVIEW AND COUNSELING: Primary | ICD-10-CM

## 2018-08-22 DIAGNOSIS — R21 RASH: ICD-10-CM

## 2018-08-22 DIAGNOSIS — Z34.90 ENCOUNTER FOR SUPERVISION OF NORMAL PREGNANCY, ANTEPARTUM, UNSPECIFIED GRAVIDITY: Primary | ICD-10-CM

## 2018-08-22 RX ORDER — CETIRIZINE HYDROCHLORIDE 10 MG/1
10 TABLET ORAL DAILY
COMMUNITY
Start: 2018-08-22 | End: 2018-08-22

## 2018-08-22 RX ORDER — FLUTICASONE PROPIONATE 50 MCG
1-2 SPRAY, SUSPENSION (ML) NASAL DAILY PRN
Qty: 1 BOTTLE | Refills: 1 | Status: SHIPPED | OUTPATIENT
Start: 2018-08-22 | End: 2018-10-25

## 2018-08-22 RX ORDER — CETIRIZINE HYDROCHLORIDE 10 MG/1
10 TABLET ORAL DAILY
Qty: 30 TABLET | Refills: 3 | Status: SHIPPED | OUTPATIENT
Start: 2018-08-22 | End: 2019-02-25

## 2018-08-22 NOTE — PROGRESS NOTES
Assessment and Plan     (Z71.89) Encounter for medication review and counseling  (primary encounter diagnosis)  Comment: Discrepancy between patient report of medication usage and medication remaining in box. Historically has had misunderstandings, seems to be happening again. No adverse effects noted with medication misuse.   Plan: Transition to one week pillbox fills. Simplify pillbox from BID (used for 2 week supply) to once daily pillbox x 1 week supply. Pillbox filled as below x1 week. Last dose 8/28 due for appointment 8/29.     (J30.2) Chronic seasonal allergic rhinitis due to fungal spores  Comment: Need refill. To simplify regimen, place scheduled drug (Cetirizine) in box to prevent confusion.   Plan: cetirizine (ZYRTEC) 10 MG tablet - refilled per [facility], fluticasone         (FLONASE) 50 MCG/ACT spray - refilled per [facility].          (R21) Rash  Comment: Taking oral medicines as prescribed. Continues to be uncomfortable in clinic.  Plan: Encourage to increase use of topical corticosteroid.     Daily medicines   Venlafaxine 75 mg capsule x1    Venlafaxine 150 mg capsule x1   Quetiapine 100 mg tablet x1   Prazosin 1 mg capsule x2    Montelukast 10 mg tablet x1    Ranitidine 150 mg tablet x1    Cetirizine 10 mg tablet x1   Prenatal tablet x1      Docusate not available today - instructed patient to take out of bottle.    Follow up: One week.     Options for treatment and/or follow-up care were reviewed with the patient. Hayde was engaged and actively involved in the decision making process. She verbalized understanding of the options discussed and was satisfied with the final plan. Patient was provided with written instructions/medication list via AVS.    Dr. Rosales was provided our recommendations in clinic today and Dr. Degroot was available for supervision during this visit and is the authorizing prescriber for this visit through the pharmacist collaborative practice agreement.    Thank you for the opportunity  to participate in the care of this patient.  Sierra Scott, Pharm.D.  [facility]: [phone]    Current Outpatient Prescriptions   Medication Sig Dispense Refill     acetaminophen (TYLENOL) 325 MG tablet Take 2 tablets (650 mg) by mouth every 4 hours as needed for headaches or pain       calcium carbonate (TUMS) 500 MG chewable tablet Take 1 tablet (500 mg) by mouth 2 times daily as needed for heartburn 150 tablet 3     cetirizine (ZYRTEC) 10 MG tablet Take 1 tablet (10 mg) by mouth daily       diphenhydrAMINE (BENADRYL) 25 MG tablet Take 1-2 tablets (25-50 mg) by mouth every 6 hours as needed for itching or allergies 60 tablet 1     docusate sodium (COLACE) 100 MG tablet Take 200 mg by mouth daily 60 tablet 3     ketotifen (ZADITOR) 0.025 % SOLN ophthalmic solution Place 1 drop into both eyes every 12 hours 1 Bottle 3     montelukast (SINGULAIR) 10 MG tablet Take 1 tablet (10 mg) by mouth daily 30 tablet 11     polyethylene glycol (MIRALAX) powder Take 17 g (1 capful) by mouth daily 510 g 1     prazosin (MINIPRESS) 1 MG capsule Take 2 capsules (2 mg) by mouth At Bedtime 60 capsule 3     Prenatal Vit-Fe Fumarate-FA (PRENATAL MULTIVITAMIN PLUS IRON) 27-0.8 MG TABS per tablet Take 1 tablet by mouth daily 100 tablet 3     QUEtiapine (SEROQUEL) 100 MG tablet Take 1 tablet (100 mg) by mouth At Bedtime 30 tablet 3     ranitidine (ZANTAC) 150 MG tablet Take 1 tablet (150 mg) by mouth At Bedtime 30 tablet 3     triamcinolone (KENALOG) 0.1 % cream Apply sparingly to affected area two times daily for 14 days or until rash resolves, whichever is sooner 30 g 0     venlafaxine (EFFEXOR-XR) 150 MG 24 hr capsule Take 1 capsule by mouth daily (together with 75 mg capsule for total of 225 mg/day) 30 capsule 11     venlafaxine (EFFEXOR-XR) 75 MG 24 hr capsule Take 1 capsule by mouth daily (together with 150 mg capsule for total of 225 mg/day) 30 capsule 11     fluticasone (FLONASE) 50 MCG/ACT  spray Spray 1-2 sprays into both nostrils daily as needed for allergies              HPI:       Hayde Macias is a 27 year old female referred by Dr. Rosales for medication review and counseling / pillbox fill. Hayde bustos main concern today is continued itching. Patient seen today with the assistance of Maryann lamberter.    Itchiness gets worse with showering. Doesn't want to shower any more. Doesn't like to use cream. Also seems to make worse or just not helping. Using 2 Benadryl every 6 hours and Cetirizine 10 mg daily.     Sneezing more lately. Needs more of nasal medicine. Continues to use eye drop daily.     Presents pillbox for review. Only one dose remaining in 2 week set up. When inquire about dosing, reports taking medicines every evening. Unable to articulate why fewer doses remaining than expected.     When asked about her mood states she has been itchy, frustrated and busy. Hard to fall asleep because of all of these things.            PMHX:     Patient Active Problem List   Diagnosis     Health Care Home     Recurrent major depressive disorder (H)     Encounter for supervision of normal pregnancy     Seasonal allergic rhinitis     PTSD (post-traumatic stress disorder)     BRENDA (generalized anxiety disorder)     Other fatigue     Insomnia due to other mental disorder     Allergies   Allergen Reactions     Augmentin Rash            Objective     VS w/ IP 8/22/2018   SYSTOLIC 98   DIASTOLIC 64   PULSE 92   TEMPERATURE 98.2           [facility] Pharmacist Documentation     Drug therapy problems identified:  Medical Condition 1: Depression, Goals of therapy: Not at goal, Drug Class: Antidepressant, Convenience: Patient misunderstanding, Intervention: Educate patient, Add device or process to assist use, Verification: Patient Agreed - Compliance/Education  Medical Condition 2: Other dermatologic (i.e. psoriasis, eczema), Goals of therapy 2: Not at goal, Drug Class 2: Topical steroid,  , Efficacy 2: Partially effective,  Intervention 2: Change dose, Educate patient, Verification 2: Patient Agreed - Compliance/Education    Relevant medical devices: n/a    # of medical conditions addressed: 2  # of medications addressed: 8  # of DTP identified: 2  Time spent: 30 minutes  Level of service per [place] DHS guidelines: 3 nc           Documentation   Due to patient being non-English speaking/uses sign language, an  was used for this visit. Only for face-to-face interpretation by an external agency, date and length of interpretation can be found on the scanned worksheet.     name: Sunny Chahal  Agency: [employer]  Language: Maryann   Telephone number: [phone]  Type of interpretation: Face-to-face, spoken

## 2018-08-22 NOTE — MR AVS SNAPSHOT
After Visit Summary   8/22/2018    Hayde LUJAN Say    MRN: [id number]           Patient Information     Date Of Birth          1990        Visit Information        Provider Department      8/22/2018 9:40 AM Stewart, Haley, DO [facility]        Care Instructions    -Follow up with Pharmacist in 1 week  -Follow up with Dr. Rosales in 3 weeks          Follow-ups after your visit        Who to contact     Please call your clinic at [phone] to:    Ask questions about your health    Make or cancel appointments    Discuss your medicines    Learn about your test results    Speak to your doctor            Additional Information About Your Visit        Care EveryWhere ID     This is your Care EveryWhere ID. This could be used by other organizations to access your [place] medical records  [id number]        Your Vitals Were     Pulse Temperature Last Period             92 98.2  F (36.8  C) (Oral) (LMP Unknown)          Blood Pressure from Last 3 Encounters:   08/22/18 98/64   08/16/18 102/68   08/07/18 95/60    Weight from Last 3 Encounters:   08/16/18 137 lb 3.2 oz (62.2 kg)   08/02/18 137 lb 6.4 oz (62.3 kg)   07/20/18 135 lb (61.2 kg)              Today, you had the following     No orders found for display         Today's Medication Changes          These changes are accurate as of 8/22/18 10:25 AM.  If you have any questions, ask your nurse or doctor.               These medicines have changed or have updated prescriptions.        Dose/Directions    cetirizine 10 MG tablet   Commonly known as:  zyrTEC   This may have changed:    - when to take this  - reasons to take this   Used for:  Chronic seasonal allergic rhinitis due to fungal spores   Changed by:  Sierra Scott RP        Dose:  10 mg   Take 1 tablet (10 mg) by mouth daily   Refills:  0       triamcinolone 0.1 % cream   Commonly known as:  KENALOG   This may have changed:  Another medication with the same name was  removed. Continue taking this medication, and follow the directions you see here.   Used for:  Rash   Changed by:  Sierra Scott RPH        Apply sparingly to affected area two times daily for 14 days or until rash resolves, whichever is sooner   Quantity:  30 g   Refills:  0                Primary Care Provider Office Phone #    Rosana Rosales [phone]       [address] [place] [address]        Equal Access to Services     CHHAYA LEY : Hadii aad ku hadasho Soomaali, waaxda luqadaha, qaybta kaalmada adeegyada, waxay idiin hayaan adeeg khdeenaelie lashayne . So [facility] [phone].    ATENCIÓN: Si habla español, tiene a keene disposición servicios gratuitos de asistencia lingüística. Llame al [phone].    We comply with applicable federal civil rights laws and [place] laws. We do not discriminate on the basis of race, color, national origin, age, disability, sex, sexual orientation, or gender identity.            Thank you!     Thank you for choosing [facility]  for your care. Our goal is always to provide you with excellent care. Hearing back from our patients is one way we can continue to improve our services. Please take a few minutes to complete the written survey that you may receive in the mail after your visit with us. Thank you!             Your Updated Medication List - Protect others around you: Learn how to safely use, store and throw away your medicines at www.disposemymeds.org.          This list is accurate as of 8/22/18 10:25 AM.  Always use your most recent med list.                   Brand Name Dispense Instructions for use Diagnosis    acetaminophen 325 MG tablet    TYLENOL     Take 2 tablets (650 mg) by mouth every 4 hours as needed for headaches or pain    Episodic tension-type headache, not intractable, Severe episode of recurrent major depressive disorder, without psychotic features (H), PTSD (post-traumatic stress disorder), BRENDA (generalized anxiety  disorder), Insomnia due to other mental disorder       calcium carbonate 500 MG chewable tablet    TUMS    150 tablet    Take 1 tablet (500 mg) by mouth 2 times daily as needed for heartburn    Other chronic gastritis without hemorrhage       cetirizine 10 MG tablet    zyrTEC     Take 1 tablet (10 mg) by mouth daily    Chronic seasonal allergic rhinitis due to fungal spores       diphenhydrAMINE 25 MG tablet    BENADRYL    60 tablet    Take 1-2 tablets (25-50 mg) by mouth every 6 hours as needed for itching or allergies    Rash       docusate sodium 100 MG tablet    COLACE    60 tablet    Take 200 mg by mouth daily    Constipation, unspecified constipation type       FLONASE 50 MCG/ACT spray   Generic drug:  fluticasone      Spray 1-2 sprays into both nostrils daily as needed for allergies    Seasonal allergic rhinitis due to other allergic trigger       ketotifen 0.025 % Soln ophthalmic solution    ZADITOR    1 Bottle    Place 1 drop into both eyes every 12 hours    Acute seasonal allergic rhinitis, unspecified trigger       montelukast 10 MG tablet    SINGULAIR    30 tablet    Take 1 tablet (10 mg) by mouth daily    Acute seasonal allergic rhinitis, unspecified trigger       polyethylene glycol powder    MIRALAX    510 g    Take 17 g (1 capful) by mouth daily    Constipation, unspecified constipation type       prazosin 1 MG capsule    MINIPRESS    60 capsule    Take 2 capsules (2 mg) by mouth At Bedtime    PTSD (post-traumatic stress disorder)       prenatal multivitamin plus iron 27-0.8 MG Tabs per tablet     100 tablet    Take 1 tablet by mouth daily    Encounter for supervision of other normal pregnancy in first trimester, Amenorrhea, Severe episode of recurrent major depressive disorder, without psychotic features (H)       QUEtiapine 100 MG tablet    SEROquel    30 tablet    Take 1 tablet (100 mg) by mouth At Bedtime    PTSD (post-traumatic stress disorder)       ranitidine 150 MG tablet    ZANTAC    30  tablet    Take 1 tablet (150 mg) by mouth At Bedtime    Gastroesophageal reflux disease, esophagitis presence not specified       triamcinolone 0.1 % cream    KENALOG    30 g    Apply sparingly to affected area two times daily for 14 days or until rash resolves, whichever is sooner    Rash       * venlafaxine 75 MG 24 hr capsule    EFFEXOR-XR    30 capsule    Take 1 capsule by mouth daily (together with 150 mg capsule for total of 225 mg/day)    PTSD (post-traumatic stress disorder), Major depressive disorder, recurrent episode, moderate (H)       * venlafaxine 150 MG 24 hr capsule    EFFEXOR-XR    30 capsule    Take 1 capsule by mouth daily (together with 75 mg capsule for total of 225 mg/day)    PTSD (post-traumatic stress disorder), Major depressive disorder, recurrent episode, moderate (H)       * Notice:  This list has 2 medication(s) that are the same as other medications prescribed for you. Read the directions carefully, and ask your doctor or other care provider to review them with you.

## 2018-08-22 NOTE — PROGRESS NOTES
Hayde Macias is a 27 year old  female who presents to clinic for a follow up OB visit. She is currently 14w1d with an estimated date of delivery of 2019 via 7wk US. She denies headache, chest pain, shortness of breath, abdominal pain/contractions, vaginal bleeding, or abnormal discharge.     New concerns today: Rash: Was seen on  in clinic and again on  in the ED for rash and severe itching. He was given Benadryl and triamcinolone cream. She states the Benadryl does not seem to have a big impact on the itching. She has been using the cream intermittently. She's been using alcohol wipes to the skin. She says water aggravates it more. Her son has a similar rash. It primarily involves her arms, legs, lower back and part of her face.  Taking prenatal vitamin daily? Yes    Obstetric History       T4      L4     SAB0   TAB0   Ectopic0   Multiple0   Live Births4       # Outcome Date GA Lbr Avelino/2nd Weight Sex Delivery Anes PTL Lv   5 Current            4 Term 01/11/15 38w6d 04:37 / 00:17 6 lb 8.2 oz (2.954 kg) M   N MAHESH      Name: Osiris      Apgar1:  8                Apgar5: 9   3 Term 13 38w1d 10:20 / 00:18 6 lb 11 oz (3.033 kg) M  EPI  MAHESH      Name: Devin Novak      Apgar1:  9                Apgar5: 9   2 Term 11 40w0d 12:00  M  None N MAHESH      Name: Brant   1 Term 07 40w0d 12:00  M  None N MAHESH      Name: Pawan URBINA Del          Physical exam:  BP 98/64  Pulse 92  Temp 98.2  F (36.8  C) (Oral)  LMP  (LMP Unknown)  Wt Readings from Last 2 Encounters:   18 137 lb 3.2 oz (62.2 kg)   18 137 lb 6.4 oz (62.3 kg)     General: alert, female scratching arms throughout visit  Abdomen: gravid uterus appropriate for gestation age above pubic symphysis, non-tender, FHTs 130s  Extremities: trace edema  Skin: maculopapular rash of b/l arms through hands, minimal leg involvement, a few papules present on lower abdomen and lower back.     Assessment/Plan:  1.  Encounter for supervision of normal pregnancy, antepartum, unspecified   Reviewed genetic screening options, including false positive rate of 5% with screening tests and diagnostic options (chorionic villus sampling, amniocentesis), and patient declines today.  Reviewed prenatal labs.     2. Rash:   Little to no improvement. Unclear if patient has actually been using the Benadryl at home. Per the ED notes she was given a dose there which relieved a majority of her itching. Reiterated importance of using the Benadryl and steroid cream. Asked patient to avoid alcohol wipes to prevent excess drying of skin and superimposed infection. Unlikely to be pregnancy related rash given its location, appearance, and her gestational age.      Follow up in 3 weeks for routine prenatal visit.     Rosana Rosales DO  [facility] Resident    Precepted with: Juan Alberto Chávez MD

## 2018-08-22 NOTE — NURSING NOTE
Due to patient being non-English speaking/uses sign language, an  was used for this visit. Only for face-to-face interpretation by an external agency, date and length of interpretation can be found on the scanned worksheet.     name: Michael  Agency: [employer]  Language: Maryann   Telephone number: N/A  Type of interpretation: Telemedicine, spoken

## 2018-08-22 NOTE — MR AVS SNAPSHOT
After Visit Summary   8/22/2018    Hayde LUJAN Say    MRN: [id number]           Patient Information     Date Of Birth          1990        Visit Information        Provider Department      8/22/2018 9:20 AM Sierra Scott RPH [facility]        Today's Diagnoses     Encounter for medication review and counseling    -  1    Chronic seasonal allergic rhinitis due to fungal spores        Rash           Follow-ups after your visit        Who to contact     Please call your clinic at [phone] to:    Ask questions about your health    Make or cancel appointments    Discuss your medicines    Learn about your test results    Speak to your doctor            Additional Information About Your Visit        Care EveryWhere ID     This is your Care EveryWhere ID. This could be used by other organizations to access your [place] medical records  [id number]        Your Vitals Were     Last Period                   (LMP Unknown)            Blood Pressure from Last 3 Encounters:   08/22/18 98/64   08/16/18 102/68   08/07/18 95/60    Weight from Last 3 Encounters:   08/16/18 137 lb 3.2 oz (62.2 kg)   08/02/18 137 lb 6.4 oz (62.3 kg)   07/20/18 135 lb (61.2 kg)              Today, you had the following     No orders found for display         Today's Medication Changes          These changes are accurate as of 8/22/18  5:06 PM.  If you have any questions, ask your nurse or doctor.               Start taking these medicines.        Dose/Directions    cetirizine 10 MG tablet   Commonly known as:  zyrTEC   Used for:  Chronic seasonal allergic rhinitis due to fungal spores   Started by:  Sierra Scott RPH        Dose:  10 mg   Take 1 tablet (10 mg) by mouth daily   Quantity:  30 tablet   Refills:  3         These medicines have changed or have updated prescriptions.        Dose/Directions    triamcinolone 0.1 % cream   Commonly known as:  KENALOG   This may have changed:  Another  medication with the same name was removed. Continue taking this medication, and follow the directions you see here.   Used for:  Rash   Changed by:  Sierra Scott RPH        Apply sparingly to affected area two times daily for 14 days or until rash resolves, whichever is sooner   Quantity:  30 g   Refills:  0            Where to get your medicines      These medications were sent to [facility] - [place], [place] - [address]  [address], [place] [address]     Phone:  [phone]     cetirizine 10 MG tablet    fluticasone 50 MCG/ACT spray                Primary Care Provider Office Phone #    Rosana Rosales [phone]       [address] [place] [address] [address]  [place] [address]        Equal Access to Services     DYLAN LEY : Hadii aad ku hadasho Soyobanyali, waaxda luqadaha, qaybta kaalmada adeegyada, waxay jeffery garcía. So [facility] [phone].    ATENCIÓN: Si habla español, tiene a keene disposición servicios gratuitos de asistencia lingüística. [facility] [phone].    We comply with applicable federal civil rights laws and [place] laws. We do not discriminate on the basis of race, color, national origin, age, disability, sex, sexual orientation, or gender identity.            Thank you!     Thank you for choosing [facility]  for your care. Our goal is always to provide you with excellent care. Hearing back from our patients is one way we can continue to improve our services. Please take a few minutes to complete the written survey that you may receive in the mail after your visit with us. Thank you!             Your Updated Medication List - Protect others around you: Learn how to safely use, store and throw away your medicines at www.disposemymeds.org.          This list is accurate as of 8/22/18  5:06 PM.  Always use your most recent med list.                   Brand Name Dispense Instructions for use Diagnosis    acetaminophen 325 MG  tablet    TYLENOL     Take 2 tablets (650 mg) by mouth every 4 hours as needed for headaches or pain    Episodic tension-type headache, not intractable, Severe episode of recurrent major depressive disorder, without psychotic features (H), PTSD (post-traumatic stress disorder), BRENDA (generalized anxiety disorder), Insomnia due to other mental disorder       calcium carbonate 500 MG chewable tablet    TUMS    150 tablet    Take 1 tablet (500 mg) by mouth 2 times daily as needed for heartburn    Other chronic gastritis without hemorrhage       cetirizine 10 MG tablet    zyrTEC    30 tablet    Take 1 tablet (10 mg) by mouth daily    Chronic seasonal allergic rhinitis due to fungal spores       diphenhydrAMINE 25 MG tablet    BENADRYL    60 tablet    Take 1-2 tablets (25-50 mg) by mouth every 6 hours as needed for itching or allergies    Rash       docusate sodium 100 MG tablet    COLACE    60 tablet    Take 200 mg by mouth daily    Constipation, unspecified constipation type       fluticasone 50 MCG/ACT spray    FLONASE    1 Bottle    Spray 1-2 sprays into both nostrils daily as needed for allergies    Chronic seasonal allergic rhinitis due to fungal spores       ketotifen 0.025 % Soln ophthalmic solution    ZADITOR    1 Bottle    Place 1 drop into both eyes every 12 hours    Acute seasonal allergic rhinitis, unspecified trigger       montelukast 10 MG tablet    SINGULAIR    30 tablet    Take 1 tablet (10 mg) by mouth daily    Acute seasonal allergic rhinitis, unspecified trigger       polyethylene glycol powder    MIRALAX    510 g    Take 17 g (1 capful) by mouth daily    Constipation, unspecified constipation type       prazosin 1 MG capsule    MINIPRESS    60 capsule    Take 2 capsules (2 mg) by mouth At Bedtime    PTSD (post-traumatic stress disorder)       prenatal multivitamin plus iron 27-0.8 MG Tabs per tablet     100 tablet    Take 1 tablet by mouth daily    Encounter for supervision of other normal pregnancy  in first trimester, Amenorrhea, Severe episode of recurrent major depressive disorder, without psychotic features (H)       QUEtiapine 100 MG tablet    SEROquel    30 tablet    Take 1 tablet (100 mg) by mouth At Bedtime    PTSD (post-traumatic stress disorder)       ranitidine 150 MG tablet    ZANTAC    30 tablet    Take 1 tablet (150 mg) by mouth At Bedtime    Gastroesophageal reflux disease, esophagitis presence not specified       triamcinolone 0.1 % cream    KENALOG    30 g    Apply sparingly to affected area two times daily for 14 days or until rash resolves, whichever is sooner    Rash       * venlafaxine 75 MG 24 hr capsule    EFFEXOR-XR    30 capsule    Take 1 capsule by mouth daily (together with 150 mg capsule for total of 225 mg/day)    PTSD (post-traumatic stress disorder), Major depressive disorder, recurrent episode, moderate (H)       * venlafaxine 150 MG 24 hr capsule    EFFEXOR-XR    30 capsule    Take 1 capsule by mouth daily (together with 75 mg capsule for total of 225 mg/day)    PTSD (post-traumatic stress disorder), Major depressive disorder, recurrent episode, moderate (H)       * Notice:  This list has 2 medication(s) that are the same as other medications prescribed for you. Read the directions carefully, and ask your doctor or other care provider to review them with you.

## 2018-08-24 ENCOUNTER — TELEPHONE (OUTPATIENT)
Dept: FAMILY MEDICINE | Facility: CLINIC | Age: 28
End: 2018-08-24

## 2018-08-24 NOTE — TELEPHONE ENCOUNTER
Pt Called wanting appt with Elham Kenneth Villa  She is still having a lot of paranoia and worry, along with not sleeping well  She can only been seen in am, so scheduled for late sept but will copy Pharm D and pcp as well to review medications as well.  Glad pt was asking for this appt important to her care if she lets us know what she needs  Lbetz  Copy to:  Reji Rosales

## 2018-08-29 ENCOUNTER — OFFICE VISIT (OUTPATIENT)
Dept: PHARMACY | Facility: CLINIC | Age: 28
End: 2018-08-29
Payer: COMMERCIAL

## 2018-08-29 ENCOUNTER — TELEPHONE (OUTPATIENT)
Dept: FAMILY MEDICINE | Facility: CLINIC | Age: 28
End: 2018-08-29

## 2018-08-29 VITALS — TEMPERATURE: 100 F | HEART RATE: 88 BPM | SYSTOLIC BLOOD PRESSURE: 100 MMHG | DIASTOLIC BLOOD PRESSURE: 68 MMHG

## 2018-08-29 DIAGNOSIS — F41.1 GAD (GENERALIZED ANXIETY DISORDER): ICD-10-CM

## 2018-08-29 DIAGNOSIS — F51.05 INSOMNIA DUE TO OTHER MENTAL DISORDER: ICD-10-CM

## 2018-08-29 DIAGNOSIS — R30.0 DYSURIA: ICD-10-CM

## 2018-08-29 DIAGNOSIS — R30.0 DYSURIA: Primary | ICD-10-CM

## 2018-08-29 DIAGNOSIS — F99 INSOMNIA DUE TO OTHER MENTAL DISORDER: ICD-10-CM

## 2018-08-29 DIAGNOSIS — Z71.89 ENCOUNTER FOR MEDICATION REVIEW AND COUNSELING: Primary | ICD-10-CM

## 2018-08-29 LAB
BILIRUBIN UR: NEGATIVE
BLOOD UR: NEGATIVE
GLUCOSE URINE: NEGATIVE
KETONES UR QL: NEGATIVE
LEUKOCYTE ESTERASE UR: ABNORMAL
NITRITE UR QL STRIP: NEGATIVE
PH UR STRIP: 7 [PH] (ref 5–7)
PROTEIN UR: NEGATIVE
SP GR UR STRIP: 1.01
UROBILINOGEN UR STRIP-ACNC: ABNORMAL

## 2018-08-29 NOTE — PROGRESS NOTES
Preceptor Attestation:  Patient's case reviewed and discussed with Rosana Rosales DO resident and I evaluated the patient. I agree with written assessment and plan of care.  Supervising Physician:  Derek Chávez MD MD  [facility]

## 2018-08-29 NOTE — MR AVS SNAPSHOT
After Visit Summary   8/29/2018    Hayde LUJAN Say    MRN: [id number]           Patient Information     Date Of Birth          1990        Visit Information        Provider Department      8/29/2018 8:20 AM Sierra Scott RPH [facility]        Today's Diagnoses     Encounter for medication review and counseling    -  1    Dysuria        BRENDA (generalized anxiety disorder)        Insomnia due to other mental disorder           Follow-ups after your visit        Your next 10 appointments already scheduled     Sep 06, 2018 11:00 AM CDT   Return Visit with Elham Mcmahan LMFT [facility] ([facility])    [address] [place] [address]   [phone]            Sep 06, 2018 11:40 AM CDT   Return Visit with Sierra Scott RPH [facility] ([facility])    [address] [place] [id number]   [phone]            Sep 17, 2018  8:20 AM CDT   RETURN OB with Rosana Rosales DO   [facility] ([facility])    [address] [place] [address]   [phone]            Sep 17, 2018  8:40 AM CDT   Return Visit with Sierra Scott RPH [facility] ([facility])    [address] [place] [id number]   [phone]            Sep 24, 2018  8:00 AM CDT   Return Visit with Elham Mcmahan LMFT [facility] ([facility])    [address] [place] [address]   [phone]              Who to contact     Please call your clinic at [phone] to:    Ask questions about your health    Make or cancel appointments    Discuss your medicines    Learn about your test results    Speak to your doctor            Additional Information About Your Visit        Care EveryWhere ID     This is your Care EveryWhere ID. This could be used by other organizations to access your [facility] medical records  [id number]        Your Vitals Were     Pulse  Temperature Last Period             88 100  F (37.8  C) (LMP Unknown)          Blood Pressure from Last 3 Encounters:   08/29/18 100/68   08/22/18 98/64   08/16/18 102/68    Weight from Last 3 Encounters:   08/16/18 137 lb 3.2 oz (62.2 kg)   08/02/18 137 lb 6.4 oz (62.3 kg)   07/20/18 135 lb (61.2 kg)              We Performed the Following      : Sign Language or Oral - 68-82 minutes        Primary Care Provider Office Phone #    Rosana Rosales [phone]       [facility] [place] [address]        Equal Access to Services     CHHAYA LEY : Hadii raven Jones, waameya villalta, qaybva kaalmada shoshana, nakul duarte . So [facility] [phone].    ATENCIÓN: Si habla español, tiene a keene disposición servicios gratuitos de asistencia lingüística. [facility] [phone].    We comply with applicable federal civil rights laws and [place] laws. We do not discriminate on the basis of race, color, national origin, age, disability, sex, sexual orientation, or gender identity.            Thank you!     Thank you for choosing [facility]  for your care. Our goal is always to provide you with excellent care. Hearing back from our patients is one way we can continue to improve our services. Please take a few minutes to complete the written survey that you may receive in the mail after your visit with us. Thank you!             Your Updated Medication List - Protect others around you: Learn how to safely use, store and throw away your medicines at www.disposemymeds.org.          This list is accurate as of 8/29/18 11:59 PM.  Always use your most recent med list.                   Brand Name Dispense Instructions for use Diagnosis    acetaminophen 325 MG tablet    TYLENOL     Take 2 tablets (650 mg) by mouth every 4 hours as needed for headaches or pain    Episodic tension-type headache, not intractable, Severe episode of recurrent major depressive  disorder, without psychotic features (H), PTSD (post-traumatic stress disorder), BRENDA (generalized anxiety disorder), Insomnia due to other mental disorder       calcium carbonate 500 MG chewable tablet    TUMS    150 tablet    Take 1 tablet (500 mg) by mouth 2 times daily as needed for heartburn    Other chronic gastritis without hemorrhage       cetirizine 10 MG tablet    zyrTEC    30 tablet    Take 1 tablet (10 mg) by mouth daily    Chronic seasonal allergic rhinitis due to fungal spores       diphenhydrAMINE 25 MG tablet    BENADRYL    60 tablet    Take 1-2 tablets (25-50 mg) by mouth every 6 hours as needed for itching or allergies    Rash       docusate sodium 100 MG tablet    COLACE    60 tablet    Take 200 mg by mouth daily    Constipation, unspecified constipation type       fluticasone 50 MCG/ACT spray    FLONASE    1 Bottle    Spray 1-2 sprays into both nostrils daily as needed for allergies    Chronic seasonal allergic rhinitis due to fungal spores       ketotifen 0.025 % Soln ophthalmic solution    ZADITOR    1 Bottle    Place 1 drop into both eyes every 12 hours    Acute seasonal allergic rhinitis, unspecified trigger       montelukast 10 MG tablet    SINGULAIR    30 tablet    Take 1 tablet (10 mg) by mouth daily    Acute seasonal allergic rhinitis, unspecified trigger       polyethylene glycol powder    MIRALAX    510 g    Take 17 g (1 capful) by mouth daily    Constipation, unspecified constipation type       prazosin 1 MG capsule    MINIPRESS    60 capsule    Take 2 capsules (2 mg) by mouth At Bedtime    PTSD (post-traumatic stress disorder)       prenatal multivitamin plus iron 27-0.8 MG Tabs per tablet     100 tablet    Take 1 tablet by mouth daily    Encounter for supervision of other normal pregnancy in first trimester, Amenorrhea, Severe episode of recurrent major depressive disorder, without psychotic features (H)       QUEtiapine 100 MG tablet    SEROquel    30 tablet    Take 1 tablet (100 mg)  by mouth At Bedtime    PTSD (post-traumatic stress disorder)       ranitidine 150 MG tablet    ZANTAC    30 tablet    Take 1 tablet (150 mg) by mouth At Bedtime    Gastroesophageal reflux disease, esophagitis presence not specified       triamcinolone 0.1 % cream    KENALOG    30 g    Apply sparingly to affected area two times daily for 14 days or until rash resolves, whichever is sooner    Rash       * venlafaxine 75 MG 24 hr capsule    EFFEXOR-XR    30 capsule    Take 1 capsule by mouth daily (together with 150 mg capsule for total of 225 mg/day)    PTSD (post-traumatic stress disorder), Major depressive disorder, recurrent episode, moderate (H)       * venlafaxine 150 MG 24 hr capsule    EFFEXOR-XR    30 capsule    Take 1 capsule by mouth daily (together with 75 mg capsule for total of 225 mg/day)    PTSD (post-traumatic stress disorder), Major depressive disorder, recurrent episode, moderate (H)       * Notice:  This list has 2 medication(s) that are the same as other medications prescribed for you. Read the directions carefully, and ask your doctor or other care provider to review them with you.

## 2018-08-29 NOTE — PROGRESS NOTES
Please notify patient. No signs of infection noted on UA. We will send for Ucx since patient is pregnant to verify.    Rosana Rosales DO  [facility] Medicine Resident  Pager #[phone]

## 2018-08-29 NOTE — PROGRESS NOTES
"  Assessment and Plan     (Z71.89) Encounter for medication review and counseling  (primary encounter diagnosis)  Comment: Pillbox empty as expected. Will work to align med box fills with other appointments. Concern for lack of supply of medicines, especially as per pharmacy data medicines were delivered 8/22-8/26. Need to encourage patient to look for medicines in home.   Plan: Pillbox filled x8 days as below. Last dose 9/6, due for pillbox fill 9/5. Will see patient 9/6 with Elham Mcmahan. Unable to add Venlafaxine and Quetiapine to medbox. Instructed patient how to add using stickers. Patient plans to match labels to know which of new supply to use.   One tablet of Quetiapine Thursday, Friday, Saturday, and \"extra\" box for day 8 - placed single blue sticker on bottle  Three capsules of Venlafaxine 75 mg Tuesday, Wednesday, Thursday, Friday, Saturday, and \"extra\" box for day 8 - places three blue stickers on bottle    Venlafaxine 75 mg capsule x3 - Patient to add on own   Quetiapine 100 mg tablet x1 - Patient to add on own     Prazosin 1 mg capsule x2    Montelukast 10 mg tablet x1 (7/29 #30)   Ranitidine 150 mg tablet x1 (7/2 #30)   Cetirizine 10 mg tablet x1 (7/25 #30)   Prenatal tablet x1 (7/3 #100)       (R30.0) Dysuria  Comment: Discussed with Dr. Rosales today in clinic. Given previous reaction to Augmentin and possible lack of efficacy (completed 7 day course beginning of August), should avoid repeating use. Would recommend use of Nitrofurantoin x5 days or third generation cephalosporin like Cefpodoxime x5-7 days (low risk of cross reactivity if true Augmentin allergy).   Plan: UA/UC collected today. Signed out to house officer, Dr. Brothers.    (F41.1) BRENDA (generalized anxiety disorder); (F51.05,  F99) Insomnia due to other mental disorder  Comment: Working to minimize medicines given pregnancy, patient agrees and also requests alternative methods. Venlafaxine at maximum dose. Hesitate to further increase " Prazosin given low BP and likelihood of further decrease as pregnancy progresses. Leaves Quetiapine titration or changing to alternative agent for anxiety/depression management. Given misunderstanding or other unclear variable, in review of pillbox has in the recent past inadvertently taken more medication than prescribed. Unclear if increased dose of Quetiapine or Prazosin has been beneficial.   Plan: Encourage patient to partner with therapist and PCP.          Follow up: One week covisit w/ LMFT    Options for treatment and/or follow-up care were reviewed with the patient. Hayde was engaged and actively involved in the decision making process. She verbalized understanding of the options discussed and was satisfied with the final plan. Patient was provided with written instructions/medication list via AVS.    Dr. Rosales was provided our recommendations in clinic today and Dr. Chávez was available for supervision during this visit and is the authorizing prescriber for this visit through the pharmacist collaborative practice agreement.    Thank you for the opportunity to participate in the care of this patient.  Sierra Scott, Pharm.D.  [facility]: [phone]    Current Outpatient Prescriptions   Medication Sig Dispense Refill     cetirizine (ZYRTEC) 10 MG tablet Take 1 tablet (10 mg) by mouth daily 30 tablet 3     diphenhydrAMINE (BENADRYL) 25 MG tablet Take 1-2 tablets (25-50 mg) by mouth every 6 hours as needed for itching or allergies 60 tablet 1     docusate sodium (COLACE) 100 MG tablet Take 200 mg by mouth daily 60 tablet 3     fluticasone (FLONASE) 50 MCG/ACT spray Spray 1-2 sprays into both nostrils daily as needed for allergies 1 Bottle 1     montelukast (SINGULAIR) 10 MG tablet Take 1 tablet (10 mg) by mouth daily 30 tablet 11     prazosin (MINIPRESS) 1 MG capsule Take 2 capsules (2 mg) by mouth At Bedtime 60 capsule 3     Prenatal Vit-Fe Fumarate-FA (PRENATAL  MULTIVITAMIN PLUS IRON) 27-0.8 MG TABS per tablet Take 1 tablet by mouth daily 100 tablet 3     QUEtiapine (SEROQUEL) 100 MG tablet Take 1 tablet (100 mg) by mouth At Bedtime 30 tablet 3     ranitidine (ZANTAC) 150 MG tablet Take 1 tablet (150 mg) by mouth At Bedtime 30 tablet 3     triamcinolone (KENALOG) 0.1 % cream Apply sparingly to affected area two times daily for 14 days or until rash resolves, whichever is sooner 30 g 0     venlafaxine (EFFEXOR-XR) 150 MG 24 hr capsule Take 1 capsule by mouth daily (together with 75 mg capsule for total of 225 mg/day) 30 capsule 11     venlafaxine (EFFEXOR-XR) 75 MG 24 hr capsule Take 1 capsule by mouth daily (together with 150 mg capsule for total of 225 mg/day) 30 capsule 11     acetaminophen (TYLENOL) 325 MG tablet Take 2 tablets (650 mg) by mouth every 4 hours as needed for headaches or pain       calcium carbonate (TUMS) 500 MG chewable tablet Take 1 tablet (500 mg) by mouth 2 times daily as needed for heartburn 150 tablet 3     ketotifen (ZADITOR) 0.025 % SOLN ophthalmic solution Place 1 drop into both eyes every 12 hours 1 Bottle 3     polyethylene glycol (MIRALAX) powder Take 17 g (1 capful) by mouth daily 510 g 1            HPI:       Hayde Macias is a 27 year old female referred by Dr. Rosales for medication review and counseling. Patient seen today with the assistance of Frances Fernandez.    # Skin rash / itching:  Pill for itch (medication in pillbox = Cetirizine), as well as cream  Less itchy than previously    # BRENDA/PTSD:   More worry, more anxiety  She feels that there is no medication that is going to help with her worries  Sleeping through the night sometimes, estimates 3-4 times up worrying and cant sleep  Seeing ghosts or spirits, very scared at night  Main door - someone ringing bell and knocking - others in home hear. Scared and so no one answers the door  Prefers to keep medicines the same  Would like other strategies to manage worry or anxiety.  [facility] has not asked Elham [facility] about this as last visit was very itchy and tired    # Dysuria:  Describes dull pain w/ urination  Other symptoms include fever chills headache  Worry that still has urinary tract infection  Previously improved with antibiotic and then came back after it was stopped  Started 2-3 days ago  Improved by Tylenol - last dose lunch time yesterday    # Med box / etc:  Requesting nasal spray  No refills delivered to home           PMHX:     Patient Active Problem List   Diagnosis     Health Care Home     Recurrent major depressive disorder (H)     Encounter for supervision of normal pregnancy     Seasonal allergic rhinitis     PTSD (post-traumatic stress disorder)     BRENDA (generalized anxiety disorder)     Other fatigue     Insomnia due to other mental disorder     Allergies   Allergen Reactions     Augmentin Rash            Objective     Vitals:    08/29/18 0846   BP: 100/68   BP Location: Right arm   Pulse: 88   Temp: 100  F (37.8  C)             [facility] Pharmacist Documentation     Drug therapy problems identified:  Medical Condition 1: ID (i.e. CAP, UTI, URI, LTBI), Goals of therapy: Not at goal, Drug Class: Antibiotic, Efficacy: Lab test needed to determine efficacy, Intervention: Order lab, Verification: Provider Agreed    Relevant medical devices: n/a    # of medical conditions addressed: 3  # of medications addressed: 11  # of DTP identified: 1  Time spent: 30 minutes  Level of service per [place] DHS guidelines: 2 nc           Documentation   Due to patient being non-English speaking/uses sign language, an  was used for this visit. Only for face-to-face interpretation by an external agency, date and length of interpretation can be found on the scanned worksheet.     name: Frances  Agency: [employer]  Language: Maryann   Telephone number: [phone]  Type of interpretation: Face-to-face, spoken

## 2018-08-29 NOTE — TELEPHONE ENCOUNTER
Received text page from PharmD, patient with dysuria again today - UA shows only trace leukocyte esterase. Last time she was having symptoms with 1+ leukocyte esterase she was treated with antibiotics presumptively given her symptoms although culture showed no growth. Given this, we will hold off on therapy for now until culture is back.    Flavio Brothers MD  [facility], PGY-2

## 2018-08-30 LAB — CULTURE: NORMAL

## 2018-08-30 NOTE — PROGRESS NOTES
Please let patient know that her urine culture did not show that she has a urinary tract infection.     Rosana Rosales DO  [facility] Medicine Resident  Pager #[phone]

## 2018-09-06 ENCOUNTER — OFFICE VISIT (OUTPATIENT)
Dept: PHARMACY | Facility: CLINIC | Age: 28
End: 2018-09-06
Payer: COMMERCIAL

## 2018-09-06 ENCOUNTER — OFFICE VISIT (OUTPATIENT)
Dept: PSYCHOLOGY | Facility: CLINIC | Age: 28
End: 2018-09-06
Payer: COMMERCIAL

## 2018-09-06 ENCOUNTER — CARE COORDINATION (OUTPATIENT)
Dept: FAMILY MEDICINE | Facility: CLINIC | Age: 28
End: 2018-09-06

## 2018-09-06 DIAGNOSIS — F33.2 SEVERE EPISODE OF RECURRENT MAJOR DEPRESSIVE DISORDER, WITHOUT PSYCHOTIC FEATURES (H): ICD-10-CM

## 2018-09-06 DIAGNOSIS — F43.10 PTSD (POST-TRAUMATIC STRESS DISORDER): Primary | ICD-10-CM

## 2018-09-06 DIAGNOSIS — Z71.89 ENCOUNTER FOR MEDICATION REVIEW AND COUNSELING: Primary | ICD-10-CM

## 2018-09-06 NOTE — PROGRESS NOTES
[facility] Follow-up    Call initiated by patient.  Message recorded by Agustina Gramajo  Calling for: Pt in clinic for visit with mh and pharm d  Patient: Hayde LUJAN Say  Phone conversation with: Patient  Patient's Phone number/s: Home number on file [phone] (home)  Available today in the PM on their Home number.  OK to leave message on voice mail? Yes      Major diagnoses and/or issues requiring coordination services  Mental health    In the past month, how many times have you been to the Emergency Room? 0  In the past month, how many times have you been hospitalized? 0    Summary notes: Pt needed help and support with childrens school forms  We were able to navigate the forms and help her feel prepared, as well as needed to reassure pt that she is a good mom and doing a good job, she was in tears because she feels she cant help her children with homework because she does not understand it.  We talked about her role as mom, to hold them accountable to do homework, to keep them healthy and school can provide extra help if they are unable to keep up.  Offered support in communication with school    Self-management goals:  Continue to come to all appts  Mental and physical health important right now  Communicate her needs- she is getting much stronger and bolder at asking for help and knowing who does what for her  Readiness to change: Somewhat ready        Counseling and coordination activities with: patient/family, PCP and specialist:     Follow-up date: monthly and prn

## 2018-09-06 NOTE — PROGRESS NOTES
Behavioral Health Progress Note    Client Legal Name: Hayde LUJAN Say   Client Preferred Name: Hayde   Service Type: Individual  Length of Visit: 55 minutes  Attendees:       name: Frances  Agency: [employer]  Language: Maryann   Telephone number: [phone]  Type of interpretation: Face-to-face, spoken    Complexity statement: Due to patient being non-English speaking, an  was used for this visit. An  is used not only to interpret language, since the patient does not speak English, but also to help with the complexity of understandings across cultures, since the patient is not well integrated in the larger [place] culture.      Identifying Information and Presenting Problem:    The patient is a 27 year old [place] female who is being seen for problematic symptoms of PTSD, depression.    Treatment Objective(s) Addressed in This Session:  Anger management and learn more adaptive ways to manage her worries.      Progress on / Status of Treatment Objective(s) / Homework:  Worsening      PHQ-9 SCORE 1/9/2018 1/22/2018 8/7/2018   Total Score - - -   Total Score 9 6 11       BRENDA-7 SCORE 4/26/2017 8/29/2017 1/22/2018   Total Score 8 15 14       Topics Discussed/Interventions Provided:  Patient had asked for an earlier appointment with me because of worsening anger and worry. We assessed sources of her worry: children's school performance and behavior. To address children's school performance and behavior, we developed a plan: Agustina and I will work together to reach out to the school to see if they can provide regular (say bi weekly?) brief reports of how the children are doing in school. It is much too difficult for Hayde to reach out to them. She feels paralyzed if they call her because she becomes so worried they are calling about something bad. Therefore, she does not answer the phone nor ask anyone to listen to the message and translate.     Home behavior of children: Worried about oldest  since he does not follow directions and she has to remind him often. Unclear if this happens at school. I encouraged Hayde to look for positive actions and praise her children for good behavior. We will also connect with the school to determine if this is a problem at school as well.     Anger: Still yelling and throwing soft items. Doing this in her room when others are not around. I suggested she also engage in regular exercise- a walk outside in the AM when the children are gone. Walk about 20-30 min.    Assessment: The patient appeared to be active and engaged in today's session and was receptive to feedback. Hayde was significantly more focused and engaged today. I am wondering now that care coordination is meeting more of her basic needs, establishing trust with our clinic, and helping her distinguish the type of help different providers offer her (PCP vs PharmD vs BH vs care coordination) if she is better able to address her mental health needs with me.     Mental Status: Hayde appeared generally alert and oriented. Dress was casual and appropriate to the weather and occasion. Grooming and hygiene were clean. Eye contact was good. Speech was of normal volume and rate and was clear, coherent, and relevant. Mood was anxious with congruent affect. Thought processes were relevant, logical and goal-directed. Thought content was WNL with no evidence of psychotic or paranoid features. No evidence of SI/HI or self-harm, intent, or plans. Memory appeared grossly intact. Insight and judgment appeared fair and patient exhibited good impulse control during the appointment.     Does the patient appear to be at imminent risk of harm to self/others at this time? No    The session was necessary to address worry, depression, PTSD that have been interfering with patient's ability to function at concentrating at work, being able to work with strangers, arguments with her , personal life management, parenting.  Ongoing  psychotherapy is necessary to improve functioning with daily activities, provide psychoeducation and provide support.      Diagnosis (DSM-5):  296.32 recurrent moderate major depression  308.3 PTSD      Plan:  1. Follow up in 1 week.  2. Lissy will work on coordination with schools/teachers  3. Hayde will walk 20-30 min/day  4. Hayde will look for good behavior from her oldest son DAVID Sanders (pronounced David) and praise him.            NOTE: Treatment plan update due 9/21/18.  Diagnostic assessment update due 4/26/19

## 2018-09-06 NOTE — MR AVS SNAPSHOT
After Visit Summary   9/6/2018    Hayde LUJAN Say    MRN: [id number]           Patient Information     Date Of Birth          1990        Visit Information        Provider Department      9/6/2018 11:00 AM Elham Mcmahan LMFT [facility]        Today's Diagnoses     PTSD (post-traumatic stress disorder)    -  1    Severe episode of recurrent major depressive disorder, without psychotic features (H)           Follow-ups after your visit        Your next 10 appointments already scheduled     Sep 13, 2018  1:00 PM CDT   Return Visit with Elham Mcmahan LMFT [facility] ([facility])    [address] [place] [address]   [phone]            Sep 17, 2018  8:20 AM CDT   RETURN OB with Rosana Rosales DO   [facility] ([facility])    [address] [place] [address]   [phone]            Sep 17, 2018  8:40 AM CDT   Return Visit with Sierra Scott RPH [facility] ([facility])    [address] [place] [address]   [phone]            Sep 24, 2018  8:00 AM CDT   Return Visit with Elham Mcmahan LMFT [facility] ([facility])    [address] [place] [address]   [phone]              Who to contact     Please call your clinic at [phone] to:    Ask questions about your health    Make or cancel appointments    Discuss your medicines    Learn about your test results    Speak to your doctor            Additional Information About Your Visit        Care EveryWhere ID     This is your Care EveryWhere ID. This could be used by other organizations to access your [facility] medical records  [id number]        Your Vitals Were     Last Period                   (LMP Unknown)            Blood Pressure from Last 3 Encounters:   08/29/18 100/68   08/22/18 98/64   08/16/18 102/68    Weight from Last 3 Encounters:   08/16/18 137 lb 3.2 oz (62.2 kg)   08/02/18  137 lb 6.4 oz (62.3 kg)   07/20/18 135 lb (61.2 kg)              We Performed the Following     Interactive Complexity add-on (07427)      : Sign Language or Oral - 53-67 minutes     Psychotherapy 60 min (90208)        Primary Care Provider Office Phone #    Rosana Rosales [phone]       [address] [place] [address]        Equal Access to Services     [facility] : Hadii aad ku hadasho Soomaali, waaxda luqadaha, qaybta kaalmada adeegyada, waxay idiin hayaan adeeg kharash la'aan . So [facility] [phone].    ATENCIÓN: Si habla español, tiene a keene disposición servicios gratuitos de asistencia lingüística. Brittanieesa al [phone].    We comply with applicable federal civil rights laws and [place] laws. We do not discriminate on the basis of race, color, national origin, age, disability, sex, sexual orientation, or gender identity.            Thank you!     Thank you for choosing [facility]  for your care. Our goal is always to provide you with excellent care. Hearing back from our patients is one way we can continue to improve our services. Please take a few minutes to complete the written survey that you may receive in the mail after your visit with us. Thank you!             Your Updated Medication List - Protect others around you: Learn how to safely use, store and throw away your medicines at www.disposemymeds.org.          This list is accurate as of 9/6/18  2:45 PM.  Always use your most recent med list.                   Brand Name Dispense Instructions for use Diagnosis    acetaminophen 325 MG tablet    TYLENOL     Take 2 tablets (650 mg) by mouth every 4 hours as needed for headaches or pain    Episodic tension-type headache, not intractable, Severe episode of recurrent major depressive disorder, without psychotic features (H), PTSD (post-traumatic stress disorder), BRENDA (generalized anxiety disorder), Insomnia due to other mental disorder       calcium carbonate 500  MG chewable tablet    TUMS    150 tablet    Take 1 tablet (500 mg) by mouth 2 times daily as needed for heartburn    Other chronic gastritis without hemorrhage       cetirizine 10 MG tablet    zyrTEC    30 tablet    Take 1 tablet (10 mg) by mouth daily    Chronic seasonal allergic rhinitis due to fungal spores       diphenhydrAMINE 25 MG tablet    BENADRYL    60 tablet    Take 1-2 tablets (25-50 mg) by mouth every 6 hours as needed for itching or allergies    Rash       docusate sodium 100 MG tablet    COLACE    60 tablet    Take 200 mg by mouth daily    Constipation, unspecified constipation type       fluticasone 50 MCG/ACT spray    FLONASE    1 Bottle    Spray 1-2 sprays into both nostrils daily as needed for allergies    Chronic seasonal allergic rhinitis due to fungal spores       ketotifen 0.025 % Soln ophthalmic solution    ZADITOR    1 Bottle    Place 1 drop into both eyes every 12 hours    Acute seasonal allergic rhinitis, unspecified trigger       montelukast 10 MG tablet    SINGULAIR    30 tablet    Take 1 tablet (10 mg) by mouth daily    Acute seasonal allergic rhinitis, unspecified trigger       polyethylene glycol powder    MIRALAX    510 g    Take 17 g (1 capful) by mouth daily    Constipation, unspecified constipation type       prazosin 1 MG capsule    MINIPRESS    60 capsule    Take 2 capsules (2 mg) by mouth At Bedtime    PTSD (post-traumatic stress disorder)       prenatal multivitamin plus iron 27-0.8 MG Tabs per tablet     100 tablet    Take 1 tablet by mouth daily    Encounter for supervision of other normal pregnancy in first trimester, Amenorrhea, Severe episode of recurrent major depressive disorder, without psychotic features (H)       QUEtiapine 100 MG tablet    SEROquel    30 tablet    Take 1 tablet (100 mg) by mouth At Bedtime    PTSD (post-traumatic stress disorder)       ranitidine 150 MG tablet    ZANTAC    30 tablet    Take 1 tablet (150 mg) by mouth At Bedtime    Gastroesophageal  reflux disease, esophagitis presence not specified       triamcinolone 0.1 % cream    KENALOG    30 g    Apply sparingly to affected area two times daily for 14 days or until rash resolves, whichever is sooner    Rash       * venlafaxine 75 MG 24 hr capsule    EFFEXOR-XR    30 capsule    Take 1 capsule by mouth daily (together with 150 mg capsule for total of 225 mg/day)    PTSD (post-traumatic stress disorder), Major depressive disorder, recurrent episode, moderate (H)       * venlafaxine 150 MG 24 hr capsule    EFFEXOR-XR    30 capsule    Take 1 capsule by mouth daily (together with 75 mg capsule for total of 225 mg/day)    PTSD (post-traumatic stress disorder), Major depressive disorder, recurrent episode, moderate (H)       * Notice:  This list has 2 medication(s) that are the same as other medications prescribed for you. Read the directions carefully, and ask your doctor or other care provider to review them with you.

## 2018-09-06 NOTE — PROGRESS NOTES
Assessment and Plan     (Z71.89) Encounter for medication review and counseling  (primary encounter diagnosis)  (F33.2) Severe episode of recurrent major depressive disorder, without psychotic features (H)    Comment: Did not fill pillbox as instructed at last visit. Has therefore gone without antidepressant and antipsychotic medication. Concern that this could be reason or contributing for her mood today.    Plan: Pillbox filled as below. 11 days of medications, last dose 9/16. Due for refill 9/17, appointments already scheduled w/ self and PCP.     Venlafaxine 75 mg capsule x1; Venlafaxine 150 mg capsule x1 (LF 8/26 #30 each)   Quetiapine 100 mg tablet x1 (LC 8/28 #30)   Prazosin 1 mg capsule x2 (LF 8/26 #60)   Montelukast 10 mg tablet x1 (8/26 #30)   Ranitidine 150 mg tablet x1 (8/26 #30)   Cetirizine 10 mg tablet x1 (8/22 #30)   Prenatal tablet x1 (7/3 #100)   Docusate 100 mg capsule x2 (8/29 #60)        Follow up: 9/17/18     Options for treatment and/or follow-up care were reviewed with the patient. Hayde was engaged and actively involved in the decision making process. She verbalized understanding of the options discussed and was satisfied with the final plan. Patient was provided with written instructions/medication list via AVS.    Dr. Rosales was provided our recommendations via routed note and Dr. Alvarez was available for supervision during this visit and is the authorizing prescriber for this visit through the pharmacist collaborative practice agreement.    Thank you for the opportunity to participate in the care of this patient.  Sierra Scott, Pharm.D.  [facility]: [phone]    Current Outpatient Prescriptions   Medication Sig Dispense Refill     acetaminophen (TYLENOL) 325 MG tablet Take 2 tablets (650 mg) by mouth every 4 hours as needed for headaches or pain       calcium carbonate (TUMS) 500 MG chewable tablet Take 1 tablet (500 mg) by mouth 2 times daily as  needed for heartburn 150 tablet 3     cetirizine (ZYRTEC) 10 MG tablet Take 1 tablet (10 mg) by mouth daily 30 tablet 3     diphenhydrAMINE (BENADRYL) 25 MG tablet Take 1-2 tablets (25-50 mg) by mouth every 6 hours as needed for itching or allergies 60 tablet 1     docusate sodium (COLACE) 100 MG tablet Take 200 mg by mouth daily 60 tablet 3     fluticasone (FLONASE) 50 MCG/ACT spray Spray 1-2 sprays into both nostrils daily as needed for allergies 1 Bottle 1     ketotifen (ZADITOR) 0.025 % SOLN ophthalmic solution Place 1 drop into both eyes every 12 hours 1 Bottle 3     montelukast (SINGULAIR) 10 MG tablet Take 1 tablet (10 mg) by mouth daily 30 tablet 11     polyethylene glycol (MIRALAX) powder Take 17 g (1 capful) by mouth daily 510 g 1     prazosin (MINIPRESS) 1 MG capsule Take 2 capsules (2 mg) by mouth At Bedtime 60 capsule 3     Prenatal Vit-Fe Fumarate-FA (PRENATAL MULTIVITAMIN PLUS IRON) 27-0.8 MG TABS per tablet Take 1 tablet by mouth daily 100 tablet 3     QUEtiapine (SEROQUEL) 100 MG tablet Take 1 tablet (100 mg) by mouth At Bedtime 30 tablet 3     ranitidine (ZANTAC) 150 MG tablet Take 1 tablet (150 mg) by mouth At Bedtime 30 tablet 3     triamcinolone (KENALOG) 0.1 % cream Apply sparingly to affected area two times daily for 14 days or until rash resolves, whichever is sooner 30 g 0     venlafaxine (EFFEXOR-XR) 150 MG 24 hr capsule Take 1 capsule by mouth daily (together with 75 mg capsule for total of 225 mg/day) 30 capsule 11     venlafaxine (EFFEXOR-XR) 75 MG 24 hr capsule Take 1 capsule by mouth daily (together with 150 mg capsule for total of 225 mg/day) 30 capsule 11            HPI:       Hayde Macias is a 27 year old female referred by Dr. Rosales for medication review and counseling. Patient seen today with the assistance of Frances Fernandez.    Patient very tearful today, overwhelmed with children, school and their home activities. Seen with BARTOLO Berrios and ANABEL Reno.            PMHX:     Patient Active Problem List   Diagnosis     Health Care Home     Recurrent major depressive disorder (H)     Encounter for supervision of normal pregnancy     Seasonal allergic rhinitis     PTSD (post-traumatic stress disorder)     BRENDA (generalized anxiety disorder)     Other fatigue     Insomnia due to other mental disorder     Allergies   Allergen Reactions     Augmentin Rash            Objective     n/a        [facility] Pharmacist Documentation     Drug therapy problems identified: Medical Condition 1: Other psychiatric (i.e. bipolar, schizophrenia), Goals of therapy: Not at goal, Drug Class: Antipsychotic, Convenience: Pt unable to administer correctly, Intervention: Add device or process to assist use, Verification: Patient Agreed - Compliance/Education    Relevant medical devices: n/a    # of medical conditions addressed: 1  # of medications addressed: 15  # of DTP identified: 1  Time spent: 20 minutes  Level of service per [place] DHS guidelines: 2 nc           Documentation   Due to patient being non-English speaking/uses sign language, an  was used for this visit. Only for face-to-face interpretation by an external agency, date and length of interpretation can be found on the scanned worksheet.     name: Frances  Agency: [employer]  Language: Maryann   Telephone number: [phone]  Type of interpretation: Face-to-face, spoken

## 2018-09-06 NOTE — MR AVS SNAPSHOT
After Visit Summary   9/6/2018    Hayde LUJAN Say    MRN: [id number]           Patient Information     Date Of Birth          1990        Visit Information        Provider Department      9/6/2018 11:40 AM Sierra Scott RPH [facility]        Today's Diagnoses     Encounter for medication review and counseling    -  1    Severe episode of recurrent major depressive disorder, without psychotic features (H)           Follow-ups after your visit        Your next 10 appointments already scheduled     Sep 13, 2018  1:00 PM CDT   Return Visit with ANABEL Eckert   [facility] ([facility])    [address] [place] [address]   [phone]            Sep 17, 2018  8:20 AM CDT   RETURN OB with Rosana Rosales DO   [facility] ([facility])    [address] [place] [address]   [phone]            Sep 17, 2018  8:40 AM CDT   Return Visit with Sierra Scott RPH [facility] ([facility])    [address] [place] [address]   [phone]            Sep 24, 2018  8:00 AM CDT   Return Visit with Elham Mcmahan LMFT [facility] ([facility])    [address] [place] [address]   [phone]              Who to contact     Please call your clinic at [phone] to:    Ask questions about your health    Make or cancel appointments    Discuss your medicines    Learn about your test results    Speak to your doctor            Additional Information About Your Visit        Care EveryWhere ID     This is your Care EveryWhere ID. This could be used by other organizations to access your [facility] medical records  [id number]        Your Vitals Were     Last Period                   (LMP Unknown)            Blood Pressure from Last 3 Encounters:   08/29/18 100/68   08/22/18 98/64   08/16/18 102/68    Weight from Last 3 Encounters:   08/16/18 137 lb 3.2 oz (62.2  kg)   08/02/18 137 lb 6.4 oz (62.3 kg)   07/20/18 135 lb (61.2 kg)              Today, you had the following     No orders found for display       Primary Care Provider Office Phone #    Rosana Rosales [phone]       [address] [place] [address]        Equal Access to Services     CHHAYA LEY : Hadii aad ku hadasho Soomaali, waaxda luqadaha, qaybta kaalmada adeegyada, nakul shenin hayaan aderitchie pateldeenaelie duarte . So [facility] [phone].    ATENCIÓN: Si habla español, tiene a keene disposición servicios gratuitos de asistencia lingüística. rCisty al [phone].    We comply with applicable federal civil rights laws and [place] laws. We do not discriminate on the basis of race, color, national origin, age, disability, sex, sexual orientation, or gender identity.            Thank you!     Thank you for choosing [facility]  for your care. Our goal is always to provide you with excellent care. Hearing back from our patients is one way we can continue to improve our services. Please take a few minutes to complete the written survey that you may receive in the mail after your visit with us. Thank you!             Your Updated Medication List - Protect others around you: Learn how to safely use, store and throw away your medicines at www.disposemymeds.org.          This list is accurate as of 9/6/18 11:59 PM.  Always use your most recent med list.                   Brand Name Dispense Instructions for use Diagnosis    acetaminophen 325 MG tablet    TYLENOL     Take 2 tablets (650 mg) by mouth every 4 hours as needed for headaches or pain    Episodic tension-type headache, not intractable, Severe episode of recurrent major depressive disorder, without psychotic features (H), PTSD (post-traumatic stress disorder), BRENDA (generalized anxiety disorder), Insomnia due to other mental disorder       calcium carbonate 500 MG chewable tablet    TUMS    150 tablet    Take 1 tablet (500 mg) by mouth 2 times  daily as needed for heartburn    Other chronic gastritis without hemorrhage       cetirizine 10 MG tablet    zyrTEC    30 tablet    Take 1 tablet (10 mg) by mouth daily    Chronic seasonal allergic rhinitis due to fungal spores       diphenhydrAMINE 25 MG tablet    BENADRYL    60 tablet    Take 1-2 tablets (25-50 mg) by mouth every 6 hours as needed for itching or allergies    Rash       docusate sodium 100 MG tablet    COLACE    60 tablet    Take 200 mg by mouth daily    Constipation, unspecified constipation type       fluticasone 50 MCG/ACT spray    FLONASE    1 Bottle    Spray 1-2 sprays into both nostrils daily as needed for allergies    Chronic seasonal allergic rhinitis due to fungal spores       ketotifen 0.025 % Soln ophthalmic solution    ZADITOR    1 Bottle    Place 1 drop into both eyes every 12 hours    Acute seasonal allergic rhinitis, unspecified trigger       montelukast 10 MG tablet    SINGULAIR    30 tablet    Take 1 tablet (10 mg) by mouth daily    Acute seasonal allergic rhinitis, unspecified trigger       polyethylene glycol powder    MIRALAX    510 g    Take 17 g (1 capful) by mouth daily    Constipation, unspecified constipation type       prazosin 1 MG capsule    MINIPRESS    60 capsule    Take 2 capsules (2 mg) by mouth At Bedtime    PTSD (post-traumatic stress disorder)       prenatal multivitamin plus iron 27-0.8 MG Tabs per tablet     100 tablet    Take 1 tablet by mouth daily    Encounter for supervision of other normal pregnancy in first trimester, Amenorrhea, Severe episode of recurrent major depressive disorder, without psychotic features (H)       QUEtiapine 100 MG tablet    SEROquel    30 tablet    Take 1 tablet (100 mg) by mouth At Bedtime    PTSD (post-traumatic stress disorder)       ranitidine 150 MG tablet    ZANTAC    30 tablet    Take 1 tablet (150 mg) by mouth At Bedtime    Gastroesophageal reflux disease, esophagitis presence not specified       triamcinolone 0.1 % cream     KENALOG    30 g    Apply sparingly to affected area two times daily for 14 days or until rash resolves, whichever is sooner    Rash       * venlafaxine 75 MG 24 hr capsule    EFFEXOR-XR    30 capsule    Take 1 capsule by mouth daily (together with 150 mg capsule for total of 225 mg/day)    PTSD (post-traumatic stress disorder), Major depressive disorder, recurrent episode, moderate (H)       * venlafaxine 150 MG 24 hr capsule    EFFEXOR-XR    30 capsule    Take 1 capsule by mouth daily (together with 75 mg capsule for total of 225 mg/day)    PTSD (post-traumatic stress disorder), Major depressive disorder, recurrent episode, moderate (H)       * Notice:  This list has 2 medication(s) that are the same as other medications prescribed for you. Read the directions carefully, and ask your doctor or other care provider to review them with you.

## 2018-09-13 ENCOUNTER — OFFICE VISIT (OUTPATIENT)
Dept: PSYCHOLOGY | Facility: CLINIC | Age: 28
End: 2018-09-13
Payer: COMMERCIAL

## 2018-09-13 ENCOUNTER — CARE COORDINATION (OUTPATIENT)
Dept: FAMILY MEDICINE | Facility: CLINIC | Age: 28
End: 2018-09-13

## 2018-09-13 DIAGNOSIS — F33.2 SEVERE EPISODE OF RECURRENT MAJOR DEPRESSIVE DISORDER, WITHOUT PSYCHOTIC FEATURES (H): ICD-10-CM

## 2018-09-13 DIAGNOSIS — F43.10 PTSD (POST-TRAUMATIC STRESS DISORDER): Primary | ICD-10-CM

## 2018-09-13 DIAGNOSIS — F41.1 GAD (GENERALIZED ANXIETY DISORDER): ICD-10-CM

## 2018-09-13 NOTE — MR AVS SNAPSHOT
After Visit Summary   9/13/2018    Hayde LUJAN Say    MRN: [id number]           Patient Information     Date Of Birth          1990        Visit Information        Provider Department      9/13/2018 1:00 PM Elham Mcmahan, LMFT [facility]        Today's Diagnoses     PTSD (post-traumatic stress disorder)    -  1    BRENDA (generalized anxiety disorder)        Severe episode of recurrent major depressive disorder, without psychotic features (H)           Follow-ups after your visit        Your next 10 appointments already scheduled     Sep 18, 2018 10:20 AM CDT   RETURN OB with Rosana Rosales DO   [facility] ([facility])    [address] [place] [address]   [phone]            Sep 18, 2018 10:40 AM CDT   Return Visit with Sierra Scott RPH [facility] ([facility])    [address] [place] [id number]   [phone]            Sep 24, 2018  8:00 AM CDT   Return Visit with Elham Mcmahan LMFT [facility] ([facility])    [address] [place] [address]   [phone]              Who to contact     Please call your clinic at [phone] to:    Ask questions about your health    Make or cancel appointments    Discuss your medicines    Learn about your test results    Speak to your doctor            Additional Information About Your Visit        Care EveryWhere ID     This is your Care EveryWhere ID. This could be used by other organizations to access your [facility] medical records  [id number]        Your Vitals Were     Last Period                   (LMP Unknown)            Blood Pressure from Last 3 Encounters:   08/29/18 100/68   08/22/18 98/64   08/16/18 102/68    Weight from Last 3 Encounters:   08/16/18 137 lb 3.2 oz (62.2 kg)   08/02/18 137 lb 6.4 oz (62.3 kg)   07/20/18 135 lb (61.2 kg)              We Performed the Following     Interactive Complexity add-on (14787)       : Sign Language or Oral - 83-97 minutes     Psychotherapy 60 min (75035)        Primary Care Provider Office Phone #    Rosana Rosales [phone]       [address] [place] [address]        Equal Access to Services     DYLAN LEY : Hadii aad ku hadlainao Soyobanyali, waaxda luqadaha, qaybta kaalmada adeegyada, nakul daojulián weemsritchie ibarra felicia garcía. So [facility] [phone].    ATENCIÓN: Si habla español, tiene a keene disposición servicios gratuitos de asistencia lingüística. Llame al [phone].    We comply with applicable federal civil rights laws and [place] laws. We do not discriminate on the basis of race, color, national origin, age, disability, sex, sexual orientation, or gender identity.            Thank you!     Thank you for choosing [facility]  for your care. Our goal is always to provide you with excellent care. Hearing back from our patients is one way we can continue to improve our services. Please take a few minutes to complete the written survey that you may receive in the mail after your visit with us. Thank you!             Your Updated Medication List - Protect others around you: Learn how to safely use, store and throw away your medicines at www.disposemymeds.org.          This list is accurate as of 9/13/18 11:59 PM.  Always use your most recent med list.                   Brand Name Dispense Instructions for use Diagnosis    acetaminophen 325 MG tablet    TYLENOL     Take 2 tablets (650 mg) by mouth every 4 hours as needed for headaches or pain    Episodic tension-type headache, not intractable, Severe episode of recurrent major depressive disorder, without psychotic features (H), PTSD (post-traumatic stress disorder), BRENDA (generalized anxiety disorder), Insomnia due to other mental disorder       calcium carbonate 500 MG chewable tablet    TUMS    150 tablet    Take 1 tablet (500 mg) by mouth 2 times daily as needed for heartburn    Other chronic  gastritis without hemorrhage       cetirizine 10 MG tablet    zyrTEC    30 tablet    Take 1 tablet (10 mg) by mouth daily    Chronic seasonal allergic rhinitis due to fungal spores       diphenhydrAMINE 25 MG tablet    BENADRYL    60 tablet    Take 1-2 tablets (25-50 mg) by mouth every 6 hours as needed for itching or allergies    Rash       docusate sodium 100 MG tablet    COLACE    60 tablet    Take 200 mg by mouth daily    Constipation, unspecified constipation type       fluticasone 50 MCG/ACT spray    FLONASE    1 Bottle    Spray 1-2 sprays into both nostrils daily as needed for allergies    Chronic seasonal allergic rhinitis due to fungal spores       ketotifen 0.025 % Soln ophthalmic solution    ZADITOR    1 Bottle    Place 1 drop into both eyes every 12 hours    Acute seasonal allergic rhinitis, unspecified trigger       montelukast 10 MG tablet    SINGULAIR    30 tablet    Take 1 tablet (10 mg) by mouth daily    Acute seasonal allergic rhinitis, unspecified trigger       polyethylene glycol powder    MIRALAX    510 g    Take 17 g (1 capful) by mouth daily    Constipation, unspecified constipation type       prazosin 1 MG capsule    MINIPRESS    60 capsule    Take 2 capsules (2 mg) by mouth At Bedtime    PTSD (post-traumatic stress disorder)       prenatal multivitamin plus iron 27-0.8 MG Tabs per tablet     100 tablet    Take 1 tablet by mouth daily    Encounter for supervision of other normal pregnancy in first trimester, Amenorrhea, Severe episode of recurrent major depressive disorder, without psychotic features (H)       QUEtiapine 100 MG tablet    SEROquel    30 tablet    Take 1 tablet (100 mg) by mouth At Bedtime    PTSD (post-traumatic stress disorder)       ranitidine 150 MG tablet    ZANTAC    30 tablet    Take 1 tablet (150 mg) by mouth At Bedtime    Gastroesophageal reflux disease, esophagitis presence not specified       triamcinolone 0.1 % cream    KENALOG    30 g    Apply sparingly to affected  area two times daily for 14 days or until rash resolves, whichever is sooner    Rash       * venlafaxine 75 MG 24 hr capsule    EFFEXOR-XR    30 capsule    Take 1 capsule by mouth daily (together with 150 mg capsule for total of 225 mg/day)    PTSD (post-traumatic stress disorder), Major depressive disorder, recurrent episode, moderate (H)       * venlafaxine 150 MG 24 hr capsule    EFFEXOR-XR    30 capsule    Take 1 capsule by mouth daily (together with 75 mg capsule for total of 225 mg/day)    PTSD (post-traumatic stress disorder), Major depressive disorder, recurrent episode, moderate (H)       * Notice:  This list has 2 medication(s) that are the same as other medications prescribed for you. Read the directions carefully, and ask your doctor or other care provider to review them with you.

## 2018-09-13 NOTE — PROGRESS NOTES
Met with pt, she wants help to understand a note from  Ana, about [place], we are going to cover in more detail when we have an .  She is due to see Elham today for therapy.    Had a lot of donated items of baby needs  Delivered a crib, bounce seat, diapers, bottles, wipes, lotions, clothing to help them get started.  Very appreciative.     Pt wants a visit from [facility] worker Ny Rice, to help with questions about housing.  I called ny and had to leave a message for her.    Pt also had a bill from [facility] for $3 Karina Ignacio to review     Lbetz
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
## 2018-09-18 ENCOUNTER — OFFICE VISIT (OUTPATIENT)
Dept: PHARMACY | Facility: CLINIC | Age: 28
End: 2018-09-18
Payer: COMMERCIAL

## 2018-09-18 ENCOUNTER — TELEPHONE (OUTPATIENT)
Dept: FAMILY MEDICINE | Facility: CLINIC | Age: 28
End: 2018-09-18

## 2018-09-18 ENCOUNTER — OFFICE VISIT (OUTPATIENT)
Dept: FAMILY MEDICINE | Facility: CLINIC | Age: 28
End: 2018-09-18
Payer: COMMERCIAL

## 2018-09-18 ENCOUNTER — CARE COORDINATION (OUTPATIENT)
Dept: FAMILY MEDICINE | Facility: CLINIC | Age: 28
End: 2018-09-18

## 2018-09-18 VITALS
DIASTOLIC BLOOD PRESSURE: 59 MMHG | HEART RATE: 83 BPM | BODY MASS INDEX: 29.18 KG/M2 | OXYGEN SATURATION: 99 % | HEIGHT: 58 IN | RESPIRATION RATE: 16 BRPM | TEMPERATURE: 98.5 F | WEIGHT: 139 LBS | SYSTOLIC BLOOD PRESSURE: 90 MMHG

## 2018-09-18 DIAGNOSIS — K59.00 CONSTIPATION, UNSPECIFIED CONSTIPATION TYPE: ICD-10-CM

## 2018-09-18 DIAGNOSIS — Z34.80 SUPERVISION OF OTHER NORMAL PREGNANCY, ANTEPARTUM: Primary | ICD-10-CM

## 2018-09-18 DIAGNOSIS — G44.219 EPISODIC TENSION-TYPE HEADACHE, NOT INTRACTABLE: ICD-10-CM

## 2018-09-18 DIAGNOSIS — Z71.89 ENCOUNTER FOR MEDICATION REVIEW AND COUNSELING: Primary | ICD-10-CM

## 2018-09-18 NOTE — PATIENT INSTRUCTIONS
[facility] Information  If you have any further concerns or wish to schedule another appointment, please call our office at [phone] during normal business hours (8-5, M-F).     For uncomplicated pregnancies, you will be seeing your doctor once a month until you are 28 weeks, then you will see your doctor twice a month. You will begin weekly visits at 36 weeks until you deliver.     If you have urgent medical questions that cannot wait, you may call [phone] at any time of day. Call your doctor if you experience any bleeding or abdominal cramping early in pregnancy.     If you have a medical emergency, please call [phone].    When to call or come in to the hospital  If you have any bleeding or leakage of fluids.   If you have lower abdominal/uterine cramping not relieved with rest and fluids.  If you have a headache not resolved with tylenol, right upper abdominal pain, or sudden onset of swelling.  You know your body best. Never hesitate to call or go to the hospital if something doesn't feel right!    Fetal Survey Ultrasound  We will arrange for an ultrasound around 22 weeks gestation. These are done in clinic on  and . Many families look forward to this visit as a chance to try and determine the babies gender - however, it is an important exam to ensure that baby is developing and growing normally!    Consider childbirth education classes  Childbirth classes are a great way to learn what to expect from the remainder of pregnancy, tips for preparing and handeling the stresses of labor, and learning more about your . Classes are also great for learning more about breastfeeding! Discuss options for classes with your doctor if you are interested.   Healthy Eating Habits During Pregnancy  It s important to develop healthy eating habits while you are pregnant, for you as well as for your baby. Here are some ways to stay healthy.  Aim for a healthy weight  A slow, steady rate of  weight gain is often best. After the first trimester, you may gain about a pound a week. If you were overweight before pregnancy, you need to gain fewer pounds. Your healthcare provider can give you a healthy weight goal for your pregnancy.  Don t diet  Now is not the time to diet. You may not get enough of the nutrients you and your baby need. Instead, learn how to be a healthy eater. Start by doing it for your baby. Soon, you may do it for yourself.  Vitamins and supplements  Talk with your healthcare provider about taking these and other prenatal vitamins and supplements.    Iron makes the extra blood you need now.    Calcium and vitamin D help build and keep strong bones.    Folic acid helps prevent certain birth defects.    Some vitamins may not be safe to take. Your healthcare provider will tell you which ones to avoid.  Fluids  Drink at least 8 to 10 cups of fluid daily. Your baby needs fluids. Fluids also decrease constipation, flush out toxins and waste, limit swelling, and help prevent bladder infections. Water is best. Other good choices are:    Water or seltzer water with a slice of lemon or lime. (These can also help ease an upset stomach.)    Clear soups that are low in salt    Low-fat or fat-free milk; soy or rice milk with calcium added    100% fruit juices mixed with water    Popsicles or gelatin  Things to avoid  Some things might harm your growing baby. Don t eat or drink:    Alcohol    Unpasteurized dairy foods and juices    Raw or undercooked meat, poultry, fish, or eggs    Prepared meats, like deli meats or hot dogs, unless heated until steaming hot    Fish that are high in mercury, like shark, swordfish, bianca mackerel, tilefish, and albacore tuna   Things to limit  Ask your healthcare provider whether it s safe to eat or drink:    Caffeine    Artificial sweeteners    Organ meats    Certain types of fish    Fish and shellfish that contain mercury in lower amounts, like shrimp, canned light  tuna, salmon, pollock, and catfish   Date Last Reviewed: 8/16/2015 2000-2017 [employer]. [address] [place] [address], [place] [address]. All rights reserved. This information is not intended as a substitute for professional medical care. Always follow your healthcare professional's instructions.

## 2018-09-18 NOTE — PROGRESS NOTES
"SUBJECTIVE  No vaginal bleeding, loss of fluids, or cramping noted. No headaches, change in vision, RUQ pain, or lower extremity edema. Patient has not felt baby move. She has been able to tolerate a diet with no significant nausea/vomitting. She has been taking her pre- vitamin.    Mood: bad day yesterday, cried all day to the point of having a headache. She states her  missed work yesterday and she is concerned about their finances.     OBJECTIVE  BP 90/59  Pulse 83  Temp 98.5  F (36.9  C) (Oral)  Resp 16  Ht 4' 9.6\" (146.3 cm)  Wt 139 lb (63 kg)  LMP  (LMP Unknown)  SpO2 99%  BMI 29.46 kg/m2    General: sitting up in a chair awake and alert. Pleasant and cooperative in NAD.  Abd: gravid, non-tender, uterus palpated at umbilicus, FHTs 150s  Skin: no lesions or rashes  Ext: no edema  Psych: mood good, affect congruent. Mentation and conversation appropriate.    ASSESSMENT/PLAN  Patient is a  a 27 year old  female at 18w0d corresponding to CHARLI of  2019 obtained via 7wk ultrasound.  - prenatal labs unremarkable (Blood type:O+, Hgb:12.4, Hep B, HIV, syphilis, GC/Chlam, HIV negative, Rubella immune)  - pregravid weight 140 lbs, TWG today 3 lbs. Discussed daily small meals throughout the day to help with weight gain.   - Mood concerns: history of PTSD, BRENDA, recurrent major depressive disorder currently on effexor, prazosin, and seroquel. Mood improved today. Continue to follow PHQ9 and GADs which were elevated today, however, she is visibly in better spirits today.   - planning on vaginal  delivery  - Formula feeding  - Declines genetic testing  - Schedule fetal survey for 22 wks. Does NOT want to know gender. Hoping for girl.      F/U in 4 weeks.     -----------------------------------------    Precepted with: MD Rosana Stevens DO  [facility] Medicine Resident  Pager #[phone]      "

## 2018-09-18 NOTE — MR AVS SNAPSHOT
After Visit Summary   9/18/2018    Hayde LUJAN Say    MRN: [id number]           Patient Information     Date Of Birth          1990        Visit Information        Provider Department      9/18/2018 10:20 AM Rosana Rosales DO [facility]        Today's Diagnoses     Supervision of other normal pregnancy, antepartum    -  1      Care Instructions    [facility] Information  If you have any further concerns or wish to schedule another appointment, please call our office at [phone] during normal business hours (8-5, M-F).     For uncomplicated pregnancies, you will be seeing your doctor once a month until you are 28 weeks, then you will see your doctor twice a month. You will begin weekly visits at 36 weeks until you deliver.     If you have urgent medical questions that cannot wait, you may call [phone] at any time of day. Call your doctor if you experience any bleeding or abdominal cramping early in pregnancy.     If you have a medical emergency, please call [phone].    When to call or come in to the hospital  If you have any bleeding or leakage of fluids.   If you have lower abdominal/uterine cramping not relieved with rest and fluids.  If you have a headache not resolved with tylenol, right upper abdominal pain, or sudden onset of swelling.  You know your body best. Never hesitate to call or go to the hospital if something doesn't feel right!    Fetal Survey Ultrasound  We will arrange for an ultrasound around 22 weeks gestation. These are done in clinic on Mondays and Wednesdays. Many families look forward to this visit as a chance to try and determine the babies gender - however, it is an important exam to ensure that baby is developing and growing normally!    Consider childbirth education classes  Childbirth classes are a great way to learn what to expect from the remainder of pregnancy, tips for preparing and handeling the stresses of labor, and learning more about  your . Classes are also great for learning more about breastfeeding! Discuss options for classes with your doctor if you are interested.   Healthy Eating Habits During Pregnancy  It s important to develop healthy eating habits while you are pregnant, for you as well as for your baby. Here are some ways to stay healthy.  Aim for a healthy weight  A slow, steady rate of weight gain is often best. After the first trimester, you may gain about a pound a week. If you were overweight before pregnancy, you need to gain fewer pounds. Your healthcare provider can give you a healthy weight goal for your pregnancy.  Don t diet  Now is not the time to diet. You may not get enough of the nutrients you and your baby need. Instead, learn how to be a healthy eater. Start by doing it for your baby. Soon, you may do it for yourself.  Vitamins and supplements  Talk with your healthcare provider about taking these and other prenatal vitamins and supplements.    Iron makes the extra blood you need now.    Calcium and vitamin D help build and keep strong bones.    Folic acid helps prevent certain birth defects.    Some vitamins may not be safe to take. Your healthcare provider will tell you which ones to avoid.  Fluids  Drink at least 8 to 10 cups of fluid daily. Your baby needs fluids. Fluids also decrease constipation, flush out toxins and waste, limit swelling, and help prevent bladder infections. Water is best. Other good choices are:    Water or seltzer water with a slice of lemon or lime. (These can also help ease an upset stomach.)    Clear soups that are low in salt    Low-fat or fat-free milk; soy or rice milk with calcium added    100% fruit juices mixed with water    Popsicles or gelatin  Things to avoid  Some things might harm your growing baby. Don t eat or drink:    Alcohol    Unpasteurized dairy foods and juices    Raw or undercooked meat, poultry, fish, or eggs    Prepared meats, like deli meats or hot dogs, unless  heated until steaming hot    Fish that are high in mercury, like shark, swordfish, bianca mackerel, tilefish, and albacore tuna   Things to limit  Ask your healthcare provider whether it s safe to eat or drink:    Caffeine    Artificial sweeteners    Organ meats    Certain types of fish    Fish and shellfish that contain mercury in lower amounts, like shrimp, canned light tuna, salmon, pollock, and catfish   Date Last Reviewed: 8/16/2015 2000-2017 [employer]. [address] [place] [address]. All rights reserved. This information is not intended as a substitute for professional medical care. Always follow your healthcare professional's instructions.                Follow-ups after your visit        Follow-up notes from your care team     Return in about 3 weeks (around 10/9/2018) for Routine Visit OB.      Your next 10 appointments already scheduled     Oct 04, 2018  2:20 PM CDT   Return Visit with Sierra Scott RPH [facility] ([facility])    [address] [place] [id number]   [phone]            Oct 04, 2018  2:40 PM CDT   RETURN OB with Rosana Rosales DO   [facility] ([facility])    [address] [place] [address]   [phone]            Oct 11, 2018 11:00 AM CDT   Return Visit with Elham Mcmahan LMFT [facility] ([facility])    [address] [place] [address]   [phone]              Who to contact     Please call your clinic at [phone] to:    Ask questions about your health    Make or cancel appointments    Discuss your medicines    Learn about your test results    Speak to your doctor            Additional Information About Your Visit        Care EveryWhere ID     This is your Care EveryWhere ID. This could be used by other organizations to access your [facility] medical records  [id number]        Your Vitals Were     Pulse Temperature Respirations Height Last  "Period Pulse Oximetry    83 98.5  F (36.9  C) (Oral) 16 4' 9.6\" (146.3 cm) (LMP Unknown) 99%    BMI (Body Mass Index)                   29.46 kg/m2            Blood Pressure from Last 3 Encounters:   09/18/18 90/59   08/29/18 100/68   08/22/18 98/64    Weight from Last 3 Encounters:   09/18/18 139 lb (63 kg)   08/16/18 137 lb 3.2 oz (62.2 kg)   08/02/18 137 lb 6.4 oz (62.3 kg)              We Performed the Following      : Sign Language or Oral - 68-82 minutes          Today's Medication Changes          These changes are accurate as of 9/18/18 11:59 PM.  If you have any questions, ask your nurse or doctor.               These medicines have changed or have updated prescriptions.        Dose/Directions    acetaminophen 325 MG tablet   Commonly known as:  TYLENOL   This may have changed:    - when to take this  - reasons to take this   Used for:  Episodic tension-type headache, not intractable   Changed by:  Sierra Scott RP        Dose:  650 mg   Take 2 tablets (650 mg) by mouth every 6 hours as needed for headaches   Quantity:  100 tablet   Refills:  1            Where to get your medicines      These medications were sent to [facility] - [place], [place] - [address] [place] [address] Pkwy  [address] [facility] [address], [place] [address]     Phone:  [phone]     acetaminophen 325 MG tablet                Primary Care Provider Office Phone # Fax #    Rosana DO Bobby [phone]       [facility] [place] [address]        Equal Access to Services     DYLAN LEY : Hadii raven ku hadasho Soomaali, waaxda luqadaha, qaybta kaalmada adeegyada, nakul idiin hayaan adeeg kharash la'aan . So [facility] [phone].    ATENCIÓN: Si habla español, tiene a keene disposición servicios gratuitos de asistencia lingüística. Llame al [phone].    We comply with applicable federal civil rights laws and [place] laws. We do not discriminate on the basis of race, color, national " origin, age, disability, sex, sexual orientation, or gender identity.            Thank you!     Thank you for choosing [facility]  for your care. Our goal is always to provide you with excellent care. Hearing back from our patients is one way we can continue to improve our services. Please take a few minutes to complete the written survey that you may receive in the mail after your visit with us. Thank you!             Your Updated Medication List - Protect others around you: Learn how to safely use, store and throw away your medicines at www.disposemymeds.org.          This list is accurate as of 9/18/18 11:59 PM.  Always use your most recent med list.                   Brand Name Dispense Instructions for use Diagnosis    acetaminophen 325 MG tablet    TYLENOL    100 tablet    Take 2 tablets (650 mg) by mouth every 6 hours as needed for headaches    Episodic tension-type headache, not intractable       calcium carbonate 500 MG chewable tablet    TUMS    150 tablet    Take 1 tablet (500 mg) by mouth 2 times daily as needed for heartburn    Other chronic gastritis without hemorrhage       cetirizine 10 MG tablet    zyrTEC    30 tablet    Take 1 tablet (10 mg) by mouth daily    Chronic seasonal allergic rhinitis due to fungal spores       diphenhydrAMINE 25 MG tablet    BENADRYL    60 tablet    Take 1-2 tablets (25-50 mg) by mouth every 6 hours as needed for itching or allergies    Rash       docusate sodium 100 MG tablet    COLACE    60 tablet    Take 200 mg by mouth daily    Constipation, unspecified constipation type       fluticasone 50 MCG/ACT spray    FLONASE    1 Bottle    Spray 1-2 sprays into both nostrils daily as needed for allergies    Chronic seasonal allergic rhinitis due to fungal spores       ketotifen 0.025 % Soln ophthalmic solution    ZADITOR    1 Bottle    Place 1 drop into both eyes every 12 hours    Acute seasonal allergic rhinitis, unspecified trigger       montelukast 10 MG tablet     SINGULAIR    30 tablet    Take 1 tablet (10 mg) by mouth daily    Acute seasonal allergic rhinitis, unspecified trigger       polyethylene glycol powder    MIRALAX    510 g    Take 17 g (1 capful) by mouth daily    Constipation, unspecified constipation type       prazosin 1 MG capsule    MINIPRESS    60 capsule    Take 2 capsules (2 mg) by mouth At Bedtime    PTSD (post-traumatic stress disorder)       prenatal multivitamin plus iron 27-0.8 MG Tabs per tablet     100 tablet    Take 1 tablet by mouth daily    Encounter for supervision of other normal pregnancy in first trimester, Amenorrhea, Severe episode of recurrent major depressive disorder, without psychotic features (H)       QUEtiapine 100 MG tablet    SEROquel    30 tablet    Take 1 tablet (100 mg) by mouth At Bedtime    PTSD (post-traumatic stress disorder)       ranitidine 150 MG tablet    ZANTAC    30 tablet    Take 1 tablet (150 mg) by mouth At Bedtime    Gastroesophageal reflux disease, esophagitis presence not specified       triamcinolone 0.1 % cream    KENALOG    30 g    Apply sparingly to affected area two times daily for 14 days or until rash resolves, whichever is sooner    Rash       * venlafaxine 75 MG 24 hr capsule    EFFEXOR-XR    30 capsule    Take 1 capsule by mouth daily (together with 150 mg capsule for total of 225 mg/day)    PTSD (post-traumatic stress disorder), Major depressive disorder, recurrent episode, moderate (H)       * venlafaxine 150 MG 24 hr capsule    EFFEXOR-XR    30 capsule    Take 1 capsule by mouth daily (together with 75 mg capsule for total of 225 mg/day)    PTSD (post-traumatic stress disorder), Major depressive disorder, recurrent episode, moderate (H)       * Notice:  This list has 2 medication(s) that are the same as other medications prescribed for you. Read the directions carefully, and ask your doctor or other care provider to review them with you.

## 2018-09-18 NOTE — MR AVS SNAPSHOT
After Visit Summary   9/18/2018    Hayde LUJAN Say    MRN: [id number]           Patient Information     Date Of Birth          1990        Visit Information        Provider Department      9/18/2018 10:40 AM Sierra Scott RPH [facility]        Today's Diagnoses     Encounter for medication review and counseling    -  1    Constipation, unspecified constipation type        Episodic tension-type headache, not intractable           Follow-ups after your visit        Your next 10 appointments already scheduled     Sep 27, 2018  1:40 PM CDT   Return Visit with ANABEL Eckert   [facility] ([facility])    [address] [place] [address]   [phone]            Oct 04, 2018  2:20 PM CDT   Return Visit with Sierra Scott RPH [facility] ([facility])    [address] [place] [address]   [phone]            Oct 04, 2018  2:40 PM CDT   RETURN OB with Rosana Rosales DO   [facility] ([facility])    [address] [place] [address]   [phone]              Future tests that were ordered for you today     Open Future Orders        Priority Expected Expires Ordered    US OB 14 +WKS SINGLE OR FIRST GESTATION Routine 10/16/2018 10/30/2018 9/18/2018            Who to contact     Please call your clinic at [phone] to:    Ask questions about your health    Make or cancel appointments    Discuss your medicines    Learn about your test results    Speak to your doctor            Additional Information About Your Visit        Care EveryWhere ID     This is your Care EveryWhere ID. This could be used by other organizations to access your [facility] medical records  [id number]        Your Vitals Were     Last Period                   (LMP Unknown)            Blood Pressure from Last 3 Encounters:   09/18/18 90/59   08/29/18 100/68   08/22/18 98/64    Weight from Last 3 Encounters:    09/18/18 139 lb (63 kg)   08/16/18 137 lb 3.2 oz (62.2 kg)   08/02/18 137 lb 6.4 oz (62.3 kg)              Today, you had the following     No orders found for display         Today's Medication Changes          These changes are accurate as of 9/18/18 11:59 PM.  If you have any questions, ask your nurse or doctor.               These medicines have changed or have updated prescriptions.        Dose/Directions    acetaminophen 325 MG tablet   Commonly known as:  TYLENOL   This may have changed:    - when to take this  - reasons to take this   Used for:  Episodic tension-type headache, not intractable   Changed by:  Sierra Scott RPH        Dose:  650 mg   Take 2 tablets (650 mg) by mouth every 6 hours as needed for headaches   Quantity:  100 tablet   Refills:  1            Where to get your medicines      These medications were sent to [facility] - [place], [place] - [address] [place] [address] Pkw[address]  [address], [place] [address]     Phone:  [phone]     acetaminophen 325 MG tablet                Primary Care Provider Office Phone #    Rosana RosalesDO [phone]       [address] [place] [address]        Equal Access to Services     DYLAN LEY AH: Hadii raven roberts hadasho Soyobanyali, waaxda luqadaha, qaybta kaalmada adeegyada, nakul duarte . So [facility] [phone].    ATENCIÓN: Si habla español, tiene a keene disposición servicios gratuitos de asistencia lingüística. Llame al [phone].    We comply with applicable federal civil rights laws and [place] laws. We do not discriminate on the basis of race, color, national origin, age, disability, sex, sexual orientation, or gender identity.            Thank you!     Thank you for choosing [facility]  for your care. Our goal is always to provide you with excellent care. Hearing back from our patients is one way we can continue to improve our services. Please take a few minutes to  complete the written survey that you may receive in the mail after your visit with us. Thank you!             Your Updated Medication List - Protect others around you: Learn how to safely use, store and throw away your medicines at www.disposemymeds.org.          This list is accurate as of 9/18/18 11:59 PM.  Always use your most recent med list.                   Brand Name Dispense Instructions for use Diagnosis    acetaminophen 325 MG tablet    TYLENOL    100 tablet    Take 2 tablets (650 mg) by mouth every 6 hours as needed for headaches    Episodic tension-type headache, not intractable       calcium carbonate 500 MG chewable tablet    TUMS    150 tablet    Take 1 tablet (500 mg) by mouth 2 times daily as needed for heartburn    Other chronic gastritis without hemorrhage       cetirizine 10 MG tablet    zyrTEC    30 tablet    Take 1 tablet (10 mg) by mouth daily    Chronic seasonal allergic rhinitis due to fungal spores       diphenhydrAMINE 25 MG tablet    BENADRYL    60 tablet    Take 1-2 tablets (25-50 mg) by mouth every 6 hours as needed for itching or allergies    Rash       docusate sodium 100 MG tablet    COLACE    60 tablet    Take 200 mg by mouth daily    Constipation, unspecified constipation type       fluticasone 50 MCG/ACT spray    FLONASE    1 Bottle    Spray 1-2 sprays into both nostrils daily as needed for allergies    Chronic seasonal allergic rhinitis due to fungal spores       ketotifen 0.025 % Soln ophthalmic solution    ZADITOR    1 Bottle    Place 1 drop into both eyes every 12 hours    Acute seasonal allergic rhinitis, unspecified trigger       montelukast 10 MG tablet    SINGULAIR    30 tablet    Take 1 tablet (10 mg) by mouth daily    Acute seasonal allergic rhinitis, unspecified trigger       polyethylene glycol powder    MIRALAX    510 g    Take 17 g (1 capful) by mouth daily    Constipation, unspecified constipation type       prazosin 1 MG capsule    MINIPRESS    60 capsule    Take  2 capsules (2 mg) by mouth At Bedtime    PTSD (post-traumatic stress disorder)       prenatal multivitamin plus iron 27-0.8 MG Tabs per tablet     100 tablet    Take 1 tablet by mouth daily    Encounter for supervision of other normal pregnancy in first trimester, Amenorrhea, Severe episode of recurrent major depressive disorder, without psychotic features (H)       QUEtiapine 100 MG tablet    SEROquel    30 tablet    Take 1 tablet (100 mg) by mouth At Bedtime    PTSD (post-traumatic stress disorder)       ranitidine 150 MG tablet    ZANTAC    30 tablet    Take 1 tablet (150 mg) by mouth At Bedtime    Gastroesophageal reflux disease, esophagitis presence not specified       triamcinolone 0.1 % cream    KENALOG    30 g    Apply sparingly to affected area two times daily for 14 days or until rash resolves, whichever is sooner    Rash       * venlafaxine 75 MG 24 hr capsule    EFFEXOR-XR    30 capsule    Take 1 capsule by mouth daily (together with 150 mg capsule for total of 225 mg/day)    PTSD (post-traumatic stress disorder), Major depressive disorder, recurrent episode, moderate (H)       * venlafaxine 150 MG 24 hr capsule    EFFEXOR-XR    30 capsule    Take 1 capsule by mouth daily (together with 75 mg capsule for total of 225 mg/day)    PTSD (post-traumatic stress disorder), Major depressive disorder, recurrent episode, moderate (H)       * Notice:  This list has 2 medication(s) that are the same as other medications prescribed for you. Read the directions carefully, and ask your doctor or other care provider to review them with you.

## 2018-09-18 NOTE — PROGRESS NOTES
Pt in clinic for ob appt and med set  She asked for a comb said she does not have one- got her a comb and she asked about toothbrushes for her and the boys, got that for her.    Also worked on scheduling appts and making changes, review her calender  Discussed school communications, she says she forgot the rest of the mail from school     Also called Felix Krystal again, and she was out last week and will call pt today to set up a time for fernando to come and see her, not sure if that will work for fernando or not.  Communicated to fernando that she will get a call this afternoon from [facility] and to answer it.  She said ok    ramila    Need to check with Elham cuellar about a change to Monday appt  Also concerns about immigration and things fernando is worrying about. Ramila

## 2018-09-18 NOTE — TELEPHONE ENCOUNTER
Called to follow up with pt   Urgent care mental health did speak with her and are planning a visit when they can get an  tomorrow    Asked pt to call me after her call with them    Ramila

## 2018-09-18 NOTE — TELEPHONE ENCOUNTER
Pt called sobbing, it was hard to understand who I was speaking with.  I was finally able to discern it was her and asked if I could call right back with language line- she said yes.     had difficulty translating as well, as she was sobbing through much of call.     Pt said she was so depressed she does not want to live anymore.  Asked what if anything had happened to make her feel this way, she said she had a fight with her . He had asked for money and she said she had no money to give him, which made him mad and he told her to quit her job.  She was so upset because if she quit her job she can not pay bills or help her family.  She had difficulty calming and being able to carry on a conversation, so worked with  to encourage her to breathe, slow down, and she was better able to respond.    In the background I had co worker Karina call urgent care team to see if they could make a visit to her, they said they would look to get an  and go out asked me to ensure pt was ok with this, she said yes. Urgent care called back to say they could not get anyone on short notice but possibly tomorrow they will call her though and talk with her and then call me to let me know how it went.    Prior to hanging up with patient, I did ask her if her  physically hurt her, she said no, I asked if she could promise me that she would not try to hurt herself, she said she would not hurt herself and she told me her mom is there with her now too for support.  Pt does have a history or depression and expression of not wanting to live but in past has always let us know she does not feel she could ever hurt herself as she wants to be there for her babies.    Will copy Dr Reji Villa as she was not present today in clinic

## 2018-09-18 NOTE — NURSING NOTE
Due to patient being non-English speaking/uses sign language, an  was used for this visit. Only for face-to-face interpretation by an external agency, date and length of interpretation can be found on the scanned worksheet.     name: Frances England  Agency: [employer]  Language: Maryann   Telephone number: [phone]  Type of interpretation: Face-to-face, spoken     PHQ9--given to pt to have  help fill out for MD to review

## 2018-09-18 NOTE — PROGRESS NOTES
"  Assessment and Plan     (Z71.89) Encounter for medication review and counseling  (primary encounter diagnosis)  Comment: Adherent to medicines as set up. Pillbox empty as expected, however give last date of med set up and supply available would have missed one day. Not realized by patient. Appointment ended up being rescheduled after last visit.  Plan: Pillbox filled as below. 19 days of medications, last dose 10/6. Due for refill 10/7, appointments already scheduled w/ self and PCP. Should have two extra days of medicines at time of next visit.     Venlafaxine 75 mg capsule x1; Venlafaxine 150 mg capsule x1 (LF 8/26 #30 each) - refill   Quetiapine 100 mg tablet x1 (LC 8/28 #30) - refill   Prazosin 1 mg capsule x2 (LF 8/26 #60)   Montelukast 10 mg tablet x1 (8/26 #30)   Ranitidine 150 mg tablet x1 (8/26 #30) - refill   Cetirizine 10 mg tablet x1 (8/22 #30) - refill   Prenatal tablet x1 (7/3 #100) - refill   Docusate 100 mg capsule x2 (8/29 #60) - refill      Refilled APAP today    (K59.00) Constipation, unspecified constipation type  Comment: Without adequate fluid consumption, inappropriate to add additional medication for constipation.   Plan: After discussion w/ PCP, encouraged increased water intake from one to three \"cups\" per day. Continue current Docusate dosing.    Follow up: 10/4/18 covisit. Reminder on calendar 9/28 for refills.     Options for treatment and/or follow-up care were reviewed with the patient. Hayde was engaged and actively involved in the decision making process. She verbalized understanding of the options discussed and was satisfied with the final plan. Patient was provided with written instructions/medication list via AVS.    Dr. Rosales was provided our recommendations in clinic today and Dr. Lozada was available for supervision during this visit and is the authorizing prescriber for this visit through the pharmacist collaborative practice agreement.    Thank you for the opportunity to " participate in the care of this patient.  Sierra Scott, Pharm.D.  [facility]: [phone]    Current Outpatient Prescriptions   Medication Sig Dispense Refill     acetaminophen (TYLENOL) 325 MG tablet Take 2 tablets (650 mg) by mouth every 6 hours as needed for headaches 100 tablet 1     calcium carbonate (TUMS) 500 MG chewable tablet Take 1 tablet (500 mg) by mouth 2 times daily as needed for heartburn 150 tablet 3     cetirizine (ZYRTEC) 10 MG tablet Take 1 tablet (10 mg) by mouth daily 30 tablet 3     diphenhydrAMINE (BENADRYL) 25 MG tablet Take 1-2 tablets (25-50 mg) by mouth every 6 hours as needed for itching or allergies 60 tablet 1     docusate sodium (COLACE) 100 MG tablet Take 200 mg by mouth daily 60 tablet 3     fluticasone (FLONASE) 50 MCG/ACT spray Spray 1-2 sprays into both nostrils daily as needed for allergies 1 Bottle 1     ketotifen (ZADITOR) 0.025 % SOLN ophthalmic solution Place 1 drop into both eyes every 12 hours 1 Bottle 3     montelukast (SINGULAIR) 10 MG tablet Take 1 tablet (10 mg) by mouth daily 30 tablet 11     polyethylene glycol (MIRALAX) powder Take 17 g (1 capful) by mouth daily 510 g 1     prazosin (MINIPRESS) 1 MG capsule Take 2 capsules (2 mg) by mouth At Bedtime 60 capsule 3     Prenatal Vit-Fe Fumarate-FA (PRENATAL MULTIVITAMIN PLUS IRON) 27-0.8 MG TABS per tablet Take 1 tablet by mouth daily 100 tablet 3     QUEtiapine (SEROQUEL) 100 MG tablet Take 1 tablet (100 mg) by mouth At Bedtime 30 tablet 3     ranitidine (ZANTAC) 150 MG tablet Take 1 tablet (150 mg) by mouth At Bedtime 30 tablet 3     triamcinolone (KENALOG) 0.1 % cream Apply sparingly to affected area two times daily for 14 days or until rash resolves, whichever is sooner 30 g 0     venlafaxine (EFFEXOR-XR) 150 MG 24 hr capsule Take 1 capsule by mouth daily (together with 75 mg capsule for total of 225 mg/day) 30 capsule 11     venlafaxine (EFFEXOR-XR) 75 MG 24 hr capsule Take 1  "capsule by mouth daily (together with 150 mg capsule for total of 225 mg/day) 30 capsule 11            HPI:       Hayde Macias is a 27 year old female referred by Dr. Rosales for medication management and counseling. Hayde s main concern today is constipation. Patient seen today with the assistance of Maryann Frances.    States last dose of medicine was last night. No nights without medicine.   Asks about medicine for bloating - Ranitidine.   Constipation. 2-3 weeks since last BM. Miralax makes her nauseous, doesn't like to use. Wanting different medicine. Only drinking one cup of water per day. Passing gas. Not uncomfortable.   Itchy throat/gums last week - took 25 mg benadryl each day and uses salt water gargles to resolve. Went away after about 5 days. Unsure what this is from thinks due to pregnancy.           PMHX:     Patient Active Problem List   Diagnosis     Health Care Home     Recurrent major depressive disorder (H)     Encounter for supervision of normal pregnancy     Seasonal allergic rhinitis     PTSD (post-traumatic stress disorder)     BRENDA (generalized anxiety disorder)     Other fatigue     Insomnia due to other mental disorder     Allergies   Allergen Reactions     Augmentin Rash            Objective     VS w/ IP 9/18/2018 9/18/2018   SYSTOLIC 90    DIASTOLIC 59    PULSE 83    TEMPERATURE 98.5    RESPIRATIONS 16    Wt.(Lbs.) 139    Wt. (Kg) 63.05 kg    Ht. (Feet./Inches) 4' 9.598\"    Ht. (Cm) 146.3 cm    BMI  29.46   O2 Sat 99            [facility] Pharmacist Documentation     Drug therapy problems identified:  Medical Condition 1: Pain (i.e. somatic, visceral, neuropathic), Goals of therapy: Not at goal, Drug Class: Analgesic, OTC, Indication: Needs additional drug therapy, Intervention: Refill, Verification: Patient Agreed - OTC    Relevant medical devices: n/a    # of medical conditions addressed: 1  # of medications addressed: 14  # of DTP identified: 1  Time spent: 20 minutes  Level of service " per [place] DHS guidelines: 2 nc           Documentation   Due to patient being non-English speaking/uses sign language, an  was used for this visit. Only for face-to-face interpretation by an external agency, date and length of interpretation can be found on the scanned worksheet.     name: Frances  Agency: [employer]  Language: Maryann   Telephone number: [phone]  Type of interpretation: Face-to-face, spoken

## 2018-09-19 ENCOUNTER — TELEPHONE (OUTPATIENT)
Dept: FAMILY MEDICINE | Facility: CLINIC | Age: 28
End: 2018-09-19

## 2018-09-19 RX ORDER — ACETAMINOPHEN 325 MG/1
650 TABLET ORAL EVERY 6 HOURS PRN
Qty: 100 TABLET | Refills: 1 | Status: SHIPPED | OUTPATIENT
Start: 2018-09-19 | End: 2019-02-11

## 2018-09-19 NOTE — TELEPHONE ENCOUNTER
Attempted to reach pt for follow up on her visit with urgent care- no answer today when calling at 3:10pm    juan david

## 2018-09-19 NOTE — TELEPHONE ENCOUNTER
Received a call from [facility], Urgent care team sent her there, but hospital was unsure of why, note was vague for assessment.  Someone who saw her is going to call back soon.      Was able to give some history and our concerns for getting urgent care involved. Spoke with Ginger from [place] today.      Pt is doing ok right now, resting and had something to eat    lbetz

## 2018-09-20 ENCOUNTER — TELEPHONE (OUTPATIENT)
Dept: FAMILY MEDICINE | Facility: CLINIC | Age: 28
End: 2018-09-20

## 2018-09-20 ASSESSMENT — PATIENT HEALTH QUESTIONNAIRE - PHQ9: SUM OF ALL RESPONSES TO PHQ QUESTIONS 1-9: 17

## 2018-09-20 NOTE — TELEPHONE ENCOUNTER
Pt is scheduled to see Isidro Krystal on Friday @ [facility]  Pt told hospital she can not afford food to get family through, she does have snap and weekly veggies from clinic and have shared food shelves with her but will revisit that again.    Ask if it is getting easier with kids in school, they should be getting breakfast, snacks and lunch at school.

## 2018-09-20 NOTE — TELEPHONE ENCOUNTER
Called pt to let her know her appt was changed on Monday per her request and changed to thurs at 1:40 when Frances can be the  per her request.  Ramila

## 2018-09-27 ENCOUNTER — OFFICE VISIT (OUTPATIENT)
Dept: PSYCHOLOGY | Facility: CLINIC | Age: 28
End: 2018-09-27
Payer: COMMERCIAL

## 2018-09-27 ENCOUNTER — CARE COORDINATION (OUTPATIENT)
Dept: FAMILY MEDICINE | Facility: CLINIC | Age: 28
End: 2018-09-27

## 2018-09-27 DIAGNOSIS — F43.10 PTSD (POST-TRAUMATIC STRESS DISORDER): Primary | ICD-10-CM

## 2018-09-27 DIAGNOSIS — F41.1 GAD (GENERALIZED ANXIETY DISORDER): ICD-10-CM

## 2018-09-27 DIAGNOSIS — F33.2 SEVERE EPISODE OF RECURRENT MAJOR DEPRESSIVE DISORDER, WITHOUT PSYCHOTIC FEATURES (H): ICD-10-CM

## 2018-09-27 NOTE — MR AVS SNAPSHOT
After Visit Summary   9/27/2018    Hayde LUJAN Say    MRN: [id number]           Patient Information     Date Of Birth          1990        Visit Information        Provider Department      9/27/2018 1:40 PM Elham Mcmahan LMFT [facility]        Today's Diagnoses     PTSD (post-traumatic stress disorder)    -  1    BRENDA (generalized anxiety disorder)        Severe episode of recurrent major depressive disorder, without psychotic features (H)           Follow-ups after your visit        Your next 10 appointments already scheduled     Oct 04, 2018  2:20 PM CDT   Return Visit with Sierra Scott RPH [facility] ([facility])    [address] [place] [id number]   [phone]            Oct 04, 2018  2:40 PM CDT   RETURN OB with Rosana Rosales DO   [facility] ([facility])    [address] [place] [id number]   [phone]            Oct 11, 2018 11:00 AM CDT   Return Visit with Elham Mcmahan LMFT [facility] ([facility])    [address] [place] [address]   [phone]              Who to contact     Please call your clinic at [phone] to:    Ask questions about your health    Make or cancel appointments    Discuss your medicines    Learn about your test results    Speak to your doctor            Additional Information About Your Visit        Care EveryWhere ID     This is your Care EveryWhere ID. This could be used by other organizations to access your [facility] medical records  [id number]        Your Vitals Were     Last Period                   (LMP Unknown)            Blood Pressure from Last 3 Encounters:   09/18/18 90/59   08/29/18 100/68   08/22/18 98/64    Weight from Last 3 Encounters:   09/18/18 139 lb (63 kg)   08/16/18 137 lb 3.2 oz (62.2 kg)   08/02/18 137 lb 6.4 oz (62.3 kg)              We Performed the Following     Interactive Complexity add-on (62703)       : Sign Language or Oral - 68-82 minutes     Psychotherapy 60 min ([address])        Primary Care Provider Office Phone # Fax #    Rosana Rosales [phone]       [facility] [place] [address]        Equal Access to Services     DYLAN LEY : Hadii aad ku hadasho Soomaali, waaxda luqadaha, qaybta kaalmada adeegyada, waxay acin feliberton aderitchie ibarra felicia garcía. So [facility] [phone].    ATENCIÓN: Si habla español, tiene a keene disposición servicios gratuitos de asistencia lingüística. Llame al [phone].    We comply with applicable federal civil rights laws and [place] laws. We do not discriminate on the basis of race, color, national origin, age, disability, sex, sexual orientation, or gender identity.            Thank you!     Thank you for choosing [facility]  for your care. Our goal is always to provide you with excellent care. Hearing back from our patients is one way we can continue to improve our services. Please take a few minutes to complete the written survey that you may receive in the mail after your visit with us. Thank you!             Your Updated Medication List - Protect others around you: Learn how to safely use, store and throw away your medicines at www.disposemymeds.org.          This list is accurate as of 9/27/18 11:59 PM.  Always use your most recent med list.                   Brand Name Dispense Instructions for use Diagnosis    acetaminophen 325 MG tablet    TYLENOL    100 tablet    Take 2 tablets (650 mg) by mouth every 6 hours as needed for headaches    Episodic tension-type headache, not intractable       calcium carbonate 500 MG chewable tablet    TUMS    150 tablet    Take 1 tablet (500 mg) by mouth 2 times daily as needed for heartburn    Other chronic gastritis without hemorrhage       cetirizine 10 MG tablet    zyrTEC    30 tablet    Take 1 tablet (10 mg) by mouth daily    Chronic seasonal allergic rhinitis due to fungal spores        diphenhydrAMINE 25 MG tablet    BENADRYL    60 tablet    Take 1-2 tablets (25-50 mg) by mouth every 6 hours as needed for itching or allergies    Rash       docusate sodium 100 MG tablet    COLACE    60 tablet    Take 200 mg by mouth daily    Constipation, unspecified constipation type       fluticasone 50 MCG/ACT spray    FLONASE    1 Bottle    Spray 1-2 sprays into both nostrils daily as needed for allergies    Chronic seasonal allergic rhinitis due to fungal spores       ketotifen 0.025 % Soln ophthalmic solution    ZADITOR    1 Bottle    Place 1 drop into both eyes every 12 hours    Acute seasonal allergic rhinitis, unspecified trigger       montelukast 10 MG tablet    SINGULAIR    30 tablet    Take 1 tablet (10 mg) by mouth daily    Acute seasonal allergic rhinitis, unspecified trigger       polyethylene glycol powder    MIRALAX    510 g    Take 17 g (1 capful) by mouth daily    Constipation, unspecified constipation type       prazosin 1 MG capsule    MINIPRESS    60 capsule    Take 2 capsules (2 mg) by mouth At Bedtime    PTSD (post-traumatic stress disorder)       prenatal multivitamin plus iron 27-0.8 MG Tabs per tablet     100 tablet    Take 1 tablet by mouth daily    Encounter for supervision of other normal pregnancy in first trimester, Amenorrhea, Severe episode of recurrent major depressive disorder, without psychotic features (H)       QUEtiapine 100 MG tablet    SEROquel    30 tablet    Take 1 tablet (100 mg) by mouth At Bedtime    PTSD (post-traumatic stress disorder)       ranitidine 150 MG tablet    ZANTAC    30 tablet    Take 1 tablet (150 mg) by mouth At Bedtime    Gastroesophageal reflux disease, esophagitis presence not specified       triamcinolone 0.1 % cream    KENALOG    30 g    Apply sparingly to affected area two times daily for 14 days or until rash resolves, whichever is sooner    Rash       * venlafaxine 75 MG 24 hr capsule    EFFEXOR-XR    30 capsule    Take 1 capsule by mouth  daily (together with 150 mg capsule for total of 225 mg/day)    PTSD (post-traumatic stress disorder), Major depressive disorder, recurrent episode, moderate (H)       * venlafaxine 150 MG 24 hr capsule    EFFEXOR-XR    30 capsule    Take 1 capsule by mouth daily (together with 75 mg capsule for total of 225 mg/day)    PTSD (post-traumatic stress disorder), Major depressive disorder, recurrent episode, moderate (H)       * Notice:  This list has 2 medication(s) that are the same as other medications prescribed for you. Read the directions carefully, and ask your doctor or other care provider to review them with you.

## 2018-09-27 NOTE — PROGRESS NOTES
Met with pt to review mail, school items for the children    Son needs to have [employer]( Devin just turned 4 so school wants form filled out) have the note for , called the school to confirm when the appt is. And set up ride    Set up ride for next ob and next mh    Called [facility] and got passed along to a supervisor breanne, to talk about snap benefit - spouse is not working, no income for food    Called [facility] as pt mentioned she was supposed to go there but a ride did not come, Not knowing about the appt I was unable to schedule a ride for her.  Left a message for her worker.      Lbetz

## 2018-10-03 ENCOUNTER — CARE COORDINATION (OUTPATIENT)
Dept: FAMILY MEDICINE | Facility: CLINIC | Age: 28
End: 2018-10-03

## 2018-10-03 NOTE — PROGRESS NOTES
[facility] Follow-up    Call initiated by clinic.  Message recorded by Agustina Gramajo  Calling for: in clinic face to face  Patient: Hayde LUJAN Say  Phone conversation with: Patient  Patient's Phone number/s: Home number on file [phone] (home)  Available today in the PM on their Home number.  OK to leave message on voice mail? Yes      Major diagnoses and/or issues requiring coordination services  Mental health     In the past month, how many times have you been to the Emergency Room? 1  In the past month, how many times have you been hospitalized? 0    Summary notes: Reviewed pt concerns, currently worried about sposes mental health as he does not worry enough for her about expenses and money, wants him seen by a therapist thinks something is wrong  She is worried about her oldest son, teacher called to say he is not turning in homework, and mom can not find it in back pack  Got mom rescheduled with [facility] and all appts for her and son scheduled and rides completed    Self-management goals:  Food insecurity  Baby needs  Communication increased with children  Attend all appts for self and children- overwhelming  Asked her [facility] worker to refer to arm and assist with food insecurity - she can take her to food shelves  Readiness to change: Somewhat ready        Counseling and coordination activities with: patient/family, PCP and specialist:     Follow-up date: Monthly and prn

## 2018-10-04 ENCOUNTER — OFFICE VISIT (OUTPATIENT)
Dept: PHARMACY | Facility: CLINIC | Age: 28
End: 2018-10-04
Payer: COMMERCIAL

## 2018-10-04 ENCOUNTER — OFFICE VISIT (OUTPATIENT)
Dept: FAMILY MEDICINE | Facility: CLINIC | Age: 28
End: 2018-10-04
Payer: COMMERCIAL

## 2018-10-04 VITALS
HEART RATE: 84 BPM | OXYGEN SATURATION: 100 % | DIASTOLIC BLOOD PRESSURE: 60 MMHG | SYSTOLIC BLOOD PRESSURE: 89 MMHG | TEMPERATURE: 98 F | HEIGHT: 57 IN | WEIGHT: 140 LBS | BODY MASS INDEX: 30.2 KG/M2

## 2018-10-04 DIAGNOSIS — F41.1 GAD (GENERALIZED ANXIETY DISORDER): ICD-10-CM

## 2018-10-04 DIAGNOSIS — Z23 NEEDS FLU SHOT: ICD-10-CM

## 2018-10-04 DIAGNOSIS — F43.10 PTSD (POST-TRAUMATIC STRESS DISORDER): ICD-10-CM

## 2018-10-04 DIAGNOSIS — K59.01 SLOW TRANSIT CONSTIPATION: ICD-10-CM

## 2018-10-04 DIAGNOSIS — K59.00 CONSTIPATION, UNSPECIFIED CONSTIPATION TYPE: ICD-10-CM

## 2018-10-04 DIAGNOSIS — Z34.80 SUPERVISION OF OTHER NORMAL PREGNANCY, ANTEPARTUM: Primary | ICD-10-CM

## 2018-10-04 DIAGNOSIS — Z71.89 ENCOUNTER FOR MEDICATION REVIEW AND COUNSELING: Primary | ICD-10-CM

## 2018-10-04 RX ORDER — AMOXICILLIN 250 MG
1 CAPSULE ORAL 2 TIMES DAILY PRN
Qty: 20 TABLET | Refills: 1 | Status: SHIPPED | OUTPATIENT
Start: 2018-10-04 | End: 2018-12-19

## 2018-10-04 NOTE — MR AVS SNAPSHOT
"              After Visit Summary   10/4/2018    Hayde LUJAN Say    MRN: [id number]           Patient Information     Date Of Birth          1990        Visit Information        Provider Department      10/4/2018 2:40 PM Rosana Rosales DO [facility]        Today's Diagnoses     Supervision of other normal pregnancy, antepartum    -  1    Slow transit constipation          Care Instructions    Constipation  - 3-4 servings of fruit (EXCEPT BANANAS) per day.  Foods that start with a \"P\" are especially helpful (prunes, peaches, pears)  - Eat a lot of fresh vegetables, whole grains, and other high fiber foods.    - Drink at least 5 cups of water per day    [facility] Information  If you have any further concerns or wish to schedule another appointment, please call our office at [phone] during normal business hours (8-5, M-F).     For uncomplicated pregnancies, you will be seeing your doctor once a month until you are 28 weeks, then you will see your doctor twice a month. You will begin weekly visits at 36 weeks until you deliver.     If you have urgent medical questions that cannot wait, you may call [phone] at any time of day.     If you have a medical emergency, please call [phone].    When to call or come in to the hospital  If you notice a decrease in your baby's movement.   If your begin to experience contractions that are 5 minutes or less apart and lasting for over 45 seconds, or if contractions are becoming more painful.  If you have any bleeding or leakage of fluids.   If you have a headache not resolved with tylenol, right upper abdominal pain, or sudden onset of swelling.  You know your body best. Never hesitate to call or go to the hospital if something doesn't feel right!  Gestational Diabetes Screen  At your next visit, you will be screened for gestational diabetes. You will drink a sugary drink and then have your blood drawn to see how your body responds to a sugar load. This " test takes about an hour to complete - please plan your schedule accordingly!  The Benefits of Breastfeeding   Breastfeeding gives your new baby the very best start. It supplies nutrition, comfort, and love. Experts agree: Breastfeeding is the healthiest choice for babies during the first year of life and beyond. It s healthy for Mom, too. Breastfeeding may be challenging at first. But with support, you and your baby can succeed together.   Healthiest for Baby   Breastmilk is the ideal food for babies. It has all the nutrients your little one needs to grow healthy and strong. Here are some of the many benefits for your baby:   Breastfeeding provides contact that babies love. Frequent skin-to-skin time with Mom is calming and comforting.   Breastmilk is full of antibodies. These are substances that help your baby fight infection. Breastmilk reduces your baby's risk of respiratory illnesses, ear infections, and diarrhea.   Breastfeeding reduces your baby s risk of allergies, colds, and many other diseases.   Breastmilk changes as your baby grows, meeting her changing needs.   Breastmilk is easy for your baby to digest.   Breastmilk contains DHA, a fat that is good for your baby s developing brain and eyes.   Breastmilk decreases the risk of sudden infant death syndrome (SIDS).  Healthiest for Mom   For many women, breastfeeding is an amazing experience. It creates a strong bond between mother and baby. Other benefits for Mom include:   You can feel proud knowing that your baby is growing healthy and strong because of your milk.   Breastmilk is convenient. It s free, clean, and always the right temperature.   Breastfeeding burns calories. This can help you lose pregnancy weight faster.   Breastfeeding releases hormones that contract the uterus. This helps the uterus return to its normal size after childbirth.   Mothers who breastfeed have a decreased risk of ovarian and breast cancers.  There are many people who can  "help you learn to breastfeed. A lactation consultant is a healthcare provider specially trained to help breastfeeding moms. A lactation consultant will visit you after delivery and is available after your baby is born - if you'd like to meet with lactation prior to delivery, let your doctor know!                       Follow-ups after your visit        Follow-up notes from your care team     Return in about 3 weeks (around 10/25/2018).      Your next 10 appointments already scheduled     Oct 11, 2018 11:00 AM CDT   Return Visit with Elham Mcmahan LMFT [facility] ([facility])    [address] [place] [address]   [phone]            Oct 25, 2018  2:20 PM CDT   Return Visit with Sierra Scott RPH [facility] ([facility])    [address] [place] [id number] [phone]            Oct 25, 2018  2:40 PM CDT   RETURN OB with Rosana Rosales DO   [facility] ([facility])    [address] [place] [address]   [phone]              Who to contact     Please call your clinic at [phone] to:    Ask questions about your health    Make or cancel appointments    Discuss your medicines    Learn about your test results    Speak to your doctor            Additional Information About Your Visit        Care EveryWhere ID     This is your Care EveryWhere ID. This could be used by other organizations to access your [facility] medical records  [id number]        Your Vitals Were     Pulse Temperature Height Last Period Pulse Oximetry BMI (Body Mass Index)    84 98  F (36.7  C) (Oral) 4' 9.48\" (146 cm) (LMP Unknown) 100% 29.79 kg/m2       Blood Pressure from Last 3 Encounters:   10/04/18 (!) 89/60   09/18/18 90/59   08/29/18 100/68    Weight from Last 3 Encounters:   10/04/18 140 lb (63.5 kg)   09/18/18 139 lb (63 kg)   08/16/18 137 lb 3.2 oz (62.2 kg)              Today, you had the following     No orders found for " display         Today's Medication Changes          These changes are accurate as of 10/4/18  3:20 PM.  If you have any questions, ask your nurse or doctor.               Start taking these medicines.        Dose/Directions    senna-docusate 8.6-50 MG per tablet   Commonly known as:  DESEAN-COLACE   Used for:  Slow transit constipation   Started by:  Rosana Rosales DO        Dose:  1 tablet   Take 1 tablet by mouth 2 times daily as needed for constipation   Quantity:  20 tablet   Refills:  1            Where to get your medicines      These medications were sent to [facility] - [place], [place] - [address]  [address], [place] [address]     Phone:  [phone]     senna-docusate 8.6-50 MG per tablet                Primary Care Provider Office Phone # Fax #    Rosana Rosales [phone]       [facility] [address]  [place] [address]        Equal Access to Services     DYLAN LEY : Hadii raven roberts hadasho Soomaali, waaxda luqadaha, qaybta kaalmada adeegyada, waxay acin hayninfan justin duarte . So [facility] [phone].    ATENCIÓN: Si habla español, tiene a keene disposición servicios gratuitos de asistencia lingüística. Cristy al [phone].    We comply with applicable federal civil rights laws and [place] laws. We do not discriminate on the basis of race, color, national origin, age, disability, sex, sexual orientation, or gender identity.            Thank you!     Thank you for choosing [facility]  for your care. Our goal is always to provide you with excellent care. Hearing back from our patients is one way we can continue to improve our services. Please take a few minutes to complete the written survey that you may receive in the mail after your visit with us. Thank you!             Your Updated Medication List - Protect others around you: Learn how to safely use, store and throw away your medicines at www.disposemymeds.org.          This  list is accurate as of 10/4/18  3:20 PM.  Always use your most recent med list.                   Brand Name Dispense Instructions for use Diagnosis    acetaminophen 325 MG tablet    TYLENOL    100 tablet    Take 2 tablets (650 mg) by mouth every 6 hours as needed for headaches    Episodic tension-type headache, not intractable       calcium carbonate 500 MG chewable tablet    TUMS    150 tablet    Take 1 tablet (500 mg) by mouth 2 times daily as needed for heartburn    Other chronic gastritis without hemorrhage       cetirizine 10 MG tablet    zyrTEC    30 tablet    Take 1 tablet (10 mg) by mouth daily    Chronic seasonal allergic rhinitis due to fungal spores       diphenhydrAMINE 25 MG tablet    BENADRYL    60 tablet    Take 1-2 tablets (25-50 mg) by mouth every 6 hours as needed for itching or allergies    Rash       docusate sodium 100 MG tablet    COLACE    60 tablet    Take 200 mg by mouth daily    Constipation, unspecified constipation type       fluticasone 50 MCG/ACT spray    FLONASE    1 Bottle    Spray 1-2 sprays into both nostrils daily as needed for allergies    Chronic seasonal allergic rhinitis due to fungal spores       ketotifen 0.025 % Soln ophthalmic solution    ZADITOR    1 Bottle    Place 1 drop into both eyes every 12 hours    Acute seasonal allergic rhinitis, unspecified trigger       montelukast 10 MG tablet    SINGULAIR    30 tablet    Take 1 tablet (10 mg) by mouth daily    Acute seasonal allergic rhinitis, unspecified trigger       polyethylene glycol powder    MIRALAX    510 g    Take 17 g (1 capful) by mouth daily    Constipation, unspecified constipation type       prazosin 1 MG capsule    MINIPRESS    60 capsule    Take 2 capsules (2 mg) by mouth At Bedtime    PTSD (post-traumatic stress disorder)       prenatal multivitamin plus iron 27-0.8 MG Tabs per tablet     100 tablet    Take 1 tablet by mouth daily    Encounter for supervision of other normal pregnancy in first trimester,  Amenorrhea, Severe episode of recurrent major depressive disorder, without psychotic features (H)       QUEtiapine 100 MG tablet    SEROquel    30 tablet    Take 1 tablet (100 mg) by mouth At Bedtime    PTSD (post-traumatic stress disorder)       ranitidine 150 MG tablet    ZANTAC    30 tablet    Take 1 tablet (150 mg) by mouth At Bedtime    Gastroesophageal reflux disease, esophagitis presence not specified       senna-docusate 8.6-50 MG per tablet    DESEAN-COLACE    20 tablet    Take 1 tablet by mouth 2 times daily as needed for constipation    Slow transit constipation       triamcinolone 0.1 % cream    KENALOG    30 g    Apply sparingly to affected area two times daily for 14 days or until rash resolves, whichever is sooner    Rash       * venlafaxine 75 MG 24 hr capsule    EFFEXOR-XR    30 capsule    Take 1 capsule by mouth daily (together with 150 mg capsule for total of 225 mg/day)    PTSD (post-traumatic stress disorder), Major depressive disorder, recurrent episode, moderate (H)       * venlafaxine 150 MG 24 hr capsule    EFFEXOR-XR    30 capsule    Take 1 capsule by mouth daily (together with 75 mg capsule for total of 225 mg/day)    PTSD (post-traumatic stress disorder), Major depressive disorder, recurrent episode, moderate (H)       * Notice:  This list has 2 medication(s) that are the same as other medications prescribed for you. Read the directions carefully, and ask your doctor or other care provider to review them with you.

## 2018-10-04 NOTE — MR AVS SNAPSHOT
After Visit Summary   10/4/2018    Hayde LUJAN Say    MRN: [id number]           Patient Information     Date Of Birth          1990        Visit Information        Provider Department      10/4/2018 2:20 PM Sierra Scott, RPH [facility]        Today's Diagnoses     Encounter for medication review and counseling    -  1    Constipation, unspecified constipation type        BRENDA (generalized anxiety disorder)        PTSD (post-traumatic stress disorder)           Follow-ups after your visit        Your next 10 appointments already scheduled     Oct 11, 2018 11:00 AM CDT   Return Visit with ANABEL Eckert   [facility] ([facility])    [address] [place] [address]   [phone]            Oct 11, 2018  1:40 PM CDT   ULTRASOUND with Gemma Arceo   [facility] ([facility])    [address] [place] [address]   [phone]           An adult must be present if children accompany the patient being seen.            Oct 25, 2018  2:20 PM CDT   Return Visit with Sierra Scott, RPH [facility] ([facility])    [address] [place] [id number]   [phone]            Oct 25, 2018  2:40 PM CDT   RETURN OB with Rosana Rosales DO   [facility] ([facility])    [address] [place] [address]   [phone]              Who to contact     Please call your clinic at [phone] to:    Ask questions about your health    Make or cancel appointments    Discuss your medicines    Learn about your test results    Speak to your doctor            Additional Information About Your Visit        Care EveryWhere ID     This is your Care EveryWhere ID. This could be used by other organizations to access your [facility] medical records  [id number]        Your Vitals Were     Last Period                   (LMP Unknown)            Blood Pressure from Last 3  Encounters:   10/04/18 (!) 89/60   09/18/18 90/59   08/29/18 100/68    Weight from Last 3 Encounters:   10/04/18 140 lb (63.5 kg)   09/18/18 139 lb (63 kg)   08/16/18 137 lb 3.2 oz (62.2 kg)              We Performed the Following      : Sign Language or Oral - 68-82 minutes          Today's Medication Changes          These changes are accurate as of 10/4/18 11:59 PM.  If you have any questions, ask your nurse or doctor.               Start taking these medicines.        Dose/Directions    senna-docusate 8.6-50 MG per tablet   Commonly known as:  DESEAN-COLACE   Used for:  Slow transit constipation   Started by:  Rosana Rosales DO        Dose:  1 tablet   Take 1 tablet by mouth 2 times daily as needed for constipation   Quantity:  20 tablet   Refills:  1            Where to get your medicines      These medications were sent to [facility] - [place], [place] - [address] [place] [address]  [address] [facility] [address], [place] [address]     Phone:  [phone]     docusate sodium 100 MG tablet    prazosin 1 MG capsule    senna-docusate 8.6-50 MG per tablet                Primary Care Provider Office Phone # Fax #    Rosana Rosales [phone]       [facility] [address] [place] [address]        Equal Access to Services     DYLAN LEY AH: Hadii raven ku hadasho Soomaali, waaxda luqadaha, qaybta kaalmada adeegyada, nakul garcía. So [facility] [phone].    ATENCIÓN: Si habla español, tiene a keene disposición servicios gratuitos de asistencia lingüística. Cristy al [phone].    We comply with applicable federal civil rights laws and [place] laws. We do not discriminate on the basis of race, color, national origin, age, disability, sex, sexual orientation, or gender identity.            Thank you!     Thank you for choosing [facility]  for your care. Our goal is always to provide you with excellent care. Hearing back from our patients is one way we  can continue to improve our services. Please take a few minutes to complete the written survey that you may receive in the mail after your visit with us. Thank you!             Your Updated Medication List - Protect others around you: Learn how to safely use, store and throw away your medicines at www.disposemymeds.org.          This list is accurate as of 10/4/18 11:59 PM.  Always use your most recent med list.                   Brand Name Dispense Instructions for use Diagnosis    acetaminophen 325 MG tablet    TYLENOL    100 tablet    Take 2 tablets (650 mg) by mouth every 6 hours as needed for headaches    Episodic tension-type headache, not intractable       calcium carbonate 500 MG chewable tablet    TUMS    150 tablet    Take 1 tablet (500 mg) by mouth 2 times daily as needed for heartburn    Other chronic gastritis without hemorrhage       cetirizine 10 MG tablet    zyrTEC    30 tablet    Take 1 tablet (10 mg) by mouth daily    Chronic seasonal allergic rhinitis due to fungal spores       diphenhydrAMINE 25 MG tablet    BENADRYL    60 tablet    Take 1-2 tablets (25-50 mg) by mouth every 6 hours as needed for itching or allergies    Rash       docusate sodium 100 MG tablet    COLACE    60 tablet    Take 200 mg by mouth daily    Constipation, unspecified constipation type       fluticasone 50 MCG/ACT spray    FLONASE    1 Bottle    Spray 1-2 sprays into both nostrils daily as needed for allergies    Chronic seasonal allergic rhinitis due to fungal spores       ketotifen 0.025 % Soln ophthalmic solution    ZADITOR    1 Bottle    Place 1 drop into both eyes every 12 hours    Acute seasonal allergic rhinitis, unspecified trigger       montelukast 10 MG tablet    SINGULAIR    30 tablet    Take 1 tablet (10 mg) by mouth daily    Acute seasonal allergic rhinitis, unspecified trigger       prazosin 1 MG capsule    MINIPRESS    60 capsule    Take 2 capsules (2 mg) by mouth At Bedtime    PTSD (post-traumatic stress  disorder)       prenatal multivitamin plus iron 27-0.8 MG Tabs per tablet     100 tablet    Take 1 tablet by mouth daily    Encounter for supervision of other normal pregnancy in first trimester, Amenorrhea, Severe episode of recurrent major depressive disorder, without psychotic features (H)       QUEtiapine 100 MG tablet    SEROquel    30 tablet    Take 1 tablet (100 mg) by mouth At Bedtime    PTSD (post-traumatic stress disorder)       ranitidine 150 MG tablet    ZANTAC    30 tablet    Take 1 tablet (150 mg) by mouth At Bedtime    Gastroesophageal reflux disease, esophagitis presence not specified       senna-docusate 8.6-50 MG per tablet    DESEAN-COLACE    20 tablet    Take 1 tablet by mouth 2 times daily as needed for constipation    Slow transit constipation       triamcinolone 0.1 % cream    KENALOG    30 g    Apply sparingly to affected area two times daily for 14 days or until rash resolves, whichever is sooner    Rash       * venlafaxine 75 MG 24 hr capsule    EFFEXOR-XR    30 capsule    Take 1 capsule by mouth daily (together with 150 mg capsule for total of 225 mg/day)    PTSD (post-traumatic stress disorder), Major depressive disorder, recurrent episode, moderate (H)       * venlafaxine 150 MG 24 hr capsule    EFFEXOR-XR    30 capsule    Take 1 capsule by mouth daily (together with 75 mg capsule for total of 225 mg/day)    PTSD (post-traumatic stress disorder), Major depressive disorder, recurrent episode, moderate (H)       * Notice:  This list has 2 medication(s) that are the same as other medications prescribed for you. Read the directions carefully, and ask your doctor or other care provider to review them with you.

## 2018-10-04 NOTE — PROGRESS NOTES
Assessment and Plan     (Z71.89) Encounter for medication review and counseling  (primary encounter diagnosis)  Comment: Pillbox empty as expected. Residual doses remain! Added confirmation that taking as instructed.   Plan: Pillbox filled as below x 4 weeks. Last dose 10/31, due for refill 11/1. Requires refills on all medicines. Refills sent to pharmacy for those with none remaining per CPA.     (K59.00) Constipation, unspecified constipation type  Comment: Continues to be uncontrolled. While increased water intake as instructed no change. Requires further dietary intervention + medication change.  Plan: Per Dr. Rosales, add PRN Senna-Docusate. Continue current 200 mg docusate daily. Encouraged increase in fluid intake.     (F41.1) BRENDA (generalized anxiety disorder)  Comment: Continues to be uncontrolled. No related to adherence. Changes in medication currently limited with pregnancy. Unclear if have seen benefit from atypical antipsychotic addition. Question if ELI agent like Pregabalin could be beneficial. However, given limited data in pregnancy (both normal and abnormal pregnancy outcomes as a result of exposure - per [employer]), not option currently. Future state, consider consultation with psychiatry post-partum?  Plan: Encourage patient to continue to work with therapist.     Venlafaxine 75 mg capsule x1; Venlafaxine 150 mg capsule x1 (9/25/18 #30)    Quetiapine 100 mg tablet x1   Prazosin 1 mg capsule x2 (9/25/18 #60) - no refills available   Montelukast 10 mg tablet x1   Ranitidine 150 mg tablet x1 (9/21/18 #30)   Cetirizine 10 mg tablet x1 (9/25/18 #30)   Prenatal tablet x1   Docusate 100 mg capsule x2 (9/25/18 #60) - no refills available          Follow up: 3 weeks w/ PCP    Options for treatment and/or follow-up care were reviewed with the patient. Hayde was engaged and actively involved in the decision making process. She verbalized understanding of the options discussed and was satisfied with the  final plan. Patient was provided with written instructions/medication list via AVS.    Dr. Rosales was provided our recommendations in clinic today and Dr. Snyder was available for supervision during this visit and is the authorizing prescriber for this visit through the pharmacist collaborative practice agreement.    Thank you for the opportunity to participate in the care of this patient.  Sierra Scott, Pharm.D.  [facility]: [phone]    Current Outpatient Prescriptions   Medication Sig Dispense Refill     cetirizine (ZYRTEC) 10 MG tablet Take 1 tablet (10 mg) by mouth daily 30 tablet 3     docusate sodium (COLACE) 100 MG tablet Take 200 mg by mouth daily 60 tablet 3     montelukast (SINGULAIR) 10 MG tablet Take 1 tablet (10 mg) by mouth daily 30 tablet 11     prazosin (MINIPRESS) 1 MG capsule Take 2 capsules (2 mg) by mouth At Bedtime 60 capsule 3     Prenatal Vit-Fe Fumarate-FA (PRENATAL MULTIVITAMIN PLUS IRON) 27-0.8 MG TABS per tablet Take 1 tablet by mouth daily 100 tablet 3     QUEtiapine (SEROQUEL) 100 MG tablet Take 1 tablet (100 mg) by mouth At Bedtime 30 tablet 3     ranitidine (ZANTAC) 150 MG tablet Take 1 tablet (150 mg) by mouth At Bedtime 30 tablet 3     venlafaxine (EFFEXOR-XR) 150 MG 24 hr capsule Take 1 capsule by mouth daily (together with 75 mg capsule for total of 225 mg/day) 30 capsule 11     venlafaxine (EFFEXOR-XR) 75 MG 24 hr capsule Take 1 capsule by mouth daily (together with 150 mg capsule for total of 225 mg/day) 30 capsule 11     acetaminophen (TYLENOL) 325 MG tablet Take 2 tablets (650 mg) by mouth every 6 hours as needed for headaches 100 tablet 1     calcium carbonate (TUMS) 500 MG chewable tablet Take 1 tablet (500 mg) by mouth 2 times daily as needed for heartburn 150 tablet 3     diphenhydrAMINE (BENADRYL) 25 MG tablet Take 1-2 tablets (25-50 mg) by mouth every 6 hours as needed for itching or allergies 60 tablet 1     fluticasone  "(FLONASE) 50 MCG/ACT spray Spray 1-2 sprays into both nostrils daily as needed for allergies 1 Bottle 1     ketotifen (ZADITOR) 0.025 % SOLN ophthalmic solution Place 1 drop into both eyes every 12 hours 1 Bottle 3     senna-docusate (DESEAN-COLACE) 8.6-50 MG per tablet Take 1 tablet by mouth 2 times daily as needed for constipation 20 tablet 1     triamcinolone (KENALOG) 0.1 % cream Apply sparingly to affected area two times daily for 14 days or until rash resolves, whichever is sooner 30 g 0     [DISCONTINUED] prazosin (MINIPRESS) 1 MG capsule Take 2 capsules (2 mg) by mouth At Bedtime 60 capsule 3            HPI:       Hayde Macias is a 27 year old female referred by Dr. Rosales for medication management and counseling. Patient seen today with the assistance of Maryann Frances.    Initially expresses concern for \"heartburn\". Not affected by food. All day every day. Worse than in other pregnancies.  assists in identifying that the patient feels her \"heart is burning\". Different from acid reflux. Then explains further that likely because she has \"too much worry.\" Other symptoms related to worry: frustration, heart racing, wonders if she should use oxygen for trouble breathing.     Still having constipation. Liked powder packets. Unable to tolerate Miralax. 3 small glasses of water per day. Increased from one glass/day previously. One hard bowel movement per week. Hard, requires straining.     Hayde informs us today that she does not do the shopping for her house. Her  does this. Beverages other than water available = receives juice from [employer].           PMHX:     Patient Active Problem List   Diagnosis     Health Care Home     Recurrent major depressive disorder (H)     Encounter for supervision of normal pregnancy     Seasonal allergic rhinitis     PTSD (post-traumatic stress disorder)     BRENDA (generalized anxiety disorder)     Other fatigue     Insomnia due to other mental disorder " "    Allergies   Allergen Reactions     Augmentin Rash            Objective     VS w/ IP 10/4/2018 10/4/2018   SYSTOLIC 89    DIASTOLIC 60    PULSE 84    TEMPERATURE 98    RESPIRATIONS (No Data)    Wt.(Lbs.) 140    Wt. (Kg) 63.504 kg    Ht. (Feet./Inches) 4' 9.48\"    Ht. (Cm) 146 cm    BMI  29.79   O2 Sat 100            [facility] Pharmacist Documentation     Drug therapy problems identified:  Medical Condition 1: Constipation, Goals of therapy: Not at goal, Drug Class: Stimulant, Indication: Needs additional drug therapy, Intervention: Initiate drug, Verification: Provider Agreed  Medical Condition 2: Anxiety, Goals of therapy 2: Not at goal, Drug Class 2: Other, Efficacy 2: More effective/evidence based drug available, Intervention 2: Change drug, Verification 2: Deferred to future visit    Relevant medical devices: n/a    # of medical conditions addressed: 2  # of medications addressed: 9  # of DTP identified: 2  Time spent: 20 minutes  Level of service per [place] DHS guidelines: 3 nc             Documentation   Due to patient being non-English speaking/uses sign language, an  was used for this visit. Only for face-to-face interpretation by an external agency, date and length of interpretation can be found on the scanned worksheet.     name: Frances  Agency: [employer]  Language: Maryann   Telephone number: [phone]  Type of interpretation: Face-to-face, spoken              "

## 2018-10-04 NOTE — PROGRESS NOTES
Preceptor Attestation:   Patient seen, evaluated and discussed with the resident, Dr. Rosana Rosales. I have verified the content of the note, which accurately reflects my assessment of the patient and the plan of care.  Supervising Physician:Teagan Snyder MD  [facility]

## 2018-10-04 NOTE — PROGRESS NOTES
"SUBJECTIVE  No vaginal bleeding, loss of fluids, or cramping noted. No headaches, change in vision, RUQ pain, or lower extremity edema. Patient has felt baby move. She has been able to tolerate a diet with no significant nausea/vomitting. She has been taking her pre- vitamin.    Constipation: having one bowel movement per week. Hard in consistency and has to strain. Doesn't like miralax. Taking colace.     Mood: Still having a lot of \"worry\" about many things. Feels her heart racing during these episodes. She is able to go into a quiet room and focus on her breathing and the episode will pass.     OBJECTIVE  BP (!) 89/60  Pulse 84  Temp 98  F (36.7  C) (Oral)  Ht 4' 9.48\" (146 cm)  Wt 140 lb (63.5 kg)  LMP  (LMP Unknown)  SpO2 100%  BMI 29.79 kg/m2    General: sitting up in a chair awake and alert. Pleasant and cooperative in NAD.  Cardio: RRR  Resp: clear vesicular breath sounds bilaterally, no increased work of breathing  Abd: gravid, non-tender, uterus palpated 21 cm, FHTs 147  Skin: no lesions or rashes  Ext: no edema  Psych: mood good, affect congruent. Mentation and conversation appropriate.    ASSESSMENT/PLAN  Patient is a  a 27 year old  female at 20w2d corresponding to CHARLI of  2019 obtained via 7 week ultrasound.  - prenatal labs unremarkable (Blood type:O+, Hgb:12.4, Hep B, HIV, syphilis, GC/Chlam, HIV negative, Rubella immune)  - pregravid weight 136 lbs, TWG today 4 lbs.   - Mood concerns: history of PTSD, BRENDA, recurrent major depressive disorder currently on effexor, prazosin, and seroquel. Mood stable today.   - planning on vaginal  delivery  - Formula feeding  - Declines genetic testing  - Schedule fetal survey for 22 wks today. Does NOT want to know gender. Hoping for girl.    - Flu vaccine 10/4/18  -     2. Slow transit constipation  Continued to encourage increased water intake. Discussed helpful foods/juices.  - senna-docusate (DESEAN-COLACE) 8.6-50 MG per tablet; Take 1 " tablet by mouth 2 times daily as needed for constipation  Dispense: 20 tablet; Refill: 1    3. Needs flu shot  - FLU VAC PRESRV FREE QUAD SPLIT VIR IM, 0.5 mL dosage  - ADMIN VACCINE, INITIAL      F/u 3 weeks.     Precepted with: MD Rosana Anderson DO  [facility] Medicine Resident  Pager #[phone]

## 2018-10-04 NOTE — PATIENT INSTRUCTIONS
"Constipation  - 3-4 servings of fruit (EXCEPT BANANAS) per day.  Foods that start with a \"P\" are especially helpful (prunes, peaches, pears)  - Eat a lot of fresh vegetables, whole grains, and other high fiber foods.    - Drink at least 5 cups of water per day    [facility] Information  If you have any further concerns or wish to schedule another appointment, please call our office at [phone] during normal business hours (8-5, M-F).     For uncomplicated pregnancies, you will be seeing your doctor once a month until you are 28 weeks, then you will see your doctor twice a month. You will begin weekly visits at 36 weeks until you deliver.     If you have urgent medical questions that cannot wait, you may call [phone] at any time of day.     If you have a medical emergency, please call [phone].    When to call or come in to the hospital  If you notice a decrease in your baby's movement.   If your begin to experience contractions that are 5 minutes or less apart and lasting for over 45 seconds, or if contractions are becoming more painful.  If you have any bleeding or leakage of fluids.   If you have a headache not resolved with tylenol, right upper abdominal pain, or sudden onset of swelling.  You know your body best. Never hesitate to call or go to the hospital if something doesn't feel right!  Gestational Diabetes Screen  At your next visit, you will be screened for gestational diabetes. You will drink a sugary drink and then have your blood drawn to see how your body responds to a sugar load. This test takes about an hour to complete - please plan your schedule accordingly!  The Benefits of Breastfeeding   Breastfeeding gives your new baby the very best start. It supplies nutrition, comfort, and love. Experts agree: Breastfeeding is the healthiest choice for babies during the first year of life and beyond. It s healthy for Mom, too. Breastfeeding may be challenging at first. But with support, you " and your baby can succeed together.   Healthiest for Baby   Breastmilk is the ideal food for babies. It has all the nutrients your little one needs to grow healthy and strong. Here are some of the many benefits for your baby:   Breastfeeding provides contact that babies love. Frequent skin-to-skin time with Mom is calming and comforting.   Breastmilk is full of antibodies. These are substances that help your baby fight infection. Breastmilk reduces your baby's risk of respiratory illnesses, ear infections, and diarrhea.   Breastfeeding reduces your baby s risk of allergies, colds, and many other diseases.   Breastmilk changes as your baby grows, meeting her changing needs.   Breastmilk is easy for your baby to digest.   Breastmilk contains DHA, a fat that is good for your baby s developing brain and eyes.   Breastmilk decreases the risk of sudden infant death syndrome (SIDS).  Healthiest for Mom   For many women, breastfeeding is an amazing experience. It creates a strong bond between mother and baby. Other benefits for Mom include:   You can feel proud knowing that your baby is growing healthy and strong because of your milk.   Breastmilk is convenient. It s free, clean, and always the right temperature.   Breastfeeding burns calories. This can help you lose pregnancy weight faster.   Breastfeeding releases hormones that contract the uterus. This helps the uterus return to its normal size after childbirth.   Mothers who breastfeed have a decreased risk of ovarian and breast cancers.  There are many people who can help you learn to breastfeed. A lactation consultant is a healthcare provider specially trained to help breastfeeding moms. A lactation consultant will visit you after delivery and is available after your baby is born - if you'd like to meet with lactation prior to delivery, let your doctor know!

## 2018-10-10 RX ORDER — ASPIRIN 81 MG
200 TABLET, DELAYED RELEASE (ENTERIC COATED) ORAL DAILY
Qty: 60 TABLET | Refills: 3 | Status: SHIPPED | OUTPATIENT
Start: 2018-10-10 | End: 2019-02-19

## 2018-10-10 RX ORDER — PRAZOSIN HYDROCHLORIDE 1 MG/1
2 CAPSULE ORAL AT BEDTIME
Qty: 60 CAPSULE | Refills: 3 | Status: SHIPPED | OUTPATIENT
Start: 2018-10-10 | End: 2018-11-02

## 2018-10-11 ENCOUNTER — OFFICE VISIT (OUTPATIENT)
Dept: PSYCHOLOGY | Facility: CLINIC | Age: 28
End: 2018-10-11
Payer: COMMERCIAL

## 2018-10-11 DIAGNOSIS — F41.1 GAD (GENERALIZED ANXIETY DISORDER): ICD-10-CM

## 2018-10-11 DIAGNOSIS — Z34.80 SUPERVISION OF OTHER NORMAL PREGNANCY, ANTEPARTUM: ICD-10-CM

## 2018-10-11 DIAGNOSIS — F43.10 PTSD (POST-TRAUMATIC STRESS DISORDER): Primary | ICD-10-CM

## 2018-10-11 DIAGNOSIS — F33.2 SEVERE EPISODE OF RECURRENT MAJOR DEPRESSIVE DISORDER, WITHOUT PSYCHOTIC FEATURES (H): ICD-10-CM

## 2018-10-11 NOTE — LETTER
October 15, 2018      Hayde LUJAN Say  [address]  [place] [address]        Dear Hayde,    Your OB US showed baby was developing appropriately.     If you have any questions, please call the clinic to make an appointment.    Sincerely,    Gemma Arceo

## 2018-10-11 NOTE — PROGRESS NOTES
Due to patient being non-English speaking/uses sign language, an  was used for this visit. Only for face-to-face interpretation by an external agency, date and length of interpretation can be found on the scanned worksheet.     name: Frances England  Agency: [employer]  Language: Maryann   Telephone number: [phone]  Type of interpretation: Face-to-face, spoken

## 2018-10-11 NOTE — PROGRESS NOTES
Behavioral Health Progress Note    Client Legal Name:             Hayde LUJAN Say                  Client Preferred Name: Hayde             Service Type:           Individual  Length of Visit: 55 minutes  Attendees:  , care coordinator      Due to patient being non-English speaking/uses sign language, an  was used for this visit. Date and length of interpretation can be found on the scanned  worksheet.      name: Frances England  Agency: [employer]  Language: Maryann   Telephone number: [phone]  Type of interpretation: Face-to-face, spoken    Complexity statement: Due to patient being non-English speaking, an  was used for this visit. An  is used not only to interpret language, since the patient does not speak English, but also to help with the complexity of understandings across cultures, since the patient is not well integrated in the larger [place] culture.         Identifying Information and Presenting Problem:      The patient is a 27 year old Maryann female who is being seen for problematic symptoms of PTSD, depression.      Treatment Objective(s) Addressed in This Session:  Depression management and anxiety management.       Progress on / Status of Treatment Objective(s) / Homework:  Worsening       PHQ-9 SCORE 1/22/2018 8/7/2018 9/18/2018   Total Score - - -   Total Score 6 11 17       BRENDA-7 SCORE 4/26/2017 8/29/2017 1/22/2018   Total Score 8 15 14       Topics Discussed/Interventions Provided:  Patient was worried and tired today. She was hard to redirect, very focused on certain topics. One was how anxious she has been feeling. Deep breathing is helping. Sometimes she tries to nap. Overall, though, it is worsening as are her nightmares. She is having worsening nightmares of people trying to kill or rape her. She is sleeping very poorly and is more irritable.     The school called about one of her sons and she did not understand why. During our appt,  Agustina Gramajo, clinic /Hayde's care coordinator reached out to the school and learned her son had been misbehaving on the bus. During the last 1/2 of our appt, Agustina came in and we discussed her call with the school. We offered Hayde suggestions for punishment including chores, losing privileges. She reports he does not understand Maryann and she does not know how to punish him. At this point, she seemed shut down-was crying a little and hardly spoke.     Assessment: The patient appeared to be active and engaged in today's session and was receptive to feedback. I believe her worsening sleep is exacerbating anxiety and depressive symptoms. She was not in a very receptive place to discuss parenting challenges and learn more strategies for anxiety management. She had not eaten yet and was very tired (appt was 11am).    Mental Status: Hayde appeared generally alert and oriented. Dress was casual and appropriate to the weather and occasion. Grooming and hygiene were clean. Eye contact was adequate. Speech was of normal volume and rate and was clear, coherent, and relevant. Mood was depresed with congruent affect. Thought processes were relevant, logical and goal-directed. Thought content was WNL with no evidence of psychotic or paranoid features. No evidence of SI/HI or self-harm, intent, or plans. Memory appeared grossly intact. Insight and judgment appeared poor insight, fair judgment and patient exhibited good impulse control during the appointment.      Does the patient appear to be at imminent risk of harm to self/others at this time? No      The session was necessary to address worry, depression, PTSD that have been interfering with patient's ability to function at concentrating at work, being able to work with strangers, arguments with her , personal life management, parenting.  Ongoing psychotherapy is necessary to improve functioning with daily activities, provide psychoeducation and provide  support.      Diagnosis (DSM-5):  296.33 recurrent severe major depression  308.3 PTSD  300.02 Generalized anxiety disorder        Plan:  1. Follow up in 1-2 weeks.  2. Continue with concrete strategies to manage depression, anger, anxiety.           NOTE: Treatment plan update due 12/27/18.  Diagnostic assessment update due 4/26/19

## 2018-10-11 NOTE — MR AVS SNAPSHOT
After Visit Summary   10/11/2018    Hayde LUJAN Say    MRN: [id number]           Patient Information     Date Of Birth          1990        Visit Information        Provider Department      10/11/2018 11:00 AM Elham Mcmhaan, LMFT [facility]        Today's Diagnoses     PTSD (post-traumatic stress disorder)    -  1    Severe episode of recurrent major depressive disorder, without psychotic features (H)        BRENDA (generalized anxiety disorder)           Follow-ups after your visit        Your next 10 appointments already scheduled     Oct 25, 2018  2:20 PM CDT   Return Visit with Sierra Scott RPH [facility] ([facility])    [address] [place] [id number] [phone]            Oct 25, 2018  2:40 PM CDT   RETURN OB with Rosana Rosales DO   [facility] ([facility])    [address] [place] [address] [phone]              Who to contact     Please call your clinic at [phone] to:    Ask questions about your health    Make or cancel appointments    Discuss your medicines    Learn about your test results    Speak to your doctor            Additional Information About Your Visit        Care EveryWhere ID     This is your Care EveryWhere ID. This could be used by other organizations to access your [place] medical records  [id number]        Your Vitals Were     Last Period                   (LMP Unknown)            Blood Pressure from Last 3 Encounters:   10/04/18 (!) 89/60   09/18/18 90/59   08/29/18 100/68    Weight from Last 3 Encounters:   10/04/18 140 lb (63.5 kg)   09/18/18 139 lb (63 kg)   08/16/18 137 lb 3.2 oz (62.2 kg)              We Performed the Following     Interactive Complexity add-on (50279)     Psychotherapy 60 min (37399)        Primary Care Provider Office Phone # Fax #    Rosana Rosales [phone]       [facility] [place] [address]        Equal  Access to Services     [facility]: Hadii aad ku hadlainashelia Celenafransisco, wacarinda luqadaha, qaybta kajbnakul elizabeth. So [facility] [phone].    ATENCIÓN: Si habla jolanta, tiene a keene disposición servicios gratuitos de asistencia lingüística. Llame al [phone].    We comply with applicable federal civil rights laws and [place] laws. We do not discriminate on the basis of race, color, national origin, age, disability, sex, sexual orientation, or gender identity.            Thank you!     Thank you for choosing [facility]  for your care. Our goal is always to provide you with excellent care. Hearing back from our patients is one way we can continue to improve our services. Please take a few minutes to complete the written survey that you may receive in the mail after your visit with us. Thank you!             Your Updated Medication List - Protect others around you: Learn how to safely use, store and throw away your medicines at www.disposemymeds.org.          This list is accurate as of 10/11/18  2:13 PM.  Always use your most recent med list.                   Brand Name Dispense Instructions for use Diagnosis    acetaminophen 325 MG tablet    TYLENOL    100 tablet    Take 2 tablets (650 mg) by mouth every 6 hours as needed for headaches    Episodic tension-type headache, not intractable       calcium carbonate 500 MG chewable tablet    TUMS    150 tablet    Take 1 tablet (500 mg) by mouth 2 times daily as needed for heartburn    Other chronic gastritis without hemorrhage       cetirizine 10 MG tablet    zyrTEC    30 tablet    Take 1 tablet (10 mg) by mouth daily    Chronic seasonal allergic rhinitis due to fungal spores       diphenhydrAMINE 25 MG tablet    BENADRYL    60 tablet    Take 1-2 tablets (25-50 mg) by mouth every 6 hours as needed for itching or allergies    Rash       docusate sodium 100 MG tablet    COLACE    60 tablet    Take 200 mg by mouth daily     Constipation, unspecified constipation type       fluticasone 50 MCG/ACT spray    FLONASE    1 Bottle    Spray 1-2 sprays into both nostrils daily as needed for allergies    Chronic seasonal allergic rhinitis due to fungal spores       ketotifen 0.025 % Soln ophthalmic solution    ZADITOR    1 Bottle    Place 1 drop into both eyes every 12 hours    Acute seasonal allergic rhinitis, unspecified trigger       montelukast 10 MG tablet    SINGULAIR    30 tablet    Take 1 tablet (10 mg) by mouth daily    Acute seasonal allergic rhinitis, unspecified trigger       prazosin 1 MG capsule    MINIPRESS    60 capsule    Take 2 capsules (2 mg) by mouth At Bedtime    PTSD (post-traumatic stress disorder)       prenatal multivitamin plus iron 27-0.8 MG Tabs per tablet     100 tablet    Take 1 tablet by mouth daily    Encounter for supervision of other normal pregnancy in first trimester, Amenorrhea, Severe episode of recurrent major depressive disorder, without psychotic features (H)       QUEtiapine 100 MG tablet    SEROquel    30 tablet    Take 1 tablet (100 mg) by mouth At Bedtime    PTSD (post-traumatic stress disorder)       ranitidine 150 MG tablet    ZANTAC    30 tablet    Take 1 tablet (150 mg) by mouth At Bedtime    Gastroesophageal reflux disease, esophagitis presence not specified       senna-docusate 8.6-50 MG per tablet    DESEAN-COLACE    20 tablet    Take 1 tablet by mouth 2 times daily as needed for constipation    Slow transit constipation       triamcinolone 0.1 % cream    KENALOG    30 g    Apply sparingly to affected area two times daily for 14 days or until rash resolves, whichever is sooner    Rash       * venlafaxine 75 MG 24 hr capsule    EFFEXOR-XR    30 capsule    Take 1 capsule by mouth daily (together with 150 mg capsule for total of 225 mg/day)    PTSD (post-traumatic stress disorder), Major depressive disorder, recurrent episode, moderate (H)       * venlafaxine 150 MG 24 hr capsule    EFFEXOR-XR     30 capsule    Take 1 capsule by mouth daily (together with 75 mg capsule for total of 225 mg/day)    PTSD (post-traumatic stress disorder), Major depressive disorder, recurrent episode, moderate (H)       * Notice:  This list has 2 medication(s) that are the same as other medications prescribed for you. Read the directions carefully, and ask your doctor or other care provider to review them with you.

## 2018-10-11 NOTE — Clinical Note
Any other ideas for meds?? BP is low, but seems to be tolerated low BPs. Her nightmares have been worsening significantly, so sleep is terrible, increased irritability, and anxiety/worry is worse than baseline as well.

## 2018-10-12 ENCOUNTER — TELEPHONE (OUTPATIENT)
Dept: FAMILY MEDICINE | Facility: CLINIC | Age: 28
End: 2018-10-12

## 2018-10-12 NOTE — TELEPHONE ENCOUNTER
[facility] is having trouble getting interpreters to show up for appts with pt and her son, they called to see who we use.    Let them know we use shiraz dsouza and they do as well but are struggling to get them there.      They will try to request Frances and see if that goes better as well

## 2018-10-13 NOTE — PROGRESS NOTES
Please notify patient that her OB US showed baby was developing appropriately.     Rosana Rosales DO  [facility] Medicine Resident  Pager #[phone]

## 2018-10-15 ENCOUNTER — TELEPHONE (OUTPATIENT)
Dept: FAMILY MEDICINE | Facility: CLINIC | Age: 28
End: 2018-10-15

## 2018-10-15 NOTE — TELEPHONE ENCOUNTER
No answer when attempted two calls to pt, left message about her Monday appt with Elham.  Will continue to reach out for follow up since she was delivered the message of having a boy when she wants a girl and she was already struggling with feelings of depression and hopelessness earlier in the day.  Want to follow up and see how she is adjusting.  Ubaldoetz

## 2018-10-18 ENCOUNTER — TELEPHONE (OUTPATIENT)
Dept: FAMILY MEDICINE | Facility: CLINIC | Age: 28
End: 2018-10-18

## 2018-10-18 NOTE — TELEPHONE ENCOUNTER
"Called and spoke with pt today  Told her I was worried as she was really sad last time I saw her and she said yes, sad, crying, frustrated, and tired.  It was good to see her put her feelings to words.  Then asked how she feels about having a baby boy- said \"oh, not happy\"  Asked what we can do about that, she had no ideas but said she does not feel well, nervous, feels like heart is pounding too hard, dizzy light headed and sounded like breathing was more labored at beginning but improved during conversation.  Talked about what to do if this continues or worsens.    Asked about picking up veggies she is going to try for tomorrow as she is too tired and has no ride today.   Encouraged her to see if a friend or family could bring her as the food will help her and baby.      We discussed upcoming appts for Monday and Thursday of next week, she will bring some papers from [employer] school to me on Monday when she meets with camille.      Continue to check in with pt regarding depression and pregnancy, may need to revisit desire to keep child possible adoption?    Ubaldoetz  "

## 2018-10-22 ENCOUNTER — OFFICE VISIT (OUTPATIENT)
Dept: PSYCHOLOGY | Facility: CLINIC | Age: 28
End: 2018-10-22
Payer: COMMERCIAL

## 2018-10-22 DIAGNOSIS — F33.2 SEVERE EPISODE OF RECURRENT MAJOR DEPRESSIVE DISORDER, WITHOUT PSYCHOTIC FEATURES (H): ICD-10-CM

## 2018-10-22 DIAGNOSIS — F43.10 PTSD (POST-TRAUMATIC STRESS DISORDER): Primary | ICD-10-CM

## 2018-10-22 DIAGNOSIS — F41.1 GAD (GENERALIZED ANXIETY DISORDER): ICD-10-CM

## 2018-10-22 NOTE — PROGRESS NOTES
Behavioral Health Progress Note    Client Legal Name:             Hayde LUJAN Say                  Client Preferred Name: Hayde             Service Type: Individual  Length of Visit: 55 minutes  Attendees:       Due to patient being non-English speaking/uses sign language, an  was used for this visit. Date and length of interpretation can be found on the scanned  worksheet.       name: Frances England  Agency: [employer]  Language: Maryann   Telephone number: [phone]  Type of interpretation: Face-to-face, spoken     Complexity statement: Due to patient being non-English speaking, an  was used for this visit. An  is used not only to interpret language, since the patient does not speak English, but also to help with the complexity of understandings across cultures, since the patient is not well integrated in the larger [place] culture.          Identifying Information and Presenting Problem:      The patient is a 27 year old Maryann female who is being seen for problematic symptoms of PTSD, depression.      Treatment Objective(s) Addressed in This Session:  Depression management and anxiety management.       Progress on / Status of Treatment Objective(s) / Homework:  Worsening        PHQ-9 SCORE 1/22/2018 8/7/2018 9/18/2018   Total Score - - -   Total Score 6 11 17       BRENDA-7 SCORE 4/26/2017 8/29/2017 1/22/2018   Total Score 8 15 14       Topics Discussed/Interventions Provided:  1) Ultrasound and preparation for baby. Patient recently found out she is having another baby boy, when she wanted a girl. We processed her feelings around this today. She did not answer many questions related to her own feelings about having a boy, instead often talked about how unhappy her mother was at having another grandson (all grandchildren are boys). Her community is blaming her for baby's sex. Since pt has been unhappy about this pregnancy, we approached the topic of adoption  today. It was unclear whether patient wanted this or not, kept replying her  would not allow. When I pressed about how she knew this/whether they had discussed it, patient said I would need to speak with her . Unclear if they have talked or not.     2) Panic attacks. Patient experiencing increased attacks, entire episodes lasting 2-3 hours. Feels dizzy, 'heart breaking', heart pounding, difficulty breathing, heart beating fast. Cannot identify specific trigger, is worried about what this means, that she might die. Is having more bad nightmares. Sleeping very poorly, 2-3 hrs, max, poor appetite.     3) Citizenship- reviewed and finished filling out patient's n648 form. She reported feeling scared as I was reviewing it with her. When we tried to process her fears, she again stated she was uncertain why she was scared and did not really endorse any of my suggestions.     Assessment: The patient appeared to be active and engaged in today's session and was receptive to feedback. Although patient identifies having more stress, worry, and not sleeping well, she seems to have little insight that this is causing her panic attacks. Interestingly, she can see that lack of sleep and appetite impact dizziness.    Mental Status: Hayde appeared generally alert and oriented. Dress was casual and appropriate to the weather and occasion. Grooming and hygiene were clean. Eye contact was adequate. Speech was of normal volume and rate and was clear, coherent, and relevant. Mood was depresed with congruent affect. Thought processes were relevant, logical and goal-directed. Thought content was WNL with no evidence of psychotic or paranoid features. No evidence of SI/HI or self-harm, intent, or plans. Memory appeared grossly intact. Insight and judgment appeared poor insight, fair judgment and patient exhibited good impulse control during the appointment.       Does the patient appear to be at imminent risk of harm to  self/others at this time? No      The session was necessary to address worry, depression, PTSD that have been interfering with patient's ability to function at concentrating at work, being able to work with strangers, arguments with her , personal life management, parenting.  Ongoing psychotherapy is necessary to improve functioning with daily activities, provide psychoeducation and provide support.      Diagnosis (DSM-5):  296.33 recurrent severe major depression  308.3 PTSD  300.02 Generalized anxiety disorder        Plan:  1. Follow up in 1-2 weeks.  2. Continue with concrete strategies to manage depression, anger, anxiety.          NOTE: Treatment plan update due 12/27/18.  Diagnostic assessment update due 4/26/19

## 2018-10-22 NOTE — MR AVS SNAPSHOT
After Visit Summary   10/22/2018    Hayde LUJAN Say    MRN: [id number]           Patient Information     Date Of Birth          1990        Visit Information        Provider Department      10/22/2018 10:40 AM Elham Mcmahan, LMFT [facility]        Today's Diagnoses     PTSD (post-traumatic stress disorder)    -  1    Severe episode of recurrent major depressive disorder, without psychotic features (H)        BRENDA (generalized anxiety disorder)           Follow-ups after your visit        Who to contact     Please call your clinic at [phone] to:    Ask questions about your health    Make or cancel appointments    Discuss your medicines    Learn about your test results    Speak to your doctor            Additional Information About Your Visit        Care EveryWhere ID     This is your Care EveryWhere ID. This could be used by other organizations to access your [facility] medical records  [id number]        Your Vitals Were     Last Period                   (LMP Unknown)            Blood Pressure from Last 3 Encounters:   10/25/18 105/69   10/04/18 (!) 89/60   09/18/18 90/59    Weight from Last 3 Encounters:   10/25/18 144 lb 6.4 oz (65.5 kg)   10/04/18 140 lb (63.5 kg)   09/18/18 139 lb (63 kg)              We Performed the Following     Interactive Complexity add-on (84558)     Psychotherapy 60 min (42834)        Primary Care Provider Office Phone # Fax #    Rosana RosalesDO [phone]       [facility] [place] [address]        Equal Access to Services     [facility]: Hadii aad ku hadasho Soomaali, waaxda luqadaha, qaybta kaalmada adeegyada, nakul duarte . So [facility] [phone].    ATENCIÓN: Si habla español, tiene a keene disposición servicios gratuitos de asistencia lingüística. Llame al [phone].    We comply with applicable federal civil rights laws and [place] laws. We do not discriminate on the basis of race, color,  national origin, age, disability, sex, sexual orientation, or gender identity.            Thank you!     Thank you for choosing [facility]  for your care. Our goal is always to provide you with excellent care. Hearing back from our patients is one way we can continue to improve our services. Please take a few minutes to complete the written survey that you may receive in the mail after your visit with us. Thank you!             Your Updated Medication List - Protect others around you: Learn how to safely use, store and throw away your medicines at www.disposemymeds.org.          This list is accurate as of 10/22/18 11:59 PM.  Always use your most recent med list.                   Brand Name Dispense Instructions for use Diagnosis    acetaminophen 325 MG tablet    TYLENOL    100 tablet    Take 2 tablets (650 mg) by mouth every 6 hours as needed for headaches    Episodic tension-type headache, not intractable       calcium carbonate 500 MG chewable tablet    TUMS    150 tablet    Take 1 tablet (500 mg) by mouth 2 times daily as needed for heartburn    Other chronic gastritis without hemorrhage       cetirizine 10 MG tablet    zyrTEC    30 tablet    Take 1 tablet (10 mg) by mouth daily    Chronic seasonal allergic rhinitis due to fungal spores       diphenhydrAMINE 25 MG tablet    BENADRYL    60 tablet    Take 1-2 tablets (25-50 mg) by mouth every 6 hours as needed for itching or allergies    Rash       docusate sodium 100 MG tablet    COLACE    60 tablet    Take 200 mg by mouth daily    Constipation, unspecified constipation type       fluticasone 50 MCG/ACT spray    FLONASE    1 Bottle    Spray 1-2 sprays into both nostrils daily as needed for allergies    Chronic seasonal allergic rhinitis due to fungal spores       ketotifen 0.025 % Soln ophthalmic solution    ZADITOR    1 Bottle    Place 1 drop into both eyes every 12 hours    Acute seasonal allergic rhinitis, unspecified trigger       montelukast 10  MG tablet    SINGULAIR    30 tablet    Take 1 tablet (10 mg) by mouth daily    Acute seasonal allergic rhinitis, unspecified trigger       prazosin 1 MG capsule    MINIPRESS    60 capsule    Take 2 capsules (2 mg) by mouth At Bedtime    PTSD (post-traumatic stress disorder)       prenatal multivitamin plus iron 27-0.8 MG Tabs per tablet     100 tablet    Take 1 tablet by mouth daily    Encounter for supervision of other normal pregnancy in first trimester, Amenorrhea, Severe episode of recurrent major depressive disorder, without psychotic features (H)       QUEtiapine 100 MG tablet    SEROquel    30 tablet    Take 1 tablet (100 mg) by mouth At Bedtime    PTSD (post-traumatic stress disorder)       ranitidine 150 MG tablet    ZANTAC    30 tablet    Take 1 tablet (150 mg) by mouth At Bedtime    Gastroesophageal reflux disease, esophagitis presence not specified       senna-docusate 8.6-50 MG per tablet    DESEAN-COLACE    20 tablet    Take 1 tablet by mouth 2 times daily as needed for constipation    Slow transit constipation       triamcinolone 0.1 % cream    KENALOG    30 g    Apply sparingly to affected area two times daily for 14 days or until rash resolves, whichever is sooner    Rash       * venlafaxine 75 MG 24 hr capsule    EFFEXOR-XR    30 capsule    Take 1 capsule by mouth daily (together with 150 mg capsule for total of 225 mg/day)    PTSD (post-traumatic stress disorder), Major depressive disorder, recurrent episode, moderate (H)       * venlafaxine 150 MG 24 hr capsule    EFFEXOR-XR    30 capsule    Take 1 capsule by mouth daily (together with 75 mg capsule for total of 225 mg/day)    PTSD (post-traumatic stress disorder), Major depressive disorder, recurrent episode, moderate (H)       * Notice:  This list has 2 medication(s) that are the same as other medications prescribed for you. Read the directions carefully, and ask your doctor or other care provider to review them with you.

## 2018-10-23 ENCOUNTER — TELEPHONE (OUTPATIENT)
Dept: FAMILY MEDICINE | Facility: CLINIC | Age: 28
End: 2018-10-23

## 2018-10-23 NOTE — TELEPHONE ENCOUNTER
All medications to be delivered to her home tomorrow so she will have in time for Thursdays appt    They were unsure on dulcosate but that cleared too- so she will have everything.  Agustina

## 2018-10-25 ENCOUNTER — OFFICE VISIT (OUTPATIENT)
Dept: PHARMACY | Facility: CLINIC | Age: 28
End: 2018-10-25
Payer: COMMERCIAL

## 2018-10-25 ENCOUNTER — OFFICE VISIT (OUTPATIENT)
Dept: FAMILY MEDICINE | Facility: CLINIC | Age: 28
End: 2018-10-25
Payer: COMMERCIAL

## 2018-10-25 VITALS
OXYGEN SATURATION: 100 % | BODY MASS INDEX: 31.15 KG/M2 | WEIGHT: 144.4 LBS | TEMPERATURE: 98 F | SYSTOLIC BLOOD PRESSURE: 105 MMHG | HEART RATE: 92 BPM | HEIGHT: 57 IN | DIASTOLIC BLOOD PRESSURE: 69 MMHG | RESPIRATION RATE: 18 BRPM

## 2018-10-25 DIAGNOSIS — K59.00 CONSTIPATION, UNSPECIFIED CONSTIPATION TYPE: ICD-10-CM

## 2018-10-25 DIAGNOSIS — Z71.89 ENCOUNTER FOR MEDICATION REVIEW AND COUNSELING: Primary | ICD-10-CM

## 2018-10-25 DIAGNOSIS — Z34.80 SUPERVISION OF OTHER NORMAL PREGNANCY, ANTEPARTUM: Primary | ICD-10-CM

## 2018-10-25 DIAGNOSIS — F43.10 PTSD (POST-TRAUMATIC STRESS DISORDER): ICD-10-CM

## 2018-10-25 DIAGNOSIS — F41.1 GAD (GENERALIZED ANXIETY DISORDER): ICD-10-CM

## 2018-10-25 DIAGNOSIS — J30.2 CHRONIC SEASONAL ALLERGIC RHINITIS DUE TO FUNGAL SPORES: ICD-10-CM

## 2018-10-25 RX ORDER — FLUTICASONE PROPIONATE 50 MCG
1-2 SPRAY, SUSPENSION (ML) NASAL DAILY PRN
Qty: 1 BOTTLE | Refills: 1 | Status: SHIPPED | OUTPATIENT
Start: 2018-10-25 | End: 2018-12-20

## 2018-10-25 ASSESSMENT — PATIENT HEALTH QUESTIONNAIRE - PHQ9: SUM OF ALL RESPONSES TO PHQ QUESTIONS 1-9: 16

## 2018-10-25 NOTE — PROGRESS NOTES
"SUBJECTIVE  No vaginal bleeding, loss of fluids, or cramping noted. No headaches, change in vision, RUQ pain, or lower extremity edema. Patient continues to feel baby move. She has been able to tolerate a diet with no significant nausea/vomitting. She has been taking her pre-ani vitamin.    Was having a lot of constipation, hard stools with blood in them. Had to strain, very painful. BMs have greatly improved with increased water intake and pericolace.    Still having nightmares. Difficult to fall asleep. Lots of worry. Crying almost daily.    Had US having a boy. \"No other choice, im fine with it.    OBJECTIVE  /69  Pulse 92  Temp 98  F (36.7  C) (Oral)  Resp 18  Ht 4' 9.28\" (145.5 cm)  Wt 144 lb 6.4 oz (65.5 kg)  LMP  (LMP Unknown)  SpO2 100%  BMI 30.94 kg/m2    General: sitting up in a chair awake and alert. Pleasant and cooperative in NAD.  Cardio: RRR  Resp: clear vesicular breath sounds bilaterally, no increased work of breathing  Abd: gravid, non-tender, uterus palpated 24 cm, FHTs 150s  Skin: no lesions or rashes  Ext: no edema  Psych: mood good, affect congruent. Mentation and conversation appropriate.    PHQ-9 SCORE 2018 2018 10/25/2018   Total Score - - -   Total Score 11 17 16       ASSESSMENT/PLAN  1. Supervision of other normal pregnancy, antepartum  Patient is a  a 27 year old  female at 20w2d corresponding to CHARLI of  2019 obtained via 7 week ultrasound.  - prenatal labs unremarkable (Blood type:O+, Hgb:12.4, Hep B, HIV, syphilis, GC/Chlam, HIV negative, Rubella immune)  - pregravid weight 136 lbs, TWG today 8 lbs.   - Mood concerns: history of PTSD, BRENDA, recurrent major depressive disorder currently on effexor, prazosin, and seroquel. Mood stable today.   - planning on vaginal  delivery  - Formula feeding  - Declines genetic testing  - Schedule fetal survey done and normal. It's a boy. Hayde has come to terms with this.   - Flu vaccine given 10/4/18    2. BRENDA " (generalized anxiety disorder)  3. PTSD (post-traumatic stress disorder)  Persistent worry and nightmares. PHQ9 stable, but mood could be better controlled. Continuing to meet with Dr. Kenneth Villa for therapy. Orthostatics done today and were normal so will increase prazosin to 3 mg nightly. Could also consider increasing seroquel at next visit. Med boxes updated by DR. Natalie Sibley.     4. Chronic seasonal allergic rhinitis due to fungal spores  Requesting refill.   - fluticasone (FLONASE) 50 MCG/ACT spray; Spray 1-2 sprays into both nostrils daily as needed for allergies  Dispense: 1 Bottle; Refill: 1    5. Constipation  Greatly improved. Given pedialyte packets to encourage continued water intake. Continue pericolace PRN.     Precepted with: DO Rosana Olson DO  [facility] Family Medicine Resident  Pager #[phone]

## 2018-10-25 NOTE — NURSING NOTE
Orthostatic Blood Pressures:    Laying: /72       P 92    Sitting: /69       P 97    Standing: /68     P 111

## 2018-10-25 NOTE — MR AVS SNAPSHOT
After Visit Summary   10/25/2018    Hayde LUJAN Say    MRN: [id number]           Patient Information     Date Of Birth          1990        Visit Information        Provider Department      10/25/2018 2:20 PM Sierra Scott, RPH [facility]        Today's Diagnoses     Encounter for medication review and counseling    -  1    PTSD (post-traumatic stress disorder)           Follow-ups after your visit        Your next 10 appointments already scheduled     Nov 26, 2018  9:20 AM CST   Return Visit with Rosana Rosales DO   [facility] ([facility])    [address] [place] [address]   [phone]            Nov 26, 2018  9:20 AM CST   Return Visit with Sierra Scott RPH [facility] ([facility])    [address] [place] [id number] [phone]              Who to contact     Please call your clinic at [phone] to:    Ask questions about your health    Make or cancel appointments    Discuss your medicines    Learn about your test results    Speak to your doctor            Additional Information About Your Visit        Care EveryWhere ID     This is your Care EveryWhere ID. This could be used by other organizations to access your [facility] medical records  [id number]        Your Vitals Were     Last Period                   (LMP Unknown)            Blood Pressure from Last 3 Encounters:   11/01/18 105/69   10/25/18 105/69   10/04/18 (!) 89/60    Weight from Last 3 Encounters:   10/25/18 144 lb 6.4 oz (65.5 kg)   10/04/18 140 lb (63.5 kg)   09/18/18 139 lb (63 kg)              Today, you had the following     No orders found for display         Where to get your medicines      These medications were sent to [facility] - [place], [place] - [address]  [address], [place] [address]     Phone:  [phone]     fluticasone 50 MCG/ACT spray          Primary Care Provider  Office Phone # Fax #    Rosana Rosales [phone]       [facility] [place] [address]        Equal Access to Services     DYLAN LEY : Mason Jones, wacarinda estefaniamoisesha, qamarshata kaalmada shoshana, nakul acin hayaajulián weemsritchie ibarra felicia garcía. So [facility] [phone].    ATENCIÓN: Si habla español, tiene a keene disposición servicios gratuitos de asistencia lingüística. Llame al [phone].    We comply with applicable federal civil rights laws and [place] laws. We do not discriminate on the basis of race, color, national origin, age, disability, sex, sexual orientation, or gender identity.            Thank you!     Thank you for choosing [facility]  for your care. Our goal is always to provide you with excellent care. Hearing back from our patients is one way we can continue to improve our services. Please take a few minutes to complete the written survey that you may receive in the mail after your visit with us. Thank you!             Your Updated Medication List - Protect others around you: Learn how to safely use, store and throw away your medicines at www.disposemymeds.org.          This list is accurate as of 10/25/18 11:59 PM.  Always use your most recent med list.                   Brand Name Dispense Instructions for use Diagnosis    acetaminophen 325 MG tablet    TYLENOL    100 tablet    Take 2 tablets (650 mg) by mouth every 6 hours as needed for headaches    Episodic tension-type headache, not intractable       calcium carbonate 500 MG chewable tablet    TUMS    150 tablet    Take 1 tablet (500 mg) by mouth 2 times daily as needed for heartburn    Other chronic gastritis without hemorrhage       cetirizine 10 MG tablet    zyrTEC    30 tablet    Take 1 tablet (10 mg) by mouth daily    Chronic seasonal allergic rhinitis due to fungal spores       diphenhydrAMINE 25 MG tablet    BENADRYL    60 tablet    Take 1-2 tablets (25-50 mg) by mouth every 6 hours as  needed for itching or allergies    Rash       docusate sodium 100 MG tablet    COLACE    60 tablet    Take 200 mg by mouth daily    Constipation, unspecified constipation type       fluticasone 50 MCG/ACT spray    FLONASE    1 Bottle    Spray 1-2 sprays into both nostrils daily as needed for allergies    Chronic seasonal allergic rhinitis due to fungal spores, Supervision of other normal pregnancy, antepartum, BRENDA (generalized anxiety disorder), PTSD (post-traumatic stress disorder), Constipation, unspecified constipation type       ketotifen 0.025 % Soln ophthalmic solution    ZADITOR    1 Bottle    Place 1 drop into both eyes every 12 hours    Acute seasonal allergic rhinitis, unspecified trigger       montelukast 10 MG tablet    SINGULAIR    30 tablet    Take 1 tablet (10 mg) by mouth daily    Acute seasonal allergic rhinitis, unspecified trigger       prenatal multivitamin plus iron 27-0.8 MG Tabs per tablet     100 tablet    Take 1 tablet by mouth daily    Encounter for supervision of other normal pregnancy in first trimester, Amenorrhea, Severe episode of recurrent major depressive disorder, without psychotic features (H)       QUEtiapine 100 MG tablet    SEROquel    30 tablet    Take 1 tablet (100 mg) by mouth At Bedtime    PTSD (post-traumatic stress disorder)       ranitidine 150 MG tablet    ZANTAC    30 tablet    Take 1 tablet (150 mg) by mouth At Bedtime    Gastroesophageal reflux disease, esophagitis presence not specified       senna-docusate 8.6-50 MG per tablet    DESEAN-COLACE    20 tablet    Take 1 tablet by mouth 2 times daily as needed for constipation    Slow transit constipation       triamcinolone 0.1 % cream    KENALOG    30 g    Apply sparingly to affected area two times daily for 14 days or until rash resolves, whichever is sooner    Rash       * venlafaxine 75 MG 24 hr capsule    EFFEXOR-XR    30 capsule    Take 1 capsule by mouth daily (together with 150 mg capsule for total of 225 mg/day)     PTSD (post-traumatic stress disorder), Major depressive disorder, recurrent episode, moderate (H)       * venlafaxine 150 MG 24 hr capsule    EFFEXOR-XR    30 capsule    Take 1 capsule by mouth daily (together with 75 mg capsule for total of 225 mg/day)    PTSD (post-traumatic stress disorder), Major depressive disorder, recurrent episode, moderate (H)       * Notice:  This list has 2 medication(s) that are the same as other medications prescribed for you. Read the directions carefully, and ask your doctor or other care provider to review them with you.

## 2018-10-25 NOTE — PROGRESS NOTES
Assessment and Plan     (Z71.89) Encounter for medication review and counseling  (primary encounter diagnosis)  Comment: Pillboxes empty as expected. Remaining medicines as expected. Unable to fill pillbox today as refills not available in clinic today.   Plan: Reschedule pillbox fill appointment today. Will ensure does not go without medicines.     (F43.10) PTSD ( post-traumatic stress disorder)  Comment: Concern that not controlled given pt report.   Plan: Per Dr. Rosales, increase in Prazosin today. Able to update pillbox present today as supply available.     Venlafaxine 75 mg capsule x1; Venlafaxine 150 mg capsule x1 (9/25/18 #30)    Quetiapine 100 mg tablet x1   Prazosin 1 mg capsule x3 (9/25/18 #60) - increased today   Montelukast 10 mg tablet x1   Ranitidine 150 mg tablet x1 (9/21/18 #30)   Cetirizine 10 mg tablet x1 (9/25/18 #30)   Prenatal tablet x1   Docusate 100 mg capsule x2 (9/25/18 #60)         Follow up: 11/1/18     Options for treatment and/or follow-up care were reviewed with the patient. Hayde was engaged and actively involved in the decision making process. She verbalized understanding of the options discussed and was satisfied with the final plan. Patient was provided with written instructions/medication list via AVS.    Dr. Rosales was provided our recommendations in clinic today and Dr. eKller was available for supervision during this visit and is the authorizing prescriber for this visit through the pharmacist collaborative practice agreement.    Thank you for the opportunity to participate in the care of this patient.  Sierra Scott, Pharm.D.  [facility]: [phone]    Current Outpatient Prescriptions   Medication Sig Dispense Refill     acetaminophen (TYLENOL) 325 MG tablet Take 2 tablets (650 mg) by mouth every 6 hours as needed for headaches 100 tablet 1     calcium carbonate (TUMS) 500 MG chewable tablet Take 1 tablet (500 mg) by mouth 2 times daily  "as needed for heartburn 150 tablet 3     cetirizine (ZYRTEC) 10 MG tablet Take 1 tablet (10 mg) by mouth daily 30 tablet 3     diphenhydrAMINE (BENADRYL) 25 MG tablet Take 1-2 tablets (25-50 mg) by mouth every 6 hours as needed for itching or allergies 60 tablet 1     docusate sodium (COLACE) 100 MG tablet Take 200 mg by mouth daily 60 tablet 3     fluticasone (FLONASE) 50 MCG/ACT spray Spray 1-2 sprays into both nostrils daily as needed for allergies 1 Bottle 1     ketotifen (ZADITOR) 0.025 % SOLN ophthalmic solution Place 1 drop into both eyes every 12 hours 1 Bottle 3     montelukast (SINGULAIR) 10 MG tablet Take 1 tablet (10 mg) by mouth daily 30 tablet 11     prazosin (MINIPRESS) 1 MG capsule Take 2 capsules (2 mg) by mouth At Bedtime 60 capsule 3     Prenatal Vit-Fe Fumarate-FA (PRENATAL MULTIVITAMIN PLUS IRON) 27-0.8 MG TABS per tablet Take 1 tablet by mouth daily 100 tablet 3     QUEtiapine (SEROQUEL) 100 MG tablet Take 1 tablet (100 mg) by mouth At Bedtime 30 tablet 3     ranitidine (ZANTAC) 150 MG tablet Take 1 tablet (150 mg) by mouth At Bedtime 30 tablet 3     senna-docusate (DESEAN-COLACE) 8.6-50 MG per tablet Take 1 tablet by mouth 2 times daily as needed for constipation 20 tablet 1     triamcinolone (KENALOG) 0.1 % cream Apply sparingly to affected area two times daily for 14 days or until rash resolves, whichever is sooner 30 g 0     venlafaxine (EFFEXOR-XR) 150 MG 24 hr capsule Take 1 capsule by mouth daily (together with 75 mg capsule for total of 225 mg/day) 30 capsule 11     venlafaxine (EFFEXOR-XR) 75 MG 24 hr capsule Take 1 capsule by mouth daily (together with 150 mg capsule for total of 225 mg/day) 30 capsule 11            HPI:       Hayde Macias is a 27 year old female referred by Dr. Rosales for pillbox fill. Patient seen today with the assistance of Maryann Kenzie.    Trouble falling asleep, everynight As a result of her \"worries\" and feeling scared. Unclear if Prazosin helped with " "chanaares. She feels these feelings are all related to depression.    Improvement in constipation with use of electrolyte powder packets. Using BID with water, results in one BM per day which is desirable. No change needed.           PMHX:     Patient Active Problem List   Diagnosis     Health Care Home     Recurrent major depressive disorder (H)     Encounter for supervision of normal pregnancy     Seasonal allergic rhinitis     PTSD (post-traumatic stress disorder)     BRENDA (generalized anxiety disorder)     Other fatigue     Insomnia due to other mental disorder     Allergies   Allergen Reactions     Augmentin Rash            Objective     VS w/ IP 10/25/2018 10/25/2018   SYSTOLIC 105    DIASTOLIC 69    PULSE 92    TEMPERATURE 98    RESPIRATIONS 18    Wt.(Lbs.) 144.4    Wt. (Kg) 65.499 kg    Ht. (Feet./Inches) 4' 9.283\"    Ht. (Cm) 145.5 cm    BMI  30.94   O2 Sat 100            [facility] Pharmacist Documentation     Relevant medical devices: n/a    # of medical conditions addressed: 2  # of medications addressed: 3  # of DTP identified: 0  Time spent: 20 minutes  Level of service per [place] DHS guidelines: 1 nc           Documentation   Due to patient being non-English speaking/uses sign language, an  was used for this visit. Only for face-to-face interpretation by an external agency, date and length of interpretation can be found on the scanned worksheet.     name: Kenzie Paul   Agency: [employer]  Language: Maryann   Telephone number: [phone]  Type of interpretation: Face-to-face, spoken              "

## 2018-10-25 NOTE — MR AVS SNAPSHOT
"              After Visit Summary   10/25/2018    Hayde LUJAN Say    MRN: [id number]           Patient Information     Date Of Birth          1990        Visit Information        Provider Department      10/25/2018 2:40 PM [facility]ales DO [facility]        Today's Diagnoses     Supervision of other normal pregnancy, antepartum    -  1    BRENDA (generalized anxiety disorder)        PTSD (post-traumatic stress disorder)        Chronic seasonal allergic rhinitis due to fungal spores        Constipation, unspecified constipation type           Follow-ups after your visit        Follow-up notes from your care team     Return in about 3 weeks (around 11/15/2018).      Your next 10 appointments already scheduled     Nov 01, 2018  1:20 PM CDT   Return Visit with Sierra Scott, RPH [facility] ([facility])    [address] [place] [id number] [phone]            Nov 01, 2018  1:40 PM CDT   Return Visit with Elham Mcmahan, LMFT [facility] ([facility])    [address] [place] [address]   [phone]            Nov 26, 2018  9:20 AM CST   Return Visit with Rosana Rosales DO   [facility] ([facility])    [address] [place] [address]   [phone]              Who to contact     Please call your clinic at [phone] to:    Ask questions about your health    Make or cancel appointments    Discuss your medicines    Learn about your test results    Speak to your doctor            Additional Information About Your Visit        Care EveryWhere ID     This is your Care EveryWhere ID. This could be used by other organizations to access your [facility] medical records  [id number]        Your Vitals Were     Pulse Temperature Respirations Height Last Period Pulse Oximetry    92 98  F (36.7  C) (Oral) 18 4' 9.28\" (145.5 cm) (LMP Unknown) 100%    BMI (Body Mass Index)                   30.94 kg/m2            " Blood Pressure from Last 3 Encounters:   10/25/18 105/69   10/04/18 (!) 89/60   09/18/18 90/59    Weight from Last 3 Encounters:   10/25/18 144 lb 6.4 oz (65.5 kg)   10/04/18 140 lb (63.5 kg)   09/18/18 139 lb (63 kg)              We Performed the Following      : Sign Language or Oral - 68-82 minutes          Where to get your medicines      These medications were sent to [facility] - [place], [place] - [address] [place] [address] Pkwy  [address], [place] [address]     Phone:  [phone]     fluticasone 50 MCG/ACT spray          Primary Care Provider Office Phone # Fax #    Rosana RosalesDO [phone]       [facility] [place] [address]        Equal Access to Services     DYLAN [facility] CenterCONSTANTINO : Hadii aad ku hadasho Sofransisco, waaxda luqadaha, qaybta kaalmada aderitchieyada, nakul duarte . So [facility] [phone].    ATENCIÓN: Si habla español, tiene a keene disposición servicios gratuitos de asistencia lingüística. Llame al [phone].    We comply with applicable federal civil rights laws and [place] laws. We do not discriminate on the basis of race, color, national origin, age, disability, sex, sexual orientation, or gender identity.            Thank you!     Thank you for choosing [facility]  for your care. Our goal is always to provide you with excellent care. Hearing back from our patients is one way we can continue to improve our services. Please take a few minutes to complete the written survey that you may receive in the mail after your visit with us. Thank you!             Your Updated Medication List - Protect others around you: Learn how to safely use, store and throw away your medicines at www.disposemymeds.org.          This list is accurate as of 10/25/18 11:59 PM.  Always use your most recent med list.                   Brand Name Dispense Instructions for use Diagnosis    acetaminophen 325 MG tablet    TYLENOL    100  tablet    Take 2 tablets (650 mg) by mouth every 6 hours as needed for headaches    Episodic tension-type headache, not intractable       calcium carbonate 500 MG chewable tablet    TUMS    150 tablet    Take 1 tablet (500 mg) by mouth 2 times daily as needed for heartburn    Other chronic gastritis without hemorrhage       cetirizine 10 MG tablet    zyrTEC    30 tablet    Take 1 tablet (10 mg) by mouth daily    Chronic seasonal allergic rhinitis due to fungal spores       diphenhydrAMINE 25 MG tablet    BENADRYL    60 tablet    Take 1-2 tablets (25-50 mg) by mouth every 6 hours as needed for itching or allergies    Rash       docusate sodium 100 MG tablet    COLACE    60 tablet    Take 200 mg by mouth daily    Constipation, unspecified constipation type       fluticasone 50 MCG/ACT spray    FLONASE    1 Bottle    Spray 1-2 sprays into both nostrils daily as needed for allergies    Chronic seasonal allergic rhinitis due to fungal spores, Supervision of other normal pregnancy, antepartum, BRENDA (generalized anxiety disorder), PTSD (post-traumatic stress disorder), Constipation, unspecified constipation type       ketotifen 0.025 % Soln ophthalmic solution    ZADITOR    1 Bottle    Place 1 drop into both eyes every 12 hours    Acute seasonal allergic rhinitis, unspecified trigger       montelukast 10 MG tablet    SINGULAIR    30 tablet    Take 1 tablet (10 mg) by mouth daily    Acute seasonal allergic rhinitis, unspecified trigger       prazosin 1 MG capsule    MINIPRESS    60 capsule    Take 2 capsules (2 mg) by mouth At Bedtime    PTSD (post-traumatic stress disorder)       prenatal multivitamin plus iron 27-0.8 MG Tabs per tablet     100 tablet    Take 1 tablet by mouth daily    Encounter for supervision of other normal pregnancy in first trimester, Amenorrhea, Severe episode of recurrent major depressive disorder, without psychotic features (H)       QUEtiapine 100 MG tablet    SEROquel    30 tablet    Take 1 tablet  (100 mg) by mouth At Bedtime    PTSD (post-traumatic stress disorder)       ranitidine 150 MG tablet    ZANTAC    30 tablet    Take 1 tablet (150 mg) by mouth At Bedtime    Gastroesophageal reflux disease, esophagitis presence not specified       senna-docusate 8.6-50 MG per tablet    DESEAN-COLACE    20 tablet    Take 1 tablet by mouth 2 times daily as needed for constipation    Slow transit constipation       triamcinolone 0.1 % cream    KENALOG    30 g    Apply sparingly to affected area two times daily for 14 days or until rash resolves, whichever is sooner    Rash       * venlafaxine 75 MG 24 hr capsule    EFFEXOR-XR    30 capsule    Take 1 capsule by mouth daily (together with 150 mg capsule for total of 225 mg/day)    PTSD (post-traumatic stress disorder), Major depressive disorder, recurrent episode, moderate (H)       * venlafaxine 150 MG 24 hr capsule    EFFEXOR-XR    30 capsule    Take 1 capsule by mouth daily (together with 75 mg capsule for total of 225 mg/day)    PTSD (post-traumatic stress disorder), Major depressive disorder, recurrent episode, moderate (H)       * Notice:  This list has 2 medication(s) that are the same as other medications prescribed for you. Read the directions carefully, and ask your doctor or other care provider to review them with you.

## 2018-10-26 ENCOUNTER — CARE COORDINATION (OUTPATIENT)
Dept: FAMILY MEDICINE | Facility: CLINIC | Age: 28
End: 2018-10-26

## 2018-10-26 NOTE — PROGRESS NOTES
Met with pt in clinic today   Pt wanted help in translating needs, understanding her mail, communications with the childrens schools and appt and ride scheduling.    After putting fears and worries to rest and communicating how well the children are doing she relaxed and we congratulated her on being a good mom, and doing a good job, got some smiles out of her today, she was actively engaged.      Picked up her veggies, worked with communicating some simple words and sharing back and forth while she waited for her ride found creative ways to get the messages across about fun things.     Ramila    Upcoming appts include:  Speech therapy for youngest child every Friday  Second to youngest has eye appt on 31st at [facility]   She has therapy appt on nov 1 with Elham  And youngest has eye appt in mid novemeber    Ramila

## 2018-10-26 NOTE — NURSING NOTE
Due to patient being non-English speaking/uses sign language, an  was used for this visit. Only for face-to-face interpretation by an external agency, date and length of interpretation can be found on the scanned worksheet.     name: Cherelle Humberto  Agency: [employer]  Language: Maryann   Telephone number: [phone]  Type of interpretation: Face-to-face, spoken

## 2018-10-31 ENCOUNTER — TELEPHONE (OUTPATIENT)
Dept: FAMILY MEDICINE | Facility: CLINIC | Age: 28
End: 2018-10-31

## 2018-10-31 NOTE — TELEPHONE ENCOUNTER
Called pt and left message using language line  Reminder of appt tomorrow and to bring  Medications, green card, check stub, so we can do her paperwork    lbetz

## 2018-11-01 ENCOUNTER — OFFICE VISIT (OUTPATIENT)
Dept: PHARMACY | Facility: CLINIC | Age: 28
End: 2018-11-01
Payer: COMMERCIAL

## 2018-11-01 ENCOUNTER — CARE COORDINATION (OUTPATIENT)
Dept: FAMILY MEDICINE | Facility: CLINIC | Age: 28
End: 2018-11-01

## 2018-11-01 ENCOUNTER — OFFICE VISIT (OUTPATIENT)
Dept: PSYCHOLOGY | Facility: CLINIC | Age: 28
End: 2018-11-01
Payer: COMMERCIAL

## 2018-11-01 VITALS
HEART RATE: 98 BPM | OXYGEN SATURATION: 100 % | TEMPERATURE: 98 F | RESPIRATION RATE: 18 BRPM | SYSTOLIC BLOOD PRESSURE: 105 MMHG | DIASTOLIC BLOOD PRESSURE: 69 MMHG

## 2018-11-01 DIAGNOSIS — F41.1 GAD (GENERALIZED ANXIETY DISORDER): ICD-10-CM

## 2018-11-01 DIAGNOSIS — F43.10 PTSD (POST-TRAUMATIC STRESS DISORDER): ICD-10-CM

## 2018-11-01 DIAGNOSIS — Z71.89 ENCOUNTER FOR MEDICATION REVIEW AND COUNSELING: Primary | ICD-10-CM

## 2018-11-01 DIAGNOSIS — F33.2 SEVERE EPISODE OF RECURRENT MAJOR DEPRESSIVE DISORDER, WITHOUT PSYCHOTIC FEATURES (H): Primary | ICD-10-CM

## 2018-11-01 RX ORDER — PRAZOSIN HYDROCHLORIDE 1 MG/1
3 CAPSULE ORAL AT BEDTIME
Qty: 90 CAPSULE | Refills: 2 | Status: SHIPPED | OUTPATIENT
Start: 2018-11-01 | End: 2019-03-29

## 2018-11-01 NOTE — NURSING NOTE
Due to patient being non-English speaking/uses sign language, an  was used for this visit. Only for face-to-face interpretation by an external agency, date and length of interpretation can be found on the scanned worksheet.     name: Frances England  Agency: [employer]  Language: Maryann   Telephone number: [phone]  Type of interpretation: Face-to-face, spoken

## 2018-11-01 NOTE — MR AVS SNAPSHOT
After Visit Summary   11/1/2018    Hayde LUJAN Say    MRN: [id number]           Patient Information     Date Of Birth          1990        Visit Information        Provider Department      11/1/2018 1:40 PM Elham Mcmahan, LMFT [facility]        Today's Diagnoses     Severe episode of recurrent major depressive disorder, without psychotic features (H)    -  1    PTSD (post-traumatic stress disorder)        BRENDA (generalized anxiety disorder)           Follow-ups after your visit        Your next 10 appointments already scheduled     Nov 26, 2018  9:20 AM CST   Return Visit with Rosana Rosales DO   [facility] ([facility])    [address] [place] [address]   [phone]            Nov 26, 2018  9:20 AM CST   Return Visit with Sierra Scott RPH [facility] ([facility])    [address] [place] [id number] [phone]              Who to contact     Please call your clinic at [phone] to:    Ask questions about your health    Make or cancel appointments    Discuss your medicines    Learn about your test results    Speak to your doctor            Additional Information About Your Visit        Care EveryWhere ID     This is your Care EveryWhere ID. This could be used by other organizations to access your [place] medical records  [id number]        Your Vitals Were     Last Period                   (LMP Unknown)            Blood Pressure from Last 3 Encounters:   11/01/18 105/69   10/25/18 105/69   10/04/18 (!) 89/60    Weight from Last 3 Encounters:   10/25/18 144 lb 6.4 oz (65.5 kg)   10/04/18 140 lb (63.5 kg)   09/18/18 139 lb (63 kg)              We Performed the Following     Interactive Complexity add-on (87119)     Psychotherapy 45 min (18493)          Today's Medication Changes          These changes are accurate as of 11/1/18 11:59 PM.  If you have any questions, ask your nurse or doctor.                These medicines have changed or have updated prescriptions.        Dose/Directions    prazosin 1 MG capsule   Commonly known as:  MINIPRESS   This may have changed:  how much to take   Used for:  PTSD (post-traumatic stress disorder), Encounter for medication review and counseling   Changed by:  Sierra Scott RPH        Dose:  3 mg   Take 3 capsules (3 mg) by mouth At Bedtime   Quantity:  90 capsule   Refills:  2            Where to get your medicines      These medications were sent to [facility] - [place], [place] - [address] [place] [address] [address]kw[address]  [address], [place] [address]     Phone:  [phone]     prazosin 1 MG capsule                Primary Care Provider Office Phone # Fax #    Rosana RosalesDO [phone]       [facility] [place] [address]        Equal Access to Services     DYLAN LEY : Mason roberts hadasho Soomaali, waaxda luqadaha, qaybta kaalmada adeegyada, nakul duarte . So [facility] [phone].    ATENCIÓN: Si habla español, tiene a keene disposición servicios gratuitos de asistencia lingüística. [facility] [phone].    We comply with applicable federal civil rights laws and [place] laws. We do not discriminate on the basis of race, color, national origin, age, disability, sex, sexual orientation, or gender identity.            Thank you!     Thank you for choosing [facility]  for your care. Our goal is always to provide you with excellent care. Hearing back from our patients is one way we can continue to improve our services. Please take a few minutes to complete the written survey that you may receive in the mail after your visit with us. Thank you!             Your Updated Medication List - Protect others around you: Learn how to safely use, store and throw away your medicines at www.disposemymeds.org.          This list is accurate as of 11/1/18 11:59 PM.  Always use your most recent med list.                    Brand Name Dispense Instructions for use Diagnosis    acetaminophen 325 MG tablet    TYLENOL    100 tablet    Take 2 tablets (650 mg) by mouth every 6 hours as needed for headaches    Episodic tension-type headache, not intractable       calcium carbonate 500 MG chewable tablet    TUMS    150 tablet    Take 1 tablet (500 mg) by mouth 2 times daily as needed for heartburn    Other chronic gastritis without hemorrhage       cetirizine 10 MG tablet    zyrTEC    30 tablet    Take 1 tablet (10 mg) by mouth daily    Chronic seasonal allergic rhinitis due to fungal spores       diphenhydrAMINE 25 MG tablet    BENADRYL    60 tablet    Take 1-2 tablets (25-50 mg) by mouth every 6 hours as needed for itching or allergies    Rash       docusate sodium 100 MG tablet    COLACE    60 tablet    Take 200 mg by mouth daily    Constipation, unspecified constipation type       fluticasone 50 MCG/ACT spray    FLONASE    1 Bottle    Spray 1-2 sprays into both nostrils daily as needed for allergies    Chronic seasonal allergic rhinitis due to fungal spores, Supervision of other normal pregnancy, antepartum, BRENDA (generalized anxiety disorder), PTSD (post-traumatic stress disorder), Constipation, unspecified constipation type       ketotifen 0.025 % Soln ophthalmic solution    ZADITOR    1 Bottle    Place 1 drop into both eyes every 12 hours    Acute seasonal allergic rhinitis, unspecified trigger       montelukast 10 MG tablet    SINGULAIR    30 tablet    Take 1 tablet (10 mg) by mouth daily    Acute seasonal allergic rhinitis, unspecified trigger       prazosin 1 MG capsule    MINIPRESS    90 capsule    Take 3 capsules (3 mg) by mouth At Bedtime    PTSD (post-traumatic stress disorder), Encounter for medication review and counseling       prenatal multivitamin plus iron 27-0.8 MG Tabs per tablet     100 tablet    Take 1 tablet by mouth daily    Encounter for supervision of other normal pregnancy in first trimester,  Amenorrhea, Severe episode of recurrent major depressive disorder, without psychotic features (H)       QUEtiapine 100 MG tablet    SEROquel    30 tablet    Take 1 tablet (100 mg) by mouth At Bedtime    PTSD (post-traumatic stress disorder)       ranitidine 150 MG tablet    ZANTAC    30 tablet    Take 1 tablet (150 mg) by mouth At Bedtime    Gastroesophageal reflux disease, esophagitis presence not specified       senna-docusate 8.6-50 MG per tablet    DESEAN-COLACE    20 tablet    Take 1 tablet by mouth 2 times daily as needed for constipation    Slow transit constipation       triamcinolone 0.1 % cream    KENALOG    30 g    Apply sparingly to affected area two times daily for 14 days or until rash resolves, whichever is sooner    Rash       * venlafaxine 75 MG 24 hr capsule    EFFEXOR-XR    30 capsule    Take 1 capsule by mouth daily (together with 150 mg capsule for total of 225 mg/day)    PTSD (post-traumatic stress disorder), Major depressive disorder, recurrent episode, moderate (H)       * venlafaxine 150 MG 24 hr capsule    EFFEXOR-XR    30 capsule    Take 1 capsule by mouth daily (together with 75 mg capsule for total of 225 mg/day)    PTSD (post-traumatic stress disorder), Major depressive disorder, recurrent episode, moderate (H)       * Notice:  This list has 2 medication(s) that are the same as other medications prescribed for you. Read the directions carefully, and ask your doctor or other care provider to review them with you.

## 2018-11-01 NOTE — MR AVS SNAPSHOT
After Visit Summary   11/1/2018    Hayde LUJAN Say    MRN: [id number]           Patient Information     Date Of Birth          1990        Visit Information        Provider Department      11/1/2018 1:20 PM Sierra Scott MALATHI [facility]        Today's Diagnoses     Encounter for medication review and counseling    -  1    PTSD (post-traumatic stress disorder)           Follow-ups after your visit        Your next 10 appointments already scheduled     Nov 26, 2018  9:20 AM CST   Return Visit with Rosana Rosales DO   [facility] ([facility])    [address] [place] [address]   [phone]              Who to contact     Please call your clinic at [phone] to:    Ask questions about your health    Make or cancel appointments    Discuss your medicines    Learn about your test results    Speak to your doctor            Additional Information About Your Visit        Care EveryWhere ID     This is your Care EveryWhere ID. This could be used by other organizations to access your [place] medical records  [id number]        Your Vitals Were     Pulse Temperature Respirations Last Period Pulse Oximetry       98 98  F (36.7  C) (Oral) 18 (LMP Unknown) 100%        Blood Pressure from Last 3 Encounters:   11/01/18 105/69   10/25/18 105/69   10/04/18 (!) 89/60    Weight from Last 3 Encounters:   10/25/18 144 lb 6.4 oz (65.5 kg)   10/04/18 140 lb (63.5 kg)   09/18/18 139 lb (63 kg)              Today, you had the following     No orders found for display         Today's Medication Changes          These changes are accurate as of 11/1/18  4:42 PM.  If you have any questions, ask your nurse or doctor.               These medicines have changed or have updated prescriptions.        Dose/Directions    prazosin 1 MG capsule   Commonly known as:  MINIPRESS   This may have changed:  how much to take   Used for:  PTSD (post-traumatic stress disorder)   Changed by:   Sierra Scott, [facility]        Dose:  3 mg   Take 3 capsules (3 mg) by mouth At Bedtime   Quantity:  90 capsule   Refills:  2            Where to get your medicines      These medications were sent to [facility] - [place], [place] - [address]  [address], [place] [address]     Phone:  [phone]     prazosin 1 MG capsule                Primary Care Provider Office Phone # Fax #    Rosana Rosales [phone]       [facility] [place] [address]        Equal Access to Services     [facility]: Hadii aad ku hadasho Soomaali, waaxda luqadaha, qaybta kaalmada adeegyada, nakul gil hayjuan duarte . So [facility] [phone].    ATENCIÓN: Si habla español, tiene a keene disposición servicios gratuitos de asistencia lingüística. [facility] [phone].    We comply with applicable federal civil rights laws and [place] laws. We do not discriminate on the basis of race, color, national origin, age, disability, sex, sexual orientation, or gender identity.            Thank you!     Thank you for choosing [facility]  for your care. Our goal is always to provide you with excellent care. Hearing back from our patients is one way we can continue to improve our services. Please take a few minutes to complete the written survey that you may receive in the mail after your visit with us. Thank you!             Your Updated Medication List - Protect others around you: Learn how to safely use, store and throw away your medicines at www.disposemymeds.org.          This list is accurate as of 11/1/18  4:42 PM.  Always use your most recent med list.                   Brand Name Dispense Instructions for use Diagnosis    acetaminophen 325 MG tablet    TYLENOL    100 tablet    Take 2 tablets (650 mg) by mouth every 6 hours as needed for headaches    Episodic tension-type headache, not intractable       calcium carbonate 500 MG chewable tablet     TUMS    150 tablet    Take 1 tablet (500 mg) by mouth 2 times daily as needed for heartburn    Other chronic gastritis without hemorrhage       cetirizine 10 MG tablet    zyrTEC    30 tablet    Take 1 tablet (10 mg) by mouth daily    Chronic seasonal allergic rhinitis due to fungal spores       diphenhydrAMINE 25 MG tablet    BENADRYL    60 tablet    Take 1-2 tablets (25-50 mg) by mouth every 6 hours as needed for itching or allergies    Rash       docusate sodium 100 MG tablet    COLACE    60 tablet    Take 200 mg by mouth daily    Constipation, unspecified constipation type       fluticasone 50 MCG/ACT spray    FLONASE    1 Bottle    Spray 1-2 sprays into both nostrils daily as needed for allergies    Chronic seasonal allergic rhinitis due to fungal spores, Supervision of other normal pregnancy, antepartum, BRENDA (generalized anxiety disorder), PTSD (post-traumatic stress disorder), Constipation, unspecified constipation type       ketotifen 0.025 % Soln ophthalmic solution    ZADITOR    1 Bottle    Place 1 drop into both eyes every 12 hours    Acute seasonal allergic rhinitis, unspecified trigger       montelukast 10 MG tablet    SINGULAIR    30 tablet    Take 1 tablet (10 mg) by mouth daily    Acute seasonal allergic rhinitis, unspecified trigger       prazosin 1 MG capsule    MINIPRESS    90 capsule    Take 3 capsules (3 mg) by mouth At Bedtime    PTSD (post-traumatic stress disorder)       prenatal multivitamin plus iron 27-0.8 MG Tabs per tablet     100 tablet    Take 1 tablet by mouth daily    Encounter for supervision of other normal pregnancy in first trimester, Amenorrhea, Severe episode of recurrent major depressive disorder, without psychotic features (H)       QUEtiapine 100 MG tablet    SEROquel    30 tablet    Take 1 tablet (100 mg) by mouth At Bedtime    PTSD (post-traumatic stress disorder)       ranitidine 150 MG tablet    ZANTAC    30 tablet    Take 1 tablet (150 mg) by mouth At Bedtime     Gastroesophageal reflux disease, esophagitis presence not specified       senna-docusate 8.6-50 MG per tablet    DESEAN-COLACE    20 tablet    Take 1 tablet by mouth 2 times daily as needed for constipation    Slow transit constipation       triamcinolone 0.1 % cream    KENALOG    30 g    Apply sparingly to affected area two times daily for 14 days or until rash resolves, whichever is sooner    Rash       * venlafaxine 75 MG 24 hr capsule    EFFEXOR-XR    30 capsule    Take 1 capsule by mouth daily (together with 150 mg capsule for total of 225 mg/day)    PTSD (post-traumatic stress disorder), Major depressive disorder, recurrent episode, moderate (H)       * venlafaxine 150 MG 24 hr capsule    EFFEXOR-XR    30 capsule    Take 1 capsule by mouth daily (together with 75 mg capsule for total of 225 mg/day)    PTSD (post-traumatic stress disorder), Major depressive disorder, recurrent episode, moderate (H)       * Notice:  This list has 2 medication(s) that are the same as other medications prescribed for you. Read the directions carefully, and ask your doctor or other care provider to review them with you.

## 2018-11-01 NOTE — PROGRESS NOTES
Assessment and Plan     (Z71.89) Encounter for medication review and counseling  (primary encounter diagnosis)  Comment: Pillboxes empty as expected.   Plan: Pillbox filled as below x 4 weeks. Last dose 11/28, due for refill 11/29. Will see patient with PCP at next OB visit 11/26.     (F43.10) PTSD (post-traumatic stress disorder)  Comment: Seems that could have obtained improvement in symptoms from last increase in Prazosin! Headaches could be related to medication, however will continue to monitor.   Plan: prazosin (MINIPRESS) 1 MG capsule - Updated Rx sent to pharmacy for next visit.     Venlafaxine 75 mg capsule x1; Venlafaxine 150 mg capsule x1 (10/23/18 #30)    Quetiapine 100 mg tablet x1 (10/23/18 #30) - no refills   Prazosin 1 mg capsule x3 (9/25/18 #60)    Montelukast 10 mg tablet x1 (10/23/18 #30)   Ranitidine 150 mg tablet x1 (10/23/18 #30) - no refills   Cetirizine 10 mg tablet x1 (10/23/18 #30)   Prenatal tablet x1 10/11 #100   Docusate 100 mg capsule x2 (10/23/18 #60)          Follow up: 11/26    Options for treatment and/or follow-up care were reviewed with the patient. Hayde was engaged and actively involved in the decision making process. She verbalized understanding of the options discussed and was satisfied with the final plan. Patient was provided with written instructions/medication list via AVS.    Dr. Rosales was provided our recommendations via routed note and Dr. Lin was available for supervision during this visit and is the authorizing prescriber for this visit through the pharmacist collaborative practice agreement.    Thank you for the opportunity to participate in the care of this patient.  Sierra Scott, Pharm.D.  [facility]: [phone]    Current Outpatient Prescriptions   Medication Sig Dispense Refill     acetaminophen (TYLENOL) 325 MG tablet Take 2 tablets (650 mg) by mouth every 6 hours as needed for headaches 100 tablet 1     calcium  carbonate (TUMS) 500 MG chewable tablet Take 1 tablet (500 mg) by mouth 2 times daily as needed for heartburn 150 tablet 3     cetirizine (ZYRTEC) 10 MG tablet Take 1 tablet (10 mg) by mouth daily 30 tablet 3     diphenhydrAMINE (BENADRYL) 25 MG tablet Take 1-2 tablets (25-50 mg) by mouth every 6 hours as needed for itching or allergies 60 tablet 1     docusate sodium (COLACE) 100 MG tablet Take 200 mg by mouth daily 60 tablet 3     fluticasone (FLONASE) 50 MCG/ACT spray Spray 1-2 sprays into both nostrils daily as needed for allergies 1 Bottle 1     ketotifen (ZADITOR) 0.025 % SOLN ophthalmic solution Place 1 drop into both eyes every 12 hours 1 Bottle 3     montelukast (SINGULAIR) 10 MG tablet Take 1 tablet (10 mg) by mouth daily 30 tablet 11     prazosin (MINIPRESS) 1 MG capsule Take 2 capsules (2 mg) by mouth At Bedtime 60 capsule 3     Prenatal Vit-Fe Fumarate-FA (PRENATAL MULTIVITAMIN PLUS IRON) 27-0.8 MG TABS per tablet Take 1 tablet by mouth daily 100 tablet 3     QUEtiapine (SEROQUEL) 100 MG tablet Take 1 tablet (100 mg) by mouth At Bedtime 30 tablet 3     ranitidine (ZANTAC) 150 MG tablet Take 1 tablet (150 mg) by mouth At Bedtime 30 tablet 3     senna-docusate (DESEAN-COLACE) 8.6-50 MG per tablet Take 1 tablet by mouth 2 times daily as needed for constipation 20 tablet 1     triamcinolone (KENALOG) 0.1 % cream Apply sparingly to affected area two times daily for 14 days or until rash resolves, whichever is sooner 30 g 0     venlafaxine (EFFEXOR-XR) 150 MG 24 hr capsule Take 1 capsule by mouth daily (together with 75 mg capsule for total of 225 mg/day) 30 capsule 11     venlafaxine (EFFEXOR-XR) 75 MG 24 hr capsule Take 1 capsule by mouth daily (together with 150 mg capsule for total of 225 mg/day) 30 capsule 11            HPI:      Hayde Macias is a 27 year old female referred by Dr. Rosales for medication management. Patient seen today with the assistance of Frances Fernandez.    No difference in how  she's sleeping better, but hard to explain. Differences from night to night. Getting up less often. No concerns with adverse effects of medicines. Occasional dizziness. Feeling tired all the time + headaches all the time. Acetaminophen helps. Usually once per day, sometimes 2x per day. Estimates needs this 2-3 times per week.     Constipation ok. No change desired.            PMHX:     Patient Active Problem List   Diagnosis     Health Care Home     Recurrent major depressive disorder (H)     Encounter for supervision of normal pregnancy     Seasonal allergic rhinitis     PTSD (post-traumatic stress disorder)     BRENDA (generalized anxiety disorder)     Other fatigue     Insomnia due to other mental disorder     Allergies   Allergen Reactions     Augmentin Rash            Objective     Vitals:    11/01/18 1333   BP: 105/69   Pulse: 98   Resp: 18   Temp: 98  F (36.7  C)   TempSrc: Oral   SpO2: 100%           [facility] Pharmacist Documentation     Drug therapy problems identified:  Medical Condition 1: Other (PTSD), Goals of therapy: Not at goal, Drug Class: Alpha-1 blocker, Convenience: Pt unable to administer correctly, Intervention: Add device or process to assist use, Verification: Patient Agreed - Compliance/Education    Relevant medical devices: n/a    # of medical conditions addressed: 2  # of medications addressed: 11  # of DTP identified: 1  Time spent: 20 minutes  Level of service per [place] DHS guidelines: 2 nc             Documentation   Due to patient being non-English speaking/uses sign language, an  was used for this visit. Only for face-to-face interpretation by an external agency, date and length of interpretation can be found on the scanned worksheet.     name: Frances England  Agency: [employer]  Language: Maryann   Telephone number: [phone]  Type of interpretation: Face-to-face, spoken

## 2018-11-01 NOTE — PROGRESS NOTES
Behavioral Health Progress Note    Client Legal Name:             Hayde LUJAN Say                  Client Preferred Name: Hayde             Service Type: Individual  Length of Visit: 40 minutes  Attendees:       Due to patient being non-English speaking/uses sign language, an  was used for this visit. Date and length of interpretation can be found on the scanned  worksheet.       name: Frances England  Agency: [employer]  Language: Maryann   Telephone number: [phone]  Type of interpretation: Face-to-face, spoken      Complexity statement: Due to patient being non-English speaking, an  was used for this visit. An  is used not only to interpret language, since the patient does not speak English, but also to help with the complexity of understandings across cultures, since the patient is not well integrated in the larger [place] culture.          Identifying Information and Presenting Problem:      The patient is a 27 year old Maryann female who is being seen for problematic symptoms of PTSD, depression.      Treatment Objective(s) Addressed in This Session:  Depression management and anxiety management.       Progress on / Status of Treatment Objective(s) / Homework:  Worsening per patient, but objectively, patient is looking better today. Per pharmacist who saw patient before me, patient is sleeping better.      PHQ-9 SCORE 8/7/2018 9/18/2018 10/25/2018   Total Score - - -   Total Score 11 17 16       BRENDA-7 SCORE 4/26/2017 8/29/2017 1/22/2018   Total Score 8 15 14       Topics Discussed/Interventions Provided:  Patient met with care coordinator, Agustina, prior to our appt. Agustina is helping patient submit citizenship paperwork, which requires patient's 's information. Patient has a poor relationship with her  and became very upset that she will need to ask him for this information. She was still focused on this during our appointment and was worried. We  discussed how her worries can be addressed in a few ways- to adapt and learn to live with them or find ways to fix them. I pointed out ways we have 'fixed' or at least addressed some worries (citizenship paperwork is being submitted, Agustina is helping with communication at the schools). Hayde agreed these have been helpful and lessened her worry.She is concerned that all the worry is going to kill her and she often states she cannot die with out her children. Denies HI/SI, just does not want to leave her children.    Assessment: The patient appeared to be active and engaged in today's session and was receptive to feedback.  noted she was mumbling to herself a little about her  and anger towards him. Overall, there may be some very important changes including improved sleep. However, progress remains slow.       Mental Status: Hayde appeared generally alert and oriented. Dress was casual and appropriate to the weather and occasion. Grooming and hygiene were clean. Eye contact was adequate. Speech was of normal volume and rate and was clear, coherent, and relevant. Mood was depresed with congruent affect. Thought processes were relevant, logical and goal-directed. Thought content was WNL with no evidence of psychotic or paranoid features. No evidence of SI/HI or self-harm, intent, or plans. Memory appeared grossly intact. Insight and judgment appeared poor insight, fair judgment and patient exhibited good impulse control during the appointment.       Does the patient appear to be at imminent risk of harm to self/others at this time? No      The session was necessary to address worry, depression, PTSD that have been interfering with patient's ability to function at concentrating at work, being able to work with strangers, arguments with her , personal life management, parenting.  Ongoing psychotherapy is necessary to improve functioning with daily activities, provide psychoeducation and provide  support.      Diagnosis (DSM-5):  296.33 recurrent severe major depression  308.3 PTSD  300.02 Generalized anxiety disorder        Plan:  1. Follow up in 1-2 weeks.  2. Continue with concrete strategies to manage depression, anger, anxiety.          NOTE: Treatment plan update due 12/27/18.  Diagnostic assessment update due 4/26/19

## 2018-11-01 NOTE — PROGRESS NOTES
[facility] Follow-up    Call initiated by patient.  Message recorded by Agustina Gramajo  Calling for: in clinic for appt so met with pt to review communication needs, mail, school and immigration paperword  Patient: Hayde LUJAN Say  Phone conversation with: Patient  Patient's Phone number/s: Home number on file [phone] (home)  Available today in the PM on their Home number.  OK to leave message on voice mail? Yes      Major diagnoses and/or issues requiring coordination services  Mental health, social stressors, pregnancy    In the past month, how many times have you been to the Emergency Room? 0  In the past month, how many times have you been hospitalized? 0    Summary notes: Pt brought in a lot of papers from the [employer] so reviewed each and got signatures as needed for field trips, conferences ect.    Faxed [facility] paperwork and [facility] [employer] paperwork and mailed rent deduction form to land today.  Communicated on needs for citizenship form, became tearful at the thought of having to ask her  about the information needed on him    Self-management goals:  Work on more independence  Doing things on her own  Improving communication with children  Planning for  due feb  Readiness to change: Somewhat ready        Counseling and coordination activities with: patient/family, PCP and specialist:     Follow-up date: monthly and prn

## 2018-11-08 ENCOUNTER — TELEPHONE (OUTPATIENT)
Dept: FAMILY MEDICINE | Facility: CLINIC | Age: 28
End: 2018-11-08

## 2018-11-08 NOTE — TELEPHONE ENCOUNTER
Ride set up for 28th for therapy with camille  Communicated with pt about appt time and rides for month   Spoke with spouse, and was able to obtain his Alien number for her immigration forms  lbetz

## 2018-11-21 DIAGNOSIS — K21.9 GASTROESOPHAGEAL REFLUX DISEASE, ESOPHAGITIS PRESENCE NOT SPECIFIED: ICD-10-CM

## 2018-11-21 DIAGNOSIS — F43.10 PTSD (POST-TRAUMATIC STRESS DISORDER): ICD-10-CM

## 2018-11-23 RX ORDER — QUETIAPINE FUMARATE 100 MG/1
100 TABLET, FILM COATED ORAL AT BEDTIME
Qty: 90 TABLET | Refills: 1 | Status: SHIPPED | OUTPATIENT
Start: 2018-11-23 | End: 2018-12-19

## 2018-11-26 ENCOUNTER — OFFICE VISIT (OUTPATIENT)
Dept: PHARMACY | Facility: CLINIC | Age: 28
End: 2018-11-26
Payer: COMMERCIAL

## 2018-11-26 ENCOUNTER — OFFICE VISIT (OUTPATIENT)
Dept: FAMILY MEDICINE | Facility: CLINIC | Age: 28
End: 2018-11-26
Payer: COMMERCIAL

## 2018-11-26 VITALS
HEIGHT: 57 IN | WEIGHT: 145.6 LBS | OXYGEN SATURATION: 100 % | RESPIRATION RATE: 22 BRPM | SYSTOLIC BLOOD PRESSURE: 104 MMHG | TEMPERATURE: 98 F | DIASTOLIC BLOOD PRESSURE: 67 MMHG | BODY MASS INDEX: 31.41 KG/M2 | HEART RATE: 96 BPM

## 2018-11-26 DIAGNOSIS — Z23 IMMUNIZATION DUE: ICD-10-CM

## 2018-11-26 DIAGNOSIS — Z71.89 ENCOUNTER FOR MEDICATION REVIEW AND COUNSELING: Primary | ICD-10-CM

## 2018-11-26 DIAGNOSIS — Z34.90 ENCOUNTER FOR SUPERVISION OF NORMAL PREGNANCY, ANTEPARTUM, UNSPECIFIED GRAVIDITY: Primary | ICD-10-CM

## 2018-11-26 DIAGNOSIS — K59.00 CONSTIPATION, UNSPECIFIED CONSTIPATION TYPE: ICD-10-CM

## 2018-11-26 LAB
GLU GEST SCREEN 1HR 50G: 122 (ref 0–129)
HCT VFR BLD AUTO: 33.8 % (ref 35–47)
HEMOGLOBIN: 10.3 G/DL (ref 11.7–15.7)
MCH RBC QN AUTO: 28.4 PG (ref 26.5–35)
MCHC RBC AUTO-ENTMCNC: 30.5 G/DL (ref 32–36)
MCV RBC AUTO: 93.1 FL (ref 78–100)
PLATELET # BLD AUTO: 235 K/UL (ref 150–450)
RBC # BLD AUTO: 3.63 M/UL (ref 3.8–5.2)
WBC # BLD AUTO: 9.8 K/UL (ref 4–11)

## 2018-11-26 NOTE — NURSING NOTE
Due to patient being non-English speaking/uses sign language, an  was used for this visit. Only for face-to-face interpretation by an external agency, date and length of interpretation can be found on the scanned worksheet.     name: Myra Ramírez  Agency: [employer]  Language: Maryann   Telephone number: [phone]   Type of interpretation: Face-to-face, spoken

## 2018-11-26 NOTE — MR AVS SNAPSHOT
"              After Visit Summary   2018    Hayde LUJAN Say    MRN: [id number]           Patient Information     Date Of Birth          1990        Visit Information        Provider Department      2018 9:20 AM Rosana Rosales DO [facility]        Today's Diagnoses     Encounter for supervision of normal pregnancy, antepartum, unspecified     -  1    Constipation, unspecified constipation type        Immunization due           Follow-ups after your visit        Follow-up notes from your care team     Return in about 3 weeks (around 2018).      Your next 10 appointments already scheduled     2018  1:00 PM CST   Return Visit with Elham Mcmahan, LMFT [facility] ([facility])    [address] [place] [address]   [phone]            Dec 19, 2018  1:40 PM CST   Return Visit with Sierra Scott, RPH [facility] ([facility])    [address] [place] [id number]   [phone]            Dec 19, 2018  2:00 PM CST   RETURN OB with Rosana Rosales DO   [facility] ([facility])    [address] [place] [address]   [phone]              Who to contact     Please call your clinic at [phone] to:    Ask questions about your health    Make or cancel appointments    Discuss your medicines    Learn about your test results    Speak to your doctor            Additional Information About Your Visit        Care EveryWhere ID     This is your Care EveryWhere ID. This could be used by other organizations to access your [place] medical records  [id number]        Your Vitals Were     Pulse Temperature Respirations Height Last Period Pulse Oximetry    96 98  F (36.7  C) 22 4' 9\" (144.8 cm) (LMP Unknown) 100%    BMI (Body Mass Index)                   31.51 kg/m2            Blood Pressure from Last 3 Encounters:   18 104/67   18 105/69   10/25/18 105/69    Weight from " Last 3 Encounters:   11/26/18 145 lb 9.6 oz (66 kg)   10/25/18 144 lb 6.4 oz (65.5 kg)   10/04/18 140 lb (63.5 kg)              We Performed the Following     ADMIN VACCINE, INITIAL     CBC with Plt ([facility])     Syphilis Screen [place] (RPR/VDRL) ([facility])     TDAP VACCINE ([facility])        Primary Care Provider Office Phone # Fax #    Rosana Rosales [phone]       [facility] [place] [address]        Equal Access to Services     [facility]: Hadii aad ku hadasho Soomaali, waaxda luqadaha, qaybta kaalmada adeegyada, nakul duarte . So [facility] [phone].    ATENCIÓN: Si habla español, tiene a keene disposición servicios gratuitos de asistencia lingüística. [facility] [phone].    We comply with applicable federal civil rights laws and [place] laws. We do not discriminate on the basis of race, color, national origin, age, disability, sex, sexual orientation, or gender identity.            Thank you!     Thank you for choosing [facility]  for your care. Our goal is always to provide you with excellent care. Hearing back from our patients is one way we can continue to improve our services. Please take a few minutes to complete the written survey that you may receive in the mail after your visit with us. Thank you!             Your Updated Medication List - Protect others around you: Learn how to safely use, store and throw away your medicines at www.disposemymeds.org.          This list is accurate as of 11/26/18 10:39 AM.  Always use your most recent med list.                   Brand Name Dispense Instructions for use Diagnosis    acetaminophen 325 MG tablet    TYLENOL    100 tablet    Take 2 tablets (650 mg) by mouth every 6 hours as needed for headaches    Episodic tension-type headache, not intractable       calcium carbonate 500 MG chewable tablet    TUMS    150 tablet    Take 1 tablet (500 mg) by mouth 2 times daily as needed for  heartburn    Other chronic gastritis without hemorrhage       cetirizine 10 MG tablet    zyrTEC    30 tablet    Take 1 tablet (10 mg) by mouth daily    Chronic seasonal allergic rhinitis due to fungal spores       diphenhydrAMINE 25 MG tablet    BENADRYL    60 tablet    Take 1-2 tablets (25-50 mg) by mouth every 6 hours as needed for itching or allergies    Rash       docusate sodium 100 MG tablet    COLACE    60 tablet    Take 200 mg by mouth daily    Constipation, unspecified constipation type       fluticasone 50 MCG/ACT spray    FLONASE    1 Bottle    Spray 1-2 sprays into both nostrils daily as needed for allergies    Chronic seasonal allergic rhinitis due to fungal spores, Supervision of other normal pregnancy, antepartum, BRENDA (generalized anxiety disorder), PTSD (post-traumatic stress disorder), Constipation, unspecified constipation type       ketotifen 0.025 % Soln ophthalmic solution    ZADITOR    1 Bottle    Place 1 drop into both eyes every 12 hours    Acute seasonal allergic rhinitis, unspecified trigger       montelukast 10 MG tablet    SINGULAIR    30 tablet    Take 1 tablet (10 mg) by mouth daily    Acute seasonal allergic rhinitis, unspecified trigger       prazosin 1 MG capsule    MINIPRESS    90 capsule    Take 3 capsules (3 mg) by mouth At Bedtime    PTSD (post-traumatic stress disorder), Encounter for medication review and counseling       prenatal multivitamin plus iron 27-0.8 MG Tabs per tablet     100 tablet    Take 1 tablet by mouth daily    Encounter for supervision of other normal pregnancy in first trimester, Amenorrhea, Severe episode of recurrent major depressive disorder, without psychotic features (H)       QUEtiapine 100 MG tablet    SEROquel    90 tablet    Take 1 tablet (100 mg) by mouth At Bedtime    PTSD (post-traumatic stress disorder)       ranitidine 150 MG tablet    ZANTAC    90 tablet    Take 1 tablet (150 mg) by mouth At Bedtime    Gastroesophageal reflux disease,  esophagitis presence not specified       senna-docusate 8.6-50 MG per tablet    DESEAN-COLACE    20 tablet    Take 1 tablet by mouth 2 times daily as needed for constipation    Slow transit constipation       triamcinolone 0.1 % cream    KENALOG    30 g    Apply sparingly to affected area two times daily for 14 days or until rash resolves, whichever is sooner    Rash       * venlafaxine 75 MG 24 hr capsule    EFFEXOR-XR    30 capsule    Take 1 capsule by mouth daily (together with 150 mg capsule for total of 225 mg/day)    PTSD (post-traumatic stress disorder), Major depressive disorder, recurrent episode, moderate (H)       * venlafaxine 150 MG 24 hr capsule    EFFEXOR-XR    30 capsule    Take 1 capsule by mouth daily (together with 75 mg capsule for total of 225 mg/day)    PTSD (post-traumatic stress disorder), Major depressive disorder, recurrent episode, moderate (H)       * Notice:  This list has 2 medication(s) that are the same as other medications prescribed for you. Read the directions carefully, and ask your doctor or other care provider to review them with you.

## 2018-11-26 NOTE — PROGRESS NOTES
"SUBJECTIVE  No vaginal bleeding, loss of fluids, or cramping noted. No headaches, change in vision, RUQ pain, or lower extremity edema. Patient continues to feel baby move. She has been able to tolerate a diet. She has been taking her pre-ani vitamin.    Nausea started 2 weeks ago. Still constipated. Unsure when last BM was.     OBJECTIVE  /67  Pulse 96  Temp 98  F (36.7  C)  Resp 22  Ht 4' 9\" (144.8 cm)  Wt 145 lb 9.6 oz (66 kg)  LMP  (LMP Unknown)  SpO2 100%  BMI 31.51 kg/m2    General: sitting up in a chair awake and alert. Pleasant and cooperative in NAD.  Cardio: RRR  Resp: clear vesicular breath sounds bilaterally, no increased work of breathing  Abd: gravid, non-tender, uterus palpated 28 cm, FHTs 150s  Skin: no lesions or rashes  Ext: no edema  Psych: mood good, affect congruent. Mentation and conversation appropriate.    ASSESSMENT/PLAN  Patient is a  a 28 year old  female at 27w6d corresponding to CHARLI of  2019 obtained via 7 week ultrasound.  - prenatal labs unremarkable (Blood type:O+, Hgb:12.4, Hep B, HIV, syphilis, GC/Chlam, HIV negative, Rubella immune)  - pregravid weight 136 lbs, TWG today 8 lbs.   - Mood concerns: history of PTSD, BRENDA, recurrent major depressive disorder currently on effexor, prazosin, and seroquel. Mood stable today.   - planning on vaginal delivery  - Formula feeding  - Declines genetic testing  - Schedule fetal survey done and normal. It's a boy. Hayde has come to terms with this.   - Flu vaccine given 10/4/18  -1 hr GTT today  -Tdap  -2nd trimester CBC, syphilis    2. Constipation, unspecified constipation type  Still not drinking enough water. Provided water bottle today and encouraged her to drink at least 2 water bottles full of water a day. Also discussed risk of uterine hyperactivity/ labor 2/2 dehydration.     Precepted with: Dr. Alvarez    F/u 3 weeks    Rosana Rosales DO  [facility] Resident  Pager #[phone]      "

## 2018-11-26 NOTE — PROGRESS NOTES
I have reviewed the history and physical examination. I was present for key portions of the visit and agree with the assessment and plan as documented above by Dr. Rosales for 28 yr old  @ 27w 6d wks here for prenatal care. FHT/ FH appropriate.  Current issues include 1) Mood - stable on effexor, seroquel, prazosin 2) constipation - counseled on inc water intake, meds prn. /term labor precautions given.  Continue routine prenatal care.     Arnulfo Alvarez MD  2018  10:13 AM

## 2018-11-26 NOTE — PROGRESS NOTES
"  Assessment and Plan     (Z71.89) Encounter for medication review and counseling  (primary encounter diagnosis)  Comment: Unable to fill box today. Unable to assess adherence.   Plan: Return to clinic this Wednesday already scheduled for appointment with [facility]. VLAD Berrios/BARTOLO to assist in filling pillbox. Instructions below. Confirmed that all required refills already delivered to patient.     Pillbox to be filled as outlined below. Please fill 4 weeks if possible, if not 3 weeks sufficient.     Venlafaxine 75 mg capsule x1; Venlafaxine 150 mg capsule x1   Quetiapine 100 mg tablet x1    Prazosin 1 mg capsule x3    Montelukast 10 mg tablet x1   Ranitidine 150 mg tablet x1   Cetirizine 10 mg tablet x1    Prenatal tablet x1    Docusate 100 mg capsule x2          (K59.00) Constipation, unspecified constipation type  Comment: Uncontrolled. Not using PRN as previously instructed.  Plan: Encouraged continued water intake. As medications not present today, not able to provide education on use of PRN Senna-Docusate. Will defer this to BETSEY Berrios during pillbox fill\ Wednesday as well when returns to clinic. In the past, the Maryann  have assisted in writing on label in Maryann reason for use, I.e. \"constipation\".         Follow up: 3 weeks as covisit with PCP    Options for treatment and/or follow-up care were reviewed with the patient. Hayde was engaged and actively involved in the decision making process. She verbalized understanding of the options discussed and was satisfied with the final plan. Patient was provided with written instructions/medication list via AVS.    Dr. Rosales was provided our recommendations in clinic today and Dr. Alvarez was available for supervision during this visit and is the authorizing prescriber for this visit through the pharmacist collaborative practice agreement.    Thank you for the opportunity to participate in the care of this patient.  Sierra Dickson, PharmD  [facility]  Phone: [phone]  November 26, 2018 at 9:32 AM    Current Outpatient Prescriptions   Medication Sig Dispense Refill     acetaminophen (TYLENOL) 325 MG tablet Take 2 tablets (650 mg) by mouth every 6 hours as needed for headaches 100 tablet 1     calcium carbonate (TUMS) 500 MG chewable tablet Take 1 tablet (500 mg) by mouth 2 times daily as needed for heartburn 150 tablet 3     cetirizine (ZYRTEC) 10 MG tablet Take 1 tablet (10 mg) by mouth daily 30 tablet 3     diphenhydrAMINE (BENADRYL) 25 MG tablet Take 1-2 tablets (25-50 mg) by mouth every 6 hours as needed for itching or allergies 60 tablet 1     docusate sodium (COLACE) 100 MG tablet Take 200 mg by mouth daily 60 tablet 3     fluticasone (FLONASE) 50 MCG/ACT spray Spray 1-2 sprays into both nostrils daily as needed for allergies 1 Bottle 1     ketotifen (ZADITOR) 0.025 % SOLN ophthalmic solution Place 1 drop into both eyes every 12 hours 1 Bottle 3     montelukast (SINGULAIR) 10 MG tablet Take 1 tablet (10 mg) by mouth daily 30 tablet 11     prazosin (MINIPRESS) 1 MG capsule Take 3 capsules (3 mg) by mouth At Bedtime 90 capsule 2     Prenatal Vit-Fe Fumarate-FA (PRENATAL MULTIVITAMIN PLUS IRON) 27-0.8 MG TABS per tablet Take 1 tablet by mouth daily 100 tablet 3     QUEtiapine (SEROQUEL) 100 MG tablet Take 1 tablet (100 mg) by mouth At Bedtime 90 tablet 1     ranitidine (ZANTAC) 150 MG tablet Take 1 tablet (150 mg) by mouth At Bedtime 90 tablet 3     senna-docusate (DESEAN-COLACE) 8.6-50 MG per tablet Take 1 tablet by mouth 2 times daily as needed for constipation 20 tablet 1     triamcinolone (KENALOG) 0.1 % cream Apply sparingly to affected area two times daily for 14 days or until rash resolves, whichever is sooner 30 g 0     venlafaxine (EFFEXOR-XR) 150 MG 24 hr capsule Take 1 capsule by mouth daily (together with 75 mg capsule for total of 225 mg/day) 30 capsule 11     venlafaxine (EFFEXOR-XR) 75 MG 24 hr capsule Take 1 capsule by  "mouth daily (together with 150 mg capsule for total of 225 mg/day) 30 capsule 11            HPI:       Hayde Macias is a 28 year old female referred by Dr. Rosales for medication management. Hayde s main concern today is nausea (see below). Patient seen today with the assistance of Maryann .    # Nausea: Started two weeks ago, estimates. BM every two weeks. Drinking 2 cups of water per day. Not interested in drinking more as her stomach is already \"too tight\". No PRN Senna-Docusate use, not sure how to identify this medication.     # MDD/Anxiety: Continues to have sleep difficulties. Doesn't think medicine will help this issue. Experienced dizziness this last Sunday. Doesn't happen often, only once in a while.     # Pillbox: Was not aware that she had to bring in her medications today.            PMHX:     Patient Active Problem List   Diagnosis     Health Care Home     Recurrent major depressive disorder (H)     Encounter for supervision of normal pregnancy     Seasonal allergic rhinitis     PTSD (post-traumatic stress disorder)     BRENDA (generalized anxiety disorder)     Other fatigue     Insomnia due to other mental disorder     Allergies   Allergen Reactions     Augmentin Rash            Objective     VS w/ IP 11/26/2018 11/26/2018   SYSTOLIC 104    DIASTOLIC 67    PULSE 96    TEMPERATURE 98    RESPIRATIONS 22    Wt.(Lbs.) 145.6    Wt. (Kg) 66.044 kg    Ht. (Feet./Inches) 4' 9\"    Ht. (Cm) 144.8 cm    BMI  31.51   O2 Sat 100            [facility] Pharmacist Documentation     Drug therapy problems identified:  Medical Condition 1: Constipation, Goals of therapy: Not at goal, Drug Class: Stimulant, Convenience: Patient misunderstanding, Intervention: Educate patient, Verification: Deferred to Future Visit    Relevant medical devices: n/a    # of medical conditions addressed: 2  # of medications addressed: 2  # of DTP identified: 1  Time spent: 20 minutes  Level of service per [place] DHS guidelines: 2 nc  "

## 2018-11-26 NOTE — MR AVS SNAPSHOT
After Visit Summary   11/26/2018    Hayde LUJAN Say    MRN: [id number]           Patient Information     Date Of Birth          1990        Visit Information        Provider Department      11/26/2018 9:20 AM Sierra Scott RPH [facility]        Today's Diagnoses     Encounter for medication review and counseling    -  1    Constipation, unspecified constipation type           Follow-ups after your visit        Follow-up notes from your care team     Return in about 3 weeks (around 12/17/2018).      Your next 10 appointments already scheduled     Nov 28, 2018  1:00 PM CST   Return Visit with Elham Mcmahan LMFT [facility] ([facility])    [address] [place] [address]   [phone]            Dec 19, 2018  1:40 PM CST   Return Visit with Sierra Scott RPH [facility] ([facility])    [address] [place] [id number]   [phone]            Dec 19, 2018  1:40 PM CST   RETURN OB with Rosana Rosales DO   [facility] ([facility])    [address] [place] [address]   [phone]              Who to contact     Please call your clinic at [phone] to:    Ask questions about your health    Make or cancel appointments    Discuss your medicines    Learn about your test results    Speak to your doctor            Additional Information About Your Visit        Care EveryWhere ID     This is your Care EveryWhere ID. This could be used by other organizations to access your [place] medical records  [id number]        Your Vitals Were     Last Period                   (LMP Unknown)            Blood Pressure from Last 3 Encounters:   11/26/18 104/67   11/01/18 105/69   10/25/18 105/69    Weight from Last 3 Encounters:   11/26/18 145 lb 9.6 oz (66 kg)   10/25/18 144 lb 6.4 oz (65.5 kg)   10/04/18 140 lb (63.5 kg)              Today, you had the following     No orders found for  display       Primary Care Provider Office Phone # Fax #    Rosana Rosales [phone]       [facility] [place] [address]        Equal Access to Services     DYLAN LEY : Hadii aad ku hadlainashelia Celenafransisco, waaxda luqadaha, qaybta kaalmada kelleyda, nakul daojulián weemsritchie jorgedeenaelie garcía. So [facility] [phone].    ATENCIÓN: Si habla español, tiene a keene disposición servicios gratuitos de asistencia lingüística. Llame al [phone].    We comply with applicable federal civil rights laws and [place] laws. We do not discriminate on the basis of race, color, national origin, age, disability, sex, sexual orientation, or gender identity.            Thank you!     Thank you for choosing [facility]  for your care. Our goal is always to provide you with excellent care. Hearing back from our patients is one way we can continue to improve our services. Please take a few minutes to complete the written survey that you may receive in the mail after your visit with us. Thank you!             Your Updated Medication List - Protect others around you: Learn how to safely use, store and throw away your medicines at www.disposemymeds.org.          This list is accurate as of 11/26/18 11:59 PM.  Always use your most recent med list.                   Brand Name Dispense Instructions for use Diagnosis    acetaminophen 325 MG tablet    TYLENOL    100 tablet    Take 2 tablets (650 mg) by mouth every 6 hours as needed for headaches    Episodic tension-type headache, not intractable       calcium carbonate 500 MG chewable tablet    TUMS    150 tablet    Take 1 tablet (500 mg) by mouth 2 times daily as needed for heartburn    Other chronic gastritis without hemorrhage       cetirizine 10 MG tablet    zyrTEC    30 tablet    Take 1 tablet (10 mg) by mouth daily    Chronic seasonal allergic rhinitis due to fungal spores       diphenhydrAMINE 25 MG tablet    BENADRYL    60 tablet    Take 1-2 tablets  (25-50 mg) by mouth every 6 hours as needed for itching or allergies    Rash       docusate sodium 100 MG tablet    COLACE    60 tablet    Take 200 mg by mouth daily    Constipation, unspecified constipation type       fluticasone 50 MCG/ACT nasal spray    FLONASE    1 Bottle    Spray 1-2 sprays into both nostrils daily as needed for allergies    Chronic seasonal allergic rhinitis due to fungal spores, Supervision of other normal pregnancy, antepartum, BRENDA (generalized anxiety disorder), PTSD (post-traumatic stress disorder), Constipation, unspecified constipation type       ketotifen 0.025 % ophthalmic solution    ZADITOR    1 Bottle    Place 1 drop into both eyes every 12 hours    Acute seasonal allergic rhinitis, unspecified trigger       montelukast 10 MG tablet    SINGULAIR    30 tablet    Take 1 tablet (10 mg) by mouth daily    Acute seasonal allergic rhinitis, unspecified trigger       pediatric multivitamin w/iron 27-0.8 MG tablet     100 tablet    Take 1 tablet by mouth daily    Encounter for supervision of other normal pregnancy in first trimester, Amenorrhea, Severe episode of recurrent major depressive disorder, without psychotic features (H)       prazosin 1 MG capsule    MINIPRESS    90 capsule    Take 3 capsules (3 mg) by mouth At Bedtime    PTSD (post-traumatic stress disorder), Encounter for medication review and counseling       QUEtiapine 100 MG tablet    SEROquel    90 tablet    Take 1 tablet (100 mg) by mouth At Bedtime    PTSD (post-traumatic stress disorder)       ranitidine 150 MG tablet    ZANTAC    90 tablet    Take 1 tablet (150 mg) by mouth At Bedtime    Gastroesophageal reflux disease, esophagitis presence not specified       senna-docusate 8.6-50 MG tablet    DESEAN-COLACE    20 tablet    Take 1 tablet by mouth 2 times daily as needed for constipation    Slow transit constipation       triamcinolone 0.1 % cream    KENALOG    30 g    Apply sparingly to affected area two times daily for 14  days or until rash resolves, whichever is sooner    Rash       * venlafaxine 75 MG 24 hr capsule    EFFEXOR-XR    30 capsule    Take 1 capsule by mouth daily (together with 150 mg capsule for total of 225 mg/day)    PTSD (post-traumatic stress disorder), Major depressive disorder, recurrent episode, moderate (H)       * venlafaxine 150 MG 24 hr capsule    EFFEXOR-XR    30 capsule    Take 1 capsule by mouth daily (together with 75 mg capsule for total of 225 mg/day)    PTSD (post-traumatic stress disorder), Major depressive disorder, recurrent episode, moderate (H)       * Notice:  This list has 2 medication(s) that are the same as other medications prescribed for you. Read the directions carefully, and ask your doctor or other care provider to review them with you.

## 2018-11-27 DIAGNOSIS — O99.019 ANTEPARTUM ANEMIA: Primary | ICD-10-CM

## 2018-11-27 LAB — TREPONEMA ANTIBODY (SYPHILIS): NEGATIVE

## 2018-11-27 NOTE — PROGRESS NOTES
Patient noted to have normocytic anemia. Elham, please have patient go to lab for further workup after your appointment with her on 11/28/18 (future orders placed).    Rosana Rosales DO  [facility] Medicine Resident  Pager #[phone]

## 2018-11-28 ENCOUNTER — CARE COORDINATION (OUTPATIENT)
Dept: FAMILY MEDICINE | Facility: CLINIC | Age: 28
End: 2018-11-28

## 2018-11-28 ENCOUNTER — TELEPHONE (OUTPATIENT)
Dept: FAMILY MEDICINE | Facility: CLINIC | Age: 28
End: 2018-11-28

## 2018-11-28 ENCOUNTER — OFFICE VISIT (OUTPATIENT)
Dept: PSYCHOLOGY | Facility: CLINIC | Age: 28
End: 2018-11-28
Payer: COMMERCIAL

## 2018-11-28 DIAGNOSIS — O99.019 ANTEPARTUM ANEMIA: Primary | ICD-10-CM

## 2018-11-28 DIAGNOSIS — O99.019 ANTEPARTUM ANEMIA: ICD-10-CM

## 2018-11-28 LAB
FERRITIN SERPL-MCNC: 10 NG/ML (ref 10–130)
FOLATE SERPL-MCNC: 7.1 NG/ML
VIT B12 SERPL-MCNC: 319 PG/ML (ref 213–816)

## 2018-11-28 NOTE — PROGRESS NOTES
Met with pt today in clinic, reviewed mail, bills, school communications today.  Also did med set up per pharm D notes with no concerns.  Pt medications are set up for 4 weeks.    Per Pharm D notes and current med set up she is to take Prazosin x3 caps per day, however bottles both old and new one are marked as take 2 per day- did box set up with 3x so pt may need a refill sooner than planned- please revise.    Post Med box set up ( 4 weeks completed)  Did remaining counts of medications:  Venlafaxine 75 mg- 10 remaining  Venlafaxine 150 mg caps 9 remaining  Quetiapine- 8 left  Prazosin 27 left  Montelukast has 35 left  Ranitidine 8 left  Cetirizine 12 left  Prenatal - 28 left  Docusate 23 left  Has enough prns of all - new bottle of Fluticasone just received and appears to have enough of everything else.    Lbetz

## 2018-11-28 NOTE — Clinical Note
Hayde was not doing as well as you thought on Monday. She said the  told her she was crazy when she tried to tell you about feeling more anxious and tired and did not interpret it. I reported to Karina Vazquez. Got lab done!

## 2018-11-28 NOTE — PROGRESS NOTES
Behavioral Health Progress Note    Client Legal Name:             Hayde LUJAN Say                  Client Preferred Name: Hayde             Service Type: Individual  Length of Visit: 60 minutes  Attendees:       Due to patient being non-English speaking/uses sign language, an  was used for this visit. Date and length of interpretation can be found on the scanned  worksheet.       name: Frances England  Agency: [employer]  Language: [place]   Telephone number: [phone]  Type of interpretation: Face-to-face, spoken      Complexity statement: Due to patient being non-English speaking, an  was used for this visit. An  is used not only to interpret language, since the patient does not speak English, but also to help with the complexity of understandings across cultures, since the patient is not well integrated in the larger [place] culture.    Identifying Information and Presenting Problem:    The patient is a 28 year old [place] female who is being seen for problematic symptoms of PTSD, depression.    Treatment Objective(s) Addressed in This Session:  Learn more adaptive ways to manage her worries.     Progress on / Status of Treatment Objective(s) / Homework:  Worsening      PHQ-9 SCORE 8/7/2018 9/18/2018 10/25/2018   PHQ-9 Total Score - - -   PHQ-9 Total Score 11 17 16       BRENDA-7 SCORE 4/26/2017 8/29/2017 1/22/2018   Total Score 8 15 14       Topics Discussed/Interventions Provided:  Patient had numerous concerns today, many of which I relayed to to care coordinator, Agustina (food stamp increase, not heard from [place], citizenship paperwork, etc).     Overall, patient feels her memory and anxiety are worse. She has been seeing a black object move around the floor near her bed at night, which happened after her neighbor passed away recently. We explored cultural significance. Sounds as though this is not uncommon in [place] culture after a death, but it is not a good  sign. Also reporting palpitations in the AM and panic attacks before she goes to bed. Could not identify particular trigger-spent a good amount of time exploring this. Deep breathing helps a little. Says these symptoms make her body feel weird. Wanted to discuss this with me, but her phone was off. Also wanted to discuss with Dr. Rosales at OB appt, but reports the  told her she was 'crazy' and did not translate. I have brought this to attention of our clinic supervisor.     We decided to work on a strategy to manage anxiety that is more physiological. Attempts to do cognitive-based strategies have not been effective. Suggested that in addition to deep breathing, patient should consider taking deep breaths of cold air to distract her (going outside or opening her freezer door).    Assessment: The patient appeared to be active and engaged in today's session and was receptive to feedback.     Mental Status: Hayde appeared generally alert and oriented. Dress was casual and appropriate to the weather and occasion. Grooming and hygiene were clean. Eye contact was adequate. Speech was of normal volume and rate and was clear, coherent, and relevant. Mood was anxious with congruent affect. Thought processes were relevant, logical and goal-directed. Thought content was WNL with no evidence of psychotic or paranoid features. No evidence of SI/HI or self-harm, intent, or plans. Memory appeared grossly intact. Insight and judgment appeared fair and patient exhibited good impulse control during the appointment.     Does the patient appear to be at imminent risk of harm to self/others at this time? No    The session was necessary to address worry, depression, PTSD that have been interfering with patient's ability to function at concentrating at work, being able to work with strangers, arguments with her , personal life management, parenting.  Ongoing psychotherapy is necessary to improve functioning with daily  activities, provide psychoeducation and provide support.      Diagnosis (DSM-5):  296.33 recurrent severe major depression  308.3 PTSD  300.02 Generalized anxiety disorder        Plan:  1. Follow up in 1-2 weeks.  2. Continue with concrete strategies to manage depression, anger, anxiety.          NOTE: Treatment plan update due 12/27/18.  Diagnostic assessment update due 4/26/19

## 2018-11-28 NOTE — LETTER
November 29, 2018      Hayde LUJAN Say  [address]   [place] [address]        Dear Hayde,  Blood work indicative of iron deficiency anemia. Iron supplement sent to pharmacy with instructions to drink with large glass of water. Very important to increase water intake since patient already has issues with constipation.     Please see below for your test results.    Resulted Orders   Vitamin B12 ([facility])   Result Value Ref Range    Vitamin B12 319 213 - 816 pg/mL    Narrative    Test performed by:  [facility] [address] [place] [address]   Folate  Serum ([facility])   Result Value Ref Range    Folate 7.1 >=3.5 ng/mL    Narrative    Test performed by:  [facility] [address] [place] [address]   Ferritin ([facility])   Result Value Ref Range    Ferritin 10 10 - 130 ng/mL    Narrative    Test performed by:  [facility] [address] [place] [address]       If you have any questions, please call the clinic to make an appointment.    Sincerely,    [facility]

## 2018-11-28 NOTE — MR AVS SNAPSHOT
After Visit Summary   11/28/2018    Hayde LUJAN Say    MRN: [id number]           Patient Information     Date Of Birth          1990        Visit Information        Provider Department      11/28/2018 1:00 PM Elham Mcmahan, LMFT [facility]        Today's Diagnoses     Antepartum anemia           Follow-ups after your visit        Your next 10 appointments already scheduled     Dec 19, 2018  1:40 PM CST   Return Visit with Sierra Scott, RPH [facility] ([facility])    [address] [place] [id number] [phone]            Dec 19, 2018  1:40 PM CST   RETURN OB with Rosana Rosales DO   [facility] ([facility])    [address] [place] [address] [phone]              Who to contact     Please call your clinic at [phone] to:    Ask questions about your health    Make or cancel appointments    Discuss your medicines    Learn about your test results    Speak to your doctor            Additional Information About Your Visit        Care EveryWhere ID     This is your Care EveryWhere ID. This could be used by other organizations to access your [place] medical records  [id number]        Your Vitals Were     Last Period                   (LMP Unknown)            Blood Pressure from Last 3 Encounters:   11/26/18 104/67   11/01/18 105/69   10/25/18 105/69    Weight from Last 3 Encounters:   11/26/18 145 lb 9.6 oz (66 kg)   10/25/18 144 lb 6.4 oz (65.5 kg)   10/04/18 140 lb (63.5 kg)              We Performed the Following      : Sign Language or Oral - 68-82 minutes        Primary Care Provider Office Phone # Fax #    Rosana Rosales [phone]       [address] [place] [address]        Equal Access to Services     DYLAN LEY : Mason Jones, waaxda luqadaha, qaybta kaalmada adeegyada, nakul garcía. So [facility]  [phone].    ATENCIÓN: Si ema stover, tiene a keene disposición servicios gratuitos de asistencia lingüística. Cristy medina [phone].    We comply with applicable federal civil rights laws and [place] laws. We do not discriminate on the basis of race, color, national origin, age, disability, sex, sexual orientation, or gender identity.            Thank you!     Thank you for choosing [facility]  for your care. Our goal is always to provide you with excellent care. Hearing back from our patients is one way we can continue to improve our services. Please take a few minutes to complete the written survey that you may receive in the mail after your visit with us. Thank you!             Your Updated Medication List - Protect others around you: Learn how to safely use, store and throw away your medicines at www.disposemymeds.org.          This list is accurate as of 11/28/18  4:31 PM.  Always use your most recent med list.                   Brand Name Dispense Instructions for use Diagnosis    acetaminophen 325 MG tablet    TYLENOL    100 tablet    Take 2 tablets (650 mg) by mouth every 6 hours as needed for headaches    Episodic tension-type headache, not intractable       calcium carbonate 500 MG chewable tablet    TUMS    150 tablet    Take 1 tablet (500 mg) by mouth 2 times daily as needed for heartburn    Other chronic gastritis without hemorrhage       cetirizine 10 MG tablet    zyrTEC    30 tablet    Take 1 tablet (10 mg) by mouth daily    Chronic seasonal allergic rhinitis due to fungal spores       diphenhydrAMINE 25 MG tablet    BENADRYL    60 tablet    Take 1-2 tablets (25-50 mg) by mouth every 6 hours as needed for itching or allergies    Rash       docusate sodium 100 MG tablet    COLACE    60 tablet    Take 200 mg by mouth daily    Constipation, unspecified constipation type       fluticasone 50 MCG/ACT nasal spray    FLONASE    1 Bottle    Spray 1-2 sprays into both nostrils daily as needed  for allergies    Chronic seasonal allergic rhinitis due to fungal spores, Supervision of other normal pregnancy, antepartum, BRENDA (generalized anxiety disorder), PTSD (post-traumatic stress disorder), Constipation, unspecified constipation type       ketotifen 0.025 % ophthalmic solution    ZADITOR    1 Bottle    Place 1 drop into both eyes every 12 hours    Acute seasonal allergic rhinitis, unspecified trigger       montelukast 10 MG tablet    SINGULAIR    30 tablet    Take 1 tablet (10 mg) by mouth daily    Acute seasonal allergic rhinitis, unspecified trigger       prazosin 1 MG capsule    MINIPRESS    90 capsule    Take 3 capsules (3 mg) by mouth At Bedtime    PTSD (post-traumatic stress disorder), Encounter for medication review and counseling       prenatal multivitamin w/iron 27-0.8 MG tablet     100 tablet    Take 1 tablet by mouth daily    Encounter for supervision of other normal pregnancy in first trimester, Amenorrhea, Severe episode of recurrent major depressive disorder, without psychotic features (H)       QUEtiapine 100 MG tablet    SEROquel    90 tablet    Take 1 tablet (100 mg) by mouth At Bedtime    PTSD (post-traumatic stress disorder)       ranitidine 150 MG tablet    ZANTAC    90 tablet    Take 1 tablet (150 mg) by mouth At Bedtime    Gastroesophageal reflux disease, esophagitis presence not specified       senna-docusate 8.6-50 MG tablet    DESEAN-COLACE    20 tablet    Take 1 tablet by mouth 2 times daily as needed for constipation    Slow transit constipation       triamcinolone 0.1 % external cream    KENALOG    30 g    Apply sparingly to affected area two times daily for 14 days or until rash resolves, whichever is sooner    Rash       * venlafaxine 75 MG 24 hr capsule    EFFEXOR-XR    30 capsule    Take 1 capsule by mouth daily (together with 150 mg capsule for total of 225 mg/day)    PTSD (post-traumatic stress disorder), Major depressive disorder, recurrent episode, moderate (H)       *  venlafaxine 150 MG 24 hr capsule    EFFEXOR-XR    30 capsule    Take 1 capsule by mouth daily (together with 75 mg capsule for total of 225 mg/day)    PTSD (post-traumatic stress disorder), Major depressive disorder, recurrent episode, moderate (H)       * Notice:  This list has 2 medication(s) that are the same as other medications prescribed for you. Read the directions carefully, and ask your doctor or other care provider to review them with you.

## 2018-11-28 NOTE — TELEPHONE ENCOUNTER
Called Shawn  for Behavioral health home program [facility]  I called to confirm that she had said she would be helping pt with food shelves, transport to food, forms, mail, forms ect.  Needs an [facility] worker or someone more on hand than the current plan and that pt should not have to call her as she is unable, she needs a  to communicate, and if worker sets up a time, let me know and I will call with reminders too.    lbetz

## 2018-11-29 ENCOUNTER — TELEPHONE (OUTPATIENT)
Dept: FAMILY MEDICINE | Facility: CLINIC | Age: 28
End: 2018-11-29

## 2018-11-29 DIAGNOSIS — D50.8 IRON DEFICIENCY ANEMIA SECONDARY TO INADEQUATE DIETARY IRON INTAKE: Primary | ICD-10-CM

## 2018-11-29 RX ORDER — FERROUS SULFATE 325(65) MG
325 TABLET ORAL
Qty: 90 TABLET | Refills: 0 | Status: SHIPPED | OUTPATIENT
Start: 2018-11-29 | End: 2019-05-06

## 2018-11-29 NOTE — TELEPHONE ENCOUNTER
Pt called today in need of help, wants to get registered for toys for tots, I will look into this but also heard from shawn who can help assist as well    Called shawn to confirm an appt with [place] her worker for Dec 11th at 9am, and have a ride set up to [place] to meet with [place] and an  there so they can start getting consistent relationship set up with the  as well and build a relationship.  Focus is to set goals which has been difficult based on what Hayde tells me then tells ny.    Will call hayde with reminder befor this appt and to bring, Mail, school communications, snap information, and have specific goals in mind.      Set up appt with  here for Dec 6th at 2pm with Elham  And she has OB and Pharm appt on 19th.  All rides are scheduled with [place] transportation    With language line, communicated this to pt    juan david

## 2018-11-29 NOTE — TELEPHONE ENCOUNTER
Called pt with results of anemia bloodwork  Communicated Dr Mack notes    Confirmed understanding with pt, had her repeat back what was coming    lbetz

## 2018-11-29 NOTE — PROGRESS NOTES
To be discussed at in-person clinic appointment on 12/19/18:  Blood work indicative of iron deficiency anemia. Iron supplement sent to pharmacy with instructions to drink with large glass of water. Very important to increase water intake since patient already has issues with constipation.     Rosana Rosales DO  [facility] Medicine Resident  Pager #[phone]

## 2018-12-05 ENCOUNTER — TELEPHONE (OUTPATIENT)
Dept: FAMILY MEDICINE | Facility: CLINIC | Age: 28
End: 2018-12-05

## 2018-12-05 NOTE — TELEPHONE ENCOUNTER
[facility] Follow-up    Call initiated by clinic.  Message recorded by Agustina Gramajo  Calling for: Reminder and check in  Patient: Hayde LUJAN Say  Phone conversation with: Patient  Patient's Phone number/s: Home number on file [phone] (home)  Available today in the PM on their Home number.  OK to leave message on voice mail? Yes      Major diagnoses and/or issues requiring coordination services  Mental health    In the past month, how many times have you been to the Emergency Room? 0  In the past month, how many times have you been hospitalized? 0    Summary notes: Continue to see Elham  Practice relaxation techniques  Follow up appts  For current pregnancy  Work with outside agencies for supports     Self-management goals:  Meet with Elham Mental health therapist   Good self care and self talk  Continue to use distraction and walks when panic bothers you  Continue to call agustina as needed  See MD regularily  Readiness to change: Already changing        Counseling and coordination activities with: patient/family, PCP and specialist:     Follow-up date: monthly and prn    See pt tomorrow when in clinic for more updates and collaborate with mental didier to meet pt and family needs

## 2018-12-06 ENCOUNTER — CARE COORDINATION (OUTPATIENT)
Dept: FAMILY MEDICINE | Facility: CLINIC | Age: 28
End: 2018-12-06

## 2018-12-06 ENCOUNTER — OFFICE VISIT (OUTPATIENT)
Dept: PSYCHOLOGY | Facility: CLINIC | Age: 28
End: 2018-12-06
Payer: COMMERCIAL

## 2018-12-06 DIAGNOSIS — F41.1 GENERALIZED ANXIETY DISORDER: ICD-10-CM

## 2018-12-06 DIAGNOSIS — F33.2 SEVERE EPISODE OF RECURRENT MAJOR DEPRESSIVE DISORDER, WITHOUT PSYCHOTIC FEATURES (H): Primary | ICD-10-CM

## 2018-12-06 DIAGNOSIS — F43.10 PTSD (POST-TRAUMATIC STRESS DISORDER): ICD-10-CM

## 2018-12-06 NOTE — PROGRESS NOTES
Pt in clinic today  Reviewed mail  Reviewed school communication  Got some food and a snack so she eats  Called her ride  Called teacher of her oldest son and left message for update on how he is doing in school and how we can help with homework getting done    Called her younger sukumar teacher orly as well today and spoke with her about public housing, she gave me a phone number to call on application she did with pt    Also reported that child is now coming to school in pants every day, he tolerates them well, and has proper footware as well.      Spoke with headstart they went out to see pt this week, she wants early headstart for baby and has that in process now, and they also got her signed up for toys for tots as well, I also registered them so they should be covered.    Called on pts ride home    Ramila

## 2018-12-06 NOTE — PROGRESS NOTES
Behavioral Health Progress Note     Client Legal Name:             Hayde LUJAN Say                  Client Preferred Name: Hayde             Service Type: Individual  Length of Visit: 60 minutes  Attendees:       Due to patient being non-English speaking/uses sign language, an  was used for this visit. Date and length of interpretation can be found on the scanned  worksheet.       name: Sylvester Murillo  Agency: [employer]  Language: Maryann   Telephone number: [phone]  Type of interpretation: Face-to-face, spoken      Complexity statement: Due to patient being non-English speaking, an  was used for this visit. An  is used not only to interpret language, since the patient does not speak English, but also to help with the complexity of understandings across cultures, since the patient is not well integrated in the larger [place] culture.     Identifying Information and Presenting Problem:     The patient is a 28 year old Maryann female who is being seen for problematic symptoms of PTSD, depression.     Treatment Objective(s) Addressed in This Session:  Learn more adaptive ways to manage her worries.      Progress on / Status of Treatment Objective(s) / Homework:  Minimal progress-back to baseline today.    PHQ-9 SCORE 8/7/2018 9/18/2018 10/25/2018   PHQ-9 Total Score - - -   PHQ-9 Total Score 11 17 16       BRENDA-7 SCORE 4/26/2017 8/29/2017 1/22/2018   Total Score 8 15 14       Topics Discussed/Interventions Provided:  Most of appointment was spent doing a birth plan with patient. She is very worried about how she will be treated in the hospital. I'll discuss with her PCP the best way to make sure Hayde's birth plan is easily accessible. Plan includes items such as warning staff about hx of postpartum depression, having minimal staff/providers in room during delivery, being treated with respect. We will continue to add to this and discuss with Dr. Rosales.      Assessment: The patient appeared to be active and engaged in today's session and was receptive to feedback. Hayde has legitimate, important concerns regarding her next deliver and I appreciated her insight into what staff should know about her to help at the hospital.     Mental Status: Hayde appeared generally alert and oriented. Dress was casual and inappropriate to the weather and occasion (t shirt, leggings, sandals with no socks)- patient provided with jacket, hat, mittens before she left. Grooming and hygiene were clean. Eye contact was good. Speech was of normal volume and rate and was clear, coherent, and relevant. Mood was anxious with congruent affect. Thought processes were relevant, logical and goal-directed. Thought content was WNL with no evidence of psychotic or paranoid features. No evidence of SI/HI or self-harm, intent, or plans. Memory appeared grossly intact. Insight and judgment appeared fair and patient exhibited good impulse control during the appointment.     Does the patient appear to be at imminent risk of harm to self/others at this time? No    The session was necessary to address worry, depression, PTSD that have been interfering with patient's ability to function at concentrating at work, being able to work with strangers, arguments with her , personal life management, parenting.  Ongoing psychotherapy is necessary to improve functioning with daily activities, provide psychoeducation and provide support.      Diagnosis (DSM-5):  296.33 recurrent severe major depression  308.3 PTSD  300.02 Generalized anxiety disorder        Plan:  1. Follow up in 1-2 weeks.  2. Continue with concrete strategies to manage depression, anger, anxiety (such as birth plan)          NOTE: Treatment plan update due 12/27/18.  Diagnostic assessment update due 4/26/19

## 2018-12-07 ENCOUNTER — TELEPHONE (OUTPATIENT)
Dept: FAMILY MEDICINE | Facility: CLINIC | Age: 28
End: 2018-12-07

## 2018-12-07 NOTE — TELEPHONE ENCOUNTER
Call to [facility]  [phone] left message to see if she is in fact on the wait list, she has heard nothing and is worried as she knows people who have gotten a confirmation letter and she has not.    Ramila

## 2018-12-18 NOTE — PROGRESS NOTES
SUBJECTIVE  HPI limited. Patient refused to answer most questions.     Appetite improving. Isnt pooping. Wakes up in the middle of the hearing voices. They are fighting and no one is there. They are yelling at eachother. Heart beats fast and feels anxious. No thoughts of hurting herself but sometimes she wants to die.     OBJECTIVE  /70   Pulse 103   Resp 16   Wt 66.2 kg (146 lb)   LMP  (LMP Unknown)   SpO2 98%   BMI 31.59 kg/m      General: sitting up in a chair awake and alert. Pleasant and cooperative in NAD.  Abd: gravid, non-tender, uterus palpated 31 cm, FHTs 150s  Ext: no edema  Psych: tearful    ASSESSMENT/PLAN  Patient is a 28 year old  female at 31w1d corresponding to CHARLI of  2019 obtained via 7 week ultrasound.  - prenatal labs unremarkable (Blood type:O+, Hgb:12.4, 10.4 (ferritin 10), Hep B, HIV, syphilisx2, GC/Chlam, HIV negative, Rubella immune)  - pregravid weight 136 lbs, TWG today 10 lbs.   - planning on vaginal delivery  - Formula feeding  - Declines genetic testing  - Schedule fetal survey done and normal. It's a boy. Hayde has come to terms with this.   - Flu vaccine given 10/4/18  - TDAP given 18  - Passed 1 hr GTT  - O+, no need for rhogam  - Mood concerns: history of PTSD, BRENDA, recurrent major depressive disorder currently on effexor, prazosin, and seroquel. Uncontrolled depressed mood with psychiatric features today.  Will increase Seroquel to 150 mg nightly.  Also referral sent for psychiatry at [facility].  -   Constipation continues to be an issue, senna docusate scheduled daily.     Precepted with: Dr. Degroot    F/u 1 week for mood    Rosana Rosales DO  [facility] Family Medicine Resident  Pager #[phone]

## 2018-12-19 ENCOUNTER — OFFICE VISIT (OUTPATIENT)
Dept: PHARMACY | Facility: CLINIC | Age: 28
End: 2018-12-19
Payer: COMMERCIAL

## 2018-12-19 ENCOUNTER — OFFICE VISIT (OUTPATIENT)
Dept: FAMILY MEDICINE | Facility: CLINIC | Age: 28
End: 2018-12-19
Payer: COMMERCIAL

## 2018-12-19 VITALS
WEIGHT: 146 LBS | SYSTOLIC BLOOD PRESSURE: 102 MMHG | HEART RATE: 103 BPM | RESPIRATION RATE: 16 BRPM | DIASTOLIC BLOOD PRESSURE: 70 MMHG | OXYGEN SATURATION: 98 % | BODY MASS INDEX: 31.59 KG/M2

## 2018-12-19 DIAGNOSIS — K59.00 CONSTIPATION, UNSPECIFIED CONSTIPATION TYPE: ICD-10-CM

## 2018-12-19 DIAGNOSIS — K59.01 SLOW TRANSIT CONSTIPATION: ICD-10-CM

## 2018-12-19 DIAGNOSIS — F43.10 PTSD (POST-TRAUMATIC STRESS DISORDER): ICD-10-CM

## 2018-12-19 DIAGNOSIS — Z71.89 ENCOUNTER FOR MEDICATION REVIEW AND COUNSELING: Primary | ICD-10-CM

## 2018-12-19 DIAGNOSIS — F33.3 SEVERE EPISODE OF RECURRENT MAJOR DEPRESSIVE DISORDER, WITH PSYCHOTIC FEATURES (H): ICD-10-CM

## 2018-12-19 DIAGNOSIS — F32.3 MAJOR DEPRESSION WITH PSYCHOTIC FEATURES (H): ICD-10-CM

## 2018-12-19 DIAGNOSIS — Z34.90 ENCOUNTER FOR SUPERVISION OF NORMAL PREGNANCY, ANTEPARTUM, UNSPECIFIED GRAVIDITY: Primary | ICD-10-CM

## 2018-12-19 RX ORDER — AMOXICILLIN 250 MG
1 CAPSULE ORAL DAILY
Qty: 60 TABLET | Refills: 2 | Status: SHIPPED | OUTPATIENT
Start: 2018-12-19 | End: 2018-12-27

## 2018-12-19 RX ORDER — QUETIAPINE FUMARATE 100 MG/1
150 TABLET, FILM COATED ORAL AT BEDTIME
Qty: 45 TABLET | Refills: 0 | Status: SHIPPED | OUTPATIENT
Start: 2018-12-19 | End: 2019-01-14

## 2018-12-19 NOTE — PROGRESS NOTES
Assessment and Plan     (Z71.89) Encounter for medication review and counseling  (primary encounter diagnosis)  Comment: Pillbox NOT empty as anticipated. Did not assess reasons for missed doses today due to time restrictions and patient affect, upset and minimally participating today. However using PRNs appropriately. Taking iron appropriately, doesn't want in box as takes at different time of day.   Plan: Pillbox filled as below x 2 weeks, last dose 1/1/2019. Will plan to see patient at next PCP visit.     (K59.00) Constipation, unspecified constipation type  Comment: Uncontrolled today. Not willing to make dietary changes. Poor recall about stool frequency. Concern that medication changes will not be effective given poor PO intake (or at least PO intake reported).   Plan: After discussion w/ Dr. Rosales, continue Docusate 200 mg daily. Add scheduled Senna-Docusate. Corrected misunderstanding about Ranitidine. Instructed that can take additional tablet of Senna-Docusate PRN. Confirmed via teachback.     (F33.3) Severe episode of recurrent major depressive disorder, with psychotic features (H)  Comment: Uncontrolled symptomatology. Concern for further titration of Prazosin given soft BPs, however no concerns for symptoms at this time. Antipsychotic may likely help to greater extent than further alpha blocker titration.   Plan: After discussion w/ Dr. Rosales, increase Quetiapine to 150 mg at bedtime.     Venlafaxine 75 mg capsule x1; Venlafaxine 150 mg capsule x1 (11/21/18 #30)    Quetiapine 100 mg tablet x1.5 (11/24/18 #30)    Prazosin 1 mg capsule x3 (11/21/18 #60)    Montelukast 10 mg tablet x1 (11/21/18 #30)   Ranitidine 150 mg tablet x1 (11/24/18 #30)    Cetirizine 10 mg tablet x1 (11/21/18 #30)   Prenatal tablet x1 10/11/18 #100   Senna-docusate 8.6-50 x1 tablet 10/23/18   Docusate 100 mg capsule x2 (11/21/18 #60)          Refill iron - called and requested this and all other medicines to be refilled.  KMS      Follow up: 1 week    Options for treatment and/or follow-up care were reviewed with the patient. Hayde was engaged and actively involved in the decision making process. She verbalized understanding of the options discussed and was satisfied with the final plan. Patient was provided with written instructions/medication list via AVS.    Dr. Rosales was provided our recommendations in clinic today and Dr. Degroot was available for supervision during this visit and is the authorizing prescriber for this visit through the pharmacist collaborative practice agreement.    Thank you for the opportunity to participate in the care of this patient.  Sierra Dickson, PharmD  [facility]  Phone: [phone]  December 19, 2018 at 2:12 PM    Current Outpatient Medications   Medication Sig Dispense Refill     senna-docusate (DESEAN-COLACE) 8.6-50 MG tablet Take 1 tablet by mouth daily , may take additional one tablet daily as needed. 60 tablet 2     acetaminophen (TYLENOL) 325 MG tablet Take 2 tablets (650 mg) by mouth every 6 hours as needed for headaches 100 tablet 1     calcium carbonate (TUMS) 500 MG chewable tablet Take 1 tablet (500 mg) by mouth 2 times daily as needed for heartburn 150 tablet 3     cetirizine (ZYRTEC) 10 MG tablet Take 1 tablet (10 mg) by mouth daily 30 tablet 3     diphenhydrAMINE (BENADRYL) 25 MG tablet Take 1-2 tablets (25-50 mg) by mouth every 6 hours as needed for itching or allergies 60 tablet 1     docusate sodium (COLACE) 100 MG tablet Take 200 mg by mouth daily 60 tablet 3     ferrous sulfate (FEROSUL) 325 (65 Fe) MG tablet Take 1 tablet (325 mg) by mouth daily (with breakfast) DRINK WITH LARGE GLASS OF WATER 90 tablet 0     fluticasone (FLONASE) 50 MCG/ACT spray Spray 1-2 sprays into both nostrils daily as needed for allergies 1 Bottle 1     ketotifen (ZADITOR) 0.025 % SOLN ophthalmic solution Place 1 drop into both eyes every 12 hours 1 Bottle 3      montelukast (SINGULAIR) 10 MG tablet Take 1 tablet (10 mg) by mouth daily 30 tablet 11     prazosin (MINIPRESS) 1 MG capsule Take 3 capsules (3 mg) by mouth At Bedtime 90 capsule 2     Prenatal Vit-Fe Fumarate-FA (PRENATAL MULTIVITAMIN PLUS IRON) 27-0.8 MG TABS per tablet Take 1 tablet by mouth daily 100 tablet 3     QUEtiapine (SEROQUEL) 100 MG tablet Take 1.5 tablets (150 mg) by mouth At Bedtime 45 tablet 0     ranitidine (ZANTAC) 150 MG tablet Take 1 tablet (150 mg) by mouth At Bedtime 90 tablet 3     triamcinolone (KENALOG) 0.1 % cream Apply sparingly to affected area two times daily for 14 days or until rash resolves, whichever is sooner 30 g 0     venlafaxine (EFFEXOR-XR) 150 MG 24 hr capsule Take 1 capsule by mouth daily (together with 75 mg capsule for total of 225 mg/day) 30 capsule 11     venlafaxine (EFFEXOR-XR) 75 MG 24 hr capsule Take 1 capsule by mouth daily (together with 150 mg capsule for total of 225 mg/day) 30 capsule 11            HPI:       Hayde Macias is a 28 year old female referred by Dr. Rosales for medication management and education. Patient seen today with the assistance of Maryann .    Patient offers pillboxes and PRN medicines for review.   Taking iron QAM - no concerns with stomach upset. Noticed dark stools. Hard to go to the bathroom.   Not using PRN Senna-Docusate. Accidentally taking extra Ranitidine PRN as thought this was for BMs.            PMHX:     Patient Active Problem List   Diagnosis     Health Care Home     Recurrent major depressive disorder (H)     Encounter for supervision of normal pregnancy     Seasonal allergic rhinitis     PTSD (post-traumatic stress disorder)     BRENDA (generalized anxiety disorder)     Other fatigue     Insomnia due to other mental disorder     Allergies   Allergen Reactions     Augmentin Rash            Objective     VS w/ IP 12/19/2018 12/19/2018   SYSTOLIC 102    DIASTOLIC 70    PULSE 103    TEMPERATURE     RESPIRATIONS 16    Wt.(Lbs.)  146    Wt. (Kg) 66.225 kg    Ht. (Feet./Inches)     Ht. (Cm)     BMI  31.59   O2 Sat 98            [facility] Pharmacist Documentation     Drug therapy problems identified:  Medical Condition 1: Constipation, Goals of therapy: Not at goal, Drug Class: Stimulant, Indication: Needs additional drug therapy, Intervention: Change dose, Verification: Provider Agreed  Medical Condition 2: Depression, Goals of therapy 2: Not at goal, Drug Class 2: Antipsychotic, Efficacy 2: Partially effective, Intervention 2: Change dose, Verification 2: Provider Agreed       Relevant medical devices: n/a    # of medical conditions addressed: 2  # of medications addressed: 10  # of DTP identified: 2  Time spent: 30 minutes  Level of service per [place] DHS guidelines: 3 nc

## 2018-12-20 ENCOUNTER — DOCUMENTATION ONLY (OUTPATIENT)
Dept: FAMILY MEDICINE | Facility: CLINIC | Age: 28
End: 2018-12-20

## 2018-12-20 ENCOUNTER — TELEPHONE (OUTPATIENT)
Dept: FAMILY MEDICINE | Facility: CLINIC | Age: 28
End: 2018-12-20

## 2018-12-20 DIAGNOSIS — F43.10 PTSD (POST-TRAUMATIC STRESS DISORDER): ICD-10-CM

## 2018-12-20 DIAGNOSIS — F41.1 GAD (GENERALIZED ANXIETY DISORDER): ICD-10-CM

## 2018-12-20 DIAGNOSIS — K59.00 CONSTIPATION, UNSPECIFIED CONSTIPATION TYPE: ICD-10-CM

## 2018-12-20 DIAGNOSIS — Z34.80 SUPERVISION OF OTHER NORMAL PREGNANCY, ANTEPARTUM: ICD-10-CM

## 2018-12-20 DIAGNOSIS — J30.2 CHRONIC SEASONAL ALLERGIC RHINITIS DUE TO FUNGAL SPORES: ICD-10-CM

## 2018-12-20 RX ORDER — FLUTICASONE PROPIONATE 50 MCG
1-2 SPRAY, SUSPENSION (ML) NASAL DAILY PRN
Qty: 16 ML | Refills: 1 | Status: SHIPPED | OUTPATIENT
Start: 2018-12-20 | End: 2019-09-11

## 2018-12-20 NOTE — PROGRESS NOTES
Order Date:2018   Ordering User:VASYL SARAVIA [[email or web address]]   Encounter Provider:Vasyl Saravia DO [[id number]]   Authorizing Provider: Balta Degroot MD [[id number]]   Department:[facility][[id number]]      Ordering Provider NPI: [id number]  Balta Degroot   [facility]~[address] [id number]   Phone: [phone]            Procedure Requested     4612     MENTAL H   EALT REFERRAL  - Adult; Psy* [#[id number]]       Priority: Routine  Class: External referral       Comment:All scheduling is subject to the client's specific insurance plan &                benefits, provider/location availability, and provider clinical                specialities.  Please arrive 15 minutes early for your first                appointment and bring your completed paperwork.                                Please be a   ware that coverage of these services is subject to the                 terms and limitations of your health.                 insurance plan.  Call member services at your health plan with                 any benefit or coverage questions.                                WITH [facility]. PLEASE SCHEDULE WITH PATIENT     Associated Diagnoses       Z34.90 Encounter for supervision of normal pregnancy, antepartum,        unspecified        F43.10 PTSD (po   st-traumatic stress disorder)       F32.3 Major depression with psychotic features (H)       K59.01 Slow transit constipation       Child/Adolescent or Adult  Adult           Which Service?  Psychiatry and Medication Management           Psychiatry and Medication Management  Psychiatry           Adult Psychiatry Locations  Other: Not Listed - Enter Referral Details in                                   Scheduling Comments Below           Scheduling Note:  Pat   ient call to schedule             Hayde LUJAN Say                [id number]               : 1990  F      [address]                          PCP: [id number],  [place] [address]                                CTR: [facility]

## 2018-12-20 NOTE — PROGRESS NOTES
Referral for (Test): Psychiatry   Location/Place/Provider: [facility]    Date/Time: 01/14/2019 at 10:00am with    Phone: [phone]  Fax:   Additional information/prep.:   Scheduled by: GEETA Chung

## 2018-12-20 NOTE — TELEPHONE ENCOUNTER
Set up rides for Blays appts on Dec 27th at 1:40 and 2 with [employer] confirmation number is [id number]    Set up ride for appt with Elham on Dec 28, 2pm with return ride picking her up at 3:15pm confirmation number [id number]

## 2018-12-23 NOTE — PROGRESS NOTES
Preceptor Attestation:   Patient seen, evaluated and discussed with the resident. I have verified the content of the note, which accurately reflects my assessment of the patient and the plan of care.    Seroquel was actually increased to 150mg, NOT 350mg.    Supervising Physician:Balta Degroot MD    [facility]
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
## 2018-12-27 ENCOUNTER — OFFICE VISIT (OUTPATIENT)
Dept: PHARMACY | Facility: CLINIC | Age: 28
End: 2018-12-27
Payer: COMMERCIAL

## 2018-12-27 ENCOUNTER — OFFICE VISIT (OUTPATIENT)
Dept: FAMILY MEDICINE | Facility: CLINIC | Age: 28
End: 2018-12-27
Payer: COMMERCIAL

## 2018-12-27 VITALS
BODY MASS INDEX: 31.59 KG/M2 | SYSTOLIC BLOOD PRESSURE: 96 MMHG | HEIGHT: 57 IN | DIASTOLIC BLOOD PRESSURE: 61 MMHG | WEIGHT: 146.4 LBS | OXYGEN SATURATION: 99 % | HEART RATE: 100 BPM | RESPIRATION RATE: 20 BRPM | TEMPERATURE: 98.5 F

## 2018-12-27 DIAGNOSIS — F32.3 MAJOR DEPRESSION WITH PSYCHOTIC FEATURES (H): Primary | ICD-10-CM

## 2018-12-27 DIAGNOSIS — K59.01 SLOW TRANSIT CONSTIPATION: ICD-10-CM

## 2018-12-27 DIAGNOSIS — K59.00 CONSTIPATION, UNSPECIFIED CONSTIPATION TYPE: ICD-10-CM

## 2018-12-27 DIAGNOSIS — Z71.89 ENCOUNTER FOR MEDICATION REVIEW AND COUNSELING: Primary | ICD-10-CM

## 2018-12-27 DIAGNOSIS — F41.1 GAD (GENERALIZED ANXIETY DISORDER): ICD-10-CM

## 2018-12-27 DIAGNOSIS — F33.3 SEVERE EPISODE OF RECURRENT MAJOR DEPRESSIVE DISORDER, WITH PSYCHOTIC FEATURES (H): ICD-10-CM

## 2018-12-27 RX ORDER — AMOXICILLIN 250 MG
1 CAPSULE ORAL DAILY PRN
COMMUNITY
Start: 2018-12-27 | End: 2019-02-25

## 2018-12-27 ASSESSMENT — ANXIETY QUESTIONNAIRES
GAD7 TOTAL SCORE: 7
6. BECOMING EASILY ANNOYED OR IRRITABLE: SEVERAL DAYS
1. FEELING NERVOUS, ANXIOUS, OR ON EDGE: MORE THAN HALF THE DAYS
IF YOU CHECKED OFF ANY PROBLEMS ON THIS QUESTIONNAIRE, HOW DIFFICULT HAVE THESE PROBLEMS MADE IT FOR YOU TO DO YOUR WORK, TAKE CARE OF THINGS AT HOME, OR GET ALONG WITH OTHER PEOPLE: VERY DIFFICULT
3. WORRYING TOO MUCH ABOUT DIFFERENT THINGS: MORE THAN HALF THE DAYS
7. FEELING AFRAID AS IF SOMETHING AWFUL MIGHT HAPPEN: MORE THAN HALF THE DAYS
5. BEING SO RESTLESS THAT IT IS HARD TO SIT STILL: NOT AT ALL
2. NOT BEING ABLE TO STOP OR CONTROL WORRYING: NOT AT ALL

## 2018-12-27 ASSESSMENT — MIFFLIN-ST. JEOR: SCORE: 1262.95

## 2018-12-27 ASSESSMENT — PATIENT HEALTH QUESTIONNAIRE - PHQ9
SUM OF ALL RESPONSES TO PHQ QUESTIONS 1-9: 20
5. POOR APPETITE OR OVEREATING: NOT AT ALL

## 2018-12-27 NOTE — PROGRESS NOTES
Assessment and Plan     (Z71.89) Encounter for medication review and counseling  (primary encounter diagnosis)  Comment: Pillbox empty as expected.   Plan: Pillbox filled as below x4 weeks. Last dose 1/23, due for refill 1/24/2019. Prior to next pillbox fill will need refill of all medicines.     (K59.00) Constipation, unspecified constipation type  Comment: No improvement in symptoms despite addition of Senna-Docusate daily. Likely not drinking enough water daily for medications to be efficacious.  Plan: After discussion w/ Dr. Rosales, discontinue Senna-Docusate, continue Docusate 200 mg daily. Continue to encourage increased water intake.     (F33.3) Severe episode of recurrent major depressive disorder, with psychotic features (H)  Comment: Improvement in symptoms with Quetiapine dose reduction  Plan: Continue Quetiapine 150 mg at bedtime.     Refill: Acetaminophen, Ferrous sulfate; Need to update Prazosin Rx at pharmacy, again filled with old SIG    Venlafaxine 75 mg capsule x1; Venlafaxine 150 mg capsule x1 (12/20/18 #30)    Quetiapine 100 mg tablet x1.5 (12/19/18 #45) - no refills   Prazosin 1 mg capsule x3 (12/20/18 #60)    Montelukast 10 mg tablet x1 (12/20/18 #30)   Ranitidine 150 mg tablet x1 (12/20/18 #30)    Cetirizine 10 mg tablet x1 (12/20/18 #30)   Prenatal tablet x1 10/11/18 #100   Docusate 100 mg capsule x2 (12/20/18 #60)          Follow up: 4 weeks     Options for treatment and/or follow-up care were reviewed with the patient. Hayde was engaged and actively involved in the decision making process. She verbalized understanding of the options discussed and was satisfied with the final plan. Patient was provided with written instructions/medication list via AVS.    Dr. Rosales was provided our recommendations in clinic today and Dr. Lin was available for supervision during this visit and is the authorizing prescriber for this visit through the pharmacist collaborative practice  agreement.    Thank you for the opportunity to participate in the care of this patient.  Sierra Dickson, PharmD  [facility]  Phone: [phone]  December 27, 2018 at 1:54 PM    Current Outpatient Medications   Medication Sig Dispense Refill     cetirizine (ZYRTEC) 10 MG tablet Take 1 tablet (10 mg) by mouth daily 30 tablet 3     docusate sodium (COLACE) 100 MG tablet Take 200 mg by mouth daily 60 tablet 3     ferrous sulfate (FEROSUL) 325 (65 Fe) MG tablet Take 1 tablet (325 mg) by mouth daily (with breakfast) DRINK WITH LARGE GLASS OF WATER 90 tablet 0     montelukast (SINGULAIR) 10 MG tablet Take 1 tablet (10 mg) by mouth daily 30 tablet 11     prazosin (MINIPRESS) 1 MG capsule Take 3 capsules (3 mg) by mouth At Bedtime 90 capsule 2     Prenatal Vit-Fe Fumarate-FA (PRENATAL MULTIVITAMIN PLUS IRON) 27-0.8 MG TABS per tablet Take 1 tablet by mouth daily 100 tablet 3     QUEtiapine (SEROQUEL) 100 MG tablet Take 1.5 tablets (150 mg) by mouth At Bedtime 45 tablet 0     ranitidine (ZANTAC) 150 MG tablet Take 1 tablet (150 mg) by mouth At Bedtime 90 tablet 3     senna-docusate (DESEAN-COLACE) 8.6-50 MG tablet Take 1 tablet by mouth daily as needed for constipation , may take additional one tablet daily as needed.       acetaminophen (TYLENOL) 325 MG tablet Take 2 tablets (650 mg) by mouth every 6 hours as needed for headaches 100 tablet 1     calcium carbonate (TUMS) 500 MG chewable tablet Take 1 tablet (500 mg) by mouth 2 times daily as needed for heartburn 150 tablet 3     fluticasone (FLONASE) 50 MCG/ACT nasal spray Spray 1-2 sprays into both nostrils daily as needed for allergies 16 mL 1     ketotifen (ZADITOR) 0.025 % SOLN ophthalmic solution Place 1 drop into both eyes every 12 hours 1 Bottle 3     venlafaxine (EFFEXOR-XR) 150 MG 24 hr capsule Take 1 capsule by mouth daily (together with 75 mg capsule for total of 225 mg/day) 30 capsule 11     venlafaxine (EFFEXOR-XR) 75 MG 24 hr  "capsule Take 1 capsule by mouth daily (together with 150 mg capsule for total of 225 mg/day) 30 capsule 11            HPI:       Hayde Macias is a 28 year old female referred by Dr. Rosales for medication review and counseling. Patient seen today with the assistance of Maryann .    No help with BM. Still only one in a week. Drinking only 1.5 bottles of water per day.     Sleeping better.    No iron available. Not delivered with other medicines.     No other concerns with medicines. Concern that has bill at pharmacy for copays that she didn't pay when the medicines were delivered as she wasn't home.            PMHX:     Patient Active Problem List   Diagnosis     Health Care Home     Recurrent major depressive disorder (H)     Encounter for supervision of normal pregnancy     Seasonal allergic rhinitis     PTSD (post-traumatic stress disorder)     BRENDA (generalized anxiety disorder)     Other fatigue     Insomnia due to other mental disorder     Allergies   Allergen Reactions     Augmentin Rash            Objective     VS w/ IP 12/27/2018 12/27/2018   SYSTOLIC 96    DIASTOLIC 61    PULSE 100    TEMPERATURE 98.5    RESPIRATIONS 20    Wt.(Lbs.) 146.4    Wt. (Kg) 66.407 kg    Ht. (Feet./Inches) 4' 9\"    Ht. (Cm) 144.8 cm    BMI  31.68   O2 Sat 99            [facility] Pharmacist Documentation     Drug therapy problems identified:  Medical Condition 1: Constipation, Goals of therapy: Not at goal, Drug Class: Stimulant, Efficacy: Ineffective, Intervention: Discontinue drug, Verification: Provider Agreed    Relevant medical devices: n/a    # of medical conditions addressed: 2  # of medications addressed: 10  # of DTP identified: 1  Time spent: 20 minutes  Level of service per [place] DHS guidelines: 2 nc           Documentation   Due to patient being non-English speaking/uses sign language, an  was used for this visit. Only for face-to-face interpretation by an external agency, date and length of " interpretation can be found on the scanned worksheet.     name: Kishore  Agency: [employer]  Language: Maryann   Telephone number: [phone]  Type of interpretation: Face-to-face, spoken

## 2018-12-27 NOTE — PROGRESS NOTES
Preceptor Attestation:   Patient seen, evaluated and discussed with the resident. I have verified the content of the note, which accurately reflects my assessment of the patient and the plan of care.  Supervising Physician:Bulmaro Lin MD  [facility]

## 2018-12-27 NOTE — NURSING NOTE
Due to patient being non-English speaking/uses sign language, an  was used for this visit. Only for face-to-face interpretation by an external agency, date and length of interpretation can be found on the scanned worksheet.     name: Kishore Bolanos  Agency: [employer]  Language: Maryann   Telephone number: [phone]   Type of interpretation: Face-to-face, spoken

## 2018-12-28 ENCOUNTER — OFFICE VISIT (OUTPATIENT)
Dept: PSYCHOLOGY | Facility: CLINIC | Age: 28
End: 2018-12-28
Payer: COMMERCIAL

## 2018-12-28 DIAGNOSIS — F43.10 PTSD (POST-TRAUMATIC STRESS DISORDER): Primary | ICD-10-CM

## 2018-12-28 DIAGNOSIS — F33.2 SEVERE EPISODE OF RECURRENT MAJOR DEPRESSIVE DISORDER, WITHOUT PSYCHOTIC FEATURES (H): ICD-10-CM

## 2018-12-28 DIAGNOSIS — F41.1 GENERALIZED ANXIETY DISORDER: ICD-10-CM

## 2018-12-28 NOTE — PROGRESS NOTES
Behavioral Health Progress Note    Client Legal Name: Hayde LUJAN Say   Client Preferred Name: Hayde   Service Type: Individual  Length of Visit: 60 minutes  Attendees:        Due to patient being non-English speaking/uses sign language, an  was used for this visit. Date and length of interpretation can be found on the scanned  worksheet.       name: Claire Atwood  Agency: [employer]  Language: Maryann   Telephone number: [phone]  Type of interpretation: Face-to-face, spoken      Complexity statement: Due to patient being non-English speaking, an  was used for this visit. An  is used not only to interpret language, since the patient does not speak English, but also to help with the complexity of understandings across cultures, since the patient is not well integrated in the larger [place] culture.       Identifying Information and Presenting Problem:    The patient is a 28 year old Maryann female who is being seen for problematic symptoms of PTSD, depression.     Treatment Objective(s) Addressed in This Session:  Learn more adaptive ways to manage her worries.      Progress on / Status of Treatment Objective(s) / Homework:  Baseline, although patient did sleep well last night and notes that she feels somewhat better.    PHQ-9 SCORE 9/18/2018 10/25/2018 12/27/2018   PHQ-9 Total Score - - -   PHQ-9 Total Score 17 16 20       BRENDA-7 SCORE 8/29/2017 1/22/2018 12/27/2018   Total Score 15 14 7       Topics Discussed/Interventions Provided:  Reviewed symptoms: patient slept well last night after increasing meds yesterday. Looked more awake today and easier to redirect. She reports waking up angry. We discussed how over time, increased sleep may improve emotional reactivity, anger, etc. Patient continues to engage in deep breathing, taking a breath of cold air to help with anger. Reports this helps somewhat.    Continued discussion of baby planning: reviewed patient's  "intention to keep baby vs. Adoption. Hayde wants to keep this child, said \"I don't know, don't ask me that\" when I asked if she thought she would be happier once baby arrived. Notably, she wants suggestions from Dr. Rosales (or whoever delivers) after delivery. We spoke briefly about her thoughts on another child. Hayde brought this up. Culturally, she reports that girls take care of their parents. She is concerned that her boys will not care for her when she gets older. We did not explore this in depth, as there was insufficient time. Lastly, explored help her  could provide: he knows how to change diapers, bottle feed.  She reports she will need to direct him to do these. She does all cleaning and cooking. He will help with some discipline, but reports the boys do not mind him either.     Discussed tx plan, which remains appropriate.    Lastly, touched on parenting concerns with oldest son, Guillermo Matamoros. Reports she is very stressed. He does not do his homework and will cry and bang his head on the wall. She worries about this response, recognizes it is not normal. I requested that he come to a few appointments so I can better understand their dynamic and guide her in appropriate interventions. She would like me to be his therapist, but I explained I cannot do this.     Assessment: The patient appeared to be active and engaged in today's session and was receptive to feedback. Treatment plan remains appropriate. Hayde has not yet accomplished her goals, but we have seen changes: she is more trusting and open with care coordinator and myself, is more clear on the differences in our roles.     Mental Status: Hayde appeared generally alert and oriented. Dress was casual and appropriate to the weather and occasion. Grooming and hygiene were clean. Eye contact was good. Speech was of normal volume and rate and was clear, coherent, and relevant. Mood was anxious with congruent affect. Thought processes were relevant, " logical and goal-directed. Thought content was WNL with no evidence of psychotic or paranoid features. No evidence of SI/HI or self-harm, intent, or plans. Memory appeared grossly intact. Insight and judgment appeared fair and patient exhibited good impulse control during the appointment.      Does the patient appear to be at imminent risk of harm to self/others at this time? No     The session was necessary to address worry, depression, PTSD that have been interfering with patient's ability to function at concentrating at work, being able to work with strangers, arguments with her , personal life management, parenting.  Ongoing psychotherapy is necessary to improve functioning with daily activities, provide psychoeducation and provide support.      Diagnosis (DSM-5):  296.33 recurrent severe major depression  308.3 PTSD  300.02 Generalized anxiety disorder        Plan:  1. Follow up in 2 weeks.  2. Continue with concrete strategies to manage depression, anger, anxiety (such as birth plan)  3. Continue exploration of support system, who can help with baby  4. Continue exploration of desires to have another child, practicality of this.  5. Bring son to the next appointment          NOTE: Treatment plan update due 3/28/19.  Diagnostic assessment update due 4/26/19  The Treatment Plan dated 5/24/17 was reviewed with the patient at today s visit.  The Treatment Plan remains current based on the patient s status and progress to date.

## 2018-12-28 NOTE — NURSING NOTE
Due to patient being non-English speaking/uses sign language, an  was used for this visit. Only for face-to-face interpretation by an external agency, date and length of interpretation can be found on the scanned worksheet.     name: Ryan Davis  Agency: [employer]  Language: Maryann   Telephone number: [phone]  Type of interpretation: Face-to-face, spoken

## 2018-12-28 NOTE — TELEPHONE ENCOUNTER
Message to physician:     Date of last visit: 12/27/2018     Date of next visit if scheduled: Visit date not found       Last Comprehensive Metabolic Panel:  Sodium   Date Value Ref Range Status   12/22/2017 140.0 133.0 - 144.0 mmol/L Final     Potassium   Date Value Ref Range Status   12/22/2017 3.8 3.4 - 5.3 mmol/L Final     Chloride   Date Value Ref Range Status   12/22/2017 100.0 94.0 - 109.0 mmol/L Final     Carbon Dioxide   Date Value Ref Range Status   12/22/2017 26.0 20.0 - 32.0 mmol/L Final     Glucose   Date Value Ref Range Status   12/22/2017 79.0 60.0 - 109.0 mg/dL Final     Urea Nitrogen   Date Value Ref Range Status   12/22/2017 8.0 5.0 - 24.0 mg/dL Final     Creatinine   Date Value Ref Range Status   12/22/2017 0.7 0.6 - 1.3 mg/dL Final     Calcium   Date Value Ref Range Status   12/22/2017 9.3 8.5 - 10.4 mg/dL Final       BP Readings from Last 3 Encounters:   12/27/18 96/61   12/19/18 102/70   11/26/18 104/67       No results found for: A1C             Please complete refill and CLOSE ENCOUNTER.  Closing the encounter signifies the refill is complete.

## 2018-12-29 ASSESSMENT — ANXIETY QUESTIONNAIRES: GAD7 TOTAL SCORE: 7

## 2019-01-07 ENCOUNTER — OFFICE VISIT (OUTPATIENT)
Dept: PSYCHOLOGY | Facility: CLINIC | Age: 29
End: 2019-01-07
Payer: COMMERCIAL

## 2019-01-07 ENCOUNTER — CARE COORDINATION (OUTPATIENT)
Dept: FAMILY MEDICINE | Facility: CLINIC | Age: 29
End: 2019-01-07

## 2019-01-07 DIAGNOSIS — F33.2 SEVERE EPISODE OF RECURRENT MAJOR DEPRESSIVE DISORDER, WITHOUT PSYCHOTIC FEATURES (H): ICD-10-CM

## 2019-01-07 DIAGNOSIS — F41.1 GENERALIZED ANXIETY DISORDER: ICD-10-CM

## 2019-01-07 DIAGNOSIS — F43.10 PTSD (POST-TRAUMATIC STRESS DISORDER): Primary | ICD-10-CM

## 2019-01-07 NOTE — PROGRESS NOTES
Behavioral Health Progress Note    Client Legal Name:   Hayde LUJAN Say        Client Preferred Name: Hayde   Service Type: Individual  Length of Visit: 45 minutes  Attendees:         Due to patient being non-English speaking/uses sign language, an  was used for this visit. Date and length of interpretation can be found on the scanned  worksheet.       name: Claudia Rey   Agency: [employer]  Language: Maryann   Telephone number: [phone]  Type of interpretation: Face-to-face, spoken      Complexity statement: Due to patient being non-English speaking, an  was used for this visit. An  is used not only to interpret language, since the patient does not speak English, but also to help with the complexity of understandings across cultures, since the patient is not well integrated in the larger [place] culture.        Identifying Information and Presenting Problem:     The patient is a 28 year old Maryann female who is being seen for problematic symptoms of PTSD, depression.     Treatment Objective(s) Addressed in This Session:  Learn more adaptive ways to manage her worries.      Progress on / Status of Treatment Objective(s) / Homework:  Minimal progress    PHQ-9 SCORE 9/18/2018 10/25/2018 12/27/2018   PHQ-9 Total Score - - -   PHQ-9 Total Score 17 16 20       BRENDA-7 SCORE 8/29/2017 1/22/2018 12/27/2018   Total Score 15 14 7       Topics Discussed/Interventions Provided:  Hayde reported not feeling well today, she appeared to have a cold and her children were sick. She did not remember that this was the appointment to bring her son, Guillermo Matamoros and sent him to school. He has his 11 yr C this PM, so we did some coordinating and trouble shooting to make sure they can make it. She continues to have concerns that he does not follow directions and often cries when given directions. The other day, he threw his food on the floor during a meal and cried. She does not  understand what is wrong and is worried about him. Her sleep has improved overall, but is still hit or miss. She mostly complains of worries today. The biggest worries are related to Guillermo Matamoros's behaviors and who will watch her children when she is in labor.     Assessment: The patient appeared to be active and engaged in today's session and was receptive to feedback. Patient was disorganized and difficult to understand and redirect. Often when I asked a question she did not answer directly and it was not clear if she had a good understanding of what I asked. It could be that there were difficulties with this particular ; patient was also sick, which has contributed to poor responses in the past. Therefore, the appointment was fairly disjointed and we did not make much progress on anxiety management.        Mental Status: Hayde appeared generally alert and oriented. Dress was casual and appropriate to the weather and occasion. Grooming and hygiene were clean. Eye contact was good. Speech was of normal volume and rate and was clear, coherent, and relevant. Mood was anxious with congruent affect. Thought processes were relevant, logical and goal-directed. Thought content was WNL with no evidence of psychotic or paranoid features. No evidence of SI/HI or self-harm, intent, or plans. Memory appeared grossly intact. Insight and judgment appeared fair and patient exhibited good impulse control during the appointment.      Does the patient appear to be at imminent risk of harm to self/others at this time? No     The session was necessary to address worry, depression, PTSD that have been interfering with patient's ability to function at concentrating at work, being able to work with strangers, arguments with her , personal life management, parenting.  Ongoing psychotherapy is necessary to improve functioning with daily activities, provide psychoeducation and provide support.      Diagnosis  (DSM-5):  296.33 recurrent severe major depression  308.3 PTSD  300.02 Generalized anxiety disorder        Plan:  1. Follow up in 2-3 weeks.  2. Continue with concrete strategies to manage depression, anger, anxiety (such as birth plan)  3. Discuss plans for managing son's behaviors after his WCC today          NOTE: Treatment plan update due 3/28/19.  Diagnostic assessment update due 4/26/19

## 2019-01-07 NOTE — PROGRESS NOTES
[facility] Follow-up    Call initiated by clinic.  Message recorded by Agustina Gramajo  Calling for: monthly check in  Patient: Hayde LUJAN Say  Phone conversation with: Patient  Patient's Phone number/s: Home number on file [phone] (home)  Available today in the PM on their Home number.  OK to leave message on voice mail? Yes      Major diagnoses and/or issues requiring coordination services  Mental health    In the past month, how many times have you been to the Emergency Room? 0  In the past month, how many times have you been hospitalized? 0    Summary notes: Pt not feeling well, she has been sick along with her children.  I did receive her paperwork for immigration and will mail in again asking for an exception for payment in this process based on income    Self-management goals:  See Dr Regular  Communicate to Agustina any ride needs/ appts  See camille per schedule  Readiness to change: Already changing      Counseling and coordination activities with: patient/family, PCP and specialist:     Follow-up date: monthly and prn

## 2019-01-14 ENCOUNTER — OFFICE VISIT (OUTPATIENT)
Dept: FAMILY MEDICINE | Facility: CLINIC | Age: 29
End: 2019-01-14
Payer: COMMERCIAL

## 2019-01-14 ENCOUNTER — OFFICE VISIT (OUTPATIENT)
Dept: PSYCHOLOGY | Facility: CLINIC | Age: 29
End: 2019-01-14
Payer: COMMERCIAL

## 2019-01-14 VITALS
SYSTOLIC BLOOD PRESSURE: 97 MMHG | DIASTOLIC BLOOD PRESSURE: 64 MMHG | TEMPERATURE: 98.2 F | WEIGHT: 149.2 LBS | BODY MASS INDEX: 32.29 KG/M2 | RESPIRATION RATE: 18 BRPM | OXYGEN SATURATION: 99 % | HEART RATE: 102 BPM

## 2019-01-14 VITALS
SYSTOLIC BLOOD PRESSURE: 97 MMHG | DIASTOLIC BLOOD PRESSURE: 64 MMHG | OXYGEN SATURATION: 99 % | HEART RATE: 102 BPM | RESPIRATION RATE: 18 BRPM | BODY MASS INDEX: 32.19 KG/M2 | TEMPERATURE: 98.2 F | HEIGHT: 57 IN | WEIGHT: 149.2 LBS

## 2019-01-14 DIAGNOSIS — F43.10 PTSD (POST-TRAUMATIC STRESS DISORDER): ICD-10-CM

## 2019-01-14 DIAGNOSIS — Z34.80 SUPERVISION OF OTHER NORMAL PREGNANCY, ANTEPARTUM: Primary | ICD-10-CM

## 2019-01-14 RX ORDER — QUETIAPINE FUMARATE 200 MG/1
200 TABLET, FILM COATED ORAL AT BEDTIME
Qty: 30 TABLET | Refills: 1 | Status: SHIPPED | OUTPATIENT
Start: 2019-01-14 | End: 2019-02-25

## 2019-01-14 ASSESSMENT — MIFFLIN-ST. JEOR: SCORE: 1275.65

## 2019-01-14 NOTE — PROGRESS NOTES
Preceptor Attestation:   Patient seen, evaluated and discussed with the resident. I have verified the content of the note, which accurately reflects my assessment of the patient and the plan of care.  Supervising Physician:Parth Jessica MD  [facility]

## 2019-01-14 NOTE — PROGRESS NOTES
"SUBJECTIVE    Stooling every 2-3 days. Drinking juice has helped BMs. Feeling baby move. No vaginal bleeding, leakage of fluid, intermittent cramping that she can not time.     OBJECTIVE  BP 97/64   Pulse 102   Temp 98.2  F (36.8  C)   Resp 18   Ht 1.448 m (4' 9\")   Wt 67.7 kg (149 lb 3.2 oz)   LMP  (LMP Unknown)   SpO2 99%   BMI 32.29 kg/m      General: sitting up in a chair awake and alert. Pleasant and cooperative in NAD.  Abd: gravid, non-tender, uterus palpated 33.5 cm, FHTs 150s  Ext: no edema  Psych: smiling, interactive     ASSESSMENT/PLAN  Patient is a 28 year old  female at 34w6d corresponding to CHARLI of  2019 obtained via 7 week ultrasound.  - prenatal labs unremarkable (Blood type:O+, Hgb:12.4, 10.4 (ferritin 10), Hep B, HIV, syphilisx2, GC/Chlam, HIV negative, Rubella immune)  - pregravid weight 136 lbs, TWG today 13 lbs.   - planning on vaginal delivery  - Formula feeding  - Declines genetic testing  - Schedule fetal survey done and normal. It's a boy. Hayde has come to terms with this.   - Flu vaccine given 10/4/18  - TDAP given 18  - Passed 1 hr GTT  - Mood concerns: history of PTSD, BRENDA, recurrent major depressive disorder currently on effexor, prazosin, and seroquel. Seen by psychiatry today who increased her seroquel.    F/up 1 week    Precepted with: Dr. Jaskaran Rosales DO  [facility] Medicine Resident  Pager #[phone]      "
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
## 2019-01-14 NOTE — NURSING NOTE
Due to patient being non-English speaking/uses sign language, an  was used for this visit. Only for face-to-face interpretation by an external agency, date and length of interpretation can be found on the scanned worksheet.     name: Cherelle Humberto  Agency: [employer]  Language: Maryann   Telephone number: [phone]   Type of interpretation: Face-to-face, spoken

## 2019-01-14 NOTE — PATIENT INSTRUCTIONS
[facility] Information  If you have any further concerns or wish to schedule another appointment, please call our office at [phone] during normal business hours (8-5, M-F).     For uncomplicated pregnancies, you will be seeing your doctor once a month until you are 28 weeks, then you will see your doctor twice a month. You will begin weekly visits at 36 weeks until you deliver.     If you have urgent medical questions that cannot wait, you may call [phone] at any time of day.     If you have a medical emergency, please call [phone].    When to call or come in to the hospital  If you notice a decrease in your baby's movement.   If your begin to experience contractions that are 5 minutes or less apart and lasting for over 45 seconds, or if contractions are becoming more painful.  If you have any bleeding or leakage of fluids.   If you have a headache not resolved with tylenol, right upper abdominal pain, or sudden onset of swelling.  You know your body best. Never hesitate to call or go to the hospital if something doesn't feel right!    Preparing for the hospital  You re likely feeling anxious as your child s birth approaches. This is normal. To give yourself some peace of mind, pack a bag 3-4 weeks before your due date. Here is a list of things to remember:  Personal care items like toothbrush, hair brush, lip balm, lotion, shampoo, glasses, contacts  Nightgown, bathrobe, slippers  Several pairs of underwear  Comfortable clothes for you to wear home  Camera with new batteries  Cell phone and   Insurance information and any other paperwork needed for your hospital stay  Clothes for baby to wear home  An infant, rear-facing car seat for bringing home your baby (this is required by law)  Managing Labor Pain   There are many ways to manage pain during labor. It can often be done with no anesthesia or strong pain medications. Talk to your health care provider about any options you would like to  "explore.   Help from Relaxation  Some of these are learned in special classes. Your health care provider can help you find classes. The hospital has a tub you can use during early labor. These methods may be of help to you:   Breathing techniques   A warm tub between contractions   Massage and therapeutic touch by your support person or labor    Reading materials that are comforting or inspiring   Music that is soothing   Hypnosis   Acupuncture and acupressure   Heat and cold applicatio  Help From Analgesics   Analgesics are mild medications that reduce pain. They can be used along with some relaxation methods. They can give you pain relief without total loss of feeling. They may lessen the pain of strong contractions. You may feel well enough to nap between contractions. They have little effect on your baby if given early in labor. This may be done by injection or by IV.   Help from Inhaled Medications  Nitrous oxide (\"laughing gas\") mixed with oxygen is another option for anesthesia. This is administered by breathing through a mask that you can take off or put on when you would like. It is safe for you and your baby during labor. Women have various responses to pain relief from this method, typically you will feel less bothered by the pain.   Help From Anesthetics   Anesthesia involves blockage of all feeling including pain. It can be given in the form of local anesthesia or general anesthesia.  Anesthesia is a type of medication to prevent pain. It is often used in labor. It may numb only one region of your body. This is called regional anesthesia. Or it may let you sleep during surgery. This is called general anesthesia. This type of medication is given by a trained specialist. When possible, regional anesthesia will be used. This is so you can be awake during your baby s birth.   Regional Anesthesia   Regional anesthesia may be used to numb your lower body for a vaginal or  birth. It does not go " into your bloodstream. This means that little or none of it will reach your baby. There are two kinds:   Epidural. This is most often given while you sit up or lie on your side. A needle with a flexible tube (catheter) is put in your lower back. The needle is then removed. The anesthetic is sent through the catheter. A pump may be attached. This gives you a constant level of anesthetic. An epidural often only partly affects muscle control. This means you should still be able to push for a vaginal birth.   Spinal. This is most often given in one dose right before delivery. It acts fast. You may sit up or lie down when it is injected. It may affect muscle control in your lower body. This includes the ability to push.  General Anesthesia   General anesthesia lets you sleep and keeps you free of pain during surgery. It may be used for a . It may be given as an injection. It may be given as an inhaled gas. Or it may be given as both. Delivery often occurs before the medication has reached the baby.

## 2019-01-15 ENCOUNTER — TELEPHONE (OUTPATIENT)
Dept: FAMILY MEDICINE | Facility: CLINIC | Age: 29
End: 2019-01-15

## 2019-01-15 NOTE — TELEPHONE ENCOUNTER
Change of appts for Jan 21 to do meds that day when she is here with childrens appts so she does not have to come 3 days in a row.  [email or web address]

## 2019-01-21 ENCOUNTER — TELEPHONE (OUTPATIENT)
Dept: FAMILY MEDICINE | Facility: CLINIC | Age: 29
End: 2019-01-21

## 2019-01-21 ENCOUNTER — OFFICE VISIT (OUTPATIENT)
Dept: PHARMACY | Facility: CLINIC | Age: 29
End: 2019-01-21
Payer: COMMERCIAL

## 2019-01-21 DIAGNOSIS — Z71.89 ENCOUNTER FOR MEDICATION REVIEW AND COUNSELING: Primary | ICD-10-CM

## 2019-01-21 DIAGNOSIS — R52 PAIN: ICD-10-CM

## 2019-01-21 DIAGNOSIS — K21.9 GASTROESOPHAGEAL REFLUX DISEASE, ESOPHAGITIS PRESENCE NOT SPECIFIED: ICD-10-CM

## 2019-01-21 NOTE — TELEPHONE ENCOUNTER
Message to physician: N/A    Date of last visit: 1/14/2019     Date of next visit if scheduled: Visit date 01/23/2019    Last Comprehensive Metabolic Panel:  Sodium   Date Value Ref Range Status   12/22/2017 140.0 133.0 - 144.0 mmol/L Final     Potassium   Date Value Ref Range Status   12/22/2017 3.8 3.4 - 5.3 mmol/L Final     Chloride   Date Value Ref Range Status   12/22/2017 100.0 94.0 - 109.0 mmol/L Final     Carbon Dioxide   Date Value Ref Range Status   12/22/2017 26.0 20.0 - 32.0 mmol/L Final     Glucose   Date Value Ref Range Status   12/22/2017 79.0 60.0 - 109.0 mg/dL Final     Urea Nitrogen   Date Value Ref Range Status   12/22/2017 8.0 5.0 - 24.0 mg/dL Final     Creatinine   Date Value Ref Range Status   12/22/2017 0.7 0.6 - 1.3 mg/dL Final     Calcium   Date Value Ref Range Status   12/22/2017 9.3 8.5 - 10.4 mg/dL Final       BP Readings from Last 3 Encounters:   01/14/19 97/64   01/14/19 97/64   12/27/18 96/61       No results found for: A1C             Please complete refill and CLOSE ENCOUNTER.  Closing the encounter signifies the refill is complete.

## 2019-01-21 NOTE — TELEPHONE ENCOUNTER
Called to remind parent of appts for two of her children and to remind her to bring meds as she is scheduled for a med set

## 2019-01-21 NOTE — PROGRESS NOTES
Assessment and Plan     (Z71.89) Encounter for medication review and counseling  (primary encounter diagnosis)  Comment: Appropriate use of PRNs. Unable to fill medicines as refills not yet delivered. Call to pharmacy - due to insurance change, unable to process without new ID number. Do not anticipate that change in sleep due to medication as takes all medicine doses at night/bedtime.  Plan: Provided pharmacy with updated insurance information. Pillbox filled x 1 week. RTC in one week to fill remainder of medicines. Encouraged patient to work to limit day time napping to help improve night time sleep. Reports will attempt.     Venlafaxine 75 mg capsule x1; Venlafaxine 150 mg capsule x1 (12/20/18 #30)    Quetiapine 100 mg tablet x1.5 (12/19/18 #45) - Please note due to lack of supply, continued with lower dose, however at next visit to increase to 200 mg per psychiatry appt   Prazosin 1 mg capsule x3 (12/20/18 #60)    Montelukast 10 mg tablet x1 (12/20/18 #30)   Ranitidine 150 mg tablet x1 (12/20/18 #30)    Cetirizine 10 mg tablet x1 (12/20/18 #30)   Prenatal tablet x1 10/11/18 #100   Docusate 100 mg capsule x2 (12/20/18 #60)      Medicines not available today: Ferrous sulfate    Started to fill second box: needs Seroquel, Prazosin, Prenatal vitamin    Follow up: 1 week    Options for treatment and/or follow-up care were reviewed with the patient. Hayde was engaged and actively involved in the decision making process. She verbalized understanding of the options discussed and was satisfied with the final plan. Patient was provided with written instructions/medication list via AVS.    Dr. Rosales was provided our recommendations via routed note and Dr. Degroot was available for supervision during this visit and is the authorizing prescriber for this visit through the pharmacist collaborative practice agreement.    Thank you for the opportunity to participate in the care of this patient.  Sierra Dickson,  PharmD  [facility]  Phone: [phone]  January 21, 2019 at 3:59 PM    Current Outpatient Medications   Medication Sig Dispense Refill     acetaminophen (TYLENOL) 325 MG tablet Take 2 tablets (650 mg) by mouth every 6 hours as needed for headaches 100 tablet 1     cetirizine (ZYRTEC) 10 MG tablet Take 1 tablet (10 mg) by mouth daily 30 tablet 3     docusate sodium (COLACE) 100 MG tablet Take 200 mg by mouth daily 60 tablet 3     fluticasone (FLONASE) 50 MCG/ACT nasal spray Spray 1-2 sprays into both nostrils daily as needed for allergies 16 mL 1     montelukast (SINGULAIR) 10 MG tablet Take 1 tablet (10 mg) by mouth daily 30 tablet 11     prazosin (MINIPRESS) 1 MG capsule Take 3 capsules (3 mg) by mouth At Bedtime 90 capsule 2     Prenatal Vit-Fe Fumarate-FA (PRENATAL MULTIVITAMIN PLUS IRON) 27-0.8 MG TABS per tablet Take 1 tablet by mouth daily 100 tablet 3     QUEtiapine (SEROQUEL) 200 MG tablet Take 1 tablet (200 mg) by mouth At Bedtime 30 tablet 1     ranitidine (ZANTAC) 150 MG tablet Take 1 tablet (150 mg) by mouth At Bedtime 90 tablet 3     senna-docusate (DESEAN-COLACE) 8.6-50 MG tablet Take 1 tablet by mouth daily as needed for constipation , may take additional one tablet daily as needed.       venlafaxine (EFFEXOR-XR) 150 MG 24 hr capsule Take 1 capsule by mouth daily (together with 75 mg capsule for total of 225 mg/day) 30 capsule 11     venlafaxine (EFFEXOR-XR) 75 MG 24 hr capsule Take 1 capsule by mouth daily (together with 150 mg capsule for total of 225 mg/day) 30 capsule 11     calcium carbonate (TUMS) 500 MG chewable tablet Take 1 tablet (500 mg) by mouth 2 times daily as needed for heartburn 150 tablet 3     ferrous sulfate (FEROSUL) 325 (65 Fe) MG tablet Take 1 tablet (325 mg) by mouth daily (with breakfast) DRINK WITH LARGE GLASS OF WATER 90 tablet 0     ketotifen (ZADITOR) 0.025 % SOLN ophthalmic solution Place 1 drop into both eyes every 12 hours 1 Bottle 3             HPI:       Hayde Macias is a 28 year old female referred by Dr. Rosales for pillbox fill.     # Constipation: BM 2-3 times per week. Comfortable. Still working to drink water / juice.     # Pain: Need Tylenol refill. 2 tablets every day recently 2/2 back pain. Doesn't need second dose in a day.     # Sleep: Some night sleeping good, others not as good. Tired at noon everyday. Not medicine caused. Doesn't take any medicine at noon. With napping during day trouble then sleeping at night. Nap from noon - 430pm.            PMHX:     Patient Active Problem List   Diagnosis     Health Care Home     Recurrent major depressive disorder (H)     Encounter for supervision of normal pregnancy     Seasonal allergic rhinitis     PTSD (post-traumatic stress disorder)     BRENDA (generalized anxiety disorder)     Other fatigue     Insomnia due to other mental disorder     Allergies   Allergen Reactions     Augmentin Rash            Objective     n/a        [facility] Pharmacist Documentation     Drug therapy problems identified:  Medical Condition 1: Pain (i.e. somatic, visceral, neuropathic), Goals of therapy: At Goal, Drug Class: Analgesic, OTC, Convenience: Other, Intervention: Refill, Verification: Patient Agreed - OTC      Relevant medical devices: n/a    # of medical conditions addressed: 2  # of medications addressed: 10  # of DTP identified: 1  Time spent: 20 minutes  Level of service per [place] DHS guidelines: 2

## 2019-01-23 ENCOUNTER — TELEPHONE (OUTPATIENT)
Dept: FAMILY MEDICINE | Facility: CLINIC | Age: 29
End: 2019-01-23

## 2019-01-23 ENCOUNTER — OFFICE VISIT (OUTPATIENT)
Dept: FAMILY MEDICINE | Facility: CLINIC | Age: 29
End: 2019-01-23
Payer: COMMERCIAL

## 2019-01-23 VITALS
DIASTOLIC BLOOD PRESSURE: 65 MMHG | WEIGHT: 151.6 LBS | TEMPERATURE: 98.6 F | SYSTOLIC BLOOD PRESSURE: 100 MMHG | HEIGHT: 57 IN | RESPIRATION RATE: 20 BRPM | HEART RATE: 97 BPM | OXYGEN SATURATION: 100 % | BODY MASS INDEX: 32.7 KG/M2

## 2019-01-23 DIAGNOSIS — R05.9 COUGH: ICD-10-CM

## 2019-01-23 DIAGNOSIS — Z34.83 ENCOUNTER FOR SUPERVISION OF OTHER NORMAL PREGNANCY IN THIRD TRIMESTER: Primary | ICD-10-CM

## 2019-01-23 RX ORDER — GUAIFENESIN 200 MG/10ML
200 LIQUID ORAL EVERY 4 HOURS PRN
Qty: 118 ML | Refills: 1 | Status: SHIPPED | OUTPATIENT
Start: 2019-01-23 | End: 2019-02-25

## 2019-01-23 ASSESSMENT — MIFFLIN-ST. JEOR: SCORE: 1286.53

## 2019-01-23 NOTE — PROGRESS NOTES
"SUBJECTIVE  No vaginal bleeding or leakage of fluids.  Intermittent contractions that are not able to be timed. Feeling baby move.  She has been stooling 2-3 times per week.    OBJECTIVE  /65   Pulse 97   Temp 98.6  F (37  C)   Resp 20   Ht 1.448 m (4' 9\")   Wt 68.8 kg (151 lb 9.6 oz)   LMP  (LMP Unknown)   SpO2 100%   BMI 32.81 kg/m      General: sitting up in a chair awake and alert. Pleasant and cooperative in NAD.  Abd: gravid, non-tender, uterus palpated 36.5 cm, FHTs 150s  Ext: no edema  Psych: smiling, interactive     ASSESSMENT/PLAN  Patient is a 28 year old  female at 36w1d corresponding to CHARLI of  2019 obtained via 7 week ultrasound.  - prenatal labs unremarkable (Blood type:O+, Hgb:12.4, 10.4 (ferritin 10), Hep B, HIV, syphilisx2, GC/Chlam, HIV negative, Rubella immune)  - pregravid weight 136 lbs, TWG today 15 lbs.   - planning on vaginal delivery  - Formula feeding  - Declines genetic testing  - Schedule fetal survey done and normal. It's a boy. Hayde has come to terms with this.   - Flu vaccine given 10/4/18  - TDAP given 18  - Passed 1 hr GTT  - Mood concerns: history of PTSD, BRENDA, recurrent major depressive disorder currently on effexor, prazosin, and seroquel. Being [facility] by [facility] psychiatry. Mood stable today.   - GBS screen performed today    F/up 1 week    Precepted with: Dr. Zane Rosales DO  [facility] Medicine Resident  Pager #[phone]      "

## 2019-01-23 NOTE — PATIENT INSTRUCTIONS
Nonprescription Medications Thought To Be Safe In Pregnancy     CONGESTION AND COLDS    Robitussin    Chlortrimeton    Benadryl    Vicks Vapor Rub    Cough Drops    [facility]      [facility] Information  If you have any further concerns or wish to schedule another appointment, please call our office at [phone] during normal business hours (8-5, M-F).     For uncomplicated pregnancies, you will be seeing your doctor once a month until you are 28 weeks, then you will see your doctor twice a month. You will begin weekly visits at 36 weeks until you deliver.     If you have urgent medical questions that cannot wait, you may call [phone] at any time of day.     If you have a medical emergency, please call 361.    When to call or come in to the hospital  If you notice a decrease in your baby's movement.   If your begin to experience contractions that are 5 minutes or less apart and lasting for over 45 seconds, or if contractions are becoming more painful.  If you have any bleeding or leakage of fluids.   If you have a headache not resolved with tylenol, right upper abdominal pain, or sudden onset of swelling.  You know your body best. Never hesitate to call or go to the hospital if something doesn't feel right!    Preparing for the hospital  You re likely feeling anxious as your child s birth approaches. This is normal. To give yourself some peace of mind, pack a bag 3-4 weeks before your due date. Here is a list of things to remember:  Personal care items like toothbrush, hair brush, lip balm, lotion, shampoo, glasses, contacts  Nightgown, bathrobe, slippers  Several pairs of underwear  Comfortable clothes for you to wear home  Camera with new batteries  Cell phone and   Insurance information and any other paperwork needed for your hospital stay  Clothes for baby to wear home  An infant, rear-facing car seat for bringing home your baby (this is required by law)  What Is Group B Strep?   Group B  strep (streptococcus) is a common bacteria. It can grow in a woman s vagina, rectum, or urinary tract. It is almost always harmless in adults. But in rare cases, a woman who has group B strep can infect her baby during the birth. Infection can cause serious illness in the . The good news is that treating the mother during labor reduces the risk of the baby becoming infected. And if a  is infected, the infection can be treated. You will be screened for Group B strep at 35-36 weeks gestation.  Facts about group B strep   Learning more about group B strep can help you understand how testing and treatment can help. Here are some basic facts about group B strep:   It is not a sexually transmitted disease.   It is not the same as strep throat. (This is caused by group A strep.)   It often has no symptoms and may cause no problems in adults.   Group B strep can be transmitted during vaginal delivery. It cannot be passed during  (surgical) birth.   A mother with group B strep rarely infects her . (Infection occurs only about 1% to 2% of the time.)   When a mother is treated during labor and delivery, her baby almost never becomes infected.   Certain factors during pregnancy increase the risk of a baby becoming infected.  Possible effects on your baby   Group B strep can infect the blood. It can also cause inflammation of the baby s lungs, brain, or spinal cord. Long-term effects can include blindness, deafness, mental retardation, or cerebral palsy. And in rare cases, infection causes death. Infection is most often detected soon after the baby is born.   How your baby may become infected   Group B strep often lives in the vagina or rectum. If the amniotic sac breaks early, bacteria from the vagina can travel to the uterus, reaching the baby. Or, as the baby passes through the birth canal, it can come in contact with the bacteria. In rare cases, group B strep can also be passed to the baby after  delivery. This is called late-onset group B strep. The source of this type of infection is not well understood. But some experts believe that it happens if the baby is exposed to group B strep in the home, from the parents or siblings, or in the community.   What increases the risk?   Certain risk factors increase the chance that a baby will be infected. They include:   Breaking or leaking of the amniotic sac earlier than 37 weeks gestation   Labor earlier than 37 weeks gestation   Breaking of the amniotic sac more than 18 hours before labor begins   Fever during labor   A urinary tract infection with group B strep at any point in the pregnancy   A previous baby born with a group B strep infection    BaBaby Feeding in the Hospital: Information, Support and      Resources    As As you prepare for the birth of your child, you will want to consider options for feeding your baby cluding breast-feeding and/or baby formula. The American Academy of Pediatrics recommends exclusive breast-feeding for the first six months (although any amount of breast-feeding is beneficial).  However, we also understand that breast-feeding is  a personal choice and not for everyone. Whether or not you choose to breast-feed, your decision will be respected by our staff.        There are numerous benefits of breast-feeding; here are a few to consider:    Provides antibodies to protect your baby from infections and diseases    The cost: formula can cost over $1,500 per year    Convenience, no warming up or sterilizing bottles and supplies    The physical contact with breastfeeding can make babies feel secure, warm and comforted        What ever my feeding choice, what can I expect after I deliver my baby?    Your baby will usually be placed skin-to-skin immediately following birth. The skin to skin contact between you and your baby will be a special and memorable time. The bonding and attachment comforts your baby and has a positive effect on  baby s brain development.     Having your baby  room in  with you also helps you start to learn your baby s body rhythms and sleep cycle.      You will also begin to learn your baby s cues (signals) that he or she is ready to feed.        When do I start to feed my baby?         As soon as possible after your baby s birth, you will be encouraged to begin feeding. In the first couple of weeks, your baby will eat often. Breastfeeding babies usually eat at least 8 times in 24 hours. Babies fed formula usually eat at least 7 times in 24 hours.           Breast-feeding tips:    Get comfortable and use pillows for support.    Have your baby at the level of your breast, facing you,  tummy to tummy.       Touch your nipple to your baby s lips so your baby s mouth opens wide (rooting reflex).  Aim the nipple toward the roof of your baby s mouth. When your baby opens his or her mouth, pull your baby toward your breast to help your baby  latch on  to your nipple and much of the areola area.    Hand expressing your breast milk can assist with latching your baby to your breast, if needed.    Ask for help, breastfeeding may seem awkward or uncomfortable at first, this is normal. There are numerous resources available at [facility] hospitals, clinics and beyond.     If your goal is to exclusively breastfeed, avoid using any formula or artificial nipples (including bottles and pacifiers) while you and your baby are learning to breastfeed unless there is a medical reason.       Mixing breastfeeding and formula can interfere with how you begin building your milk supply. It can impact how you and your baby learn to breastfeeding together and alter the natural growth of  good  bacteria in your baby s stomach.  Breast-feeding tips (cont.)    Delay a pacifier or a bottle in the first few weeks until breastfeeding is well established. This is often around 3 weeks of age.    Ask your nurse to show you how to hand express. Breast milk can  be kept in the refrigerator orfreezer for later use.     Hospital Resources:  [facility] located at [facility], [facility] and [facility]  Call: [phone].    Inpatient support    Outpatient appointments    Telephone consultation    Breast-feeding classes available through [employer]      Online Resources:    [email or web address] sign up for free online weekly e-mail    [email or web address]    Breastfeedingmadesimple.[email or web address]    Llli.org (La Leche League)    Normalfed.com    [email or web address]    Workandpump.com    Breast-feeding Supplies & Pumps:  Talk to your insurance provider or WIC (Women, Infants and Children) to learn more about options available to you. Recent health insurance changes may include additional coverage for supplies and pumps.    Public Health:  Women, Infants and Children Nutrition program (WIC): provides breast-feeding support and education in addition to formal feeding moms. 243-QEY-4075 or [email or web address]    Family Health Home Visiting: Public [facility] Nurse home visits are available. Talk to your provider to see if you qualify. Most [facility] have a program available.    Additional Resources:  La Leche League is an international, nonprofit, nonsectarian organization offering information, education, and support to mothers who want to breast-feed their babies. Local groups offer phone help and monthly meetings. Visit S[employer] My Timesh[employer] or [employer] and us the  Find local support  drop down menu or click on the  Resources  tab.    [place] Breastfeeding Resources: 9-622-980-BABY (2221) toll free    National Breastfeeding Help Line trained breastfeeding peer counselors can help answer common breast-feeding questions by phone. Monday-Friday: English/Kinyarwanda  2-035- 614-7606 toll free, 1-861.745.3521 (TTY)    Care Connection: 667-372-[facility] (3496)

## 2019-01-23 NOTE — NURSING NOTE
Due to patient being non-English speaking/uses sign language, an  was used for this visit. Only for face-to-face interpretation by an external agency, date and length of interpretation can be found on the scanned worksheet.     name: Cherelle Humberto  Agency: [employer]  Language: Maryann   Telephone number: [phone]  Type of interpretation: Face-to-face, spoken

## 2019-01-26 LAB
ALLERGIC TO PENICILLIN: NORMAL
GP B STREP AG SPEC QL LA: NEGATIVE

## 2019-01-28 ENCOUNTER — CARE COORDINATION (OUTPATIENT)
Dept: FAMILY MEDICINE | Facility: CLINIC | Age: 29
End: 2019-01-28

## 2019-01-28 ENCOUNTER — OFFICE VISIT (OUTPATIENT)
Dept: PHARMACY | Facility: CLINIC | Age: 29
End: 2019-01-28
Payer: COMMERCIAL

## 2019-01-28 VITALS
SYSTOLIC BLOOD PRESSURE: 95 MMHG | BODY MASS INDEX: 32.76 KG/M2 | RESPIRATION RATE: 18 BRPM | OXYGEN SATURATION: 98 % | TEMPERATURE: 98.1 F | DIASTOLIC BLOOD PRESSURE: 85 MMHG | HEART RATE: 88 BPM | WEIGHT: 151.4 LBS

## 2019-01-28 DIAGNOSIS — Z71.89 ENCOUNTER FOR MEDICATION REVIEW AND COUNSELING: Primary | ICD-10-CM

## 2019-01-28 NOTE — PROGRESS NOTES
Pt very reluctant to engage with Pharm D or this writer today.  Use of [employer] for interpreting may have been part of the reluctance, however she also appeared to be very tired.  Was able to get her to answer questions as needed, but could not express why she did not want to talk and would only say she did not feel like talking today.  She was able to let me know she has not yet heard from Isidro Rice regarding help getting her child glasses. I have left two messages and called again today to leave another message requesting help.  Tried to engage pt in a discussion of what options were near her for glasses, a [employer] or a [employer] possibly but she was unsure and does not have transportation to get to them.  Will see if her [facility] home worker can assist.    Message left at 11:20 today.    Reviewed paperwork from [employer], nothing urgent just informational flyers about fun activities and pta info- so she asked me to discard.      Reviewed pt upcoming schedule with her and childrens appointments.  Sent a calendar for feb with her.  Baby is due Feb 19th or around that time.  Medication boxes filled by Pharm D today will get her through until Feb 25.  Will check for scheduling another appt as well.  Ramila    Scheduled for Feb 25 at 9am for refill boxes

## 2019-01-28 NOTE — Clinical Note
Not willing to talk to me today :( Not able to assess how things are going. Doesn't have iron, need to call pharmacy and figure out why. Would you like to continue if can get refilled?Pillboxes due for refill post-baby (per 2/19 due date). Let me know how I can help :)

## 2019-01-28 NOTE — PROGRESS NOTES
Assessment and Plan     (Z71.89) Encounter for medication review and counseling  (primary encounter diagnosis)  Comment: Pillbox empty as expected. Patient refusing to participate, unclear reason.  Plan: Pillboxes filled as below, Last dose 2/24/19, due for refill 2/25/18. Likely will be postpartum, baby due 2/19/19. Will work with PCP to ensure medications in peripartum period. Available for home visit if needed.     Venlafaxine 75 mg capsule x1; Venlafaxine 150 mg capsule x1 (1/21//19 #30)    Quetiapine 200 mg tablet x1 (1/14/19 #30)    Prazosin 1 mg capsule x3 (1/21/19 #90)    Montelukast 10 mg tablet x1 (1/21/19 #30) - no refills   Ranitidine 150 mg tablet x1 (1/23/19 #30)    Cetirizine 10 mg tablet x1 (1/21/19 #30) - no refills   Prenatal tablet x1 1/21/10 #100   Docusate 100 mg capsule x2 (1/21/18 #60) -no refills      Ferrous sulfate not available. Will work with PCP to determine if still needed.     Follow up: Pharmacist available per patient or provider request.     Options for treatment and/or follow-up care were reviewed with the patient. Hayde was engaged and actively involved in the decision making process. She verbalized understanding of the options discussed and was satisfied with the final plan. Patient was provided with written instructions/medication list via AVS.    Dr. Rosales was provided our recommendations via routed note and Dr. Alvarez was available for supervision during this visit and is the authorizing prescriber for this visit through the pharmacist collaborative practice agreement.    Thank you for the opportunity to participate in the care of this patient.  Sierra Dickson, PharmD  [facility]  Phone: [phone]  January 28, 2019 at 10:46 AM    Current Outpatient Medications   Medication Sig Dispense Refill     acetaminophen (TYLENOL) 325 MG tablet Take 2 tablets (650 mg) by mouth every 6 hours as needed for headaches 100 tablet 1     calcium  carbonate (TUMS) 500 MG chewable tablet Take 1 tablet (500 mg) by mouth 2 times daily as needed for heartburn 150 tablet 3     cetirizine (ZYRTEC) 10 MG tablet Take 1 tablet (10 mg) by mouth daily 30 tablet 3     docusate sodium (COLACE) 100 MG tablet Take 200 mg by mouth daily 60 tablet 3     ferrous sulfate (FEROSUL) 325 (65 Fe) MG tablet Take 1 tablet (325 mg) by mouth daily (with breakfast) DRINK WITH LARGE GLASS OF WATER 90 tablet 0     fluticasone (FLONASE) 50 MCG/ACT nasal spray Spray 1-2 sprays into both nostrils daily as needed for allergies 16 mL 1     guaiFENesin (ROBITUSSIN) 100 MG/5ML liquid Take 10 mLs (200 mg) by mouth every 4 hours as needed for cough 118 mL 1     ketotifen (ZADITOR) 0.025 % SOLN ophthalmic solution Place 1 drop into both eyes every 12 hours 1 Bottle 3     montelukast (SINGULAIR) 10 MG tablet Take 1 tablet (10 mg) by mouth daily 30 tablet 11     prazosin (MINIPRESS) 1 MG capsule Take 3 capsules (3 mg) by mouth At Bedtime 90 capsule 2     Prenatal Vit-Fe Fumarate-FA (PRENATAL MULTIVITAMIN PLUS IRON) 27-0.8 MG TABS per tablet Take 1 tablet by mouth daily 100 tablet 3     QUEtiapine (SEROQUEL) 200 MG tablet Take 1 tablet (200 mg) by mouth At Bedtime 30 tablet 1     ranitidine (ZANTAC) 150 MG tablet Take 1 tablet (150 mg) by mouth At Bedtime 90 tablet 3     senna-docusate (DESEAN-COLACE) 8.6-50 MG tablet Take 1 tablet by mouth daily as needed for constipation , may take additional one tablet daily as needed.       venlafaxine (EFFEXOR-XR) 150 MG 24 hr capsule Take 1 capsule by mouth daily (together with 75 mg capsule for total of 225 mg/day) 30 capsule 11     venlafaxine (EFFEXOR-XR) 75 MG 24 hr capsule Take 1 capsule by mouth daily (together with 150 mg capsule for total of 225 mg/day) 30 capsule 11            HPI:       Hayde Macias is a 28 year old female referred by Dr. Rosales for medication management and counseling. Patient seen today with the assistance of Maryann olea, Per,  video .    Not feeling good today, concerns for headache. Not interested in talking about. (Anticipate this is due to male, Maryann ).     Medicine refills brought to clinic.          PMHX:     Patient Active Problem List   Diagnosis     Health Care Home     Recurrent major depressive disorder (H)     Encounter for supervision of normal pregnancy     Seasonal allergic rhinitis     PTSD (post-traumatic stress disorder)     BRENDA (generalized anxiety disorder)     Other fatigue     Insomnia due to other mental disorder     Allergies   Allergen Reactions     Augmentin Rash            Objective     Vitals:    01/28/19 1017   BP: 95/85   Pulse: 88   Resp: 18   Temp: 98.1  F (36.7  C)   TempSrc: Oral   SpO2: 98%   Weight: 68.7 kg (151 lb 6.4 oz)     Body mass index is 32.76 kg/m .          [facility] Pharmacist Documentation     Drug therapy problems identified:  Medical Condition 1: Depression, Goals of therapy: Not at goal, Drug Class: Antidepressant, Convenience: Pt unable to administer correctly, Intervention: Add device or process to assist use, Verification: Patient Agreed - Compliance/Education    Relevant medical devices: n/a    # of medical conditions addressed: 0  # of medications addressed: 9  # of DTP identified: 1  Time spent: 20 minutes  Level of service per [place] DHS guidelines: 0           Documentation   Due to patient being non-English speaking/uses sign language, an  was used for this visit. Only for face-to-face interpretation by an external agency, date and length of interpretation can be found on the scanned worksheet.     name: [id number]  Agency: [employer]  Language: Maryann   Telephone number: [phone]  Type of interpretation: Telephone, spoken

## 2019-01-30 ENCOUNTER — TELEPHONE (OUTPATIENT)
Dept: FAMILY MEDICINE | Facility: CLINIC | Age: 29
End: 2019-01-30

## 2019-01-30 NOTE — TELEPHONE ENCOUNTER
Change in ob appt until next week Feb 5  9am    Ride is changed and also set up ride for feb 25 for med set with Sierra

## 2019-02-01 ENCOUNTER — TELEPHONE (OUTPATIENT)
Dept: FAMILY MEDICINE | Facility: CLINIC | Age: 29
End: 2019-02-01

## 2019-02-01 NOTE — TELEPHONE ENCOUNTER
Call to [facility]. Iron prescription previously unfilable, unclear why.     1/29/19 - just filled and going out for delivery today.     Per PCP to continue at least until delivery.     CHANO Berrios, will need to be added to her pillbox vs. patient can take independently which she did in the past.     Sierra Dickson, PharmD  [facility]  Phone: [phone]  February 1, 2019 at 1:31 PM

## 2019-02-05 ENCOUNTER — ANESTHESIA - HEALTHEAST (OUTPATIENT)
Dept: OBGYN | Facility: HOSPITAL | Age: 29
End: 2019-02-05

## 2019-02-07 ENCOUNTER — COMMUNICATION - HEALTHEAST (OUTPATIENT)
Dept: OBGYN | Facility: HOSPITAL | Age: 29
End: 2019-02-07

## 2019-02-08 ENCOUNTER — TELEPHONE (OUTPATIENT)
Dept: FAMILY MEDICINE | Facility: CLINIC | Age: 29
End: 2019-02-08

## 2019-02-08 ENCOUNTER — CARE COORDINATION (OUTPATIENT)
Dept: FAMILY MEDICINE | Facility: CLINIC | Age: 29
End: 2019-02-08

## 2019-02-08 NOTE — TELEPHONE ENCOUNTER
Set up rides for Thursday Feb 14  And March 7th     Ye confirmation is [id number] and [id number] are confirmation numbers for marco Conneretz

## 2019-02-08 NOTE — PROGRESS NOTES
[facility] Follow-up    Call initiated by patient.  Message recorded by Agustina Gramajo  Calling for: In clinic for visit  Patient: Hayde LUJAN Say  Phone conversation with: Patient  Patient's Phone number/s: Home number on file [phone] (home)  Available today in the AM on their Home number.  OK to leave message on voice mail? Yes      Major diagnoses and/or issues requiring coordination services  Mental health  New baby in home      In the past month, how many times have you been to the Emergency Room? 0  In the past month, how many times have you been hospitalized? 0    Summary notes: Pt very tired today, talked about self care and went through all of the paperwork she has with her questions    Self-management goals:  Sleep when baby sleeps  Continue to bring mail to Clinic for help  Help coordinate appts to give her support and relief   Communication with children/ school/ and appts  Bonding with child    Readiness to change: Somewhat ready        Counseling and coordination activities with: patient/family, PCP and specialist:     Follow-up date: Monthly and prn

## 2019-02-08 NOTE — TELEPHONE ENCOUNTER
Pt call to public health request a visit to support and educate with parent.     Sent through [facility]

## 2019-02-11 ENCOUNTER — TELEPHONE (OUTPATIENT)
Dept: FAMILY MEDICINE | Facility: CLINIC | Age: 29
End: 2019-02-11

## 2019-02-11 DIAGNOSIS — G44.219 EPISODIC TENSION-TYPE HEADACHE, NOT INTRACTABLE: ICD-10-CM

## 2019-02-11 RX ORDER — ACETAMINOPHEN 325 MG/1
650 TABLET ORAL EVERY 6 HOURS PRN
Qty: 100 TABLET | Refills: 1 | Status: SHIPPED | OUTPATIENT
Start: 2019-02-11 | End: 2019-05-10

## 2019-02-11 NOTE — TELEPHONE ENCOUNTER
Ride scheduled for Child who needs glasses for Feb 27th to get glasses as [facility] as they take [facility]    [place] scheduled with [facility] 10am on 27th    Called her [facility] worker at [place] to see if she would be available to meet pt and son at the eye dr on 27th to help explain to staff what she needs and to communicate to me her needs to go back.  Ubaldoetz

## 2019-02-11 NOTE — TELEPHONE ENCOUNTER
Pt called stating that she believes she has a fever, but did not take temp, she has chills and abdominal pain today, sounds miserable voice shaky when speaking as it sounds like she is shaking and cold.  Trying to take care of children as spouse went to hospital with similar symptoms to get checked out.    She asked if she can get tylenol/ ibuprofen I do not see Ibuprofen on med list can she take it?      Route to PCP

## 2019-02-14 ENCOUNTER — TELEPHONE (OUTPATIENT)
Dept: FAMILY MEDICINE | Facility: CLINIC | Age: 29
End: 2019-02-14

## 2019-02-14 NOTE — ADDENDUM NOTE
Addended by: NATALIE MALLOY on: 2/14/2019 01:47 PM     Modules accepted: Orders, Level of Service

## 2019-02-14 NOTE — TELEPHONE ENCOUNTER
Spoke with pt, cancelled pts appt for today as public health nurse is going out to the home today with .  They are going to open care today.      Pt complained of some abdominal pain that will hurt, then her feet and hands get cold and/ or gets a hot flash so she could not sleep - will work with her if continues to come in to clinic or go to hospital- she said it has been difficult as spouse is in hospital she reports and she has to care for kids    Asked that she tell rn today about her pain as well, asked about constipation but she said medication is working for that.      Reviewed with her that son is meeting with nicolás next Wednesday at 2 and reviewed when jacksons appt is to get glasses and called [facility] to get a copy of the rx as she can not find it    Mailed rx and her calender to her today    lbetz

## 2019-02-15 ENCOUNTER — TELEPHONE (OUTPATIENT)
Dept: FAMILY MEDICINE | Facility: CLINIC | Age: 29
End: 2019-02-15

## 2019-02-15 NOTE — TELEPHONE ENCOUNTER
Public health nurse called stating the patient was experiencing clots, lots of bleeding and abdominal pain. Notified RN and transferred call to ANKIT Salas to further assist the patient.

## 2019-02-15 NOTE — TELEPHONE ENCOUNTER
Public health nurse called stating she would like to speak with ANKIT Salas. I notified her Maritza was gone for the day. She stated she wanted to update that Hayde declined to go to the emergency room. Felipe stated she will be giving Hayde her personal cell phone number to contact her if she changes her mind and will order a cab for her if needed. She states the patient is also stating she has a fever, Felipe provided a thermometer to Hayde to use and check her temperature. She stated the patient BP /82. We did schedule an appointment for Monday 02/18/19 at 11:40 AM with Dr. Pagan since Dr. Rosales did not any openings. Patient preferred to be seen at clinic by Dr. Rosales but is ok seeing another female provider. I will send a message to Agustina to see if she will be able to schedule a ride for Monday 2/18/19 at 11:40 AM.

## 2019-02-15 NOTE — TELEPHONE ENCOUNTER
Public Health RN meeting with patient and patient endorses severe abdominal cramping and vaginal bleeding and clots. Advised RN to have patient go to emergency department. RN asked if Urgent Care is appropriate, advised against urgent care for interventions and unknown if they have transvaginal US at urgent care. Advised best to go to ED r/t recent vaginal delivery. RN verbalized understanding and stated she would advise patient. Zee PHAM

## 2019-02-18 ENCOUNTER — TELEPHONE (OUTPATIENT)
Dept: FAMILY MEDICINE | Facility: CLINIC | Age: 29
End: 2019-02-18

## 2019-02-18 NOTE — TELEPHONE ENCOUNTER
Used language line today to communicate with pt about her appt today.  I did not get message to set up ride as she called after hours on Friday, and she will instead come with her  who has an appt as well.  Unclear as to why she is reluctant to come with him I the first place and would rather come alone.  Ubaldoetz

## 2019-02-18 NOTE — TELEPHONE ENCOUNTER
Patient arrived in clinic this morning but had left without being seen. Triage was asked to call and further assess patient and further advise. Use language line was done, a call attempt was made, no answer. Will call again later this afternoon. Aniceto PHAM

## 2019-02-18 NOTE — TELEPHONE ENCOUNTER
Spoke with pt today and she was upset that it took too long to get in today so she left- she was frustrated and said dying is one way to go - I told her I did not think she would want to die as she loves her children- She said she will not die alone    Explored this further and she does not intend to hurt self or children, just concerned as she has made comments in the past in therapy about not dying alone.    Spoke with Nurse triage who attempted to reach her as well and were unsuccessful and was able to gain some insight and called pt back to further explore symptoms    She is warm, and chilled, she has bad stomach pain that does not go away- she believes the pain is from the bleeding but bleeding has lessened.  Encouraged a visit to ED again, but she refuses.  She was open to coming to clinic in am as she has Public health nurse coming out in afternoon.  I will try to reach  nurse and have her check her a bit tomorrow as well and call ambulance if needed or encourage necessary treatments.    Ramila

## 2019-02-19 ENCOUNTER — TELEPHONE (OUTPATIENT)
Dept: FAMILY MEDICINE | Facility: CLINIC | Age: 29
End: 2019-02-19

## 2019-02-19 DIAGNOSIS — K59.00 CONSTIPATION, UNSPECIFIED CONSTIPATION TYPE: ICD-10-CM

## 2019-02-19 DIAGNOSIS — J30.2 ACUTE SEASONAL ALLERGIC RHINITIS: ICD-10-CM

## 2019-02-19 RX ORDER — ASPIRIN 81 MG
200 TABLET, DELAYED RELEASE (ENTERIC COATED) ORAL DAILY
Qty: 60 TABLET | Refills: 3 | Status: SHIPPED | OUTPATIENT
Start: 2019-02-19 | End: 2019-06-10

## 2019-02-19 RX ORDER — MONTELUKAST SODIUM 10 MG/1
10 TABLET ORAL DAILY
Qty: 30 TABLET | Refills: 3 | Status: SHIPPED | OUTPATIENT
Start: 2019-02-19 | End: 2019-06-10

## 2019-02-19 NOTE — TELEPHONE ENCOUNTER
Spoke with pt to follow up today and see how she is feeling  She was resting when I called and reports feeling a little better today, pain not as bad as yesterday.      Asked if I could get her in tomorrow after her sons appt and she agreed    I have also spoken with PH nurse from [facility] and she was upset that pt had not been seen yet by a dr, and said she can not provide for her if she will not be compliant, I explained that she has been very compliant and a few barriers got in her way but overall she is wanting to be seen and that I have her scheduled for tomorrow along with next weeks appts with pharm D, Parmjit, and her primary.  Ramila

## 2019-02-20 ENCOUNTER — TELEPHONE (OUTPATIENT)
Dept: FAMILY MEDICINE | Facility: CLINIC | Age: 29
End: 2019-02-20

## 2019-02-20 ENCOUNTER — OFFICE VISIT (OUTPATIENT)
Dept: FAMILY MEDICINE | Facility: CLINIC | Age: 29
End: 2019-02-20
Payer: COMMERCIAL

## 2019-02-20 VITALS
SYSTOLIC BLOOD PRESSURE: 105 MMHG | WEIGHT: 138 LBS | TEMPERATURE: 98.6 F | OXYGEN SATURATION: 96 % | DIASTOLIC BLOOD PRESSURE: 73 MMHG | RESPIRATION RATE: 16 BRPM | BODY MASS INDEX: 29.86 KG/M2 | HEART RATE: 80 BPM

## 2019-02-20 DIAGNOSIS — R10.84 ABDOMINAL PAIN, GENERALIZED: Primary | ICD-10-CM

## 2019-02-20 DIAGNOSIS — F33.1 MAJOR DEPRESSIVE DISORDER, RECURRENT EPISODE, MODERATE (H): ICD-10-CM

## 2019-02-20 LAB
ALBUMIN SERPL-MCNC: 2.8 G/DL (ref 3.3–4.6)
ALP SERPL-CCNC: 95 U/L (ref 40–150)
ALT SERPL-CCNC: 19 U/L (ref 0–45)
AST SERPL-CCNC: 23 U/L (ref 0–45)
BILIRUB SERPL-MCNC: 0.6 MG/DL (ref 0.2–1.3)
BUN SERPL-MCNC: 6 MG/DL (ref 5–24)
CALCIUM SERPL-MCNC: 9.3 MG/DL (ref 8.5–10.4)
CHLORIDE SERPLBLD-SCNC: 104 MMOL/L (ref 94–109)
CO2 SERPL-SCNC: 28 MMOL/L (ref 20–32)
CREAT SERPL-MCNC: 0.8 MG/DL (ref 0.6–1.3)
EGFR CALCULATED (BLACK REFERENCE): >90 ML/MIN
EGFR CALCULATED (NON BLACK REFERENCE): >90 ML/MIN
GLUCOSE SERPL-MCNC: 84 MG/DL (ref 60–109)
POTASSIUM SERPL-SCNC: 3.6 MMOL/L (ref 3.4–5.3)
PROT SERPL-MCNC: 8.3 G/DL (ref 6.6–8.8)
SODIUM SERPL-SCNC: 143 MMOL/L (ref 133–144)

## 2019-02-20 RX ORDER — IBUPROFEN 600 MG/1
600 TABLET, FILM COATED ORAL
COMMUNITY
Start: 2019-02-06 | End: 2019-02-25

## 2019-02-20 NOTE — LETTER
February 22, 2019      Hayde LUJAN Say  [address]  [place] [address]        Dear Hayde,    Thank you for visiting our clinic on Feb 20, 2019.     The result of your most recent labs (Complete Metabolic Panel) has returned and was normal.     There is no need to follow up for this lab, but if you have any questions, please feel free to contact us.     Please see below for your test results.    Resulted Orders   Comprehensive Metabolic Panel ([facility]) - results <1hr Protocol   Result Value Ref Range    Glucose 84.0 60.0 - 109.0 mg/dL    Urea Nitrogen 6.0 5.0 - 24.0 mg/dL    Creatinine 0.8 0.6 - 1.3 mg/dL    Sodium 143.0 133.0 - 144.0 mmol/L    Potassium 3.6 3.4 - 5.3 mmol/L    Chloride 104.0 94.0 - 109.0 mmol/L    Carbon Dioxide 28.0 20.0 - 32.0 mmol/L    Calcium 9.3 8.5 - 10.4 mg/dL    Protein Total 8.3 6.6 - 8.8 g/dL    Albumin 2.8 (L) 3.3 - 4.6 g/dL    Alkaline Phosphatase 95.0 40.0 - 150.0 U/L    ALT 19.0 0.0 - 45.0 U/L    AST 23.0 0.0 - 45.0 U/L    Bilirubin Total 0.6 0.2 - 1.3 mg/dL    eGFR Calculated (Non Black Reference) >90 >60.0 mL/min    eGFR Calculated (Black Reference) >90 >60.0 mL/min       If you have any questions, please call the clinic to make an appointment.    Sincerely,    Dax Romero MD

## 2019-02-20 NOTE — PATIENT INSTRUCTIONS
- Stop using Ibuprofen and Tylenol  - Use only your prescribed medications  - Continue with your psychiatric medications

## 2019-02-20 NOTE — NURSING NOTE
Due to patient being non-English speaking/uses sign language, an  was used for this visit. Only for face-to-face interpretation by an external agency, date and length of interpretation can be found on the scanned worksheet.     name: nafisa ramsey   Agency: [employer]  Language: Maryann   Telephone number: [phone]  Type of interpretation: Face-to-face, spoken
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
## 2019-02-20 NOTE — TELEPHONE ENCOUNTER
[facility] phone call message- general phone call:    Reason for call: Calling to speak to a Nurse regarding her concerns and observations she has when she saw Patient yesterday. Told her I could transfer her call to Agustina, she states she already left a message to her not sure if she is in today or not, told her that she is in today. She then asked if she could speak to a Nurse, told her that I would have to take a message. Please call and advise.     Return call needed: Yes    OK to leave a message on voice mail?     Primary language: Maryann      needed? Yes    Call taken on February 20, 2019 at 1:31 PM by Talat Felix

## 2019-02-20 NOTE — TELEPHONE ENCOUNTER
Called PH Nurse to discuss concerns. She stated she wanted to update on patient's progress. She endorsed patient looked better at yesterday's visit and her depression has improved slightly. She stated patient informed her of negative behaviors towards patient's  and was unsure how to control the behaviors. PH nurse recommended alternative therapy to freeze some sponges and place under eyes on face to distract negative thinking during time of high stress. PH nurse endorsed patient wanted to try that technique. PH nurse advised she will be seeing patient weekly and using the same  at each visit. She also stated she is not available Mondays but is willing to accompany patient to office visits prn to assist with patient's children and to allow patient to have productive office visits. Advised PH nurse to call with any questions, concerns, or updates prn. She verbalized understanding. Will route to care coordinator and Dr.Lisa Mcmahan for FYI. Zee PHAM

## 2019-02-25 ENCOUNTER — OFFICE VISIT (OUTPATIENT)
Dept: PSYCHOLOGY | Facility: CLINIC | Age: 29
End: 2019-02-25
Payer: COMMERCIAL

## 2019-02-25 ENCOUNTER — TELEPHONE (OUTPATIENT)
Dept: FAMILY MEDICINE | Facility: CLINIC | Age: 29
End: 2019-02-25

## 2019-02-25 ENCOUNTER — OFFICE VISIT (OUTPATIENT)
Dept: PHARMACY | Facility: CLINIC | Age: 29
End: 2019-02-25
Payer: COMMERCIAL

## 2019-02-25 DIAGNOSIS — Z71.89 ENCOUNTER FOR MEDICATION REVIEW AND COUNSELING: Primary | ICD-10-CM

## 2019-02-25 DIAGNOSIS — F41.1 GENERALIZED ANXIETY DISORDER: ICD-10-CM

## 2019-02-25 DIAGNOSIS — K59.01 SLOW TRANSIT CONSTIPATION: ICD-10-CM

## 2019-02-25 DIAGNOSIS — D50.8 IRON DEFICIENCY ANEMIA SECONDARY TO INADEQUATE DIETARY IRON INTAKE: ICD-10-CM

## 2019-02-25 DIAGNOSIS — F43.10 PTSD (POST-TRAUMATIC STRESS DISORDER): ICD-10-CM

## 2019-02-25 DIAGNOSIS — J30.2 CHRONIC SEASONAL ALLERGIC RHINITIS DUE TO FUNGAL SPORES: ICD-10-CM

## 2019-02-25 DIAGNOSIS — F43.10 PTSD (POST-TRAUMATIC STRESS DISORDER): Primary | ICD-10-CM

## 2019-02-25 DIAGNOSIS — F33.2 SEVERE EPISODE OF RECURRENT MAJOR DEPRESSIVE DISORDER, WITHOUT PSYCHOTIC FEATURES (H): ICD-10-CM

## 2019-02-25 RX ORDER — AMOXICILLIN 250 MG
1 CAPSULE ORAL DAILY
COMMUNITY
Start: 2019-02-25 | End: 2019-03-18

## 2019-02-25 RX ORDER — CETIRIZINE HYDROCHLORIDE 10 MG/1
10 TABLET ORAL DAILY
Qty: 30 TABLET | Refills: 11 | Status: SHIPPED | OUTPATIENT
Start: 2019-02-25 | End: 2020-03-20

## 2019-02-25 NOTE — TELEPHONE ENCOUNTER
Pt is scheduled for March appts with Dr Cooper and rides are scheduled for March 12 and march 28 at 9am both days    Rides are scheduled and confirmed # [id number] and Ride # [id number]    She is also scheduled for next med refill with [facility] for March 20th 10am with ride # [id number] scheduled    Sons Friday speech and OT are all scheduled for month of March  Ride #   [id number]  [id number]  [id number]  [id number]  [id number]    Lbetz
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
## 2019-02-25 NOTE — PROGRESS NOTES
Assessment and Plan     (Z71.89) Encounter for medication review and counseling  (primary encounter diagnosis)  Comment: Pillbox empty as expected. Refills of medicines available.   Plan: Pillboxes filled x 5 weeks, last dose 3/31, due for refill 4/1/19. Pillboxes filled as below.     (K59.01) Slow transit constipation  Comment: Increased fluid intake from previous. Still trouble with constipation. When given options to manage constipation - Miralax and/or senna, patient unable to voice her opinion about available options or if she would adhere to proposed treatment plan. Do not entirely understand concerns for swollen bottom.   Plan: senna-docusate (DESEAN-COLACE) 8.6-50 MG tablet - as pillbox more within our control, added 1 tablet/day to pillbox. Continued Docusate daily as below. Encourage Dr. Rosales to discuss non-pharmacologic options with patient and to further assess concern.     # Lower back pain: Given time limitation today, deferred to appointment tomorrow with Dr. Rosales. Requested patient to bring in both bottles of medicine (APAP, Ibuprofen) so can more easily facilitate conversation.     (D50.8) Iron deficiency anemia secondary to inadequate dietary iron intake  Comment: No repeat Hgb available since delivery. Unclear if continued therapy needed.   Plan: Request from Dr. Rosales to assist in determining duration of treatment warranted.       Venlafaxine 75 mg capsule x1; Venlafaxine 150 mg capsule x1   (2/18//19 #30)    Quetiapine 200 mg tablet x1   (2/18/19 #30)  NR - Refill request routed to PCP   Prazosin 1 mg capsule x3   (2/18/19 #90)    Montelukast 10 mg tablet x1   (2/20/19 #30)    Ranitidine 150 mg tablet x1   (2/18/19 #30)    Cetirizine 10 mg tablet x1   (1/21/19 #30) - no refills, refilled today per CPA   Prenatal tablet x1   (1/21/10 #100)   Docusate 100 mg capsule x2   (2/20/18 #60)   Ferrous sulfate 325 mg tablet x1   (1/29/19 #30 - NR) - refill deferred to Dr. Hou    Sennadocustate8.6-50 x1 daily        Follow up: 5 weeks as above.     Options for treatment and/or follow-up care were reviewed with the patient. Hayde was engaged and actively involved in the decision making process. She verbalized understanding of the options discussed and was satisfied with the final plan. Patient was provided with written instructions/medication list via AVS.    Dr. Rosales was provided our recommendations via routed note and Dr. Alvarez was available for supervision during this visit and is the authorizing prescriber for this visit through the pharmacist collaborative practice agreement.    Thank you for the opportunity to participate in the care of this patient.  Sierra Dickson, PharmD  [facility]  Phone: [phone]  February 25, 2019 at 10:02 AM    Current Outpatient Medications   Medication Sig Dispense Refill     acetaminophen (TYLENOL) 325 MG tablet Take 2 tablets (650 mg) by mouth every 6 hours as needed for headaches 100 tablet 1     cetirizine (ZYRTEC) 10 MG tablet Take 1 tablet (10 mg) by mouth daily 30 tablet 11     docusate sodium (COLACE) 100 MG tablet Take 2 tablets (200 mg) by mouth daily 60 tablet 3     ferrous sulfate (FEROSUL) 325 (65 Fe) MG tablet Take 1 tablet (325 mg) by mouth daily (with breakfast) DRINK WITH LARGE GLASS OF WATER 90 tablet 0     fluticasone (FLONASE) 50 MCG/ACT nasal spray Spray 1-2 sprays into both nostrils daily as needed for allergies 16 mL 1     ketotifen (ZADITOR) 0.025 % SOLN ophthalmic solution Place 1 drop into both eyes every 12 hours 1 Bottle 3     montelukast (SINGULAIR) 10 MG tablet Take 1 tablet (10 mg) by mouth daily 30 tablet 3     prazosin (MINIPRESS) 1 MG capsule Take 3 capsules (3 mg) by mouth At Bedtime 90 capsule 2     Prenatal Vit-Fe Fumarate-FA (PRENATAL MULTIVITAMIN PLUS IRON) 27-0.8 MG TABS per tablet Take 1 tablet by mouth daily 100 tablet 3     QUEtiapine (SEROQUEL) 200 MG tablet Take 1 tablet  (200 mg) by mouth At Bedtime 30 tablet 1     ranitidine (ZANTAC) 150 MG tablet Take 1 tablet (150 mg) by mouth At Bedtime 90 tablet 3     senna-docusate (DESEAN-COLACE) 8.6-50 MG tablet Take 1 tablet by mouth daily , may take additional one tablet daily as needed.       venlafaxine (EFFEXOR-XR) 150 MG 24 hr capsule Take 1 capsule by mouth daily (together with 75 mg capsule for total of 225 mg/day) 30 capsule 11     venlafaxine (EFFEXOR-XR) 75 MG 24 hr capsule Take 1 capsule by mouth daily (together with 150 mg capsule for total of 225 mg/day) 30 capsule 11     calcium carbonate (TUMS) 500 MG chewable tablet Take 1 tablet (500 mg) by mouth 2 times daily as needed for heartburn 150 tablet 3            HPI:       Hayde Macias is a 28 year old female referred by Dr. Rosales for medication management. Patient seen today with the assistance of Maryann .    Pain in belly improved. Stopped taking APAP and ibuprofen as couldn't breathe well after took them. In the past with APAP use, no difficulty with breathing. Acknowledges was using too much so stopped as instructed by physician last week. Has APAP at home, unsure how much left.    Swollen rectum, even before baby. Notices more after having BM. Having to strain and difficulty with BM. Lost water bottle. However increased thirst lately, so drinking more water.     Concern today also for low back pain.         PMHX:     Patient Active Problem List   Diagnosis     Health Care Home     Recurrent major depressive disorder (H)     Encounter for supervision of normal pregnancy     Seasonal allergic rhinitis     PTSD (post-traumatic stress disorder)     BRENDA (generalized anxiety disorder)     Other fatigue     Insomnia due to other mental disorder     Major depressive disorder, recurrent episode, moderate (H)     Allergies   Allergen Reactions     Augmentin Rash            Objective     n/a        [facility] Pharmacist Documentation     Drug therapy problems identified:  Medical  Condition 1: Other hematologic (i.e. sickle cell, anemia), Goals of therapy: Not at goal, Drug Class: Supplement,  , Efficacy: Lab test needed to determine efficacy, Intervention: Order lab, Verification: Deferred to Future Visit  Medical Condition 2: Constipation, Goals of therapy 2: Not at goal, Drug Class 2: Stimulant, Indication 2: Needs additional drug therapy,  Intervention 2: Initiate drug, Verification 2: Patient Agreed - CPA    Relevant medical devices: n/a    # of medical conditions addressed: 3  # of medications addressed: 13  # of DTP identified: 2  Time spent: 30 minutes  Level of service per [place] DHS guidelines: 3           Documentation   Due to patient being non-English speaking/uses sign language, an  was used for this visit. Only for face-to-face interpretation by an external agency, date and length of interpretation can be found on the scanned worksheet.     name: ALBARO HAMM  Agency: [employer]  Language: Maryann  Telephone number: [phone]  Type of interpretation: Face-to-face, spoken

## 2019-02-25 NOTE — TELEPHONE ENCOUNTER
Requires refill, however unclear if further dose titration required. Unable to assess today due to time restriction.     Sierra Dickson, PharmD  [facility]  Phone: [phone]  February 25, 2019 at 12:03 PM
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
## 2019-02-26 ENCOUNTER — CARE COORDINATION (OUTPATIENT)
Dept: FAMILY MEDICINE | Facility: CLINIC | Age: 29
End: 2019-02-26

## 2019-02-26 ENCOUNTER — OFFICE VISIT (OUTPATIENT)
Dept: FAMILY MEDICINE | Facility: CLINIC | Age: 29
End: 2019-02-26
Payer: COMMERCIAL

## 2019-02-26 ENCOUNTER — TELEPHONE (OUTPATIENT)
Dept: FAMILY MEDICINE | Facility: CLINIC | Age: 29
End: 2019-02-26

## 2019-02-26 VITALS
TEMPERATURE: 98.3 F | HEART RATE: 66 BPM | OXYGEN SATURATION: 100 % | WEIGHT: 137.8 LBS | BODY MASS INDEX: 29.82 KG/M2 | SYSTOLIC BLOOD PRESSURE: 103 MMHG | RESPIRATION RATE: 20 BRPM | DIASTOLIC BLOOD PRESSURE: 63 MMHG

## 2019-02-26 DIAGNOSIS — F41.1 GAD (GENERALIZED ANXIETY DISORDER): ICD-10-CM

## 2019-02-26 DIAGNOSIS — F33.1 MAJOR DEPRESSIVE DISORDER, RECURRENT EPISODE, MODERATE (H): ICD-10-CM

## 2019-02-26 RX ORDER — QUETIAPINE FUMARATE 200 MG/1
200 TABLET, FILM COATED ORAL AT BEDTIME
Qty: 90 TABLET | Refills: 0 | Status: SHIPPED | OUTPATIENT
Start: 2019-02-26 | End: 2019-03-18

## 2019-02-26 NOTE — TELEPHONE ENCOUNTER
Full term, normal, spontaneous, vaginal delivery on 2019 at 38 weeks gestation. Labor duration- 7 hours, delivery complicated by nuchal cord wrapped around trunk, intervention- delivered through. No perineal laceration,  ml. Male (Alejo Macias) , 6 lbs 5.6 oz, 20 inches, 30.5 cm. Formula fed. Pregnancy episode resolved. Aniceto RN

## 2019-02-26 NOTE — PROGRESS NOTES
Met with pt in clinic today.  She was in much better spirits today as evidenced by her smile, relaxed posturing, interaction with this writer as well as asking and answering questions with Dr today.    When asked about relaxation excercises she learned yesterday and if she had a chance to try them, she said yes.  Asked if it helped or made her feel better, she said yes a little.    She still has concerns about her oldest child and him not always coming home or being out late, and she worries - which in turn comes out as anger with him.  Talked about working on expressing feelings in new ways and finding signs, pictures, or gestures that could better communicate with the children what she is feeling rather than yelling or hitting.    She will be bringing her son to therapy tomorrow along with her mother if possible, as child lives at her mothers part of the time.     We reviewed paperwork sent from the schools for children, and each week her son Devin will receive homework now, and it is simple enough that I could explain it to mother so she can in turn help him and then send back to school- matching shapes, copying his name and writing it, and tracing numbers.      Pt laughed and smiled today, engaged and was animated in conversation.    No new concerns at this time    lbetz

## 2019-02-26 NOTE — NURSING NOTE
Due to patient being non-English speaking/uses sign language, an  was used for this visit. Only for face-to-face interpretation by an external agency, date and length of interpretation can be found on the scanned worksheet.     name: Sosa Baird  Agency: [employer]  Language: Maryann  Telephone number:   Type of interpretation: Face-to-face, spoken     Ema Gomez CMA
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
## 2019-02-26 NOTE — TELEPHONE ENCOUNTER
Called [facility] twice to speak with  about an increase to snap - with a new baby, and her husbands hours being cut to on call only they are strapped with finances and making ends meet.  Food insecurities have been a major problem and I have left multiple messages about a review.    Ramila

## 2019-02-26 NOTE — NURSING NOTE
Due to patient being non-English speaking/uses sign language, an  was used for this visit. Only for face-to-face interpretation by an external agency, date and length of interpretation can be found on the scanned worksheet.     name: luis thompson  Agency: [employer]  Language: na   Telephone number: unknown   Type of interpretation: unknown

## 2019-02-27 ENCOUNTER — TELEPHONE (OUTPATIENT)
Dept: FAMILY MEDICINE | Facility: CLINIC | Age: 29
End: 2019-02-27

## 2019-02-27 NOTE — TELEPHONE ENCOUNTER
Public health nurse called and reports that baby is gaining weight, mom is mixing formula correctly he is drinking 1-3 oz at a feeding, but is having constipation.  She spoke to her team and they are changing formula to Total comfort formula and that should help him.    She will continue to see baby and mom weekly.      juan david

## 2019-02-27 NOTE — LETTER
February 27, 2019       TO: Hayde LUJAN Say  [address]  [place] [address]         Felix, I am sending Paola calendar, she also has a Public Health Nurse that is coming out to the house one day a week, but I am unsure which days she comes out.  She was out on 2.26 with Hayde and Baby Alejo.      Future needs:    Glasses for son Devin, anyplace that will take MA    Continued support for food insecurity, if you can get to the food shelves once in awhile, I called [facility] twice to ask for a review of their snap benefits since her  is not working much now and with a new baby things are even harder with expenses.    Hayde apparently did paperwork with her  Delia for public housing, and she wonders if that has gone anywhere, not sure if it is possible to check or to do paperwork again, they need to be in a bigger and hopefully better apartment- their rent just went up too for two bedroom she is paying 867/mth I think so most of their money goes to rent.    We have completed application to Immigration for citizen ship and also asked for an exception for her to not have to test, and also that she can not pay the fees- all of that is sent and we are waiting to hear, but can take awhile and she worries.      I help Hayde with mail and papers from the kids school, if you are able to help with that when you see her that would be great- not sure how often you and Hayde can meet, she could likely use help weekly or biweekly.    Thank you so much     Agustina

## 2019-02-27 NOTE — TELEPHONE ENCOUNTER
Felix called and said ride did not show up, it is scheduled with confirmation number of [id number] - she also missed her last ride because she was not watching, they waited 20 minutes and had to leave.      Her Worker from [employer] will  her and her son up and just take her to a [employer] or [employer] or someplace close to home to select glasses.        I will fax Blays schedule to Felix as well so they can plan a day together.  As well as other needs she currently has.

## 2019-03-12 ENCOUNTER — TELEPHONE (OUTPATIENT)
Dept: FAMILY MEDICINE | Facility: CLINIC | Age: 29
End: 2019-03-12

## 2019-03-13 NOTE — TELEPHONE ENCOUNTER
"Out going call using a [employer]  ([id number]) to f/u on multiple missed appointments for both herself and her children.  Hayde reports she \"is very tired and so is kids\" . She reports yesterdays missed appointment 2/12/19 was due to medical ride not arriving until late and the kids have been sick. I offered to reschedule missed appointments however, every appointment offered Hayde declined. Hayde reports she is tired and cannot come in for M la la appt and would like that to be rescheduled.   Dates offered for reschedule those were also declined.     Hayde reports she will keep her appointment with Dr Rosales Monday 3/18/18 for Nexplanon  Placement but is not wanting to reschedule with Dr. Mcmahan just yet as she has to many appointment. Patient is scheduled for 3/20/19 with Dr. Sierra Scott for medication management and has requested M la la be scheduled for his missed appointment with Dr. Fran Fernandez 3/20/19 at 1:00.   Both Lisa henderson and have been scheduled for a ride with [employer] [place] [id number]  "

## 2019-03-18 ENCOUNTER — OFFICE VISIT (OUTPATIENT)
Dept: FAMILY MEDICINE | Facility: CLINIC | Age: 29
End: 2019-03-18
Payer: COMMERCIAL

## 2019-03-18 VITALS
TEMPERATURE: 98.2 F | DIASTOLIC BLOOD PRESSURE: 73 MMHG | SYSTOLIC BLOOD PRESSURE: 107 MMHG | BODY MASS INDEX: 29.95 KG/M2 | RESPIRATION RATE: 18 BRPM | HEART RATE: 72 BPM | HEIGHT: 57 IN | WEIGHT: 138.8 LBS | OXYGEN SATURATION: 100 %

## 2019-03-18 DIAGNOSIS — K59.01 SLOW TRANSIT CONSTIPATION: ICD-10-CM

## 2019-03-18 DIAGNOSIS — Z30.8 ENCOUNTER FOR OTHER CONTRACEPTIVE MANAGEMENT: ICD-10-CM

## 2019-03-18 DIAGNOSIS — Z30.017 INSERTION OF IMPLANTABLE SUBDERMAL CONTRACEPTIVE: Primary | ICD-10-CM

## 2019-03-18 DIAGNOSIS — F43.10 PTSD (POST-TRAUMATIC STRESS DISORDER): ICD-10-CM

## 2019-03-18 LAB — HCG UR QL: NEGATIVE

## 2019-03-18 ASSESSMENT — MIFFLIN-ST. JEOR: SCORE: 1226.72

## 2019-03-18 NOTE — PATIENT INSTRUCTIONS
Patient Education     Etonogestrel implant  What is this medicine?  ETONOGESTREL (et oh perla SEMAJ trel) is a contraceptive (birth control) device. It is used to prevent pregnancy. It can be used for up to 3 years.  How should I use this medicine?  This device is inserted just under the skin on the inner side of your upper arm by a health care professional.  Talk to your pediatrician regarding the use of this medicine in children. Special care may be needed.  What side effects may I notice from receiving this medicine?  Side effects that you should report to your doctor or health care professional as soon as possible:    allergic reactions like skin rash, itching or hives, swelling of the face, lips, or tongue    breast lumps    changes in emotions or moods    depressed mood    heavy or prolonged menstrual bleeding    pain, irritation, swelling, or bruising at the insertion site    scar at site of insertion    signs of infection at the insertion site such as fever, and skin redness, pain or discharge    signs of pregnancy    signs and symptoms of a blood clot such as breathing problems; changes in vision; chest pain; severe, sudden headache; pain, swelling, warmth in the leg; trouble speaking; sudden numbness or weakness of the face, arm or leg    signs and symptoms of liver injury like dark yellow or brown urine; general ill feeling or flu-like symptoms; light-colored stools; loss of appetite; nausea; right upper belly pain; unusually weak or tired; yellowing of the eyes or skin    unusual vaginal bleeding, discharge    signs and symptoms of a stroke like changes in vision; confusion; trouble speaking or understanding; severe headaches; sudden numbness or weakness of the face, arm or leg; trouble walking; dizziness; loss of balance or coordination  Side effects that usually do not require medical attention (report to your doctor or health care professional if they continue or are bothersome):    acne    back  pain    breast pain    changes in weight    dizziness    general ill feeling or flu-like symptoms    headache    irregular menstrual bleeding    nausea    sore throat    vaginal irritation or inflammation  What may interact with this medicine?  Do not take this medicine with any of the following medications:    amprenavir    bosentan    fosamprenavir  This medicine may also interact with the following medications:    barbiturate medicines for inducing sleep or treating seizures    certain medicines for fungal infections like ketoconazole and itraconazole    grapefruit juice    griseofulvin    medicines to treat seizures like carbamazepine, felbamate, oxcarbazepine, phenytoin, topiramate    modafinil    phenylbutazone    rifampin    rufinamide    some medicines to treat HIV infection like atazanavir, indinavir, lopinavir, nelfinavir, tipranavir, ritonavir    [place]'s wort  What if I miss a dose?  This does not apply.  Where should I keep my medicine?  This drug is given in a hospital or clinic and will not be stored at home.  What should I tell my health care provider before I take this medicine?  They need to know if you have any of these conditions:    abnormal vaginal bleeding    blood vessel disease or blood clots    cancer of the breast, cervix, or liver    depression    diabetes    gallbladder disease    headaches    heart disease or recent heart attack    high blood pressure    high cholesterol    kidney disease    liver disease    renal disease    seizures    tobacco smoker    an unusual or allergic reaction to etonogestrel, other hormones, anesthetics or antiseptics, medicines, foods, dyes, or preservatives    pregnant or trying to get pregnant    breast-feeding  What should I watch for while using this medicine?  This product does not protect you against HIV infection (AIDS) or other sexually transmitted diseases.  You should be able to feel the implant by pressing your fingertips over the skin where it  was inserted. Contact your doctor if you cannot feel the implant, and use a non-hormonal birth control method (such as condoms) until your doctor confirms that the implant is in place. If you feel that the implant may have broken or become bent while in your arm, contact your healthcare provider.  NOTE:This sheet is a summary. It may not cover all possible information. If you have questions about this medicine, talk to your doctor, pharmacist, or health care provider. Copyright  2018 [employer]

## 2019-03-18 NOTE — PROGRESS NOTES
Procedure Note-Nexplanon Insertion    Hayde Macias is a patient of Rosana Rajan here for placement of etonogestrel implant (Nexplanon)    Indication:Contraception   When she arrived she stated she was scared to have the Mirena placed. She would like to use Nexplanon. Has not had a period yet. Has not had intercourse since baby.    Consent: Affirmation of informed consent was signed and scanned into the medical record. Risks, benefits and alternatives were discussed. Patient's questions were elicited and answered.   Procedure safety checklist was completed:  Yes  Time Out (Pause for the Cause) completed: Yes    Labs: UPT:  Negative  LMP:   No LMP recorded.       Previous Contraception:  none  Counseling:  Pt. counseled on potential side effects of  Nexplanon, including unpredictable spotting/bleeding and plan for removal/replacement of Nexplanon in 3 years or less.    Preoperative Diagnosis: Desires effective contraception  Postoperative Diagnosis: etonogestrel implant in place  Skin prep Betadine  Anesthesia 1% lidocaine    Technique:   Patient was placed supine with right arm exposed.  Artur was made 8-10cm above medial epicondial and a guiding artur 4 cm above the first.  Arm was prepped with betadine.Insertion point was anesthetized with 1% lidocaine 2mL. After stretching the skin with thumb and index finger around the insertion site.  Skin punctured with the tip of the needle inserted at 30 degrees and then lowered to horizontal position. While lifting the skin with the tip of the needle, slided the needle to it's full length. Applicator was then stabilized and the purple slider was unlocked by pushing it slightly down. Slider moved back completely until it stopped. Applicator was then removed.  Correct placement of the implant was confirmed by palpation in the patient's arm and visualizing the purple top of the obturator.  Insertion site was dressed with an adhesive covered by a pressure dressing.      User  card and patient chart label filled out. User card  given to patient for record. Nexplanon added to medication list     Lot# [ [id number] ]    EBL: minimal  Complications:  No  Tolerance:  Pt tolerated procedure well and was in stable condition.         Follow up: Pt was instructed to call if bleeding, severe pain or foul smell.     Follow up in 2-4 weeks.  Sariah Keller D.O.

## 2019-03-18 NOTE — NURSING NOTE
Due to patient being non-English speaking/uses sign language, an  was used for this visit. Only for face-to-face interpretation by an external agency, date and length of interpretation can be found on the scanned worksheet.     name: Cipriano Atwood  Agency: [employer]  Language: Maryann   Telephone number: [phone]  Type of interpretation: Face-to-face, spoken

## 2019-03-19 ENCOUNTER — TELEPHONE (OUTPATIENT)
Dept: FAMILY MEDICINE | Facility: CLINIC | Age: 29
End: 2019-03-19

## 2019-03-19 RX ORDER — QUETIAPINE FUMARATE 200 MG/1
200 TABLET, FILM COATED ORAL AT BEDTIME
Qty: 90 TABLET | Refills: 0 | Status: SHIPPED | OUTPATIENT
Start: 2019-03-19 | End: 2019-05-06

## 2019-03-19 RX ORDER — AMOXICILLIN 250 MG
1 CAPSULE ORAL DAILY
Qty: 60 TABLET | Refills: 0 | Status: SHIPPED | OUTPATIENT
Start: 2019-03-19 | End: 2019-05-06
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
## 2019-03-19 NOTE — TELEPHONE ENCOUNTER
Incoming call received from Perlita Huang (Public Health Nurse) requesting a return call to discuss recent finding during her recent home meeting. Please call to discuss ASAP    Perlita Huang   Direct office: [phone]  Personal Cell: [phone]

## 2019-03-20 ENCOUNTER — TELEPHONE (OUTPATIENT)
Dept: FAMILY MEDICINE | Facility: CLINIC | Age: 29
End: 2019-03-20

## 2019-03-20 ENCOUNTER — OFFICE VISIT (OUTPATIENT)
Dept: PHARMACY | Facility: CLINIC | Age: 29
End: 2019-03-20
Payer: COMMERCIAL

## 2019-03-20 DIAGNOSIS — Z71.89 ENCOUNTER FOR MEDICATION REVIEW AND COUNSELING: Primary | ICD-10-CM

## 2019-03-20 DIAGNOSIS — F43.10 PTSD (POST-TRAUMATIC STRESS DISORDER): ICD-10-CM

## 2019-03-20 DIAGNOSIS — K59.00 CONSTIPATION, UNSPECIFIED CONSTIPATION TYPE: ICD-10-CM

## 2019-03-20 DIAGNOSIS — F33.1 MAJOR DEPRESSIVE DISORDER, RECURRENT EPISODE, MODERATE (H): ICD-10-CM

## 2019-03-20 RX ORDER — POLYETHYLENE GLYCOL 3350 17 G/17G
1 POWDER, FOR SOLUTION ORAL DAILY PRN
Qty: 510 G | Refills: 1 | Status: SHIPPED | OUTPATIENT
Start: 2019-03-20 | End: 2022-01-25

## 2019-03-20 NOTE — PROGRESS NOTES
Assessment and Plan     (Z71.89) Encounter for medication review and counseling  (primary encounter diagnosis)  Comment: Unable to fill pillboxes beyond 1 week as refills not available. Not desirable. Quantity available requires dose adjustment.  Plan: Pillboxes filled x1 week as below.     Venlafaxine 75 mg capsule x1; Venlafaxine 150 mg capsule x1      Quetiapine 200 mg tablet x1/2 tablet     Prazosin 1 mg capsule x1 (DOSE CHANGE from 3 mg)      Montelukast 10 mg tablet x1       Ranitidine 150 mg tablet x1      Cetirizine 10 mg tablet x1      Docusate 100 mg capsule x2      Ferrous sulfate 325 mg tablet x1      Sennadocustate8.6-50 x1 daily           (K59.00) Constipation, unspecified constipation type  Comment: ***  Plan: polyethylene glycol (MIRALAX) powder        ***    (F33.1) Major depressive disorder, recurrent episode, moderate (H)  Comment: ***  Plan: ***    Decreased to seroquel 100 mgm HS  Prazosin 1 mg HS      Follow up: Pharmacist available per patient or provider request.     Options for treatment and/or follow-up care were reviewed with the patient. Hayde was engaged and actively involved in the decision making process. She verbalized understanding of the options discussed and was satisfied with the final plan. Patient was provided with written instructions/medication list via AVS.     *** was provided our recommendations {[facility] PHARMD COMM METHOD:[id number]} and  *** was available for supervision during this visit and is the authorizing prescriber for this visit through the pharmacist collaborative practice agreement.    Thank you for the opportunity to participate in the care of this patient.  Sierra Dickson, PharmD  [facility]  Phone: [phone]  March 20, 2019 at 10:23 AM    Current Outpatient Medications   Medication Sig Dispense Refill     cetirizine (ZYRTEC) 10 MG tablet Take 1 tablet (10 mg) by mouth daily 30 tablet 11     docusate sodium (COLACE) 100  MG tablet Take 2 tablets (200 mg) by mouth daily 60 tablet 3     ferrous sulfate (FEROSUL) 325 (65 Fe) MG tablet Take 1 tablet (325 mg) by mouth daily (with breakfast) DRINK WITH LARGE GLASS OF WATER 90 tablet 0     montelukast (SINGULAIR) 10 MG tablet Take 1 tablet (10 mg) by mouth daily 30 tablet 3     prazosin (MINIPRESS) 1 MG capsule Take 3 capsules (3 mg) by mouth At Bedtime 90 capsule 2     QUEtiapine (SEROQUEL) 200 MG tablet Take 1 tablet (200 mg) by mouth At Bedtime 90 tablet 0     ranitidine (ZANTAC) 150 MG tablet Take 1 tablet (150 mg) by mouth At Bedtime 90 tablet 3     senna-docusate (DESEAN-COLACE) 8.6-50 MG tablet Take 1 tablet by mouth daily , may take additional one tablet daily as needed. 60 tablet 0     venlafaxine (EFFEXOR-XR) 150 MG 24 hr capsule Take 1 capsule by mouth daily (together with 75 mg capsule for total of 225 mg/day) 30 capsule 11     venlafaxine (EFFEXOR-XR) 75 MG 24 hr capsule Take 1 capsule by mouth daily (together with 150 mg capsule for total of 225 mg/day) 30 capsule 11     acetaminophen (TYLENOL) 325 MG tablet Take 2 tablets (650 mg) by mouth every 6 hours as needed for headaches 100 tablet 1     calcium carbonate (TUMS) 500 MG chewable tablet Take 1 tablet (500 mg) by mouth 2 times daily as needed for heartburn 150 tablet 3     fluticasone (FLONASE) 50 MCG/ACT nasal spray Spray 1-2 sprays into both nostrils daily as needed for allergies 16 mL 1     ketotifen (ZADITOR) 0.025 % SOLN ophthalmic solution Place 1 drop into both eyes every 12 hours 1 Bottle 3            HPI:       Hayde Macias is a 28 year old female referred by Dr. Rosales for medication management. Patient seen today with the assistance of Claire Fernandez.    # Medication management: No trouble remembering to take medicines. Refills not available today in clinic. Unclear of barrier. Ideally would rather not come back to clinic again before appointment next week with Dr. Kenneth Villa.    # Pain 2/2 Nexplanon  placement: Requests APAP and Ibuprofen refill.     # Constipation: Still concerns with constipation. Would like Miralax refill and clinic water bottle.     # Mood: Not good. Not able to offer suggestions of how to improve. Not sleeping much at night because baby (Chencho) awake, crying. However napping when he's napping. Able to still wake up at night when baby wakes up, medicines don't prevent her from waking when needed.            PMHX:     Patient Active Problem List   Diagnosis     Health Care Home     Recurrent major depressive disorder (H)     Encounter for supervision of normal pregnancy     Seasonal allergic rhinitis     PTSD (post-traumatic stress disorder)     BRENDA (generalized anxiety disorder)     Other fatigue     Insomnia due to other mental disorder     Major depressive disorder, recurrent episode, moderate (H)     Allergies   Allergen Reactions     Augmentin Rash            Objective     There were no vitals filed for this visit.  There is no height or weight on file to calculate BMI.        [facility] Pharmacist Documentation     Drug therapy problems identified:      Relevant medical devices: ***    # of medical conditions addressed: ***  # of medications addressed: ***  # of DTP identified: {NUMBERS 0-5:074388}  Time spent: *** minutes  Level of service per [place] DHS guidelines: {NUMBERS 0-5:019865}             Documentation   Due to patient being non-English speaking/uses sign language, an  was used for this visit. Only for face-to-face interpretation by an external agency, date and length of interpretation can be found on the scanned worksheet.     name: [facility]  Agency: [employer]  Language: Maryann   Telephone number: [phone]  Type of interpretation: Face-to-face, spoken

## 2019-03-20 NOTE — TELEPHONE ENCOUNTER
Face to face meeting with Hayde and Maryann  Claire from [facility] ([id number]) to discuss Aprils 2019 schedule for appointments. Hayde reports in the past [facility] had always scheduled  appointments along with transportation. Discussed with Hayde, I will review the last couple of months worth of appointments (family) and reach out to those facilities to determine what appointments need to be scheduled along with transportation.   Hayde verbalized understanding and is aware I will complete April's schedule by early next week, a copy will be provided to Hayde once completed.    Reviewed and discussed [facility] information with regards to food rescue, summer  program,  connections with [employer] and possible assistance with child day care. I provided  a copy of the services offered to Hayde. She  has requested I begin the process of applying for the summer  programs.  At this time there are no additional questions, concerns or request.

## 2019-03-20 NOTE — PROGRESS NOTES
Assessment and Plan     (Z71.89) Encounter for medication review and counseling  (primary encounter diagnosis)  Comment: Unable to fill pillboxes beyond 1 week as refills not available. Not desirable and not as intended. Quantity available requires dose adjustments as outlined below for Prazosin, Quetiapine. Dose adjustment more ideal than abrupt discontinuation.  Plan: Pillboxes filled x1 week as below.     Venlafaxine 75 mg capsule x1; Venlafaxine 150 mg capsule x1      Quetiapine 200 mg tablet x1/2 tablet (DOSE CHANGE from 200 mg)     Prazosin 1 mg capsule x1 (DOSE CHANGE from 3 mg)      Montelukast 10 mg tablet x1       Ranitidine 150 mg tablet x1      Cetirizine 10 mg tablet x1      Docusate 100 mg capsule x2      Ferrous sulfate 325 mg tablet x1      Senna-docustate 8.6-50 x1 daily           (K59.00) Constipation, unspecified constipation type  Comment: --  Plan: polyethylene glycol (MIRALAX) powder - provided refill and water bottle from clinic supply.    (F33.1) Major depressive disorder, recurrent episode, moderate (H)  Comment: Effects of medications not interfering with care of new baby.   Plan: Recommended continued close f/u with psychologist. Patient agreeable.     # Misc: Given lack of breastfeeding nor plans for upcoming pregnancy (just had Nexplanon placed), no need for prenatal MVI. No labs available to assess iron stores/anemia. Continue supplementation until labs can be repeated.   Plan: Discontinue prenatal vitamin. Request help of staff to call pharmacy and discontinue. Informed patient that she had Ibuprofen available in her supply, provided instruction on PRN use.       Follow up: Close f/u for pillbox fill.     Options for treatment and/or follow-up care were reviewed with the patient. Hayde was engaged and actively involved in the decision making process. She verbalized understanding of the options discussed and was satisfied with the final plan. Patient was provided with written  instructions/medication list via AVS.    Dr. Rosales was provided our recommendations via routed note and Dr. Keller was available for supervision during this visit and is the authorizing prescriber for this visit through the pharmacist collaborative practice agreement.    Thank you for the opportunity to participate in the care of this patient.  Sierra Dickson, PharmD  [facility]  Phone: [phone]  March 20, 2019 at 10:23 AM    Current Outpatient Medications   Medication Sig Dispense Refill     cetirizine (ZYRTEC) 10 MG tablet Take 1 tablet (10 mg) by mouth daily 30 tablet 11     docusate sodium (COLACE) 100 MG tablet Take 2 tablets (200 mg) by mouth daily 60 tablet 3     ferrous sulfate (FEROSUL) 325 (65 Fe) MG tablet Take 1 tablet (325 mg) by mouth daily (with breakfast) DRINK WITH LARGE GLASS OF WATER 90 tablet 0     montelukast (SINGULAIR) 10 MG tablet Take 1 tablet (10 mg) by mouth daily 30 tablet 3     prazosin (MINIPRESS) 1 MG capsule Take 3 capsules (3 mg) by mouth At Bedtime 90 capsule 2     QUEtiapine (SEROQUEL) 200 MG tablet Take 1 tablet (200 mg) by mouth At Bedtime 90 tablet 0     ranitidine (ZANTAC) 150 MG tablet Take 1 tablet (150 mg) by mouth At Bedtime 90 tablet 3     senna-docusate (DESEAN-COLACE) 8.6-50 MG tablet Take 1 tablet by mouth daily , may take additional one tablet daily as needed. 60 tablet 0     venlafaxine (EFFEXOR-XR) 150 MG 24 hr capsule Take 1 capsule by mouth daily (together with 75 mg capsule for total of 225 mg/day) 30 capsule 11     venlafaxine (EFFEXOR-XR) 75 MG 24 hr capsule Take 1 capsule by mouth daily (together with 150 mg capsule for total of 225 mg/day) 30 capsule 11     acetaminophen (TYLENOL) 325 MG tablet Take 2 tablets (650 mg) by mouth every 6 hours as needed for headaches 100 tablet 1     calcium carbonate (TUMS) 500 MG chewable tablet Take 1 tablet (500 mg) by mouth 2 times daily as needed for heartburn 150 tablet 3      fluticasone (FLONASE) 50 MCG/ACT nasal spray Spray 1-2 sprays into both nostrils daily as needed for allergies 16 mL 1     ketotifen (ZADITOR) 0.025 % SOLN ophthalmic solution Place 1 drop into both eyes every 12 hours 1 Bottle 3            HPI:       Hayde Macias is a 28 year old female referred by Dr. Rosales for medication management. Patient seen today with the assistance of Maryann Claire.    # Medication management: No trouble remembering to take medicines. Refills not available today in clinic. Unclear of barrier. Ideally would rather not come back to clinic again before appointment next week with Dr. Kenneth Villa.    # Pain 2/2 Nexplanon placement: Requests APAP and Ibuprofen refill.     # Constipation: Still concerns with constipation. Would like Miralax refill and clinic water bottle.     # Mood: Not good. Not able to offer suggestions of how to improve. Not sleeping much at night because baby (Chencho) awake, crying. However napping when he's napping. Able to still wake up at night when baby wakes up, medicines don't prevent her from waking when needed.            PMHX:     Patient Active Problem List   Diagnosis     Health Care Home     Recurrent major depressive disorder (H)     Encounter for supervision of normal pregnancy     Seasonal allergic rhinitis     PTSD (post-traumatic stress disorder)     BRENDA (generalized anxiety disorder)     Other fatigue     Insomnia due to other mental disorder     Major depressive disorder, recurrent episode, moderate (H)     Allergies   Allergen Reactions     Augmentin Rash            Objective     There were no vitals filed for this visit.  There is no height or weight on file to calculate BMI.        [facility] Pharmacist Documentation     Drug therapy problems identified:  Medical Condition 1: Constipation, Goals of therapy: Not at goal, Drug Class: Other, Convenience: Other, Intervention: Refill, Verification: Patient Agreed - CPA  Medical Condition 2: Other vitamin  deficiencies, Goals of therapy 2: At goal, Drug Class 2: Vitamins, Indication 2: Unnecessary drug therapy, Intervention 2: Discontinue drug, Verification 2: Patient Agreed - CPA       Relevant medical devices: n/a    # of medical conditions addressed: 2  # of medications addressed: 11  # of DTP identified: 2  Time spent: 30 minutes  Level of service per [place] DHS guidelines: 3             Documentation   Due to patient being non-English speaking/uses sign language, an  was used for this visit. Only for face-to-face interpretation by an external agency, date and length of interpretation can be found on the scanned worksheet.     name: Claire  Agency: [employer]  Language: Maryann   Telephone number: [phone]  Type of interpretation: Face-to-face, spoken

## 2019-03-20 NOTE — TELEPHONE ENCOUNTER
Out going call to confirm today's appointment 3/20/19 using a Maryann  ([id number]), Hayde reports she will be here today provided transportation picks her up.   Transportation has been confirmed with [employer] [phone] #[phone] w/  between 9:15 & 9:30.

## 2019-03-27 NOTE — TELEPHONE ENCOUNTER
Cheryl wanted me to help call the pt today for the pt to come in to  her vegetables.  The pt was enrolled in our [employer] program, and today is the day they deliver the vegetables to our clinic.  I called the pt with an .  I asked the pt to see if she is able to come in to  her share of vegetables.  Pt stated that she will come in later to  her share.    
430

## 2019-03-28 ENCOUNTER — OFFICE VISIT (OUTPATIENT)
Dept: PSYCHOLOGY | Facility: CLINIC | Age: 29
End: 2019-03-28
Payer: COMMERCIAL

## 2019-03-28 ENCOUNTER — TELEPHONE (OUTPATIENT)
Dept: FAMILY MEDICINE | Facility: CLINIC | Age: 29
End: 2019-03-28

## 2019-03-28 DIAGNOSIS — F41.1 GENERALIZED ANXIETY DISORDER: ICD-10-CM

## 2019-03-28 DIAGNOSIS — F43.10 PTSD (POST-TRAUMATIC STRESS DISORDER): Primary | ICD-10-CM

## 2019-03-28 DIAGNOSIS — F33.2 SEVERE EPISODE OF RECURRENT MAJOR DEPRESSIVE DISORDER, WITHOUT PSYCHOTIC FEATURES (H): ICD-10-CM

## 2019-03-28 NOTE — PROGRESS NOTES
Behavioral Health Progress Note     Client Legal Name:   Hayde LUJAN Say        Client Preferred Name: Hayde   Service Type: Individual  Length of Visit: 50 minutes  Attendees:       Due to patient being non-English speaking/uses sign language, an  was used for this visit. Date and length of interpretation can be found on the scanned  worksheet.       name: Cipriano Atwood   Agency: [employer]  Language: Maryann   Telephone number: [phone]  Type of interpretation: Face-to-face, spoken      Complexity statement: Due to patient being non-English speaking, an  was used for this visit. An  is used not only to interpret language, since the patient does not speak English, but also to help with the complexity of understandings across cultures, since the patient is not well integrated in the larger [place] culture.       Identifying Information and Presenting Problem:     The patient is a 28 year old Maryann female who is being seen for problematic symptoms of PTSD, depression.     Treatment Objective(s) Addressed in This Session:  Learn more adaptive ways to manage her worries.   Anger management     Progress on / Status of Treatment Objective(s) / Homework:  worsening    PHQ-9 SCORE 9/18/2018 10/25/2018 12/27/2018   PHQ-9 Total Score - - -   PHQ-9 Total Score 17 16 20       BRENDA-7 SCORE 8/29/2017 1/22/2018 12/27/2018   Total Score 15 14 7       Topics Discussed/Interventions Provided:  Follow-up on multiple pieces of patient's wellbeing today as well as discussion about getting more help. She reports that her mood and anger continue to get worse since Alejo's birth. After discussion among the care team (PCP, myself, and care coordinator, Nicole Estrella) we agree patient would do best at an agency such as [facility], that offers greater wrap around services. I spoke with Hayde about options they could offer including more in-home services, in-home therapy, case management  etc. Patient reports she was interested in this and especially wants a new . She does not find this  to be very responsive.     We reviewed how she is managing at home with the children. She again reports Alejo (baby) cries a lot. She responds by feeding him and gets frustrated when he does not always respond to this. She reported being 'unsure' of how well she was doing with holding him (bonding goal per Dr. Rosales's notes). When I asked what she did when he cried when she was trying to feed him, she reported trying to rub the bottle on his face and side of his head to get him to respond. We talked about how babies sometimes want to suck to self-soothe, not eat. She does not have a pacifier. We attempted to provide her with one, but she left prior to getting it.      Assessment: The patient appeared to be active and engaged in today's session and was receptive to feedback. She was very interested in getting more help at home, although I am under the impression she thinks this will be more like someone coming in to care for her children. I reiterated numerous times that [facility] would help provide someone to help her learn different parenting and self-care strategies based on what they see in the home. Patient and family are high risk and in need of wrap around services including, but not limited to case management, [facility] worker, in-home therapy, respite care.     Mental Status: Hayde appeared generally alert and oriented. Dress was traditional and appropriate to the weather and occasion. Grooming and hygiene were clean. Eye contact was good. Speech was of normal volume and rate and was clear, coherent, and relevant. Mood was neutral with congruent affect. Thought processes were relevant, logical and goal-directed. Thought content was WNL with no evidence of psychotic or paranoid features. No evidence of SI/HI or self-harm, intent, or plans. Memory appeared grossly intact. Insight and judgment  appeared fair and patient exhibited good impulse control during the appointment.     Does the patient appear to be at imminent risk of harm to self/others at this time? No    The session was necessary to address worry, depression, PTSD that have been interfering with patient's ability to function at concentrating at work, being able to work with strangers, arguments with her , personal life management, parenting.  Ongoing psychotherapy is necessary to improve functioning with daily activities, provide psychoeducation and provide support.      Diagnosis (DSM-5):  296.33 recurrent severe major depression  308.3 PTSD  300.02 Generalized anxiety disorder        Plan:  1. Follow up in 2 weeks with psychotherapy.  2. We will continue to connect with [facility] to help facilitate and intake.        NOTE: Treatment plan update due 6/28/19.  Diagnostic assessment update due 4/26/19.  The Treatment Plan dated 5/24/17 was reviewed with the patient at today s visit.  The Treatment Plan remains current based on the patient s status and progress to date.

## 2019-03-28 NOTE — TELEPHONE ENCOUNTER
Transportation has been scheduled with  [facility] ([id number]) [phone] for up coming clinic visit 4/3/19 with Sierra. Pt p/u is @ 9:15 with a will call for return.    [facility]:   Per verbal request (Hayde) I have reached to request referral/enrollment process.  is Sayda Olivares [phone] EXT: 3.  LMTCB to discuss and obtain enrollment information.

## 2019-03-28 NOTE — TELEPHONE ENCOUNTER
Patient brought medications into today's appointment w/ Dr. Elham Mcmahan for monthly medication (Med box) set up. Due to Sierra Scott being out of clinic, patients med boxes were set up for the next 2 weeks by myself. Patient has been scheduled for 4/3/19 with Sierra for f/u.  All medication bottles and medications boxes returned into patient's plastic bag, duplicate medication bottles banded together (placed in patients bag) and empty bottles have been placed on Sierra's desk.

## 2019-03-29 RX ORDER — PRAZOSIN HYDROCHLORIDE 1 MG/1
3 CAPSULE ORAL AT BEDTIME
Qty: 90 CAPSULE | Refills: 3 | Status: SHIPPED | OUTPATIENT
Start: 2019-03-29 | End: 2019-07-16

## 2019-04-01 ENCOUNTER — OFFICE VISIT (OUTPATIENT)
Dept: FAMILY MEDICINE | Facility: CLINIC | Age: 29
End: 2019-04-01
Payer: COMMERCIAL

## 2019-04-01 ENCOUNTER — TELEPHONE (OUTPATIENT)
Dept: FAMILY MEDICINE | Facility: CLINIC | Age: 29
End: 2019-04-01

## 2019-04-01 VITALS
OXYGEN SATURATION: 97 % | DIASTOLIC BLOOD PRESSURE: 82 MMHG | HEART RATE: 74 BPM | WEIGHT: 135.6 LBS | BODY MASS INDEX: 30.5 KG/M2 | RESPIRATION RATE: 20 BRPM | SYSTOLIC BLOOD PRESSURE: 135 MMHG | HEIGHT: 56 IN | TEMPERATURE: 98.5 F

## 2019-04-01 DIAGNOSIS — Z30.46 ENCOUNTER FOR SURVEILLANCE OF NEXPLANON SUBDERMAL CONTRACEPTIVE: Primary | ICD-10-CM

## 2019-04-01 DIAGNOSIS — F33.1 MAJOR DEPRESSIVE DISORDER, RECURRENT EPISODE, MODERATE (H): ICD-10-CM

## 2019-04-01 DIAGNOSIS — F41.1 GAD (GENERALIZED ANXIETY DISORDER): ICD-10-CM

## 2019-04-01 ASSESSMENT — MIFFLIN-ST. JEOR: SCORE: 1198.08

## 2019-04-01 NOTE — TELEPHONE ENCOUNTER
Incoming voicemail received from Sayda Olivares from [facility] 3/29/19 @ 5:20 pm. Requesting a return call to discuss options for respite and educational assistance for family.    Return call made to Sayda STAFFORD [phone] [facility] to discuss.

## 2019-04-01 NOTE — NURSING NOTE
Due to patient being non-English speaking/uses sign language, an  was used for this visit. Only for face-to-face interpretation by an external agency, date and length of interpretation can be found on the scanned worksheet.     name: Claire Atwood  Agency: [employer]  Language: Maryann   Telephone number: [phone]  Type of interpretation: Group family plus 1

## 2019-04-01 NOTE — PROGRESS NOTES
"       Subjective:   Hayde Macias is a 28 year old year old female who presents to clinic today for the following health issues:    Chief Complaint   Patient presents with     RECHECK     meds and nexplanon     Medication Reconciliation     Completed     Nexplanon:  Had a lot of pain during insertion and right afterwards. No longer having pain but it is pruritic. No longer has bruising. Thinks it is healing.     Depression:  Mood is fine. Thinks she heard a voice the other night. She was ringing her sister's doorbell and heard a voice that resembled her brothers that said \"open.\" Denies hearing voices saying angry things or telling her to harm herself like she has in the past. Denies SI/HI.    phq9 16    A Maryann  was used for this visit.         PMHX:   PAST MEDICAL HISTORY:  Patient Active Problem List   Diagnosis     Health Care Home     Recurrent major depressive disorder (H)     Encounter for supervision of normal pregnancy     Seasonal allergic rhinitis     PTSD (post-traumatic stress disorder)     BRENDA (generalized anxiety disorder)     Other fatigue     Insomnia due to other mental disorder     Major depressive disorder, recurrent episode, moderate (H)     CURRENT MEDICATIONS:  Current Outpatient Medications   Medication Sig Dispense Refill     acetaminophen (TYLENOL) 325 MG tablet Take 2 tablets (650 mg) by mouth every 6 hours as needed for headaches 100 tablet 1     calcium carbonate (TUMS) 500 MG chewable tablet Take 1 tablet (500 mg) by mouth 2 times daily as needed for heartburn 150 tablet 3     cetirizine (ZYRTEC) 10 MG tablet Take 1 tablet (10 mg) by mouth daily 30 tablet 11     docusate sodium (COLACE) 100 MG tablet Take 2 tablets (200 mg) by mouth daily 60 tablet 3     ferrous sulfate (FEROSUL) 325 (65 Fe) MG tablet Take 1 tablet (325 mg) by mouth daily (with breakfast) DRINK WITH LARGE GLASS OF WATER 90 tablet 0     fluticasone (FLONASE) 50 MCG/ACT nasal spray Spray 1-2 sprays into both " "nostrils daily as needed for allergies 16 mL 1     ketotifen (ZADITOR) 0.025 % SOLN ophthalmic solution Place 1 drop into both eyes every 12 hours 1 Bottle 3     montelukast (SINGULAIR) 10 MG tablet Take 1 tablet (10 mg) by mouth daily 30 tablet 3     polyethylene glycol (MIRALAX) powder Take 17 g (1 capful) by mouth daily as needed for constipation 510 g 1     prazosin (MINIPRESS) 1 MG capsule Take 3 capsules (3 mg) by mouth At Bedtime 90 capsule 3     QUEtiapine (SEROQUEL) 200 MG tablet Take 1 tablet (200 mg) by mouth At Bedtime 90 tablet 0     ranitidine (ZANTAC) 150 MG tablet Take 1 tablet (150 mg) by mouth At Bedtime 90 tablet 3     senna-docusate (DESEAN-COLACE) 8.6-50 MG tablet Take 1 tablet by mouth daily , may take additional one tablet daily as needed. 60 tablet 0     venlafaxine (EFFEXOR-XR) 150 MG 24 hr capsule Take 1 capsule by mouth daily (together with 75 mg capsule for total of 225 mg/day) 30 capsule 11     venlafaxine (EFFEXOR-XR) 75 MG 24 hr capsule Take 1 capsule by mouth daily (together with 150 mg capsule for total of 225 mg/day) 30 capsule 11     ALLERGIES:     Allergies   Allergen Reactions     Augmentin Rash            Review of Systems:    ROS: 10 point ROS neg other than the symptoms noted above in the HPI.         Objective:     Vitals:    04/01/19 1539   BP: 135/82   Pulse: 74   Resp: 20   Temp: 98.5  F (36.9  C)   SpO2: 97%   Weight: 61.5 kg (135 lb 9.6 oz)   Height: 1.422 m (4' 8\")     Body mass index is 30.4 kg/m .  No results found for this or any previous visit (from the past 24 hour(s)).    General: Alert, well-appearing female in NAD  Pulm: CTA BL, no tachypnea  CV: RRR, no murmur  Psych: Mood appropriate to visit content, full affect, rational thought content and process, no delusions/hallucinations    Assessment and Plan:   1. Encounter for surveillance of Nexplanon subdermal contraceptive  Healing appropriately. Bruising and pain have resolved, some pruritus remains. Patient able " to palpate device.     2. Major depressive disorder, recurrent episode, moderate (H)  3. BRENDA (generalized anxiety disorder)  Mood stable and PHQ9 around patient's baseline at 16. Has had one reported nonthreatening auditory hallucination in the past week. In the process of establishing with [facility] for additional psychiatric and familial support. She also has weekly appointments scheduled with Dr. Kenneth Villa, Behavioral Health over the next 3 weeks. She was provided with additional food and home supplies today.     Follow up: 4/9/19 with Dr. Kenneth Villa  Options for treatment and follow-up care were reviewed with the patient and/or guardian. Hayde LUJAN Say and/or guardian engaged in the decision making process and verbalized understanding of the options discussed and agreed with the final plan.    Rosana Rosales DO  [facility] Family Medicine Resident    Precepted with: Parth Jessica MD

## 2019-04-02 NOTE — TELEPHONE ENCOUNTER
Out going call made to Sayda both cell [phone] and Office phone [phone] EXT: 3 to discuss  Referral for this program. LMTCB

## 2019-04-03 NOTE — TELEPHONE ENCOUNTER
Outgoing call made to Sayda Olivares [phone] to discuss enrollment into [facility]. TCB   I did request additional information/referral to another program that could possibly assist with this family.

## 2019-04-09 ENCOUNTER — OFFICE VISIT (OUTPATIENT)
Dept: PSYCHOLOGY | Facility: CLINIC | Age: 29
End: 2019-04-09
Payer: COMMERCIAL

## 2019-04-09 DIAGNOSIS — F43.10 PTSD (POST-TRAUMATIC STRESS DISORDER): Primary | ICD-10-CM

## 2019-04-09 DIAGNOSIS — F33.2 SEVERE EPISODE OF RECURRENT MAJOR DEPRESSIVE DISORDER, WITHOUT PSYCHOTIC FEATURES (H): ICD-10-CM

## 2019-04-09 DIAGNOSIS — F41.1 GENERALIZED ANXIETY DISORDER: ICD-10-CM

## 2019-04-09 NOTE — Clinical Note
Nicole-Can you find out if Hayde's current [facility] Program takes infants- or is this something we should ask her  to do? Also, please schedule her ASAP with Dr. Becker and CHANO Rajan

## 2019-04-09 NOTE — PROGRESS NOTES
Behavioral Health Progress Note     Client Legal Name:   Hayde LUJAN Say        Client Preferred Name: Hayde   Service Type: Individual  Length of Visit: 50 minutes  Attendees:       Due to patient being non-English speaking/uses sign language, an  was used for this visit. Date and length of interpretation can be found on the scanned  worksheet.       name: Pipo Murillo    Agency: [employer]  Language: Maryann   Telephone number: [phone]  Type of interpretation: Face-to-face, spoken      Complexity statement: Due to patient being non-English speaking, an  was used for this visit. An  is used not only to interpret language, since the patient does not speak English, but also to help with the complexity of understandings across cultures, since the patient is not well integrated in the larger [place] culture.    Identifying Information and Presenting Problem:     The patient is a 28 year old Maryann female who is being seen for problematic symptoms of PTSD, depression.     Treatment Objective(s) Addressed in This Session:  Learn more adaptive ways to manage her worries.   Anger management     Progress on / Status of Treatment Objective(s) / Homework:  worsening    PHQ-9 SCORE 9/18/2018 10/25/2018 12/27/2018   PHQ-9 Total Score - - -   PHQ-9 Total Score 17 16 20       BRENDA-7 SCORE 8/29/2017 1/22/2018 12/27/2018   Total Score 15 14 7       Topics Discussed/Interventions Provided:  1. Children: Patient reports the most trouble with her oldest son and her 4 year old. Oldest is being seen at our clinic for psychotherapy and they are working on parenting and appropriate discipline. Patient reports she is very worried about him- stating he has not changed his clothes or bathed in a month and still does not tell her where she is going. She reports that corporal punishment does not seem to change his behaviors. I encouraged her to follow the directions from the other  therapist regarding appropriate discipline, reminding her of our conversations about the problems around corporal punishment. She seemed to understand that it does not make sense to continue corporal punishment if it's not working. Secondly, she reports she is concerned because her 3 y/o will hit the baby and pull his arms/legs and hair. She is constantly watching over the baby when they are together to make sure he does not get hurt. She tries not to leave them alone together. Nothing has happened recently, but she is very tired of trying to keep them separate and being watchful. We discussed the use of crisis nurseries, because patient wanted someone who could watch the baby all the time. I will discuss possibility of infant [facility] with care coordinator.     2. Anger: patient's anger and worries are about the same today. She voiced many worries, all of them are ones we have discussed in the past, so we did not explore them more today. Instead, I gave the patient a stress ball to use when she feels worried or angry instead of throwing pots/pans on the floor. She agreed to try.    3. Psychosis(?): patient reports hearing more noises at night. Will hear her brother talking and other indistinguishable voices. We discussed seeing psychiatry again for post partum follow up.    Assessment: The patient appeared to be active and engaged in today's session and was receptive to feedback. Patient seemed better rested and engaged today. Her mood was lighter and she smiled and joked a little with the . Patient and family remain sergei risk and need more wrap around care than our clinic can offer. Difficult to tell right now if auditory hallucinations are secondary to stress, lack of sleep, or a psychotic d/o.    Mental Status: Hayde appeared generally alert and oriented. Dress was traditional and appropriate to the weather and occasion. Grooming and hygiene were clean. Eye contact was good. Speech was of normal  volume and rate and was clear, coherent, and relevant. Mood was neutral with congruent affect. Thought processes were relevant, logical and goal-directed. Thought content was WNL with no evidence of psychotic or paranoid features. No evidence of SI/HI or self-harm, intent, or plans. Memory appeared grossly intact. Insight and judgment appeared fair and patient exhibited good impulse control during the appointment.      Does the patient appear to be at imminent risk of harm to self/others at this time? No     The session was necessary to address worry, depression, PTSD that have been interfering with patient's ability to function at concentrating at work, being able to work with strangers, arguments with her , personal life management, parenting.  Ongoing psychotherapy is necessary to improve functioning with daily activities, provide psychoeducation and provide support.      Diagnosis (DSM-5):  296.33 recurrent severe major depression  308.3 PTSD  300.02 Generalized anxiety disorder        Plan:  1. Follow up in 2 weeks with psychotherapy.  2. Refer to psychiatry Dr. Meier for follow up.  3. Connect with [facility] for additional resources.  4. Determine if there is a [facility] nearby that takes infants.      NOTE: Treatment plan update due 6/28/19.  Diagnostic assessment update due 4/26/19.

## 2019-04-12 ENCOUNTER — TELEPHONE (OUTPATIENT)
Dept: FAMILY MEDICINE | Facility: CLINIC | Age: 29
End: 2019-04-12

## 2019-04-12 NOTE — TELEPHONE ENCOUNTER
Out going call made to Isidro f/u status of recent referral's for [facility] and [facility]. LMTCB to discuss. I have also requested a copy of both referral's be faxed to [facility] so that we may follow up with them directly.

## 2019-04-12 NOTE — TELEPHONE ENCOUNTER
4/9/19 [facility]     Incoming call received from Cristiana (Sayda) to review and discuss support services. At this time patient/family would need to be referred by [facility] , [facility], [facility] or [facility] as these services are paid for through the [facility] and [facility]. In addition currently the waiting list is roughly 2 years for home and respite services. I will need to reach out to both [facility] and [facility] to make this request.    Out going call made to Isidro Escobar [facility] worker. Isidro reports she meets 1 a month with Hayde to assist with whatever needs the family may be in need of.   Krystal reports she was unaware of Hadye/family need for additional support and  services as previous patient care coordinator Agustina LUJAN had arranged and requested all of the family's needs.  During this call I requested the referral's be placed for ILS, arms worker and any other programs she can find that would be beneficial for the entire family. Isidro verbalized understanding and is agreeable with submitting referrals along with looking into additional services that would benefit the entire family.  Isidro reports she will place the referrals for services and will f/u with Hayde this week when she brings Devin's glasses.    I will follow up with Isidro the week of 4/15/19-4/19/19 to touch base with where she is with regards to referrals and services.

## 2019-04-15 ENCOUNTER — DOCUMENTATION ONLY (OUTPATIENT)
Dept: PSYCHOLOGY | Facility: CLINIC | Age: 29
End: 2019-04-15

## 2019-04-15 NOTE — PROGRESS NOTES
Primary Care Behavioral Health Consult Note    Meeting lasted: 25 minutes  Others present:     Identifying Information and Presenting Problem:    Dr. Romero requested behavioral health consultation for this patient regarding flat affect, not interacting during son's appt.  The patient is a 28 year old Maryann individual that agreed to be seen by behavioral health today.    Topics Discussed/Interventions Provided:       Hayde was silent, flat affect, not talking at all to physician during her son Osiris's appointment today with Dr. Romero. Hayde also efused to speak with me for the first 5 minutes or so. Eventually, I empathized with the struggles with had with finding an in person  this morning and patient began to complain about how hard it is for her to arrive at clinic and not know what to expect. She expects an in person , and feels overwhelmed and flooded when things do not go as plani eliza. Secondly, she also complained about her  this morning who scares her- erratic driving and always has the windows open, regardless of weather. Eventually, this led to a conversation about other worries and concerns over citizenship and housing. I explained citizenship would take a long time and housing should be discussed with . I also shared that we received a message last week which indicates they are starting the process for an [facility] worker.    Lastly, I followed up on some of Dr. Romero's concerns about her son. Hayde reports feeling worried about how his behaviors towards his brothers, that he is hurting them.  I will pass this information to Dr. Romero and patient's PCP, Dr. Rosales.     Patient denied desire to harm self or others.       Assessment:     Mental Status: Hayde appeared generally alert and oriented. Dress was casual, traditional and appropriate to the weather and occasion. Grooming and hygiene were clean. Eye contact was poor. Speech was of normal volume and  rate and was clear, coherent, and relevant. Mood was angry with flat affect. Thought processes were relevant, logical and goal-directed. Thought content was WNL with no evidence of psychotic or paranoid features. No evidence of SI/HI or self-harm, intent, or plans. Memory appeared grossly intact. Insight and judgment appeared fair/poor and patient exhibited fair impulse control during the appointment.     PHQ-9 SCORE 9/18/2018 10/25/2018 12/27/2018   PHQ-9 Total Score - - -   PHQ-9 Total Score 17 16 20       BRENDA-7 SCORE 8/29/2017 1/22/2018 12/27/2018   Total Score 15 14 7       Diagnostic Considerations:      A complete diagnostic assessment was not performed at today's visit. Patient has diagnoses of PTSD, depression, and generalized anxiety.    Plan:      Patient has upcoming appointment with me tomorrow.

## 2019-04-16 ENCOUNTER — TELEPHONE (OUTPATIENT)
Dept: FAMILY MEDICINE | Facility: CLINIC | Age: 29
End: 2019-04-16

## 2019-04-16 ENCOUNTER — OFFICE VISIT (OUTPATIENT)
Dept: PSYCHOLOGY | Facility: CLINIC | Age: 29
End: 2019-04-16
Payer: COMMERCIAL

## 2019-04-16 ENCOUNTER — DOCUMENTATION ONLY (OUTPATIENT)
Dept: FAMILY MEDICINE | Facility: CLINIC | Age: 29
End: 2019-04-16

## 2019-04-16 DIAGNOSIS — D50.8 IRON DEFICIENCY ANEMIA SECONDARY TO INADEQUATE DIETARY IRON INTAKE: Primary | ICD-10-CM

## 2019-04-16 DIAGNOSIS — F43.10 PTSD (POST-TRAUMATIC STRESS DISORDER): Primary | ICD-10-CM

## 2019-04-16 DIAGNOSIS — F41.1 GAD (GENERALIZED ANXIETY DISORDER): ICD-10-CM

## 2019-04-16 DIAGNOSIS — F33.2 SEVERE EPISODE OF RECURRENT MAJOR DEPRESSIVE DISORDER, WITHOUT PSYCHOTIC FEATURES (H): ICD-10-CM

## 2019-04-16 NOTE — PROGRESS NOTES
Behavioral Health Progress Note    Client Legal Name:   Hayde LUJAN Say        Client Preferred Name: Hayde   Service Type: Individual  Length of Visit: 50 minutes  Attendees:  , child, and for the last 5 minutes, our pharmacist came in to discuss medication changes     Due to patient being non-English speaking/uses sign language, an  was used for this visit. Date and length of interpretation can be found on the scanned  worksheet.       name: Chuck Woods   Agency: [employer]  Language: Maryann   Telephone number: [phone]  Type of interpretation: Face-to-face, spoken      Complexity statement: Due to patient being non-English speaking, an  was used for this visit. An  is used not only to interpret language, since the patient does not speak English, but also to help with the complexity of understandings across cultures, since the patient is not well integrated in the larger [place] culture.     Identifying Information and Presenting Problem:     The patient is a 28 year old Maryann female who is being seen for problematic symptoms of PTSD, depression.     Treatment Objective(s) Addressed in This Session:  Learn more adaptive ways to manage her worries.   Anger management     Progress on / Status of Treatment Objective(s) / Homework:  Minimal progress      PHQ-9 SCORE 9/18/2018 10/25/2018 12/27/2018   PHQ-9 Total Score - - -   PHQ-9 Total Score 17 16 20       BRENDA-7 SCORE 8/29/2017 1/22/2018 12/27/2018   Total Score 15 14 7       Topics Discussed/Interventions Provided:  Patient appeared more alert today. Her primary concerns were related to symptom changes and her medications. Our clinic pharmacist, Sierra Dickson filled patients boxes during our appt. Patient complained to me about feeling more tired over the past week or two, and increased auditory hallucinations. She has visual and auditory hallucinations at night (seeing shadows moving on the floor  and hearing whispering) as well as hearing the same whispering almost daily. The daytime whispering comes and goes. I tried to determine if it was triggered by stressful events. Patient shared that the first and last 2-3 hours of her day are the most stressful where she wants to cry and desires to be left alone. Midday is better because the children are at school. She could not ascertain whether the whispering seems to be worse during her stressful episodes. She noted that her mother's side of the family is 'crazy' and pointed out an aunt who recently cut her own hair in a strange manner and does not interact much when they visit- mostly sings and dances by herself.    Of note, one of patient's children was present today, I believe it was Devin. He was quiet and well behaved. When he took the crayons out, they all spilled and he promptly started putting them back. He had to do this twice because the box bottom fell open. He did not look fearful that he had done something wrong or look to patient for her reaction, he simply put the crayons back both times. I pointed his good behavior out to Hayde. She smiled and appeared shy/proud that I had pointed this out. She then asked why the children could behave so well at school or in public but not at home. We discussed a few reasons for this, children feel more comfortable to fall apart at home; boundaries, rules, consequences, and family dynamics at home influence a child's behavior as well. Hayde indicated understanding     Assessment: The patient appeared to be active and engaged in today's session and was receptive to feedback. Concerns that patient's worsening symptoms are a reflection of worsening psychiatric disorder. However, she also has not been on her medications for the past week, so should reassess at next appointment.      Mental Status: Hayde appeared generally alert and oriented. Dress was casual/traditional and appropriate to the weather and occasion.  Grooming and hygiene were clean. Eye contact was adequate. Speech was of normal volume and rate and was clear, coherent, and relevant. Mood was anxious with congruent affect. Thought processes were relevant, logical and goal-directed. Thought content was WNL with no evidence of psychotic or paranoid features. No evidence of SI/HI or self-harm, intent, or plans. Memory appeared grossly intact. Insight and judgment appeared fair/poor and patient exhibited fair impulse control during the appointment.     Does the patient appear to be at imminent risk of harm to self/others at this time? No    The session was necessary to address worry, depression, PTSD that have been interfering with patient's ability to function at concentrating at work, being able to work with strangers, arguments with her , personal life management, parenting.  Ongoing psychotherapy is necessary to improve functioning with daily activities, provide psychoeducation and provide support.      Diagnosis (DSM-5):  296.33 recurrent severe major depression  308.3 PTSD  300.02 Generalized anxiety disorder        Plan:  1. Follow up in 2 weeks with psychotherapy.  2. Refer to psychiatry Dr. Meier for follow up.  3. After 4/26, send copy of updated DA to [facility] to help start [facility] services process.    NOTE: Treatment plan update due 6/28/19.  Diagnostic assessment update due 4/26/19.

## 2019-04-16 NOTE — TELEPHONE ENCOUNTER
[facility] Family Medicine phone call message- general phone call:    Reason for call: Caller states she would like to speak with someone on the patient team regarding the patients depression. She states the patient was suicidal last night but is feeling better today. Caller feels as if medications are not helping the patient. Please call and advise. Caller will not be available from 4:15-5:15pm and is off work at 5:30pm.     Return call needed: Yes    OK to leave a message on voice mail? Yes    Primary language: Maryann      needed? Yes    Call taken on April 16, 2019 at 1:51 PM by Hallie Weeks

## 2019-04-16 NOTE — PROGRESS NOTES
Concerns today from patient regarding sleepiness and fatigue. Unclear if due to lack of medicines or due to medication side effect. Presuming that due to former as presume patient has been out of medicines for at least 1 week. Last filled 3/28/19 x 1 week.     Due for Hgb for assessment of iron deficiency anemia.     Docusate 200 mg daily  Senna-Docusate 1 tablet daily  Cetirizine 10 mg daily  Montelukast 10 mg daily  Ranitidine 150 mg daily  Venlafaxine 75 mg + 150 mg daily  Prazosin 1 mg daily x 1 week, 2 mg x 1 week, then 3 mg x 1 week  Quetiapine 100 mg x 9 days, then 200 mg x 12 days  Ferrous sulfate x 9 days.    Pillboxes filled x 3 weeks. Due for fill on or before 5/6/19.        Sierra Dickson, PharmD  [facility]  Phone: [phone]  April 16, 2019 at 9:26 AM

## 2019-04-16 NOTE — TELEPHONE ENCOUNTER
"Called Felipe to discuss concerns about patient. Felipe stated she is concerned the current medication regimen of seroquel and effexor is not effective. Patient verbalized to Felipe she was having suicidal ideation, no plan in place. Felipe asked patient if she reached out to her mother, whom patient stated she trusts, but patient stated she did not. Felipe advised patient to reach out to her mother if she has those thoughts. Patient agreeable.     Felipe stated patient also has rodent and insect problems at their home; currently being treated for bed bugs, cockroaches, and mice.     Felipe discussed patient's 's recent hospital stay, patient was not wanting to discuss and redirected conversation.    Felipe also stated she noted a business card from emergency mental health services. Patient stated they did a welfare check on the patient. Felipe stated patient was upset and stated \"I will not let them back in\".     No other questions or concerns from Felipe, wanted patient's team updated on ineffective medications r/t ongoing depression with suicidal ideation. Will route to patient's care team for FYI and further review. Zee PHAM  "

## 2019-04-17 ENCOUNTER — TELEPHONE (OUTPATIENT)
Dept: FAMILY MEDICINE | Facility: CLINIC | Age: 29
End: 2019-04-17

## 2019-04-17 DIAGNOSIS — F33.1 MAJOR DEPRESSIVE DISORDER, RECURRENT EPISODE, MODERATE (H): ICD-10-CM

## 2019-04-17 DIAGNOSIS — F43.10 PTSD (POST-TRAUMATIC STRESS DISORDER): ICD-10-CM

## 2019-04-17 RX ORDER — VENLAFAXINE HYDROCHLORIDE 75 MG/1
CAPSULE, EXTENDED RELEASE ORAL
Qty: 90 CAPSULE | Refills: 1 | Status: SHIPPED | OUTPATIENT
Start: 2019-04-17 | End: 2022-01-25

## 2019-04-17 RX ORDER — VENLAFAXINE HYDROCHLORIDE 150 MG/1
CAPSULE, EXTENDED RELEASE ORAL
Qty: 90 CAPSULE | Refills: 1 | Status: SHIPPED | OUTPATIENT
Start: 2019-04-17 | End: 2022-01-25

## 2019-04-17 NOTE — TELEPHONE ENCOUNTER
Patient has been scheduled for 4/29/19 dual appointment with Dr. Sierra Brown and Dr. Meier @ 9:40. Ride has been scheduled with Emmanuel Stern #[id number] Maryann  has been requested.

## 2019-04-22 ENCOUNTER — OFFICE VISIT (OUTPATIENT)
Dept: PSYCHOLOGY | Facility: CLINIC | Age: 29
End: 2019-04-22
Payer: COMMERCIAL

## 2019-04-22 ENCOUNTER — TELEPHONE (OUTPATIENT)
Dept: FAMILY MEDICINE | Facility: CLINIC | Age: 29
End: 2019-04-22

## 2019-04-22 DIAGNOSIS — F41.1 GAD (GENERALIZED ANXIETY DISORDER): ICD-10-CM

## 2019-04-22 DIAGNOSIS — F43.10 PTSD (POST-TRAUMATIC STRESS DISORDER): ICD-10-CM

## 2019-04-22 DIAGNOSIS — F33.2 SEVERE EPISODE OF RECURRENT MAJOR DEPRESSIVE DISORDER, WITHOUT PSYCHOTIC FEATURES (H): ICD-10-CM

## 2019-04-22 DIAGNOSIS — Z76.89 HEALTH CARE HOME: Primary | ICD-10-CM

## 2019-04-22 NOTE — PROGRESS NOTES
Behavioral Health Progress Note    Client Legal Name:   Hayde LUJAN Say        Client Preferred Name: Hayde   Service Type: Individual  Length of Visit: 50 minutes  Attendees:  , children (Alejo and Devin)     Due to patient being non-English speaking/uses sign language, an  was used for this visit. Date and length of interpretation can be found on the scanned  worksheet.       name: April HERNANDEZ   Agency: [employer]  Language: Maryann   Telephone number: [phone]  Type of interpretation: Face-to-face, spoken      Complexity statement: Due to patient being non-English speaking, an  was used for this visit. An  is used not only to interpret language, since the patient does not speak English, but also to help with the complexity of understandings across cultures, since the patient is not well integrated in the larger [place] culture.     Identifying Information and Presenting Problem:     The patient is a 28 year old Maryann female who is being seen for problematic symptoms of PTSD, depression.     Treatment Objective(s) Addressed in This Session:  Learn more adaptive ways to manage her worries.   Anger management     Progress on / Status of Treatment Objective(s) / Homework:  Minimal progress         PHQ-9 SCORE 9/18/2018 10/25/2018 12/27/2018   PHQ-9 Total Score - - -   PHQ-9 Total Score 17 16 20       BRENDA-7 SCORE 8/29/2017 1/22/2018 12/27/2018   Total Score 15 14 7       Topics Discussed/Interventions Provided:  Patient reports she is still tired and worries that she is so tired she won't be able to wake up when the baby cries. She also gets hungry suddenly, and will immediately feel dizzy and lightheaded. Once she's eaten, she becomes very sleepy and wants to nap. She wonders if this is related to her medications. I questioned her a few different ways, and it seems that despite restarting her medicines that she is hearing and seeing more things than last week.  She has an appt with psychiatry next Monday. She also briefly asked questions about how to lose weight, and I wonder whether she is restricting her diet, which could account for sleepiness. We did not have time to follow up on this.    Patient also mentioned that she has had a [facility] affiliated agency ([facility]- interpretation was unclear despite attempts to clarify) that has been coming 1x/week offering 'help'. She is unclear what they can help her with. She does not have their card on her and they are coming to her home at 1pm today. With Hayde's permission, I wrote a brief introductory letter and had her sign a consent form so this group can reach out either to myself or Nicole Estrella to help coordinate and clarify care.    Assessment: The patient appeared to be active and engaged in today's session and was receptive to feedback. Hayde's needs will be best served once we are able to schedule a team meeting with all her care providers. It is unclear how many of us are involved and what our respective roles are.     Mental Status: Hayde appeared generally alert and oriented. Dress was casual and appropriate to the weather and occasion. Grooming and hygiene were clean. Eye contact was poor. Speech was of normal volume and rate and was clear, coherent, and relevant. Mood was depressed, irritable with congruent affect. Thought processes were relevant, logical and goal-directed. Thought content was WNL with no evidence of psychotic or paranoid features. No evidence of SI/HI or self-harm, intent, or plans. Memory appeared grossly intact. Insight and judgment appeared poor and patient exhibited good impulse control during the appointment.      Does the patient appear to be at imminent risk of harm to self/others at this time? No- but irritability needs to be closely monitored.     The session was necessary to address worry, depression, PTSD that have been interfering with patient's  ability to function at concentrating at work, being able to work with strangers, arguments with her , personal life management, parenting.  Ongoing psychotherapy is necessary to improve functioning with daily activities, provide psychoeducation and provide support.      Diagnosis (DSM-5):  296.33 recurrent severe major depression  308.3 PTSD  300.02 Generalized anxiety disorder        Plan:  1. Follow up in 2 weeks with psychotherapy.  2. Follow up with psychiatry next week.    NOTE: Treatment plan update due 6/28/19.  Diagnostic assessment update due 4/26/19.

## 2019-04-22 NOTE — TELEPHONE ENCOUNTER
Patient reported  transportation did not arrive for today's clinic visit.  Out going call made to Francisco Fournier [phone],  Trip # [id number] to follow up.  Francisco fournier reports the  attempted to contact Hayde but she did not answer her phone, in add he sent a text message letting her know he was there and waited until 8:35 AM.  Maryann ambrocio was available for this visit Claire Atwood.    Francisco Fournier is requesting a call from our clinic for return  [phone].

## 2019-04-22 NOTE — TELEPHONE ENCOUNTER
"[facility] Follow-up    Call initiated by clinic.  Message recorded by Nicole Estrella  Calling for: [facility]  Patient: Hayde LUJAN Say  Phone conversation with: Patient  Patient's Phone number/s: face to face      Major diagnoses and/or issues requiring coordination services  Mental Health    In the past month, how many times have you been to the Emergency Room? 0  In the past month, how many times have you been hospitalized? 0    Summary notes: Patient is not wanting to spend a lot of time talking with me today. Patient has only one questions and that is how am I getting home today. Please see today's phone message.   Patient report she is having a very bad day as the  never picked her up for today's appt and she did not have her requested Qinti  and so the [employer] was used. I discussed with Hayde what Francisco molina had reported with regards to transportation today, her response is she's mad and could not get out and yes, she heard her phone but was not able to answer. Patient reports she is unable to read any text messages.  Patient has requested pancake mix and baby clothes. Patient has been advised that we do not have much for baby clothes and with regards to the food (Pancake) mix it has been explained  That the food shelf service provided from our clinic are strictly donations from [employer] employees and it is meant to be used as a \"filler gap\" from time to time.  Patient laughs and request pancake powder.   Patient is to meet with Elham. I will contact Francisco Molina when Hayde is ready to leave.       Self-management goals:  Patient reports she is tired and angry. Provides no answers or suggestions as to what she would like to set for goals.    Unable to address changes at this visit    Counseling and coordination activities with: PCP, Mental Health Providers, public health nurse and Home care coordinator.   Follow-up date: 5/22/29 or PRN  "

## 2019-04-29 ENCOUNTER — TRANSFERRED RECORDS (OUTPATIENT)
Dept: HEALTH INFORMATION MANAGEMENT | Facility: CLINIC | Age: 29
End: 2019-04-29

## 2019-04-29 ENCOUNTER — TELEPHONE (OUTPATIENT)
Dept: FAMILY MEDICINE | Facility: CLINIC | Age: 29
End: 2019-04-29

## 2019-04-29 ENCOUNTER — TELEPHONE (OUTPATIENT)
Dept: PSYCHOLOGY | Facility: CLINIC | Age: 29
End: 2019-04-29

## 2019-04-29 ENCOUNTER — OFFICE VISIT (OUTPATIENT)
Dept: PSYCHOLOGY | Facility: CLINIC | Age: 29
End: 2019-04-29
Payer: COMMERCIAL

## 2019-04-29 VITALS
WEIGHT: 139 LBS | DIASTOLIC BLOOD PRESSURE: 79 MMHG | HEIGHT: 57 IN | SYSTOLIC BLOOD PRESSURE: 133 MMHG | TEMPERATURE: 98.5 F | RESPIRATION RATE: 20 BRPM | BODY MASS INDEX: 29.99 KG/M2 | OXYGEN SATURATION: 100 % | HEART RATE: 82 BPM

## 2019-04-29 DIAGNOSIS — F33.2 SEVERE EPISODE OF RECURRENT MAJOR DEPRESSIVE DISORDER, WITHOUT PSYCHOTIC FEATURES (H): Primary | ICD-10-CM

## 2019-04-29 ASSESSMENT — MIFFLIN-ST. JEOR: SCORE: 1230.76

## 2019-04-29 NOTE — NURSING NOTE
Due to patient being non-English speaking/uses sign language, an  was used for this visit. Only for face-to-face interpretation by an external agency, date and length of interpretation can be found on the scanned worksheet.      name: Lila [id number]  Agency: [employer]  Language: Maryann   Telephone number: NA  Type of interpretation: Face-to-face, spoken         Due to patient being non-English speaking/uses sign language, an  was used for this visit. Only for face-to-face interpretation by an external agency, date and length of interpretation can be found on the scanned worksheet.     name: Claire Atwood - Arrived around 10:13 AM   Agency: [employer]  Language: Maryann   Telephone number: [phone]  Type of interpretation: Face-to-face, spoken

## 2019-04-29 NOTE — TELEPHONE ENCOUNTER
Out going call made to Isidro Rey) and Monse (Public health nurse)  f/u on clinical concerns 4/29/19 please see Dr. Lees visit. LMTCB to discuss possibly arranging a meeting to discuss the needs of the patient.
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
## 2019-04-29 NOTE — TELEPHONE ENCOUNTER
I spoke with Graciela at [facility] [phone] who called to discuss the services they have been providing Hayde in the home. They were contacted through [facility] who had been contacted by Cynthia, a teacher at Hayde's son, Osiris's school. They have been trying to help her with bed bugs as of recently. We spoke a bit about Hayde's diagnoses, functioning level, and symptoms. I also informed her that Hayde was admitted to [facility] early this afternoon after presenting to clinic with acute psychosis combined with SI/HI. Graciela would like for us to include her in a conference call with Hayde's care coordinator at clinic and her  through Isidro Escobar. They will contact [facility] as well to see if Hayde has been discharged by the time of their appt with her on Wed.

## 2019-04-30 ENCOUNTER — TELEPHONE (OUTPATIENT)
Dept: FAMILY MEDICINE | Facility: CLINIC | Age: 29
End: 2019-04-30

## 2019-04-30 ENCOUNTER — TELEPHONE (OUTPATIENT)
Dept: PSYCHOLOGY | Facility: CLINIC | Age: 29
End: 2019-04-30

## 2019-04-30 NOTE — TELEPHONE ENCOUNTER
Message received requesting patient be scheduled for Hospital follow up for the week of 5/6/19-5/10/19 with Dr. Rosana Rosales 40 min appt , Dr. Elham Mcmahan 20 min appt and Sierra Scott 20 min appt. Message sent to providers for review. Please call Tara [phone] with appt dates, times and locations.

## 2019-04-30 NOTE — TELEPHONE ENCOUNTER
[facility] called to discuss patient's mental state and wellbeing. Mallory Castro is the psychiatric physician assistant in charge of Hayde's care. Patient is denying safety concerns and psychotic symptoms. They plan to start her on risperidone. I helped clarify that we believe current psychotic symptoms are likely secondary to a psychotic disorder. CPS to visit with patient this afternoon. Mallory is unaware of what has happened with Hayde's children since yesterday. Hayde is being held on a 72 hour hold (anticipated discharge Thursday) and Mallory is trying to convince her to stay on a voluntary hold after, but has had a difficult time with rapport. I emphasized that Hayde is a risk to her , self, and children because of her volatile temper and could not assure us safety in the clinic. Mallory is also looking to connect with Hayde [facility] [place]  and [facility] Crisis services.     : Suma Parra.    [phone]

## 2019-05-01 ENCOUNTER — TRANSFERRED RECORDS (OUTPATIENT)
Dept: HEALTH INFORMATION MANAGEMENT | Facility: CLINIC | Age: 29
End: 2019-05-01

## 2019-05-01 ENCOUNTER — TELEPHONE (OUTPATIENT)
Dept: FAMILY MEDICINE | Facility: CLINIC | Age: 29
End: 2019-05-01

## 2019-05-01 NOTE — TELEPHONE ENCOUNTER
[facility] phone call message- general phone call:    Reason for call: Caller states clinic does not want to continue to see patient but is wondering if Dr. Meier is able to continue to see the patient for 1 or 2 more times until ACT Team is set up by the [facility] by their  at the hospital. Please call and advise.     Return call needed: Yes    OK to leave a message on voice mail? Yes    Primary language: Maryann      needed? Yes    Call taken on May 1, 2019 at 9:36 AM by Hallie Weeks

## 2019-05-02 ENCOUNTER — TELEPHONE (OUTPATIENT)
Dept: FAMILY MEDICINE | Facility: CLINIC | Age: 29
End: 2019-05-02

## 2019-05-02 ENCOUNTER — TELEPHONE (OUTPATIENT)
Dept: PSYCHOLOGY | Facility: CLINIC | Age: 29
End: 2019-05-02
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
## 2019-05-02 NOTE — TELEPHONE ENCOUNTER
Incoming records received from [facility] (Social work progress note) Copy given to Elham Mcmahan for review, copy sent to HIM for scan.

## 2019-05-02 NOTE — TELEPHONE ENCOUNTER
Voicemail received from Hayde requesting a return call. Out going call made using a [employer]  ([phone]),  Hayde reports her EBT card has been decreased from $ 200.00 to $100.00 a month. Patient has requested clinic to follow up with regards to decrease.  Out going call made to [facility]  Shantel [phone], VU to discuss.

## 2019-05-03 ENCOUNTER — TELEPHONE (OUTPATIENT)
Dept: FAMILY MEDICINE | Facility: CLINIC | Age: 29
End: 2019-05-03

## 2019-05-03 NOTE — TELEPHONE ENCOUNTER
[facility] phone call message- general phone call:    Reason for call: Caller states she would like to speak with Elham Mcmahan regarding an update on case coordination. Please call and advise.     Return call needed: Yes    OK to leave a message on voice mail? Yes    Primary language: Maryann      needed? Yes    Call taken on May 3, 2019 at 11:35 AM by Hallie Weeks

## 2019-05-03 NOTE — TELEPHONE ENCOUNTER
Return call made to Graciela to f/u on earlier message.  Graciela reports when she arrived at [facility]'s house today she observed cock roaches and bed bugs within the home. Graciela reports the  will be stopping by to assess the building at some point.   Karina MARTINEZ notified as patient has a clinic visit 5/6/2019

## 2019-05-06 ENCOUNTER — TELEPHONE (OUTPATIENT)
Dept: FAMILY MEDICINE | Facility: CLINIC | Age: 29
End: 2019-05-06

## 2019-05-06 ENCOUNTER — OFFICE VISIT (OUTPATIENT)
Dept: PHARMACY | Facility: CLINIC | Age: 29
End: 2019-05-06
Payer: COMMERCIAL

## 2019-05-06 VITALS
SYSTOLIC BLOOD PRESSURE: 119 MMHG | RESPIRATION RATE: 16 BRPM | HEART RATE: 87 BPM | OXYGEN SATURATION: 99 % | TEMPERATURE: 98.3 F | DIASTOLIC BLOOD PRESSURE: 78 MMHG

## 2019-05-06 DIAGNOSIS — R52 PAIN: Primary | ICD-10-CM

## 2019-05-06 DIAGNOSIS — G44.219 EPISODIC TENSION-TYPE HEADACHE, NOT INTRACTABLE: ICD-10-CM

## 2019-05-06 DIAGNOSIS — F33.3 SEVERE EPISODE OF RECURRENT MAJOR DEPRESSIVE DISORDER, WITH PSYCHOTIC FEATURES (H): ICD-10-CM

## 2019-05-06 NOTE — PROGRESS NOTES
S: Seen today after hospital discharge. Presents with medicine boxes filled by hospital staff. Has BID pillboxes but reports taking medicine all at once. Requests refill of Tylenol for headaches and muscle cream. Main concern today for tiredness, tightness in throat/jaw/neck and blurred vision. Despite these has continued to take medicines    O:     Patient Active Problem List   Diagnosis     Health Care Home     Recurrent major depressive disorder (H)     Encounter for supervision of normal pregnancy     Seasonal allergic rhinitis     PTSD (post-traumatic stress disorder)     BRENDA (generalized anxiety disorder)     Other fatigue     Insomnia due to other mental disorder     Major depressive disorder, recurrent episode, moderate (H)         A/P:   - Updated medication list to reflect pillbox contents / medication changes after discharge.  - Refill Tylenol and cream as requested.   - Pillbox due 5/29 - forwarded to care team to assist in coordinating care.   - Symptoms could be related to anticholinergic effects of Risperidone, however low dose. Could continue to improve as therapy fairly new. Encouraged patient to continue medicine until upcoming follow up appointment with Dr. Meier. Patient agreeable.         Options for treatment and/or follow-up care were reviewed with the patient. Hayde was engaged and actively involved in the decision making process. She verbalized understanding of the options discussed and was satisfied with the final plan. Patient was provided with written instructions/medication list via AVS.    Dr. Rosales was provided our recommendations via routed note and Dr. Jessica was available for supervision during this visit and is the authorizing prescriber for this visit through the pharmacist collaborative practice agreement.    Thank you for the opportunity to participate in the care of this patient.  Sierra Scott, Pharm.D.  [facility]: [phone]

## 2019-05-06 NOTE — TELEPHONE ENCOUNTER
Out going call made to follow up with where patient is at 6/6/19 @ 11:42. Patient reports she never received a phone call from [facility].  Contact has been made with dispatch per report,   called patient and did not receive a response. Company confirmed phone number however, it was incorrect. Correct number was given

## 2019-05-06 NOTE — NURSING NOTE
Due to patient being non-English speaking/uses sign language, an  was used for this visit. Only for face-to-face interpretation by an external agency, date and length of interpretation can be found on the scanned worksheet.     name: [employer]  Agency: [employer]  Language: Maryann   Telephone number: [phone]  Type of interpretation: Face-to-face, spoken

## 2019-05-08 ENCOUNTER — MEDICAL CORRESPONDENCE (OUTPATIENT)
Dept: HEALTH INFORMATION MANAGEMENT | Facility: CLINIC | Age: 29
End: 2019-05-08

## 2019-05-08 RX ORDER — RISPERIDONE 2 MG/1
2 TABLET ORAL AT BEDTIME
COMMUNITY
Start: 2019-05-06 | End: 2019-05-23

## 2019-05-08 RX ORDER — MENTHOL 1.4 %
ADHESIVE PATCH, MEDICATED TOPICAL 2 TIMES DAILY PRN
Qty: 113 G | Refills: 1 | Status: SHIPPED | OUTPATIENT
Start: 2019-05-08 | End: 2019-05-24

## 2019-05-10 RX ORDER — ACETAMINOPHEN 325 MG/1
650 TABLET ORAL EVERY 6 HOURS PRN
Qty: 100 TABLET | Refills: 1 | Status: SHIPPED | OUTPATIENT
Start: 2019-05-10 | End: 2019-07-08

## 2019-05-13 ENCOUNTER — OFFICE VISIT (OUTPATIENT)
Dept: PSYCHOLOGY | Facility: CLINIC | Age: 29
End: 2019-05-13
Payer: COMMERCIAL

## 2019-05-13 VITALS
OXYGEN SATURATION: 98 % | TEMPERATURE: 98.2 F | SYSTOLIC BLOOD PRESSURE: 116 MMHG | DIASTOLIC BLOOD PRESSURE: 80 MMHG | HEART RATE: 88 BPM | RESPIRATION RATE: 16 BRPM

## 2019-05-13 DIAGNOSIS — F33.2 SEVERE EPISODE OF RECURRENT MAJOR DEPRESSIVE DISORDER, WITHOUT PSYCHOTIC FEATURES (H): Primary | ICD-10-CM

## 2019-05-13 NOTE — PROGRESS NOTES
"    [facility]  PSYCHIATRY OUTPATIENT CONSULT      Hayde LUJAN Say is a 28 year old female who prefers the name Hayde and pronouns she, her, hers.     Pt seen by:  Dr. Meier and Dr. Jessica   Referred by PCP:  Rosana Rosales  Referral Question:  Make recommendations for psychosis [hallucinations- yes, delusions- yes].  History Provided by:  patient who was a limited historian.  We spent 60 minutes face to face time with the pt, greater than 50% in counseling and care coordination.       DIAGNOSES                                    Psychotic disorder (schizoaffective disorder, depressed type vs. Schizophrenia with MDD)     ASSESSMENT                                    SUMMARY:    Per my initial assessment of patient in January of this year:  Patient describes many years of symptoms suspicious for psychosis - fear that others may be in her home, paranoia, possible auditory hallucinations. While it is unclear the extent to which cultural beliefs may play in these symptoms, her descriptions coupled with the strong family history of psychotic disorder makes a primary psychotic disorder very likely in this patient. Additionally, she describes depressive symptoms that may be part of a schizoaffective disorder vs major depression coupled with schizophrenia.     Patient was seen again two weeks ago, at which time she appeared to be actively psychotic (please refer to note by this writer on 4/29/19), and was sent to [facility] for psychiatric hospitalization. Patient was started on risperidone and ACT services initiated. Patient is being seen today for bridging purposes until ACT team in place.     Patient reports significant reduction in both AH and VH. She also reports feeling \"calmer,\" and less irritable. Other clinic providers have noticed that psychosis has improved. However there is some concern that she remains hostile and irritable towards her children. From reports, this behavior pre-dates psychotic " symptoms. Likely her irritability is a product of poor distress tolerance and limited coping skills - patient likely has some intellectual disability at baseline (reports of failing out of school, etc), as well as significant trauma history that make development of mature coping ability very difficult. Would continue to monitor closely her ability to function as a parent and as an independent adult. It may prove that she needs significant assistance in the near future.    TREATMENT DISCUSSION:   Pharmacotherapy:  Risperidone:  Patient currently on 2 mg at bedtime, and reporting noticeable reduction in psychotic symptoms. Unfortunately she is also experiencing significant sedation. Currently Hayde feels the sedation is tolerable as she is so pleased with the other results. However, if patient continues to experience a high level of sedation in 1-2 weeks, would decrease risperidone dose to 1.5-1.75 mg at bedtime. Defer further medication adjustment to ACT providers.  Effexor:   Currently at max dose. Unclear how effective given recent confounding psychotic symptoms as well as distress related to psychosocial difficulties. Would defer changes to ACT providers.  Prazosin: Currently effective for trauma-related nightmares at current dose.      Drug Interactions- None clinically relevant  Psychotherapy- Continue with current therapist, as well as ACT providers  Follow-up- Patient will transition psychiatric care to ACT team - ROIs signed in clinic today to allow for better care coordination.     RECOMMENDATIONS                                                                                                       1) MEDICATION:  [after today, all med related issues should be directed to PCP]  - Continue risperidone 2 mg at bedtime  - Continue Effexor 225 mg daily  - Continue prazosin 3 mg QHS     2) THERAPY:  Continue with current provider. Additional recommendations may be made by ACT team    3) FOLLOW-UP:  Patient will  "transfer psychiatric care to ACT team, clinic staff currently working on coordinating with providers, patient    4) OTHER:  No Referrals needed    5) CRISIS NUMBERS:   None today. If needed in the future, would give:  [facility] - [phone]  COPE 24/7 [place] Mobile Team for Adults [[phone]];  Child [[phone]]    [facility]is [facility] - [phone]  Urgent Care Adult Mental Health: Drop-in, 24/7 crisis line and [facility] Mobile Team [[phone]]   CRISIS TEXT LINE: Text [phone] from anywhere, anytime, any crisis 24/7;  OR SEE www.crisistextline.org     CHIEF COMPLAINT                                                                                                             \" I feel better \"     HISTORY OF PRESENT ILLNESS                                                                                          Pertinent Background:  See previous notes for background information    Psych critical item history includes suicidal ideation, psychosis [sxs include AH, VH], trauma hx and psych hosp (<3)    Most recent history:  Patient was last seen by this provider 2 weeks ago, at which time she was transferred to [facility] for admission given significantly worsening psychotic symptoms. Please refer to 4/29/19 note for details.  During 3 day hospital stay, patient was cross titrated off of quetiapine to risperidone. It was also determined that patient would benefit from ACT team level of care.  It seems that ACT team has been to home to complete assessment, however it is not clear when she will see psychiatric provider. Patient signed ROIs today to allow clinic to better coordinate.  Hayde reports feeling significant reduction in psychotic symptoms. She is no longer seeing the \"black shadow\" that she had previously, and the whispers have quieted. She feels she is calmer and able to think more clearly.   She appears notably fatigued today, and is frequently yawning. She confirms that she " has experienced increased sedation since initiation of risperidone. She does not wish to decrease as she has found the medication so helpful, however we discussed the possibility of decreasing the dose in 1-2 weeks if sedation has not improved. She is agreeable to this.  There has been some concern for ongoing irritability, and she does get somewhat upset that she is not allowed in the waiting room due to active bed bugs in her home. After a discussion with her other providers, it seems that this behavior is largely independent of psychotic symptoms.    Recent Symptoms:   Depression:  depressed mood, low energy and poor concentration /memory  Elevated:  none  Psychosis:  none    Recent Substance Use  none reported    SUBSTANCE USE HISTORY                                                                See initial consult note    PSYCHIATRIC HISTORY     See initial consult note    SOCIAL/ FAMILY HISTORY                                   [per patient report]                                  See initial consult note    MEDICAL / SURGICAL HISTORY                                   Patient Active Problem List   Diagnosis     Health Care Home     Recurrent major depressive disorder (H)     Encounter for supervision of normal pregnancy     Seasonal allergic rhinitis     PTSD (post-traumatic stress disorder)     BRENDA (generalized anxiety disorder)     Other fatigue     Insomnia due to other mental disorder     Major depressive disorder, recurrent episode, moderate (H)       Past Surgical History:   Procedure Laterality Date     NO HISTORY OF SURGERY         MEDICAL REVIEW OF SYSTEMS                                                                                    A comprehensive review of systems was performed and is negative other than noted in the HPI.       ALLERGY                                Augmentin   MEDICATIONS                                 Current Outpatient Medications   Medication Sig Dispense Refill      acetaminophen (TYLENOL) 325 MG tablet Take 2 tablets (650 mg) by mouth every 6 hours as needed for headaches 100 tablet 1     calcium carbonate (TUMS) 500 MG chewable tablet Take 1 tablet (500 mg) by mouth 2 times daily as needed for heartburn 150 tablet 3     cetirizine (ZYRTEC) 10 MG tablet Take 1 tablet (10 mg) by mouth daily 30 tablet 11     docusate sodium (COLACE) 100 MG tablet Take 2 tablets (200 mg) by mouth daily 60 tablet 3     fluticasone (FLONASE) 50 MCG/ACT nasal spray Spray 1-2 sprays into both nostrils daily as needed for allergies 16 mL 1     ketotifen (ZADITOR) 0.025 % SOLN ophthalmic solution Place 1 drop into both eyes every 12 hours 1 Bottle 3     Menthol-Methyl Salicylate (CALLY NELSON GREASELESS) cream Apply topically 2 times daily as needed (pain) 113 g 1     montelukast (SINGULAIR) 10 MG tablet Take 1 tablet (10 mg) by mouth daily 30 tablet 3     polyethylene glycol (MIRALAX) powder Take 17 g (1 capful) by mouth daily as needed for constipation 510 g 1     prazosin (MINIPRESS) 1 MG capsule Take 3 capsules (3 mg) by mouth At Bedtime 90 capsule 3     ranitidine (ZANTAC) 150 MG tablet Take 1 tablet (150 mg) by mouth At Bedtime 90 tablet 3     risperiDONE (RISPERDAL) 2 MG tablet Take 1 tablet (2 mg) by mouth At Bedtime       venlafaxine (EFFEXOR-XR) 150 MG 24 hr capsule Take 1 capsule by mouth daily (together with 75 mg capsule for total of 225 mg/day) 90 capsule 1     venlafaxine (EFFEXOR-XR) 75 MG 24 hr capsule Take 1 capsule by mouth daily (together with 150 mg capsule for total of 225 mg/day) 90 capsule 1     VITALS   /80   Pulse 88   Temp 98.2  F (36.8  C) (Oral)   Resp 16   SpO2 98%    MENTAL STATUS EXAM                                                             Alertness: drowsy  Appearance: casually groomed  Behavior/Demeanor: cooperative, pleasant and calm, with good  eye contact   Speech: regular rate and rhythm  Language: intact  Psychomotor: normal or unremarkable  Mood:  "description consistent with euthymia, \"better\"  Affect: blunted; was congruent to mood; was not congruent to content  Thought Process/Associations: unremarkable  Thought Content:  Reports none;  Denies suicidal ideation, violent ideation and delusions  Perception:  Reports none;  Denies auditory hallucinations and visual hallucinations  Insight: adequate  Judgment: fair  Cognition: (6) does  appear grossly intact; formal cognitive testing was not done  Gait and Station: unremarkable    LABS and DATA     PHQ9 TODAY = not completed    Recent Labs   Lab Test 02/20/19  1542 12/22/17  0956   CR 0.8 0.7     Recent Labs   Lab Test 02/20/19  1542   AST 23.0   ALT 19.0   ALKPHOS 95.0        STATEMENTS REGARDING TREATMENT RISK and CONSULT PROCESS      TREATMENT RISK STATEMENT:  The risks, benefits, alternatives and potential adverse effects have been explained and are understood by the pt. The pt agrees to the treatment plan with the ability to do so. The pt knows to call the clinic for any problems or to access emergency care if needed.  Medical and substance use concerns are documented above.  Psychotropic drug interaction check was done, including changes made today, and is discussed above.     STATEMENT REGARDING CONSULT:  Intervention decisions emerging from this consult will be either made by the PCP or initiated today in agreement with PCP.  Note, this is a one time consult only.  No psychiatry follow-up will be provided. PCP is encouraged to contact this consultant if future assistance is desired.    COUNSELING AND COORDINATION OF CARE CONSISTED OF:  Diagnosis, impressions, risk and benefits of treatment options, instructions for treatment and follow up and plan for additional supporting services.                                                  RESIDENT PHYSICIAN:     Hardeep  ATTENDING PHYSICIAN:  Dr Hardeep Pace, am a resident physician who saw the pt with Dr. Jessica Family Medicine attending and remotely " discussed the pt with Dr Monge Psychiatry attending.  I, Dr. Monge as the remote attending doctor, have reviewed and edited the documentation recorded by Dr Meier.  The documentation accurately reflects the treatment decisions made by me.   MD Jackie
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
## 2019-05-16 ENCOUNTER — OFFICE VISIT (OUTPATIENT)
Dept: FAMILY MEDICINE | Facility: CLINIC | Age: 29
End: 2019-05-16
Payer: COMMERCIAL

## 2019-05-16 ENCOUNTER — CARE COORDINATION (OUTPATIENT)
Dept: FAMILY MEDICINE | Facility: CLINIC | Age: 29
End: 2019-05-16

## 2019-05-16 ENCOUNTER — OFFICE VISIT (OUTPATIENT)
Dept: PSYCHOLOGY | Facility: CLINIC | Age: 29
End: 2019-05-16
Payer: COMMERCIAL

## 2019-05-16 ENCOUNTER — TELEPHONE (OUTPATIENT)
Dept: FAMILY MEDICINE | Facility: CLINIC | Age: 29
End: 2019-05-16

## 2019-05-16 VITALS
TEMPERATURE: 98.9 F | DIASTOLIC BLOOD PRESSURE: 65 MMHG | HEART RATE: 92 BPM | OXYGEN SATURATION: 100 % | SYSTOLIC BLOOD PRESSURE: 93 MMHG | RESPIRATION RATE: 14 BRPM

## 2019-05-16 DIAGNOSIS — F41.1 GAD (GENERALIZED ANXIETY DISORDER): ICD-10-CM

## 2019-05-16 DIAGNOSIS — F43.10 PTSD (POST-TRAUMATIC STRESS DISORDER): Primary | ICD-10-CM

## 2019-05-16 DIAGNOSIS — G44.209 TENSION HEADACHE: ICD-10-CM

## 2019-05-16 DIAGNOSIS — G24.9 DYSTONIC MOVEMENTS: ICD-10-CM

## 2019-05-16 DIAGNOSIS — F33.3 SEVERE EPISODE OF RECURRENT MAJOR DEPRESSIVE DISORDER, WITH PSYCHOTIC FEATURES (H): Primary | ICD-10-CM

## 2019-05-16 DIAGNOSIS — F33.1 MAJOR DEPRESSIVE DISORDER, RECURRENT EPISODE, MODERATE (H): ICD-10-CM

## 2019-05-16 NOTE — PROGRESS NOTES
Pt had a question about immigration. Pt met with the [employer] about gaining [place] citizenship, but was unsure of what the next steps were. SW read her letter and reported pt had to attend a hearing on 5/24/2019 in [place] and bring necessary paperwork. SW also provided pt with info about her PCP.
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
## 2019-05-16 NOTE — NURSING NOTE
Due to patient being non-English speaking/uses sign language, an  was used for this visit. Only for face-to-face interpretation by an external agency, date and length of interpretation can be found on the scanned worksheet.     name: Shine Archuleta  Agency: [employer]  Language: Maryann   Telephone number: [phone]  Type of interpretation: Face-to-face, spoken

## 2019-05-16 NOTE — PROGRESS NOTES
Primary Care Behavioral Health Consult Note    Meeting lasted: 40 minutes  Others present: PCP,     Identifying Information and Presenting Problem:    Dr. Rosales requested behavioral health consultation for this patient regarding follow up from psychiatric hospitalization.  The patient is a 28 year old Maryann individual that agreed to be seen by behavioral health today.    Topics Discussed/Interventions Provided:       Child protection: Hayde reports she received a call from CPS this morning stating they were going to take her children. We tried in numerous ways to understand what exactly would have occurred on that phone call, but Hayde reiterated someone was taking her children. Lots of discussion about how we plan to coordinate care with all agencies involved in patient's care and the different ways that CPS can become involved in their care and safety. Patient was emphatic, crying, stating that no one could take her children, especially her baby and stating that she was a good mother. Dr. Rosales and I reiterated that we needed more information and acknowledged the steps Hayde has taken to care for herself (medication compliance, attends appointments).    Hallucinations: not having many. Feels less irritable and less irate with her . No SI/HI.    Some complaining again that her oldest son goes on his bike and does not tell him where they are going.    Concerns that she received medication for Osiris and is not sure how to get it filled. We tried to problem solve with having a brother help, but she became distracted. Also reports there is no ride for Osiris's upcoming mental health appt. We have received communication from outside sources that a ride was scheduled, but Hayde believes it is not.      Assessment:     Mental Status: Hayde appeared generally alert and oriented. Dress was casual and appropriate to the weather and occasion. Grooming and hygiene were clean. Eye contact was variable  (sometimes intense, other times, eyes closed). Speech was of variable volume (yelling, crying, raised voice) and rate and was clear, coherent, and relevant. Mood was angry, upset with congruent affect. Thought processes were relevant sometimes-other times tangential, impulsive, logical and goal-directed. Thought content was WNL with no evidence of psychotic or paranoid features. No evidence of SI/HI or self-harm, intent, or plans. Memory appeared grossly intact. Insight and judgment appeared poor and patient exhibited fair/poor impulse control during the appointment.     PHQ-9 SCORE 9/18/2018 10/25/2018 12/27/2018   PHQ-9 Total Score - - -   PHQ-9 Total Score 17 16 20       BRENDA-7 SCORE 8/29/2017 1/22/2018 12/27/2018   Total Score 15 14 7       Diagnostic Considerations:      A complete diagnostic assessment was not performed at today's visit. Standing diagnoses of PTSD, MDD, BRENDA and a psychotic disorder that we aren't clear of it's exact ddx (schizophrenia vs schizoaffective).    Plan:      Connect with ACT team, CPS before moving forward to determine their plan and how to move forward with care. Patient needs psychiatry and advanced/more intensive therapy and family therapy, in home work etc.     We will coordinate a team meeting with Hayde's other care providers, agencies to discuss a general plan of care and steps to move forward.

## 2019-05-17 NOTE — PROGRESS NOTES
SW did not meet with this pt yesterday about immigration. SW met with another pt and created encounter in this chart by accident.

## 2019-05-22 ENCOUNTER — TELEPHONE (OUTPATIENT)
Dept: FAMILY MEDICINE | Facility: CLINIC | Age: 29
End: 2019-05-22

## 2019-05-22 NOTE — TELEPHONE ENCOUNTER
Voicemail received from 5/21/2019 4:07 pm from Monse Huang (Public Health nurse) with update.   Message reports both CPS (Leanne Kwon) and herself met with Hayde 5/21/2019 and it has been determined that DAVID Sanders is in need of urgent mental health attention at this time due to statements of killing himself, crying and wandering off. Leanne Kwon is currently working on scheduling DAVID Calvin with [employer].    CPS worker is also working on finding respite foster care.    Return call made to Monse for f/u LMTCB.

## 2019-05-23 ENCOUNTER — OFFICE VISIT (OUTPATIENT)
Dept: PHARMACY | Facility: CLINIC | Age: 29
End: 2019-05-23
Payer: COMMERCIAL

## 2019-05-23 VITALS
TEMPERATURE: 98.3 F | SYSTOLIC BLOOD PRESSURE: 96 MMHG | OXYGEN SATURATION: 100 % | HEART RATE: 119 BPM | DIASTOLIC BLOOD PRESSURE: 68 MMHG | RESPIRATION RATE: 14 BRPM

## 2019-05-23 DIAGNOSIS — F33.3 SEVERE EPISODE OF RECURRENT MAJOR DEPRESSIVE DISORDER, WITH PSYCHOTIC FEATURES (H): Primary | ICD-10-CM

## 2019-05-23 DIAGNOSIS — R52 PAIN: ICD-10-CM

## 2019-05-23 DIAGNOSIS — Z71.89 ENCOUNTER FOR MEDICATION REVIEW AND COUNSELING: ICD-10-CM

## 2019-05-23 DIAGNOSIS — R12 HEARTBURN: ICD-10-CM

## 2019-05-23 NOTE — Clinical Note
Despite all previous visits. She seemed in good spirits. Thought Risperidone was helpful, didn't have continued concerns for adverse effects. I kept the dose the same. I recently learned that scheduled H2RA are not ideal as they loose their efficacy, I was thinking of changing her to PRN from scheduled Ranitidine. Also, she wants a pain cream for her back. Previously had benefit from icyhot, not covered. Can you help me understand what type of pain she is experiencing so we can see what other options available? Or I will probably just buy her some icyhot..

## 2019-05-23 NOTE — NURSING NOTE
Due to patient being non-English speaking/uses sign language, an  was used for this visit. Only for face-to-face interpretation by an external agency, date and length of interpretation can be found on the scanned worksheet.     name: [employer]  Agency: [employer]  Language: Maryann   Telephone number: [phone]  Type of interpretation: Face-to-face, spoken

## 2019-05-23 NOTE — TELEPHONE ENCOUNTER
Out going call made to Leanne to f/u and confirm information received from Public Health nurse Monse Huang 5/22/19. Patient has clinic visit appointment today.  LMTCB

## 2019-05-23 NOTE — PROGRESS NOTES
Assessment and Plan     (F33.3) Severe episode of recurrent major depressive disorder, with psychotic features (H)  (primary encounter diagnosis)  Comment: Previous encounters raised concern for adverse effects likely attributable to Risperdone. Per patient report, improved! No continued need for dose adjustment. Lack of refill of Risperidone today, limit pillbox fill possible.  Plan: risperiDONE (RISPERDAL) 2 MG tablet - refilled today per CPA.     (Z71.89) Encounter for medication review and counseling  Comment: No concerns for missed doses.   Plan: Pillbox filled for 10 days, limited due to lack of Risperidone refill.    # GERD: H2RA with risk of tachyphylaxis. Decreased efficacy over time. Would prefer to use PRN instead of scheduled.   Plan: Defer to future visit to discuss with patient in greater detail. Continue current dose presently.     # Pain cream: Do not think medication or alternative / similar medicine on [employer] formulary.   Plan: Will work with pharmacy to determine options.     Follow up: On or before 5/31    Options for treatment and/or follow-up care were reviewed with the patient. Hayde was engaged and actively involved in the decision making process. She verbalized understanding of the options discussed and was satisfied with the final plan. Patient was provided with written instructions/medication list via AVS.    Dr. Rosales was provided our recommendations via routed note and Dr. Keller was available for supervision during this visit and is the authorizing prescriber for this visit through the pharmacist collaborative practice agreement.    Thank you for the opportunity to participate in the care of this patient.  Sierra Dickson, PharmD  [facility]  Phone: [phone]  May 23, 2019 at 1:06 PM    Current Outpatient Medications   Medication Sig Dispense Refill     cetirizine (ZYRTEC) 10 MG tablet Take 1 tablet (10 mg) by mouth daily 30 tablet 11      docusate sodium (COLACE) 100 MG tablet Take 2 tablets (200 mg) by mouth daily 60 tablet 3     montelukast (SINGULAIR) 10 MG tablet Take 1 tablet (10 mg) by mouth daily 30 tablet 3     prazosin (MINIPRESS) 1 MG capsule Take 3 capsules (3 mg) by mouth At Bedtime 90 capsule 3     ranitidine (ZANTAC) 150 MG tablet Take 1 tablet (150 mg) by mouth At Bedtime 90 tablet 3     risperiDONE (RISPERDAL) 2 MG tablet Take 1 tablet (2 mg) by mouth At Bedtime 30 tablet 3     venlafaxine (EFFEXOR-XR) 150 MG 24 hr capsule Take 1 capsule by mouth daily (together with 75 mg capsule for total of 225 mg/day) 90 capsule 1     venlafaxine (EFFEXOR-XR) 75 MG 24 hr capsule Take 1 capsule by mouth daily (together with 150 mg capsule for total of 225 mg/day) 90 capsule 1     acetaminophen (TYLENOL) 325 MG tablet Take 2 tablets (650 mg) by mouth every 6 hours as needed for headaches 100 tablet 1     calcium carbonate (TUMS) 500 MG chewable tablet Take 1 tablet (500 mg) by mouth 2 times daily as needed for heartburn 150 tablet 3     fluticasone (FLONASE) 50 MCG/ACT nasal spray Spray 1-2 sprays into both nostrils daily as needed for allergies 16 mL 1     ketotifen (ZADITOR) 0.025 % SOLN ophthalmic solution Place 1 drop into both eyes every 12 hours 1 Bottle 3     Menthol-Methyl Salicylate (CALLY NELSON GREASELESS) cream Apply topically 2 times daily as needed (pain) 113 g 1     polyethylene glycol (MIRALAX) powder Take 17 g (1 capful) by mouth daily as needed for constipation 510 g 1            HPI:       Hayde Macias is a 28 year old female referred by Dr. Rosales for medication management and counseling. Patient seen today with the assistance of Maryann olea.    Request for drink packets. Questions today how to loose weight.  States that she believes medicine change (new Risperidone) is helping her. Specifically she offers that it decreases her anger toward other people. States that side effects that she was experiencing previously have improved,  I.e. Dry mouth, stiff jaw, sedation. Does not wish for any change at this time.   Continues to have headaches. Taking Acetaminophen.   Main concern today for lack of availability of cream for her back - like Icy Hot.         PMHX:     Patient Active Problem List   Diagnosis     Health Care Home     Recurrent major depressive disorder (H)     Encounter for supervision of normal pregnancy     Seasonal allergic rhinitis     PTSD (post-traumatic stress disorder)     BRENDA (generalized anxiety disorder)     Other fatigue     Insomnia due to other mental disorder     Major depressive disorder, recurrent episode, moderate (H)     Allergies   Allergen Reactions     Augmentin Rash            Objective     Vitals:    05/23/19 1303   BP: 96/68   Pulse: 119   Resp: 14   Temp: 98.3  F (36.8  C)   TempSrc: Oral   SpO2: 100%     There is no height or weight on file to calculate BMI.        P Pharmacist Documentation     Drug therapy problems identified:  Medical Condition 1: GERD, Goals of therapy: Not at goal, Drug Class: H2- blocker,  , Efficacy: Partially effective, Intervention: Change dose, Verification: Deferred to Future Visit  Medical Condition 2: Other psychiatric (i.e. bipolar, schizophrenia), Goals of therapy 2: Not at goal, Drug Class 2: Antipsychotic, Convenience 2: Other, Intervention 2: Refill, Verification 2: Patient Agreed - CPA  Medical Condition 3: Pain (i.e. somatic, visceral, neuropathic), Goals of therapy 3: Not at goal, Drug Class 3: Analgesic, OTC, Convenience 3: Patient cannot afford, Intervention 3: Change drug, Verification 3: Deferred to future visit      Relevant medical devices: n/a    # of medical conditions addressed: 3  # of medications addressed: 8  # of DTP identified: 3  Time spent: 30 minutes  Level of service per [place] DHS guidelines: 4

## 2019-05-24 ENCOUNTER — TELEPHONE (OUTPATIENT)
Dept: FAMILY MEDICINE | Facility: CLINIC | Age: 29
End: 2019-05-24

## 2019-05-24 RX ORDER — RISPERIDONE 2 MG/1
2 TABLET ORAL AT BEDTIME
Qty: 30 TABLET | Refills: 3 | Status: SHIPPED | OUTPATIENT
Start: 2019-05-24 | End: 2022-01-25
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
## 2019-05-28 ENCOUNTER — TELEPHONE (OUTPATIENT)
Dept: FAMILY MEDICINE | Facility: CLINIC | Age: 29
End: 2019-05-28

## 2019-05-28 NOTE — TELEPHONE ENCOUNTER
Incoming call received from Monse Huang Public Health Nurse with concerns of the entire family having bed bug bites over the entire body.  Please call Hayde to advise what medication can be used for these bites.

## 2019-05-28 NOTE — TELEPHONE ENCOUNTER
Called Monse to discuss, requested medication to assist with intense itching for patient and patient's family. Will route encounters for each family member to provider. Zee PHAM

## 2019-05-29 ENCOUNTER — OFFICE VISIT (OUTPATIENT)
Dept: PHARMACY | Facility: CLINIC | Age: 29
End: 2019-05-29
Payer: COMMERCIAL

## 2019-05-29 ENCOUNTER — TELEPHONE (OUTPATIENT)
Dept: FAMILY MEDICINE | Facility: CLINIC | Age: 29
End: 2019-05-29

## 2019-05-29 DIAGNOSIS — F33.3 SEVERE EPISODE OF RECURRENT MAJOR DEPRESSIVE DISORDER, WITH PSYCHOTIC FEATURES (H): Primary | ICD-10-CM

## 2019-05-29 DIAGNOSIS — G44.209 TENSION HEADACHE: ICD-10-CM

## 2019-05-29 NOTE — TELEPHONE ENCOUNTER
Risperdal 2 mg - 1 tablet needs to be added to all 5 pillboxes (green, pink x2, clear x2). Green pillbox slots for Sunday, Monday will be empty by Friday.

## 2019-05-29 NOTE — TELEPHONE ENCOUNTER
Out going call made to the following.  Brooklyndeena Rolon [facility]: Cell [phone]  Monse Huang [facility]: Cell [phone]  Graciela Jane [facility]: Cell [phone]  Hayde's Medication boxes were to be filled during clinic visit 5/29/19 but with the holiday week medication delivery had been delayed.  Detailed messages left on all cell phones requesting a return call to discuss adding them to [facility] medication boxes.  Risperidone 2 mg: take 1 tab by mouth at bedtime

## 2019-05-29 NOTE — PROGRESS NOTES
Assessment and Plan     (F33.3) Severe episode of recurrent major depressive disorder, with psychotic features (H)  (primary encounter diagnosis)  Comment: Unfortunate that medication not yet delivered likely delayed due to holiday.   Plan: Will work with patient's team to get out to home to add Risperidone to patient's pillbox. All other medicines available to fill pillboxes x 35 days.     (G44.209) Tension headache  Comment: Misunderstanding of previous directions.  Plan: Counseled patient that safe to take 3 tablets = 975 mg per dose. Patient agreeable        Follow up: 6/19/19     Options for treatment and/or follow-up care were reviewed with the patient. Hayde was engaged and actively involved in the decision making process. She verbalized understanding of the options discussed and was satisfied with the final plan. Patient was provided with written instructions/medication list via AVS.    Dr. Rosales was provided our recommendations via routed note and Dr. Chávez was available for supervision during this visit and is the authorizing prescriber for this visit through the pharmacist collaborative practice agreement.    Thank you for the opportunity to participate in the care of this patient.  Sierra Dickson, PharmD  [facility]  Phone: [phone]  May 29, 2019 at 1:05 PM    Current Outpatient Medications   Medication Sig Dispense Refill     acetaminophen (TYLENOL) 325 MG tablet Take 2 tablets (650 mg) by mouth every 6 hours as needed for headaches 100 tablet 1     calcium carbonate (TUMS) 500 MG chewable tablet Take 1 tablet (500 mg) by mouth 2 times daily as needed for heartburn 150 tablet 3     cetirizine (ZYRTEC) 10 MG tablet Take 1 tablet (10 mg) by mouth daily 30 tablet 11     docusate sodium (COLACE) 100 MG tablet Take 2 tablets (200 mg) by mouth daily 60 tablet 3     fluticasone (FLONASE) 50 MCG/ACT nasal spray Spray 1-2 sprays into both nostrils daily as needed for  allergies 16 mL 1     ketotifen (ZADITOR) 0.025 % SOLN ophthalmic solution Place 1 drop into both eyes every 12 hours 1 Bottle 3     montelukast (SINGULAIR) 10 MG tablet Take 1 tablet (10 mg) by mouth daily 30 tablet 3     polyethylene glycol (MIRALAX) powder Take 17 g (1 capful) by mouth daily as needed for constipation 510 g 1     prazosin (MINIPRESS) 1 MG capsule Take 3 capsules (3 mg) by mouth At Bedtime 90 capsule 3     ranitidine (ZANTAC) 150 MG tablet Take 1 tablet (150 mg) by mouth At Bedtime 90 tablet 3     risperiDONE (RISPERDAL) 2 MG tablet Take 1 tablet (2 mg) by mouth At Bedtime 30 tablet 3     venlafaxine (EFFEXOR-XR) 150 MG 24 hr capsule Take 1 capsule by mouth daily (together with 75 mg capsule for total of 225 mg/day) 90 capsule 1     venlafaxine (EFFEXOR-XR) 75 MG 24 hr capsule Take 1 capsule by mouth daily (together with 150 mg capsule for total of 225 mg/day) 90 capsule 1            HPI:       Hayde Macias is a 28 year old female referred by Dr. Rosales for medication therapy management.  Patient seen today with the assistance of Maryann olea.    Hayde bustos main concern today is disciplining children and children fighting.   Risperidone not yet delivered from pharmacy.   All other medicines brought to clinic.   Continued concerns for headaches. Scared to take 3 Acetaminophen. States that friend  after having the same symptoms she is having - pulsing on both sides of her head.   Continues to request cream for back. Insurance issue persists. No available agent covered on formulary.           PMHX:     Patient Active Problem List   Diagnosis     Health Care Home     Recurrent major depressive disorder (H)     Encounter for supervision of normal pregnancy     Seasonal allergic rhinitis     PTSD (post-traumatic stress disorder)     BRENDA (generalized anxiety disorder)     Other fatigue     Insomnia due to other mental disorder     Major depressive disorder, recurrent episode, moderate (H)      Allergies   Allergen Reactions     Augmentin Rash            Objective     There were no vitals filed for this visit.  There is no height or weight on file to calculate BMI.        P Pharmacist Documentation     Drug therapy problems identified:  Medical Condition 1: HA/Migraine, Goals of therapy: Not at goal, Drug Class: Analgesic, OTC, Convenience: Patient misunderstanding, Intervention: Educate patient, Verification: Patient Agreed - Compliance/Education  Medical Condition 2: Other psychiatric (i.e. bipolar, schizophrenia), Goals of therapy 2: Not at goal, Drug Class 2: Antipsychotic, Convenience 2: Pt unable to administer correctly, Intervention 2: Add device or process to assist use, Verification 2: Patient Agreed - CPA    Relevant medical devices: n/a    # of medical conditions addressed: 2  # of medications addressed: 9  # of DTP identified: 2  Time spent: 30 minutes  Level of service per [place] DHS guidelines: 3         Documentation   Due to patient being non-English speaking/uses sign language, an  was used for this visit. Only for face-to-face interpretation by an external agency, date and length of interpretation can be found on the scanned worksheet.     name: LIV VALENCIAH  Agency: [employer]  Language: Maryann   Telephone number: [email or web address] [phone]  Type of interpretation: Face-to-face, spoken

## 2019-05-30 ENCOUNTER — TELEPHONE (OUTPATIENT)
Dept: FAMILY MEDICINE | Facility: CLINIC | Age: 29
End: 2019-05-30

## 2019-05-30 NOTE — TELEPHONE ENCOUNTER
F/U outgoing call made to Monse Huang to discuss medication boxes.   Monse reports she will swing out to [facility]'s home today to fill the medication boxes as follows:  Risperdal 2 mg tablet-Please add 1 tablet needs to be added to all 5 pillboxes (green, pink x2, clear x2). Directions provided by Dr. Sierra Pina.     Monse verbalized complete understanding and is agreeable with plan.

## 2019-05-30 NOTE — TELEPHONE ENCOUNTER
Incoming call received from Monse to notify clinic the medication Risperdal 2 mg tab had not delivered.     Out going call made to [facility] [phone] to follow up. Per pharmacist the Risperdal 2 mg tablet was not submitted for refill. I reviewed  Escribe confirmation of 5/24/2019 @ 8:21 pm. Pharmacist reports script was not received, they can fill the prescription today but won't be delivered until tomorrow.

## 2019-05-31 ENCOUNTER — OFFICE VISIT (OUTPATIENT)
Dept: PHARMACY | Facility: CLINIC | Age: 29
End: 2019-05-31
Payer: COMMERCIAL

## 2019-05-31 DIAGNOSIS — F33.3 SEVERE EPISODE OF RECURRENT MAJOR DEPRESSIVE DISORDER, WITH PSYCHOTIC FEATURES (H): Primary | ICD-10-CM

## 2019-06-05 ENCOUNTER — TELEPHONE (OUTPATIENT)
Dept: FAMILY MEDICINE | Facility: CLINIC | Age: 29
End: 2019-06-05

## 2019-06-05 NOTE — TELEPHONE ENCOUNTER
Voicemail 6/4/2019 @ 3:56 pm received from Public Health Nurse Monse Huang to inform clinic patient was hospitalized over the weekend with mental health concerns.     Return call made to Monse to obtain additional information. Per report Hayde was admitted over the weekend with fears she may hurt her children. Monse reports Hayde has since been released and believes the children may be with their grandmother but has not been able to confirm this information.  Monse notified of DAVID Pena's appt for today.

## 2019-06-06 NOTE — PROGRESS NOTES
S: Seen today in patient's home. Risperidone delivered to patient's home prior to arrival. Numerous questions about other medicines delivered to her home for her boys. Seen today with use of telephone .     O:     Patient Active Problem List   Diagnosis     Health Care Home     Recurrent major depressive disorder (H)     Encounter for supervision of normal pregnancy     Seasonal allergic rhinitis     PTSD (post-traumatic stress disorder)     BRENDA (generalized anxiety disorder)     Other fatigue     Insomnia due to other mental disorder     Major depressive disorder, recurrent episode, moderate (H)         A/P: Added 1 tablet per day of Risperidone 2 mg to patient's pillbox. Filled x4 weeks. RTC 6/19 for further pillbox fills.       Options for treatment and/or follow-up care were reviewed with the patient. Hayde was engaged and actively involved in the decision making process. She verbalized understanding of the options discussed and was satisfied with the final plan.     Dr. Rosales was provided our recommendations via routed note and Dr. Alvarez was available for supervision during this visit and is the authorizing prescriber for this visit through the pharmacist collaborative practice agreement.    Thank you for the opportunity to participate in the care of this patient.  Sierra Dickson, PharmD  [facility]  Phone: [phone]  June 6, 2019 at 10:07 AM

## 2019-06-10 ENCOUNTER — MEDICAL CORRESPONDENCE (OUTPATIENT)
Dept: HEALTH INFORMATION MANAGEMENT | Facility: CLINIC | Age: 29
End: 2019-06-10

## 2019-06-10 DIAGNOSIS — K59.00 CONSTIPATION, UNSPECIFIED CONSTIPATION TYPE: ICD-10-CM

## 2019-06-10 DIAGNOSIS — J30.2 ACUTE SEASONAL ALLERGIC RHINITIS: ICD-10-CM

## 2019-06-10 RX ORDER — ASPIRIN 81 MG
200 TABLET, DELAYED RELEASE (ENTERIC COATED) ORAL DAILY
Qty: 90 TABLET | Refills: 3 | Status: SHIPPED | OUTPATIENT
Start: 2019-06-10 | End: 2020-01-24

## 2019-06-10 RX ORDER — MONTELUKAST SODIUM 10 MG/1
10 TABLET ORAL DAILY
Qty: 90 TABLET | Refills: 3 | Status: SHIPPED | OUTPATIENT
Start: 2019-06-10 | End: 2020-07-14

## 2019-06-12 ENCOUNTER — TELEPHONE (OUTPATIENT)
Dept: FAMILY MEDICINE | Facility: CLINIC | Age: 29
End: 2019-06-12

## 2019-06-12 NOTE — TELEPHONE ENCOUNTER
Up date information provided by CPS Worker Leanne LARRY  Currently Hayde has been scheduled with [facility] for the following dates.  July 09, 2019 July 16, 2019 July 23, 2019    Hayde and her children will be touring the [employer] 6/13/2019.  Family has been placed on section 8 housing as of today.

## 2019-06-18 NOTE — PROGRESS NOTES
Subjective:   Hayde Macias is a 28 year old year old female who presents to clinic today for the following health issues:    Chief Complaint   Patient presents with     Follow Up     mood     Medication Reconciliation     seeing candido     Back pain:  Achy, burning pain in low back for years. Wears belly band for pain with little improvement. Tylenol not helping. No radiation into legs.     GERD:  Has been waking up in the morning with nausea and a bad taste in her mouth. Occasional epigastric pain. No known relationship with foods. History of h pylori.     A eÃ‡ift  was used for this visit.         PMHX:   PAST MEDICAL HISTORY:  Patient Active Problem List   Diagnosis     Health Care Home     Recurrent major depressive disorder (H)     Encounter for supervision of normal pregnancy     Seasonal allergic rhinitis     PTSD (post-traumatic stress disorder)     BRENDA (generalized anxiety disorder)     Other fatigue     Insomnia due to other mental disorder     Major depressive disorder, recurrent episode, moderate (H)     CURRENT MEDICATIONS:  Current Outpatient Medications   Medication Sig Dispense Refill     acetaminophen (TYLENOL) 325 MG tablet Take 2 tablets (650 mg) by mouth every 6 hours as needed for headaches 100 tablet 1     calcium carbonate (TUMS) 500 MG chewable tablet Take 1 tablet (500 mg) by mouth 2 times daily as needed for heartburn 150 tablet 3     cetirizine (ZYRTEC) 10 MG tablet Take 1 tablet (10 mg) by mouth daily 30 tablet 11     docusate sodium (COLACE) 100 MG tablet Take 2 tablets (200 mg) by mouth daily 90 tablet 3     fluticasone (FLONASE) 50 MCG/ACT nasal spray Spray 1-2 sprays into both nostrils daily as needed for allergies 16 mL 1     ibuprofen (ADVIL/MOTRIN) 600 MG tablet Take 1 tablet (600 mg) by mouth daily as needed (back pain)       ketotifen (ZADITOR) 0.025 % SOLN ophthalmic solution Place 1 drop into both eyes every 12 hours 1 Bottle 3     montelukast (SINGULAIR) 10  MG tablet Take 1 tablet (10 mg) by mouth daily 90 tablet 3     polyethylene glycol (MIRALAX) powder Take 17 g (1 capful) by mouth daily as needed for constipation 510 g 1     prazosin (MINIPRESS) 1 MG capsule Take 3 capsules (3 mg) by mouth At Bedtime 90 capsule 3     ranitidine (ZANTAC) 150 MG tablet Take 1 tablet (150 mg) by mouth At Bedtime 90 tablet 3     risperiDONE (RISPERDAL) 2 MG tablet Take 1 tablet (2 mg) by mouth At Bedtime 30 tablet 3     SENNA-docusate sodium (SENNA S) 8.6-50 MG tablet Take 1 tablet by mouth At Bedtime       venlafaxine (EFFEXOR-XR) 150 MG 24 hr capsule Take 1 capsule by mouth daily (together with 75 mg capsule for total of 225 mg/day) 90 capsule 1     venlafaxine (EFFEXOR-XR) 75 MG 24 hr capsule Take 1 capsule by mouth daily (together with 150 mg capsule for total of 225 mg/day) 90 capsule 1     ALLERGIES:     Allergies   Allergen Reactions     Augmentin Rash            Review of Systems:    ROS: 10 point ROS neg other than the symptoms noted above in the HPI.         Objective:     Vitals:    06/19/19 0959   BP: 98/64   Pulse: 87   Resp: 18   Temp: 98.1  F (36.7  C)   TempSrc: Oral   SpO2: 96%     There is no height or weight on file to calculate BMI.  No results found for this or any previous visit (from the past 24 hour(s)).    General: Alert, standing, walking around room  Pulm: CTA BL, no tachypnea  CV: RRR, no murmur  Psych: intermittent eye contact, answers questions appropriately, normal affect but occasionally responds with frustration and repeats statements/questions  MSK: belly band in place, nontender spinous process without step offs, hypertonic paralumbar muscles    Assessment and Plan:   1. Epigastric pain  History of treated H. Pylori gastritis in 8/2017, but without confirmed iradication. Will retest to ensure she is being properly treated, especially given her current symptoms. If negative, could consider changing randitine to PPI given decreased effectiveness of H2  blockers over time.   [facility]    2. Strain of lumbar region, initial encounter  Paraspinal muscle hypertonicity, increased since birth of last child. Patient has ibuprofen available but has not used this. Discussed appropriate ibuprofen use with pharmacist, Sierra. If no improvement would change to BID naproxen PRN.     Follow up: 2 weeks  Options for treatment and follow-up care were reviewed with the patient and/or guardian. Hayde TAI Say and/or guardian engaged in the decision making process and verbalized understanding of the options discussed and agreed with the final plan.    Rosana Rosales DO  [facility] Family [facility] Resident    Precepted with: Juan Alberto Chávez MD

## 2019-06-19 ENCOUNTER — OFFICE VISIT (OUTPATIENT)
Dept: FAMILY MEDICINE | Facility: CLINIC | Age: 29
End: 2019-06-19
Payer: COMMERCIAL

## 2019-06-19 ENCOUNTER — TELEPHONE (OUTPATIENT)
Dept: FAMILY MEDICINE | Facility: CLINIC | Age: 29
End: 2019-06-19

## 2019-06-19 ENCOUNTER — OFFICE VISIT (OUTPATIENT)
Dept: PHARMACY | Facility: CLINIC | Age: 29
End: 2019-06-19
Payer: COMMERCIAL

## 2019-06-19 VITALS
TEMPERATURE: 98.1 F | OXYGEN SATURATION: 96 % | RESPIRATION RATE: 18 BRPM | DIASTOLIC BLOOD PRESSURE: 64 MMHG | SYSTOLIC BLOOD PRESSURE: 98 MMHG | HEART RATE: 87 BPM

## 2019-06-19 DIAGNOSIS — S39.012A STRAIN OF LUMBAR REGION, INITIAL ENCOUNTER: ICD-10-CM

## 2019-06-19 DIAGNOSIS — M54.9 BACK PAIN: ICD-10-CM

## 2019-06-19 DIAGNOSIS — Z71.89 ENCOUNTER FOR MEDICATION REVIEW AND COUNSELING: Primary | ICD-10-CM

## 2019-06-19 DIAGNOSIS — G44.219 EPISODIC TENSION-TYPE HEADACHE, NOT INTRACTABLE: ICD-10-CM

## 2019-06-19 DIAGNOSIS — R10.13 EPIGASTRIC PAIN: Primary | ICD-10-CM

## 2019-06-19 RX ORDER — IBUPROFEN 600 MG/1
600 TABLET, FILM COATED ORAL DAILY PRN
COMMUNITY
Start: 2019-06-19 | End: 2020-11-17

## 2019-06-19 RX ORDER — SENNA AND DOCUSATE SODIUM 50; 8.6 MG/1; MG/1
1 TABLET, FILM COATED ORAL AT BEDTIME
COMMUNITY
Start: 2019-06-19 | End: 2019-11-01

## 2019-06-19 NOTE — PROGRESS NOTES
Assessment and Plan     (Z71.89) Encounter for medication review and counseling  (primary encounter diagnosis)  Comment: Pillbox empty as expected. All refill dates on the 10th of every month except Risperidone which is on the 30th, making us two weeks off on pillbox fills.   Plan: Pillbox filled x 10 days given supply available. Call to [facility] to request assistance in coordinating refills, they are agreeable and will call back if any questions / concerns.     Pillbox contains: Cetirizine 10 mg, Docusate 200 mg, Montelukast 10 mg, Prazosin 3 mg, Ranitidine 150 mg, Risperidone 2 mg, Venlafaxine 225 mg    (G44.219) Episodic tension-type headache, not intractable  Comment: Misunderstanding by patient.   Plan: Counseled patient on PRN medications vs. Scheduled medicines. Stored separately in patient's home supply. Ordered refill of Acetaminophen to be mailed to patient's home.     (M54.9) Back pain  Comment: --   Plan: Per Dr. Rosales, recommended NSAID therapy. Patient with Ibuprofen 600 mg supply available from post-delivery. Counseled patient on use.       Follow up: 10 days    Options for treatment and/or follow-up care were reviewed with the patient. Hayde was engaged and actively involved in the decision making process. She verbalized understanding of the options discussed and was satisfied with the final plan. Patient was provided with written instructions/medication list via AVS.    Dr. Rosales was provided our recommendations in clinic today and Dr. Chávez was available for supervision during this visit and is the authorizing prescriber for this visit through the pharmacist collaborative practice agreement.    Thank you for the opportunity to participate in the care of this patient.  Sierra Dickson, PharmD  [facility]  Phone: [phone]  June 19, 2019 at 11:40 AM    Current Outpatient Medications   Medication Sig Dispense Refill     cetirizine (ZYRTEC) 10  MG tablet Take 1 tablet (10 mg) by mouth daily 30 tablet 11     docusate sodium (COLACE) 100 MG tablet Take 2 tablets (200 mg) by mouth daily 90 tablet 3     montelukast (SINGULAIR) 10 MG tablet Take 1 tablet (10 mg) by mouth daily 90 tablet 3     ranitidine (ZANTAC) 150 MG tablet Take 1 tablet (150 mg) by mouth At Bedtime 90 tablet 3     risperiDONE (RISPERDAL) 2 MG tablet Take 1 tablet (2 mg) by mouth At Bedtime 30 tablet 3     SENNA-docusate sodium (SENNA S) 8.6-50 MG tablet Take 1 tablet by mouth At Bedtime       venlafaxine (EFFEXOR-XR) 150 MG 24 hr capsule Take 1 capsule by mouth daily (together with 75 mg capsule for total of 225 mg/day) 90 capsule 1     venlafaxine (EFFEXOR-XR) 75 MG 24 hr capsule Take 1 capsule by mouth daily (together with 150 mg capsule for total of 225 mg/day) 90 capsule 1     acetaminophen (TYLENOL) 325 MG tablet Take 2 tablets (650 mg) by mouth every 6 hours as needed for headaches 100 tablet 1     calcium carbonate (TUMS) 500 MG chewable tablet Take 1 tablet (500 mg) by mouth 2 times daily as needed for heartburn 150 tablet 3     fluticasone (FLONASE) 50 MCG/ACT nasal spray Spray 1-2 sprays into both nostrils daily as needed for allergies 16 mL 1     ketotifen (ZADITOR) 0.025 % SOLN ophthalmic solution Place 1 drop into both eyes every 12 hours 1 Bottle 3     polyethylene glycol (MIRALAX) powder Take 17 g (1 capful) by mouth daily as needed for constipation 510 g 1     prazosin (MINIPRESS) 1 MG capsule Take 3 capsules (3 mg) by mouth At Bedtime 90 capsule 3            HPI:       Hayde Macias is a 28 year old female referred by Dr. Rosales for medication therapy management. Patient seen today with the assistance of Maryann olea.    Pillboxes present today.   Concerns today for social situation. Confusion on who is helping her. States no one is helping her. Concerns for housing is too small for her family.   Concerns for back pain and concern for headaches. On further investigation  taking Prazosin PRN for headache, thought was APAP.          PMHX:     Patient Active Problem List   Diagnosis     Health Care Home     Recurrent major depressive disorder (H)     Encounter for supervision of normal pregnancy     Seasonal allergic rhinitis     PTSD (post-traumatic stress disorder)     BRENDA (generalized anxiety disorder)     Other fatigue     Insomnia due to other mental disorder     Major depressive disorder, recurrent episode, moderate (H)     Allergies   Allergen Reactions     Augmentin Rash            Objective     VS w/ IP 6/19/2019   SYSTOLIC 98   DIASTOLIC 64   PULSE 87   TEMPERATURE 98.1   RESPIRATIONS 18   Wt.(Lbs.) (No Data)   Wt. (Kg) (No Data)   Ht. (Feet./Inches)    Ht. (Cm)    BMI    O2 Sat 96         [facility] Pharmacist Documentation     Drug therapy problems identified:  Medical Condition 1: HA/Migraine, Goals of therapy: Not at goal, Drug Class: Analgesic, OTC, Convenience: Patient misunderstanding, Intervention: Educate patient, Verification: Patient Agreed - Compliance/Education  Medical Condition 2: Pain (i.e. somatic, visceral, neuropathic), Goals of therapy 2: Not at goal, Drug Class 2: Analgesic, Non-opioid, Convenience 2: Patient misunderstanding, Intervention 2: Educate patient, Verification 2: Provider Agreed    Relevant medical devices: n/a    # of medical conditions addressed: 2  # of medications addressed: 9  # of DTP identified: 2  Time spent: 30 minutes  Level of service per [place] DHS guidelines: 3

## 2019-06-19 NOTE — TELEPHONE ENCOUNTER
Per Leanne Kwon [facility] CPS, should family need assistance non medical or if family has safety concerns the must call in the following order.     Leanne Kwon CPS: [phone]     Graciela Jane [facility] crisis stabilization: [phone]     Monse Huang [facility] Public health nurse: [phone]     [facility]: Nicole [phone]     Transportation for this family [employer]: [phone], Pt ID is listed in care team.

## 2019-06-20 ENCOUNTER — TELEPHONE (OUTPATIENT)
Dept: FAMILY MEDICINE | Facility: CLINIC | Age: 29
End: 2019-06-20

## 2019-06-20 NOTE — TELEPHONE ENCOUNTER
[facility]/19/2019 @ 1:48 pm. Informing clinic that as of 6/25/2019 [facility] team will officially take over assisting the family on 6/25/2019.    Out going call made to Odalis [facility] [phone], [facility].

## 2019-06-20 NOTE — NURSING NOTE
Due to patient being non-English speaking/uses sign language, an  was used for this visit. Only for face-to-face interpretation by an external agency, date and length of interpretation can be found on the scanned worksheet.     name: Shine Archuleta  Agency: [employer]  Language: Maryann   Telephone number: [phone]  Type of interpretation: Face-to-face, spoken

## 2019-06-20 NOTE — TELEPHONE ENCOUNTER
Incoming call received from [facility]. Odalis reports that beginning on 6/25/2019 [facility] will begin assuming all responsibility for the families mental health, medication management and general assistance. [facility] will continue to provide health/wellness care.    Odalis will contact clinic in the next coming weeks with an update.

## 2019-06-20 NOTE — TELEPHONE ENCOUNTER
Called and LM for return call with Odalis with [facility] team. Requesting connection to ACT team pharmacist given transition of mental health care and possible medication management.     Sierra Dickson, PharmD  [facility]  Phone: [phone]  June 20, 2019 at 4:05 PM

## 2019-06-21 NOTE — TELEPHONE ENCOUNTER
Discuss plans for medication management with Odalis. Plan for nurse to set up medicines and transfer prescriptions to [facility] as they are then mailed to [place]. Intake appointment Tuesday 6/26 2 pm. Potential for Thursday assessment with psychiatrist.     Will plan for pillbox fill next Wednesday then when know more details on timeline for transition to [place] team will work with RN for warm hand off.     Sierra Dickson, PharmD, CDE  [facility]  Phone: [phone]  June 21, 2019 at 8:51 AM

## 2019-06-26 ENCOUNTER — OFFICE VISIT (OUTPATIENT)
Dept: PHARMACY | Facility: CLINIC | Age: 29
End: 2019-06-26
Payer: COMMERCIAL

## 2019-06-26 VITALS
OXYGEN SATURATION: 98 % | SYSTOLIC BLOOD PRESSURE: 97 MMHG | DIASTOLIC BLOOD PRESSURE: 63 MMHG | HEIGHT: 58 IN | BODY MASS INDEX: 30.48 KG/M2 | HEART RATE: 72 BPM | TEMPERATURE: 97.9 F | WEIGHT: 145.2 LBS

## 2019-06-26 DIAGNOSIS — G44.209 TENSION HEADACHE: ICD-10-CM

## 2019-06-26 DIAGNOSIS — F43.10 PTSD (POST-TRAUMATIC STRESS DISORDER): ICD-10-CM

## 2019-06-26 DIAGNOSIS — R42 DIZZINESS: ICD-10-CM

## 2019-06-26 DIAGNOSIS — Z71.89 ENCOUNTER FOR MEDICATION REVIEW AND COUNSELING: Primary | ICD-10-CM

## 2019-06-26 ASSESSMENT — MIFFLIN-ST. JEOR: SCORE: 1266.69

## 2019-06-26 NOTE — PROGRESS NOTES
Assessment and Plan     (Z71.89) Encounter for medication review and counseling  (primary encounter diagnosis)  Comment: Risperidone unavailable for refill today.   Plan: Previous attempts to align refills unsuccessful. Call to pharmacy again, explained request to fill Risperidone for 2 week supply and then should be able to fill all medicines 7/10/19. Agreeable, will deliver medicine today.     (G44.209) Tension headache  Comment: Did not have relief with appropriate dose of Acetaminophen. Concern that could be related to dehydration given possible restricted water intake.   Plan: Start use of Ibuprofen PRN for headaches in addition to back pain. Encouraged to use second line after Acetaminophen. Need to continue to look into topical agent. Change in formulary 7/1/19, possible will include agent.     (F43.10) PTSD (post-traumatic stress disorder)  (R42) Dizziness  Comment: Concern for dizziness, likely related to Prazosin and/or decrease in water intake.   Plan: Educated patient to increase water intake, that not contributing to weight gain.        Follow up: Tomorrow 6/27/19 in patient's home.     Options for treatment and/or follow-up care were reviewed with the patient. Hayde was engaged and actively involved in the decision making process. She verbalized understanding of the options discussed and was satisfied with the final plan. Patient was provided with written instructions/medication list via AVS.    Dr. Rosales was provided our recommendations via routed note and Dr. Alvarez was available for supervision during this visit and is the authorizing prescriber for this visit through the pharmacist collaborative practice agreement.    Thank you for the opportunity to participate in the care of this patient.  Sierra Dickson, PharmD, CDE  [facility]  Phone: [phone]  June 26, 2019 at 9:03 AM    Current Outpatient Medications   Medication Sig Dispense Refill     acetaminophen  (TYLENOL) 325 MG tablet Take 2 tablets (650 mg) by mouth every 6 hours as needed for headaches 100 tablet 1     calcium carbonate (TUMS) 500 MG chewable tablet Take 1 tablet (500 mg) by mouth 2 times daily as needed for heartburn 150 tablet 3     cetirizine (ZYRTEC) 10 MG tablet Take 1 tablet (10 mg) by mouth daily 30 tablet 11     docusate sodium (COLACE) 100 MG tablet Take 2 tablets (200 mg) by mouth daily 90 tablet 3     fluticasone (FLONASE) 50 MCG/ACT nasal spray Spray 1-2 sprays into both nostrils daily as needed for allergies 16 mL 1     ibuprofen (ADVIL/MOTRIN) 600 MG tablet Take 1 tablet (600 mg) by mouth daily as needed (back pain)       ketotifen (ZADITOR) 0.025 % SOLN ophthalmic solution Place 1 drop into both eyes every 12 hours 1 Bottle 3     montelukast (SINGULAIR) 10 MG tablet Take 1 tablet (10 mg) by mouth daily 90 tablet 3     polyethylene glycol (MIRALAX) powder Take 17 g (1 capful) by mouth daily as needed for constipation 510 g 1     prazosin (MINIPRESS) 1 MG capsule Take 3 capsules (3 mg) by mouth At Bedtime 90 capsule 3     ranitidine (ZANTAC) 150 MG tablet Take 1 tablet (150 mg) by mouth At Bedtime 90 tablet 3     risperiDONE (RISPERDAL) 2 MG tablet Take 1 tablet (2 mg) by mouth At Bedtime 30 tablet 3     SENNA-docusate sodium (SENNA S) 8.6-50 MG tablet Take 1 tablet by mouth At Bedtime       venlafaxine (EFFEXOR-XR) 150 MG 24 hr capsule Take 1 capsule by mouth daily (together with 75 mg capsule for total of 225 mg/day) 90 capsule 1     venlafaxine (EFFEXOR-XR) 75 MG 24 hr capsule Take 1 capsule by mouth daily (together with 150 mg capsule for total of 225 mg/day) 90 capsule 1            HPI:       Hayde Macias is a 28 year old female referred by Dr. Rosales for medication therapy management. Patient seen today with the assistance of Maryann olea.    # Headache: Concerns for continued headaches. No relief with Acetaminophen use. Has been using 975 mg each dose.    # Back pain: Used  "Ibuprofen which did not resolve back pain. Would prefer use of cream or other topical medicine.   # Fall: Notice cut on patient's right temple. Patient reports fell on Sunday. More concerned today about her weight gain. During assessment of fall, patient very fixed and distracted by current weight. States was dizzy which resulted in change in vision and then fell. States gets dizzy occasionally. Describes changes in heart \"shaky heart\" when she is angry. States this happened before she fell. Concern today that change in weight is due to increase water intake. Has continued to drink 2-5 cups of water per day.          PMHX:     Patient Active Problem List   Diagnosis     Health Care Home     Recurrent major depressive disorder (H)     Encounter for supervision of normal pregnancy     Seasonal allergic rhinitis     PTSD (post-traumatic stress disorder)     BRENDA (generalized anxiety disorder)     Other fatigue     Insomnia due to other mental disorder     Major depressive disorder, recurrent episode, moderate (H)     Allergies   Allergen Reactions     Augmentin Rash            Objective     Vitals:    06/26/19 0900   BP: 97/63   Pulse: 72   Temp: 97.9  F (36.6  C)   SpO2: 98%   Weight: 65.9 kg (145 lb 3.2 oz)   Height: 1.463 m (4' 9.58\")     Body mass index is 30.79 kg/m .          [facility] Pharmacist Documentation     Drug therapy problems identified:  Medical Condition 1: HA/Migraine, Goals of therapy: Not at goal, Drug Class: Analgesic, OTC, Indication: Needs additional drug therapy, Intervention: Initiate drug, Verification: Patient Agreed - OTC      Relevant medical devices: n/a    # of medical conditions addressed: 3  # of medications addressed: 4  # of DTP identified: 1  Time spent: 30 minutes  Level of service per [place] DHS guidelines: 2           Documentation   Due to patient being non-English speaking/uses sign language, an  was used for this visit. Only for face-to-face interpretation by an " external agency, date and length of interpretation can be found on the scanned worksheet.     name: Sylvester Kishore  Agency: [employer]  Language: Maryann   Telephone number: [employer] [phone]  Type of interpretation: Face-to-face, spoken

## 2019-06-27 ENCOUNTER — OFFICE VISIT (OUTPATIENT)
Dept: PHARMACY | Facility: CLINIC | Age: 29
End: 2019-06-27
Payer: COMMERCIAL

## 2019-06-27 ENCOUNTER — TELEPHONE (OUTPATIENT)
Dept: FAMILY MEDICINE | Facility: CLINIC | Age: 29
End: 2019-06-27

## 2019-06-27 DIAGNOSIS — F33.3 SEVERE EPISODE OF RECURRENT MAJOR DEPRESSIVE DISORDER, WITH PSYCHOTIC FEATURES (H): ICD-10-CM

## 2019-06-27 DIAGNOSIS — Z71.89 ENCOUNTER FOR MEDICATION REVIEW AND COUNSELING: Primary | ICD-10-CM

## 2019-06-27 NOTE — TELEPHONE ENCOUNTER
Call and LM For Odalis to discuss plans for pillbox fill, needed Friday 6/28 before weekend. Will run out of medicines in pillbox 6/28 PM.     Sierra Dickson, PharmD, CDE  [facility]  Phone: [phone]  June 27, 2019 at 2:46 PM

## 2019-06-27 NOTE — PROGRESS NOTES
Assessment and Plan     (Z71.89) Encounter for medication review and counseling  (primary encounter diagnosis)  (F33.3) Severe episode of recurrent major depressive disorder, with psychotic features (H)  Comment: Pillbox empty as expected. Long history of compliance as long as medicines are set up for her. Do not anticipate that patient would benefit from long acting injectable antipsychotic.   Plan: Pillbox filled today per medication list. Only scheduled medicines placed in pillbox. Anticipate will transition to ACT team for medication management in near future, however exact timeline unclear.       Follow up: 4 weeks    Options for treatment and/or follow-up care were reviewed with the patient. Hayde was engaged and actively involved in the decision making process. She verbalized understanding of the options discussed and was satisfied with the final plan.     Dr. Rosales was provided our recommendations via routed note and Dr. Lozada was available for supervision during this visit and is the authorizing prescriber for this visit through the pharmacist collaborative practice agreement.    Thank you for the opportunity to participate in the care of this patient.  Sierra Dickson, PharmD, CDE  [facility]  Phone: [phone]  June 27, 2019 at 5:01 PM    Current Outpatient Medications   Medication Sig Dispense Refill     acetaminophen (TYLENOL) 325 MG tablet Take 2 tablets (650 mg) by mouth every 6 hours as needed for headaches 100 tablet 1     calcium carbonate (TUMS) 500 MG chewable tablet Take 1 tablet (500 mg) by mouth 2 times daily as needed for heartburn 150 tablet 3     cetirizine (ZYRTEC) 10 MG tablet Take 1 tablet (10 mg) by mouth daily 30 tablet 11     docusate sodium (COLACE) 100 MG tablet Take 2 tablets (200 mg) by mouth daily 90 tablet 3     fluticasone (FLONASE) 50 MCG/ACT nasal spray Spray 1-2 sprays into both nostrils daily as needed for allergies 16 mL 1      ibuprofen (ADVIL/MOTRIN) 600 MG tablet Take 1 tablet (600 mg) by mouth daily as needed (back pain)       ketotifen (ZADITOR) 0.025 % SOLN ophthalmic solution Place 1 drop into both eyes every 12 hours 1 Bottle 3     montelukast (SINGULAIR) 10 MG tablet Take 1 tablet (10 mg) by mouth daily 90 tablet 3     polyethylene glycol (MIRALAX) powder Take 17 g (1 capful) by mouth daily as needed for constipation 510 g 1     prazosin (MINIPRESS) 1 MG capsule Take 3 capsules (3 mg) by mouth At Bedtime 90 capsule 3     ranitidine (ZANTAC) 150 MG tablet Take 1 tablet (150 mg) by mouth At Bedtime 90 tablet 3     risperiDONE (RISPERDAL) 2 MG tablet Take 1 tablet (2 mg) by mouth At Bedtime 30 tablet 3     SENNA-docusate sodium (SENNA S) 8.6-50 MG tablet Take 1 tablet by mouth At Bedtime       venlafaxine (EFFEXOR-XR) 150 MG 24 hr capsule Take 1 capsule by mouth daily (together with 75 mg capsule for total of 225 mg/day) 90 capsule 1     venlafaxine (EFFEXOR-XR) 75 MG 24 hr capsule Take 1 capsule by mouth daily (together with 150 mg capsule for total of 225 mg/day) 90 capsule 1            HPI:       Hayde Macias is a 28 year old female seen today for follow up for pillbox fill. Seen today in patient's home with assistance of telephone Maryann .     Medicine refills delivered from pharmacy. States that appointment today went well. Concern for injectable medicine that they presented as an option.          PMHX:     Patient Active Problem List   Diagnosis     Health Care Home     Recurrent major depressive disorder (H)     Encounter for supervision of normal pregnancy     Seasonal allergic rhinitis     PTSD (post-traumatic stress disorder)     BRENDA (generalized anxiety disorder)     Other fatigue     Insomnia due to other mental disorder     Major depressive disorder, recurrent episode, moderate (H)     Allergies   Allergen Reactions     Augmentin Rash            Objective     There were no vitals filed for this visit.  There is  no height or weight on file to calculate BMI.

## 2019-06-28 ENCOUNTER — TELEPHONE (OUTPATIENT)
Dept: FAMILY MEDICINE | Facility: CLINIC | Age: 29
End: 2019-06-28

## 2019-06-28 NOTE — TELEPHONE ENCOUNTER
Incoming voice mail received from [facility] requesting a return call to discuss patient's plan of care [phone], LBTCB.    In addition Monse Huang (Merged with [facility]) also left a detailed message requesting a return call to update on patients plan of care [phone], LMTCB.

## 2019-06-28 NOTE — TELEPHONE ENCOUNTER
Incoming call received from [facility] requesting Hayde's most recent updated medication list along with transportation info and care team for continuation and continuity of care.  Patient's emergency medical data printed faxed/confirmed to [facility] Act team [phone].  Pailli report patients Act  is Jelani [phone].

## 2019-07-01 ENCOUNTER — TELEPHONE (OUTPATIENT)
Dept: FAMILY MEDICINE | Facility: CLINIC | Age: 29
End: 2019-07-01

## 2019-07-01 NOTE — TELEPHONE ENCOUNTER
Patient requesting I assist with scheduling transportation for both herself and child for their upcoming appt's. July 3,2019. Due to conflicting times with Hayde and her younger child. Transportation has been scheduled for both patients July 3, 2019. Also patient reports she has a few upcoming mental health appointments with [facility] and needs transportation.  I have requested she contact her [employer] team worker as they have taken over all of her mental health.     Out going call made to Leanne STAFFORD (CPS worker) to discuss conflicting upcoming appointments for July 3, 2019 between patient and her child and [facility] mental health appointments.  Leanne STAFFORD (CPS) will look into whom might be able to assist with upcoming conflicting appointments along with transportation for Hayde's upcoming appointment with [facility].  Will wait for Leanne's call on how to proceed.

## 2019-07-02 NOTE — TELEPHONE ENCOUNTER
Out going call made using Gabbie   ([phone]) to confirm transportation has been scheduled for 7/3/19, Hayde informed she will need to speak with her [employer] worker with regards to scheduling transportation.   Patient requesting update with regards to her immigration application. Attempted to explain that I am unsure as to how long this process takes and the only information passed onto myself was from Agustina Gramajo. Agustina reported patient's immigration application had been completed and submitted however it was returned as incomplete due to process fee not included with the application. Agustina reported a waiver had been completed/resubmitted to Immigration for review.  I will reach out to Shawn to discuss

## 2019-07-03 ENCOUNTER — TELEPHONE (OUTPATIENT)
Dept: FAMILY MEDICINE | Facility: CLINIC | Age: 29
End: 2019-07-03

## 2019-07-03 DIAGNOSIS — G44.219 EPISODIC TENSION-TYPE HEADACHE, NOT INTRACTABLE: ICD-10-CM

## 2019-07-03 DIAGNOSIS — R10.13 EPIGASTRIC PAIN: ICD-10-CM

## 2019-07-03 PROBLEM — R53.83 OTHER FATIGUE: Status: RESOLVED | Noted: 2018-06-28 | Resolved: 2019-07-03

## 2019-07-03 NOTE — LETTER
July 8, 2019      Hayde LUJAN Say  [address]  [place] [address]        Dear Hayde,  Your stool sample was negative for the bacteria that can cause ulcers.    Please see below for your test results.    Resulted Orders   H. Pylori Agn Fecal ([facility])   Result Value Ref Range    Specimen Description Feces     H Pylori Antigen SEE NOTES       Comment:      H pylori Antigen         Negative for Helicobacter pylori antigen                            by enzyme immunoassay. A negative                           result indicates the absence of H.                            pylori antigen or that the level of antigen                           is below the level of detection.      Report Status FINAL 07/08/2019       Comment:      Testing lab location: [facility], [place], [address], [place] [address], [place] [address] [id number],[facility] [id number]   [phone]  Testing lab location: [facility], [address] [place] [id number], [place] [address] [id number],[facility]   [id number] [phone]     Performed and/or entered by:  [place] [address]   Performed and/or entered by:  [facility]  [address] [place] [address]      Narrative    Test performed by:  [facility]  [facility], [place] [address]       If you have any questions, please call the clinic to make an appointment.    Sincerely,    [facility]

## 2019-07-03 NOTE — TELEPHONE ENCOUNTER
Message to physician:     Date of last visit: 6/19/2019     Date of next visit if scheduled: Visit date not found       Last Comprehensive Metabolic Panel:  Sodium   Date Value Ref Range Status   02/20/2019 143.0 133.0 - 144.0 mmol/L Final     Potassium   Date Value Ref Range Status   02/20/2019 3.6 3.4 - 5.3 mmol/L Final     Chloride   Date Value Ref Range Status   02/20/2019 104.0 94.0 - 109.0 mmol/L Final     Carbon Dioxide   Date Value Ref Range Status   02/20/2019 28.0 20.0 - 32.0 mmol/L Final     Glucose   Date Value Ref Range Status   02/20/2019 84.0 60.0 - 109.0 mg/dL Final     Urea Nitrogen   Date Value Ref Range Status   02/20/2019 6.0 5.0 - 24.0 mg/dL Final     Creatinine   Date Value Ref Range Status   02/20/2019 0.8 0.6 - 1.3 mg/dL Final     Calcium   Date Value Ref Range Status   02/20/2019 9.3 8.5 - 10.4 mg/dL Final       BP Readings from Last 3 Encounters:   06/26/19 97/63   06/19/19 98/64   05/23/19 96/68       No results found for: A1C             Please complete refill and CLOSE ENCOUNTER.  Closing the encounter signifies the refill is complete.

## 2019-07-03 NOTE — TELEPHONE ENCOUNTER
"Hayde presented in clinic to  the [facility] box.  I met face to face at patient request to discuss wanting to reschedule her 7/3/19 physical with Dr. Rosales for next week and a appointment with Elham Mcmahan. I  Explained that I can assist with getting the physical rescheduled but unfortunately at this time we are unable to schedule her mental health appointments as [facility] is handling her mental health appointment.  Hayde verbalized yes but wants to stay at [facility] with \" elham and you\" . I requested  Hayde contact her [employer] worker to discuss her mental didier appointments. I gave Hayde the reassurance that [facility] will continue to provide medical care and help with transportation for her and her family.   Hayde acknowledged and requested I call on Friday to schedule her appointment.    "

## 2019-07-08 LAB
H PYLORI ANTIGEN: NORMAL
REPORT STATUS: NORMAL
SPECIMEN DESCRIPTION: NORMAL

## 2019-07-08 RX ORDER — ACETAMINOPHEN 325 MG/1
650 TABLET ORAL EVERY 6 HOURS PRN
Qty: 100 TABLET | Refills: 1 | Status: SHIPPED | OUTPATIENT
Start: 2019-07-08 | End: 2019-11-06

## 2019-07-10 ENCOUNTER — TELEPHONE (OUTPATIENT)
Dept: FAMILY MEDICINE | Facility: CLINIC | Age: 29
End: 2019-07-10

## 2019-07-10 NOTE — TELEPHONE ENCOUNTER
Out going call made using a Maryann  ([phone]) for a reminder call to  [facility].   Hayde reports she will come in clinic today for .    Hayde is requesting a clinic appointment to discuss complaints of fatigue, weakness, heart palpitations, dizziness, metal taste in her mouth and bilateral arm weakness. Patient reports she has been experiencing these symptoms for the last few days. Denies SOB, chest pain, pressure and numbness.  Patient has been advised that due to reported symptoms she should be seen in the ER or Urgent care as there are no clinic openings at this time. Patient declines both options and request to be scheduled with her PCP at next available.   Appointment with PCP offered for 7/12/19 patient declines and request to be scheduled for 7/16/2019.  Patient reminded that due to her symptoms she should be seen sooner by Urgent care of ER she denies and reports she will wait.

## 2019-07-16 ENCOUNTER — OFFICE VISIT (OUTPATIENT)
Dept: FAMILY MEDICINE | Facility: CLINIC | Age: 29
End: 2019-07-16
Payer: COMMERCIAL

## 2019-07-16 ENCOUNTER — TELEPHONE (OUTPATIENT)
Dept: FAMILY MEDICINE | Facility: CLINIC | Age: 29
End: 2019-07-16

## 2019-07-16 VITALS
SYSTOLIC BLOOD PRESSURE: 106 MMHG | HEART RATE: 70 BPM | DIASTOLIC BLOOD PRESSURE: 70 MMHG | OXYGEN SATURATION: 98 % | RESPIRATION RATE: 18 BRPM | TEMPERATURE: 98.7 F

## 2019-07-16 DIAGNOSIS — F41.1 GAD (GENERALIZED ANXIETY DISORDER): ICD-10-CM

## 2019-07-16 DIAGNOSIS — F43.10 PTSD (POST-TRAUMATIC STRESS DISORDER): ICD-10-CM

## 2019-07-16 DIAGNOSIS — R25.1 SHAKINESS: ICD-10-CM

## 2019-07-16 DIAGNOSIS — R53.83 FATIGUE, UNSPECIFIED TYPE: Primary | ICD-10-CM

## 2019-07-16 DIAGNOSIS — H53.8 BLURRED VISION: ICD-10-CM

## 2019-07-16 LAB
GLUCOSE P FAST SERPL-MCNC: 80 MG/DL (ref 51–109)
HEMOGLOBIN: 12.4 G/DL (ref 11.7–15.7)
TSH SERPL DL<=0.05 MIU/L-ACNC: 4.08 UIU/ML (ref 0.3–5)

## 2019-07-16 RX ORDER — PRAZOSIN HYDROCHLORIDE 1 MG/1
2 CAPSULE ORAL AT BEDTIME
Qty: 90 CAPSULE | Refills: 3 | COMMUNITY
Start: 2019-07-16 | End: 2022-01-25

## 2019-07-16 NOTE — LETTER
July 18, 2019      Hayde LUJAN Say  [address]  [place] [address]        Dear Hayde,  Your lab work was all normal. Please continue drinking water and eating small frequent meals throughout the day to prevent low blood sugars.     Please see below for your test results.    Resulted Orders   Hemoglobin (HGB) (P )   Result Value Ref Range    Hemoglobin 12.4 11.7 - 15.7 g/dL   TSH  Sensitive ([facility])   Result Value Ref Range    TSH 4.08 0.30 - 5.00 uIU/mL    Narrative    Test performed by:  [facility]  [address] [place] [address]   Glucose Fasting ([facility])   Result Value Ref Range    Glucose Fasting 80.0 51.0 - 109.0 mg/dL       If you have any questions, please call the clinic to make an appointment.    Sincerely,    Rosana Rosales, DO

## 2019-07-16 NOTE — NURSING NOTE
Due to patient being non-English speaking/uses sign language, an  was used for this visit. Only for face-to-face interpretation by an external agency, date and length of interpretation can be found on the scanned worksheet.      name: jay   Agency: [employer] ([employer] only)  Language: Maryann   Telephone number: na  Type of interpretation: [employer]mi

## 2019-07-16 NOTE — PROGRESS NOTES
Subjective:   Hayde Macias is a 28 year old year old female who presents to clinic today for the following health issues:    Chief Complaint   Patient presents with     Eye Problem     na usea, double blurry vision, fatigue      Medication Reconciliation     not completed      Shaking     arms, bilateral      Blurred vision:  Progressive blurry vision. Has seen an eye doctor in the past. Reports had a prescription and wore glasses in the past.     Other concerns:  Ongoing fatigue, gaining weight despite not eating much. Also having palpitations, racing heart. No CP. Admits that these episodes happen during times of stress. Also having shakiness of arms that improves by drinking sugary drink. No LOC during these episodes.     A [employer]  was used for this visit.         PMHX:   PAST MEDICAL HISTORY:  Patient Active Problem List   Diagnosis     Health Care Home     Recurrent major depressive disorder (H)     Seasonal allergic rhinitis     PTSD (post-traumatic stress disorder)     BRENDA (generalized anxiety disorder)     Insomnia due to other mental disorder     Major depressive disorder, recurrent episode, moderate (H)     CURRENT MEDICATIONS:  Current Outpatient Medications   Medication Sig Dispense Refill     prazosin (MINIPRESS) 1 MG capsule Take 2 capsules (2 mg) by mouth At Bedtime 90 capsule 3     acetaminophen (TYLENOL) 325 MG tablet Take 2 tablets (650 mg) by mouth every 6 hours as needed for headaches 100 tablet 1     calcium carbonate (TUMS) 500 MG chewable tablet Take 1 tablet (500 mg) by mouth 2 times daily as needed for heartburn 150 tablet 3     cetirizine (ZYRTEC) 10 MG tablet Take 1 tablet (10 mg) by mouth daily 30 tablet 11     docusate sodium (COLACE) 100 MG tablet Take 2 tablets (200 mg) by mouth daily 90 tablet 3     fluticasone (FLONASE) 50 MCG/ACT nasal spray Spray 1-2 sprays into both nostrils daily as needed for allergies 16 mL 1     ibuprofen (ADVIL/MOTRIN) 600 MG tablet Take 1  tablet (600 mg) by mouth daily as needed (back pain)       ketotifen (ZADITOR) 0.025 % SOLN ophthalmic solution Place 1 drop into both eyes every 12 hours 1 Bottle 3     montelukast (SINGULAIR) 10 MG tablet Take 1 tablet (10 mg) by mouth daily 90 tablet 3     polyethylene glycol (MIRALAX) powder Take 17 g (1 capful) by mouth daily as needed for constipation 510 g 1     ranitidine (ZANTAC) 150 MG tablet Take 1 tablet (150 mg) by mouth At Bedtime 90 tablet 3     risperiDONE (RISPERDAL) 2 MG tablet Take 1 tablet (2 mg) by mouth At Bedtime 30 tablet 3     SENNA-docusate sodium (SENNA S) 8.6-50 MG tablet Take 1 tablet by mouth At Bedtime       venlafaxine (EFFEXOR-XR) 150 MG 24 hr capsule Take 1 capsule by mouth daily (together with 75 mg capsule for total of 225 mg/day) 90 capsule 1     venlafaxine (EFFEXOR-XR) 75 MG 24 hr capsule Take 1 capsule by mouth daily (together with 150 mg capsule for total of 225 mg/day) 90 capsule 1     ALLERGIES:     Allergies   Allergen Reactions     Augmentin Rash          Objective:     Vitals:    19 1117   BP: 106/70   Pulse: 70   Resp: 18   Temp: 98.7  F (37.1  C)   TempSrc: Oral   SpO2: 98%     There is no height or weight on file to calculate BMI.  Results for orders placed or performed in visit on 19 (from the past 24 hour(s))   Hemoglobin (HGB) ([facility])   Result Value Ref Range    Hemoglobin 12.4 11.7 - 15.7 g/dL   Glucose Fasting ([facility])   Result Value Ref Range    Glucose Fasting 80.0 51.0 - 109.0 mg/dL       General: Alert, well-appearing female in NAD  Neck: thyroid normal to palpation  Pulm: CTA BL, no tachypnea  CV: RRR, no murmur  Psych: flat affect, intermittently tearful, poor eye contact, depressed mood    Assessment and Plan:   1. Fatigue, unspecified type  Chronic, ongoing for patient with multiple reasons for her fatigue including ongoing mental health issues, lack of sleep/sleep disturbance, numerous children to care for including a , along with  sedating psychiatric medications. She has had history of anemia in the past but hgb stable at 12.4 today. Thyroid function pending.  - Hemoglobin (HGB) ([facility])  - TSH  Sensitive ([facility])    2. BRENDA (generalized anxiety disorder)  3. PTSD (post-traumatic stress disorder)  Symptoms of racing heart and palpitations sound most consistent with acute anxiety episodes. We discussed the body's physiologic response to stress and to perform calming techniques she's been taught in the past. After discussion with Micheline Esquivel there is also some concern for orthostasis given previous reports of lightheadedness and falls. Given her baseline low blood pressures and symptoms will decrease prazosin with hopes of reducing risk of further orthostasis.   - prazosin (MINIPRESS) 1 MG capsule; Take 2 capsules (2 mg) by mouth At Bedtime  Dispense: 90 capsule; Refill: 3    4. Blurred vision  - OPTOMETRY REFERRAL    5. Shakiness  Question intermittent hypoglycemia given reports of shakiness improved with glucose rich drinks. No LOC to suggest seizure-like activity, history doesn't seem consistent with dystonic reactions (which she has had in the past from psychiatric medications). Fasting BG in acceptable range of 80 today. Recommended small consistent snacks throughout the day to maintain euglycemia.  - Glucose Fasting ([facility])    Options for treatment and follow-up care were reviewed with the patient and/or guardian. Hayde LUJAN Say and/or guardian engaged in the decision making process and verbalized understanding of the options discussed and agreed with the final plan.    Rosana Rosales DO  [facility] Family [facility] Resident    Precepted with: Dr. Jessica

## 2019-07-17 ENCOUNTER — TELEPHONE (OUTPATIENT)
Dept: FAMILY MEDICINE | Facility: CLINIC | Age: 29
End: 2019-07-17

## 2019-07-17 NOTE — TELEPHONE ENCOUNTER
"Incoming voicemail received 7/17/19 @ 11:29 am from Monse Huang with update information. Per Monse,  Hayde has been scheduled for an Eye Exam 07/30/2019 @ 11:00 am [facility]. Monse also would like to confirm that Hayde is receiving all of her mental health care from [facility].  Monse has also requested assistance with scheduling transportation for upcoming eye appt. Attempted contacting [employer] to schedule ride however, Monse had already scheduled that ride earlier this morning.    Outgoing confirmation call made to Monse Huang. Monse reports \"things seem to be going well\" and currently families needs are being met with current services in place.   "

## 2019-07-17 NOTE — TELEPHONE ENCOUNTER
"[facility] Follow-up    Face to Face meeting  HC monthly and follow up    Patient: Hayde LUJAN Say  Conversation with: Patient Hayde.        Major diagnoses and/or issues requiring coordination services:  PTSD, Major Depression and mental health disorder    In the past month, how many times have you been to the Emergency Room? 0  In the past month, how many times have you been hospitalized? 0    Summary notes: Face to face meeting during clinic visit with Dr. Rosales using Video  (Maryann) for this visit. Hayde remains tearful during entire visit. Hayde reports she is seeing someone for mental health but is unclear as to whom/where due to \"lots and lots of people\". Hayde reports she is receiving services but really miss's Elham and wants to see and talk to her. It was explained in great detail that with Shawn, [facility] and [facility] providing her with services [facility] has been directed to provide only wellness health for her and her family. Hayde reports she understands this but really 's Elham and just needs to talk to her. Hayde advised provider is not in today and it was reiterated that [facility] will only provide wellness health for Hayde and her family.     Self-management goals:  Hayde will continue working with [facility] and [facility] as directed.   Readiness to change: In process    Counseling and coordination activities with: county worker, , CPS and Home care health Coordinator.     Follow-up date: 8/17/19 and PRN      "

## 2019-07-18 ENCOUNTER — TELEPHONE (OUTPATIENT)
Dept: FAMILY MEDICINE | Facility: CLINIC | Age: 29
End: 2019-07-18

## 2019-07-18 NOTE — TELEPHONE ENCOUNTER
Outgoing reminder call made using a Maryann  ([phone]) with regards to picking up her veggie RX box. Hayde reports she will have it picked up today.

## 2019-07-23 ENCOUNTER — TELEPHONE (OUTPATIENT)
Dept: FAMILY MEDICINE | Facility: CLINIC | Age: 29
End: 2019-07-23

## 2019-07-24 ENCOUNTER — TELEPHONE (OUTPATIENT)
Dept: FAMILY MEDICINE | Facility: CLINIC | Age: 29
End: 2019-07-24

## 2019-07-25 NOTE — TELEPHONE ENCOUNTER
Face to face meeting during patient's child clinic visit.    [facility] Follow-up   Face to face        Major diagnoses and/or issues requiring coordination services  PTSD, Major Depression and mental health disorder      In the past month, how many times have you been to the Emergency Room? 0  In the past month, how many times have you been hospitalized? 0    Summary notes:   Hayde continues to work with [facility] nurse Monse Huang and CPS Leanne STAFFORD. Patient reports her mental health is being treated currently by an outside agency but would like to return back to Dr. Elham Villa.   Discussed food insecurities, patient reports she does not know when or whom last took her to the food shelf/ grocery store and unable to report what food she does have at home.  Patient advised she needs to discuss these needs and concerns with Shawn or     Hayde voices concerns with Immigration status as she still has not yet received any updates. 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 Redirect Hayde to contact Shawn to discuss and request assistance.     Self-management goals:  Continue working with [employer] and [facility].      Counseling and coordination activities with: PCPShawn     Follow-up date: PRN, Aug 24,2019

## 2019-07-31 ENCOUNTER — TELEPHONE (OUTPATIENT)
Dept: FAMILY MEDICINE | Facility: CLINIC | Age: 29
End: 2019-07-31

## 2019-07-31 NOTE — TELEPHONE ENCOUNTER
Patient here with [facility] Act worker (HSA. S) for Veggie Rx and Immigration packet .     HSA has requested that care coordinator focus on patients physical well being. Any forms non medical please direct patient to contact Shawn or CPS worker Leanne STAFFORD. This would also include Medical Assistance questions/concerns, financial concerns, food insecurities, snap benefits, mental health and immigration.     1) Immigration packet has been returned to patient. .  2) Should patient need urgent intervention or is in crisis please contact in this order. HSA S [phone], [facility] Emergency on call [phone] or [employer] [phone].  Copy of Immigrations form's completed by [facility] sent to HIM for scan.   Attempted to assist scheduling patient for clinic visit f/u as patient reports gastric distress is getting worse however,  patient defers at this time.   I will reach out to patient later this week to f/u and assist scheduling clinic visit f/u for herself.

## 2019-08-09 ENCOUNTER — TELEPHONE (OUTPATIENT)
Dept: FAMILY MEDICINE | Facility: CLINIC | Age: 29
End: 2019-08-09

## 2019-08-09 NOTE — TELEPHONE ENCOUNTER
Out going call using Maryann  ([phone]) to discuss Veggie Rx . Patient reports she  today by 12:00. Hayde has been reminded veggie  is every Wednesday until November.

## 2019-08-27 ENCOUNTER — TELEPHONE (OUTPATIENT)
Dept: FAMILY MEDICINE | Facility: CLINIC | Age: 29
End: 2019-08-27

## 2019-08-27 DIAGNOSIS — Z30.46 ENCOUNTER FOR SURVEILLANCE OF NEXPLANON SUBDERMAL CONTRACEPTIVE: Primary | ICD-10-CM

## 2019-08-27 NOTE — TELEPHONE ENCOUNTER
Called patient regarding her desire to get nexplanon removed. She reports decreased energy, shortness of breath, abdominal bloating, fast heart beat, shakiness and overall heaviness since getting the nexplanon placed. She has not been getting her periods which she feels is contributing to her symptoms. She reports stooling once or twice per week. We discussed that she has mentioned these symptoms long before every getting the nexplanon placed. She is confused why no one has been able to figure out why she is experiencing these symptoms. We discussed that amenorrhea is a common occurrence with nexplanon and that it would be unusual for the nexplanon to be causing the symptoms she described. When asked what she would do for contraception if she were to remove it she reported the pill or abstinence. I thoroughly stressed I do not feel her symptoms are unrelated to the nexplanon and strongly discouraged having it removed because of great concern of her mental health if she were to become pregnancy again. After a long discussion she agreed to keep the nexplanon in place for now and continue to work up the symptoms she has been experiencing.    Rosana Rosales DO  [facility] Family Medicine Resident  Pager #[phone]

## 2019-08-27 NOTE — TELEPHONE ENCOUNTER
Out going call using Maryann  ([phone]) made to f/u with patient's request for assistance with school supplies and clothing for the children.  Upcoming community event information provided as follows and flyer placed at the  for patient when she comes in for [employer]  8/28/19.    Family [employer] Back to school celebration event 08/28/2019 from 4:30-7:30 pm @ [facility] [address] [facility]. No registration required, all children must be present in order to receive a backpack filled with school supplies, no exceptions will be  made.  In addition families will receive fruits and vegetable during this event, clothing at no cost and additional services available for family's in need. Patient reports her social workers will take her children to this event and has requested I reach out to arrange this. Patient informed she will need to contact her  personally for this assistance.  Hayde verbalized understanding.    Patient also asking to be scheduled for a Nexplanon removal with no replacement ASAP as she is not comfortable with not having her menses, bloated stomach and weight gain. Patient has been scheduled for this procedure with her PCP 9/11/2019. Hayde declines consult appt with PCP to discuss other options.

## 2019-08-28 ENCOUNTER — TELEPHONE (OUTPATIENT)
Dept: FAMILY MEDICINE | Facility: CLINIC | Age: 29
End: 2019-08-28

## 2019-08-28 NOTE — TELEPHONE ENCOUNTER
Transportation scheduled for upcoming clinic visit appointment with Dr. Covarrubias 9/11/2019 @ 2:00pm. [place] Trip# [id number] [phone].

## 2019-08-29 NOTE — TELEPHONE ENCOUNTER
"[facility] Follow-up    Call initiated by clinic.  Message recorded by Nicole Estrella  Calling for: [facility] Monthly  Patient: Hayde LUJAN Say  Phone conversation with: Patient  Patient's Phone number/s: Home number on file [phone]  Hmong        Major diagnoses and/or issues requiring coordination services      In the past month, how many times have you been to the Emergency Room? 0  In the past month, how many times have you been hospitalized? 0    Summary notes: Hayde continues to work with her [place] worker Philipp, still receiving 4 hours a day PCA services, mental health within [facility]. Patient \"life skills and teachings\" provided by [facility],.    Self-management goals:  Continue working CPS, Public Health Nurse Monse Huang      Counseling and coordination activities with: county worker, , PCP and care Coordinators.    Follow-up date: 09/29/2019 or PRN.  "

## 2019-09-05 ENCOUNTER — TELEPHONE (OUTPATIENT)
Dept: FAMILY MEDICINE | Facility: CLINIC | Age: 29
End: 2019-09-05

## 2019-09-05 NOTE — TELEPHONE ENCOUNTER
"During out going call using a Maryann  ([phone]) to discuss up coming appointment dates, times and locations for both herself and her children, Hayde voices concerns with not feeling well. Patient reports increased   \"itchy eyes\", sneezing, feeling of \"heavy body\", not feeling like herself\" and chest pain. Hayde is requesting her Nexplanon be removed and wanted to confirm she had been scheduled for the removal. Reminded patient she recently discussed these concerns with Dr. Rosales and had agreed to discuss possible removal during her 9/11/19 clinic visit. Patient declines sooner appointment.   Hayde states \"you are making me feel heavy with all these questions, no more questions\" Apologies offered, call ended.    Out going call made to patient's [place] worker HSA,([phone]) requesting a return call to discuss and possibly obtain some additional information with regards to symptoms. LMTCB    "

## 2019-09-11 ENCOUNTER — OFFICE VISIT (OUTPATIENT)
Dept: FAMILY MEDICINE | Facility: CLINIC | Age: 29
End: 2019-09-11
Payer: COMMERCIAL

## 2019-09-11 VITALS
TEMPERATURE: 98.9 F | HEIGHT: 57 IN | OXYGEN SATURATION: 96 % | DIASTOLIC BLOOD PRESSURE: 69 MMHG | RESPIRATION RATE: 22 BRPM | BODY MASS INDEX: 32.58 KG/M2 | WEIGHT: 151 LBS | SYSTOLIC BLOOD PRESSURE: 102 MMHG | HEART RATE: 99 BPM

## 2019-09-11 DIAGNOSIS — F41.1 GAD (GENERALIZED ANXIETY DISORDER): ICD-10-CM

## 2019-09-11 DIAGNOSIS — F33.1 MAJOR DEPRESSIVE DISORDER, RECURRENT EPISODE, MODERATE (H): ICD-10-CM

## 2019-09-11 DIAGNOSIS — R21 RASH: ICD-10-CM

## 2019-09-11 DIAGNOSIS — Z12.4 SCREENING FOR MALIGNANT NEOPLASM OF CERVIX: ICD-10-CM

## 2019-09-11 DIAGNOSIS — F43.10 PTSD (POST-TRAUMATIC STRESS DISORDER): Primary | ICD-10-CM

## 2019-09-11 DIAGNOSIS — E66.811 CLASS 1 OBESITY WITHOUT SERIOUS COMORBIDITY WITH BODY MASS INDEX (BMI) OF 32.0 TO 32.9 IN ADULT, UNSPECIFIED OBESITY TYPE: ICD-10-CM

## 2019-09-11 DIAGNOSIS — J30.2 CHRONIC SEASONAL ALLERGIC RHINITIS DUE TO FUNGAL SPORES: ICD-10-CM

## 2019-09-11 LAB
CHOLEST SERPL-MCNC: 193 MG/DL
CHOLEST/HDLC SERPL: 5.2 RATIO
HDLC SERPL-MCNC: 37 MG/DL
LDLC SERPL CALC-MCNC: 123 MG/DL (ref 0–99)
TRIGL SERPL-MCNC: 163 MG/DL
VLDL-CHOLESTEROL: 33 MG/DL (ref 7–32)

## 2019-09-11 RX ORDER — BENZOCAINE/MENTHOL 6 MG-10 MG
LOZENGE MUCOUS MEMBRANE 2 TIMES DAILY
Qty: 45 G | Refills: 1 | Status: SHIPPED | OUTPATIENT
Start: 2019-09-11 | End: 2020-08-05

## 2019-09-11 RX ORDER — FLUTICASONE PROPIONATE 50 MCG
1-2 SPRAY, SUSPENSION (ML) NASAL DAILY PRN
Qty: 16 ML | Refills: 1 | Status: SHIPPED | OUTPATIENT
Start: 2019-09-11 | End: 2022-01-25

## 2019-09-11 RX ORDER — NORETHINDRONE ACETATE AND ETHINYL ESTRADIOL .03; 1.5 MG/1; MG/1
1 TABLET ORAL DAILY
Qty: 84 TABLET | Refills: 4 | Status: SHIPPED | OUTPATIENT
Start: 2019-09-11 | End: 2019-12-09

## 2019-09-11 ASSESSMENT — MIFFLIN-ST. JEOR: SCORE: 1283.81

## 2019-09-11 NOTE — LETTER
September 19, 2019      Hayde LUJAN Say  [address]  [place] [address]        Dear Hayde,  Your pap smear showed some abnormal cervical cells. I would recommend repeating the pap smear in 1 year. Often the body can heal these cells on its own. Your HPV infection screen was negative.   Please see below for your test results.    Resulted Orders   Lipid Panel ([facility])   Result Value Ref Range    Cholesterol 193.0 <200.0 mg/dL    Triglycerides 163.0 (H) <150.0 mg/dL    HDL Cholesterol 37.0 (L) >50.0 mg/dL      Comment:      If diabetic or CVD then reference range <100    VLDL-Cholesterol 33.0 (H) 7.0 - 32.0 mg/dL    LDL Cholesterol Direct 123.0 (H) 0.0 - 99.0 mg/dL    Cholesterol/HDL Ratio 5.2 (H) <5.0 RATIO   GYN Cytology ([facility])   Result Value Ref Range    Lab AP Case Report SEE RESULTS BELOW       Comment:      Gynecologic Cytology Report                       Case: [id number]                                     Authorizing Provider:  Bulmaro Lin MD     Collected:             09/11/2019 1445              Ordering Location:      [facility] Lab Received:              09/12/2019 0912              First Screen:          Angela Bailey CT                                                                             (ASCP)                                                                         Pathologist:           Irlanda Saleh MD                                                          Specimen:    SUREPATH PAP, SCREENING, Endocervical/cervical                                               Lab AP Gyn Interpretation SEE RESULTS BELOW (A) Negative for squamous intraepithelial le      Comment:      Atypical squamous cells of undetermined significance  Electronically signed by Irlanda Saleh MD on 9/19/2019 at  8:22 AM      Lab AP Malignant? Abnormal (A) Normal    Specimen adequacy:       Satisfactory for evaluation, endocervical/transformation zone component present    HPV  Reflex? Yes regardless of result     High Risk? No     Last Mens Period March 2019- since nexplanon     Lab AP Abnormal Bleeding Yes     Lab AP Patient Status N/A     Lab AP Birth Control/Hormones Depo/Implanon     Lab AP Previous Normal 2/5/13     Lab AP Previous Abnl no     Lab AP Cervical Appearance increased vascularity, somewhat friable     Narrative    Test performed by:  [facility]  [address] [place] [address]   HPV Baker PCR ([employer])   Result Value Ref Range    HPV Source SurePath     HPV 16 DNA Negative NEG    HPV 18 DNA Negative NEG    Other HR HPV Negative NEG    Final Diagnosis SEE NOTES       Comment:      This patient's sample is negative for HPV DNA.  This test was developed and its performance characteristics determined by the  [facility]. It  has not been cleared or approved by the FDA. The laboratory is regulated under  CLIA as qualified to perform high-complexity testing. This test is used for  clinical purposes. It should not be regarded as investigational or for  research.  (Note)  METHODOLOGY:  The Roche elder 4800 system uses automated extraction,  simultaneous amplification of HPV (L1 region) and beta-globin,  followed by  real time detection of fluorescent labeled HPV and beta  globin using specific oligonucleotide probes . The test specifically  identifies types HPV 16 DNA and HPV 18 DNA while concurrently  detecting the rest of the high risk types (31, 33, 35, 39, 45, 51,  52, 56, 58, 59, 66 or 68).     COMMENTS:  This test is not intended for use as a screening device  for women under age 30 with normal cervical   cytology.  Results should  be correlated with cytologic and histologic findings. Close clinical  followup is recommended.         Specimen Description Cervical Cells       Comment:         Performed and/or entered by:  [address]  [id number]       Narrative    Test performed by:  [facility]  [address], [place] [address]       If you have any questions, please call the clinic to make an appointment.    Sincerely,    Rosana Rosales, DO

## 2019-09-11 NOTE — NURSING NOTE
Due to patient being non-English speaking/uses sign language, an  was used for this visit. Only for face-to-face interpretation by an external agency, date and length of interpretation can be found on the scanned worksheet.     name: Kathe Riojas  Agency: [employer]  Language: Maryann   Telephone number: [phone]  Type of interpretation: Face-to-face, spoken

## 2019-09-12 LAB
FINAL DIAGNOSIS: NORMAL
HPV 16 DNA: NEGATIVE
HPV 18 DNA: NEGATIVE
HPV SOURCE: NORMAL
OTHER HR HPV: NEGATIVE
SPECIMEN DESCRIPTION: NORMAL

## 2019-09-17 ENCOUNTER — TELEPHONE (OUTPATIENT)
Dept: FAMILY MEDICINE | Facility: CLINIC | Age: 29
End: 2019-09-17

## 2019-09-17 NOTE — TELEPHONE ENCOUNTER
Out going call made using a Gabbie  ([phone]) to discuss scheduling Nexplanon removal.  Hayde reports she did not  recent prescriptions which included her BCP as she was not aware she needed to. Voices concerns of itchy eyes and stuffy nose. Patient advised medication ordered on 9/11/19 may help with those symptoms. Encouraged patient to  those medications.

## 2019-09-18 ENCOUNTER — OFFICE VISIT (OUTPATIENT)
Dept: FAMILY MEDICINE | Facility: CLINIC | Age: 29
End: 2019-09-18
Payer: COMMERCIAL

## 2019-09-18 ENCOUNTER — TELEPHONE (OUTPATIENT)
Dept: FAMILY MEDICINE | Facility: CLINIC | Age: 29
End: 2019-09-18

## 2019-09-18 VITALS
RESPIRATION RATE: 22 BRPM | TEMPERATURE: 98.7 F | DIASTOLIC BLOOD PRESSURE: 73 MMHG | WEIGHT: 152.2 LBS | OXYGEN SATURATION: 99 % | SYSTOLIC BLOOD PRESSURE: 117 MMHG | HEIGHT: 57 IN | BODY MASS INDEX: 32.84 KG/M2 | HEART RATE: 80 BPM

## 2019-09-18 DIAGNOSIS — Z30.46 NEXPLANON REMOVAL: Primary | ICD-10-CM

## 2019-09-18 RX ORDER — MEDROXYPROGESTERONE ACETATE 150 MG/ML
150 INJECTION, SUSPENSION INTRAMUSCULAR ONCE
Status: COMPLETED | OUTPATIENT
Start: 2019-09-18 | End: 2019-09-18

## 2019-09-18 RX ADMIN — MEDROXYPROGESTERONE ACETATE 150 MG: 150 INJECTION, SUSPENSION INTRAMUSCULAR at 17:04

## 2019-09-18 ASSESSMENT — MIFFLIN-ST. JEOR: SCORE: 1289.25

## 2019-09-18 NOTE — PROGRESS NOTES
Procedure Note-Nexplanon Removal    Hayde Macias is a patient of Rosana Rajan here for removal of etonogestrel implant Nexplanon/Implanon. She took one day of her OCP yesterday in preparation for removal.     Indication: patient desires removal    Consent: Affirmation of informed consent was signed and scanned into the medical record. Risks, benefits and alternatives were discussed. Patient's questions were elicited and answered.     Procedure safety checklist was completed:  Yes  Time Out (Pause for the Cause) completed: Yes    Preoperative Diagnosis: etonogestrel implant    Postoperative Diagnosis: etonogestrel implant removed    Technique:   Skin prep Betadine  Anesthesia 1% lidocaine, with epi  Procedure: Small incision (<5mm) was made at distal end of palpable implant, curved hemostat was used to isolate the implant and bring it to the incision. Given significant fibrous tissue, an additional small incision was made in a T-shape fashion and the Implant was removed intact. A single interrupted suture was placed to approximate the incision with 5-0 ethilon.   EBL: minimal  Complications:  No  Tolerance:  Pt tolerated procedure well and was in stable condition.     Contraception was discussed and patient chose the following method oral contraceptives    Follow up: Pt was instructed to call if bleeding, severe pain or foul smell. She will return in 1 week for suture removal. Since patient had only taken 1 day of OCP, she was agreeable to a one time depo provera injection to ensure proper contraceptive coverage.     Rosana Rosales DO  [facility] Family Medicine Resident  Pager #[phone]    Precepted with: Dr. Levine      9/18/2019  Plan Documentation  Service ordered Depo Provera injection (150mg IM) may be once  Plan and order should be renewed one year from 9/18/2019 .  Ordered by Rosana Rosales DO.
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
## 2019-09-18 NOTE — TELEPHONE ENCOUNTER
Out going call made to Hayde using a Maryann  ([phone]) to confirm today's appointment w/transportation. Patient agreeable with date and time of today's appointment.

## 2019-09-18 NOTE — NURSING NOTE
Due to patient being non-English speaking/uses sign language, an  was used for this visit. Only for face-to-face interpretation by an external agency, date and length of interpretation can be found on the scanned worksheet.     name:Shine Kathe  Agency: [employer]  Language: Maryann   Telephone number: [phone]  Type of interpretation: Face-to-face, spoken  Clinic Administered Medication Documentation    MEDICATION LIST:   Depo Provera Documentation    Prior to injection, verified patient identity using patient's name and date of birth. Medication was administered. Please see MAR and medication order for additional information. Patient instructed to remain in clinic for 15 minutes.    BP: 117/73  LAST PAP/EXAM: 9/11/19   URINE HCG:not indicated    NEXT INJECTION DUE: 12/6/19 - 12/25/19    Was entire vial of medication used? Yes  Vial/Syringe: Single dose vial  Expiration Date:  12/20      Name of provider who requested the medication administration: Bobby  Name of provider on site (faculty or community preceptor) at the time of performing the medication administration: Abner    Date of next administration: NA only given once per MD  Date of next office visit with provider to renew medication plan (must be seen annually): NA

## 2019-09-19 ENCOUNTER — TRANSFERRED RECORDS (OUTPATIENT)
Dept: HEALTH INFORMATION MANAGEMENT | Facility: CLINIC | Age: 29
End: 2019-09-19

## 2019-09-19 ENCOUNTER — RECORDS - HEALTHEAST (OUTPATIENT)
Dept: ADMINISTRATIVE | Facility: OTHER | Age: 29
End: 2019-09-19

## 2019-09-19 DIAGNOSIS — F33.3 SEVERE EPISODE OF RECURRENT MAJOR DEPRESSIVE DISORDER, WITH PSYCHOTIC FEATURES (H): ICD-10-CM

## 2019-09-19 PROBLEM — Z12.4 SCREENING FOR MALIGNANT NEOPLASM OF CERVIX: Status: ACTIVE | Noted: 2019-09-19

## 2019-09-19 LAB
CYTOLOGY CVX/VAG DOC THIN PREP: ABNORMAL
HIGH RISK?: NO
HPV REFLEX?: ABNORMAL
LAB AP ABNORMAL BLEEDING: YES
LAB AP BIRTH CONTROL/HORMONES: ABNORMAL
LAB AP CERVICAL APPEARANCE: ABNORMAL
LAB AP PATIENT STATUS: ABNORMAL
LAB AP PREVIOUS ABNL: NO
LAB AP PREVIOUS NORMAL: ABNORMAL
LAST MENS PERIOD: ABNORMAL
PATH REPORT.COMMENTS IMP SPEC: ABNORMAL
PATH REPORT.COMMENTS IMP SPEC: ABNORMAL
SPECIMEN ADEQUACY:: ABNORMAL

## 2019-09-19 RX ORDER — RISPERIDONE 2 MG/1
2 TABLET ORAL AT BEDTIME
Qty: 30 TABLET | Status: CANCELLED | OUTPATIENT
Start: 2019-09-19

## 2019-09-20 ENCOUNTER — TELEPHONE (OUTPATIENT)
Dept: FAMILY MEDICINE | Facility: CLINIC | Age: 29
End: 2019-09-20

## 2019-09-20 NOTE — PROGRESS NOTES
Preceptor Attestation:   Patient seen, evaluated and discussed with the resident. I was present for and supervised the entire procedure. I have verified the content of the note, which accurately reflects my assessment of the patient and the plan of care.  Supervising Physician:Lynne Levine MD  [facility]

## 2019-09-20 NOTE — TELEPHONE ENCOUNTER
[facility] Family Medicine phone call message- general phone call:    Reason for call: Provider states she would like to speak with Elham Mcmahan. Please call and advise.     Return call needed: Yes    OK to leave a message on voice mail? Yes    Primary language: Maryann      needed? Yes    Call taken on September 20, 2019 at 1:12 PM by Hallie Weeks

## 2019-09-20 NOTE — TELEPHONE ENCOUNTER
Out going call made to [facility] pharmacy to advise Risperdal is not filled by Dr. Rosales. Pharmacy will resubmit refill request to correct provider.   Per [facility] please disregard this request.

## 2019-09-23 DIAGNOSIS — F43.10 PTSD (POST-TRAUMATIC STRESS DISORDER): ICD-10-CM

## 2019-09-25 ENCOUNTER — ALLIED HEALTH/NURSE VISIT (OUTPATIENT)
Dept: FAMILY MEDICINE | Facility: CLINIC | Age: 29
End: 2019-09-25
Payer: COMMERCIAL

## 2019-09-25 ENCOUNTER — TELEPHONE (OUTPATIENT)
Dept: FAMILY MEDICINE | Facility: CLINIC | Age: 29
End: 2019-09-25

## 2019-09-25 DIAGNOSIS — Z48.02 VISIT FOR SUTURE REMOVAL: Primary | ICD-10-CM

## 2019-09-25 RX ORDER — PRAZOSIN HYDROCHLORIDE 1 MG/1
2 CAPSULE ORAL AT BEDTIME
Qty: 90 CAPSULE | Refills: 3 | OUTPATIENT
Start: 2019-09-25

## 2019-09-25 NOTE — TELEPHONE ENCOUNTER
Call made to Hayde using Maryann  ([phone]) to discuss veggie  and suture removal (Left arm) for today. Patient will arrive between 1-3 PM for Veggie  and suture removal. Patient aware this will be a nurse only visit w/o an . Patient has no additional questions or concerns at this time.

## 2019-09-25 NOTE — NURSING NOTE
Removed one stitch from patient's left arm after nexplanon removal. Area was cleaned, patted dry, removed with stitch scissors and tweezer. Area pink, no drainage noted. Patient expressed minimal discomfort during the procedure and tolerated well. Bandaid applied to area and advised patient to keep clean and dry. Patient verbalized understanding. Zee PHAM

## 2019-09-25 NOTE — TELEPHONE ENCOUNTER
Medication request denied, Per provider.    Out going call made [facility] f/u mental health medication refills. I spoke with pharmacy tech Ana to advise all mental health medications must be sent to [facility] as they manage her mental health. Ana reports she will fax request to [facility].

## 2019-10-09 ENCOUNTER — TELEPHONE (OUTPATIENT)
Dept: FAMILY MEDICINE | Facility: CLINIC | Age: 29
End: 2019-10-09

## 2019-10-09 DIAGNOSIS — B85.2 LICE: Primary | ICD-10-CM

## 2019-10-09 NOTE — TELEPHONE ENCOUNTER
HOUSE OFFICER PAGE NOTE 10/9/2019  3:33 PM    Situation: House Officer was paged by the RN at 1508 hrs about Hayde Macias,  1990, MRN [id number] regarding the following significant event (s): requires prescription for lice. The whole family has lice.    Plan:  -Prescribed Permethrin (NIX) 1% external liquid, 120 ml, with R-1. To be apply to clean, towel-dried hair, saturate hair and scalp, wash off after 10 min.  -Please schedule a follow-up visit.    Nestor Kim MD, MPH (PGY 2)  [facility] Family Medicine Resident  Pager: [phone]

## 2019-10-09 NOTE — TELEPHONE ENCOUNTER
Will route to urgent task r/t needing one for each family member. Please prescribe for lice treatment to [facility]. Zee PHAM

## 2019-10-09 NOTE — TELEPHONE ENCOUNTER
[facility] Family Medicine phone call message- general phone call:    Reason for call: Caller state she is at patient house and the family has lice. Requesting lice shampoo for patient. Please call caller back when Rx is ready.    Return call needed: Yes    OK to leave a message on voice mail? Yes    Primary language: Maryann      needed? Yes    Call taken on October 9, 2019 at 9:58 AM by Trace Rice

## 2019-10-30 ENCOUNTER — TELEPHONE (OUTPATIENT)
Dept: FAMILY MEDICINE | Facility: CLINIC | Age: 29
End: 2019-10-30
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
## 2019-10-30 NOTE — TELEPHONE ENCOUNTER
Incoming call from Aden ([employer] worker) to schedule Hayde for clinic visit appointment for a positive Mantoux. Aden reports he will provide transportation for upcoming clinic visit at that time I will plan on meeting with both Hayde and Aden.

## 2019-10-31 NOTE — TELEPHONE ENCOUNTER
Out going call made to Monse Huang to f/u on Positive Mantoux LMTCB to obtain additional information as to where/why mantoux was placed.

## 2019-11-01 DIAGNOSIS — K59.09 CHRONIC CONSTIPATION: Primary | ICD-10-CM

## 2019-11-01 RX ORDER — SENNA AND DOCUSATE SODIUM 50; 8.6 MG/1; MG/1
1 TABLET, FILM COATED ORAL AT BEDTIME
Qty: 90 TABLET | Refills: 3 | Status: SHIPPED | OUTPATIENT
Start: 2019-11-01 | End: 2020-11-20

## 2019-11-01 NOTE — TELEPHONE ENCOUNTER
Message to physician:     Date of last visit: 9/18/2019     Date of next visit if scheduled: Visit date not found       Last Comprehensive Metabolic Panel:  Sodium   Date Value Ref Range Status   02/20/2019 143.0 133.0 - 144.0 mmol/L Final     Potassium   Date Value Ref Range Status   02/20/2019 3.6 3.4 - 5.3 mmol/L Final     Chloride   Date Value Ref Range Status   02/20/2019 104.0 94.0 - 109.0 mmol/L Final     Carbon Dioxide   Date Value Ref Range Status   02/20/2019 28.0 20.0 - 32.0 mmol/L Final     Glucose   Date Value Ref Range Status   02/20/2019 84.0 60.0 - 109.0 mg/dL Final     Glucose Fasting   Date Value Ref Range Status   07/16/2019 80.0 51.0 - 109.0 mg/dL Final     Urea Nitrogen   Date Value Ref Range Status   02/20/2019 6.0 5.0 - 24.0 mg/dL Final     Creatinine   Date Value Ref Range Status   02/20/2019 0.8 0.6 - 1.3 mg/dL Final     Calcium   Date Value Ref Range Status   02/20/2019 9.3 8.5 - 10.4 mg/dL Final       BP Readings from Last 3 Encounters:   09/18/19 117/73   09/11/19 102/69   07/16/19 106/70       No results found for: A1C             Please complete refill and CLOSE ENCOUNTER.  Closing the encounter signifies the refill is complete.

## 2019-11-04 ENCOUNTER — OFFICE VISIT (OUTPATIENT)
Dept: FAMILY MEDICINE | Facility: CLINIC | Age: 29
End: 2019-11-04
Payer: COMMERCIAL

## 2019-11-04 VITALS
DIASTOLIC BLOOD PRESSURE: 70 MMHG | RESPIRATION RATE: 22 BRPM | WEIGHT: 152 LBS | HEIGHT: 57 IN | TEMPERATURE: 97.9 F | SYSTOLIC BLOOD PRESSURE: 110 MMHG | BODY MASS INDEX: 32.79 KG/M2 | HEART RATE: 76 BPM | OXYGEN SATURATION: 99 %

## 2019-11-04 DIAGNOSIS — Z23 NEED FOR PROPHYLACTIC VACCINATION AND INOCULATION AGAINST INFLUENZA: ICD-10-CM

## 2019-11-04 DIAGNOSIS — R76.11 MANTOUX: POSITIVE: Primary | ICD-10-CM

## 2019-11-04 ASSESSMENT — MIFFLIN-ST. JEOR: SCORE: 1288.35

## 2019-11-04 NOTE — LETTER
November 7, 2019      Hayde Macias  [address]   [place] [address]        To Whom It May Concern-    Hayde Macias was referred to our clinic for a negative mantoux but concerns for possibly positive TB given raised area at site of mantoux administration at the time of read.     I have evaluated her in clinic and have found her to be free of tuberculosis.  See testing below for your records.     Resulted Orders   TB Quantiferon Gold Plus ([employer])   Result Value Ref Range    QTF Result Negative Negative    QTF Interpretation       No interferon-gamma response to M. tuberculosis antigens was detected.  Infecton with M.   tuberculosis is unlikely.  A negative result alone does not exclude infection with M.   tuberculosis      QTF Nil 0.16 IU/mL    QTF Antigen TB1-NIL -0.06 IU/mL    QTF Antigen TB2-NIL -0.05 IU/mL    QTF Mitogen - Nil 7.72 IU/mL    Narrative    Test performed by:  [facility]  [address] [place] [address]       CXR Negative for evidence of active tuberculosis.   Symptoms inconsistent with diagnosis of tuberculosis.     At this time all work up for active or latent tuberculosis is negative.  Patient has no known active or latent disease and is safe to work in a healthcare setting from an infectious standpoint.      Thank you,        If you have any questions, please call the clinic to make an appointment.    Sincerely,    Jenna Lozada MD

## 2019-11-04 NOTE — PROGRESS NOTES
"       Chester LUJAN Say is a 26 year old  female with a significant past medical history of allergies, h pylori gastritis, and depression/PTSD who presents for    # Itchy eyes, seasonal allergies  - Has daily itchy eyes  - Has been prescribed cetirizine and benadryl  - Does not like benadryl due to drowsy side effect  - Goes back and forth on whether or not she is taking cetirizine  - Does use her fluticasone nasal spray every day with good relief of itchy nose and sneezing    # Epigastric Abdominal Pain  - Known H pylori positive tests and gastritis secondary to H pylori  - Brings in pill bottles.  Has 3 bottles of amoxicillin, 1 from May 2017.  1 bottle of clarithromycin from May 2017.  Has 3 bottles of omeprazole.    Pertinent ROS: Constitutional, HEENT, cardiovascular, pulmonary, gi and gu systems are negative, except as otherwise noted.           Objective   Vitals:    09/13/17 1356   BP: 105/72   BP Location: Right arm   Patient Position: Chair   Cuff Size: Adult Regular   Pulse: 76   Resp: 20   Temp: 98.1  F (36.7  C)   TempSrc: Oral   SpO2: 98%   Weight: 130 lb 12.8 oz (59.3 kg)   Height: 4' 8.75\" (144.1 cm)     Body mass index is 28.55 kg/(m^2).    GENERAL APPEARANCE: healthy, alert and no distress  EYES: Eyes grossly normal to inspection, mild scleral injection  HENT: ear canals and TM's normal, nose and mouth without ulcers or lesions, oropharynx clear and oral mucous membranes moist  NECK: no adenopathy, no asymmetry, masses, or scars and thyroid normal to palpation  RESP: lungs clear to auscultation - no rales, rhonchi or wheezes  CV: regular rates and rhythm, normal S1 S2, no S3 or S4, no murmur, click or rub, no peripheral edema and peripheral pulses strong  ABDOMEN: soft, nontender, no hepatosplenomegaly, no masses and bowel sounds normal  MS: no musculoskeletal defects are noted and gait is age appropriate without ataxia  SKIN: no suspicious lesions or rashes  NEURO: Normal strength and " tone, sensory exam grossly normal, mentation intact and speech normal  PSYCH: mentation appears normal and affect normal/bright           Assessment/Plan       (J30.1) Chronic seasonal allergic rhinitis due to pollen  (primary encounter diagnosis)  Comment:   Plan:         History of seasonal allergies.  Complaining of lack of effects, not taking prescribed cetirizine.  Complaining of side effects from benadryl.  Cetirizine placed in pill box.      (K29.70,  B96.81) Helicobacter positive gastritis  Comment:   Plan: Patient needs to complete her antibiotic course.  I asked our PharmD to present patient with a pill box for the next 2 weeks to complete this course.  In addition, she needs to take her omeprazole.  The 3 remaining bottles indicate to me she is not taking the medications.  Pill box may aid in this.      RTC in 2 weeks to confirm completion of treatment    Options for treatment and follow-up care were reviewed with the patient and/or guardian. Hayde LUJAN Say and/or guardian engaged in the decision making process and verbalized understanding of the options discussed and agreed with the final plan.    Carlos Manuel Rivas MD      Precepted today with: Bulmaro Lin MD   Muscle Hinge Flap Text: The defect edges were debeveled with a #15 scalpel blade.  Given the size, depth and location of the defect and the proximity to free margins a muscle hinge flap was deemed most appropriate.  Using a sterile surgical marker, an appropriate hinge flap was drawn incorporating the defect. The area thus outlined was incised with a #15 scalpel blade.  The skin margins were undermined to an appropriate distance in all directions utilizing iris scissors.

## 2019-11-04 NOTE — NURSING NOTE
Due to patient being non-English speaking/uses sign language, an  was used for this visit. Only for face-to-face interpretation by an external agency, date and length of interpretation can be found on the scanned worksheet.     name: Merlyn Bee.   Agency: [employer]  Language: Maryann   Telephone number: [phone]  Type of interpretation: Face-to-face, spoken

## 2019-11-04 NOTE — Clinical Note
Jarvis Brody    Great news.  No evidence TB.      Nicole can you print a copy of this letter for Hayde and send a copy to [facility]?    Thanks so much    Neville

## 2019-11-04 NOTE — PROGRESS NOTES
HPI:       Hayde Macias is a 28 year old  female who presents to address the following concerns:    Plan is a medically complex 28-year-old female presenting for positive tuberculosis screening through [facility].  Phone #[phone] fax [phone].  Patient referred to our clinic after reportedly positive mantle screen for tuberculosis.    Patient denies prolonged cough night sweats weight loss chest pain hemoptysis fever or chills or fatigue.  She reports that if anything she has been gaining weight of late.  Patient did bring in her reportedly positive mantle screen.  It was completed on the right arm on the 28th of October.  The test is reported as positive but induration was only 4 mm.  Per my review and confirmation a positive test is 10 mm or greater.  I have called the [facility] phone line to discuss this discrepancy.    Patient reports that she feels in her normal state of health.  She would like a flu shot today.             PMHX:     Patient Active Problem List   Diagnosis     Health Care Home     Recurrent major depressive disorder (H)     Seasonal allergic rhinitis     PTSD (post-traumatic stress disorder)     BRENDA (generalized anxiety disorder)     Insomnia due to other mental disorder     Major depressive disorder, recurrent episode, moderate (H)     Class 1 obesity without serious comorbidity with body mass index (BMI) of 32.0 to 32.9 in adult, unspecified obesity type     Screening for malignant neoplasm of cervix       Current Outpatient Medications   Medication Sig Dispense Refill     acetaminophen (TYLENOL) 325 MG tablet Take 2 tablets (650 mg) by mouth every 6 hours as needed for headaches 100 tablet 1     calcium carbonate (TUMS) 500 MG chewable tablet Take 1 tablet (500 mg) by mouth 2 times daily as needed for heartburn 150 tablet 3     cetirizine (ZYRTEC) 10 MG tablet Take 1 tablet (10 mg) by mouth daily 30 tablet 11     docusate sodium (COLACE) 100 MG  tablet Take 2 tablets (200 mg) by mouth daily 90 tablet 3     fluticasone (FLONASE) 50 MCG/ACT nasal spray Spray 1-2 sprays into both nostrils daily as needed for allergies 16 mL 1     hydrocortisone (CORTAID) 1 % external cream Apply topically 2 times daily As needed for skin rash/irritation 45 g 1     ibuprofen (ADVIL/MOTRIN) 600 MG tablet Take 1 tablet (600 mg) by mouth daily as needed (back pain)       ketotifen (ZADITOR) 0.025 % ophthalmic solution Place 1 drop into both eyes every 12 hours 1 Bottle 3     montelukast (SINGULAIR) 10 MG tablet Take 1 tablet (10 mg) by mouth daily 90 tablet 3     norethindrone-ethinyl estradiol (MICROGESTIN 1.5/30) 1.5-30 MG-MCG tablet Take 1 tablet by mouth daily 84 tablet 4     permethrin (NIX) 1 % external liquid Apply to clean, towel-dried hair, saturate hair and scalp, wash off after 10 min. 120 mL 1     polyethylene glycol (MIRALAX) powder Take 17 g (1 capful) by mouth daily as needed for constipation 510 g 1     prazosin (MINIPRESS) 1 MG capsule Take 2 capsules (2 mg) by mouth At Bedtime 90 capsule 3     ranitidine (ZANTAC) 150 MG tablet Take 1 tablet (150 mg) by mouth At Bedtime 90 tablet 3     risperiDONE (RISPERDAL) 2 MG tablet Take 1 tablet (2 mg) by mouth At Bedtime 30 tablet 3     SENNA-docusate sodium (SENNA S) 8.6-50 MG tablet Take 1 tablet by mouth At Bedtime 90 tablet 3     venlafaxine (EFFEXOR-XR) 150 MG 24 hr capsule Take 1 capsule by mouth daily (together with 75 mg capsule for total of 225 mg/day) 90 capsule 1     venlafaxine (EFFEXOR-XR) 75 MG 24 hr capsule Take 1 capsule by mouth daily (together with 150 mg capsule for total of 225 mg/day) 90 capsule 1          Allergies   Allergen Reactions     Augmentin Rash       No results found for this or any previous visit (from the past 24 hour(s)).    Current Outpatient Medications   Medication     acetaminophen (TYLENOL) 325 MG tablet     calcium carbonate (TUMS) 500 MG chewable tablet     cetirizine (ZYRTEC) 10 MG  "tablet     docusate sodium (COLACE) 100 MG tablet     fluticasone (FLONASE) 50 MCG/ACT nasal spray     hydrocortisone (CORTAID) 1 % external cream     ibuprofen (ADVIL/MOTRIN) 600 MG tablet     ketotifen (ZADITOR) 0.025 % ophthalmic solution     montelukast (SINGULAIR) 10 MG tablet     norethindrone-ethinyl estradiol (MICROGESTIN 1.5/30) 1.5-30 MG-MCG tablet     permethrin (NIX) 1 % external liquid     polyethylene glycol (MIRALAX) powder     prazosin (MINIPRESS) 1 MG capsule     ranitidine (ZANTAC) 150 MG tablet     risperiDONE (RISPERDAL) 2 MG tablet     SENNA-docusate sodium (SENNA S) 8.6-50 MG tablet     venlafaxine (EFFEXOR-XR) 150 MG 24 hr capsule     venlafaxine (EFFEXOR-XR) 75 MG 24 hr capsule     No current facility-administered medications for this visit.               Review of Systems:   ROS as described above.  Denies F/S/C/N/V/SOB/CP          Physical Exam:     /70   Pulse 76   Temp 97.9  F (36.6  C)   Resp 22   Ht 1.448 m (4' 9\")   Wt 68.9 kg (152 lb)   SpO2 99%   BMI 32.89 kg/m      GEN: patient sitting comfortably in NAD  HEEN: Head is atraumatic, normocephalic, eyes anicteric, mucous membranes moist  CV: RRR w/o M/R/G  PULM: CTAB without w/r/r  ABD: soft, nontender, bowel sounds present  NEURO: Alert and oriented x3.  No focal motor abnormalities.  Face symmetric.  PSYCH: appropriate  SKIN: No rashes, bruising, or other lesions      Assessment and Plan     1. Mantoux: reported positive.  By my understanding of \"positive\" reads the patients mantoux would not have een positive based on 4mm of induration.  Called to clarify with provider who read the mantoux but she is not available.  Given this will move forward with CXR and quantiferon gold.  Patient with no current symptoms concerning for TB  - XR CHEST 2 VW  - TB Quantiferon Gold Plus (AGNITiO)    Note: after visit received call back from reader of mantoux. At time of read area was apparently significantly raised which caused " concern for reader, but diameter of wheal was confirmed at 4mm     Fax summary to: [phone] Hsa Jelani    Flu shot today    Options for treatment and follow-up care were reviewed with the patient and/or guardian. Hayde LUJAN Say and/or guardian engaged in the decision making process and verbalized understanding of the options discussed and agreed with the final plan.    Jenna Lozada MD

## 2019-11-04 NOTE — NURSING NOTE
Face to face meeting with a Maryann , Hayde and Shawn worker SUSANNAH. Hayde reports in general things are not good voices concerns with pain in her chest when she is coughing. Per paperwork provided from [employer] in [place]. 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 Hayde has no additional questions or concerns at this time.     SUSANNAH [employer] worker reports Hayde has been more accepting of all services being provided to her. He confirms patient is still receiving both Psychology and Psychiatric care within [place], taking all medications daily and mostly caring for herself. He also reports patient at times is very difficult to deal with and at times needs to be reminded what services she is receiving and what the expectation is for her to continue receiving services provided. Shawn will continue to provide services and has requested moving forward that I schedule transportation only for [place] appointments or for the first appt if needing to refer patient to specialty.

## 2019-11-05 ENCOUNTER — TELEPHONE (OUTPATIENT)
Dept: FAMILY MEDICINE | Facility: CLINIC | Age: 29
End: 2019-11-05

## 2019-11-05 NOTE — TELEPHONE ENCOUNTER
Mantoux follow with PCP clinic visit note faxed/confirmed to [facility] [phone]. Original TB screen report has been sent to HIM for scan.

## 2019-11-06 DIAGNOSIS — K21.9 GASTROESOPHAGEAL REFLUX DISEASE, ESOPHAGITIS PRESENCE NOT SPECIFIED: ICD-10-CM

## 2019-11-06 DIAGNOSIS — G44.219 EPISODIC TENSION-TYPE HEADACHE, NOT INTRACTABLE: ICD-10-CM

## 2019-11-06 LAB
QTF ANTIGEN TB1-NIL: -0.06 IU/ML
QTF ANTIGEN TB2-NIL: -0.05 IU/ML
QTF INTERPRETATION: NORMAL
QTF MITOGEN - NIL: 7.72 IU/ML
QTF NIL: 0.16 IU/ML
QTF RESULT: NEGATIVE

## 2019-11-06 RX ORDER — ACETAMINOPHEN 325 MG/1
650 TABLET ORAL EVERY 6 HOURS PRN
Qty: 100 TABLET | Refills: 3 | Status: SHIPPED | OUTPATIENT
Start: 2019-11-06 | End: 2020-11-11

## 2019-11-07 ENCOUNTER — TELEPHONE (OUTPATIENT)
Dept: FAMILY MEDICINE | Facility: CLINIC | Age: 29
End: 2019-11-07

## 2019-11-07 NOTE — TELEPHONE ENCOUNTER
[facility] Follow-up  )  Call initiated by clinic using a Maryann  ([phone]  Message recorded by Nicole Estrella  Calling for: [facility] Monthly and Post Food Rx Survey  Patient: Hayde LUJAN Say  Phone conversation with: Patient  Patient's Phone number/s: Home number on file [phone] (home)        Major diagnoses and/or issues requiring coordination services  PTSD, Major Depression and mental health disorder    In the past month, how many times have you been to the Emergency Room? 0  In the past month, how many times have you been hospitalized? 0    Summary notes:   Total time spent on phone 39 minutes.    Reviewed/completed post survey. During conversation patient voices multiple concerns and complaints with regards to services provided by DENNIS Escobar, Monse Huang (Public Health nurse), Leanne (cps). Hayde has encouraged/redirected to call her care team to discuss additional services. Multiple attempts made to redirect conversation for [facility] review which was unsuccessful.  Patient has been advised she must contact her [place] care team to discuss her multiple concerns and needs.     Lastly, patient is requesting a refill on her Tylenol. She reports being completely out of this medication and needs it now due to severe headache. Patient advised this is a over the counter medication that can be purchased at most stores should she need it sooner than 24-72. Patient is not agreeable with this time frame and reports she is unable to go out and buy the tylenol as she cannot read and has no means of transportation. Reiterated clinic refill policy and suggested a family member or  drive her. Patient would like to be notified once refill is completed.          Completed Self-management goals:  Deferred reveiw  Readiness to change: Deferred    Counseling and coordination activities with:  or    Follow-up date: 12/7/19 PRN

## 2019-11-07 NOTE — TELEPHONE ENCOUNTER
TB results letter mailed to patient's home address listed in chart, Results also faxed/confirmed to [facility] [phone]

## 2019-11-13 ENCOUNTER — TELEPHONE (OUTPATIENT)
Dept: FAMILY MEDICINE | Facility: CLINIC | Age: 29
End: 2019-11-13

## 2019-11-13 NOTE — TELEPHONE ENCOUNTER
Incoming voicemail received from Monse Huang requesting assistance unfortunately, message is distorted and unable to understand request.   Out going call made to Monse to request repeat of message, LMTCB.

## 2019-11-18 DIAGNOSIS — F43.10 PTSD (POST-TRAUMATIC STRESS DISORDER): ICD-10-CM

## 2019-11-18 DIAGNOSIS — F33.1 MAJOR DEPRESSIVE DISORDER, RECURRENT EPISODE, MODERATE (H): ICD-10-CM

## 2019-11-18 RX ORDER — VENLAFAXINE HYDROCHLORIDE 75 MG/1
CAPSULE, EXTENDED RELEASE ORAL
Qty: 30 CAPSULE | OUTPATIENT
Start: 2019-11-18

## 2019-11-19 DIAGNOSIS — G44.209 TENSION HEADACHE: Primary | ICD-10-CM

## 2019-11-19 NOTE — TELEPHONE ENCOUNTER
Public Health nurse called requesting patient be prescribed Excedrin rather than tylenol for headaches. Tylenol not effective per patient and public health nurse wondering if caffeine in Excedrin will help. Will route request to PCP. Zee PHAM

## 2019-12-09 ENCOUNTER — TELEPHONE (OUTPATIENT)
Dept: FAMILY MEDICINE | Facility: CLINIC | Age: 29
End: 2019-12-09

## 2019-12-09 ENCOUNTER — OFFICE VISIT (OUTPATIENT)
Dept: FAMILY MEDICINE | Facility: CLINIC | Age: 29
End: 2019-12-09
Payer: COMMERCIAL

## 2019-12-09 VITALS
DIASTOLIC BLOOD PRESSURE: 65 MMHG | SYSTOLIC BLOOD PRESSURE: 105 MMHG | TEMPERATURE: 98.2 F | BODY MASS INDEX: 33.89 KG/M2 | HEART RATE: 93 BPM | WEIGHT: 156.6 LBS

## 2019-12-09 DIAGNOSIS — Z30.42 DEPO-PROVERA CONTRACEPTIVE STATUS: ICD-10-CM

## 2019-12-09 DIAGNOSIS — G44.229 CHRONIC TENSION-TYPE HEADACHE, NOT INTRACTABLE: Primary | ICD-10-CM
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
## 2019-12-09 RX ORDER — MEDROXYPROGESTERONE ACETATE 150 MG/ML
150 INJECTION, SUSPENSION INTRAMUSCULAR
Status: ACTIVE | OUTPATIENT
Start: 2019-12-09

## 2019-12-09 RX ORDER — BENZOCAINE/MENTHOL 6 MG-10 MG
LOZENGE MUCOUS MEMBRANE 2 TIMES DAILY PRN
Qty: 60 G | Refills: 0 | Status: SHIPPED | OUTPATIENT
Start: 2019-12-09 | End: 2020-08-05

## 2019-12-09 RX ADMIN — MEDROXYPROGESTERONE ACETATE 150 MG: 150 INJECTION, SUSPENSION INTRAMUSCULAR at 14:00

## 2019-12-09 NOTE — PROGRESS NOTES
Preceptor Attestation:   Patient seen, evaluated and discussed with the resident. I have verified the content of the note, which accurately reflects my assessment of the patient and the plan of care.   Supervising Physician:  Balta Marie MD

## 2019-12-09 NOTE — PROGRESS NOTES
Subjective:   Hayde Macias is a 29 year old year old female who presents to clinic today for the following health issues:    Chief Complaint   Patient presents with     Contraception     Medication Reconciliation     Headaches:  Daily bitemporal headaches. Occur daily, frequently throughout the day. Taking tylenol and ibuprofen PRN with no improvement. Drinking 4 cups of water per day. Has seen an eye doctor in the past year and was told to wear glasses but she doesn't want to. No vision changes, N/V, photophobia. Stressed at home with multiple children.     Depo:  Nexplanon removed 9/18/19, received depo at that time. Desires depo today.      Bed bugs:  Continues to have bed bugs, although apartment has been treated multiple times. Needs refill of itch cream.     A Maryann  was used for this visit.         PMHX:   PAST MEDICAL HISTORY:  Patient Active Problem List   Diagnosis     Health Care Home     Recurrent major depressive disorder (H)     Seasonal allergic rhinitis     PTSD (post-traumatic stress disorder)     BRENDA (generalized anxiety disorder)     Insomnia due to other mental disorder     Major depressive disorder, recurrent episode, moderate (H)     Class 1 obesity without serious comorbidity with body mass index (BMI) of 32.0 to 32.9 in adult, unspecified obesity type     Screening for malignant neoplasm of cervix     Depo-Provera contraceptive status     CURRENT MEDICATIONS:  Current Outpatient Medications   Medication Sig Dispense Refill     hydrocortisone (CORTAID) 1 % external cream Apply topically 2 times daily as needed for itching 60 g 0     acetaminophen (TYLENOL) 325 MG tablet Take 2 tablets (650 mg) by mouth every 6 hours as needed for headaches 100 tablet 3     aspirin-acetaminophen-caffeine (EXCEDRIN MIGRAINE) 250-250-65 MG tablet Take 1 tablet by mouth every 8 hours as needed for headaches 30 tablet 1     calcium carbonate (TUMS) 500 MG chewable tablet Take 1 tablet (500 mg) by  mouth 2 times daily as needed for heartburn 150 tablet 3     cetirizine (ZYRTEC) 10 MG tablet Take 1 tablet (10 mg) by mouth daily 30 tablet 11     docusate sodium (COLACE) 100 MG tablet Take 2 tablets (200 mg) by mouth daily 90 tablet 3     fluticasone (FLONASE) 50 MCG/ACT nasal spray Spray 1-2 sprays into both nostrils daily as needed for allergies 16 mL 1     hydrocortisone (CORTAID) 1 % external cream Apply topically 2 times daily As needed for skin rash/irritation 45 g 1     ibuprofen (ADVIL/MOTRIN) 600 MG tablet Take 1 tablet (600 mg) by mouth daily as needed (back pain)       ketotifen (ZADITOR) 0.025 % ophthalmic solution Place 1 drop into both eyes every 12 hours 1 Bottle 3     montelukast (SINGULAIR) 10 MG tablet Take 1 tablet (10 mg) by mouth daily 90 tablet 3     permethrin (NIX) 1 % external liquid Apply to clean, towel-dried hair, saturate hair and scalp, wash off after 10 min. 120 mL 1     polyethylene glycol (MIRALAX) powder Take 17 g (1 capful) by mouth daily as needed for constipation 510 g 1     prazosin (MINIPRESS) 1 MG capsule Take 2 capsules (2 mg) by mouth At Bedtime 90 capsule 3     ranitidine (ZANTAC) 150 MG tablet Take 1 tablet (150 mg) by mouth At Bedtime 90 tablet 3     risperiDONE (RISPERDAL) 2 MG tablet Take 1 tablet (2 mg) by mouth At Bedtime 30 tablet 3     SENNA-docusate sodium (SENNA S) 8.6-50 MG tablet Take 1 tablet by mouth At Bedtime 90 tablet 3     venlafaxine (EFFEXOR-XR) 150 MG 24 hr capsule Take 1 capsule by mouth daily (together with 75 mg capsule for total of 225 mg/day) 90 capsule 1     venlafaxine (EFFEXOR-XR) 75 MG 24 hr capsule Take 1 capsule by mouth daily (together with 150 mg capsule for total of 225 mg/day) 90 capsule 1     ALLERGIES:     Allergies   Allergen Reactions     Augmentin Rash            Objective:     Vitals:    12/09/19 1327   BP: 105/65   Pulse: 93   Temp: 98.2  F (36.8  C)   TempSrc: Oral   Weight: 71 kg (156 lb 9.6 oz)     Body mass index is 33.89  kg/m .  No results found for this or any previous visit (from the past 24 hour(s)).    General: Alert, well-appearing female in NAD  Pulm: CTA BL, no tachypnea  CV: RRR, no murmur  Neuro:  Awake, alert, responsive to voice, follows commands. Clear coherent speech. No facial droop. No dysarthria or word finding difficulties. CNII-XII intact. Good  strength. 5/5 strength of elbow, hip, and ankle flexion and extension. Light touch sensation of upper and lower extremities intact. No gait ataxia.   Psych: Mood appropriate to visit content, full affect, rational thought content and process    Assessment and Plan:   1. Chronic tension-type headache  Chronic daily headaches for years. No perceived improvement with tylenol or ibuprofen. No red flag symptoms and normal neuro exam again today. Discussed suspect related to underlying stressors and reviewed relaxation techniques. Also recommended wearing glasses that have been prescribed to her to prevent eye strain. Agreeable to meet with Headache specialist at [facility] to use excedrin as needed, though discussed appropriate use of this medication which should not be taken regularly.   - NEUROLOGY ADULT REFERRAL; Future    2. Depo-Provera contraceptive status  Patient wishes to continue with depo for contraception. Aware this needs to be repeated every 3 months to prevent pregnancy.  12/9/2019  Plan Documentation  Service ordered Depo Provera injection (150mg IM) may be given every 3 months for one year per protoccol.  Plan and order should be renewed one year from 12/9/2019 .  Ordered by Rosana Rosales DO.    3. Bedbug bite, subsequent encounter  Requesting refill of cream. Apartment building aware of bedbugs, which are actively being treated.   - hydrocortisone (CORTAID) 1 % external cream; Apply topically 2 times daily as needed for itching  Dispense: 60 g; Refill: 0    Follow up: 3 months for depo    Options for treatment and follow-up care were reviewed with the  patient and/or guardian. Hayde LUJAN Say and/or guardian engaged in the decision making process and verbalized understanding of the options discussed and agreed with the final plan.    Rosana Rosales DO  [facility] Family Medicine Resident    Precepted with: Dr. Tong

## 2019-12-09 NOTE — TELEPHONE ENCOUNTER
"[facility] Follow-up    Face to face clinic visit  Message recorded by Nicole Estrella  [facility] Monthly  Patient: Hayde LUJAN Say   Conversation with Hayde        Major diagnoses and/or issues requiring coordination services:  PTSD, Major Depression and mental health disorder      In the past month, how many times have you been to the Emergency Room? 0  In the past month, how many times have you been hospitalized? 0    Summary notes: Face to face meeting using a Maryann . Hayde reports apartment is still infested with bedbugs and getting worse, she reports not getting much sleeping  due to bed bug bites. Has multiple complaints/concerns with regards to her children and the lack of support.  Patient requesting assistance with reading a letter she recently received  from [employer] notifying her of a upcoming appointment on 12/17/2019 10:30 am to discuss results of John's IEP.   Patient is requesting assistance with transportation to this meeting, I have directed her to discuss this need with her [employer] worker as Francisco Rid is strictly for medical appointments only.    Hayde reports feeling overwhelmed/fatigue with \"everything\" and feels she needs additional help as she believes [place] and [facility] are not providing her with enough assistance. Hayde has been encourage to speak with her care team.    Discussed the importance of learning to care for herself and her family as the services she receives are meant to teach her life skills to learn to live somewhat independently. Patient declines and reports \"it's to much\".     Lastly, patient is requesting assistance finding winter boots for herself and her family. I attempted to provide her with resource information for winter clothing and holiday support  ([facility] and [facility] [employer]) which she requesting I contact them on her behalf.  Patient encouraged to talk with her [employer] worker to request assistance with reaching out to the [facility] " [employer]. Patient declines and request I reach out on her behalf..  This is also declined and redirected to her care team.       Self-management goals: Contact Shawn, [employer], [facility] to discuss/request additional assistance.     Readiness to change: No, feeling overwhelmed     Counseling and coordination activities with: , county worker, public health nurse, Shawn.    Follow-up date: 01/12/2020 or PRN with PCP

## 2019-12-11 ENCOUNTER — TELEPHONE (OUTPATIENT)
Dept: FAMILY MEDICINE | Facility: CLINIC | Age: 29
End: 2019-12-11

## 2019-12-11 NOTE — TELEPHONE ENCOUNTER
Felipe called back and she stated patient received her excedrin and that she was told she would get another medicine to help with the pain as well. I spoke to Dr Rosales she informed me she informed patient she would not do that but did place a referral to neurology and she needs to be assessed from there.    Also Felipe informed me she is retiring on 12/31/19 and a new [facility] nurse will be taking over and to inform Nicole DURAN Her name is Maryann Mclean ph: [phone]

## 2019-12-11 NOTE — TELEPHONE ENCOUNTER
I called [facility] and spoke to Hugh the pharmacist and also spoke to Shania their tech and she stated they did receive the Rx and that it was sent to [facility] on 11/20/19. She stated it was only a 10 day supply though so maybe patient needs another refill (there are only 2 fills per patients chart). I informed Shania to fill medication and send out as well. I called Odalis at [facility] [phone] and left message as pharmacy did state they in fact sent the Excedrin out the 20th of November and if they delivered it to the patient taht day. Waiting for further information from Odalis.

## 2019-12-11 NOTE — TELEPHONE ENCOUNTER
[facility] phone call message- medication clarification/question:    Full Medication Name: PAIN MEDICATION      Question: Felipe states during the patients most recent visit the provider stated they were going to send a pain medication to the pharmacy. Felipe states they called pharmacy and no prescription has been sent. Please call and advise.      Pharmacy confirmed as    [facility] - [place], [place] - [facility] [address]: Yes    OK to leave a message on voice mail? Yes    Primary language: Maryann      needed? Yes    Call taken on December 11, 2019 at 10:57 AM by Hallie Weeks

## 2019-12-11 NOTE — TELEPHONE ENCOUNTER
I have also called Felipe rice as well from [facility] to inform her what is going on left vmail

## 2019-12-12 ENCOUNTER — TELEPHONE (OUTPATIENT)
Dept: FAMILY MEDICINE | Facility: CLINIC | Age: 29
End: 2019-12-12

## 2019-12-12 NOTE — TELEPHONE ENCOUNTER
Neurology appt scheduled:    [facility] Neurology for 01/29/2020 @ 10:15 am  [address]  [facility], [address]  [phone]  Unable to schedule transportation w/ [employer] [phone] until after 12/30/2019.    Out going call made to Hayde using a Maryann   ([id number]) to inform, detailed message left with appointment information.

## 2020-01-20 ENCOUNTER — TELEPHONE (OUTPATIENT)
Dept: FAMILY MEDICINE | Facility: CLINIC | Age: 30
End: 2020-01-20

## 2020-01-20 NOTE — TELEPHONE ENCOUNTER
Transportation has been scheduled for 1/30/2020 both mother and child. [facility] Trip# [id number].           Transportation scheduled for 1/29/2020 @ 10:15 Neurology appt. transportation scheduled Trip# [id number], Unknown cab provider at this time.

## 2020-01-24 DIAGNOSIS — K59.00 CONSTIPATION, UNSPECIFIED CONSTIPATION TYPE: ICD-10-CM

## 2020-01-27 RX ORDER — ASPIRIN 81 MG
200 TABLET, DELAYED RELEASE (ENTERIC COATED) ORAL DAILY
Qty: 180 TABLET | Refills: 3 | Status: SHIPPED | OUTPATIENT
Start: 2020-01-27 | End: 2021-02-19

## 2020-01-30 ENCOUNTER — TELEPHONE (OUTPATIENT)
Dept: FAMILY MEDICINE | Facility: CLINIC | Age: 30
End: 2020-01-30

## 2020-01-30 ENCOUNTER — OFFICE VISIT (OUTPATIENT)
Dept: FAMILY MEDICINE | Facility: CLINIC | Age: 30
End: 2020-01-30
Payer: COMMERCIAL

## 2020-01-30 VITALS
HEART RATE: 81 BPM | SYSTOLIC BLOOD PRESSURE: 95 MMHG | DIASTOLIC BLOOD PRESSURE: 64 MMHG | OXYGEN SATURATION: 97 % | RESPIRATION RATE: 16 BRPM | TEMPERATURE: 98.6 F

## 2020-01-30 DIAGNOSIS — G44.229 CHRONIC TENSION-TYPE HEADACHE, NOT INTRACTABLE: Primary | ICD-10-CM

## 2020-01-30 DIAGNOSIS — Z13.9 ENCOUNTER FOR SCREENING INVOLVING SOCIAL DETERMINANTS OF HEALTH (SDOH): ICD-10-CM

## 2020-01-30 NOTE — PROGRESS NOTES
"       Subjective:   Hayde Macias is a 29 year old year old female who presents to clinic today for the following health issues:    Chief Complaint   Patient presents with     Headache     Medication Reconciliation     Headaches:  Daily bitemporal headaches for years, currently has headache as well. Saw headache specialist yesterday. They told her she has migraines and that she needed to get her \"jaw tested.\" Was given a paper prescription but unsure of the name of the medication. Has not started them yet. She has been increasing her water intake.     Social factors:  Getting a lot of paperwork in the mail that she doesn't know what to do with. Used to have a nurse come out to the home but she hasn't been coming. She reported she was retiring and a new nurse would be coming. Last visit was about 1-2 months ago. Other stressors include: the [place] citizenship process because she does not speak or read English, along with frequent bills that she does not know if she can pay.     A CRV  was used for this visit.         PMHX:   PAST MEDICAL HISTORY:  Patient Active Problem List   Diagnosis     Health Care Home     Recurrent major depressive disorder (H)     Seasonal allergic rhinitis     PTSD (post-traumatic stress disorder)     BRENDA (generalized anxiety disorder)     Insomnia due to other mental disorder     Major depressive disorder, recurrent episode, moderate (H)     Class 1 obesity without serious comorbidity with body mass index (BMI) of 32.0 to 32.9 in adult, unspecified obesity type     Screening for malignant neoplasm of cervix     Depo-Provera contraceptive status     CURRENT MEDICATIONS:  Current Outpatient Medications   Medication Sig Dispense Refill     acetaminophen (TYLENOL) 325 MG tablet Take 2 tablets (650 mg) by mouth every 6 hours as needed for headaches 100 tablet 3     aspirin-acetaminophen-caffeine (EXCEDRIN MIGRAINE) 250-250-65 MG tablet Take 1 tablet by mouth every 8 hours as needed for " headaches 30 tablet 1     calcium carbonate (TUMS) 500 MG chewable tablet Take 1 tablet (500 mg) by mouth 2 times daily as needed for heartburn 150 tablet 3     cetirizine (ZYRTEC) 10 MG tablet Take 1 tablet (10 mg) by mouth daily 30 tablet 11     docusate sodium (COLACE) 100 MG tablet Take 2 tablets (200 mg) by mouth daily 180 tablet 3     fluticasone (FLONASE) 50 MCG/ACT nasal spray Spray 1-2 sprays into both nostrils daily as needed for allergies 16 mL 1     hydrocortisone (CORTAID) 1 % external cream Apply topically 2 times daily as needed for itching 60 g 0     hydrocortisone (CORTAID) 1 % external cream Apply topically 2 times daily As needed for skin rash/irritation 45 g 1     ibuprofen (ADVIL/MOTRIN) 600 MG tablet Take 1 tablet (600 mg) by mouth daily as needed (back pain)       ketotifen (ZADITOR) 0.025 % ophthalmic solution Place 1 drop into both eyes every 12 hours 1 Bottle 3     montelukast (SINGULAIR) 10 MG tablet Take 1 tablet (10 mg) by mouth daily 90 tablet 3     permethrin (NIX) 1 % external liquid Apply to clean, towel-dried hair, saturate hair and scalp, wash off after 10 min. 120 mL 1     polyethylene glycol (MIRALAX) powder Take 17 g (1 capful) by mouth daily as needed for constipation 510 g 1     prazosin (MINIPRESS) 1 MG capsule Take 2 capsules (2 mg) by mouth At Bedtime 90 capsule 3     ranitidine (ZANTAC) 150 MG tablet Take 1 tablet (150 mg) by mouth At Bedtime 90 tablet 3     risperiDONE (RISPERDAL) 2 MG tablet Take 1 tablet (2 mg) by mouth At Bedtime 30 tablet 3     SENNA-docusate sodium (SENNA S) 8.6-50 MG tablet Take 1 tablet by mouth At Bedtime 90 tablet 3     venlafaxine (EFFEXOR-XR) 150 MG 24 hr capsule Take 1 capsule by mouth daily (together with 75 mg capsule for total of 225 mg/day) 90 capsule 1     venlafaxine (EFFEXOR-XR) 75 MG 24 hr capsule Take 1 capsule by mouth daily (together with 150 mg capsule for total of 225 mg/day) 90 capsule 1     ALLERGIES:     Allergies   Allergen  Reactions     Augmentin Rash            Review of Systems:    ROS: 10 point ROS neg other than the symptoms noted above in the HPI.         Objective:     Vitals:    01/30/20 0846   BP: 95/64   Pulse: 81   Resp: 16   Temp: 98.6  F (37  C)   TempSrc: Oral   SpO2: 97%     There is no height or weight on file to calculate BMI.  No results found for this or any previous visit (from the past 24 hour(s)).    General: Alert female with child at side, squinting eyes due to current headache  Pulm: CTA BL, no tachypnea  CV: RRR, no murmur  Neuro: CN 2-12 grossly intact, moving all extremities, pupils equal round and reactive.   Psych: Mood appropriate to visit content, full affect, rational thought content and process    Assessment and Plan:   1. Chronic tension-type headache, not intractable  Referred at prior visit to headache specialist, saw Dr. Eaton at [facility] yesterday. Per chart review, she was asked to start magnesium (has not started yet), decrease tylenol/ibuprofen to 1-2 days per week to avoid rebound headaches, and to follow up in TMJ clinic. She was asked to follow up with them in 12 weeks.     2. Encounter for screening involving social determinants of health (SDoH)  Hayde continues to have multiple social determinants including difficulty understanding mailed paperwork, paying bills, applying for citizenship. She also previously had a home nurse come for frequent visits but this unexpectedly stopped per her report.  Although she has a county , I will ask our social worked to touch base with that person to ensure she is still receiving the support she needs.    Options for treatment and follow-up care were reviewed with the patient and/or guardian. Hayde LUJAN Say and/or guardian engaged in the decision making process and verbalized understanding of the options discussed and agreed with the final plan.    Rosana Rosales DO  [facility] Resident    Precepted with: Kenya  MD Sheree

## 2020-01-30 NOTE — NURSING NOTE
Due to patient being non-English speaking/uses sign language, an  was used for this visit. Only for face-to-face interpretation by an external agency, date and length of interpretation can be found on the scanned worksheet.     name: ID [id number] & [id number] (dea interp disconnected)  Agency: [employer] - iPad  Language: Maryann   Telephone number: NA  Type of interpretation: IPad

## 2020-01-30 NOTE — TELEPHONE ENCOUNTER
Contacted [employer] ([phone]) and let them know pt was ready to be picked up.

## 2020-01-30 NOTE — TELEPHONE ENCOUNTER
Contacted [employer] ([phone]) and received contact info for [employer] [place] transportation: [phone].

## 2020-01-30 NOTE — TELEPHONE ENCOUNTER
Called [employer] to . Was informed that pt was not scheduled to ride w/ [employer]. Was sometimes insurance will say they schedule w/ one vendor but end up doing it w/ someone else.

## 2020-01-30 NOTE — PROGRESS NOTES
Preceptor Attestation:  Patient's case reviewed and discussed with Rosana Rosales DO resident and I evaluated the patient. I agree with written assessment and plan of care.  Supervising Physician:  CLAUDIA LONGORIA MD  [facility]
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
## 2020-02-10 ENCOUNTER — TELEPHONE (OUTPATIENT)
Dept: FAMILY MEDICINE | Facility: CLINIC | Age: 30
End: 2020-02-10

## 2020-02-10 NOTE — TELEPHONE ENCOUNTER
VM left by Krzysztof,  for pt, who wanted to verify that Osiris had transportation for 2/11 appt.     VLAD called and verified [employer] would pick-up pt and Yudyart between 11:45 AM and 12:15 PM for the 1:00 PM appt @ [facility]. Called Krzysztof back @ [phone] and verified transportation. Krzysztof said he would let pt know.

## 2020-02-11 ENCOUNTER — TELEPHONE (OUTPATIENT)
Dept: FAMILY MEDICINE | Facility: CLINIC | Age: 30
End: 2020-02-11

## 2020-02-11 NOTE — TELEPHONE ENCOUNTER
"Incoming call received from SUSANNAH (Shawn) to discuss patient status. Asa reports overall things are going better. Discussed recent face to face meeting with Hayde whom reported \"not getting help\".   Per ASA patient is receiving multiple services from multiple agencies. Asa reports he sees Hayde on a consistent basis at least once every other day to assist with her needs. Asa reports patient \"does not seem to be getting any better\" as she no longer does anything for herself such as cleaning the house, tending to her children, Hayde wears her  Pj's daily, clothing choices not appropriate to seasons, no wanting to parenting and wanting her immigration processed asap however, refuses to assist with the process as she reports \"don't know how to read\" and/or understand.  Asa reports the current status is unknown to him at this time but will check personally.    Shawn team(Susannah) requesting [facility] continue to focus on medical only and or referred services ordered here at clinic. Per ASA: Orestes is/has been receiving services daily from someone at [facility], she also has 2 home nurses to assist with patient's needs (medication mgmt), Currently sees mental health therapy once a week, Family has been approved for [employer] and will be \"shopping for their needs\" in a couple of weeks as family will be moving 2nd floor of her building beginning March 1, 2020.   Per SUSANNAH Hayde's needs \"are met far more then most clients of [facility]\". He voices concerns of patient continually reporting she receives no services and believes she may be going backwards as \"everything\" has/is being done for her.     Susannah would like to inform (myself) that his focus is Hayde and not the care of her children as she is receiving additional resources from other outside agency's.   "

## 2020-02-12 ENCOUNTER — TELEPHONE (OUTPATIENT)
Dept: FAMILY MEDICINE | Facility: CLINIC | Age: 30
End: 2020-02-12

## 2020-02-24 DIAGNOSIS — K21.9 GASTROESOPHAGEAL REFLUX DISEASE, ESOPHAGITIS PRESENCE NOT SPECIFIED: ICD-10-CM

## 2020-02-25 ENCOUNTER — TELEPHONE (OUTPATIENT)
Dept: FAMILY MEDICINE | Facility: CLINIC | Age: 30
End: 2020-02-25

## 2020-02-25 NOTE — TELEPHONE ENCOUNTER
Due to patient being non-English speaking/uses sign language, an  was used for this visit. Only for face-to-face interpretation by an external agency, date and length of interpretation can be found on the scanned worksheet.     name: [id number]  Agency: [employer] - telephone  Language: Maryann   Telephone number: -  Type of interpretation: Telephone, spoken     MARY Amaya

## 2020-03-02 ENCOUNTER — ALLIED HEALTH/NURSE VISIT (OUTPATIENT)
Dept: FAMILY MEDICINE | Facility: CLINIC | Age: 30
End: 2020-03-02
Payer: COMMERCIAL

## 2020-03-02 VITALS
OXYGEN SATURATION: 97 % | SYSTOLIC BLOOD PRESSURE: 93 MMHG | RESPIRATION RATE: 20 BRPM | HEART RATE: 87 BPM | HEIGHT: 57 IN | WEIGHT: 157 LBS | DIASTOLIC BLOOD PRESSURE: 66 MMHG | TEMPERATURE: 98.3 F | BODY MASS INDEX: 33.87 KG/M2

## 2020-03-02 DIAGNOSIS — Z30.42 DEPO-PROVERA CONTRACEPTIVE STATUS: Primary | ICD-10-CM

## 2020-03-02 RX ADMIN — MEDROXYPROGESTERONE ACETATE 150 MG: 150 INJECTION, SUSPENSION INTRAMUSCULAR at 11:44

## 2020-03-02 ASSESSMENT — MIFFLIN-ST. JEOR: SCORE: 1307.41

## 2020-03-02 NOTE — NURSING NOTE
"Chief Complaint   Patient presents with     Contraception     on time for depo injection       BP 93/66   Pulse 87   Temp 98.3  F (36.8  C) (Oral)   Resp 20   Ht 1.45 m (4' 9.09\")   Wt 71.2 kg (157 lb)   SpO2 97%   BMI 33.87 kg/m          Clinic Administered Medication Documentation      Depo Provera Documentation    URINE HCG: not indicated    Depo-Provera Standing Order inclusion/exclusion criteria reviewed.   Patient meets: inclusion criteria     BP: 93/66  LAST PAP/EXAM: No results found for: PAP    Prior to injection, verified patient identity using patient's name and date of birth. Medication was administered. Please see MAR and medication order for additional information.     Was entire vial of medication used? Yes  Vial/Syringe: Single dose vial  Expiration Date:  01/2021      Name of provider who requested the medication administration: Dr. Rosales  Name of provider on site (faculty or community preceptor) at the time of performing the medication administration: Dr. Lozada    Date of next administration: 5/18/2020 and 6/8/2020  Date of next office visit with provider to renew medication plan (must be seen annually): 12/9/2020    MARY Cuellar CMA ([facility] [address] [place] [address]  [phone]          "

## 2020-03-16 ENCOUNTER — TELEPHONE (OUTPATIENT)
Dept: FAMILY MEDICINE | Facility: CLINIC | Age: 30
End: 2020-03-16

## 2020-03-16 NOTE — TELEPHONE ENCOUNTER
"[facility] Follow-up    Call initiated by clinic.  Message recorded by Nicole Estrella  Calling for: Monthly [facility]  Patient: Hayde LUJAN Say  Phone conversation with: Patient  Patient's Phone number/s: Home number on file [phone] (home)        Major diagnoses and/or issues requiring coordination services:   PTSD, Major Depression and mental health disorder       In the past month, how many times have you been to the Emergency Room? 0  In the past month, how many times have you been hospitalized? 0    Summary notes: Patient reports she is feeling overwhelmed with having the kids all of the time. Reports she is extremely fatigued and very frustrated as the kids never listen and reports both DAVID Pena and Brant have been leaving the house for hours on end and she has no idea where they go or what they are doing.  Alejo is doing with no concerns. Osiris is doing well no concerns, Devin is doing very well no concerns.   Hayde reports Amparo has been \"kicked out of his grandmothers home\" due to behavior, DAVID Pena is currently living with Hayde. Patient encouraged to speak with Shawn to see if they are able to offer any additional assistance, discuss anxiety with mental health provider. Confirms she is taking her medications but feels if they may not be working any more.      Self-management goals:    Not ready for change, reports she will request assistance from [employer] worker to help navigate the family through this difficult time, continue to take medications and discuss her anxiety with her public health nurse and mental health provider.    Counseling and coordination activities with: , mental health provider.    Follow-up date: 4/17/20 or PRN.    "

## 2020-03-20 DIAGNOSIS — J30.2 CHRONIC SEASONAL ALLERGIC RHINITIS DUE TO FUNGAL SPORES: ICD-10-CM

## 2020-03-20 RX ORDER — CETIRIZINE HYDROCHLORIDE 10 MG/1
10 TABLET ORAL DAILY
Qty: 90 TABLET | Refills: 1 | Status: SHIPPED | OUTPATIENT
Start: 2020-03-20 | End: 2020-08-05 | Stop reason: DRUGHIGH

## 2020-04-10 DIAGNOSIS — K21.9 GASTROESOPHAGEAL REFLUX DISEASE, ESOPHAGITIS PRESENCE NOT SPECIFIED: ICD-10-CM

## 2020-04-13 ENCOUNTER — TELEPHONE (OUTPATIENT)
Dept: FAMILY MEDICINE | Facility: CLINIC | Age: 30
End: 2020-04-13

## 2020-04-13 DIAGNOSIS — K21.9 GASTROESOPHAGEAL REFLUX DISEASE, ESOPHAGITIS PRESENCE NOT SPECIFIED: Primary | ICD-10-CM

## 2020-04-13 RX ORDER — FAMOTIDINE 20 MG/1
20 TABLET, FILM COATED ORAL
Qty: 30 TABLET | Refills: 3 | Status: SHIPPED | OUTPATIENT
Start: 2020-04-13 | End: 2022-01-25

## 2020-04-13 NOTE — TELEPHONE ENCOUNTER
[facility] phone call message- medication clarification/question:    Full Medication Name: ranitidine (ZANTAC)      Dose: 150 MG    Question: Caller state that medication is on recall and would like alternative.    Pharmacy confirmed as    [id number] - [place], [place] - [address] [facility] - [place], [place] - [address]  [facility] - [place], [place] - [address]: Yes    OK to leave a message on voice mail? Yes    Primary language: Maryann      needed? Yes    Call taken on April 13, 2020 at 12:00 PM by Trace Rice

## 2020-04-30 ENCOUNTER — TELEPHONE (OUTPATIENT)
Dept: FAMILY MEDICINE | Facility: CLINIC | Age: 30
End: 2020-04-30

## 2020-04-30 NOTE — TELEPHONE ENCOUNTER
"[facility] Follow-up    Call initiated by clinic.  Message recorded by Nicole Estrella  Face to Face [facility] Monthly  Patient: Hayde LUJAN Say  Phone conversation with: Patient      Major diagnoses and/or issues requiring coordination services: PTSD, Major Depression and mental health disorder       In the past month, how many times have you been to the Emergency Room? 0  In the past month, how many times have you been hospitalized? 0    Summary notes: Patient reports feeling overwhelmed with having the kids all of the time. Reports she is extremely fatigued and very frustrated with the distant learning as she does not understand. Discussed the importance of contacting [facility] with any questions, concerns or family needs as earlier this week she did not have any infant child Tylenol and had given Alejo adult Tylenol reporting \" I broke into pieces and gave to him\".  When asked why she had not contact clinic for refill she reports she does not know how to contact clinic or have phone numbers. Reminded patient my direct line and clinic line had been programmed into her contact list, response was \"I don't know how to do that and I don't have any phone numbers\". Patient has been provided with My business card which includes my direct line, clinic phone, name and address of [facility].   Currently, PCA services have been suspended due to Covid19. [facility] Nurse has not been to the home in  \"long time\" but reports has spoken to her on the phone but unsure when the last time was. [employer] worker still visiting \"couple times a week but does not help me\". Defers to answer if she is still receiving mental health services.  Voices no additional concerns but is requesting assistance with finding help for home schooling. Hayde referred to contact school to request this assistance and or [employer] worker to assist connecting with the school.    Reviewed current care teams: Hayde report's her CPS " file has been closed and CPS worker Leanne STAFFORD is no longer assigned to the family. Mother's [employer] worker still making home visits, PCA services have been placed on hold due to Covid19 and [facility] nurse makes contact via phone. Hayde is encouraged to continue working with her/childrens care team and schools.   Discussed the importance of not given any of her children adult medications without first checking with a medical provider.     Self-management goals:  Deferred, patient strongly encouraged to maintain contact with all Care team members.    Counseling and coordination activities with: Care Teams. School, Mental health provider and care coordinators.     Follow Up: 5/29/20 or PRN.

## 2020-06-05 ENCOUNTER — TELEPHONE (OUTPATIENT)
Dept: FAMILY MEDICINE | Facility: CLINIC | Age: 30
End: 2020-06-05

## 2020-06-06 NOTE — TELEPHONE ENCOUNTER
I got a message from the  staff about the pt. The pt has an nurse appointment in clinic on 06/08/2020 at 11:00am. I called [employer] to setup a ride for the pt. I was able to setup the pt to ride with [phone].

## 2020-06-08 ENCOUNTER — TELEPHONE (OUTPATIENT)
Dept: FAMILY MEDICINE | Facility: CLINIC | Age: 30
End: 2020-06-08

## 2020-06-08 NOTE — TELEPHONE ENCOUNTER
Informed pt that she had missed her morning appt for Depo shot and if she would like to reschedule this appt. Pt stated that her ride did not show. Pt asked to switch over to birth control pills. Advise pt that she will need to see a provider for this change. Pt is hesitant to reschedule or take depo shot and was told that if she has any further questions or concerns, please give us a call.  MARY Amaya        Due to patient being non-English speaking/uses sign language, an  was used for this visit. Only for face-to-face interpretation by an external agency, date and length of interpretation can be found on the scanned worksheet.     name: [id number]  Agency: [employer] - telephone  Language: Maryann   Telephone number: -  Type of interpretation: Telephone, spoken     MARY Amaya

## 2020-06-10 ENCOUNTER — TELEPHONE (OUTPATIENT)
Dept: FAMILY MEDICINE | Facility: CLINIC | Age: 30
End: 2020-06-10

## 2020-06-10 NOTE — TELEPHONE ENCOUNTER
"Out going call using a Maryann  #[id number]. F/u on no show nurse only visit 6/09/20 for depo and [facility] monthly.    Patient reports her transportation never came to pick her up and was unable to find additional transportation. Hayde would like a message sent to Dr. Rosales to discuss oral birth control. Hayde reports she isn't comfortable not having her menstrual cycle monthly and believes that may be why she never feels well as \"period cleans everything out\".  I have suggested patient schedule a clinic visit to discuss all options.  Patient is agreeable, will wait for providers response.     Hayde also reports not feeling well for the last couple of day. Reports fatigue, \"feels heavy chest\" and numbness in her head and extremities, reports difficulty when taking a breath. Denies chest and arm pain,  vomit, vision changes, numbness and tingling, productive cough or exposure to anyone with \"cold symptoms or Covid\".  Due to symptoms and covid protocols I have requested patient seek emergency treatment for further evaluation. Patient declines and reports she is ok for now.     [facility] Follow-up    Call initiated by clinic.  Message recorded by Nicole Estrella  Calling for: [facility] Monthly and multiple concerns  Patient: Hayde LUJAN Say  Phone conversation with: Patient  Patient's Phone number/s: Home number on file [phone] (home)    Major diagnoses and/or issues requiring coordination services: PTSD, Major Depression and mental health disorder   In the past month, how many times have you been to the Emergency Room? 0  In the past month, how many times have you been hospitalized? 0    Summary notes: Reports she is doing ok but has a few complaints listed above. Reports home nurse still coming weekly to drop off medications. Hayde unhappy with medications being left out side of her door. Reviewed the reason for the no contact due to Covid 19, patient verbalized understanding. Report's PCA services are still coming but \"not all " "the time\", Shawn aaron still comes often but does not come into the home. No current food insecurities as her  has been going to the food shelves and grocery stores. Reports the kids are doing ok but still 'don't listen to her\". States she is having problems with Hosanna spitting \"all over the house\", No concerns for Mayank, Devin or Aliyah. Reports aliyah is now walking. Unable to provided information for DAVID Calvin as he stays at grandmothers house and no longer comes over.     Self-management goals:  Continue with current plan set with Shawn,    Counseling and coordination activities with: Shawn aaron    Follow-up date: 07/10/20  "

## 2020-06-17 ENCOUNTER — VIRTUAL VISIT (OUTPATIENT)
Dept: FAMILY MEDICINE | Facility: CLINIC | Age: 30
End: 2020-06-17
Payer: COMMERCIAL

## 2020-06-17 ENCOUNTER — TELEPHONE (OUTPATIENT)
Dept: FAMILY MEDICINE | Facility: CLINIC | Age: 30
End: 2020-06-17

## 2020-06-17 VITALS — BODY MASS INDEX: 32.36 KG/M2 | WEIGHT: 150 LBS

## 2020-06-17 DIAGNOSIS — Z30.011 ENCOUNTER FOR INITIAL PRESCRIPTION OF CONTRACEPTIVE PILLS: Primary | ICD-10-CM

## 2020-06-17 DIAGNOSIS — Z30.09 ENCOUNTER FOR OTHER GENERAL COUNSELING OR ADVICE ON CONTRACEPTION: ICD-10-CM

## 2020-06-17 LAB — HCG UR QL: NEGATIVE

## 2020-06-17 RX ORDER — NORGESTIMATE AND ETHINYL ESTRADIOL 0.25-0.035
1 KIT ORAL DAILY
Qty: 84 TABLET | Refills: 3 | Status: SHIPPED | OUTPATIENT
Start: 2020-06-17 | End: 2021-07-07

## 2020-06-17 ASSESSMENT — PATIENT HEALTH QUESTIONNAIRE - PHQ9: SUM OF ALL RESPONSES TO PHQ QUESTIONS 1-9: 19

## 2020-06-17 NOTE — PROGRESS NOTES
"Family Medicine Telephone Visit Note         Telephone Visit Consent   Patient was verbally read the following and verbal consent was obtained.    \"Telephone visits are billed at different rates depending on your insurance coverage. During this emergency period, for some insurers they may be billed the same as an in-person visit.  Please reach out to your insurance provider with any questions.  If during the course of the call the physician/provider feels a telephone visit is not appropriate, you will not be charged for this service.\"    Name person giving consent:  Patient   Date verbal consent given:  6/17/2020  Time verbal consent given:  9:16 AM    Chief Complaint   Patient presents with     RECHECK     contraception. Like to switch depo shot to OCP     Medication Reconciliation     Not complete due to not sure names of all meds       Due to patient being non-English speaking/uses sign language, an  was used for this visit. Only for face-to-face interpretation by an external agency, date and length of interpretation can be found on the scanned worksheet.      name: Claudia Rey  Agency: [employer]  Language: Maryann   Telephone number: [phone]  Type of interpretation: Telephone, spoken         HPI   Patients name: Hayde  Appointment start time:  9:31 AM    Contraception:  Patient's body feels heavy when she does not get her period so she would like to change to OCP. Has not had a period on depo (since the birth of her child). Maybe last had intercourse 3 months ago. Takes her medications in the evenings, around the same. She is confident she will remember to take the oral pill daily to prevent pregnancy.     Current Outpatient Medications   Medication Sig Dispense Refill     norgestimate-ethinyl estradiol (ORTHO-CYCLEN) 0.25-35 MG-MCG tablet Take 1 tablet by mouth daily 84 tablet 3     acetaminophen (TYLENOL) 325 MG tablet Take 2 tablets (650 mg) by mouth every 6 hours as needed for headaches " 100 tablet 3     aspirin-acetaminophen-caffeine (EXCEDRIN MIGRAINE) 250-250-65 MG tablet Take 1 tablet by mouth every 8 hours as needed for headaches 30 tablet 1     calcium carbonate (TUMS) 500 MG chewable tablet Take 1 tablet (500 mg) by mouth 2 times daily as needed for heartburn 150 tablet 3     cetirizine (ZYRTEC) 10 MG tablet Take 1 tablet (10 mg) by mouth daily 90 tablet 1     docusate sodium (COLACE) 100 MG tablet Take 2 tablets (200 mg) by mouth daily 180 tablet 3     famotidine (PEPCID) 20 MG tablet Take 1 tablet (20 mg) by mouth nightly as needed (acid reflux) 30 tablet 3     fluticasone (FLONASE) 50 MCG/ACT nasal spray Spray 1-2 sprays into both nostrils daily as needed for allergies 16 mL 1     hydrocortisone (CORTAID) 1 % external cream Apply topically 2 times daily as needed for itching 60 g 0     hydrocortisone (CORTAID) 1 % external cream Apply topically 2 times daily As needed for skin rash/irritation 45 g 1     ibuprofen (ADVIL/MOTRIN) 600 MG tablet Take 1 tablet (600 mg) by mouth daily as needed (back pain)       ketotifen (ZADITOR) 0.025 % ophthalmic solution Place 1 drop into both eyes every 12 hours 1 Bottle 3     montelukast (SINGULAIR) 10 MG tablet Take 1 tablet (10 mg) by mouth daily 90 tablet 3     permethrin (NIX) 1 % external liquid Apply to clean, towel-dried hair, saturate hair and scalp, wash off after 10 min. 120 mL 1     polyethylene glycol (MIRALAX) powder Take 17 g (1 capful) by mouth daily as needed for constipation 510 g 1     prazosin (MINIPRESS) 1 MG capsule Take 2 capsules (2 mg) by mouth At Bedtime 90 capsule 3     risperiDONE (RISPERDAL) 2 MG tablet Take 1 tablet (2 mg) by mouth At Bedtime 30 tablet 3     SENNA-docusate sodium (SENNA S) 8.6-50 MG tablet Take 1 tablet by mouth At Bedtime 90 tablet 3     venlafaxine (EFFEXOR-XR) 150 MG 24 hr capsule Take 1 capsule by mouth daily (together with 75 mg capsule for total of 225 mg/day) 90 capsule 1     venlafaxine (EFFEXOR-XR) 75  "MG 24 hr capsule Take 1 capsule by mouth daily (together with 150 mg capsule for total of 225 mg/day) 90 capsule 1     Allergies   Allergen Reactions     Augmentin Rash          Physical Exam:     Wt 68 kg (150 lb)   BMI 32.36 kg/m    Estimated body mass index is 32.36 kg/m  as calculated from the following:    Height as of 3/2/20: 1.45 m (4' 9.09\").    Weight as of this encounter: 68 kg (150 lb).         Assessment and Plan   1. Encounter for other general counseling or advice on contraception  Patient would like to change from depo provera to OCP in order to have monthly menses. Unfortunately patient is overdue for depo, so need to ensure UPT is negative prior to prescribing. She will leave a urine sample later this afternoon. Discussed importance of taking OCP daily around the same time for optimal pregnancy prevention and patient verbalized understanding. Also educated patient that it is recommended to abstain from intercourse or use a backup method for 1 week of initiating OCP.   - HCG Qualitative Urine (UPT)  (P ); Future    ADDENDUM---  UPT negative. Will send OCP Rx to pharmacy.  - norgestimate-ethinyl estradiol (ORTHO-CYCLEN) 0.25-35 MG-MCG tablet; Take 1 tablet by mouth daily  Dispense: 84 tablet; Refill: 3    After Visit Information:  Patient declined AVS     No follow-ups on file.    Appointment end time: 9:47 AM  This is a telephone visit that took 16 minutes.      Clinician location:  JANET Rosales, DO  I precepted today with Dr. Chávez.        "

## 2020-06-17 NOTE — NURSING NOTE
Due to patient being non-English speaking/uses sign language, an  was used for this visit. Only for face-to-face interpretation by an external agency, date and length of interpretation can be found on the scanned worksheet.     name: Claudia Rey  Agency: [employer]  Language: Maryann   Telephone number: [phone]  Type of interpretation: Telephone, spoken

## 2020-06-17 NOTE — PROGRESS NOTES
Preceptor Attestation:  Patient's case reviewed and discussed with Rosana Rosales DO resident. I agree with written assessment and plan of care.  Supervising Physician:  Derek Chávez MD, MD AMANDA  [facility]

## 2020-06-17 NOTE — TELEPHONE ENCOUNTER
Out going call made using a Maryann  ([id number]) for Veggie Rx  reminder. Patient Verbalized understanding and reports brother will .

## 2020-06-19 NOTE — RESULT ENCOUNTER NOTE
Called pt with result and already got the rx.     Due to patient being non-English speaking/uses sign language, an  was used for this visit. Only for face-to-face interpretation by an external agency, date and length of interpretation can be found on the scanned worksheet.     name: [facility]  Agency: [employer] - telephone  Language: Maryann   Telephone number: [phone]  Type of interpretation: Telephone, spoken

## 2020-06-23 ENCOUNTER — TELEPHONE (OUTPATIENT)
Dept: FAMILY MEDICINE | Facility: CLINIC | Age: 30
End: 2020-06-23

## 2020-06-23 NOTE — TELEPHONE ENCOUNTER
Out going call made reminding patient to  veggie rx tomorrow after 10:00 patient verbalized understanding and is agreeable with .

## 2020-06-30 ENCOUNTER — TELEPHONE (OUTPATIENT)
Dept: FAMILY MEDICINE | Facility: CLINIC | Age: 30
End: 2020-06-30

## 2020-07-07 ENCOUNTER — TELEPHONE (OUTPATIENT)
Dept: FAMILY MEDICINE | Facility: CLINIC | Age: 30
End: 2020-07-07

## 2020-07-13 DIAGNOSIS — J30.2 ACUTE SEASONAL ALLERGIC RHINITIS: ICD-10-CM

## 2020-07-14 ENCOUNTER — OFFICE VISIT (OUTPATIENT)
Dept: FAMILY MEDICINE | Facility: CLINIC | Age: 30
End: 2020-07-14
Payer: COMMERCIAL

## 2020-07-14 ENCOUNTER — TELEPHONE (OUTPATIENT)
Dept: FAMILY MEDICINE | Facility: CLINIC | Age: 30
End: 2020-07-14

## 2020-07-14 VITALS
OXYGEN SATURATION: 99 % | TEMPERATURE: 98.8 F | HEART RATE: 80 BPM | SYSTOLIC BLOOD PRESSURE: 101 MMHG | RESPIRATION RATE: 18 BRPM | DIASTOLIC BLOOD PRESSURE: 68 MMHG

## 2020-07-14 DIAGNOSIS — N89.8 VAGINAL ITCHING: ICD-10-CM

## 2020-07-14 DIAGNOSIS — L50.9 URTICARIA: Primary | ICD-10-CM

## 2020-07-14 LAB
BACTERIA: NORMAL
CLUE CELLS: NORMAL
MOTILE TRICHOMONAS: NEGATIVE
ODOR: NORMAL
PH WET PREP: >4.5 (ref 3.8–4.5)
WBC WET PREP: <2 (ref 2–5)
YEAST: PRESENT

## 2020-07-14 RX ORDER — CETIRIZINE HYDROCHLORIDE 10 MG/1
10 TABLET ORAL DAILY
Qty: 30 TABLET | Refills: 0 | Status: SHIPPED | OUTPATIENT
Start: 2020-07-14 | End: 2020-08-05 | Stop reason: DRUGHIGH

## 2020-07-14 RX ORDER — MONTELUKAST SODIUM 10 MG/1
10 TABLET ORAL DAILY
Qty: 90 TABLET | Refills: 3 | Status: SHIPPED | OUTPATIENT
Start: 2020-07-14 | End: 2021-05-07

## 2020-07-14 RX ORDER — DIPHENHYDRAMINE HCL 25 MG
25 TABLET ORAL EVERY 6 HOURS PRN
Qty: 20 TABLET | Refills: 0 | Status: SHIPPED | OUTPATIENT
Start: 2020-07-14 | End: 2022-01-25

## 2020-07-14 NOTE — TELEPHONE ENCOUNTER
I called to remind the pt to come  her veggie tomorrow. Pt stated that she will come pick it up. Pt also wanted to know if she can make an appointment to come in today. Pt stated that she has a itchy, burning, and swollen rash. We were able to schedule the pt to come in today at 1:00pm with .

## 2020-07-14 NOTE — PROGRESS NOTES
Preceptor Attestation:   Patient seen, evaluated and discussed with the resident Dr Kim I have verified the content of the note, which accurately reflects my assessment of the patient and the plan of care.  Supervising Physician:Flavio Escudero MD  [facility]

## 2020-07-14 NOTE — NURSING NOTE
Due to patient being non-English speaking/uses sign language, an  was used for this visit. Only for face-to-face interpretation by an external agency, date and length of interpretation can be found on the scanned worksheet.     name: Nirmal  Agency: [employer]  Language: Maryann   Telephone number: [phone]  Type of interpretation: Telephone, spoken

## 2020-07-14 NOTE — PROGRESS NOTES
HPI:       Hayde Macias is a 29 year old female presents for the new concern(s) of.    Chief Complaint   Patient presents with     Derm Problem     Rash on face, arms and vaginal for 3 days       Rash/Lesion  Onset: 3 days ago, unknown trigger, no recent hiking or camping    Description:   Location: eyes, arms, inner thighs, and vagina  Color: red, raised, circular  Character: round, burning  Itching (Pruritis): Yes   Pain?:no    Progression of Symptoms:  worsening    Accompanying Signs & Symptoms:  Fever: no  Body aches or joint pain:  no  Sore throat symptoms:no  Recent cold symptoms: no    History:   Previous similar rash: no    Precipitating factors:   Exposure to similar rash: no  New exposures: {no  Recent travel: no  New Medication: no    What makes it better?:  Nothing. Worsened by warm shower  Therapies Tried and outcome:  Nothing    -Reports vaginal itching at the onset of the above rash  -No abnormal discharge  -Patient requesting speculum exam    Medication Reconciliation completed     A [employer]  was used for this visit.         Review of Systems:     C: NEGATIVE for fatigue, unexpected change in weight  E: NEGATIVE for acute vision problems or changes  R: NEGATIVE for significant cough or shortness of breath  CV: NEGATIVE for chest pain, palpitations or new or worsening peripheral edema  P: NEGATIVE for changes in mood or affect            PMHX:     Patient Active Problem List   Diagnosis     Health Care Home     Recurrent major depressive disorder (H)     Seasonal allergic rhinitis     PTSD (post-traumatic stress disorder)     BRENDA (generalized anxiety disorder)     Insomnia due to other mental disorder     Major depressive disorder, recurrent episode, moderate (H)     Class 1 obesity without serious comorbidity with body mass index (BMI) of 32.0 to 32.9 in adult, unspecified obesity type     Screening for malignant neoplasm of cervix     Depo-Provera contraceptive status       Current  Outpatient Medications   Medication Sig Dispense Refill     cetirizine (ZYRTEC) 10 MG tablet Take 1 tablet (10 mg) by mouth daily 30 tablet 0     diphenhydrAMINE (BENADRYL) 2 % external cream Apply topically 3 times daily as needed for itching 30 g 0     diphenhydrAMINE (BENADRYL) 25 MG tablet Take 1 tablet (25 mg) by mouth every 6 hours as needed for itching or allergies 20 tablet 0     acetaminophen (TYLENOL) 325 MG tablet Take 2 tablets (650 mg) by mouth every 6 hours as needed for headaches 100 tablet 3     aspirin-acetaminophen-caffeine (EXCEDRIN MIGRAINE) 250-250-65 MG tablet Take 1 tablet by mouth every 8 hours as needed for headaches 30 tablet 1     calcium carbonate (TUMS) 500 MG chewable tablet Take 1 tablet (500 mg) by mouth 2 times daily as needed for heartburn 150 tablet 3     cetirizine (ZYRTEC) 10 MG tablet Take 1 tablet (10 mg) by mouth daily 90 tablet 1     docusate sodium (COLACE) 100 MG tablet Take 2 tablets (200 mg) by mouth daily 180 tablet 3     famotidine (PEPCID) 20 MG tablet Take 1 tablet (20 mg) by mouth nightly as needed (acid reflux) 30 tablet 3     fluticasone (FLONASE) 50 MCG/ACT nasal spray Spray 1-2 sprays into both nostrils daily as needed for allergies 16 mL 1     hydrocortisone (CORTAID) 1 % external cream Apply topically 2 times daily as needed for itching 60 g 0     hydrocortisone (CORTAID) 1 % external cream Apply topically 2 times daily As needed for skin rash/irritation 45 g 1     ibuprofen (ADVIL/MOTRIN) 600 MG tablet Take 1 tablet (600 mg) by mouth daily as needed (back pain)       ketotifen (ZADITOR) 0.025 % ophthalmic solution Place 1 drop into both eyes every 12 hours 1 Bottle 3     montelukast (SINGULAIR) 10 MG tablet Take 1 tablet (10 mg) by mouth daily 90 tablet 3     norgestimate-ethinyl estradiol (ORTHO-CYCLEN) 0.25-35 MG-MCG tablet Take 1 tablet by mouth daily 84 tablet 3     permethrin (NIX) 1 % external liquid Apply to clean, towel-dried hair, saturate hair and  scalp, wash off after 10 min. 120 mL 1     polyethylene glycol (MIRALAX) powder Take 17 g (1 capful) by mouth daily as needed for constipation 510 g 1     prazosin (MINIPRESS) 1 MG capsule Take 2 capsules (2 mg) by mouth At Bedtime 90 capsule 3     risperiDONE (RISPERDAL) 2 MG tablet Take 1 tablet (2 mg) by mouth At Bedtime 30 tablet 3     SENNA-docusate sodium (SENNA S) 8.6-50 MG tablet Take 1 tablet by mouth At Bedtime 90 tablet 3     venlafaxine (EFFEXOR-XR) 150 MG 24 hr capsule Take 1 capsule by mouth daily (together with 75 mg capsule for total of 225 mg/day) 90 capsule 1     venlafaxine (EFFEXOR-XR) 75 MG 24 hr capsule Take 1 capsule by mouth daily (together with 150 mg capsule for total of 225 mg/day) 90 capsule 1              Allergies   Allergen Reactions     Augmentin Rash       No results found for this or any previous visit (from the past 24 hour(s)).              Physical Exam:     Vitals:    07/14/20 1301   BP: 101/68   Pulse: 80   Resp: 18   Temp: 98.8  F (37.1  C)   TempSrc: Oral   SpO2: 99%     There is no height or weight on file to calculate BMI.    GENERAL APPEARANCE: healthy, alert and no distress,  RESP: lungs clear to auscultation - no rales, rhonchi or wheezes  CV: regular rate and rhythm,  and no murmur, click,  rub or gallop  ABDOMEN: soft, nontender, without hepatosplenomegaly or masses  SKIN: raised, erythematous, plaques around the eyes, upper extremities, and inner thighs    : Labia majora has raised, round, erythematous plaques and exoriations from scracthing. - lateral to the introitus (opening of vagina), covered with pubic hair. Labia minora- Medial to labia majora with no pubic hair covering.      SPECULUM EXAMINATION: The labia  with the index finger and thumb of left hand. No  tenderness on insertion of the speculum. No active bleeding. The cervix is then inspected. Took samples for wet prep        Assessment and Plan     (L50.9) Urticaria  (primary encounter  diagnosis)  (N89.8) Vaginal itching  Comment: HPI and PE consistent with the above. Speculum exam and wet prep unremarkable, so vaginal itching most likely related to urticaria; etiology unclear. Discussed the condition with the patient and its management. Questions asked and answered      Plan:   -diphenhydrAMINE (BENADRYL) 2 % external cream,   -diphenhydrAMINE (BENADRYL) 25 MG tablet,   -cetirizine (ZYRTEC) 10 MG tablet,   -Recommended to follow-up if symptoms persist      Options for treatment and follow-up care were reviewed with the patient and/or guardian. Pt and/or guardian engaged in the decision making process and verbalized understanding of the options discussed and agreed with the final plan.    Precepted today with: MD Nestor Lopez MD, MPH (PGY 3)  [facility] Family Medicine Resident  Pager: [phone]

## 2020-07-14 NOTE — PATIENT INSTRUCTIONS
Patient Education     Hives (Adult)  Hives are pink or red bumps on the skin. These bumps are also known as wheals. The bumps can itch, burn, or sting. Hives can occur anywhere on the body. They vary in size and shape and can form in clusters. Individual hives can appear and go away quickly. New hives may develop as old ones fade. Hives are common and usually harmless. Occasionally hives are a sign of a serious allergy.  Hives are often caused by an allergic reaction. It may be an allergic reaction to foods such as fruit, shellfish, chocolate, nuts, or tomatoes. It may be a reaction to pollens, animal fur, or mold spores. Medicines, chemicals, and insect bites can also cause hives. And hives can be caused by hot sun or cold air. The cause of hives can be difficult to find.  You may be given medicines to relieve swelling and itching. Follow all instructions when using these medicines. The hives will usually fade in a few days, but can last up to 2 weeks.  Home care  Follow these tips:    Try to find the cause of the hives and eliminate it. Discuss possible causes with your healthcare provider. Future reactions to the same allergen may be worse.    Don t scratch the hives. Scratching will delay healing. To reduce itching, apply cool, wet compresses to the skin.    Dress in soft, loose cotton clothing.    Don t bathe in hot water. This can make the itching worse.    Apply an ice pack or cool pack wrapped in a thin towel to your skin. This will help reduce redness and itching. But if your hives were caused by exposure to cold, then do not apply more cold to them.    You may use over-the counter antihistamines to reduce itching. Some older antihistamines, such as diphenhydramine and chlorpheniramine, are inexpensive. But they need to be taken often and may make you sleepy. They are best used at bedtime. Don t use diphenhydramine if you have glaucoma or have trouble urinating because of an enlarged prostate. Newer  antihistamines, such as loratadine, cetirizine, and fexofenadine, are generally more expensive. But they tend to have fewer side effects, such as drowsiness. They can be taken less often.    Another type of antihistamine is used to treat heartburn. This type includes ranitidine, nizatidine, famotidine, and cimetidine. These are sometimes used along with the above antihistamines if a single medicine is not working.  Follow-up care  Follow up with your healthcare provider if your symptoms don't get better in 2 days. Ask your provider about allergy testing if you have had a severe reaction, or have had several episodes of hives. He or she can use the allergy testing to find out what you are allergic to.  When to seek medical advice  Call your healthcare provider right away if any of these occur:    Fever of 100.4 F (38.0 C) or higher, or as directed by your healthcare provider    Redness, swelling, or pain    Foul-smelling fluid coming from the rash  Call 911  Call 911 if any of the following occur:    Swelling of the face, throat, or tongue    Trouble breathing or swallowing    Dizziness, weakness, or fainting  Date Last Reviewed: 9/1/2016 2000-2019 [employer]. [address] [place] [address], [place] [address]. All rights reserved. This information is not intended as a substitute for professional medical care. Always follow your healthcare professional's instructions.

## 2020-07-17 LAB
C TRACH RRNA CVX QL NAA+PROBE: NEGATIVE
N GONORRHOEA RRNA SPEC QL NAA+PROBE: NEGATIVE

## 2020-07-21 ENCOUNTER — TELEPHONE (OUTPATIENT)
Dept: FAMILY MEDICINE | Facility: CLINIC | Age: 30
End: 2020-07-21

## 2020-07-23 ENCOUNTER — TELEPHONE (OUTPATIENT)
Dept: FAMILY MEDICINE | Facility: CLINIC | Age: 30
End: 2020-07-23

## 2020-07-28 NOTE — TELEPHONE ENCOUNTER
"Using a [employer]  ([id number]) out going [facility] call made to mom (Hayde).     [facility] Follow-up     Call initiated by clinic.  Message recorded by Nicole Estrella  Calling for: [facility] wellness check  Patient: DAVID Calvin  Phone conversation with: Mother  Patient's Phone number/s: Home number on file [phone] (home)        Major diagnoses and/or issues requiring coordination service:  PTSD, Major Depression and mental health disorders:         In the past month, how many times have you been to the Emergency Room? 0  In the past month, how many times have you been hospitalized? 0     Summary notes: Mom (Hayde reports) things have been very difficult for her and her family due to behavioral issues and now DAVID Calvin has been missing since possibly a day or two 7/22/20? Mother reports he stays out late, comes home late and at times does not come home at all for \"days\". Denies contacting the police to report missing child as she does not believe they will help and reports she does not know how to contact them herself. Discussed the importance of knowing the whereabouts of all of her children. Multiple attempts made to confirm mom (Hayde) actually knew what a \"missing child meant\". Hayde confirms she understands, extremely tearful and reports she love's her son but she does not understand why he is \"bad\" and leaves all the time. She believes he is using drugs and hanging out with bad kids, no longer feels \"he is ok\". Reports she picked DAVID henderson at a home [address] [place] [address] recently and reports she believed he was under the influence of \"drugs or something\" as his eyes were swollen and red. Hayde also reports when she arrived at this home she spoke directly to the wife (woman) of the man who lives at residence listed and reports being told he likes to find kids and bring them to their home  to use drugs. Hayde reports she was able to get MLa La out and took him home to her house however, once home patient was very " "sad, angry, swollen red eyes. Reports DAVID resendez left again unsure if that night or next day.   When asked Hayde denies hurting patient, denies contacting the police or reporting he was missing to anyone. Continues to be tearful and voices many concerns about patient and the struggles her family faces with not enough money and food insecurities. Multiple attempts made to ask the whereabouts of DAVID ALBERTO and question why she had not reported her son as missing. Patient notified she must contact the police anytime one of her children are missing. Continued response is she does not believe\"they will help and unsure how to contact them\". I have notified mom that due to what she is reporting to me I must contact the police and CPS to place a report. Hayde verbalized understanding and is agreeable with the plan.  Hayde continues to report she does not know his whereabouts. In addition multiple attempts made to find out where DAVID resendez was living as at one point it was reported he lived with his grandmother and uncle. This was unsuccessful.      Due to being a mandated  I have contact [place]  Police department and reported patient as missing. Police provided with information listed above and informed they will need a Maryann  to speak with family. Case Number# [id number]. Clinic manager Cheryl HERNANDEZ notified of call and action.     Out going call made to Leanne Kwon [place] CPS worker: Cell Phone # [phone].  Leanne has been provided with information above. Leanne reports she is no longer working the case and has requested I contact [place] CPS to report this incident and inform the CPS intake   that she (Leanne) use to work with the family \" hopefully go to her directly\".      Out going call made to [place] [phone] (Barbara) Information noted provided, Per Barbara please go to [facility] and download Maltreatment of a child complete and fax. Per CPS (Barbara) " nothing in addition is needed.                     Counseling and coordination activities with: [facility] worker, Public health nurse and CPS.      Follow-up date: 8/24/20 July 23, 2020

## 2020-07-29 ENCOUNTER — TELEPHONE (OUTPATIENT)
Dept: FAMILY MEDICINE | Facility: CLINIC | Age: 30
End: 2020-07-29

## 2020-07-29 ENCOUNTER — CARE COORDINATION (OUTPATIENT)
Dept: FAMILY MEDICINE | Facility: CLINIC | Age: 30
End: 2020-07-29

## 2020-07-29 NOTE — PROGRESS NOTES
Veggie Rx  by unidentified woman came in for Hayde to p/u donell. Due to new veggie Rx guidelines I attempted to follow this woman outside to discuss however, through the window I saw the woman getting into a vehicle driven by patient Hayde with her children in the back seat, Patient waved and drove away.   I will reach out to patient later today to remind and discuss Veggie Rx expectations and requirements.  Newer 4 door [facility] N[facility]I.

## 2020-07-29 NOTE — TELEPHONE ENCOUNTER
Out going return call made to Shawn Nurse Jinny, following up and confirming medication instructions from recent visit. Reviewed most recent clinic visit note, patient/medication instructions were to return if rash is not getting better. Per Jinny, patient reported she still has the rash. Confirmed with Jinny apartment complex is still infested with bed bugs. Hayde has been scheduled for clinic visit follow up and instructions given that she must come to the back door for check in. No additional questions or concerns at this time.     Transportation has been scheduled with [employer] [facility] [phone] p/u time will be 8-8:30 Round trip with a return ride of 10:00 am . Trip #[id number].    Per Shawn patient pharmacy needs to be changed back to [facility].

## 2020-07-30 ENCOUNTER — MEDICAL CORRESPONDENCE (OUTPATIENT)
Dept: HEALTH INFORMATION MANAGEMENT | Facility: CLINIC | Age: 30
End: 2020-07-30

## 2020-07-30 NOTE — TELEPHONE ENCOUNTER
If the patient has bedbugs in her apartment complex and believes they are still in her apartment, can we not schedule her earlier with a video or phone visit?  It seems silly to wait until 8/5 if there is a known exposure to bedbugs and that we should start trying to eradicate them sooner.  If she would be able to I would recommend trying to schedule a virtual visit sooner.      -Bulmaro Ch MD  Family Medicine Resident PGY 2

## 2020-07-31 ENCOUNTER — TELEPHONE (OUTPATIENT)
Dept: FAMILY MEDICINE | Facility: CLINIC | Age: 30
End: 2020-07-31

## 2020-07-31 NOTE — TELEPHONE ENCOUNTER
I was checking Nicole message and  wanted to see if we can get the pt in for her follow up about her rash. I called and talked to the pt, she stated that she is fine, she can wait till 08/05/2020 appointment. Pt stated that she wants help with setting up a ride for this appointment.     I [employer] to setup this ride for the pt. I was able to setup the pt to ride with [employer] [phone]

## 2020-08-04 NOTE — TELEPHONE ENCOUNTER
Patient has had know bedbug exposure for several years now. As I am in the understanding the facility has tried to rid them and seem to have been unsuccessful unfortunately. We do however prefer to see this patient in clinic as there have been some concerns of well being as well. Nicole if you would like to add in on this please do :)

## 2020-08-05 ENCOUNTER — OFFICE VISIT (OUTPATIENT)
Dept: FAMILY MEDICINE | Facility: CLINIC | Age: 30
End: 2020-08-05
Payer: COMMERCIAL

## 2020-08-05 ENCOUNTER — CARE COORDINATION (OUTPATIENT)
Dept: FAMILY MEDICINE | Facility: CLINIC | Age: 30
End: 2020-08-05

## 2020-08-05 VITALS
WEIGHT: 153 LBS | OXYGEN SATURATION: 99 % | DIASTOLIC BLOOD PRESSURE: 55 MMHG | HEART RATE: 88 BPM | BODY MASS INDEX: 33.01 KG/M2 | SYSTOLIC BLOOD PRESSURE: 91 MMHG

## 2020-08-05 DIAGNOSIS — L29.9 GENERALIZED PRURITUS: Primary | ICD-10-CM

## 2020-08-05 RX ORDER — CETIRIZINE HYDROCHLORIDE 10 MG/1
10 TABLET ORAL 2 TIMES DAILY
Qty: 60 TABLET | Refills: 3 | Status: SHIPPED | OUTPATIENT
Start: 2020-08-05 | End: 2021-05-07

## 2020-08-05 RX ORDER — AMMONIUM LACTATE 12 G/100G
LOTION TOPICAL 2 TIMES DAILY
Qty: 57 G | Refills: 3 | Status: SHIPPED | OUTPATIENT
Start: 2020-08-05 | End: 2022-01-25

## 2020-08-05 NOTE — NURSING NOTE
Due to patient being non-English speaking/uses sign language, an  was used for this visit. Only for face-to-face interpretation by an external agency, date and length of interpretation can be found on the scanned worksheet.     name: Lillian Yun  Agency: [employer]  Language: Maryann   Telephone number:   Type of interpretation: Telephone, spoken   #[phone]

## 2020-08-05 NOTE — PROGRESS NOTES
"[facility] Follow-up    Call initiated by patient.  Message recorded by Nicole Estrella  Face to Face  Patient: Hayde LUJAN Say  Phone conversation with: Patient    Face to face meeting using Maryann Phone  ID# [id number]      Major diagnoses and/or issues requiring coordination services: PTSD, Major Depression and mental health disorders      In the past month, how many times have you been to the Emergency Room? 0  In the past month, how many times have you been hospitalized? 0    Summary notes: Face to face meeting. Review Care Coordination goals and expectations. Upon arrival patient initially declined to talk with me as she is \"to tired and having difficulty breathing\". Denies SOB, Chest pain/pressure,  Fever, vomit, numbness/ tingling. Hayde reports still having issues with M La La \"which makes her sad and scared and believes her anxiety is the reason for the SOB.  Patient is requesting assistance with reading 2 letters she recently received from [facility] .  The first letter is informing Hayde a referral had been placed for M james and is requesting a return call by 8/3/20 to discuss a mental health assessment. If return call has not been made Hayde should plan for [facility] Children's mental Health care manager to stop by to discuss the need and determine the child's eligibility.  Second letter is from [facility] Gallo Cisneros requesting to discuss M la la  behavior concerning running away and patients schooling, He is requesting a return call from Hayde as the number on file reports voicemail box is full.  Hayde reports she has not respond to request as she does not understand/read/speak English.  Reviewed and discussed the importance of following up and requesting she discuss with her [facility] worker as they are working with her daily. Patient reports \"they will not help with my kids\". I have explained that [facility] is there to help/teach her life skills that including parenting education. Patient not " agreeable and is asking for my assistance contacting [facility]. Patient redirected to contact her [facility] , Shawn worker or mental health therapist.    Lastly, reviewed care coordination goals and expectations of patient participation. Discussed transferring her care to another coordinator due to being non-compliant, aggressive behavior and/or non verbal at times during visits, refusal to comply with clinic guidelines/rules for bed bugs, refusal to learn new life skills such as scheduling transportation/clinic appointments, grocery shopping, addressing her mail, parenting and/or attempting to learn how to care for herself and lastly lack of honesty. She has been informed I will still continue care coordination for her children. Patient verbalized understanding and would like me to continue on as her care coordinator and has agreed to self-managed goals.      Self-management goals:  Reviewed clinic behavioral expectations/compliance, personal goal setting,  wiliness to learn new skills and participate in her own health care both mentally and physically. Hayde verbalizes complete and is agreeable with set plan. Patient is aware lack of participation and non compliance will result in her being discharged from my [facility] panel. I will continue to care coordinate for her children unless she chooses otherwise.     1) Patient is requesting assistance with her first goal of learning how to schedule transportation for herself and children. I will follow up with Hayde next week to discuss.      Counseling and coordination activities with: Shawn danielson and care coordinator.    Follow-up date: 9/5/20

## 2020-08-05 NOTE — PROGRESS NOTES
CHIEF COMPLAINT                                                      Chief Complaint   Patient presents with     Derm Problem     Medication Reconciliation     SUBJECTIVE:                                                    Hayde Macias is a 29 year old year old female who presents to clinic today for the following health issues:    Rash    Duration: Patient cannot recall, it looks like she was seen for it on 7/14 and at that point it had been going on for 3 days.  Has bedbugs in her apartment which have not been controlled.  It is unclear if her apartment owner is working on this or not.      Description  Location: All over her body, but worst along the pant waist and legs    Intensity:  Itching: severe    Accompanying signs and symptoms: None    History (similar episodes/previous evaluation): Patient has had itching for several years as her building has been infested with bed bugs.  She was last seen for it 7/13/2020 and that time they     Precipitating or alleviating factors:  New exposures:  None  Recent travel: no      Therapies tried and outcome: topical steroid - hydrocortisone 1%, Benadryl/diphenhydramine -  effective and Zyrtec/cetirizine -  usually effective    Patient is an established patient of this clinic.  ----------------------------------------------------------------------------------------------------------------------  Patient Active Problem List   Diagnosis     Health Care Home     Recurrent major depressive disorder (H)     Seasonal allergic rhinitis     PTSD (post-traumatic stress disorder)     BRENDA (generalized anxiety disorder)     Insomnia due to other mental disorder     Major depressive disorder, recurrent episode, moderate (H)     Class 1 obesity without serious comorbidity with body mass index (BMI) of 32.0 to 32.9 in adult, unspecified obesity type     Screening for malignant neoplasm of cervix     Depo-Provera contraceptive status     Past Surgical History:   Procedure Laterality Date      NO HISTORY OF SURGERY         Social History     Tobacco Use     Smoking status: Never Smoker     Smokeless tobacco: Never Used     Tobacco comment: no smoke exposure.   Substance Use Topics     Alcohol use: No     Alcohol/week: 0.0 standard drinks     Family History   Problem Relation Age of Onset     Asthma Son      Asthma Son      Depression Mother      Diabetes No family hx of      Coronary Artery Disease No family hx of      Hypertension No family hx of      Hyperlipidemia No family hx of      Cerebrovascular Disease No family hx of      Breast Cancer No family hx of      Colon Cancer No family hx of      Prostate Cancer No family hx of      Other Cancer No family hx of      Anxiety Disorder No family hx of      Mental Illness No family hx of      Substance Abuse No family hx of      Anesthesia Reaction No family hx of      Osteoporosis No family hx of      Genetic Disorder No family hx of      Thyroid Disease No family hx of      Obesity No family hx of          Problem list and past medical, surgical, social, and family histories reviewed & adjusted, as indicated.    Current Outpatient Medications   Medication Sig Dispense Refill     ammonium lactate (LAC-HYDRIN) 12 % external lotion Apply topically 2 times daily 57 g 3     cetirizine (ZYRTEC) 10 MG tablet Take 1 tablet (10 mg) by mouth 2 times daily 60 tablet 3     acetaminophen (TYLENOL) 325 MG tablet Take 2 tablets (650 mg) by mouth every 6 hours as needed for headaches 100 tablet 3     aspirin-acetaminophen-caffeine (EXCEDRIN MIGRAINE) 250-250-65 MG tablet Take 1 tablet by mouth every 8 hours as needed for headaches 30 tablet 1     calcium carbonate (TUMS) 500 MG chewable tablet Take 1 tablet (500 mg) by mouth 2 times daily as needed for heartburn 150 tablet 3     diphenhydrAMINE (BENADRYL) 2 % external cream Apply topically 3 times daily as needed for itching 30 g 0     diphenhydrAMINE (BENADRYL) 25 MG tablet Take 1 tablet (25 mg) by mouth every 6  hours as needed for itching or allergies 20 tablet 0     docusate sodium (COLACE) 100 MG tablet Take 2 tablets (200 mg) by mouth daily 180 tablet 3     famotidine (PEPCID) 20 MG tablet Take 1 tablet (20 mg) by mouth nightly as needed (acid reflux) 30 tablet 3     fluticasone (FLONASE) 50 MCG/ACT nasal spray Spray 1-2 sprays into both nostrils daily as needed for allergies 16 mL 1     ibuprofen (ADVIL/MOTRIN) 600 MG tablet Take 1 tablet (600 mg) by mouth daily as needed (back pain)       ketotifen (ZADITOR) 0.025 % ophthalmic solution Place 1 drop into both eyes every 12 hours 1 Bottle 3     montelukast (SINGULAIR) 10 MG tablet Take 1 tablet (10 mg) by mouth daily 90 tablet 3     norgestimate-ethinyl estradiol (ORTHO-CYCLEN) 0.25-35 MG-MCG tablet Take 1 tablet by mouth daily 84 tablet 3     permethrin (NIX) 1 % external liquid Apply to clean, towel-dried hair, saturate hair and scalp, wash off after 10 min. 120 mL 1     polyethylene glycol (MIRALAX) powder Take 17 g (1 capful) by mouth daily as needed for constipation 510 g 1     prazosin (MINIPRESS) 1 MG capsule Take 2 capsules (2 mg) by mouth At Bedtime 90 capsule 3     risperiDONE (RISPERDAL) 2 MG tablet Take 1 tablet (2 mg) by mouth At Bedtime 30 tablet 3     SENNA-docusate sodium (SENNA S) 8.6-50 MG tablet Take 1 tablet by mouth At Bedtime 90 tablet 3     venlafaxine (EFFEXOR-XR) 150 MG 24 hr capsule Take 1 capsule by mouth daily (together with 75 mg capsule for total of 225 mg/day) 90 capsule 1     venlafaxine (EFFEXOR-XR) 75 MG 24 hr capsule Take 1 capsule by mouth daily (together with 150 mg capsule for total of 225 mg/day) 90 capsule 1     Medication list reviewed and updated as indicated.    Allergies   Allergen Reactions     Augmentin Rash     Allergies reviewed and updated as indicated.  ----------------------------------------------------------------------------------------------------------------------  ROS:  Constitutional, HEENT, cardiovascular,  pulmonary, GI, musculoskeletal, neuro, skin, and psych systems are negative, except as otherwise noted.    OBJECTIVE:     BP 91/55 (BP Location: Right arm, Patient Position: Sitting, Cuff Size: Adult Large)   Pulse 88   Wt 69.4 kg (153 lb)   SpO2 99%   BMI 33.01 kg/m    Body mass index is 33.01 kg/m .  Exam:  Constitutional: Pacing around room in [facility], scratching abdomen and legs throughout interview.    Head: Normocephalic. No masses, lesions, tenderness or abnormalities  Neck: Neck supple. No adenopathy. Thyroid symmetric, normal size,, Carotids without bruits.  ENT: ENT exam normal, no neck nodes or sinus tenderness  Cardiovascular:  RRR. No murmurs, clicks gallops or rub  Respiratory: CTAB, no wheezing, rales, or rhonchi.    Gastrointestinal: Obese.  Abdomen soft, non-tender. BS normal. No masses, organomegaly  : Deferred  Musculoskeletal: extremities normal- no gross deformities noted, gait normal and normal muscle tone  Skin: Excoriated, dry skin noted to the abdomen and over the lower extremities, appears to be from significant scratching.    Neurologic: Gait normal.  Sensation grossly WNL.  Psychiatric: mentation appears normal, affect normal/bright and patient appearance--disheveled   Hematologic/Lymphatic/Immunologic: Normal cervical lymph nodes    Diagnostic Test Results:  none     ASSESSMENT/PLAN:     (L29.9) Generalized pruritus  (primary encounter diagnosis)  -Patient continues to have bed bugs and diffuse body pruritis  -Discussed how the important part of treatment is getting rid of the bed bugs, which still has not happened.   -Recommended Lac-hydrin to help with symptoms.    -Will also refill zyrtec and increase to 10 mg BID as it does help with her symptoms.  -Will have her follow up in 2-4 weeks for reevaluation.      Medications Discontinued During This Encounter   Medication Reason     hydrocortisone (CORTAID) 1 % external cream Stopped by Patient     hydrocortisone (CORTAID) 1 %  external cream Stopped by Patient     cetirizine (ZYRTEC) 10 MG tablet Dose adjustment     cetirizine (ZYRTEC) 10 MG tablet Dose adjustment       Options for treatment and follow-up care were reviewed with the patient and/or guardian. Hayde TAI Say and/or guardian engaged in the decision making process and verbalized understanding of the options discussed and agreed with the final plan    Precepted with Dr. Degroot.    Bulmaro Ch MD on 8/5/2020 at 9:10 AM

## 2020-08-05 NOTE — TELEPHONE ENCOUNTER
Thanks for the info.  That explains why the patient is not so concerned to come in and be evaluated for this.

## 2020-08-13 ENCOUNTER — TELEPHONE (OUTPATIENT)
Dept: FAMILY MEDICINE | Facility: CLINIC | Age: 30
End: 2020-08-13

## 2020-08-13 NOTE — TELEPHONE ENCOUNTER
Central Prior Authorization Team   Phone: [phone]    PA Initiation    Medication: cetrizine 10mg-Initiated  Insurance Company: [employer] [facility] - Phone [phone] Fax [phone]  Pharmacy Filling the Rx: [facility] - [place], [place] - [address] [place] [address]  Filling Pharmacy Phone: [phone]  Filling Pharmacy Fax:    Start Date: 8/13/2020

## 2020-08-13 NOTE — TELEPHONE ENCOUNTER
PRIOR AUTHORIZATION DENIED    Medication: cetrizine 10mg-DENIED    Denial Date: 8/13/2020    Denial Rational: Insurance only covers 30 per 30 since medication is available OTC.          Appeal Information:

## 2020-08-13 NOTE — TELEPHONE ENCOUNTER
Prior Authorization needed on:  Cetrizine twice daily dosing    Medication:  Cetrizine   Dose:  10 mg    Pharmacy confirmed as : [facility] [phone]    Insurance Name:  Blue Plus  Insurance Phone: [phone]   Insurance Patient ID:[id number]      Alternatives Suggested:  No alternative, PA required due to twice daily dosing.  Sergo to fax to clinic reject.    Renetta Douglas RN August 13, 2020 at 9:24 AM

## 2020-08-13 NOTE — TELEPHONE ENCOUNTER
[facility] Family Medicine phone call message- medication clarification/question:    Full Medication Name: cetirizine (ZYRTEC)     Dose: 10 MG    Question: Caller state that patient has always taken this medication at once daily but this Rx is for 2x daily. Caller state if patient medication is changed to 2x daily then a PA will be needed as insurance will only cover for once daily.      Pharmacy confirmed as    [id number] - [place], [place] - [address] [facility] - [place], [place] - [address] [facility] - [place], [place] - [address]: Yes    OK to leave a message on voice mail? Yes    Primary language: Maryann      needed? Yes    Call taken on August 13, 2020 at 9:18 AM by Trace Rice

## 2020-09-24 ENCOUNTER — TELEPHONE (OUTPATIENT)
Dept: FAMILY MEDICINE | Facility: CLINIC | Age: 30
End: 2020-09-24

## 2020-10-22 ENCOUNTER — TELEPHONE (OUTPATIENT)
Dept: FAMILY MEDICINE | Facility: CLINIC | Age: 30
End: 2020-10-22

## 2020-10-26 NOTE — TELEPHONE ENCOUNTER
Major diagnoses and/or issues requiring coordination services: PTSD, Major Depression and mental health disorders      In the past month, how many times have you been to the Emergency Room? 0  In the past month, how many times have you been hospitalized? 0    Summary notes: Hayde reports struggling with oldest child DAVID OROZCO and working with [facility] Eng[facility] worker Gallo Cisneros to find possible treatment and living placement as she can no longer deal with him. Denies questions, concerns and needs at this time. Family purchased a home, address has been updated. Family currently receiving services with [facility], nothing in addition is needed at this time.         Counseling and coordination activities with: county worker,  and Care Coordinator.    Follow up: 11/26/20 or PRN.

## 2020-11-10 NOTE — TELEPHONE ENCOUNTER
"Out going call made to Monse Huang (Public Health Nurse) to f/u with regards to request for \"lice Medication\". LMTCB  " POC - s/p insertion of VPS        HPI:  14 mo female, ex 34 weeker, g-tube and trach-dependent, with h/o campomelic dysplasia, CLD, tracheomalacia, hydrocephalus, transferred to PICU for post-op monitoring s/p  shunt placement. Patient's history is significant for tracheostomy s/p arrest r/t difficult intubation, with ileus and g tube placement. Patient was in the care of [facility].    As per [place] nurse (Millie Sandoval):  She is on 28% FiO2 at home.     Home med:   Neb Budesonide 0.5mg BID  Neb Levalbuterol 0.63mg TID  Keppra 90mg BID  Elemental iron 6mg QD  Diastat 2.5mg PRN  Atrovent 500mg Q6 PRN  Albuterol 0.63mg Q4 PRN  Glycerin suppository PRN    Her home feeding regimen:  Pediasure enteral w/ fiber (30kcal/oz)- 100ml q4, rate: 280ml/hr, follow with 50ml water flush.     PAST MEDICAL & SURGICAL HISTORY:  Macrocephaly  Cerebral ventriculomegaly  Encephalomalacia  Hydrocephalus  Hip dysplasia  Anemia  Sickle cell trait  Campomelic dysplasia  Seizure-likeactivity  Pulmonary hypertension  resolved  Cardiac arrest  Ventilator dependent  Tracheobronchomalacia  Baby born premature  ex 34 weeker  Feeding by G-tube  g tube placed s/p ileus 11/2019  H/O tracheostomy  2019    PHYSICAL EXAM:  AA&0 x   Motor-   Muscle Tone-   Sensory -   Incision site C/D/I    Diet:  Regular (  )  NPO       (  )    Drains:  ventriculostomy   (  )  Lumbar drain       (  )  TERESA drain               (  )  Hemovac              (  )    Vital Signs Last 24 Hrs  T(C): 36 (10 Nov 2020 14:30), Max: 36.6 (10 Nov 2020 10:35)  T(F): 96.8 (10 Nov 2020 14:30), Max: 97.9 (10 Nov 2020 10:35)  HR: 170 (10 Nov 2020 16:16) (122 - 170)  BP: 88/60 (10 Nov 2020 16:00) (88/56 - 106/63)  BP(mean): 64 (10 Nov 2020 16:00) (64 - 73)  RR: 27 (10 Nov 2020 16:00) (0 - 28)  SpO2: 96% (10 Nov 2020 16:16) (94% - 100%)  I&O's Summary    10 Nov 2020 07:01  -  10 Nov 2020 17:09  --------------------------------------------------------  IN: 64 mL / OUT: 34 mL / NET: 30 mL      MEDICATIONS  (STANDING):  acetaminophen   Oral Liquid - Peds. 80 milliGRAM(s) Oral every 6 hours  ALBUTerol  Intermittent Nebulization - Peds 0.63 milliGRAM(s) Nebulizer every 6 hours  buDESOnide   for Nebulization - Peds 0.25 milliGRAM(s) Nebulizer every 12 hours  ceFAZolin  IV Intermittent - Peds 260 milliGRAM(s) IV Intermittent every 8 hours  dextrose 5% + sodium chloride 0.45% with potassium chloride 20 mEq/L. - Pediatric 1000 milliLiter(s) (32 mL/Hr) IV Continuous <Continuous>  levETIRAcetam IV Intermittent - Peds 90 milliGRAM(s) IV Intermittent every 12 hours    MEDICATIONS  (PRN):  glycerin  Pediatric Rectal Suppository - Peds 1 Suppository(s) Rectal every 24 hours PRN Constipation  LORazepam Injection - Peds 0.78 milliGRAM(s) IV Push once PRN seizures  morphine  IV Intermittent - Peds 0.39 milliGRAM(s) IV Intermittent every 4 hours PRN Moderate Pain (4 - 6)    LABS:                     POC - s/p insertion of VPS    Patient seen and examined with father bedside, post op CT head shows catheter in good position , no IVH,  no ICH.  Notified by RN about temp of 101.2, tyenol just given, will continue to monitor.  No other significant events.    HPI:  14 mo female, ex 34 weeker, g-tube and trach-dependent, with h/o campomelic dysplasia, CLD, tracheomalacia, hydrocephalus, transferred to PICU for post-op monitoring s/p  shunt placement. Patient's history is significant for tracheostomy s/p arrest r/t difficult intubation, with ileus and g tube placement. Patient was in the care of [facility].    As per [place] nurse (Millie Sandoval):  She is on 28% FiO2 at home.     Home med:   Neb Budesonide 0.5mg BID  Neb Levalbuterol 0.63mg TID  Keppra 90mg BID  Elemental iron 6mg QD  Diastat 2.5mg PRN  Atrovent 500mg Q6 PRN  Albuterol 0.63mg Q4 PRN  Glycerin suppository PRN    Her home feeding regimen:  Pediasure enteral w/ fiber (30kcal/oz)- 100ml q4, rate: 280ml/hr, follow with 50ml water flush.     PAST MEDICAL & SURGICAL HISTORY:  Macrocephaly  Cerebral ventriculomegaly  Encephalomalacia  Hydrocephalus  Hip dysplasia  Anemia  Sickle cell trait  Campomelic dysplasia  Seizure-likeactivity  Pulmonary hypertension  resolved  Cardiac arrest  Ventilator dependent  Tracheobronchomalacia  Baby born premature  ex 34 weeker  Feeding by G-tube  g tube placed s/p ileus 11/2019  H/O tracheostomy  2019    PHYSICAL EXAM:  AA&0 x   Motor-   Muscle Tone-   Sensory -   Incision site C/D/I    Diet:  Regular (  )  NPO       (  )  s  Drains:  ventriculostomy   (  )  Lumbar drain       (  )  TERESA drain               (  )  Hemovac              (  )    Vital Signs Last 24 Hrs  T(C): 36 (10 Nov 2020 14:30), Max: 36.6 (10 Nov 2020 10:35)  T(F): 96.8 (10 Nov 2020 14:30), Max: 97.9 (10 Nov 2020 10:35)  HR: 170 (10 Nov 2020 16:16) (122 - 170)  BP: 88/60 (10 Nov 2020 16:00) (88/56 - 106/63)  BP(mean): 64 (10 Nov 2020 16:00) (64 - 73)  RR: 27 (10 Nov 2020 16:00) (0 - 28)  SpO2: 96% (10 Nov 2020 16:16) (94% - 100%)  I&O's Summary    10 Nov 2020 07:01  -  10 Nov 2020 17:09  --------------------------------------------------------  IN: 64 mL / OUT: 34 mL / NET: 30 mL      MEDICATIONS  (STANDING):  acetaminophen   Oral Liquid - Peds. 80 milliGRAM(s) Oral every 6 hours  ALBUTerol  Intermittent Nebulization - Peds 0.63 milliGRAM(s) Nebulizer every 6 hours  buDESOnide   for Nebulization - Peds 0.25 milliGRAM(s) Nebulizer every 12 hours  ceFAZolin  IV Intermittent - Peds 260 milliGRAM(s) IV Intermittent every 8 hours  dextrose 5% + sodium chloride 0.45% with potassium chloride 20 mEq/L. - Pediatric 1000 milliLiter(s) (32 mL/Hr) IV Continuous <Continuous>  levETIRAcetam IV Intermittent - Peds 90 milliGRAM(s) IV Intermittent every 12 hours    MEDICATIONS  (PRN):  glycerin  Pediatric Rectal Suppository - Peds 1 Suppository(s) Rectal every 24 hours PRN Constipation  LORazepam Injection - Peds 0.78 milliGRAM(s) IV Push once PRN seizures  morphine  IV Intermittent - Peds 0.39 milliGRAM(s) IV Intermittent every 4 hours PRN Moderate Pain (4 - 6)    LABS:                     POC - s/p insertion of VPS    Patient seen and examined with father bedside, post op CT head shows catheter in good position , no IVH,  no ICH.  Notified by RN about temp of 101.2, tyenol just given, will continue to monitor.  No other significant events.    HPI:  14 mo female, ex 34 weeker, g-tube and trach-dependent, with h/o campomelic dysplasia, CLD, tracheomalacia, hydrocephalus, transferred to PICU for post-op monitoring s/p  shunt placement. Patient's history is significant for tracheostomy s/p arrest r/t difficult intubation, with ileus and g tube placement. Patient was in the care of [facility].    As per [place] nurse (Millie Sandoval):  She is on 28% FiO2 at home.     Home med:   Neb Budesonide 0.5mg BID  Neb Levalbuterol 0.63mg TID  Keppra 90mg BID  Elemental iron 6mg QD  Diastat 2.5mg PRN  Atrovent 500mg Q6 PRN  Albuterol 0.63mg Q4 PRN  Glycerin suppository PRN    Her home feeding regimen:  Pediasure enteral w/ fiber (30kcal/oz)- 100ml q4, rate: 280ml/hr, follow with 50ml water flush.     PAST MEDICAL & SURGICAL HISTORY:  Macrocephaly  Cerebral ventriculomegaly  Encephalomalacia  Hydrocephalus  Hip dysplasia  Anemia  Sickle cell trait  Campomelic dysplasia  Seizure-likeactivity  Pulmonary hypertension  resolved  Cardiac arrest  Ventilator dependent  Tracheobronchomalacia  Baby born premature  ex 34 weeker  Feeding by G-tube  g tube placed s/p ileus 11/2019  H/O tracheostomy  2019    PHYSICAL EXAM:  opens eyes spontaneously, PERRL, face symmetrical, non verbal  Motor- CUI spontaneously  Incision site C/D/I- head and abdomen  abdomen- soft NT/ND    Diet:  Regular (  )  NPO       (  )  s  Drains:  ventriculostomy   (  )  Lumbar drain       (  )  TERESA drain               (  )  Hemovac              (  )    Vital Signs Last 24 Hrs  T(C): 36 (10 Nov 2020 14:30), Max: 36.6 (10 Nov 2020 10:35)  T(F): 96.8 (10 Nov 2020 14:30), Max: 97.9 (10 Nov 2020 10:35)  HR: 170 (10 Nov 2020 16:16) (122 - 170)  BP: 88/60 (10 Nov 2020 16:00) (88/56 - 106/63)  BP(mean): 64 (10 Nov 2020 16:00) (64 - 73)  RR: 27 (10 Nov 2020 16:00) (0 - 28)  SpO2: 96% (10 Nov 2020 16:16) (94% - 100%)  I&O's Summary    10 Nov 2020 07:01  -  10 Nov 2020 17:09  --------------------------------------------------------  IN: 64 mL / OUT: 34 mL / NET: 30 mL      MEDICATIONS  (STANDING):  acetaminophen   Oral Liquid - Peds. 80 milliGRAM(s) Oral every 6 hours  ALBUTerol  Intermittent Nebulization - Peds 0.63 milliGRAM(s) Nebulizer every 6 hours  buDESOnide   for Nebulization - Peds 0.25 milliGRAM(s) Nebulizer every 12 hours  ceFAZolin  IV Intermittent - Peds 260 milliGRAM(s) IV Intermittent every 8 hours  dextrose 5% + sodium chloride 0.45% with potassium chloride 20 mEq/L. - Pediatric 1000 milliLiter(s) (32 mL/Hr) IV Continuous <Continuous>  levETIRAcetam IV Intermittent - Peds 90 milliGRAM(s) IV Intermittent every 12 hours    MEDICATIONS  (PRN):  glycerin  Pediatric Rectal Suppository - Peds 1 Suppository(s) Rectal every 24 hours PRN Constipation  LORazepam Injection - Peds 0.78 milliGRAM(s) IV Push once PRN seizures  morphine  IV Intermittent - Peds 0.39 milliGRAM(s) IV Intermittent every 4 hours PRN Moderate Pain (4 - 6)    LABS:

## 2020-11-11 DIAGNOSIS — G44.219 EPISODIC TENSION-TYPE HEADACHE, NOT INTRACTABLE: ICD-10-CM

## 2020-11-12 RX ORDER — ACETAMINOPHEN 325 MG/1
650 TABLET ORAL EVERY 6 HOURS PRN
Qty: 100 TABLET | Refills: 4 | Status: SHIPPED | OUTPATIENT
Start: 2020-11-12 | End: 2022-02-02

## 2020-11-17 DIAGNOSIS — G44.219 EPISODIC TENSION-TYPE HEADACHE, NOT INTRACTABLE: ICD-10-CM

## 2020-11-18 ENCOUNTER — TELEPHONE (OUTPATIENT)
Dept: FAMILY MEDICINE | Facility: CLINIC | Age: 30
End: 2020-11-18

## 2020-11-18 NOTE — TELEPHONE ENCOUNTER
Out going call made using a [place]  ([id number]) for HCH monthly and reminder veggie Rx .  Patient reports things have not been going very well as DAVID Pena has been missing since 11/13/20 and is requesting my assistance with contacting local police to report and assist contacting school to confirm if he has shown up there. Hayde report she did not contact the police as she does not know how to contact them or have the contact information. I requested she contact her home care nurse, , public health nurse or morales. Again, she reports she does not know how to contact them nor have any information and is requesting assistance. Discussed the importance of working with her daily care providers for assistance as I am a medical care coordinator only. She does confirm she receives help daily but has not asked anyone for assistance, she has not contacted [facility] Gallo Cisneros Office: [phone] Fax: [phone], again reports not having his contact information.   I reviewed recent care coordination agreement made on 8/2/20. Hayde confirms she remembers and repeats back her responsibilities of scheduling transportation, honesty and working within her care team for assistance. She reports unable to do as she does not have any contact information and language barrier. Discussed that due to non compliance of care coordination agreement  unwilling to learn and working with myself as agreed,  Beginning January 1, 2021 I will no longer provide care coordination for her and  will be transferring her over to Clinic  [facility] for continuation of assistance. Hayde verbalized understanding.     She would like me to continue on as the care coordinator for all of her children which I have agreed to do so. Nothing in addition is needed at this time, I will plan on meeting face to face during upcoming clinic appt 12/2/20 to provide new care coordinator contact  information and personal introduction if she chooses. Nothing in addition is needed at this time. Clinic manager will be notified of changes. Lastly, outgoing call will be made to Gallo Cisneros for guidance with  DAVID Rosmery Botello as a missing child.

## 2020-11-19 RX ORDER — IBUPROFEN 600 MG/1
600 TABLET, FILM COATED ORAL EVERY 8 HOURS PRN
Qty: 90 TABLET | Refills: 0 | Status: SHIPPED | OUTPATIENT
Start: 2020-11-19 | End: 2022-01-25

## 2020-11-19 NOTE — TELEPHONE ENCOUNTER
Pleestuardose call and schedule an appointment to check and see how her liver functio is at this point in time.  It looks like it has been elevated in the past and I do not see any normalized labs since then.    Thanks.    Bulmaro Ch MD on 11/19/2020 at 5:10 PM

## 2020-11-20 ENCOUNTER — TELEPHONE (OUTPATIENT)
Dept: FAMILY MEDICINE | Facility: CLINIC | Age: 30
End: 2020-11-20

## 2020-11-20 DIAGNOSIS — K59.09 CHRONIC CONSTIPATION: ICD-10-CM

## 2020-11-20 NOTE — TELEPHONE ENCOUNTER
Spoke to Nicole DURAN patient's care coordinator and discussed providers recommendations. Nicole will contact patient to try and get patient in for physical as she need labs and repeat pap.

## 2020-11-20 NOTE — TELEPHONE ENCOUNTER
Out going call using a [place]  ([id number]) to follow up on denial refill request for Ibuprofen pre Dr. Osei patient needs to be seen for labs and follow up. Patient verbalized  Understanding and has been scheduled for a physical along with fasting labs w/ Dr. Alvarado 12/2/20.

## 2020-11-23 RX ORDER — SENNA AND DOCUSATE SODIUM 50; 8.6 MG/1; MG/1
1 TABLET, FILM COATED ORAL AT BEDTIME
Qty: 90 TABLET | Refills: 3 | Status: SHIPPED | OUTPATIENT
Start: 2020-11-23 | End: 2021-11-16

## 2020-12-22 ENCOUNTER — TELEPHONE (OUTPATIENT)
Dept: FAMILY MEDICINE | Facility: CLINIC | Age: 30
End: 2020-12-22

## 2020-12-22 NOTE — TELEPHONE ENCOUNTER
Out going call made using a [place]  #[id number], for monthly HCH.    HCH Follow-up    Call initiated by clinic.  Message recorded by Nicole Estrella  Calling for: HCH Monthly  Patient: Hayde LUJAN Say  Phone conversation with: Patient  Patient's Phone number/s: Home number on file [phone] (home)  Available today in the PM on their Home number.  OK to leave message on voice mail? Yes      Major diagnoses and/or issues requiring coordination services: Major Depression and mental health disorders      In the past month, how many times have you been to the Emergency Room? 0  In the past month, how many times have you been hospitalized? 0    Summary notes: Hayde reports overall things are going ok. Reports DAVID OROZCO is currently being held in  [facility] and will not be coming home. Reports other children are doing well, no concerns, still receiving PCA services for herself along with Osiris. No additional questions, concerns or needs at this time.  Reviewed transfer of her care coordination beginning Jan 1, 20201 over to [facility].  Patient verbalized understanding and is requesting I continue to care coordinate for her children.     Self-management goals:  Establish care and coordination plan with Angeli HERNANDEZ      Counseling and coordination activities with:     Follow-up date: 01/28/2021

## 2021-01-22 ENCOUNTER — TELEPHONE (OUTPATIENT)
Dept: FAMILY MEDICINE | Facility: CLINIC | Age: 31
End: 2021-01-22

## 2021-01-22 DIAGNOSIS — L29.9 GENERALIZED PRURITUS: ICD-10-CM

## 2021-01-22 RX ORDER — CETIRIZINE HYDROCHLORIDE 10 MG/1
10 TABLET ORAL DAILY
Refills: 3 | Status: CANCELLED | OUTPATIENT
Start: 2021-01-22

## 2021-02-19 DIAGNOSIS — K59.00 CONSTIPATION, UNSPECIFIED CONSTIPATION TYPE: ICD-10-CM

## 2021-02-19 RX ORDER — ASPIRIN 81 MG
200 TABLET, DELAYED RELEASE (ENTERIC COATED) ORAL DAILY
Qty: 180 TABLET | Refills: 0 | Status: SHIPPED | OUTPATIENT
Start: 2021-02-19

## 2021-02-19 NOTE — TELEPHONE ENCOUNTER
Message to physician:     Date of last visit: 12/2/2020     Date of next visit if scheduled: none    Last Comprehensive Metabolic Panel:  Sodium   Date Value Ref Range Status   02/20/2019 143.0 133.0 - 144.0 mmol/L Final     Potassium   Date Value Ref Range Status   02/20/2019 3.6 3.4 - 5.3 mmol/L Final     Chloride   Date Value Ref Range Status   02/20/2019 104.0 94.0 - 109.0 mmol/L Final     Carbon Dioxide   Date Value Ref Range Status   02/20/2019 28.0 20.0 - 32.0 mmol/L Final     Glucose   Date Value Ref Range Status   02/20/2019 84.0 60.0 - 109.0 mg/dL Final     Glucose Fasting   Date Value Ref Range Status   07/16/2019 80.0 51.0 - 109.0 mg/dL Final     Urea Nitrogen   Date Value Ref Range Status   02/20/2019 6.0 5.0 - 24.0 mg/dL Final     Creatinine   Date Value Ref Range Status   02/20/2019 0.8 0.6 - 1.3 mg/dL Final     Calcium   Date Value Ref Range Status   02/20/2019 9.3 8.5 - 10.4 mg/dL Final       BP Readings from Last 3 Encounters:   08/05/20 91/55   07/14/20 101/68   03/02/20 93/66       No results found for: A1C             Please complete refill and CLOSE ENCOUNTER.  Closing the encounter signifies the refill is complete.

## 2021-05-07 DIAGNOSIS — J30.2 ACUTE SEASONAL ALLERGIC RHINITIS: ICD-10-CM

## 2021-05-07 DIAGNOSIS — L29.9 GENERALIZED PRURITUS: ICD-10-CM

## 2021-05-10 ENCOUNTER — TELEPHONE (OUTPATIENT)
Dept: FAMILY MEDICINE | Facility: CLINIC | Age: 31
End: 2021-05-10

## 2021-05-10 RX ORDER — CETIRIZINE HYDROCHLORIDE 10 MG/1
10 TABLET ORAL 2 TIMES DAILY
Qty: 60 TABLET | Refills: 1 | Status: SHIPPED | OUTPATIENT
Start: 2021-05-10 | End: 2021-07-08

## 2021-05-10 RX ORDER — MONTELUKAST SODIUM 10 MG/1
10 TABLET ORAL DAILY
Qty: 90 TABLET | Refills: 1 | Status: SHIPPED | OUTPATIENT
Start: 2021-05-10 | End: 2021-10-29

## 2021-05-10 NOTE — TELEPHONE ENCOUNTER
Prior Authorization needed on:  5/10/21    Medication:  cetirizine Dose:  10mg    Pharmacy confirmed as [facility] - [place], [place] - [id number] [facility]  [place] [address]  Phone: [phone] Fax: [phone]: Yes    Insurance Name:    Insurance Phone:   Insurance Patient ID:     Alternatives Suggested:      CMM:  KEY: [id number]  PT LAST NAME: SAY  : 1990    SUDARSHAN ORTIZ CMA May 10, 2021 at 3:14 PM

## 2021-05-12 NOTE — TELEPHONE ENCOUNTER
PA Initiation    Medication: cetirizine (ZYRTEC) 10 MG tablet   Insurance Company: [employer] - Phone [phone] Fax [phone]  Pharmacy Filling the Rx: [facility] - [place], [place] - [address] [place] [address]  Filling Pharmacy Phone: [phone]  Filling Pharmacy Fax: [phone]  Start Date: 5/12/2021

## 2021-05-13 NOTE — TELEPHONE ENCOUNTER
Prior Authorization Approval    Authorization Effective Date: 2/12/2021  Authorization Expiration Date: 5/12/2022  Medication: cetirizine (ZYRTEC) 10 MG tablet--APPROVED  Approved Dose/Quantity:   Reference #:     Insurance Company: [employer] - Phone [phone] Fax [phone]  Expected CoPay:       CoPay Card Available:      Foundation Assistance Needed:    Which Pharmacy is filling the prescription (Not needed for infusion/clinic administered): [facility] - [place], [place] - [address]  Pharmacy Notified: Yes  Patient Notified: Yes **Instructed pharmacy to notify patient when script is ready to /ship.**

## 2021-05-21 ENCOUNTER — TELEPHONE (OUTPATIENT)
Dept: FAMILY MEDICINE | Facility: CLINIC | Age: 31
End: 2021-05-21
Payer: COMMERCIAL

## 2021-05-21 NOTE — TELEPHONE ENCOUNTER
The pt has an appointment on 05/24/2021 at 3:00pm with  about citizenship forms. We have told the pt before that we can not help her with these forms, she will have to have her therapist fill out the form. I called the pt worker at [facility] to see if he can explain to her that we can not fill it out. We do not want to waste her time coming in, and we can not help her. The pt worker stated that he has explained to the pt many times that she will have to take a different route for her citizenship, but the pt does not seem to listen. He stated that he has explained to her many times, and she still doing what she wants to do. I asked to see if he wants me to cancel this appointment or keep it. The worker stated that he wants to keep it, so that when she comes in, and she is getting the same message, so that she knows.

## 2021-05-24 ENCOUNTER — TELEPHONE (OUTPATIENT)
Dept: FAMILY MEDICINE | Facility: CLINIC | Age: 31
End: 2021-05-24

## 2021-05-24 ENCOUNTER — OFFICE VISIT (OUTPATIENT)
Dept: FAMILY MEDICINE | Facility: CLINIC | Age: 31
End: 2021-05-24
Payer: COMMERCIAL

## 2021-05-24 VITALS — BODY MASS INDEX: 32.36 KG/M2 | WEIGHT: 150 LBS | HEIGHT: 57 IN

## 2021-05-24 DIAGNOSIS — Z13.9 ENCOUNTER FOR SCREENING INVOLVING SOCIAL DETERMINANTS OF HEALTH (SDOH): Primary | ICD-10-CM

## 2021-05-24 PROCEDURE — 99207 PR NO CHARGE LOS: CPT | Mod: GC | Performed by: STUDENT IN AN ORGANIZED HEALTH CARE EDUCATION/TRAINING PROGRAM

## 2021-05-24 ASSESSMENT — MIFFLIN-ST. JEOR: SCORE: 1270.66

## 2021-05-24 NOTE — NURSING NOTE
Unable to obtain all vitals due to pt left the clinic    Due to patient being non-English speaking/uses sign language, an  was used for this visit. Only for face-to-face interpretation by an external agency, date and length of interpretation can be found on the scanned worksheet.     name: Rowena Coffey  Agency: [employer]  Language: Maryann   Telephone number: [phone]  Type of interpretation: Face-to-face, spoken

## 2021-05-24 NOTE — TELEPHONE ENCOUNTER
had talked to me about the pt. The pt came in today about her Medical Certification form.  is not able to fill out this form, he does not know the pt enough to fill this form. The pt does not have any diagnosis for him to fill it out. He wanted to know who or where we should refer the pt to. I told him that I talked to her worker at [place] about this visit already. He stated that he has told the pt what she needs to do, but she is not following the process. I told  I can reach out to the worker again to see what we can do from here.     I called and talked to the pt [place] worker. He stated that they have reach out to [facility] to help the pt with this process. They have told her that she needs to take this citizenship class, and if she can not comprehend or fails at it, then they can go from there. The pt is not willing to do this, and is looking for a shortcut to get her citizenship. I asked to see who I should refer the pt to talk to about this. He stated that just refer the pt back to him. He will help the pt go through this process.

## 2021-05-24 NOTE — PROGRESS NOTES
CHIEF COMPLAINT                                                    Paperwork visit    SUBJECTIVE:                                                    Hayde Macias is a 30 year old year old female who presents to clinic today for the following health issues:    Paperwork visit  -Patient presents today for assistance with citizenship forms  -Patient has been working with care management, both Nicole Wyatt and Yolanda Suh to help get this accomplished as well as with her .  Patient has been given the appropriate instructions but has not followed through  -Explained today that given the sensitive nature of the forms and the need to fill out accurately she should pursue appropriate avenues per case management.   -Patient became tearful at this stating that she felt overwhelmed  -Ask care management to again help schedule appropriate follow up, and they will reach out to patient and her staff to help accomplish this  -Denied any other acute health concerns today.    -No charged visit given there was no healthcare performed today.      Patient is an established patient of this clinic.    A Maryann  was used for this visit.  ----------------------------------------------------------------------------------------------------------------------  Patient Active Problem List   Diagnosis     Health Care Home     Recurrent major depressive disorder (H)     Seasonal allergic rhinitis     PTSD (post-traumatic stress disorder)     BRENDA (generalized anxiety disorder)     Insomnia due to other mental disorder     Major depressive disorder, recurrent episode, moderate (H)     Class 1 obesity without serious comorbidity with body mass index (BMI) of 32.0 to 32.9 in adult, unspecified obesity type     Screening for malignant neoplasm of cervix     Depo-Provera contraceptive status     Past Surgical History:   Procedure Laterality Date     NO HISTORY OF SURGERY         Social History     Tobacco Use     Smoking status:  Never Smoker     Smokeless tobacco: Never Used     Tobacco comment: no smoke exposure.   Substance Use Topics     Alcohol use: No     Alcohol/week: 0.0 standard drinks     Family History   Problem Relation Age of Onset     Asthma Son      Asthma Son      Depression Mother      Diabetes No family hx of      Coronary Artery Disease No family hx of      Hypertension No family hx of      Hyperlipidemia No family hx of      Cerebrovascular Disease No family hx of      Breast Cancer No family hx of      Colon Cancer No family hx of      Prostate Cancer No family hx of      Other Cancer No family hx of      Anxiety Disorder No family hx of      Mental Illness No family hx of      Substance Abuse No family hx of      Anesthesia Reaction No family hx of      Osteoporosis No family hx of      Genetic Disorder No family hx of      Thyroid Disease No family hx of      Obesity No family hx of          Problem list and past medical, surgical, social, and family histories reviewed & adjusted, as indicated.    Current Outpatient Medications   Medication Sig Dispense Refill     acetaminophen (TYLENOL) 325 MG tablet Take 2 tablets (650 mg) by mouth every 6 hours as needed for headaches 100 tablet 4     ammonium lactate (LAC-HYDRIN) 12 % external lotion Apply topically 2 times daily 57 g 3     aspirin-acetaminophen-caffeine (EXCEDRIN MIGRAINE) 250-250-65 MG tablet Take 1 tablet by mouth every 8 hours as needed for headaches 30 tablet 1     calcium carbonate (TUMS) 500 MG chewable tablet Take 1 tablet (500 mg) by mouth 2 times daily as needed for heartburn 150 tablet 3     cetirizine (ZYRTEC) 10 MG tablet Take 1 tablet (10 mg) by mouth 2 times daily 60 tablet 1     diphenhydrAMINE (BENADRYL) 2 % external cream Apply topically 3 times daily as needed for itching 30 g 0     diphenhydrAMINE (BENADRYL) 25 MG tablet Take 1 tablet (25 mg) by mouth every 6 hours as needed for itching or allergies 20 tablet 0     docusate sodium (COLACE) 100  MG tablet Take 2 tablets (200 mg) by mouth daily 180 tablet 0     famotidine (PEPCID) 20 MG tablet Take 1 tablet (20 mg) by mouth nightly as needed (acid reflux) 30 tablet 3     fluticasone (FLONASE) 50 MCG/ACT nasal spray Spray 1-2 sprays into both nostrils daily as needed for allergies 16 mL 1     ibuprofen (ADVIL/MOTRIN) 600 MG tablet Take 1 tablet (600 mg) by mouth every 8 hours as needed (back pain) 90 tablet 0     ketotifen (ZADITOR) 0.025 % ophthalmic solution Place 1 drop into both eyes every 12 hours 1 Bottle 3     montelukast (SINGULAIR) 10 MG tablet Take 1 tablet (10 mg) by mouth daily 90 tablet 1     norgestimate-ethinyl estradiol (ORTHO-CYCLEN) 0.25-35 MG-MCG tablet Take 1 tablet by mouth daily 84 tablet 3     permethrin (NIX) 1 % external liquid Apply to clean, towel-dried hair, saturate hair and scalp, wash off after 10 min. 120 mL 1     polyethylene glycol (MIRALAX) powder Take 17 g (1 capful) by mouth daily as needed for constipation 510 g 1     prazosin (MINIPRESS) 1 MG capsule Take 2 capsules (2 mg) by mouth At Bedtime 90 capsule 3     risperiDONE (RISPERDAL) 2 MG tablet Take 1 tablet (2 mg) by mouth At Bedtime 30 tablet 3     SENNA-docusate sodium (SENNA S) 8.6-50 MG tablet Take 1 tablet by mouth At Bedtime 90 tablet 3     venlafaxine (EFFEXOR-XR) 150 MG 24 hr capsule Take 1 capsule by mouth daily (together with 75 mg capsule for total of 225 mg/day) 90 capsule 1     venlafaxine (EFFEXOR-XR) 75 MG 24 hr capsule Take 1 capsule by mouth daily (together with 150 mg capsule for total of 225 mg/day) 90 capsule 1     Medication list reviewed and updated as indicated.    Allergies   Allergen Reactions     Augmentin Rash     Allergies reviewed and updated as indicated.  ----------------------------------------------------------------------------------------------------------------------  ROS:  Constitutional, HEENT, cardiovascular, pulmonary, GI, musculoskeletal, neuro, skin, and psych systems are  negative, except as otherwise noted.    OBJECTIVE:     No exam was performed today    ASSESSMENT/PLAN:     NO CHARGE VISIT FOR PAPERWORK  (Z13.9) Encounter for screening involving social determinants of health (SDoH)  (primary encounter diagnosis)  -Explained to patient that we were unable to help with this at this time  -Explained to patient that she would need to work with her staff to help get this filled out appropriately  -Stressed the need for this to be accurate as if there are any errors it can lead to significant concerns.    -Patient voiced understanding  -Stated care managers would reach out to her staff to help get the appropriate appointments arranged  -Verbalized understanding of this    There are no discontinued medications.    Options for treatment and follow-up care were reviewed with the patient and/or guardian. Hayde LUJAN Say and/or guardian engaged in the decision making process and verbalized understanding of the options discussed and agreed with the final plan    Precepted with Dr. Lozada.    Bulmaro Ch MD on 5/24/2021 at 3:31 PM

## 2021-05-26 NOTE — PROGRESS NOTES
Faculty Supervision of Residents   I have examined this patient and the medical care has been evaluated and discussed with the resident. See resident note to follow outlining our discussion.    DOS 5/26/2021    Jenna Lozada MD

## 2021-06-23 NOTE — ANESTHESIA POSTPROCEDURE EVALUATION
Patient: Hayde LUJAN Say  * No procedures listed *  Anesthesia type: epidural    Patient location: Labor and Delivery  Last vitals:   Vitals:    02/06/19 0735   BP: 97/51   Pulse: 86   Resp: 16   Temp: 37.2  C (98.9  F)   SpO2: 98%     Post vital signs: stable  Level of consciousness: awake and responds to simple questions  Post-anesthesia pain: pain controlled  Post-anesthesia nausea and vomiting: no  Pulmonary: unassisted, return to baseline  Cardiovascular: stable and blood pressure at baseline  Hydration: adequate  Anesthetic events: no    QCDR Measures:  ASA# 11 - Kandy-op Cardiac Arrest: ASA11B - Patient did NOT experience unanticipated cardiac arrest  ASA# 12 - Kandy-op Mortality Rate: ASA12B - Patient did NOT die  ASA# 13 - PACU Re-Intubation Rate: ASA13B - Patient did NOT require a new airway mgmt  ASA# 10 - Composite Anes Safety: ASA10A - No serious adverse event    Additional Notes:    Neuraxial block has worn off, no residual numbness or weakness. Pain controlled. Denies headache or back pain. No further questions or concerns. Instructed that we are available 24/7 should she have questions pertaining to her epidural.

## 2021-06-23 NOTE — ANESTHESIA PREPROCEDURE EVALUATION
Anesthesia Evaluation      Patient summary reviewed   No history of anesthetic complications     Airway   Mallampati: II  Neck ROM: full   Pulmonary - normal exam    breath sounds clear to auscultation  (+) asthma                           Cardiovascular - negative ROS  Rhythm: regular  Rate: normal,         Neuro/Psych - negative ROS     Endo/Other    (+) pregnant     GI/Hepatic/Renal - negative ROS           Dental - normal exam                        Anesthesia Plan  Planned anesthetic: epidural  Patient requesting labor epidural. Patient interviewed and examined at the bedside with RN present throughout. Discussed with patient the procedure of epidural placement, expectations, and risks including but not limited to: decreased blood pressure, poor analgesia possibly requiring replacement, post-dural puncture headache, bleeding, infection, nerve damage, and high spinal block. All questions answered. Patient consents to proceed with epidural placement.    ASA 2     Anesthetic plan and risks discussed with: patient and  services used    Post-op plan: routine recovery

## 2021-06-23 NOTE — TELEPHONE ENCOUNTER
"OB Follow Up Phone Call    Patient: Hayde LUJAN Say  : 1990  MRN: [id number]     Location   [facility]  Provider Kenya Bentley MD    :   \"[facility]\" #[id number]    Language:   Maryann    Discharge Follow-Up:  Follow-Up call by Outreach nurse: Call placed    Type of Delivery:      Feeding Method:  Formula    Amount of Feedin ounces every 3 hours    Number of Infant Stools in 24 hours:   3-4    Stool:  Transition    Number of Infant voids in 24 hours:  >3    Urine:  Clear    Infant Jaundice:   None    Discussed increasing s/s of Jaundice:   Yes    Circumcision:   N/A    Maternal s/s of infection:   None    Maternal s/s of PIH:   WDL    Postpartum cramping:   Mild    Comfort:  Adequate with routine pain meds    Vaginal Bleeding:  WDL    S/S of Maternal Thrombosis:  None    Maternal BM Since Delivery:  Yes    Referrals:   None    Resources Used During Phone Call:  HEPS    Comments:   When asked if Hayde is having baby blues or anxiety, Hayde states she is more depressed since the baby has been born. Hayde states she does not feel like harming herself or the children. Hayde states she feels safe and will have her  at home until Monday. After Monday, she will be alone with the children when her  is at work. Reminded Hayde of the  appointment tomorrow at [facility] [facility] and when asked, Hayde states both she and her  will be at the appointment.    Completed by:   Amira Graham RN      Patient: Hayde LUJAN Say  : 1990  MRN: [id number]             "

## 2021-06-23 NOTE — ANESTHESIA PROCEDURE NOTES
Epidural Block    Patient location during procedure: OB  Time Called: 2/5/2019 4:05 AM  Reason for Block:labor epidural  Staffing:  Performing  Anesthesiologist: Sunny Helton MD  Preanesthetic Checklist  Completed: patient identified, risks, benefits, and alternatives discussed, timeout performed, consent obtained, at patient's request, airway assessed, oxygen available, suction available, emergency drugs available and hand hygiene performed  Procedure  Patient position: sitting  Prep: ChloraPrep and site prepped and draped  Patient monitoring: continuous pulse oximetry, heart rate and blood pressure  Approach: midline  Location: L3-L4  Injection technique: CJ saline  Number of Attempts:1  Needle  Needle type: Tuohy   Needle gauge: 17 G       Assessment  Sensory level:  No complications      Additional Notes:  CJ @ 4 cm, taped @ 8 cm

## 2021-07-07 DIAGNOSIS — Z30.011 ENCOUNTER FOR INITIAL PRESCRIPTION OF CONTRACEPTIVE PILLS: ICD-10-CM

## 2021-07-07 NOTE — TELEPHONE ENCOUNTER
[facility] phone call message- medication clarification/question:      Full Medication Name: norgestimate-ethinyl estradiol (ORTHO-CYCLEN) 0.25-35 MG-MCG tablet       Have you contacted your pharmacy about this refill request? Yes    If  Yes,  which pharmacy?    When did you contact the pharmacy?    Additional comments/concerns from call to pharmacy:    Reason for call to clinic: Patient states refill is needed for the medication listed. Please call and advise.       Pharmacy confirmed as    [facility] - [place], [place] - [address]: Yes    OK to leave a message on voice mail? Yes    Primary language: Maryann      needed? Yes    Call taken on July 7, 2021 at 10:01 AM by Hallie Weeks

## 2021-07-08 DIAGNOSIS — L29.9 GENERALIZED PRURITUS: ICD-10-CM

## 2021-07-08 RX ORDER — NORGESTIMATE AND ETHINYL ESTRADIOL 0.25-0.035
1 KIT ORAL DAILY
Qty: 84 TABLET | Refills: 3 | Status: SHIPPED | OUTPATIENT
Start: 2021-07-08 | End: 2022-06-23

## 2021-07-09 RX ORDER — CETIRIZINE HYDROCHLORIDE 10 MG/1
10 TABLET ORAL 2 TIMES DAILY
Qty: 60 TABLET | Refills: 0 | Status: SHIPPED | OUTPATIENT
Start: 2021-07-09 | End: 2021-08-11

## 2021-08-11 DIAGNOSIS — L29.9 GENERALIZED PRURITUS: ICD-10-CM

## 2021-08-11 RX ORDER — CETIRIZINE HYDROCHLORIDE 10 MG/1
10 TABLET ORAL 2 TIMES DAILY
Qty: 60 TABLET | Refills: 0 | Status: SHIPPED | OUTPATIENT
Start: 2021-08-11 | End: 2021-09-16

## 2021-08-11 NOTE — TELEPHONE ENCOUNTER
[facility] Clinic phone call message- medication clarification/question:    Full Medication Name: cetirizine (ZYRTEC   Dose: 10 mg tablet     Question: sent request via fax 8/5 but has not heard response. Please send refills      Pharmacy confirmed as    [id number] - [place], [place] - [address] [facility] - [place], [place] - [address]  : Yes    OK to leave a message on voice mail? Yes    Primary language: Maryann      needed? Yes    Call taken on August 11, 2021 at 10:17 AM by Angelina Felix

## 2021-09-09 ENCOUNTER — TELEPHONE (OUTPATIENT)
Dept: FAMILY MEDICINE | Facility: CLINIC | Age: 31
End: 2021-09-09

## 2021-09-09 NOTE — TELEPHONE ENCOUNTER
SW called patient this date to reschedule missed [facility] appointment for patient's son Brant 9/8/21. Appointment rescheduled for 9/20/21. SW to provide reminder call 9/17/21 for this appointment.    SW also called patient this date to completed [facility] monthly contacts for patient's children. Patient completed check-ins regarding children's health. Patient provided short, succinct answers to SW-posed questions about health of children this date.    SW inquired to patient's physical and emotional/mental health this date. Patient endorsed significant sadness and anger regarding eldest son's interaction with the legal system and reported patient difficulty navigating the immigration system. Patient endorsed recent but not current passive homicidal ideation. Patient not able to accurately communicate specificity as to when the recent thoughts occurred. Patient reported this past ideation was passive, appeared to be triggered by anger re: not knowing the legal system, and patient denied any plan or intent. SW and patient to follow-up after son's 9/20/21 appointment to contact eldest son's  for updates as well as [facility] for status of patient's immigration application.    ANICETO ARIAS, OSMAN, [facility]

## 2021-09-16 DIAGNOSIS — L29.9 GENERALIZED PRURITUS: ICD-10-CM

## 2021-09-16 RX ORDER — CETIRIZINE HYDROCHLORIDE 10 MG/1
10 TABLET ORAL 2 TIMES DAILY
Qty: 60 TABLET | Refills: 1 | Status: SHIPPED | OUTPATIENT
Start: 2021-09-16 | End: 2021-11-16

## 2021-10-28 ENCOUNTER — TELEPHONE (OUTPATIENT)
Dept: FAMILY MEDICINE | Facility: CLINIC | Age: 31
End: 2021-10-28

## 2021-10-28 NOTE — TELEPHONE ENCOUNTER
Elias from [facility] called SW this date and inquired to patient's history of a physical exam. SW confirmed no physical exam provided to patient in last 12 months. SW informed Elias to call clinic scheduling to schedule physical exam for patient.    HUGO MORELAND, OSMAN, [facility]

## 2021-10-29 DIAGNOSIS — J30.2 ACUTE SEASONAL ALLERGIC RHINITIS: ICD-10-CM

## 2021-10-29 RX ORDER — MONTELUKAST SODIUM 10 MG/1
10 TABLET ORAL DAILY
Qty: 90 TABLET | Refills: 3 | Status: SHIPPED | OUTPATIENT
Start: 2021-10-29 | End: 2022-11-18

## 2021-11-15 ENCOUNTER — OFFICE VISIT (OUTPATIENT)
Dept: FAMILY MEDICINE | Facility: CLINIC | Age: 31
End: 2021-11-15
Payer: COMMERCIAL

## 2021-11-15 VITALS
DIASTOLIC BLOOD PRESSURE: 71 MMHG | HEART RATE: 88 BPM | BODY MASS INDEX: 32.79 KG/M2 | SYSTOLIC BLOOD PRESSURE: 102 MMHG | RESPIRATION RATE: 18 BRPM | TEMPERATURE: 98.5 F | OXYGEN SATURATION: 100 % | WEIGHT: 152 LBS | HEIGHT: 57 IN

## 2021-11-15 DIAGNOSIS — R87.610 ATYPICAL SQUAMOUS CELLS OF UNDETERMINED SIGNIFICANCE (ASCUS) ON PAPANICOLAOU SMEAR OF CERVIX: ICD-10-CM

## 2021-11-15 DIAGNOSIS — Z23 HIGH PRIORITY FOR 2019-NCOV VACCINE: ICD-10-CM

## 2021-11-15 DIAGNOSIS — Z51.81 MEDICATION MONITORING ENCOUNTER: ICD-10-CM

## 2021-11-15 DIAGNOSIS — Z00.00 ROUTINE GENERAL MEDICAL EXAMINATION AT A HEALTH CARE FACILITY: Primary | ICD-10-CM

## 2021-11-15 LAB
ANION GAP SERPL CALCULATED.3IONS-SCNC: 9 MMOL/L (ref 5–18)
BUN SERPL-MCNC: 10 MG/DL (ref 8–22)
CALCIUM SERPL-MCNC: 9.3 MG/DL (ref 8.5–10.5)
CHLORIDE BLD-SCNC: 107 MMOL/L (ref 98–107)
CHOLEST SERPL-MCNC: 218 MG/DL
CO2 SERPL-SCNC: 23 MMOL/L (ref 22–31)
CREAT SERPL-MCNC: 0.72 MG/DL (ref 0.6–1.1)
FASTING STATUS PATIENT QL REPORTED: ABNORMAL
GFR SERPL CREATININE-BSD FRML MDRD: >90 ML/MIN/1.73M2
GLUCOSE BLD-MCNC: 84 MG/DL (ref 70–125)
HBA1C MFR BLD: 4.7 % (ref 0–5.6)
HDLC SERPL-MCNC: 56 MG/DL
LDLC SERPL CALC-MCNC: 139 MG/DL
POTASSIUM BLD-SCNC: 4.2 MMOL/L (ref 3.5–5)
SODIUM SERPL-SCNC: 139 MMOL/L (ref 136–145)
TRIGL SERPL-MCNC: 115 MG/DL

## 2021-11-15 PROCEDURE — T1013 SIGN LANG/ORAL INTERPRETER: HCPCS | Performed by: STUDENT IN AN ORGANIZED HEALTH CARE EDUCATION/TRAINING PROGRAM

## 2021-11-15 PROCEDURE — 91301 COVID-19,PF,MODERNA (18+ YRS PRIMARY SERIES .5ML): CPT | Performed by: STUDENT IN AN ORGANIZED HEALTH CARE EDUCATION/TRAINING PROGRAM

## 2021-11-15 PROCEDURE — 83036 HEMOGLOBIN GLYCOSYLATED A1C: CPT | Performed by: STUDENT IN AN ORGANIZED HEALTH CARE EDUCATION/TRAINING PROGRAM

## 2021-11-15 PROCEDURE — 36415 COLL VENOUS BLD VENIPUNCTURE: CPT | Performed by: STUDENT IN AN ORGANIZED HEALTH CARE EDUCATION/TRAINING PROGRAM

## 2021-11-15 PROCEDURE — 87624 HPV HI-RISK TYP POOLED RSLT: CPT | Performed by: STUDENT IN AN ORGANIZED HEALTH CARE EDUCATION/TRAINING PROGRAM

## 2021-11-15 PROCEDURE — 0011A COVID-19,PF,MODERNA (18+ YRS PRIMARY SERIES .5ML): CPT | Performed by: STUDENT IN AN ORGANIZED HEALTH CARE EDUCATION/TRAINING PROGRAM

## 2021-11-15 PROCEDURE — 80048 BASIC METABOLIC PNL TOTAL CA: CPT | Performed by: STUDENT IN AN ORGANIZED HEALTH CARE EDUCATION/TRAINING PROGRAM

## 2021-11-15 PROCEDURE — 99395 PREV VISIT EST AGE 18-39: CPT | Mod: 25 | Performed by: STUDENT IN AN ORGANIZED HEALTH CARE EDUCATION/TRAINING PROGRAM

## 2021-11-15 PROCEDURE — 80061 LIPID PANEL: CPT | Performed by: STUDENT IN AN ORGANIZED HEALTH CARE EDUCATION/TRAINING PROGRAM

## 2021-11-15 PROCEDURE — G0123 SCREEN CERV/VAG THIN LAYER: HCPCS | Performed by: STUDENT IN AN ORGANIZED HEALTH CARE EDUCATION/TRAINING PROGRAM

## 2021-11-15 ASSESSMENT — MIFFLIN-ST. JEOR: SCORE: 1280.97

## 2021-11-15 NOTE — LETTER
December 1, 2021      Hayde LUJAN Say  [address] [place] [address]        Dear Ms.Say,    We are writing to inform you of your test results.    Your pap test looked completely benign and reassuring at this time.  There was no progression of the unusual cells they saw several years ago which is reassuring.  If you have any other questions or concerns feel free to return to clinic for further evaluation.          Resulted Orders   Lipid Panel   Result Value Ref Range    Cholesterol 218 (H) <=199 mg/dL    Triglycerides 115 <=149 mg/dL    Direct Measure HDL 56 >=50 mg/dL      Comment:      HDL Cholesterol Reference Range:     0-2 years:   No reference ranges established for patients under 2 years old  at [facility] for lipid analytes.    2-8 years:  Greater than 45 mg/dL     18 years and older:   Female: Greater than or equal to 50 mg/dL   Male:   Greater than or equal to 40 mg/dL    LDL Cholesterol Calculated 139 (H) <=129 mg/dL    Patient Fasting > 8hrs? Unknown    Gynecologic Cytology (PAP)   Result Value Ref Range    Interpretation        Negative for Intraepithelial Lesion or Malignancy (NILM)    Comment         Papanicolaou Test Limitations:  Cervical cytology is a screening test with limited sensitivity, and regular screening is critical for cancer prevention.  Pap tests are primarily effective for the diagnosis/prevention of squamous cell carcinoma, not adenocarcinoma or other cancers.        Specimen Adequacy       Satisfactory for evaluation, endocervical/transformation zone component absent    Clinical Information       none      HPV Reflex Yes regardless of result     Previous Abnormal?       Yes      Previous Abnormal Diagnosis       ASCUS 2019      Performing Labs       The technical component of this testing was completed at [facility]     Basic metabolic panel   Result Value Ref Range    Sodium 139 136 - 145 mmol/L    Potassium 4.2 3.5 - 5.0 mmol/L     Chloride 107 98 - 107 mmol/L    Carbon Dioxide (CO2) 23 22 - 31 mmol/L    Anion Gap 9 5 - 18 mmol/L    Urea Nitrogen 10 8 - 22 mg/dL    Creatinine 0.72 0.60 - 1.10 mg/dL    Calcium 9.3 8.5 - 10.5 mg/dL    Glucose 84 70 - 125 mg/dL    GFR Estimate >90 >60 mL/min/1.73m2      Comment:      As of July 11, 2021, eGFR is calculated by the CKD-EPI creatinine equation, without race adjustment. eGFR can be influenced by muscle mass, exercise, and diet. The reported eGFR is an estimation only and is only applicable if the renal function is stable.   Hemoglobin A1c   Result Value Ref Range    Hemoglobin A1C 4.7 0.0 - 5.6 %      Comment:      Normal <5.7%   Prediabetes 5.7-6.4%    Diabetes 6.5% or higher     Note: Adopted from ADA consensus guidelines.   HPV High Risk Types DNA Cervical   Result Value Ref Range    Other HR HPV Negative Negative    HPV16 DNA Negative Negative    HPV18 DNA Negative Negative    FINAL DIAGNOSIS       This patient's sample is negative for HPV DNA.        This test was developed and its performance characteristics determined by the [facility]. It has not been cleared or approved by the FDA. The laboratory is regulated under CLIA as qualified to perform high-complexity testing. This test is used for clinical purposes. It should not be regarded as investigational or for research.    METHODOLOGY: The Roche Fawn 4800 system uses automated extraction, simultaneous amplification of HPV (L1 region) and beta-globin, followed by real time detection of fluorescent labeled HPV and beta globin using specific oligonucleotide probes. The test specifically identified types HPV 16 DNA and HPV 18 DNA while concurrently detecting the rest of the high risk types (31, 33, 35, 39, 45, 51, 52, 56, 58, 59, 66 or 68).    COMMENTS: This test is not intended for use as a screening device for woman under age 30 with normal cervical cytology. Results should be  correlated with cytologic and histologic findings. Close clinical followup is recommended.           If you have any questions or concerns, please call the clinic at the number listed above.       Sincerely,      Bulmaro Ch MD

## 2021-11-15 NOTE — PROGRESS NOTES
Female Physical Note    Concerns today:   -Concerns with taking the COVID shot, curious about which we would recommend   -Needs paperwork filled out for her psych clinic talking about her results   -Patient has concerns about things   -Also states she has a concern but she is not sure what the concern is about   -Feels like she is more forgetful these days, feeling depressed but well taken care of by her current clinic    A Maryann  was used for  this visit.     ROS:  CONSTITUTIONAL: no fatigue, no unexpected change in weight  SKIN: no worrisome rashes, no worrisome moles, no worrisome lesions  EYES: no acute vision problems or changes  ENT: no ear problems, no mouth problems, no throat problems  RESP: no significant cough, no shortness of breath  CV: no chest pain, no palpitations, no new or worsening peripheral edema  GI: no nausea, no vomiting, no constipation, no diarrhea    Sexually Active: No  Sexual concerns: No, does not like her  right now, cannot tolerate her    Contraception:OCP-see med list  Menarche: Regular, she cannot remember when it typically occurs but notes that it occurs at least once a month  STD History: No history of this   Last Pap Smear Date: 9/11/2019 showed ASCUS friable tissue wand was recommended to repeat this  Abnormal Pap History: Yes, 9/11/2019    Patient Active Problem List   Diagnosis     Health Care Home     Recurrent major depressive disorder (H)     Seasonal allergic rhinitis     PTSD (post-traumatic stress disorder)     BRENDA (generalized anxiety disorder)     Insomnia due to other mental disorder     Major depressive disorder, recurrent episode, moderate (H)     Class 1 obesity without serious comorbidity with body mass index (BMI) of 32.0 to 32.9 in adult, unspecified obesity type     Screening for malignant neoplasm of cervix     Depo-Provera contraceptive status       Current Outpatient Medications   Medication Sig Dispense Refill     acetaminophen  (TYLENOL) 325 MG tablet Take 2 tablets (650 mg) by mouth every 6 hours as needed for headaches 100 tablet 4     ammonium lactate (LAC-HYDRIN) 12 % external lotion Apply topically 2 times daily 57 g 3     aspirin-acetaminophen-caffeine (EXCEDRIN MIGRAINE) 250-250-65 MG tablet Take 1 tablet by mouth every 8 hours as needed for headaches 30 tablet 1     calcium carbonate (TUMS) 500 MG chewable tablet Take 1 tablet (500 mg) by mouth 2 times daily as needed for heartburn 150 tablet 3     cetirizine (ZYRTEC) 10 MG tablet Take 1 tablet (10 mg) by mouth 2 times daily 60 tablet 1     diphenhydrAMINE (BENADRYL) 2 % external cream Apply topically 3 times daily as needed for itching 30 g 0     diphenhydrAMINE (BENADRYL) 25 MG tablet Take 1 tablet (25 mg) by mouth every 6 hours as needed for itching or allergies 20 tablet 0     docusate sodium (COLACE) 100 MG tablet Take 2 tablets (200 mg) by mouth daily 180 tablet 0     famotidine (PEPCID) 20 MG tablet Take 1 tablet (20 mg) by mouth nightly as needed (acid reflux) 30 tablet 3     fluticasone (FLONASE) 50 MCG/ACT nasal spray Spray 1-2 sprays into both nostrils daily as needed for allergies 16 mL 1     ibuprofen (ADVIL/MOTRIN) 600 MG tablet Take 1 tablet (600 mg) by mouth every 8 hours as needed (back pain) 90 tablet 0     ketotifen (ZADITOR) 0.025 % ophthalmic solution Place 1 drop into both eyes every 12 hours 1 Bottle 3     montelukast (SINGULAIR) 10 MG tablet Take 1 tablet (10 mg) by mouth daily 90 tablet 3     norgestimate-ethinyl estradiol (ORTHO-CYCLEN) 0.25-35 MG-MCG tablet Take 1 tablet by mouth daily 84 tablet 3     permethrin (NIX) 1 % external liquid Apply to clean, towel-dried hair, saturate hair and scalp, wash off after 10 min. 120 mL 1     polyethylene glycol (MIRALAX) powder Take 17 g (1 capful) by mouth daily as needed for constipation 510 g 1     prazosin (MINIPRESS) 1 MG capsule Take 2 capsules (2 mg) by mouth At Bedtime 90 capsule 3     risperiDONE  (RISPERDAL) 2 MG tablet Take 1 tablet (2 mg) by mouth At Bedtime 30 tablet 3     SENNA-docusate sodium (SENNA S) 8.6-50 MG tablet Take 1 tablet by mouth At Bedtime 90 tablet 3     venlafaxine (EFFEXOR-XR) 150 MG 24 hr capsule Take 1 capsule by mouth daily (together with 75 mg capsule for total of 225 mg/day) 90 capsule 1     venlafaxine (EFFEXOR-XR) 75 MG 24 hr capsule Take 1 capsule by mouth daily (together with 150 mg capsule for total of 225 mg/day) 90 capsule 1       Past Medical History:   Diagnosis Date     Anemia in pregnancy      Constipation in pregnancy      Encounter for supervision of normal pregnancy 2014    Delivering resident: Rosana Rosales DO Pager #[phone] Cell #[phone]   Time to hospital: 25 minutes   CHARLI: 2019 based on 7 wk US  Pregnancy risks: Undesired pregnancy Consultant:  Labs: Blood type: O+, Hep B/HIV neg, RPR neg x2, rubella immune, GBS negative Hgb: 12.4-->10.3 US: EDC 2019 based on 7wk US, normal fetal survey at 21wks GDM: passed 1hr GTT Flu: given TDaP: * (     Generalized anxiety disorder 2013     Hypothyroidism      Liver failure (H)      Major depressive disorder, recurrent episode, moderate (H) 2013     Strongyloides stercoralis infection      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     Urinary frequency         Family History     Problem (# of Occurrences) Relation (Name,Age of Onset)    Asthma (2) Son (Brant), Son (Devin)    Depression (1) Mother    No Known Problems (6) Mother, Father, Son, Son, Son, Son       Negative family history of: Diabetes, Coronary Artery Disease, Hypertension, Hyperlipidemia, Cerebrovascular Disease, Breast Cancer, Colon Cancer, Prostate Cancer, Other Cancer, Anxiety Disorder, Mental Illness, Substance Abuse, Anesthesia Reaction, Osteoporosis, Genetic Disorder, Thyroid Disease, Obesity          Problem List Medication List and Allergy List were reviewed.    Patient is an established patient of this clinic..    Social  History     Tobacco Use     Smoking status: Never Smoker     Smokeless tobacco: Never Used     Tobacco comment: no smoke exposure.   Substance Use Topics     Alcohol use: No     Alcohol/week: 0.0 standard drinks       Children ? yes 4 sons    Has anyone hurt you physically, for example by pushing, hitting, slapping or kicking you or forcing you to have sex? Denies  Do you feel threatened or controlled by a partner, ex-partner or anyone in your life? Denies    RISK BEHAVIORS AND HEALTHY HABITS:  Tobacco Use/Smoking: None  Illicit Drug Use: None  Do you use alcohol? No  Diet (5-7 servings of fruits/veg daily): No   Exercise (30 min accumulated most days):No  Dental Care: No   Calcium 1500 mg/d:  No  Seat Belt Use: Yes     GC/Chlamydia     Cholesterol Level (>46 yo or at risk):  Recommended and patient accepted testing. and Pap/HPV cotest every 5 years for women 30-65   Recommended and patient accepted testing.    CV Risk based on Pooled Cohort Risk:N/A     Immunization History   Administered Date(s) Administered     HPV 04/20/2010, 08/30/2010, 01/13/2011     HepB 09/07/2009, 03/19/2010, 08/30/2010     Hepatitis A Immunity: Titer/MD Dx 07/09/2014     Influenza (H1N1) 04/20/2010     Influenza (IIV3) PF 12/28/2010, 01/13/2011, 09/30/2011, 09/20/2013     Influenza Intranasal Vaccine 4 valent (FluMist) 12/15/2015     Influenza Vaccine IM > 6 months Valent IIV4 (Alfuria,Fluzone) 01/12/2015, 10/04/2016, 11/01/2017, 10/04/2018, 11/04/2019     MMR 09/07/2009, 03/19/2010     Meningococcal (Menomune ) 04/20/2010     TD (ADULT, 7+) 09/07/2009, 01/13/2011     TDAP Vaccine (Adacel) 03/19/2010, 01/24/2012, 09/20/2013     TDAP Vaccine (Boostrix) 09/20/2013, 11/26/2018     Tdap (Adacel,Boostrix) 03/19/2010, 01/24/2012     Varicella 03/19/2010, 04/20/2010    Reviewed Immunization Record Today    EXAMINATION:   /71 (BP Location: Right arm, Patient Position: Sitting, Cuff Size: Adult Large)   Pulse 88   Temp 98.5  F (36.9  " C) (Oral)   Resp 18   Ht 1.46 m (4' 9.48\")   Wt 68.9 kg (152 lb)   SpO2 100%   BMI 32.35 kg/m    GENERAL: healthy, alert and no distress  EYES: Eyes grossly normal to inspection, extraocular movements - intact, and PERRL  HENT: ear canals- normal; TMs- normal; Nose- normal; Mouth- no ulcers, no lesions  NECK: no tenderness, no adenopathy, no asymmetry, no masses, no stiffness; thyroid- normal to palpation  RESP: lungs clear to auscultation - no rales, no rhonchi, no wheezes  BREAST: no masses, no tenderness, no nipple discharge, no palpable axillary masses or adenopathy  CV: regular rates and rhythm, normal S1 S2, no S3 or S4 and no murmur, no click or rub -  ABDOMEN: soft, no tenderness, no  hepatosplenomegaly, no masses, normal bowel sounds  MS: extremities- no gross deformities noted, no edema  SKIN: no suspicious lesions, no rashes  NEURO: strength and tone- normal, sensory exam- grossly normal, mentation- intact, speech- normal, reflexes- symmetric  BACK: no CVA tenderness, no paralumbar tenderness  - female: cervix- normal with some bleeding noted from the os, tissue does not appear friable, adnexae- normal; uterus- normal, no masses, no discharge  RECTAL- female: no masses, no hemorrhoids  PSYCH: Alert and oriented times 3; speech- coherent , normal rate and volume; able to articulate logical thoughts, able to abstract reason, no tangential thoughts, no hallucinations or delusions, affect- normal  LYMPHATICS: ant. cervical- normal, post. cervical- normal, axillary- normal, supraclavicular- normal, inguinal- normal    ASSESSMENT:  (Z00.00) Routine general medical examination at a health care facility  (primary encounter diagnosis)  -Patient here for annual exam  -Denies any acute concerns  -On oral birth control, patient would like to continue with this  -Will check lipids as it has been 2 years and patient is on oral BCP    (Z23) High priority for 2019-nCoV vaccine  -After discussion patient would " like Moderna COVID vaccine    (R87.610) Atypical squamous cells of undetermined significance (ASCUS) on Papanicolaou smear of cervix  -Patient had ASCUS in 9/2019  -Patient agreeable to getting this repeated today, though she was quite hesitant  -There was some bleeding noted, will follow up based on results     (Z51.81) Medication monitoring encounter  -Patient needs some labs for her atypical antipsychotics from her psychiatrist   -We will collect these today (BMP, lipids, A1c) and send the results  Precepted with Dr. Gopi Ch MD on 11/15/2021

## 2021-11-15 NOTE — LETTER
November 22, 2021      Hayde LUJAN Say  [address] [place] [address]        Dear Ms.Say,    We are writing to inform you of your test results.    Your triglycerides and HDL are well appearing, Your total cholesterol is mildly elevated.  At this time we will continue to monitor and would recommend cutting back on fat content of your diet. We will follow up at your next annual exam.  Otherwise you should hear from us with your results of the PAP smear in the next several days.          Resulted Orders   Lipid Panel   Result Value Ref Range    Cholesterol 218 (H) <=199 mg/dL    Triglycerides 115 <=149 mg/dL    Direct Measure HDL 56 >=50 mg/dL      Comment:      HDL Cholesterol Reference Range:     0-2 years:   No reference ranges established for patients under 2 years old  at [facility] for lipid analytes.    2-8 years:  Greater than 45 mg/dL     18 years and older:   Female: Greater than or equal to 50 mg/dL   Male:   Greater than or equal to 40 mg/dL    LDL Cholesterol Calculated 139 (H) <=129 mg/dL    Patient Fasting > 8hrs? Unknown    Basic metabolic panel   Result Value Ref Range    Sodium 139 136 - 145 mmol/L    Potassium 4.2 3.5 - 5.0 mmol/L    Chloride 107 98 - 107 mmol/L    Carbon Dioxide (CO2) 23 22 - 31 mmol/L    Anion Gap 9 5 - 18 mmol/L    Urea Nitrogen 10 8 - 22 mg/dL    Creatinine 0.72 0.60 - 1.10 mg/dL    Calcium 9.3 8.5 - 10.5 mg/dL    Glucose 84 70 - 125 mg/dL    GFR Estimate >90 >60 mL/min/1.73m2      Comment:      As of July 11, 2021, eGFR is calculated by the CKD-EPI creatinine equation, without race adjustment. eGFR can be influenced by muscle mass, exercise, and diet. The reported eGFR is an estimation only and is only applicable if the renal function is stable.   Hemoglobin A1c   Result Value Ref Range    Hemoglobin A1C 4.7 0.0 - 5.6 %      Comment:      Normal <5.7%   Prediabetes 5.7-6.4%    Diabetes 6.5% or higher     Note: Adopted from ADA consensus guidelines.       If you  have any questions or concerns, please call the clinic at the number listed above.       Sincerely,      Bulmaro Ch MD

## 2021-11-15 NOTE — NURSING NOTE
Due to patient being non-English speaking/uses sign language, an  was used for this visit. Only for face-to-face interpretation by an external agency, date and length of interpretation can be found on the scanned worksheet.     name: Zoey Juan  Agency: [employer]  Language: Maryann   Telephone number:   Type of interpretation: Face-to-face, spoken

## 2021-11-17 LAB
HUMAN PAPILLOMA VIRUS 16 DNA: NEGATIVE
HUMAN PAPILLOMA VIRUS 18 DNA: NEGATIVE
HUMAN PAPILLOMA VIRUS FINAL DIAGNOSIS: NORMAL
HUMAN PAPILLOMA VIRUS OTHER HR: NEGATIVE

## 2021-11-18 ENCOUNTER — TELEPHONE (OUTPATIENT)
Dept: FAMILY MEDICINE | Facility: CLINIC | Age: 31
End: 2021-11-18
Payer: COMMERCIAL

## 2021-11-18 NOTE — TELEPHONE ENCOUNTER
Called patient no answer, lvmtcb, if patient calls inform her that her forms are completed and have been placed at the  draw, patient can  forms or we can mail it out to her.  Thank you    -Angelina

## 2021-11-19 NOTE — PROGRESS NOTES
Preceptor Attestation:  Patient's case reviewed and discussed with the resident, Bulmaro Ch MD, and I personally evaluated the patient. I agree with written assessment and plan of care.    Supervising Physician:  Carola Nguyễn MD   [facility]

## 2021-11-24 LAB
BKR LAB AP GYN ADEQUACY: NORMAL
BKR LAB AP GYN INTERPRETATION: NORMAL
BKR LAB AP HPV REFLEX: NORMAL
BKR LAB AP PREVIOUS ABNL DX: NORMAL
BKR LAB AP PREVIOUS ABNORMAL: NORMAL
PATH REPORT.COMMENTS IMP SPEC: NORMAL
PATH REPORT.COMMENTS IMP SPEC: NORMAL
PATH REPORT.RELEVANT HX SPEC: NORMAL

## 2021-12-13 ENCOUNTER — IMMUNIZATION (OUTPATIENT)
Dept: FAMILY MEDICINE | Facility: CLINIC | Age: 31
End: 2021-12-13
Payer: COMMERCIAL

## 2021-12-13 PROCEDURE — 0012A PR COVID VAC MODERNA 100 MCG/0.5 ML IM: CPT

## 2021-12-13 PROCEDURE — 91301 PR COVID VAC MODERNA 100 MCG/0.5 ML IM: CPT

## 2022-01-06 ENCOUNTER — PATIENT OUTREACH (OUTPATIENT)
Dept: FAMILY MEDICINE | Facility: CLINIC | Age: 32
End: 2022-01-06
Payer: COMMERCIAL

## 2022-01-06 NOTE — PROGRESS NOTES
SW met with patient this date with phone  as requested by patient to complete screening for [employer] for assistance with applying for citizenship. [employer] to call patient in next 1-2 business days. Patient to call SW if no call received by 1/11/22. Patient vocalized understanding this date.    HUGO MORELAND, OSMAN, [facility]

## 2022-01-25 ENCOUNTER — TELEPHONE (OUTPATIENT)
Dept: FAMILY MEDICINE | Facility: CLINIC | Age: 32
End: 2022-01-25
Payer: COMMERCIAL

## 2022-01-25 RX ORDER — PRAZOSIN HYDROCHLORIDE 2 MG/1
2 CAPSULE ORAL AT BEDTIME
COMMUNITY

## 2022-01-25 RX ORDER — RISPERIDONE 2 MG/1
2 TABLET ORAL DAILY
COMMUNITY

## 2022-01-25 NOTE — TELEPHONE ENCOUNTER
Called Janna and advised of discontinued medications. Janna would like us to add patient taking:  prazosin 2mg capsules sig: take one capsule at nighttime  Risperidone 2mg tablet sig: take one tablet in the evening.    Medication list reconciled with Janna via read-back.     Routing to PCP to add psychiatric medications to historical medication list.  Zee PHAM

## 2022-01-25 NOTE — TELEPHONE ENCOUNTER
Routing to PCP first to check medication list, noted medications from 2019 and want to make sure med list up to date prior to calling [facility]. Please send back to ANKIT Can once reviewed. Zee PHAM

## 2022-01-25 NOTE — TELEPHONE ENCOUNTER
Jeny Can, I went over her meds and cancelled things we have not refilled for over a year.  She should also be on some antipsychotic medications which are ordered by her psychiatrist.      Please let me know if there were any other concerns.      Thanks,  Bulmaro Ch MD on 1/25/2022 at 12:24 PM

## 2022-01-25 NOTE — TELEPHONE ENCOUNTER
[facility] phone call message- general phone call:    Reason for call: Caller wants to speak to someone on provider team to go over medication list for pt. Their facility has different medications that is listed for her. Please call and advise.     Return call needed: Yes    OK to leave a message on voice mail? Yes    Primary language: Maryann      needed? Yes    Call taken on January 25, 2022 at 11:33 AM by Shaun Jin

## 2022-02-02 DIAGNOSIS — G44.219 EPISODIC TENSION-TYPE HEADACHE, NOT INTRACTABLE: ICD-10-CM

## 2022-02-02 RX ORDER — ACETAMINOPHEN 325 MG/1
650 TABLET ORAL EVERY 6 HOURS PRN
Qty: 100 TABLET | Refills: 1 | Status: SHIPPED | OUTPATIENT
Start: 2022-02-02 | End: 2022-12-13

## 2022-02-02 NOTE — TELEPHONE ENCOUNTER
Date of last visit:   11/15/2021    Date of next visit if scheduled: Visit date not found       Pharmacy fax request:  Yes  Patient/Phone call request :  No    BP Readings from Last 3 Encounters:   11/15/21 102/71   08/05/20 91/55   07/14/20 101/68       LDL Cholesterol Calculated   Date Value Ref Range Status   11/15/2021 139 (H) <=129 mg/dL Final     LDL Cholesterol Direct   Date Value Ref Range Status   09/11/2019 123.0 (H) 0.0 - 99.0 mg/dL Final       Lab Results   Component Value Date    A1C 4.7 11/15/2021       Last Comprehensive Metabolic Panel:  Sodium   Date Value Ref Range Status   11/15/2021 139 136 - 145 mmol/L Final   02/20/2019 143.0 133.0 - 144.0 mmol/L Final     Potassium   Date Value Ref Range Status   11/15/2021 4.2 3.5 - 5.0 mmol/L Final   02/20/2019 3.6 3.4 - 5.3 mmol/L Final     Chloride   Date Value Ref Range Status   11/15/2021 107 98 - 107 mmol/L Final   02/20/2019 104.0 94.0 - 109.0 mmol/L Final     Carbon Dioxide   Date Value Ref Range Status   02/20/2019 28.0 20.0 - 32.0 mmol/L Final     Carbon Dioxide (CO2)   Date Value Ref Range Status   11/15/2021 23 22 - 31 mmol/L Final     Anion Gap   Date Value Ref Range Status   11/15/2021 9 5 - 18 mmol/L Final     Glucose   Date Value Ref Range Status   11/15/2021 84 70 - 125 mg/dL Final   02/20/2019 84.0 60.0 - 109.0 mg/dL Final     Glucose Fasting   Date Value Ref Range Status   07/16/2019 80.0 51.0 - 109.0 mg/dL Final     Urea Nitrogen   Date Value Ref Range Status   11/15/2021 10 8 - 22 mg/dL Final   02/20/2019 6.0 5.0 - 24.0 mg/dL Final     Creatinine   Date Value Ref Range Status   11/15/2021 0.72 0.60 - 1.10 mg/dL Final   02/20/2019 0.8 0.6 - 1.3 mg/dL Final     GFR Estimate   Date Value Ref Range Status   11/15/2021 >90 >60 mL/min/1.73m2 Final     Comment:     As of July 11, 2021, eGFR is calculated by the CKD-EPI creatinine equation, without race adjustment. eGFR can be influenced by muscle mass, exercise, and diet. The reported eGFR is an  estimation only and is only applicable if the renal function is stable.   08/18/2018 >60 >60 mL/min/1.73m2 Final     Calcium   Date Value Ref Range Status   11/15/2021 9.3 8.5 - 10.5 mg/dL Final   02/20/2019 9.3 8.5 - 10.4 mg/dL Final       Please complete refill and CLOSE ENCOUNTER.  Closing the encounter signifies the refill is complete.

## 2022-02-16 NOTE — MR AVS SNAPSHOT
After Visit Summary   6/4/2018    Hayde LUJAN Say    MRN: [id number]           Patient Information     Date Of Birth          1990        Visit Information        Provider Department      6/4/2018 2:00 PM Sierra Scott, RPH [facility]        Today's Diagnoses     Medication management    -  1    Severe episode of recurrent major depressive disorder, with psychotic features (H)        Constipation, unspecified constipation type        Chronic seasonal allergic rhinitis due to pollen           Follow-ups after your visit        Your next 10 appointments already scheduled     Jun 21, 2018  9:00 AM CDT   Return Visit with Elham Mcmahan LMFT [facility] ([facility])    [address] [place] [address]   [phone]            Jun 28, 2018  2:20 PM CDT   Return Visit with Sierra Scott, RPH [facility] ([facility])    [address] [place] [id number]   [phone]            Jun 28, 2018  2:40 PM CDT   Return Visit with Flavio Brothers MD   [facility] ([facility])    [address] [place] [address]   [phone]              Future tests that were ordered for you today     Open Future Orders        Priority Expected Expires Ordered    [facility]. Pyl[facility]n [facility] Routine  6/26/2018 6/19/2018            Who to contact     Please call your clinic at [phone] to:    Ask questions about your health    Make or cancel appointments    Discuss your medicines    Learn about your test results    Speak to your doctor            Additional Information About Your Visit        MyChart Information     [employer] is an electronic gateway that provides easy, online access to your medical records. With [employer], you can request a clinic appointment, read your test results, renew a prescription or communicate with your care team.     To sign up for [employer] visit the website at  Chief Complaint    Med refill for ADD. Mother would also like to talk about depression.  History of Present Illness      Patient comes in for refill on Concerta.  She has been doing well on the low-dose daily of the medication.  Mom decided to take a holiday to see how she did off of it for a month.  She has been struggling over the past month and the medication a change in her behavior has been noted at school and at home.  She is also been subject of some bullying over the last couple weeks at school and she does share this with her mom.  The issue seems to be quieting down now.  Mom was concerned about depression because she seem to be isolating herself in her room somewhat.  I talked with the patient alone and she denies any suicidal thoughts.  She says that her mood is variable from day to day she denies a low mood most days.  There is a small degree of anhedonia present.  In the past she is tolerated Concerta well without any side effects.  Physical Exam   Vitals & Measurements    T: 98.0(Tympanic)  HR: 66(Peripheral)  BP: 102/65     HT: 63.5 in  WT: 104 lb  BMI: 18.13       Overall she appears well.      Assessment and plan.  ADHD.  We will continue Concerta.  Patient and her mom will monitor for signs of depression.  Assessment/Plan       Ordered:         methylphenidate, 1 tab(s) ( 18 mg ), po, qam, Instructions: Fill on or after 11/29/17, # 30 tab(s), 0 Refill(s), Type: Maintenance         methylphenidate, 1 tab(s) ( 18 mg ), po, qam, Instructions: Fill on or after 10/30/17, # 30 tab(s), 0 Refill(s), Type: Hard Stop  Patient Information     Name:KAYLYN CLEMENTE      Address:      [address] [place] [id number]     Sex:Female     YOB: 2005     [phone]     Emergency Contact:MARIOLA CLEMENTE     MRN:[id number]     FIN:[id number]     [facility]     Date of Service:10/30/2017      Primary Care Physician:       Raymond Bergman MD  [phone]  Problem List/Past Medical History    Ongoing     ADHD (attention deficit hyperactivity disorder)     Moderate persistent asthma    Historical  Procedure/Surgical History     Ts and As - Tonsillectomy and adenoidectomy  Medications    albuterol 90 mcg/inh inhalation aerosol, See Instructions    chamber with valve and mouthpiece such as aerochamber, See Instructions    Concerta 18 mg/24 hr oral tablet, extended release, 18 mg= 1 tab(s), po, qam    Concerta 18 mg/24 hr oral tablet, extended release, 18 mg= 1 tab(s), po, qam    Flonase 50 mcg/inh nasal spray, 1 spray(s), nasal, daily, 3 refills    Symbicort 80 mcg-4.5 mcg/inh inhalation aerosol, 2 puff(s), inh, bid, 5 refills  Allergies    No Known Medication Allergies  Social History    Smoking Status - 10/30/2017     Never smoker     Home and Environment - 09/12/2017      Lives with Mother.     Tobacco - 09/12/2017      Never (less than 100 in lifetime), Household tobacco concerns: No.  Immunizations      Vaccine Date Status Comments      pneumococcal (PCV13) - Not Given Not Necessary [2/28/2017] Not Given      pneumococcal (PCV13) - Not Given Not Necessary [2/28/2017] Not Given      pneumococcal (PCV13) - Not Given Not Necessary [2/28/2017] Not Given      pneumococcal (PCV13) - Not Given Not Necessary      Hib (HbOC) - Not Given Not Necessary      human papillomavirus vaccine 12/06/2016 Given      meningococcal conjugate vaccine 12/06/2016 Given      influenza virus vaccine, inactivated 10/18/2016 Given      human papillomavirus vaccine 09/30/2015 Given      tetanus/diphth/pertuss (Tdap) adult/adol 09/30/2015 Given      influenza virus vaccine, inactivated 08/20/2015 Recorded      Hep A, pediatric/adolescent 05/02/2013 Recorded      Hep A, pediatric/adolescent 10/02/2012 Recorded      influenza, H1N1, inactivated 11/19/2009 Recorded      DTaP 10/14/2009 Recorded      IPV 10/14/2009 Recorded      varicella 10/28/2008 Recorded      MMR  [email or web address]   You will be asked to enter the access code listed below, as well as some personal information. Please follow the directions to create your username and password.     Your access code is: [email or web address]  Expires: 8/15/2018 12:01 PM     Your access code will  in 90 days. If you need help or a new code, please contact your [facility] or call [phone] for assistance.        Care EveryWhere ID     This is your Care EveryWhere ID. This could be used by other organizations to access your [place] medical records  [id number]        Your Vitals Were     BMI (Body Mass Index)                   30.12 kg/m2            Blood Pressure from Last 3 Encounters:   18 104/70   18 113/76   18 110/76    Weight from Last 3 Encounters:   18 140 lb (63.5 kg)   18 139 lb 3.2 oz (63.1 kg)   18 140 lb 3.2 oz (63.6 kg)              We Performed the Following      : Sign Language or Oral - 38-52 minutes     MTM, EA ADDITIONAL 15 MIN (31582) x 3 (= 45 min.)        Primary Care Provider Office Phone # Fax #    Carlos Manuel Andreas Rivas [phone]       [facility] [address] [place] [address]        Equal Access to Services     DYLAN LEY AH: Hadii aad ku hadasho Soomaali, waaxda luqadaha, qaybta kaalmada adeegyada, waxay acin hayninfan justin garcía. So [facility] [phone].    ATENCIÓN: Si habla español, tiene a keene disposición servicios gratuitos de asistencia lingüística. Llame al [phone].    We comply with applicable federal civil rights laws and [place] laws. We do not discriminate on the basis of race, color, national origin, age, disability, sex, sexual orientation, or gender identity.            Thank you!     Thank you for choosing [facility]  for your care. Our goal is always to provide you with excellent care. Hearing back from our patients is one way we can  (measles/mumps/rubella) 10/28/2008 Recorded      DTaP 04/04/2006 Recorded      Hib (PRP-T) 01/03/2006 Recorded      pneumococcal (PCV13) 01/03/2006 Recorded      varicella 09/27/2005 Recorded      MMR (measles/mumps/rubella) 09/27/2005 Recorded      DTaP 05/02/2005 Recorded      hepatitis B pediatric vaccine 05/02/2005 Recorded      pneumococcal (PCV13) 05/02/2005 Recorded      IPV 05/02/2005 Recorded      DTaP 02/07/2005 Recorded      hepatitis B pediatric vaccine 02/07/2005 Recorded      Hib (PRP-T) 02/07/2005 Recorded      pneumococcal (PCV13) 02/07/2005 Recorded      IPV 02/07/2005 Recorded      DTaP 2004 Recorded      Hib (PRP-T) 2004 Recorded      pneumococcal (PCV13) 2004 Recorded      IPV 2004 Recorded      hepatitis B pediatric vaccine 2004 Recorded  Lab Results      Results (Last 90 days)      No results located.          continue to improve our services. Please take a few minutes to complete the written survey that you may receive in the mail after your visit with us. Thank you!             Your Updated Medication List - Protect others around you: Learn how to safely use, store and throw away your medicines at www.disposemymeds.org.          This list is accurate as of 6/4/18 11:59 PM.  Always use your most recent med list.                   Brand Name Dispense Instructions for use Diagnosis    calcium carbonate 500 MG chewable tablet    TUMS    150 tablet    Take 1 tablet (500 mg) by mouth 2 times daily as needed for heartburn    Other chronic gastritis without hemorrhage       Capsaicin 0.1 % cream     56.6 g    Apply topically 2 times daily as needed    Chronic right-sided low back pain without sciatica, Neck pain       cetirizine 10 MG tablet    zyrTEC    90 tablet    Take 1 tablet (10 mg) by mouth every evening    Chronic seasonal allergic rhinitis due to fungal spores       docusate sodium 100 MG tablet    COLACE    60 tablet    Take 200 mg by mouth daily    Constipation, unspecified constipation type       FLONASE 50 MCG/ACT spray   Generic drug:  fluticasone      Spray 1-2 sprays into both nostrils daily as needed for allergies    Seasonal allergic rhinitis due to other allergic trigger       ibuprofen 600 MG tablet    ADVIL/MOTRIN    120 tablet    Take 1 tablet (600 mg) by mouth daily as needed    Episodic tension-type headache, not intractable       ketotifen 0.025 % Soln ophthalmic solution    ZADITOR    1 Bottle    Place 1 drop into both eyes every 12 hours    Acute seasonal allergic rhinitis, unspecified trigger       montelukast 10 MG tablet    SINGULAIR    30 tablet    Take 1 tablet (10 mg) by mouth daily    Acute seasonal allergic rhinitis, unspecified trigger       norgestimate-ethinyl estradiol 0.25-35 MG-MCG per tablet    ORTHO-CYCLEN, SPRINTEC    84 tablet    Take 1 tablet by mouth daily    Encounter for initial  prescription of contraceptive pills       polyethylene glycol powder    MIRALAX    510 g    Take 17 g (1 capful) by mouth daily    Constipation, unspecified constipation type       prazosin 1 MG capsule    MINIPRESS    60 capsule    Take 2 capsules (2 mg) by mouth At Bedtime    PTSD (post-traumatic stress disorder)       ranitidine 150 MG tablet    ZANTAC    30 tablet    Take 1 tablet (150 mg) by mouth At Bedtime    Gastroesophageal reflux disease, esophagitis presence not specified       triamcinolone 0.1 % ointment    KENALOG    30 g    Apply sparingly to affected area three times daily for 14 days.    Infestation by bed bug       * venlafaxine 75 MG 24 hr capsule    EFFEXOR-XR    30 capsule    Take 1 capsule by mouth daily (together with 150 mg capsule for total of 225 mg/day)    PTSD (post-traumatic stress disorder), Major depressive disorder, recurrent episode, moderate (H)       * venlafaxine 150 MG 24 hr capsule    EFFEXOR-XR    30 capsule    Take 1 capsule by mouth daily (together with 75 mg capsule for total of 225 mg/day)    PTSD (post-traumatic stress disorder), Major depressive disorder, recurrent episode, moderate (H)       * Notice:  This list has 2 medication(s) that are the same as other medications prescribed for you. Read the directions carefully, and ask your doctor or other care provider to review them with you.

## 2022-03-17 DIAGNOSIS — L29.9 GENERALIZED PRURITUS: ICD-10-CM

## 2022-03-17 RX ORDER — CETIRIZINE HYDROCHLORIDE 10 MG/1
10 TABLET ORAL 2 TIMES DAILY
Qty: 60 TABLET | Refills: 3 | Status: SHIPPED | OUTPATIENT
Start: 2022-03-17 | End: 2022-09-23

## 2022-06-22 DIAGNOSIS — Z30.011 ENCOUNTER FOR INITIAL PRESCRIPTION OF CONTRACEPTIVE PILLS: ICD-10-CM

## 2022-06-22 NOTE — TELEPHONE ENCOUNTER
[facility] phone call message- medication clarification/question:    Full Medication Name: norgestimate-ethinyl estradiol (ORTHO-CYCLEN)   Dose: 0.25-35 MG-MCG tablet    Question: Marilee called stating they were informed by pharmacy that a call or fax request was sent. Informed marilee we have not seen or received any call but will be more than happy to put in request, but looking in pt chart medication has not been filled in almost a year therefore an appointment may be requested before fill. Marilee states if appointment is needed to call and inform her that way she can let patient know to schedule that appointment, call and advise if needed.      Pharmacy confirmed as    [facility] - [place], [place] - [facility] [address]: Yes    OK to leave a message on voice mail? Yes    Primary language: Maryann      needed? Yes    Call taken on June 22, 2022 at 10:26 AM by Angelina Felix

## 2022-06-23 ENCOUNTER — OFFICE VISIT (OUTPATIENT)
Dept: FAMILY MEDICINE | Facility: CLINIC | Age: 32
End: 2022-06-23
Payer: COMMERCIAL

## 2022-06-23 VITALS
BODY MASS INDEX: 32.12 KG/M2 | DIASTOLIC BLOOD PRESSURE: 76 MMHG | TEMPERATURE: 98.5 F | RESPIRATION RATE: 20 BRPM | HEIGHT: 58 IN | WEIGHT: 153 LBS | HEART RATE: 78 BPM | OXYGEN SATURATION: 98 % | SYSTOLIC BLOOD PRESSURE: 110 MMHG

## 2022-06-23 DIAGNOSIS — Z30.011 ENCOUNTER FOR INITIAL PRESCRIPTION OF CONTRACEPTIVE PILLS: ICD-10-CM

## 2022-06-23 DIAGNOSIS — F33.1 MAJOR DEPRESSIVE DISORDER, RECURRENT EPISODE, MODERATE (H): ICD-10-CM

## 2022-06-23 PROCEDURE — 99213 OFFICE O/P EST LOW 20 MIN: CPT | Mod: GC

## 2022-06-23 RX ORDER — NORGESTIMATE AND ETHINYL ESTRADIOL 0.25-0.035
1 KIT ORAL DAILY
Qty: 84 TABLET | Refills: 3 | Status: SHIPPED | OUTPATIENT
Start: 2022-06-23 | End: 2023-05-26

## 2022-06-23 ASSESSMENT — PATIENT HEALTH QUESTIONNAIRE - PHQ9: SUM OF ALL RESPONSES TO PHQ QUESTIONS 1-9: 1

## 2022-06-23 NOTE — PROGRESS NOTES
Due to patient being non-English speaking/uses sign language, an  was used for this visit. Only for face-to-face interpretation by an external agency, date and length of interpretation can be found on the scanned worksheet.  110    name: [employer]   Agency: [employer]  Language: Maryann   Telephone number: [phone]  Type of interpretation: Telephone, spoken

## 2022-06-23 NOTE — PROGRESS NOTES
Assessment and Plan   Hayde is a 30 y/o with PMH of MDD and current OCP use who presents for birth control discussion.     Diagnoses and all orders for this visit:    Encounter for initial prescription of contraceptive pills  Patient presented for discussion of birth control. When presented with pros and cons to all birth control options patient ultimately would like to stick with her current OCPs. She worries about weight gain with depo-provera and didn't like Nexplanon in the past. Also scared about IUD. We will stick with current regimen -- no issues taking medicine daily.   -     norgestimate-ethinyl estradiol (ORTHO-CYCLEN) 0.25-35 MG-MCG tablet; Take 1 tablet by mouth daily    Major depressive disorder, recurrent episode, moderate (H)  Patient doing well with current treatment. No concerns/thoughts of harming self. Says she has some sad days but has other happy days. Follows with psychiatry outpatient. No current visual or auditory hallucinations.       Options for treatment and follow-up care were reviewed with the patient and/or guardian. Hayde Macias and/or guardian engaged in the decision making process and verbalized understanding of the options discussed and agreed with the final plan.    Yomaira Medina MD      Precepted today with: Jacque Lozada MD           HPI:       Hayde Macias is a 31 year old  female with a significant past medical history of MDD with psychotic features for presents for the following below: Birth control discussions    Here to discuss birth control options. Currently on OCPs and has been on depo-provera in the past. Feels her mood gets kind of sporadic sometimes with birth control but is getting tired of remembering to take a pill everyday. She has also had the nexplanon in the past but felt she gained weight with this. She is scared about the IUD.    In regards to mood, she feels her mood is okay. Sometimes notes days of crying but other days not. She feels her medications are helping.  Follows with psychiatry. No thoughts of harming, hallucinations.       A Maryann  was used for this visit.             PMHX:     Patient Active Problem List   Diagnosis     Health Care Home     Recurrent major depressive disorder (H)     Seasonal allergic rhinitis     PTSD (post-traumatic stress disorder)     BRENDA (generalized anxiety disorder)     Insomnia due to other mental disorder     Major depressive disorder, recurrent episode, moderate (H)     Class 1 obesity without serious comorbidity with body mass index (BMI) of 32.0 to 32.9 in adult, unspecified obesity type     Screening for malignant neoplasm of cervix     Depo-Provera contraceptive status       Current Outpatient Medications   Medication Sig Dispense Refill     norgestimate-ethinyl estradiol (ORTHO-CYCLEN) 0.25-35 MG-MCG tablet Take 1 tablet by mouth daily 84 tablet 3     acetaminophen (TYLENOL) 325 MG tablet Take 2 tablets (650 mg) by mouth every 6 hours as needed for headaches 100 tablet 1     cetirizine (ZYRTEC) 10 MG tablet Take 1 tablet (10 mg) by mouth 2 times daily 60 tablet 3     docusate sodium (COLACE) 100 MG tablet Take 2 tablets (200 mg) by mouth daily 180 tablet 0     montelukast (SINGULAIR) 10 MG tablet Take 1 tablet (10 mg) by mouth daily 90 tablet 3     prazosin (MINIPRESS) 2 MG capsule Take 2 mg by mouth At Bedtime       risperiDONE (RISPERDAL) 2 MG tablet Take 2 mg by mouth daily       SENNA-docusate sodium (SENNA S) 8.6-50 MG tablet Take 1 tablet by mouth At Bedtime 90 tablet 3       Social History     Tobacco Use     Smoking status: Never Smoker     Smokeless tobacco: Never Used     Tobacco comment: no smoke exposure.   Substance Use Topics     Alcohol use: No     Alcohol/week: 0.0 standard drinks     Drug use: No          Allergies   Allergen Reactions     Augmentin Rash       No results found for this or any previous visit (from the past 24 hour(s)).         Review of Systems:     10 point ROS negative except for what is  "noted in HPI          Physical Exam:     Vitals:    06/23/22 0910   BP: 110/76   Pulse: 78   Resp: 20   Temp: 98.5  F (36.9  C)   TempSrc: Oral   SpO2: 98%   Weight: 69.4 kg (153 lb)   Height: 1.48 m (4' 10.27\")     Body mass index is 31.68 kg/m .      GENERAL APPEARANCE: healthy, alert and no distress,  EYES: Eyes grossly normal to inspection,  PERRL   NECK: no adenopathy, no asymmetry  RESP: lungs clear to auscultation - no rales, rhonchi or wheezes  CV: regular rate and rhythm,  and no murmur, click,  rub or gallop  ABDOMEN: soft, nontender, non distended, bowel sounds present throughout  MS: extremities normal- no gross deformities noted  SKIN: no lesions, or rashes  NEURO: Alert, mentation appears intact and speech normal  PSYCH: mood and affect normal/bright     "

## 2022-06-23 NOTE — NURSING NOTE
Due to patient being non-English speaking/uses sign language, an  was used for this visit. Only for face-to-face interpretation by an external agency, date and length of interpretation can be found on the scanned worksheet.     name: [facility] [employer]  Agency: [employer]  Language: Maryann   Telephone number: [phone]  Type of interpretation: Telephone, spoken

## 2022-06-24 RX ORDER — NORGESTIMATE AND ETHINYL ESTRADIOL 0.25-0.035
1 KIT ORAL DAILY
Qty: 84 TABLET | Status: CANCELLED | OUTPATIENT
Start: 2022-06-24

## 2022-06-25 NOTE — PROGRESS NOTES
Faculty Supervision of Residents   I have examined this patient and the medical care has been evaluated and discussed with the resident. See resident note outlining our discussion.      Jenna Lozada MD

## 2022-09-02 NOTE — PATIENT INSTRUCTIONS
Bedbugs  After years of being very rare in the , bedbugs are making a comeback. These bugs are small, about the size of an apple seed. They are reddish-brown, oval, and look slightly flattened. Bedbugs feed on human and animal blood, usually at night during sleep. Bedbugs are a nuisance. But they are not a major threat to your health.  Facts about bedbugs    Bedbugs are active mainly at night. During the day, they hide in dark places, often in and around where people or animals sleep. They are commonly found on mattresses and boxsprings and behind headboards. But they can hide anywhere.    Bedbugs are small and hard to see. They are often carried from place to place in items like luggage, furniture, and clothing. This is why they spread so easily.    Bedbugs are not attracted to dirt. Even the cleanest house or hotel can have bedbugs.    Unlike mosquitoes, bedbugs do not transmit disease. If you are bitten, you do not have to worry about catching a blood-borne illness.    Insect repellents have little effect on bedbugs.    Adult bedbugs can live for several months without a blood feeding.    Bedbugs are very hard to get rid of. If an infestation is suspected, it is recommended that a professional  be called.  Signs of bedbugs  Bites can be the first sign of a bedbug infestation. When inspecting for the bugs, look in crevices of mattresses and box springs, behind the headboard, and in and on objects near or under the bed. You may see the bugs themselves. Or, you may see tiny dark stains on fabric or carpets. Smears of blood on sheets and nightclothes upon awakening are another sign. In some cases, the bugs are so well hidden they can t be found unless items are taken apart.  Bedbug bites  Bedbugs look for food at night. They bite while people or animals are sleeping. The bites are most often painless. Many people never know they ve been bitten. But some people develop an itchy red welt or swelling.  And if a person has an allergic reaction, severe itching, blisters, or hives can develop. Bites are often on areas that are exposed, such as the head, neck, arms, and hands. Bedbug bites are not dangerous and don t spread illness. But if the bite is scratched and the skin is broken and irritated, there is a chance that a skin infection can develop.  Treating bites  Bite symptoms usually go away on their own within a week or two. During this time, over-the-counter (OTC) hydrocortisone ointment or cream can help relieve itching and swelling. If itching is bad, an OTC antihistamine that s taken by mouth (oral) can help. If an infection develops from scratching the bites, your healthcare provider can prescribe an antibiotic.  If you were bitten by bedbugs in your home, talk to a licensed pest-control professional or company. They can inspect your home and help you get rid of the bugs safely.  When to call your healthcare provider   If you have bites, call your healthcare provider if you develop any of the following:    A fever of 100.4 F (38 C) or higher, or as directed by your provider    Signs of infection of the bites, such as increased swelling and pain, warmth, or oozing    Signs of allergic reaction, such as hives, spreading rash, throat itching or swelling, or wheezing   Avoiding bedbugs    Avoid buying used beds. But if you do buy used bed frames, mattresses, box springs, or other furniture, check them carefully for bedbugs before bringing them into your home.    If bedbugs are found or suspected in the bed, use mattress and box spring encasement covers that can seal in bedbugs so they will eventually die there.    When traveling, remove linens from the top of the bed and check the mattress and headboard for signs of the bugs. Place luggage on a hard surface such as a table or on a luggage rack and not on the floor.    If you think you were exposed to bedbugs while traveling, wash all clothing in hot water as  soon as you get home. Washing alone will not kill the bugs. Clothing must be put in a dryer at high temperatures, at least 113  F (45  C) for 1 hour.     Never  items discarded on the street for use in your home. These include bed frames, mattresses, box springs, or upholstered furniture. These items may carry bedbugs.     Date Last Reviewed: 3/1/2017    5378-9804 [employer]. [address] [place] [address]. All rights reserved. This information is not intended as a substitute for professional medical care. Always follow your healthcare professional's instructions.      [facility] Information  If you have any further concerns or wish to schedule another appointment, please call our office at [phone] during normal business hours (8-5, M-F).     For uncomplicated pregnancies, you will be seeing your doctor once a month until you are 28 weeks, then you will see your doctor twice a month. You will begin weekly visits at 36 weeks until you deliver.     If you have urgent medical questions that cannot wait, you may call [phone] at any time of day. Call your doctor if you experience any bleeding or abdominal cramping early in pregnancy.     If you have a medical emergency, please call [phone].  Tips to Relieve Nausea  Although nausea can occur at any time of the day, it may be worse in the morning. To help prevent nausea:  Eat small, light meals at frequent intervals.  Get up slowly. Eat a few unsalted crackers before you get out of bed.  Drink water or 7-up to soothe your stomach.  Eat an ice pop in your favorite flavor.  Ask your health care provider about taking balbina or vitamin B6 for nausea and vomiting.  Talk with your health care provider if you take vitamins that upset your stomach.  Safe Medications  Take one prenatal vitamin with at least 400 mcg of folic acid daily.  Use acetaminophen (Tylenol) for pain.   Try Maalox or Tums for heartburn. If these are not  working, talk to your doctor about trying a different medication.  Diphenhydramine (Benadryl) can also be used safely in pregnancy.  Talk to your doctor about any other medications or vitamins you are taking!  Start Healthy Habits Now  What matters most is protecting your baby from this moment on. If you smoke, drink alcohol, or use drugs, now is the time to stop. If you need help, talk with your health care provider.  Smoking increases the risk of miscarriage or having a low-birth-weight baby. If you smoke, quit now.  Alcohol and drugs have been linked with miscarriage, birth defects, intellectual disability, and low birth weight. Do not drink alcohol or take drugs.  Continue your current exercise routine, or start one with walking, swimming, and other low impact exercises. Yoga specifically designed for pregnant moms is a great stress and pain reliever. Keep your self well hydrated and avoid overheating with all activity. If bleeding, fluid leakage, or cramping/contractions occur, stop the exercise and call your doctor.   Wear your seatbelt every time you are in the car. Fasten the lap part underneath your growing belly and the shoulder part as you normally would.   Weight Gain  Add an additional 300 to 400 calories a day into your diet.  Ideal weight gain is 25 to 35 pounds.  If your BMI is over 30, your ideal weight gain is 15 pounds.  If your BMI is under 20, your ideal weight gain is 40 pounds.  Talk to your doctor if you are concerned about weight gain.   Keep Your Environment Save  You can still clean house and use scented products. Just take some simple precautions:  Wear gloves when using cleaning fluids.  Open windows to let in fresh air. Use a fan if you paint.  Avoid secondhand smoke.  Don t breathe fumes from nail polish, hair spray, cleansers, or other chemicals.  Work Concerns  The end of the first trimester is a good time to discuss working during pregnancy with your employer. Follow your health  care provider s advice if your job requires you to stand for a long time, work with hazardous tools, or even sit at a desk all day. Your workspace, workload, or scheduled hours may need to be adjusted - perhaps you can change body postures more often or take an extra break.  Intimacy  Unless your health care provider tells you to, there is no reason to stop having sex while you re pregnant. You or your partner may notice changes in desire. Desire may be less in the first trimester, due to nausea and fatigue. In the second trimester, sex may be very enjoyable. The third trimester can be a challenge comfort-wise. Try different positions and see what s best for you both. As always, let your doctor know if you experience any bleeding, leakage of fluids, or cramping/contractions.  Other Pregnancy Concerns  Limit the amount of radiation you are exposed to. Always tell the radiology technologist that you are pregnant if having an xray or CT scan done.   Practice good hand washing to prevent infection.  Avoid cat litter.   Wash all fruits and vegetabes. Always cook meat thoroughly and do not eat raw fish. Do not eat more than 6 to 12 ounces of other fish sources.   Do not eat soft cheeses.  Limit caffeine to less than 300 milligrams a days. The average cup of coffee as approximately 120 milligrams of caffeine.           PROVIDER:[TOKEN:[[id number]]]

## 2022-09-23 DIAGNOSIS — L29.9 GENERALIZED PRURITUS: ICD-10-CM

## 2022-09-26 RX ORDER — CETIRIZINE HYDROCHLORIDE 10 MG/1
10 TABLET ORAL 2 TIMES DAILY
Qty: 120 TABLET | Refills: 1 | Status: SHIPPED | OUTPATIENT
Start: 2022-09-26 | End: 2023-02-06

## 2022-11-14 ENCOUNTER — TELEPHONE (OUTPATIENT)
Dept: FAMILY MEDICINE | Facility: CLINIC | Age: 32
End: 2022-11-14

## 2022-11-16 ENCOUNTER — OFFICE VISIT (OUTPATIENT)
Dept: FAMILY MEDICINE | Facility: CLINIC | Age: 32
End: 2022-11-16
Payer: COMMERCIAL

## 2022-11-16 VITALS
BODY MASS INDEX: 32.58 KG/M2 | RESPIRATION RATE: 20 BRPM | HEIGHT: 57 IN | DIASTOLIC BLOOD PRESSURE: 72 MMHG | WEIGHT: 151 LBS | OXYGEN SATURATION: 98 % | SYSTOLIC BLOOD PRESSURE: 106 MMHG

## 2022-11-16 DIAGNOSIS — F43.10 PTSD (POST-TRAUMATIC STRESS DISORDER): ICD-10-CM

## 2022-11-16 DIAGNOSIS — F33.3 SEVERE EPISODE OF RECURRENT MAJOR DEPRESSIVE DISORDER, WITH PSYCHOTIC FEATURES (H): ICD-10-CM

## 2022-11-16 DIAGNOSIS — K21.9 GASTRIC REFLUX: ICD-10-CM

## 2022-11-16 DIAGNOSIS — F41.1 GAD (GENERALIZED ANXIETY DISORDER): ICD-10-CM

## 2022-11-16 DIAGNOSIS — K59.09 OTHER CONSTIPATION: ICD-10-CM

## 2022-11-16 DIAGNOSIS — E66.811 CLASS 1 OBESITY WITHOUT SERIOUS COMORBIDITY WITH BODY MASS INDEX (BMI) OF 32.0 TO 32.9 IN ADULT, UNSPECIFIED OBESITY TYPE: ICD-10-CM

## 2022-11-16 DIAGNOSIS — Z79.899 LONG TERM CURRENT USE OF ANTIPSYCHOTIC MEDICATION: Primary | ICD-10-CM

## 2022-11-16 LAB
ALBUMIN SERPL-MCNC: 3.1 G/DL (ref 3.4–5)
ALP SERPL-CCNC: 79 U/L (ref 40–150)
ALT SERPL W P-5'-P-CCNC: 13 U/L (ref 0–50)
ANION GAP SERPL CALCULATED.3IONS-SCNC: 7 MMOL/L (ref 3–14)
AST SERPL W P-5'-P-CCNC: 19 U/L (ref 0–45)
BILIRUB SERPL-MCNC: 0.6 MG/DL (ref 0.2–1.3)
BUN SERPL-MCNC: 10 MG/DL (ref 7–30)
CALCIUM SERPL-MCNC: 9.2 MG/DL (ref 8.5–10.1)
CHLORIDE BLD-SCNC: 104 MMOL/L (ref 94–109)
CHOLEST SERPL-MCNC: 212 MG/DL
CO2 SERPL-SCNC: 26 MMOL/L (ref 20–32)
CREAT SERPL-MCNC: 0.9 MG/DL (ref 0.52–1.04)
GFR SERPL CREATININE-BSD FRML MDRD: 86 ML/MIN/1.73M2
GLUCOSE BLD-MCNC: 98 MG/DL (ref 70–99)
HBA1C MFR BLD: 4.9 % (ref 0–5.6)
HDLC SERPL-MCNC: 47 MG/DL
LDLC SERPL CALC-MCNC: 130 MG/DL
NONHDLC SERPL-MCNC: 165 MG/DL
POTASSIUM BLD-SCNC: 3.9 MMOL/L (ref 3.4–5.3)
PROT SERPL-MCNC: 7.3 G/DL (ref 6.8–8.8)
SODIUM SERPL-SCNC: 137 MMOL/L (ref 133–144)
TRIGL SERPL-MCNC: 177 MG/DL

## 2022-11-16 PROCEDURE — 36415 COLL VENOUS BLD VENIPUNCTURE: CPT | Performed by: FAMILY MEDICINE

## 2022-11-16 PROCEDURE — 80061 LIPID PANEL: CPT | Performed by: FAMILY MEDICINE

## 2022-11-16 PROCEDURE — 83036 HEMOGLOBIN GLYCOSYLATED A1C: CPT | Performed by: FAMILY MEDICINE

## 2022-11-16 PROCEDURE — 99214 OFFICE O/P EST MOD 30 MIN: CPT | Performed by: FAMILY MEDICINE

## 2022-11-16 PROCEDURE — 80053 COMPREHEN METABOLIC PANEL: CPT | Performed by: FAMILY MEDICINE

## 2022-11-16 RX ORDER — POLYETHYLENE GLYCOL 3350 17 G/17G
1 POWDER, FOR SOLUTION ORAL DAILY
Qty: 850 G | Refills: 3 | Status: SHIPPED | OUTPATIENT
Start: 2022-11-16

## 2022-11-16 RX ORDER — MAGNESIUM OXIDE 400 MG/1
1 TABLET ORAL DAILY
COMMUNITY
Start: 2022-01-27

## 2022-11-16 RX ORDER — MAGNESIUM OXIDE 400 MG/1
TABLET ORAL
COMMUNITY
Start: 2022-01-27

## 2022-11-16 RX ORDER — CALCIUM CARBONATE 500 MG/1
1 TABLET, CHEWABLE ORAL 2 TIMES DAILY
Qty: 180 TABLET | Refills: 1 | Status: SHIPPED | OUTPATIENT
Start: 2022-11-16

## 2022-11-16 NOTE — PROGRESS NOTES
Assessment & Plan     Hayde Macias is a 32 year old female with pmhx including BRENDA, MDD, PTSD, obesity seen today for completion of form and exam.    Long term current use of antipsychotic medication  To complete [facility] form  - Comprehensive metabolic panel; Future  - Lipid Profile; Future  - Hemoglobin A1c; Future  - Comprehensive metabolic panel  - Lipid Profile  - Hemoglobin A1c    PTSD (post-traumatic stress disorder)  Severe episode of recurrent major depressive disorder, with psychotic features (H)  BRENDA (generalized anxiety disorder)  Following with [facility] regularly. Says this has been helpful with controlling her symtpoms. Suspect memory difficulties are related to these diagnoses. Continues on risperidone and prazosin.     Class 1 obesity without serious comorbidity with body mass index (BMI) of 32.0 to 32.9 in adult, unspecified obesity type  Possibly related to antipsychotic as well as limited activity. Dietary and activity recommendations reviewed today, labs as above    Other constipation  - polyethylene glycol (MIRALAX) 17 GM/Dose powder; Take 17 g (1 capful) by mouth daily    Gastric reflux  Recommend avoiding triggering foods.   - calcium carbonate (TUMS) 500 MG chewable tablet; Take 1 tablet (500 mg) by mouth 2 times daily    Follow-up in 1-2 months for CPE.     MDM: 2+ stable chronic conditions, tests ordered/reviewed, medication management.      Benjamin Rosenstein, MD, MA  [facility]    Chester Lock is a 32 year old, presenting for the following health issues:  Forms  (Forms MD to complete)    A Maryann  assisted with this visit    HPI     Needs medical evaluation for [employer]. Yearly exam.   History limited by mental status.     She is overweight, and reviewed per [facility]. Does not get much exercise  Appetite is ok. Says she tends to eat rice with fish paste.     Mood doing ok. Does have a lot of  "anxiety. Working with [facility] for this.   She is not sure what medications she currently takes. Does believe she is taking risperidone and prazosin.    Is taking an OCP daily.   LMP: Says difficult to remember. Thinks she had already had it for this month. Typically comes monthly.  Last PAP: 11/15/2021 -- Negative cytology, negative HPV.  Prior 2019: ASCUS, HPV negative    No chest pain, no chest pressure. No shortness of breath.   No nausea or vomiting. Occasional stomach burning, primarily with spicy foods.   Constipated at times, no diarrhea. No black of bloody stools.   No dysuria, no hematuria.   Feels forgetful at times.    No alcohol use  Never smoked  Currently sexually active with her . Feels safe at home.         Objective    /72   Resp 20   Ht 1.46 m (4' 9.48\")   Wt 68.5 kg (151 lb)   SpO2 98%   BMI 32.13 kg/m    Body mass index is 32.13 kg/m .  Physical Exam     General: Awake, seated comfortably, appears well and NAD   CV: RRR, normal S1/S2, no murmurs/rubs/gallops  Pulm: Normal WOB, lungs CTAB, no wheezes or crackles, good air movement   GI: Abdomen soft, not tender, not distended, BS normal and active throughout   Psych: Alert, short responses. Has difficulty recalling events, often says \"I don't know.\" Judgment/insight limited.     Results for orders placed or performed in visit on 11/16/22 (from the past 24 hour(s))   Comprehensive metabolic panel   Result Value Ref Range    Sodium 137 133 - 144 mmol/L    Potassium 3.9 3.4 - 5.3 mmol/L    Chloride 104 94 - 109 mmol/L    Carbon Dioxide (CO2) 26 20 - 32 mmol/L    Anion Gap 7 3 - 14 mmol/L    Urea Nitrogen 10 7 - 30 mg/dL    Creatinine 0.90 0.52 - 1.04 mg/dL    Calcium 9.2 8.5 - 10.1 mg/dL    Glucose 98 70 - 99 mg/dL    Alkaline Phosphatase 79 40 - 150 U/L    AST 19 0 - 45 U/L    ALT 13 0 - 50 U/L    Protein Total 7.3 6.8 - 8.8 g/dL    Albumin 3.1 (L) 3.4 - 5.0 g/dL    Bilirubin Total 0.6 0.2 - 1.3 mg/dL    GFR Estimate 86 >60 " mL/min/1.73m2   Hemoglobin A1c   Result Value Ref Range    Hemoglobin A1C 4.9 0.0 - 5.6 %

## 2022-11-18 DIAGNOSIS — K59.09 CHRONIC CONSTIPATION: ICD-10-CM

## 2022-11-18 DIAGNOSIS — J30.2 ACUTE SEASONAL ALLERGIC RHINITIS: ICD-10-CM

## 2022-11-20 RX ORDER — MONTELUKAST SODIUM 10 MG/1
10 TABLET ORAL DAILY
Qty: 90 TABLET | Refills: 3 | Status: SHIPPED | OUTPATIENT
Start: 2022-11-20 | End: 2023-11-21

## 2022-11-20 RX ORDER — SENNA AND DOCUSATE SODIUM 50; 8.6 MG/1; MG/1
1 TABLET, FILM COATED ORAL AT BEDTIME
Qty: 90 TABLET | Refills: 3 | Status: SHIPPED | OUTPATIENT
Start: 2022-11-20 | End: 2023-05-26

## 2022-12-09 ENCOUNTER — OFFICE VISIT (OUTPATIENT)
Dept: FAMILY MEDICINE | Facility: CLINIC | Age: 32
End: 2022-12-09
Payer: COMMERCIAL

## 2022-12-09 VITALS
OXYGEN SATURATION: 99 % | BODY MASS INDEX: 31.93 KG/M2 | HEART RATE: 88 BPM | RESPIRATION RATE: 20 BRPM | TEMPERATURE: 98.7 F | SYSTOLIC BLOOD PRESSURE: 103 MMHG | DIASTOLIC BLOOD PRESSURE: 71 MMHG | WEIGHT: 148 LBS | HEIGHT: 57 IN

## 2022-12-09 DIAGNOSIS — M54.50 CHRONIC BILATERAL LOW BACK PAIN WITHOUT SCIATICA: ICD-10-CM

## 2022-12-09 DIAGNOSIS — Z00.00 ROUTINE GENERAL MEDICAL EXAMINATION AT A HEALTH CARE FACILITY: Primary | ICD-10-CM

## 2022-12-09 DIAGNOSIS — G89.29 CHRONIC BILATERAL LOW BACK PAIN WITHOUT SCIATICA: ICD-10-CM

## 2022-12-09 PROCEDURE — 99395 PREV VISIT EST AGE 18-39: CPT | Mod: GC

## 2022-12-09 ASSESSMENT — ENCOUNTER SYMPTOMS
HEARTBURN: 0
JOINT SWELLING: 0
FREQUENCY: 0
CHILLS: 0
HEADACHES: 1
ABDOMINAL PAIN: 0
ARTHRALGIAS: 0
HEMATURIA: 0
SHORTNESS OF BREATH: 0
PALPITATIONS: 0
BREAST MASS: 0
CONSTIPATION: 0
DYSURIA: 0
NAUSEA: 0
MYALGIAS: 0
HEMATOCHEZIA: 0
DIARRHEA: 0
FEVER: 0
PARESTHESIAS: 0
SORE THROAT: 0
EYE PAIN: 0
WEAKNESS: 0
DIZZINESS: 0
NERVOUS/ANXIOUS: 0
COUGH: 0

## 2022-12-09 ASSESSMENT — PATIENT HEALTH QUESTIONNAIRE - PHQ9
SUM OF ALL RESPONSES TO PHQ QUESTIONS 1-9: 9
10. IF YOU CHECKED OFF ANY PROBLEMS, HOW DIFFICULT HAVE THESE PROBLEMS MADE IT FOR YOU TO DO YOUR WORK, TAKE CARE OF THINGS AT HOME, OR GET ALONG WITH OTHER PEOPLE: SOMEWHAT DIFFICULT
SUM OF ALL RESPONSES TO PHQ QUESTIONS 1-9: 9

## 2022-12-09 NOTE — PROGRESS NOTES
SUBJECTIVE:   CC: Hayde is an 32 year old who presents for preventive health visit.     Healthy Habits:     Getting at least 3 servings of Calcium per day:  Yes    Bi-annual eye exam:  NO    Dental care twice a year:  NO    Sleep apnea or symptoms of sleep apnea:  None    Diet:  Regular (no restrictions)    Frequency of exercise:  None    Taking medications regularly:  Yes    Medication side effects:  None    PHQ-2 Total Score: 4    Additional concerns today:  No    Patient has no concerns today  Smoking: None  Alcohol: None  Exercise: Feeling depressed so she doesn't want to exercise  Diet: Eats regular meals, thinks her diet could improve.   Mood: has depression, follow with psychiatrist from [place]. Sees someone once a week.   Living situation: lives with  and children, has five children.     Today's PHQ-2 Score:   PHQ-2 ( 1999 [employer]) 12/9/2022   Q1: Little interest or pleasure in doing things 1   Q2: Feeling down, depressed or hopeless 3   PHQ-2 Score 4   PHQ-2 Total Score (12-17 Years)- Positive if 3 or more points; Administer PHQ-A if positive -   Q1: Little interest or pleasure in doing things Several days   Q2: Feeling down, depressed or hopeless Nearly every day   PHQ-2 Score 4     Social History     Tobacco Use     Smoking status: Never     Smokeless tobacco: Never     Tobacco comments:     no smoke exposure.   Substance Use Topics     Alcohol use: No     Alcohol/week: 0.0 standard drinks       Alcohol Use 12/9/2022   Prescreen: >3 drinks/day or >7 drinks/week? No     Reviewed orders with patient.  Reviewed health maintenance and updated orders accordingly - Yes    History of abnormal Pap smear:   Last 3 Pap and HPV Results:   PAP / HPV Latest Ref Rng & Units 11/15/2021 9/11/2019   PAP   Negative for Intraepithelial Lesion or Malignancy (NILM) -   HPV16 Negative Negative Negative   HPV18 Negative Negative Negative   HRHPV Negative Negative Negative     PAP / HPV Latest Ref Rng & Units 11/15/2021  2019   PAP   Negative for Intraepithelial Lesion or Malignancy (NILM) -   HPV16 Negative Negative Negative   HPV18 Negative Negative Negative   HRHPV Negative Negative Negative      Past Medical History:   Diagnosis Date     Anemia in pregnancy      Constipation in pregnancy      Encounter for supervision of normal pregnancy 2014    Delivering resident: Rosana Rosales DO Pager #[phone] Cell #[phone]   Time to hospital: 25 minutes   CHARLI: 2019 based on 7 wk US  Pregnancy risks: Undesired pregnancy Consultant:  Labs: Blood type: O+, Hep B/HIV neg, RPR neg x2, rubella immune, GBS negative Hgb: 12.4-->10.3 US: EDC 2019 based on 7wk US, normal fetal survey at 21wks GDM: passed 1hr GTT Flu: given TDaP: * (     Generalized anxiety disorder 2013     Hypothyroidism      Liver failure (H)      Major depressive disorder, recurrent episode, moderate (H) 2013     Strongyloides stercoralis infection      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      Complications: Nuchal cord, single gestation      Name: Alejo Macias   4 Term 01/11/15 38w6d 04:37 / 00:17 2.954 kg (6 lb 8.2 oz) M   N MAHESH      Name: Osiris      Apgar1: 8  Apgar5: 9   3 Term 13 38w1d 10:20 / 00:18 3.033 kg (6 lb 11 oz) M  EPI  MAHESH      Birth Comments: Augmented slightly with AROM and Pitocin. No tears or lacerations.      Name: Devin Novak      Apgar1: 9  Apgar5: 9  "  2 Term 11 40w0d 12:00  M  None N MAHESH      Name: Brant   1 Term 07 40w0d 12:00  M  None N MAHESH      Name: Pawan Mathis       Review of Systems   Constitutional: Negative for chills and fever.   HENT: Negative for congestion, ear pain, hearing loss and sore throat.    Eyes: Positive for visual disturbance. Negative for pain.   Respiratory: Negative for cough and shortness of breath.    Cardiovascular: Negative for chest pain, palpitations and peripheral edema.   Gastrointestinal: Negative for abdominal pain, constipation, diarrhea, heartburn, hematochezia and nausea.   Breasts:  Negative for tenderness, breast mass and discharge.   Genitourinary: Negative for dysuria, frequency, genital sores, hematuria, pelvic pain, urgency, vaginal bleeding and vaginal discharge.   Musculoskeletal: Negative for arthralgias, joint swelling and myalgias.   Skin: Negative for rash.   Neurological: Positive for headaches. Negative for dizziness, weakness and paresthesias.   Psychiatric/Behavioral: Positive for mood changes. The patient is not nervous/anxious.      OBJECTIVE:   /71   Pulse 88   Temp 98.7  F (37.1  C) (Oral)   Resp 20   Ht 1.45 m (4' 9.09\")   Wt 67.1 kg (148 lb)   SpO2 99%   BMI 31.93 kg/m    Physical Exam  GENERAL: healthy, alert and no distress  EYES: Eyes grossly normal to inspection, PERRL and conjunctivae and sclerae normal  HENT: ear canals and TM's normal, nose and mouth without ulcers or lesions  NECK: no adenopathy, no asymmetry, masses, or scars and thyroid normal to palpation  RESP: lungs clear to auscultation - no rales, rhonchi or wheezes  BREAST: normal without masses, tenderness or nipple discharge and no palpable axillary masses or adenopathy  CV: regular rate and rhythm, normal S1 S2, no S3 or S4, no murmur, click or rub, no peripheral edema and peripheral pulses strong  ABDOMEN: soft, nontender, no hepatosplenomegaly, no masses and bowel sounds normal  MS: no gross " "musculoskeletal defects noted, no edema  SKIN: no suspicious lesions or rashes  NEURO: Normal strength and tone, mentation intact and speech normal  PSYCH: mentation appears normal    ASSESSMENT/PLAN:     Routine general medical examination at a health care facility    Comment: Patient has no concerns this year. Medical history significant for multiple psych conditions, which are monitored closely by [facility]. Patient notes that her depression prevents her from exercising and eating well; discussed the importance of this overall and for her mood. Patient has history of ASCUS in 2019; repeat pap a year ago returned as normal. Per ASCCP guidelines, patient can have a repeat pap in five years.   Plan: Routine visit in one year    Chronic bilateral low back pain without sciatica  Comment: Patient requests refills of tylenol and a prescription for ibuprofen for chronic aches and pain, specifically mentions mild low back pain.   Plan: acetaminophen (TYLENOL) 325 MG tablet,         ibuprofen (ADVIL/MOTRIN) 600 MG tablet    COUNSELING:  Reviewed preventive health counseling, as reflected in patient instructions  Special attention given to:        Regular exercise       Healthy diet/nutrition       Alcohol Use       Family planning      BMI:   Estimated body mass index is 31.93 kg/m  as calculated from the following:    Height as of this encounter: 1.45 m (4' 9.09\").    Weight as of this encounter: 67.1 kg (148 lb).   Weight management plan: Discussed healthy diet and exercise guidelines    She reports that she has never smoked. She has never used smokeless tobacco.    Brynn Ortiz MD  [facility]        "

## 2022-12-09 NOTE — PROGRESS NOTES
Preceptor Attestation:   Patient seen, evaluated and discussed with the resident Dr. Ortiz. I have verified the content of the note, which accurately reflects my assessment of the patient and the plan of care.  Supervising Physician:Benjamin Rosenstein, MD,MA  [facility]

## 2022-12-13 RX ORDER — ACETAMINOPHEN 325 MG/1
650 TABLET ORAL EVERY 6 HOURS PRN
Qty: 100 TABLET | Refills: 1 | Status: SHIPPED | OUTPATIENT
Start: 2022-12-13 | End: 2023-05-26

## 2022-12-13 RX ORDER — IBUPROFEN 600 MG/1
600 TABLET, FILM COATED ORAL EVERY 8 HOURS PRN
Qty: 90 TABLET | Refills: 3 | Status: SHIPPED | OUTPATIENT
Start: 2022-12-13 | End: 2023-05-26

## 2023-02-06 DIAGNOSIS — L29.9 GENERALIZED PRURITUS: ICD-10-CM

## 2023-02-08 RX ORDER — CETIRIZINE HYDROCHLORIDE 10 MG/1
10 TABLET ORAL 2 TIMES DAILY
Qty: 120 TABLET | Refills: 5 | Status: SHIPPED | OUTPATIENT
Start: 2023-02-08 | End: 2024-01-31

## 2023-05-26 ENCOUNTER — OFFICE VISIT (OUTPATIENT)
Dept: FAMILY MEDICINE | Facility: CLINIC | Age: 33
End: 2023-05-26
Payer: COMMERCIAL

## 2023-05-26 VITALS
TEMPERATURE: 98 F | RESPIRATION RATE: 20 BRPM | DIASTOLIC BLOOD PRESSURE: 76 MMHG | SYSTOLIC BLOOD PRESSURE: 112 MMHG | HEIGHT: 58 IN | BODY MASS INDEX: 33.37 KG/M2 | HEART RATE: 78 BPM | WEIGHT: 159 LBS | OXYGEN SATURATION: 100 %

## 2023-05-26 DIAGNOSIS — Z30.013 ENCOUNTER FOR INITIAL PRESCRIPTION OF INJECTABLE CONTRACEPTIVE: Primary | ICD-10-CM

## 2023-05-26 DIAGNOSIS — Z30.42 DEPO-PROVERA CONTRACEPTIVE STATUS: ICD-10-CM

## 2023-05-26 DIAGNOSIS — G89.29 CHRONIC BILATERAL LOW BACK PAIN WITHOUT SCIATICA: ICD-10-CM

## 2023-05-26 DIAGNOSIS — M54.50 CHRONIC BILATERAL LOW BACK PAIN WITHOUT SCIATICA: ICD-10-CM

## 2023-05-26 DIAGNOSIS — K59.09 CHRONIC CONSTIPATION: ICD-10-CM

## 2023-05-26 LAB — HCG UR QL: NEGATIVE

## 2023-05-26 PROCEDURE — 96372 THER/PROPH/DIAG INJ SC/IM: CPT

## 2023-05-26 PROCEDURE — 81025 URINE PREGNANCY TEST: CPT

## 2023-05-26 PROCEDURE — 99214 OFFICE O/P EST MOD 30 MIN: CPT | Mod: 25

## 2023-05-26 RX ORDER — ASPIRIN 81 MG
200 TABLET, DELAYED RELEASE (ENTERIC COATED) ORAL DAILY
Qty: 180 TABLET | Refills: 0 | Status: CANCELLED | OUTPATIENT
Start: 2023-05-26

## 2023-05-26 RX ORDER — IBUPROFEN 600 MG/1
600 TABLET, FILM COATED ORAL EVERY 8 HOURS PRN
Qty: 90 TABLET | Refills: 3 | Status: SHIPPED | OUTPATIENT
Start: 2023-05-26

## 2023-05-26 RX ORDER — SENNA AND DOCUSATE SODIUM 50; 8.6 MG/1; MG/1
1 TABLET, FILM COATED ORAL AT BEDTIME
Qty: 90 TABLET | Refills: 3 | Status: SHIPPED | OUTPATIENT
Start: 2023-05-26 | End: 2024-06-06

## 2023-05-26 RX ORDER — SENNA AND DOCUSATE SODIUM 50; 8.6 MG/1; MG/1
1 TABLET, FILM COATED ORAL AT BEDTIME
Qty: 90 TABLET | Refills: 3 | Status: CANCELLED | OUTPATIENT
Start: 2023-05-26

## 2023-05-26 RX ORDER — ACETAMINOPHEN 325 MG/1
650 TABLET ORAL EVERY 6 HOURS PRN
Qty: 100 TABLET | Refills: 1 | Status: CANCELLED | OUTPATIENT
Start: 2023-05-26

## 2023-05-26 RX ORDER — ACETAMINOPHEN 325 MG/1
650 TABLET ORAL EVERY 6 HOURS PRN
Qty: 100 TABLET | Refills: 1 | Status: SHIPPED | OUTPATIENT
Start: 2023-05-26

## 2023-05-26 RX ORDER — IBUPROFEN 600 MG/1
600 TABLET, FILM COATED ORAL EVERY 8 HOURS PRN
Qty: 90 TABLET | Refills: 3 | Status: CANCELLED | OUTPATIENT
Start: 2023-05-26

## 2023-05-26 RX ORDER — MEDROXYPROGESTERONE ACETATE 150 MG/ML
150 INJECTION, SUSPENSION INTRAMUSCULAR
Status: COMPLETED | OUTPATIENT
Start: 2023-05-26 | End: 2023-12-28

## 2023-05-26 RX ADMIN — MEDROXYPROGESTERONE ACETATE 150 MG: 150 INJECTION, SUSPENSION INTRAMUSCULAR at 09:43

## 2023-05-26 ASSESSMENT — ANXIETY QUESTIONNAIRES
7. FEELING AFRAID AS IF SOMETHING AWFUL MIGHT HAPPEN: SEVERAL DAYS
6. BECOMING EASILY ANNOYED OR IRRITABLE: NEARLY EVERY DAY
GAD7 TOTAL SCORE: 10
3. WORRYING TOO MUCH ABOUT DIFFERENT THINGS: NEARLY EVERY DAY
1. FEELING NERVOUS, ANXIOUS, OR ON EDGE: NEARLY EVERY DAY
IF YOU CHECKED OFF ANY PROBLEMS ON THIS QUESTIONNAIRE, HOW DIFFICULT HAVE THESE PROBLEMS MADE IT FOR YOU TO DO YOUR WORK, TAKE CARE OF THINGS AT HOME, OR GET ALONG WITH OTHER PEOPLE: SOMEWHAT DIFFICULT
GAD7 TOTAL SCORE: 10
5. BEING SO RESTLESS THAT IT IS HARD TO SIT STILL: NOT AT ALL
2. NOT BEING ABLE TO STOP OR CONTROL WORRYING: NOT AT ALL

## 2023-05-26 ASSESSMENT — PATIENT HEALTH QUESTIONNAIRE - PHQ9
SUM OF ALL RESPONSES TO PHQ QUESTIONS 1-9: 8
5. POOR APPETITE OR OVEREATING: NOT AT ALL

## 2023-05-26 NOTE — NURSING NOTE
Clinic Administered Medication Documentation        Patient was given Depo Provera. Prior to medication administration, verified patient's identity using patient s name and date of birth. Please see MAR and medication order for additional information. Patient instructed to remain in clinic for 15 minutes and report any adverse reaction to staff immediately.    Vial/Syringe: Single dose vial. Was entire vial of medication used? Yes    NEXT INJECTION DUE: 8/11/23 - 9/8/23    Name of provider who requested the medication administration: Jone  Name of provider on site (faculty or community preceptor) at the time of performing the medication administration: Krishna    Date of next administration: 08/11-25/23  Date of next office visit with provider to renew medication plan (must be seen annually):

## 2023-05-26 NOTE — PROGRESS NOTES
Assessment & Plan     (Z30.013) Encounter for initial prescription of injectable contraceptive  (primary encounter) (Z30.42) Depo-Provera contraceptive status  Comment: patient is currently on OCP. She c/o significant mood changes. Wants a depo-provera inj today. Discussed other options that may not exacerbate her mental health, such as non hormonal IUD. Patient not interested today.   Plan: HCG qualitative urine.            medroxyPROGESTERone (DEPO-PROVERA) injection                150 mg )  2023  Plan Documentation  Service ordered Depo Provera injection (150mg IM) may be given every 3 months for one year per sade.  Plan and order should be renewed one year from 2023 .  Ordered by Kyle Becerril MD.    (M54.50,  G89.29) Chronic bilateral low back pain without sciatica  Comment: stable, refill.  Plan: ibuprofen (ADVIL/MOTRIN) 600 MG tablet,         acetaminophen (TYLENOL) 325 MG tablet        (K59.09) Chronic constipation  Comment: discussed increasing high fiber diet. Refill .  Plan: SENNA-docusate sodium (SENNA S) 8.6-50 MG         tablet      Depression Screening Follow Up        2023     8:51 AM   PHQ   PHQ-9 Total Score 8   Q9: Thoughts of better off dead/self-harm past 2 weeks Several days   patient currently see therapist and reports compliance with medications.    Kyle Becerril MD  [facility] Lock is a 32 year old, presenting for the following health issues:  Contraception (Renew Depo)        2023     8:43 AM   Additional Questions   Roomed by olga MATT   32 year old female  here for birth control counseling.   Currently using OCP. Feels like she is gaining weight and mood changes.  Interested in Depo shot today. Doesn't want IUD, tried Nexplanon didn't like it, also felt it caused weight gain. Was taking OCP regularly past few months.   LMP:  still spotting but not sure.         Review of Systems   Constitutional,  HEENT, cardiovascular, pulmonary, gi and gu systems are negative, except as otherwise noted.      Objective    There were no vitals taken for this visit.  There is no height or weight on file to calculate BMI.  Physical Exam   GENERAL: healthy, alert and no distress  RESP: lungs clear to auscultation - no rales, rhonchi or wheezes  CV: regular rate and rhythm, normal S1 S2, no S3 or S4, no murmur, click or rub, no peripheral edema and peripheral pulses strong  ABDOMEN: soft, nontender, no hepatosplenomegaly, no masses and bowel sounds normal  MS: no gross musculoskeletal defects noted, no edema

## 2023-08-11 ENCOUNTER — ALLIED HEALTH/NURSE VISIT (OUTPATIENT)
Dept: FAMILY MEDICINE | Facility: CLINIC | Age: 33
End: 2023-08-11
Payer: COMMERCIAL

## 2023-08-11 DIAGNOSIS — Z30.42 DEPO-PROVERA CONTRACEPTIVE STATUS: Primary | ICD-10-CM

## 2023-08-11 PROCEDURE — 99207 PR NO CHARGE NURSE ONLY: CPT

## 2023-08-11 PROCEDURE — 96372 THER/PROPH/DIAG INJ SC/IM: CPT

## 2023-08-11 RX ADMIN — MEDROXYPROGESTERONE ACETATE 150 MG: 150 INJECTION, SUSPENSION INTRAMUSCULAR at 13:30

## 2023-08-11 NOTE — NURSING NOTE
Clinic Administered Medication Documentation      Depo Provera Documentation    Depo-Provera Standing Order inclusion/exclusion criteria reviewed.     Is this the initial or subsequent dose of Depo Provera? Subsequent dose - patient is within the acceptable window of time (11-15 weeks) for subsequent injection. Pregnancy test not indicated.    Patient meets: inclusion criteria     Is there an active order (written within the past 365 days, with administrations remaining, not ) in the chart? Yes.     Prior to injection, verified patient identity using patient's name and date of birth. Medication was administered. Please see MAR and medication order for additional information.     Vial/Syringe: Single dose vial. Was entire vial of medication used? Yes    Patient instructed to remain in clinic for 15 minutes, report any adverse reaction to staff immediately, and remain in clinic for 15 minutes and report any adverse reaction to staff immediately but patient declined.  NEXT INJECTION DUE: 10/27/23 - 23    Verified that the patient has refills remaining in their prescription.    Name of provider who requested the medication administration: emelia  Name of provider on site (faculty or community preceptor) at the time of performing the medication administration: emelia    Date of next administration: 10/27/23-11/10/23  Date of next office visit with provider to renew medication plan (must be seen annually):

## 2023-09-20 NOTE — TELEPHONE ENCOUNTER
Called city  again, as pt is complaining of bed bugs and that her little one is itching because of them.    Left message for Sriram gasca - he has to respond to complaints in 72 hrs.    jesusetz   n/a

## 2023-10-12 ENCOUNTER — ALLIED HEALTH/NURSE VISIT (OUTPATIENT)
Dept: FAMILY MEDICINE | Facility: CLINIC | Age: 33
End: 2023-10-12
Payer: COMMERCIAL

## 2023-10-12 VITALS — OXYGEN SATURATION: 98 % | TEMPERATURE: 98.2 F | WEIGHT: 161 LBS | BODY MASS INDEX: 33.93 KG/M2

## 2023-10-12 DIAGNOSIS — Z30.42 DEPO-PROVERA CONTRACEPTIVE STATUS: Primary | ICD-10-CM

## 2023-10-12 PROCEDURE — 99207 PR NO CHARGE NURSE ONLY: CPT

## 2023-10-12 PROCEDURE — 96372 THER/PROPH/DIAG INJ SC/IM: CPT

## 2023-10-12 RX ADMIN — MEDROXYPROGESTERONE ACETATE 150 MG: 150 INJECTION, SUSPENSION INTRAMUSCULAR at 15:05

## 2023-11-21 DIAGNOSIS — J30.2 ACUTE SEASONAL ALLERGIC RHINITIS: ICD-10-CM

## 2023-11-21 NOTE — TELEPHONE ENCOUNTER
Message to physician: none    Date of last visit: 5/26/2023    Date of next visit if scheduled: none    Potassium   Date Value Ref Range Status   11/16/2022 3.9 3.4 - 5.3 mmol/L Final   02/20/2019 3.6 3.4 - 5.3 mmol/L Final     Creatinine   Date Value Ref Range Status   11/16/2022 0.90 0.52 - 1.04 mg/dL Final   02/20/2019 0.8 0.6 - 1.3 mg/dL Final     GFR Estimate   Date Value Ref Range Status   11/16/2022 86 >60 mL/min/1.73m2 Final     Comment:     Effective December 21, 2021 eGFRcr in adults is calculated using the 2021 CKD-EPI creatinine equation which includes age and gender (Komal et al., NEJM, DOI: 10.1056/USYRmy0905606)   08/18/2018 >60 >60 mL/min/1.73m2 Final       BP Readings from Last 3 Encounters:   05/26/23 112/76   12/09/22 103/71   11/16/22 106/72       Hemoglobin A1C   Date Value Ref Range Status   11/16/2022 4.9 0.0 - 5.6 % Final     Comment:     Normal <5.7%   Prediabetes 5.7-6.4%    Diabetes 6.5% or higher     Note: Adopted from ADA consensus guidelines.       Please complete refill and CLOSE ENCOUNTER.  Closing the encounter signifies the refill is complete.

## 2023-11-24 ENCOUNTER — PATIENT OUTREACH (OUTPATIENT)
Dept: CARE COORDINATION | Facility: CLINIC | Age: 33
End: 2023-11-24
Payer: COMMERCIAL

## 2023-11-28 RX ORDER — MONTELUKAST SODIUM 10 MG/1
10 TABLET ORAL DAILY
Qty: 90 TABLET | Refills: 3 | Status: SHIPPED | OUTPATIENT
Start: 2023-11-28

## 2023-12-08 ENCOUNTER — PATIENT OUTREACH (OUTPATIENT)
Dept: CARE COORDINATION | Facility: CLINIC | Age: 33
End: 2023-12-08
Payer: COMMERCIAL

## 2023-12-28 ENCOUNTER — ALLIED HEALTH/NURSE VISIT (OUTPATIENT)
Dept: FAMILY MEDICINE | Facility: CLINIC | Age: 33
End: 2023-12-28
Payer: COMMERCIAL

## 2023-12-28 DIAGNOSIS — Z30.42 DEPO-PROVERA CONTRACEPTIVE STATUS: Primary | ICD-10-CM

## 2023-12-28 PROCEDURE — 99207 PR NO CHARGE NURSE ONLY: CPT

## 2023-12-28 PROCEDURE — 96372 THER/PROPH/DIAG INJ SC/IM: CPT | Performed by: FAMILY MEDICINE

## 2023-12-28 RX ADMIN — MEDROXYPROGESTERONE ACETATE 150 MG: 150 INJECTION, SUSPENSION INTRAMUSCULAR at 09:00

## 2023-12-28 NOTE — PROGRESS NOTES
Clinic Administered Medication Documentation      Depo Provera Documentation    Depo-Provera Standing Order inclusion/exclusion criteria reviewed.     Is this the initial or subsequent dose of Depo Provera? Subsequent dose - patient is within the acceptable window of time (11-15 weeks) for subsequent injection. Pregnancy test not indicated.    Patient meets: inclusion criteria     Is there an active order (written within the past 365 days, with administrations remaining, not ) in the chart? Yes.     Prior to injection, verified patient identity using patient's name and date of birth. Medication was administered. Please see MAR and medication order for additional information.     Vial/Syringe: Single dose vial. Was entire vial of medication used? Yes    Patient instructed to remain in clinic for 15 minutes, report any adverse reaction to staff immediately, and remain in clinic for 15 minutes and report any adverse reaction to staff immediately but patient declined.  NEXT INJECTION DUE: 3/14/24 - 24

## 2024-01-31 DIAGNOSIS — L29.9 GENERALIZED PRURITUS: ICD-10-CM

## 2024-01-31 RX ORDER — CETIRIZINE HYDROCHLORIDE 10 MG/1
10 TABLET ORAL 2 TIMES DAILY
Qty: 120 TABLET | Refills: 5 | Status: SHIPPED | OUTPATIENT
Start: 2024-01-31

## 2024-01-31 NOTE — TELEPHONE ENCOUNTER
Message to physician:     Date of last visit: 5/26/2023    Date of next visit if scheduled:    Potassium   Date Value Ref Range Status   11/16/2022 3.9 3.4 - 5.3 mmol/L Final   02/20/2019 3.6 3.4 - 5.3 mmol/L Final     Creatinine   Date Value Ref Range Status   11/16/2022 0.90 0.52 - 1.04 mg/dL Final   02/20/2019 0.8 0.6 - 1.3 mg/dL Final     GFR Estimate   Date Value Ref Range Status   11/16/2022 86 >60 mL/min/1.73m2 Final     Comment:     Effective December 21, 2021 eGFRcr in adults is calculated using the 2021 CKD-EPI creatinine equation which includes age and gender (Komal et al., NEJ, DOI: 10.1056/WMOUbw6692033)   08/18/2018 >60 >60 mL/min/1.73m2 Final       BP Readings from Last 3 Encounters:   05/26/23 112/76   12/09/22 103/71   11/16/22 106/72       Hemoglobin A1C   Date Value Ref Range Status   11/16/2022 4.9 0.0 - 5.6 % Final     Comment:     Normal <5.7%   Prediabetes 5.7-6.4%    Diabetes 6.5% or higher     Note: Adopted from ADA consensus guidelines.       Please complete refill and CLOSE ENCOUNTER.  Closing the encounter signifies the refill is complete.

## 2024-03-15 ENCOUNTER — ALLIED HEALTH/NURSE VISIT (OUTPATIENT)
Dept: FAMILY MEDICINE | Facility: CLINIC | Age: 34
End: 2024-03-15
Payer: COMMERCIAL

## 2024-03-15 DIAGNOSIS — Z30.42 DEPO-PROVERA CONTRACEPTIVE STATUS: Primary | ICD-10-CM

## 2024-03-15 PROCEDURE — 99207 PR NO BILLABLE SERVICE THIS VISIT: CPT

## 2024-03-15 PROCEDURE — 96372 THER/PROPH/DIAG INJ SC/IM: CPT

## 2024-03-15 RX ADMIN — MEDROXYPROGESTERONE ACETATE 150 MG: 150 INJECTION, SUSPENSION INTRAMUSCULAR at 10:33

## 2024-03-15 NOTE — PROGRESS NOTES
Clinic Administered Medication Documentation        Patient was given Depo Provera. Prior to medication administration, verified patient's identity using patient s name and date of birth. Please see MAR and medication order for additional information. Patient instructed to remain in clinic for 15 minutes and report any adverse reaction to staff immediately.    Vial/Syringe: Single dose vial. Was entire vial of medication used? Yes    NEXT INJECTION DUE: 5/31/24 - 6/28/24    Name of provider who requested the medication administration: Angel  Name of provider on site (faculty or community preceptor) at the time of performing the medication administration: Kim    Date of next administration: 3/15/2024  Date of next office visit with provider to renew medication plan (must be seen annually): 5/31/2024-6/14/2024

## 2024-04-04 ENCOUNTER — OFFICE VISIT (OUTPATIENT)
Dept: FAMILY MEDICINE | Facility: CLINIC | Age: 34
End: 2024-04-04
Payer: COMMERCIAL

## 2024-04-04 VITALS
OXYGEN SATURATION: 98 % | RESPIRATION RATE: 20 BRPM | DIASTOLIC BLOOD PRESSURE: 79 MMHG | SYSTOLIC BLOOD PRESSURE: 129 MMHG | WEIGHT: 164 LBS | HEIGHT: 57 IN | TEMPERATURE: 98.9 F | BODY MASS INDEX: 35.38 KG/M2 | HEART RATE: 82 BPM

## 2024-04-04 DIAGNOSIS — Z00.00 ROUTINE GENERAL MEDICAL EXAMINATION AT A HEALTH CARE FACILITY: ICD-10-CM

## 2024-04-04 DIAGNOSIS — R63.5 WEIGHT GAIN: ICD-10-CM

## 2024-04-04 DIAGNOSIS — Z30.42 DEPO-PROVERA CONTRACEPTIVE STATUS: ICD-10-CM

## 2024-04-04 DIAGNOSIS — G44.209 TENSION HEADACHE: ICD-10-CM

## 2024-04-04 DIAGNOSIS — F33.3 SEVERE EPISODE OF RECURRENT MAJOR DEPRESSIVE DISORDER, WITH PSYCHOTIC FEATURES (H): ICD-10-CM

## 2024-04-04 DIAGNOSIS — R53.83 OTHER FATIGUE: ICD-10-CM

## 2024-04-04 DIAGNOSIS — Z11.59 NEED FOR HEPATITIS C SCREENING TEST: Primary | ICD-10-CM

## 2024-04-04 PROCEDURE — 84443 ASSAY THYROID STIM HORMONE: CPT

## 2024-04-04 PROCEDURE — 36415 COLL VENOUS BLD VENIPUNCTURE: CPT

## 2024-04-04 PROCEDURE — 86803 HEPATITIS C AB TEST: CPT

## 2024-04-04 PROCEDURE — 99214 OFFICE O/P EST MOD 30 MIN: CPT | Mod: 25

## 2024-04-04 PROCEDURE — 99395 PREV VISIT EST AGE 18-39: CPT | Mod: GC

## 2024-04-04 RX ORDER — METFORMIN HCL 500 MG
2000 TABLET, EXTENDED RELEASE 24 HR ORAL
Qty: 120 TABLET | Refills: 3 | Status: SHIPPED | OUTPATIENT
Start: 2024-04-04 | End: 2024-07-22

## 2024-04-04 RX ORDER — ACETAMINOPHEN 500 MG
500-1000 TABLET ORAL EVERY 6 HOURS PRN
Qty: 90 TABLET | Refills: 3 | Status: SHIPPED | OUTPATIENT
Start: 2024-04-04

## 2024-04-04 RX ORDER — MEDROXYPROGESTERONE ACETATE 150 MG/ML
150 INJECTION, SUSPENSION INTRAMUSCULAR
Status: ACTIVE | OUTPATIENT
Start: 2024-04-04 | End: 2025-03-30

## 2024-04-04 SDOH — HEALTH STABILITY: PHYSICAL HEALTH: ON AVERAGE, HOW MANY DAYS PER WEEK DO YOU ENGAGE IN MODERATE TO STRENUOUS EXERCISE (LIKE A BRISK WALK)?: 1 DAY

## 2024-04-04 SDOH — HEALTH STABILITY: PHYSICAL HEALTH: ON AVERAGE, HOW MANY MINUTES DO YOU ENGAGE IN EXERCISE AT THIS LEVEL?: 10 MIN

## 2024-04-04 ASSESSMENT — PATIENT HEALTH QUESTIONNAIRE - PHQ9
10. IF YOU CHECKED OFF ANY PROBLEMS, HOW DIFFICULT HAVE THESE PROBLEMS MADE IT FOR YOU TO DO YOUR WORK, TAKE CARE OF THINGS AT HOME, OR GET ALONG WITH OTHER PEOPLE: NOT DIFFICULT AT ALL
SUM OF ALL RESPONSES TO PHQ QUESTIONS 1-9: 10
SUM OF ALL RESPONSES TO PHQ QUESTIONS 1-9: 10

## 2024-04-04 ASSESSMENT — SOCIAL DETERMINANTS OF HEALTH (SDOH): HOW OFTEN DO YOU GET TOGETHER WITH FRIENDS OR RELATIVES?: TWICE A WEEK

## 2024-04-04 NOTE — LETTER
April 9, 2024      Hayde LUJAN Say  [address] [place] [address]        Dear Ms.Say,    We are writing to inform you of your test results.    Normal thyroid function and negative hep C screening.         Resulted Orders   Hepatitis C Screen Reflex to HCV RNA Quant and Genotype   Result Value Ref Range    Hepatitis C Antibody Nonreactive Nonreactive      Comment:      A nonreactive screening test result does not exclude the possibility of exposure to or infection with HCV. Nonreactive screening test results in individuals with prior exposure to HCV may be due to antibody levels below the limit of detection of this assay or lack of reactivity to the HCV antigens used in this assay. Patients with recent HCV infections (<3 months from time of exposure) may have false-negative HCV antibody results due to the time needed for seroconversion (average of 8 to 9 weeks).   TSH with free T4 reflex   Result Value Ref Range    TSH 2.72 0.30 - 4.20 uIU/mL       If you have any questions or concerns, please call the clinic at the number listed above.       Sincerely,      Kenay Bentley MD

## 2024-04-04 NOTE — PROGRESS NOTES
Preventive Care Visit  [facility]  Brynn Ortiz MD, Family Medicine  Apr 4, 2024    Assessment & Plan     Routine general medical examination at a health care facility  Concerns today addressed below. Discussed regular exercise and good diet habits to maintain a healthy weight; also discussed initiation of metformin with use of antipsychotic, see below. Immunizations are reviewed and up-to-date aside from flu and COVID. Patient does not smoke or drink. Last PAP normal in 11/2021, due for next in 2026. Seeing a dentist every 6 months was recommended.   - Return for annual physical in one year.      Tension headache  Patient describes intermittent headaches over the past two years associated with changes to her vision and loss of her glasses. Discussed that her headaches are likely related to her vision and her increased stress level; encouraged her to return to her eye doctor for repeat evaluation.  - Discussed verbal referral to previous eye doctor  - acetaminophen (TYLENOL) 500 MG tablet  Dispense: 90 tablet; Refill: 3    Other fatigue  Patient notes fatigue, constipation and hair loss. No skin lesions on scalp, patches of loss or androgenic hair loss pattern; will screen thyroid. Likely telogen effluvium with recent increase in stress level.   - TSH with free T4 reflex    Severe episode of recurrent major depressive disorder, with psychotic features (H)  Weight gain  Patient's severe depression is managed with risperidone. She has noted weight gain while on the medication. Her A1c was normal in 2022, but will initiate metformin for weight management associated with antipsychotic use.   - metFORMIN (GLUCOPHAGE XR) 500 MG 24 hr tablet  Dispense: 120 tablet; Refill: 3  - Follow up in three months to check on metformin tolerance.     Need for hepatitis C screening test  Agrees to routine screening.   - Hepatitis C Screen Reflex to HCV RNA Quant and Genotype    Depo-Provera  "contraceptive status  Patient would like to continue Depo; has been able to return every three months for injections. Refilled for the next year.   - medroxyPROGESTERone (DEPO-PROVERA) injection 150 mg    4/4/2024  Plan Documentation  Service ordered Depo Provera injection (150mg IM) may be given every 3 months for one year per protoccol.  Plan and order should be renewed at a visit no later than 4/4/2024  rBynn Ortiz MD    BMI  Estimated body mass index is 35.49 kg/m  as calculated from the following:    Height as of this encounter: 1.448 m (4' 9\").    Weight as of this encounter: 74.4 kg (164 lb).   Weight management plan: Discussed healthy diet and exercise guidelines and started metformin    Counseling  Appropriate preventive services were discussed with this patient, including applicable screening as appropriate for fall prevention, nutrition, physical activity, Tobacco-use cessation, weight loss and cognition.  Checklist reviewing preventive services available has been given to the patient.  Reviewed patient's diet, addressing concerns and/or questions.   She is at risk for lack of exercise and has been provided with information to increase physical activity for the benefit of her well-being.   The patient was instructed to see the dentist every 6 months.   The patient's PHQ-9 score is consistent with moderate depression. She was provided with information regarding depression.     Return in about 3 months (around 7/4/2024) to follow-up on metformin initiation    Chester Lock is a 33 year old, presenting for the following:  Physical and Headache    Health care changes since last visit:  - Any new health concerns: Headache  Headaches   - How long you been experiencing headaches: 2 years  - How often do they occur: Daily   - Where is the pain: Both temples, sometimes spreads entire head  - Recent illness: No   - Recent vision: Not for the past two year.    - Vision changes: Has been blurry for a long " time, hasn't been to an eye doctor. Used to wear glasses, but lost them two years ago.    - Photophobia/phonophobia: Says she needs to stay in a dark, quiet room.    - Nausea/vomiting: No   - Taking any medication: Takes herbal medication, occasionally takes ibuprofen.   - Any new hospitalizations:  No   - Any new surgeries: No  - Any medication changes: None  - Sees Sahwn Jaimes, comes to her house once a week.     Lifestyle:  - Diet: States that she eats healthy.   - Exercise: Has not been doing much for exercise   - Alcohol: No alcohol  - Tobacco: No cigarettes  - Drug use: None   - LMP: Doesn't get her period while on Depo.   - Contraception: On Depo - due for renewal. Concerned that the Depo has made her gain weight.     Health Maintenance/Preventitive Measures:  - Colon cancer screening: Not yet due   - Family history: No family history   - Breast cancer screening: Not yet due   - Family history: No family history   - Pap smear: Pap with HPV negative in 11/2021    Laboratory Tests:  - Cholesterol: Elevated in 11/2022  - Glucose/A1c: A1c normal at 4.9 in 11/2022 4/4/2024     2:00 PM   Additional Questions   Roomed by Dedra   Accompanied by JOSEPHINE         4/4/2024    Information    services provided? Yes   Kenrick Wolf   Type of interpretation provided Face-to-face    name Claudia Nymega    ID JOSEPHINE    Agency [employer]    phone number [phone]      Health Care Directive  Patient does not have a Health Care Directive or Living Will: Discussed advance care planning with patient; however, patient declined at this time.        4/4/2024   General Health   How would you rate your overall physical health? (!) FAIR   Feel stress (tense, anxious, or unable to sleep) Not at all         4/4/2024   Nutrition   Three or more servings of calcium each day? (!) I DON'T KNOW   Diet: Regular (no restrictions)    Vegetarian/vegan   How many servings of fruit and  vegetables per day? (!) 2-3   How many sweetened beverages each day? (!) 2         4/4/2024   Exercise   Days per week of moderate/strenous exercise 1 day   Average minutes spent exercising at this level 10 min   (!) EXERCISE CONCERN      4/4/2024   Social Factors   Frequency of gathering with friends or relatives Twice a week   Worry food won't last until get money to buy more No   Food not last or not have enough money for food? No   Do you have housing?  Yes   Are you worried about losing your housing? No   Lack of transportation? No   Unable to get utilities (heat,electricity)? No         4/4/2024   Dental   Dentist two times every year? (!) NO          Today's PHQ-9 Score:       4/4/2024     1:59 PM   PHQ-9 SCORE   PHQ-9 Total Score MyChart 10 (Moderate depression)   PHQ-9 Total Score 10         4/4/2024   Substance Use   Alcohol more than 3/day or more than 7/wk No   Do you use any other substances recreationally? No     Social History     Tobacco Use    Smoking status: Never    Smokeless tobacco: Never    Tobacco comments:     no smoke exposure.   Vaping Use    Vaping Use: Never used   Substance Use Topics    Alcohol use: No     Alcohol/week: 0.0 standard drinks of alcohol    Drug use: No     Mammogram Screening - Patient under 40 years of age: Routine Mammogram Screening not recommended.         4/4/2024   STI Screening   New sexual partner(s) since last STI/HIV test? No     History of abnormal Pap smear: NO - age 30-65 PAP every 5 years with negative HPV co-testing recommended        Latest Ref Rng & Units 11/15/2021     2:21 PM 9/11/2019     2:45 PM   PAP / HPV   PAP  Negative for Intraepithelial Lesion or Malignancy (NILM)     HPV 16 DNA Negative Negative  Negative    HPV 18 DNA Negative Negative  Negative    Other HR HPV Negative Negative  Negative            4/4/2024   Contraception/Family Planning   Questions about contraception or family planning (!) YES      Past Medical History:   Diagnosis Date     Anemia in pregnancy     Constipation in pregnancy     Encounter for supervision of normal pregnancy 2014    Delivering resident: Rosana Rosales DO Pager #[phone] Cell #[phone]   Time to hospital: 25 minutes   CHARLI: 2019 based on 7 wk US  Pregnancy risks: Undesired pregnancy Consultant:  Labs: Blood type: O+, Hep B/HIV neg, RPR neg x2, rubella immune, GBS negative Hgb: 12.4-->10.3 US: EDC 2019 based on 7wk US, normal fetal survey at 21wks GDM: passed 1hr GTT Flu: given TDaP: * (    Generalized anxiety disorder 2013    Hypothyroidism     Liver failure (H)     Major depressive disorder, recurrent episode, moderate (H) 2013    Strongyloides stercoralis infection     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    Urinary frequency      Past Surgical History:   Procedure Laterality Date    NO HISTORY OF SURGERY      NO PAST SURGERIES       Current Outpatient Medications   Medication Sig Dispense Refill    acetaminophen (TYLENOL) 325 MG tablet Take 2 tablets (650 mg) by mouth every 6 hours as needed for headaches 100 tablet 1    calcium carbonate (TUMS) 500 MG chewable tablet Take 1 tablet (500 mg) by mouth 2 times daily 180 tablet 1    cetirizine (ZYRTEC) 10 MG tablet Take 1 tablet (10 mg) by mouth 2 times daily 120 tablet 5    docusate sodium (COLACE) 100 MG tablet Take 2 tablets (200 mg) by mouth daily 180 tablet 0    ibuprofen (ADVIL/MOTRIN) 600 MG tablet Take 1 tablet (600 mg) by mouth every 8 hours as needed for moderate pain 90 tablet 3    magnesium oxide (MAG-OX) 400 MG tablet       magnesium oxide (MAG-OX) 400 MG tablet       magnesium oxide 400 MG tablet Take 1 tablet by mouth daily      montelukast (SINGULAIR) 10 MG tablet Take 1 tablet (10 mg) by mouth daily 90 tablet 3    polyethylene glycol (MIRALAX) 17 GM/Dose powder Take 17 g (1 capful) by mouth daily 850 g 3    prazosin (MINIPRESS) 2 MG capsule Take 2 mg by mouth At Bedtime      risperiDONE (RISPERDAL) 2 MG tablet Take 2 mg by  "mouth daily      SENNA-docusate sodium (SENNA S) 8.6-50 MG tablet Take 1 tablet by mouth At Bedtime 90 tablet 3     Allergies   Allergen Reactions    Amoxicillin-Pot Clavulanate Rash     Family History   Problem Relation Age of Onset    Asthma Son     Asthma Son     Depression Mother     Diabetes No family hx of     Coronary Artery Disease No family hx of     Hypertension No family hx of     Hyperlipidemia No family hx of     Cerebrovascular Disease No family hx of     Breast Cancer No family hx of     Colon Cancer No family hx of     Prostate Cancer No family hx of     Other Cancer No family hx of     Anxiety Disorder No family hx of     Mental Illness No family hx of     Substance Abuse No family hx of     Anesthesia Reaction No family hx of     Osteoporosis No family hx of     Genetic Disorder No family hx of     Thyroid Disease No family hx of     Obesity No family hx of     No Known Problems Mother     No Known Problems Father     No Known Problems Son     No Known Problems Son     No Known Problems Son     No Known Problems Son        Review of Systems  Constitutional, HEENT, cardiovascular, pulmonary, gi and gu systems are negative, except as otherwise noted.     Objective    Exam  /79   Pulse 82   Temp 98.9  F (37.2  C)   Resp 20   Ht 1.448 m (4' 9\")   Wt 74.4 kg (164 lb)   SpO2 98%   BMI 35.49 kg/m     Estimated body mass index is 35.49 kg/m  as calculated from the following:    Height as of this encounter: 1.448 m (4' 9\").    Weight as of this encounter: 74.4 kg (164 lb).    Physical Exam  GENERAL: alert and no distress  EYES: Eyes grossly normal to inspection, PERRL and conjunctivae and sclerae normal  HENT: ear canals and TM's normal, nose and mouth without ulcers or lesions  NECK: no adenopathy, no asymmetry, masses, or scars  RESP: lungs clear to auscultation - no rales, rhonchi or wheezes  CV: regular rate and rhythm, normal S1 S2, no S3 or S4, no murmur, click or rub, no peripheral " edema  ABDOMEN: soft, nontender, no hepatosplenomegaly, no masses and bowel sounds normal  MS: no gross musculoskeletal defects noted, no edema  SKIN: no suspicious lesions or rashes. No patches of hair loss or androgenic pattern noted.    Signed Electronically by: Brynn Ortiz MD    Answers submitted by the patient for this visit:  Patient Health Questionnaire (Submitted on 4/4/2024)  If you checked off any problems, how difficult have these problems made it for you to do your work, take care of things at home, or get along with other people?: Not difficult at all  PHQ9 TOTAL SCORE: 10

## 2024-04-04 NOTE — PROGRESS NOTES
Preceptor Attestation:  Patient's case reviewed and discussed with Brynn Ortiz MD resident and I evaluated the patient. I agree with written assessment and plan of care.  Supervising Physician:  CLAUDIA LONGORIA MD  [facility]

## 2024-04-05 LAB
HCV AB SERPL QL IA: NONREACTIVE
TSH SERPL DL<=0.005 MIU/L-ACNC: 2.72 UIU/ML (ref 0.3–4.2)

## 2024-04-17 NOTE — PROGRESS NOTES
Preceptor Attestation:  Patient's case reviewed and discussed with Raza Kapoor MD resident and I evaluated the patient. I agree with written assessment and plan of care.  Supervising Physician:  CLAUDIA LONGORIA MD  [facility]   17-Apr-2024 18:39

## 2024-05-24 NOTE — PROGRESS NOTES
Due to patient being non-English speaking/uses sign language, an  was used for this visit. Only for face-to-face interpretation by an external agency, date and length of interpretation can be found on the scanned worksheet.     name: Claudia Rey  Agency: [employer]  Language: Maryann   Telephone number: [phone]  Type of interpretation: Face-to-face, spoken     Statement Selected

## 2024-06-05 ENCOUNTER — ALLIED HEALTH/NURSE VISIT (OUTPATIENT)
Dept: FAMILY MEDICINE | Facility: CLINIC | Age: 34
End: 2024-06-05
Payer: COMMERCIAL

## 2024-06-05 DIAGNOSIS — Z30.42 DEPO-PROVERA CONTRACEPTIVE STATUS: Primary | ICD-10-CM

## 2024-06-05 PROCEDURE — 99207 PR NO CHARGE NURSE ONLY: CPT

## 2024-06-05 PROCEDURE — 96372 THER/PROPH/DIAG INJ SC/IM: CPT

## 2024-06-05 RX ADMIN — MEDROXYPROGESTERONE ACETATE 150 MG: 150 INJECTION, SUSPENSION INTRAMUSCULAR at 13:18

## 2024-06-05 NOTE — PROGRESS NOTES
Clinic Administered Medication Documentation      Depo Provera Documentation    Depo-Provera Standing Order inclusion/exclusion criteria reviewed.     Is this the initial or subsequent dose of Depo Provera? Subsequent dose - patient is within the acceptable window of time (11-15 weeks) for subsequent injection. Pregnancy test not indicated.    Patient meets: exclusion criteria     Patient does not meet criteria to receive Depo Provera injection today due to positive pregnancy test. Clinician informed. Patient instructed to     Name of provider who requested the medication administration: JUDY  Name of provider on site (faculty or community preceptor) at the time of performing the medication administration: AILYN    Date of next administration: AUG21-9/4/2024  Date of next office visit with provider to renew medication plan (must be seen annually):

## 2024-06-06 DIAGNOSIS — K59.09 CHRONIC CONSTIPATION: ICD-10-CM

## 2024-06-07 RX ORDER — SENNA AND DOCUSATE SODIUM 50; 8.6 MG/1; MG/1
1 TABLET, FILM COATED ORAL AT BEDTIME
Qty: 90 TABLET | Refills: 3 | Status: SHIPPED | OUTPATIENT
Start: 2024-06-07

## 2024-07-22 DIAGNOSIS — F33.3 SEVERE EPISODE OF RECURRENT MAJOR DEPRESSIVE DISORDER, WITH PSYCHOTIC FEATURES (H): ICD-10-CM

## 2024-07-22 DIAGNOSIS — R63.5 WEIGHT GAIN: ICD-10-CM

## 2024-07-22 RX ORDER — METFORMIN HCL 500 MG
2000 TABLET, EXTENDED RELEASE 24 HR ORAL
Qty: 120 TABLET | Refills: 0 | Status: SHIPPED | OUTPATIENT
Start: 2024-07-22 | End: 2024-08-26

## 2024-08-20 ENCOUNTER — OFFICE VISIT (OUTPATIENT)
Dept: FAMILY MEDICINE | Facility: CLINIC | Age: 34
End: 2024-08-20
Payer: COMMERCIAL

## 2024-08-20 VITALS
BODY MASS INDEX: 34 KG/M2 | WEIGHT: 162 LBS | TEMPERATURE: 98.6 F | HEIGHT: 58 IN | DIASTOLIC BLOOD PRESSURE: 81 MMHG | SYSTOLIC BLOOD PRESSURE: 119 MMHG | RESPIRATION RATE: 20 BRPM | OXYGEN SATURATION: 100 % | HEART RATE: 80 BPM

## 2024-08-20 DIAGNOSIS — F33.3 SEVERE EPISODE OF RECURRENT MAJOR DEPRESSIVE DISORDER, WITH PSYCHOTIC FEATURES (H): ICD-10-CM

## 2024-08-20 DIAGNOSIS — F41.1 GAD (GENERALIZED ANXIETY DISORDER): ICD-10-CM

## 2024-08-20 DIAGNOSIS — R53.83 OTHER FATIGUE: Primary | ICD-10-CM

## 2024-08-20 DIAGNOSIS — Z79.899 LONG TERM CURRENT USE OF ANTIPSYCHOTIC MEDICATION: ICD-10-CM

## 2024-08-20 LAB
ERYTHROCYTE [DISTWIDTH] IN BLOOD BY AUTOMATED COUNT: 13.3 % (ref 10–15)
HBA1C MFR BLD: 4.8 % (ref 0–5.6)
HCT VFR BLD AUTO: 42.7 % (ref 35–47)
HGB BLD-MCNC: 13.3 G/DL (ref 11.7–15.7)
MCH RBC QN AUTO: 27.1 PG (ref 26.5–33)
MCHC RBC AUTO-ENTMCNC: 31.1 G/DL (ref 31.5–36.5)
MCV RBC AUTO: 87 FL (ref 78–100)
PLATELET # BLD AUTO: 303 10E3/UL (ref 150–450)
RBC # BLD AUTO: 4.91 10E6/UL (ref 3.8–5.2)
WBC # BLD AUTO: 7.7 10E3/UL (ref 4–11)

## 2024-08-20 PROCEDURE — 80053 COMPREHEN METABOLIC PANEL: CPT

## 2024-08-20 PROCEDURE — 99214 OFFICE O/P EST MOD 30 MIN: CPT | Mod: GC

## 2024-08-20 PROCEDURE — 83036 HEMOGLOBIN GLYCOSYLATED A1C: CPT

## 2024-08-20 PROCEDURE — 85027 COMPLETE CBC AUTOMATED: CPT

## 2024-08-20 PROCEDURE — 36415 COLL VENOUS BLD VENIPUNCTURE: CPT

## 2024-08-20 NOTE — LETTER
August 22, 2024      Hayde LUJAN Say  [address] [place] [address]        Dear Ms.Say,    We are writing to inform you of your test results.    Metabolic panel/electrolytes/liver enzymes: normal   Blood counts/Hgb: normal   A1c: normal       Resulted Orders   CBC with platelets   Result Value Ref Range    WBC Count 7.7 4.0 - 11.0 10e3/uL    RBC Count 4.91 3.80 - 5.20 10e6/uL    Hemoglobin 13.3 11.7 - 15.7 g/dL    Hematocrit 42.7 35.0 - 47.0 %    MCV 87 78 - 100 fL    MCH 27.1 26.5 - 33.0 pg    MCHC 31.1 (L) 31.5 - 36.5 g/dL    RDW 13.3 10.0 - 15.0 %    Platelet Count 303 150 - 450 10e3/uL   Comprehensive metabolic panel   Result Value Ref Range    Sodium 138 135 - 145 mmol/L    Potassium 4.2 3.4 - 5.3 mmol/L    Carbon Dioxide (CO2) 20 (L) 22 - 29 mmol/L    Anion Gap 13 7 - 15 mmol/L    Urea Nitrogen 12.6 6.0 - 20.0 mg/dL    Creatinine 0.75 0.51 - 0.95 mg/dL    GFR Estimate >90 >60 mL/min/1.73m2      Comment:      eGFR calculated using 2021 CKD-EPI equation.    Calcium 9.7 8.8 - 10.4 mg/dL      Comment:      Reference intervals for this test were updated on 7/16/2024 to reflect our healthy population more accurately. There may be differences in the flagging of prior results with similar values performed with this method. Those prior results can be interpreted in the context of the updated reference intervals.    Chloride 105 98 - 107 mmol/L    Glucose 76 70 - 99 mg/dL    Alkaline Phosphatase 91 40 - 150 U/L    AST 20 0 - 45 U/L    ALT 13 0 - 50 U/L    Protein Total 8.3 6.4 - 8.3 g/dL    Albumin 4.4 3.5 - 5.2 g/dL    Bilirubin Total 0.5 <=1.2 mg/dL   Hemoglobin A1c   Result Value Ref Range    Hemoglobin A1C 4.8 0.0 - 5.6 %      Comment:      Normal <5.7%   Prediabetes 5.7-6.4%    Diabetes 6.5% or higher     Note: Adopted from ADA consensus guidelines.       If you have any questions or concerns, please call the clinic at the number listed above.       Sincerely,      Benjamin Rosenstein, MD

## 2024-08-20 NOTE — PROGRESS NOTES
Preceptor Attestation:   Patient seen, evaluated and discussed with the resident Dr. Aníbal Cruz. I have verified the content of the note, which accurately reflects my assessment of the patient and the plan of care.    Supervising Physician:  Benjamin Rosenstein, MD, MA  [facility]  [facility]

## 2024-08-20 NOTE — PROGRESS NOTES
Assessment & Plan     Hayde is a 33 year old Maryann speaking female with PMHX Mood disorder with psychotic features (on antipsychotic medication) and anxiety presenting for follow up after recently starting metformin for weight loss.    She does repeatedly ask for hair loss medication, although there is no signs of hair loss on exam.    Weight gain   Patient's severe depression is managed with risperidone. She has noted weight gain while on the medication. Her A1c was normal in 2022,  metformin initiated at last visit for weight management associated with antipsychotic use. She has tolerated the medication so far without any reported side effects.  Weight is stable.    Other fatigue  Seems to be a chronic issue.  Most likely secondary to mood disorder as below.  - CBC with platelets    Severe episode of recurrent major depressive disorder, with psychotic features (H)  BRENDA (generalized anxiety disorder)  Long term current use of antipsychotic medication  Rechecking labs to screen for metabolic derangements while on antipsychotic medication.  - CBC  - Comprehensive metabolic panel  - Hemoglobin A1c  - Follow-up in 3 months      Subjective   Hayde is a 33 year old, presenting for the following health issues:  Diabetes (Follow up) and hair loss (Patient want a hair loss medication)    Stays home home with children ages 5-16    Blurry vision  Was prescribed glasses but lost them so has never worn them    Still has fatigue  Does not feel stressed    Mood  When feels, worried or anxious, feels like heart palpitations  No SI    Has not been exercising because she feels tired            8/20/2024     9:54 AM   Additional Questions   Roomed by Kishore         8/20/2024    Information    services provided? Yes   Language Maryann   Type of interpretation provided Face-to-face    name Cirilo ordoñez    [employer]            Objective    /81   Pulse 80   Temp 98.6  F (37  C) (Oral)    "Resp 20   Ht 1.47 m (4' 9.87\")   Wt 73.5 kg (162 lb)   SpO2 100%   BMI 34.01 kg/m    Body mass index is 34.01 kg/m .  Physical Exam     GENERAL: healthy, alert and no distress,   HEAD: scalp negative for scales, inflammation, pustules, or scarring, the hair shafts appear healthy, there is no obvious hair thinning or patterns of loss  RESP: speaking in full sentences, normal work of breathing   CV: extremities well perfused  ABDOMEN: soft, nontender  MS: no gross musculoskeletal defects noted, no edema  PSYCH: mentation appears normal, affect normal/bright, repeatedly insists that she has hair loss which may be a delusion          Signed Electronically by: Aníbal Cruz MD    "

## 2024-08-21 LAB
ALBUMIN SERPL BCG-MCNC: 4.4 G/DL (ref 3.5–5.2)
ALP SERPL-CCNC: 91 U/L (ref 40–150)
ALT SERPL W P-5'-P-CCNC: 13 U/L (ref 0–50)
ANION GAP SERPL CALCULATED.3IONS-SCNC: 13 MMOL/L (ref 7–15)
AST SERPL W P-5'-P-CCNC: 20 U/L (ref 0–45)
BILIRUB SERPL-MCNC: 0.5 MG/DL
BUN SERPL-MCNC: 12.6 MG/DL (ref 6–20)
CALCIUM SERPL-MCNC: 9.7 MG/DL (ref 8.8–10.4)
CHLORIDE SERPL-SCNC: 105 MMOL/L (ref 98–107)
CREAT SERPL-MCNC: 0.75 MG/DL (ref 0.51–0.95)
EGFRCR SERPLBLD CKD-EPI 2021: >90 ML/MIN/1.73M2
GLUCOSE SERPL-MCNC: 76 MG/DL (ref 70–99)
HCO3 SERPL-SCNC: 20 MMOL/L (ref 22–29)
POTASSIUM SERPL-SCNC: 4.2 MMOL/L (ref 3.4–5.3)
PROT SERPL-MCNC: 8.3 G/DL (ref 6.4–8.3)
SODIUM SERPL-SCNC: 138 MMOL/L (ref 135–145)

## 2024-08-26 DIAGNOSIS — F33.3 SEVERE EPISODE OF RECURRENT MAJOR DEPRESSIVE DISORDER, WITH PSYCHOTIC FEATURES (H): ICD-10-CM

## 2024-08-26 DIAGNOSIS — R63.5 WEIGHT GAIN: ICD-10-CM

## 2024-08-27 RX ORDER — METFORMIN HCL 500 MG
2000 TABLET, EXTENDED RELEASE 24 HR ORAL
Qty: 120 TABLET | Refills: 0 | Status: SHIPPED | OUTPATIENT
Start: 2024-08-27 | End: 2024-09-25

## 2024-08-29 ENCOUNTER — ALLIED HEALTH/NURSE VISIT (OUTPATIENT)
Dept: FAMILY MEDICINE | Facility: CLINIC | Age: 34
End: 2024-08-29
Payer: COMMERCIAL

## 2024-08-29 DIAGNOSIS — Z30.42 DEPO-PROVERA CONTRACEPTIVE STATUS: Primary | ICD-10-CM

## 2024-08-29 PROCEDURE — 99207 PR NO CHARGE NURSE ONLY: CPT

## 2024-08-29 PROCEDURE — 96372 THER/PROPH/DIAG INJ SC/IM: CPT

## 2024-08-29 RX ADMIN — MEDROXYPROGESTERONE ACETATE 150 MG: 150 INJECTION, SUSPENSION INTRAMUSCULAR at 09:20

## 2024-08-29 NOTE — PROGRESS NOTES
Clinic Administered Medication Documentation        Patient was given Depo Provera. Prior to medication administration, verified patient's identity using patient s name and date of birth. Please see MAR and medication order for additional information. Patient instructed to remain in clinic for 15 minutes and report any adverse reaction to staff immediately.    Vial/Syringe: Single dose vial. Was entire vial of medication used? Yes    NEXT INJECTION DUE: 11/14/24 - 11/28/24    Name of provider who requested the medication administration: [place]  Name of provider on site (faculty or community preceptor) at the time of performing the medication administration: [place]    Date of next administration: 11/14-11/28/24  Date of next office visit with provider to renew medication plan (must be seen annually): yes

## 2024-09-25 DIAGNOSIS — R63.5 WEIGHT GAIN: ICD-10-CM

## 2024-09-25 DIAGNOSIS — F33.3 SEVERE EPISODE OF RECURRENT MAJOR DEPRESSIVE DISORDER, WITH PSYCHOTIC FEATURES (H): ICD-10-CM

## 2024-09-25 RX ORDER — METFORMIN HCL 500 MG
2000 TABLET, EXTENDED RELEASE 24 HR ORAL
Qty: 120 TABLET | Refills: 0 | Status: SHIPPED | OUTPATIENT
Start: 2024-09-25

## 2024-10-17 DIAGNOSIS — R63.5 WEIGHT GAIN: ICD-10-CM

## 2024-10-17 DIAGNOSIS — F33.3 SEVERE EPISODE OF RECURRENT MAJOR DEPRESSIVE DISORDER, WITH PSYCHOTIC FEATURES (H): ICD-10-CM

## 2024-10-18 RX ORDER — METFORMIN HYDROCHLORIDE 500 MG/1
2000 TABLET, EXTENDED RELEASE ORAL
Qty: 120 TABLET | Refills: 1 | Status: SHIPPED | OUTPATIENT
Start: 2024-10-18

## 2024-11-06 ENCOUNTER — OFFICE VISIT (OUTPATIENT)
Dept: FAMILY MEDICINE | Facility: CLINIC | Age: 34
End: 2024-11-06
Payer: COMMERCIAL

## 2024-11-06 VITALS
OXYGEN SATURATION: 98 % | TEMPERATURE: 98.8 F | SYSTOLIC BLOOD PRESSURE: 106 MMHG | RESPIRATION RATE: 20 BRPM | DIASTOLIC BLOOD PRESSURE: 74 MMHG | HEART RATE: 100 BPM | WEIGHT: 165 LBS | BODY MASS INDEX: 34.63 KG/M2 | HEIGHT: 58 IN

## 2024-11-06 DIAGNOSIS — G44.209 TENSION HEADACHE: ICD-10-CM

## 2024-11-06 DIAGNOSIS — Z30.011 ENCOUNTER FOR INITIAL PRESCRIPTION OF CONTRACEPTIVE PILLS: Primary | ICD-10-CM

## 2024-11-06 PROCEDURE — 99213 OFFICE O/P EST LOW 20 MIN: CPT | Mod: GC

## 2024-11-06 RX ORDER — OMEGA-3 FATTY ACIDS/FISH OIL 300-1000MG
200 CAPSULE ORAL EVERY 4 HOURS PRN
Qty: 100 CAPSULE | Refills: 1 | Status: SHIPPED | OUTPATIENT
Start: 2024-11-06

## 2024-11-06 RX ORDER — NORGESTIMATE AND ETHINYL ESTRADIOL 0.25-0.035
1 KIT ORAL DAILY
Qty: 90 TABLET | Refills: 3 | Status: SHIPPED | OUTPATIENT
Start: 2024-11-06

## 2024-11-06 RX ORDER — ACETAMINOPHEN 500 MG
500-1000 TABLET ORAL EVERY 6 HOURS PRN
Qty: 100 TABLET | Refills: 1 | Status: SHIPPED | OUTPATIENT
Start: 2024-11-06

## 2024-11-06 NOTE — PROGRESS NOTES
"Assessment & Plan     Encounter for initial prescription of contraceptive pills  Currently on Depo-Provera with last injection 8/29/2024.  Has bounced between Depo-Provera and OCP in the past.  Desires to have bleeding each month which she currently does not have on Depo.  Does not desire any future pregnancies. Reviewed reliability of each birth control method and that OCPs require taking a pill consistently every day. Discussed other forms of contraception including implantable such as Nexplanon or IUDs with higher effectiveness. She does not like the idea of an implantable. Also discussed that with her goal to not have any future pregnancies may consider more permanent contraception such as tubal or vasectomy of partner. She says she will consider and let us know if she desires a referral to OB/GYN or have partner come in for vasectomy.   - norgestimate-ethinyl estradiol (ORTHO-CYCLEN) 0.25-35 MG-MCG tablet  Dispense: 90 tablet; Refill: 3    Tension headache  Reports history of tension headaches. Desires prescriptions for Tylenol and ibuprofen to use as needed.   - acetaminophen (TYLENOL) 500 MG tablet  Dispense: 100 tablet; Refill: 1  - ibuprofen (ADVIL/MOTRIN) 200 MG capsule  Dispense: 100 capsule; Refill: 1    BMI  Estimated body mass index is 34.64 kg/m  as calculated from the following:    Height as of this encounter: 1.47 m (4' 9.87\").    Weight as of this encounter: 74.8 kg (165 lb).     Return to discuss permanent forms of birth control if desired.    Chester Lock is a 33 year old, presenting for the following health issues:  RECHECK (Was wondering if she can change Birth control to oral instead of injection. ) and Refill Request        11/6/2024     1:22 PM   Additional Questions   Roomed by Marianne MEEKS         11/6/2024    Information    services provided? Yes   Kenrick Wolf   Type of interpretation provided Telephone    [employer]    phone number " "[phone]     HPI     Birth control: on depo provera right now. Last got Depo on 8/29/24. Doesn't have any bleeding and wants to have a form of birth control where she does have bleeding each month.  Also felt like she had additional weight gain on Depo versus pill. Has been on oral birth control pills in past (ortho-cylcen) though has been switching back and forth between the pill and Depo.  Says has not been doing most consistent with taking pills/tries to take every day.     Not a smoker. No history of migraines with aura. No high blood pressure.   Does not plan to have any future pregnancies.      Objective    /74 (BP Location: Right arm, Patient Position: Sitting, Cuff Size: Adult Regular)   Pulse 100   Temp 98.8  F (37.1  C) (Tympanic)   Resp 20   Ht 1.47 m (4' 9.87\")   Wt 74.8 kg (165 lb)   SpO2 98%   BMI 34.64 kg/m    Body mass index is 34.64 kg/m .    Physical Exam   GENERAL: alert and no distress  EYES: Eyes grossly normal to inspection.  No discharge or erythema, or obvious scleral/conjunctival abnormalities.  HENT: Normal cephalic/atraumatic.  External ears, nose and mouth without ulcers or lesions.  No nasal drainage visible.  RESP: No audible wheeze, cough, or visible cyanosis.  No visible retractions or increased work of breathing.    NEURO: Cranial nerves grossly intact.  Mentation and speech appropriate for age.      Signed Electronically by: Maya Hdez MD    "

## 2024-11-25 DIAGNOSIS — J30.2 ACUTE SEASONAL ALLERGIC RHINITIS: ICD-10-CM

## 2024-11-25 RX ORDER — MONTELUKAST SODIUM 10 MG/1
10 TABLET ORAL DAILY
Qty: 90 TABLET | Refills: 0 | Status: CANCELLED | OUTPATIENT
Start: 2024-11-25

## 2024-11-25 NOTE — TELEPHONE ENCOUNTER
Message to physician:     Date of last visit: 11/6/2024    Date of next visit if scheduled:     Potassium   Date Value Ref Range Status   08/20/2024 4.2 3.4 - 5.3 mmol/L Final   11/16/2022 3.9 3.4 - 5.3 mmol/L Final   02/20/2019 3.6 3.4 - 5.3 mmol/L Final     Creatinine   Date Value Ref Range Status   08/20/2024 0.75 0.51 - 0.95 mg/dL Final   02/20/2019 0.8 0.6 - 1.3 mg/dL Final     GFR Estimate   Date Value Ref Range Status   08/20/2024 >90 >60 mL/min/1.73m2 Final     Comment:     eGFR calculated using 2021 CKD-EPI equation.   08/18/2018 >60 >60 mL/min/1.73m2 Final       BP Readings from Last 3 Encounters:   11/06/24 106/74   08/20/24 119/81   04/04/24 129/79       Hemoglobin A1C   Date Value Ref Range Status   08/20/2024 4.8 0.0 - 5.6 % Final     Comment:     Normal <5.7%   Prediabetes 5.7-6.4%    Diabetes 6.5% or higher     Note: Adopted from ADA consensus guidelines.       Please complete refill and CLOSE ENCOUNTER.  Closing the encounter signifies the refill is complete.

## 2025-02-06 DIAGNOSIS — L29.9 GENERALIZED PRURITUS: ICD-10-CM

## 2025-02-10 RX ORDER — CETIRIZINE HYDROCHLORIDE 10 MG/1
10 TABLET ORAL 2 TIMES DAILY
Qty: 120 TABLET | Refills: 5 | Status: SHIPPED | OUTPATIENT
Start: 2025-02-10

## 2025-03-05 ENCOUNTER — PATIENT OUTREACH (OUTPATIENT)
Dept: CARE COORDINATION | Facility: CLINIC | Age: 35
End: 2025-03-05
Payer: COMMERCIAL

## 2025-03-19 ENCOUNTER — PATIENT OUTREACH (OUTPATIENT)
Dept: CARE COORDINATION | Facility: CLINIC | Age: 35
End: 2025-03-19
Payer: COMMERCIAL

## 2025-04-10 DIAGNOSIS — J30.2 ACUTE SEASONAL ALLERGIC RHINITIS: ICD-10-CM

## 2025-04-15 NOTE — NURSING NOTE
"Chief Complaint   Patient presents with     Prenatal Care     20w 2d. No other concerns today.      Medication Reconciliation     pharm d to review.        BP (!) 89/60  Pulse 84  Temp 98  F (36.7  C) (Oral)  Ht 4' 9.48\" (146 cm)  Wt 140 lb (63.5 kg)  LMP  (LMP Unknown)  SpO2 100%  BMI 29.79 kg/m2       Due to patient being non-English speaking/uses sign language, an  was used for this visit. Only for face-to-face interpretation by an external agency, date and length of interpretation can be found on the scanned worksheet.       name: Frances England  Language: Maryann  Agency: [employer]  Phone number: [phone]  Type of interpretation: Face to Face spoken      Injectable influenza vaccine documentation    1. Has the patient received the information for the influenza vaccine? YES    2. Does the patient have a severe allergy to eggs (Patients with a severe egg allergy should be assessed by a medical provider, RN, or clinical pharmacist. If they receive the influenza vaccine, please have them observed for 15 minutes.)? No    3. Has the patient had an allergic reaction to previous influenza vaccines? No    4. Has the patient had any severe allergic reactions to past influenza vaccines ? No       5. Does patient have a history of Guillain-[place] syndrome? No      Based on responses above, I administered the influenza vaccine.  Andrew Rice CMA    " Detail Level: Zone Detail Level: Detailed Detail Level: Generalized

## 2025-04-22 RX ORDER — MONTELUKAST SODIUM 10 MG/1
10 TABLET ORAL DAILY
Qty: 30 TABLET | Refills: 0 | Status: SHIPPED | OUTPATIENT
Start: 2025-04-22

## 2025-05-08 ENCOUNTER — OFFICE VISIT (OUTPATIENT)
Dept: FAMILY MEDICINE | Facility: CLINIC | Age: 35
End: 2025-05-08
Payer: COMMERCIAL

## 2025-05-08 VITALS
TEMPERATURE: 97.8 F | SYSTOLIC BLOOD PRESSURE: 105 MMHG | DIASTOLIC BLOOD PRESSURE: 73 MMHG | RESPIRATION RATE: 16 BRPM | HEART RATE: 80 BPM | BODY MASS INDEX: 35.28 KG/M2 | WEIGHT: 175 LBS | OXYGEN SATURATION: 97 % | HEIGHT: 59 IN

## 2025-05-08 DIAGNOSIS — Z79.899 LONG TERM CURRENT USE OF ANTIPSYCHOTIC MEDICATION: Primary | ICD-10-CM

## 2025-05-08 DIAGNOSIS — G89.29 CHRONIC BILATERAL LOW BACK PAIN WITHOUT SCIATICA: ICD-10-CM

## 2025-05-08 DIAGNOSIS — R63.5 WEIGHT GAIN: ICD-10-CM

## 2025-05-08 DIAGNOSIS — M54.50 CHRONIC BILATERAL LOW BACK PAIN WITHOUT SCIATICA: ICD-10-CM

## 2025-05-08 DIAGNOSIS — G44.209 TENSION HEADACHE: ICD-10-CM

## 2025-05-08 DIAGNOSIS — F33.3 SEVERE EPISODE OF RECURRENT MAJOR DEPRESSIVE DISORDER, WITH PSYCHOTIC FEATURES (H): ICD-10-CM

## 2025-05-08 PROCEDURE — 3078F DIAST BP <80 MM HG: CPT

## 2025-05-08 PROCEDURE — 99214 OFFICE O/P EST MOD 30 MIN: CPT | Mod: GC

## 2025-05-08 PROCEDURE — 3074F SYST BP LT 130 MM HG: CPT

## 2025-05-08 RX ORDER — OMEGA-3 FATTY ACIDS/FISH OIL 300-1000MG
400 CAPSULE ORAL EVERY 8 HOURS PRN
Qty: 100 CAPSULE | Refills: 1 | Status: SHIPPED | OUTPATIENT
Start: 2025-05-08

## 2025-05-08 RX ORDER — METFORMIN HYDROCHLORIDE 500 MG/1
2000 TABLET, EXTENDED RELEASE ORAL
Qty: 120 TABLET | Refills: 5 | Status: SHIPPED | OUTPATIENT
Start: 2025-05-08

## 2025-05-08 RX ORDER — ACETAMINOPHEN 500 MG
500-1000 TABLET ORAL EVERY 6 HOURS PRN
Qty: 100 TABLET | Refills: 1 | Status: SHIPPED | OUTPATIENT
Start: 2025-05-08

## 2025-05-08 ASSESSMENT — PATIENT HEALTH QUESTIONNAIRE - PHQ9: SUM OF ALL RESPONSES TO PHQ QUESTIONS 1-9: 12

## 2025-05-08 NOTE — PROGRESS NOTES
Assessment & Plan     Long term current use of antipsychotic medication  Severe episode of recurrent major depressive disorder, with psychotic features (H)  Ongoing depression which has mostly been manifesting as fatigue for her.  Sees a therapist through [facility] that also comes out to her house.  She is on an antipsychotic that is not prescribed by our clinic.    - Follow-up in 3 months    Weight gain  She has been on metformin for antipsychotic induced weight gain and tolerated it well.  She is on contraception and recommended she discontinue metformin if she ever becomes pregnant.  - metFORMIN (GLUCOPHAGE XR) 500 MG 24 hr tablet  Dispense: 120 tablet; Refill: 5    Tension headache  Chronic bilateral low back pain without sciatica  - acetaminophen (TYLENOL) 500 MG tablet  Dispense: 100 tablet; Refill: 1  - ibuprofen (ADVIL/MOTRIN) 200 MG capsule  Dispense: 100 capsule; Refill: 1            5/8/2025     9:29 AM   PHQ   PHQ-9 Total Score 12   Q9: Thoughts of better off dead/self-harm past 2 weeks Several days     Follows with Shawn.  Denies SI today.  Encouraged outdoor activity now that the weather is nice.    Chester Lock is a 34 year old, presenting for the following health issues:  Refill Request (Refill tylenol, Ibuprofen, metformin. 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   goes to work  No SI  Scared to go out on walks    Still goes to the [facility], sees a therapist there, and they also come to her house  Still on medicine for mood  Has a pill organizer  Thinks there is 2 pills in the morning, maybe 3 in the evening    Desires to continue metformin for weight gain  Takes a birth control daily            5/26/2023     8:51 AM 4/4/2024     1:59 PM 5/8/2025     9:29 AM   PHQ   PHQ-9 Total Score 8 10 12   Q9: Thoughts of better off dead/self-harm past 2 weeks Several days Not at all Several days            5/8/2025     9:28 AM   Additional Questions   Roomed  "by Teresa   Accompanied by Self         5/8/2025    Information    services provided? No           Objective    /73 (BP Location: Right arm)   Pulse 80   Temp 97.8  F (36.6  C) (Tympanic)   Resp 16   Ht 1.49 m (4' 10.66\")   Wt 79.4 kg (175 lb)   SpO2 97%   BMI 35.76 kg/m    Body mass index is 35.76 kg/m .  Physical Exam     GENERAL: healthy, alert and no distress  RESP: speaking in full sentences, normal work of breathing   CV: extremities appear well perfused  MS: no gross musculoskeletal defects noted  PSYCH: mentation appears normal, affect normal/bright             Signed Electronically by: Aníbal Cruz MD    "

## 2025-05-08 NOTE — PROGRESS NOTES
Preceptor Attestation:   Patient seen, evaluated and discussed with the resident. I have verified the content of the note, which accurately reflects my assessment of the patient and the plan of care.    Supervising Physician:Maryann Lim MD    [facility]

## 2025-05-22 DIAGNOSIS — J30.2 ACUTE SEASONAL ALLERGIC RHINITIS: ICD-10-CM

## 2025-05-27 RX ORDER — MONTELUKAST SODIUM 10 MG/1
10 TABLET ORAL DAILY PRN
Qty: 30 TABLET | Refills: 0 | Status: SHIPPED | OUTPATIENT
Start: 2025-05-27

## 2025-06-03 DIAGNOSIS — K59.09 CHRONIC CONSTIPATION: ICD-10-CM

## 2025-06-03 RX ORDER — SENNA AND DOCUSATE SODIUM 50; 8.6 MG/1; MG/1
1 TABLET, FILM COATED ORAL AT BEDTIME
Qty: 90 TABLET | Refills: 0 | Status: SHIPPED | OUTPATIENT
Start: 2025-06-03

## 2025-06-23 NOTE — TELEPHONE ENCOUNTER
----- Message from Agustina Gramajo sent at 4/5/2018  3:09 PM CDT -----  Call pt with reminder to bring meds for appt on April 13th   Order signed    Veronica Orlando MD